# Patient Record
Sex: MALE | Race: WHITE | NOT HISPANIC OR LATINO | Employment: OTHER | URBAN - METROPOLITAN AREA
[De-identification: names, ages, dates, MRNs, and addresses within clinical notes are randomized per-mention and may not be internally consistent; named-entity substitution may affect disease eponyms.]

---

## 2017-10-18 ENCOUNTER — ALLSCRIPTS OFFICE VISIT (OUTPATIENT)
Dept: OTHER | Facility: OTHER | Age: 57
End: 2017-10-18

## 2017-10-19 NOTE — PROGRESS NOTES
Assessment  1  Abdominal pain, epigastric (847 29) (R10 13)    Plan  Abdominal pain, epigastric    · Follow-up PRN Evaluation and Treatment  Follow-up  Status: Complete  Done:  52FSC2009 09:03AM   Ordered; For: Abdominal pain, epigastric; Ordered By: Jose Bee Performed:  Due: 68CAP3933    Discussion/Summary  Discussion Summary:   50-year-old male who previous history of diverticulitis who now has epigastric discomfort and burning  I told him it sounds more upper GI related  I told him he should try either an antacid such as Tums or an over-the-counter Zantac or Prilosec  I told the try that for couple weeks and see if things feel better  If he has further problems to give us call  Goals and Barriers: The patient has the current Goals: Become pain free  The patent has the current Barriers: None  Patient's Capacity to Self-Care: Patient is able to Self-Care  Patient Education: Educational resources provided: Verbal instructions given  Medication SE Review and Pt Understands Tx: The treatment plan was reviewed with the patient/guardian  The patient/guardian understands and agrees with the treatment plan   Self Referrals:   Self Referrals: No Previous Dr Patricia Suarez patient  Chief Complaint  Chief Complaint Free Text Note Form: f/u diverticulitis last year  C/o abdominal pain  History of Present Illness  HPI: 50-year-old male history of diverticulitis  Now having epigastric burning sensation  He notices after coffee or tea in the morning  Gets better when he eat some food  This may come back later on in the day however  Tells me it is similar to previous diverticulitis      Review of Systems  Complete-Male:   Constitutional: No fever or chills, feels well, no tiredness, no recent weight gain or weight loss  Eyes: No complaints of eye pain, no red eyes, no discharge from eyes, no itchy eyes     ENT: no complaints of earache, no hearing loss, no nosebleeds, no nasal discharge, no sore throat, no hoarseness  Cardiovascular: No complaints of slow heart rate, no fast heart rate, no chest pain, no palpitations, no leg claudication, no lower extremity  Respiratory: No complaints of shortness of breath, no wheezing, no cough, no SOB on exertion, no orthopnea or PND  Gastrointestinal: as noted in HPI  Genitourinary: No complaints of dysuria, no incontinence, no hesitancy, no nocturia, no genital lesion, no testicular pain  Musculoskeletal: No complaints of arthralgia, no myalgias, no joint swelling or stiffness, no limb pain or swelling  Integumentary: No complaints of skin rash or skin lesions, no itching, no skin wound, no dry skin  Neurological: No compliants of headache, no confusion, no convulsions, no numbness or tingling, no dizziness or fainting, no limb weakness, no difficulty walking  Psychiatric: Is not suicidal, no sleep disturbances, no anxiety or depression, no change in personality, no emotional problems  Endocrine: No complaints of proptosis, no hot flashes, no muscle weakness, no erectile dysfunction, no deepening of the voice, no feelings of weakness  Hematologic/Lymphatic: No complaints of swollen glands, no swollen glands in the neck, does not bleed easily, no easy bruising  Active Problems  1  Acromioclavicular joint arthritis (716 91) (M19 019)   2  Benign prostatic hyperplasia (600 00) (N40 0)   3  Chronic pain of left ankle (835 83,464 53) (M25 572,G89 29)   4  Diverticulitis of colon (562 11) (K57 32)   5  Encounter for prostate cancer screening (V76 44) (Z12 5)   6  Lumbosacral pain (724 2,724 6) (M54 5)   7  Neuropathic pain of thigh, right (355 8) (G57 91)    Past Medical History  1  History of Chronic right shoulder pain (719 41,338 29) (M25 511,G89 29)   2  History of diverticulitis (V12 70) (Z87 19)  Active Problems And Past Medical History Reviewed: The active problems and past medical history were reviewed and updated today  Surgical History  1  History of Ankle Surgery  Surgical History Reviewed: The surgical history was reviewed and updated today  Family History  Family History    1  No pertinent family history  Family History Reviewed: The family history was reviewed and updated today  Social History   · Always uses seat belt   ·    · Never a smoker   · No alcohol use   · No drug use  Social History Reviewed: The social history was reviewed and updated today  Current Meds   1  Cardura 1 MG Oral Tablet; TAKE 1 TABLET AT BEDTIME; Therapy: (Recorded:11Rph2807) to Recorded   2  Tamsulosin HCl - 0 4 MG Oral Capsule; take 1tablet by mouth at bedtime MDD:1 TDD:1;   Therapy: 78EFS3933 to (Last RX:80CGT0954)  Requested for: 12XRV4299 Ordered    Allergies  1  Penicillins    Physical Exam    Constitutional   General appearance: No acute distress, well appearing and well nourished  Eyes   Conjunctiva and lids: No swelling, erythema, or discharge  Ears, Nose, Mouth, and Throat   External inspection of ears and nose: Normal     Neck   Supple, symmetric, trachea midline, no masses   Pulmonary   Respiratory effort: No increased work of breathing or signs of respiratory distress  Auscultation of lungs: Clear to auscultation, equal breath sounds bilaterally, no wheezes, no rales, no rhonci  Cardiovascular   Auscultation of heart: Normal rate and rhythm, normal S1 and S2, without murmurs  Examination of extremities for edema and/or varicosities: Normal     Carotid pulses: Normal     Abdomen   Abdomen: Non-tender, no masses  Liver and spleen: No hepatomegaly or splenomegaly  Lymphatic   Palpation of lymph nodes in neck: No lymphadenopathy  Musculoskeletal   Gait and station: Normal     Extremities: No clubbing, no cyanosis, no edema   Neurologic   Sensation: Motor and sensory grossly intact      Psychiatric   Orientation to person, place and time: Normal     Mood and affect: Normal        Signatures   Electronically signed by : Carlos James MD; Oct 18 2017  9:05AM EST                       (Author)

## 2017-11-30 ENCOUNTER — GENERIC CONVERSION - ENCOUNTER (OUTPATIENT)
Dept: OTHER | Facility: OTHER | Age: 57
End: 2017-11-30

## 2017-12-18 ENCOUNTER — GENERIC CONVERSION - ENCOUNTER (OUTPATIENT)
Dept: OTHER | Facility: OTHER | Age: 57
End: 2017-12-18

## 2018-01-09 NOTE — RESULT NOTES
Verified Results  * CT Abdomen/Pelvis With Contrast [id number] 08:03PM Antonio Corona     Test Name Result Flag Reference   CT Abd/Pelvis W/ (Report)     [address] [facility];;[place];[id number]   01/08/2016 2002 01/08/2016 2022       CT ABDOMEN AND PELVIS WITH IV CONTRAST     INDICATION- Abdominal pain and fever recent history of perforation  COMPARISON- December 31, 2015     TECHNIQUE- CT examination of the abdomen and pelvis was performed  Examination was performed utilizing techniques to minimize radiation   dose, including the use of dose reduction software  100 ml of Omnipaque 350 was injected intravenously  Axial and coronal   reformatted images were created  Enteric contrast was administered  FINDINGS-     ABDOMEN     LOWER CHEST- No significant abnormalities at the lung bases  LIVER/BILIARY TREE- Unremarkable  GALLBLADDER- No calcified gallstones  No pericholecystic inflammatory   change  SPLEEN- Normal      PANCREAS- Unremarkable  ADRENAL GLANDS- Unremarkable  KIDNEYS/URETERS- Unremarkable  No hydronephrosis  STOMACH AND BOWEL- Stomach is unremarkable  There is thickening of the   distal ileal segment immediately adjacent to a large pelvic abscess   collection  The focal region of thickening within the sigmoid colon   has shown slight interval improvement there remains extensive   pericolonic inflammatory changes within the midline of the pelvis   compatible with recent history of diverticulitis  Free air noted on   the prior study has resolved  APPENDIX- No findings to suggest appendicitis  ABDOMINOPELVIC CAVITY- Within the right pelvis interposed between the   sigmoid colon and several small bowel segments is a large abscess   collection measuring 4 8 x 4 8 cm  Focal collection of air noted   immediately adjacent to the sigmoid colon measures 2 2 x 2 0 cm  This   may represent a 2nd smaller abscess or giant diverticulum    There is   no lymphadenopathy  VESSELS- Unremarkable for patient's age  PELVIS     REPRODUCTIVE ORGANS- Unremarkable for patient's age  URINARY BLADDER- Unremarkable  ABDOMINAL WALL/INGUINAL REGIONS- Unremarkable  OSSEOUS STRUCTURES- No acute fracture or destructive osseous lesion  IMPRESSION-      5 cm right pelvic abscess collection with 2nd smaller 2 cm collection   of air which may represent 2nd smaller abscess or giant diverticulum  The patient's sigmoid diverticulitis has shown slight interval   improvement when compared to prior study         Findings were discussed with Dr Sarina Henderson at time of dictation       Transcribed on- MARELY Bautista MD   Reading Radiologist- MARELY Aparicio MD   Electronically Signed- MARELY Aparicio MD   Released Date Time- 01/08/16 2042   ------------------------------------------------------------------------------   9336^AMBROSIO ALFRED   9336^AMBROSIO ALFRED

## 2018-01-15 NOTE — PROGRESS NOTES
Patient Health Assessment    Date:            11/30/2017  Blood Pressure:  130/86  Pulse:           61  Age:             57  Weight:          165 lbs  Height/Length:   5' 8"  Body Mass Index: 25 1  Provider:        [email or web address]  Clinic:          [facility]:  Medications: Cardura  Allergies:      Penicillin  Since Last Visit: Medical Alert: No Change    Medications: No Change    Allergies:        No Change  Pain Scale Type: Numeric Pain ScalePain Level: 0  Description: pt presents for er exam: rmh: no sig med history, all: pcn, pt  reports pain on upper left side  ioe shows #15 MO caries to pulp, perio involvement, no opposing #18  eoe wnl  discussed treatment options with pt, need for ext or rct, reviewed finances for   rct, pt elects for ext  IRM placed #15 occlusion taken down  nv: comp care  nnv: ext #15    ----- Signed on Thursday, November 30, 2017 at 8:58:57 AM  -----  ----- Provider: Patricio Paula DDS -- Clinic: [facility] -----

## 2018-01-17 NOTE — RESULT NOTES
Message   benign polyps  Colonoscopy in five years  Verified Results  (1) TISSUE EXAM [id number] 10:57AM Divya Lindquist     Test Name Result Flag Reference   LAB AP CASE REPORT (Report)     Surgical Pathology Report             Case: [id number]                   Authorizing Provider: Jia Jose MD      Collected:      05/05/2016 1057        Ordering Location:   [address] [place] [address]    Received:      05/06/2016 [facility]                            Pathologist:      Roxy Yang MD                                Specimens:  A) - Polyp, Colorectal, cold snare, ascending colon, polyp                       B) - Polyp, Colorectal, hot snare, sigmoid, polyp   LAB AP FINAL DIAGNOSIS      A  Polyp, Colorectal, cold snare, ascending colon, polyp:  -Tubular adenoma  -No evidence of high grade dysplasia or malignancy seen  B  Polyp, Colorectal, hot snare, sigmoid, polyp:  -Hyperplastic polyp   -No evidence of adenomatous change or malignancy  LAB AP NOTE      Interpretation performed at [facility], [facility]   LAB AP SURGICAL ADDITIONAL INFORMATION (Report)     These tests were developed and their performance characteristics   determined by [facility] or First Opinion  They may not be cleared or approved by the [place]  Food and   Drug Administration  The FDA has determined that such clearance or   approval is not necessary  These tests are used for clinical purposes  They should not be regarded as investigational or for research  This   laboratory has been approved by CLIA 88, designated as a high-complexity   laboratory and is qualified to perform these tests  LAB AP GROSS DESCRIPTION (Report)     A  The specimen is received in formalin, labeled with the patient's name   and hospital number, and is designated ascending colon polyp   The   specimen consists of one tan-pink polypoid soft tissue fragment measuring 0 4 centimeters in greatest dimension  Entirely submitted  One cassette  B  The specimen is received in formalin, labeled with the patient's name   and hospital number, and is designated sigmoid polyp  The specimen   consists of one pink-red polypoid soft tissue fragment measuring 1 0 by   0 8 by 0 4 centimeters  The margin of resection is inked blue  The   specimen is bisected revealing pink-red dense cut surfaces  Entirely   submitted  One cassette  Note: The estimated total formalin fixation time based upon information   provided by the submitting clinician and the standard processing schedule   is 33 5 hours    MAS   LAB AP CLINICAL INFORMATION polyp     polyp

## 2018-01-23 NOTE — PROGRESS NOTES
Patient Health Assessment    Date:            12/18/2017  Blood Pressure:  120/75  Pulse:           0  Age:             65  Weight:          165 lbs  Height/Length:   5' 8"  Body Mass Index: 25 1  Provider:        [id number]  Cl[facility]:          [facility] Patient Health Assessment    Date:            12/18/2017  Blood Pressure:  120/75  Pulse:           70  Age:             57  Weight:          165 lbs  Height/Length:   5' 8"  Body Mass Index: 25 1  Provider:        [email or web address]  Clinic:          [facility]:  Medications: Cardura  Allergies:      Penicillin  Since Last Visit: Medical Alert: No Change    Medications: No Change    Allergies:        No Change  Pain Scale Type: Numeric Pain ScalePain Level: 0  Description:    Pt presents for limited exam with CC ''temporary filling came out in UL''  PMH reviewed, no changes  No radiographs taken  Tooth #15 pre-existing MO IRM  restoration chipped  No IO swelling, Palpation (-),  percussion (+), cold  (+)  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  Pt would like to  save the tooth, discussed R/B/P of restoring #15, Pt informed that the  prognosis becomes poorer the longer he waits  Pt tx planned for #15 temporary  restoration, #15 RCT, P&C, and [place]  Pt signed tx plan- uploaded to DC  Pt  also expressed interest in crowing #4 and 5     administered 0 5 carps 2% lido 1:100k epi via local infiltration  removed old  IRM restoration  Caries removed with round carbide on slow speed  Restored with   IRM  Verified Contacts and occlusion  Pt left ambulatory and alert      NV: Start #15 RCT(80 mins)    BH/MQ    ----- Signed on Monday, December 18, 2017 at 3:51:45 PM  -----  ----- Provider: [email or web address] - Resident Two, Dentist -- Clinic: [facility] -----
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
## 2018-05-18 ENCOUNTER — HOSPITAL ENCOUNTER (INPATIENT)
Facility: HOSPITAL | Age: 58
LOS: 3 days | Discharge: LEFT BEFORE DISCHARGE | DRG: 392 | End: 2018-05-21
Attending: EMERGENCY MEDICINE | Admitting: SURGERY
Payer: COMMERCIAL

## 2018-05-18 ENCOUNTER — APPOINTMENT (EMERGENCY)
Dept: RADIOLOGY | Facility: HOSPITAL | Age: 58
DRG: 392 | End: 2018-05-18
Payer: COMMERCIAL

## 2018-05-18 DIAGNOSIS — K57.20 DIVERTICULITIS OF LARGE INTESTINE WITH PERFORATION WITHOUT BLEEDING: Primary | ICD-10-CM

## 2018-05-18 LAB
ALBUMIN SERPL BCP-MCNC: 3.6 G/DL (ref 3.5–5)
ALP SERPL-CCNC: 79 U/L (ref 46–116)
ALT SERPL W P-5'-P-CCNC: 20 U/L (ref 12–78)
ANION GAP SERPL CALCULATED.3IONS-SCNC: 7 MMOL/L (ref 4–13)
AST SERPL W P-5'-P-CCNC: 12 U/L (ref 5–45)
BACTERIA UR QL AUTO: ABNORMAL /HPF
BASOPHILS # BLD AUTO: 0.02 THOUSANDS/ΜL (ref 0–0.1)
BASOPHILS NFR BLD AUTO: 0 % (ref 0–1)
BILIRUB SERPL-MCNC: 0.86 MG/DL (ref 0.2–1)
BILIRUB UR QL STRIP: ABNORMAL
BUN SERPL-MCNC: 16 MG/DL (ref 5–25)
CALCIUM SERPL-MCNC: 8.6 MG/DL (ref 8.3–10.1)
CHLORIDE SERPL-SCNC: 105 MMOL/L (ref 100–108)
CLARITY UR: CLEAR
CO2 SERPL-SCNC: 25 MMOL/L (ref 21–32)
COLOR UR: YELLOW
COLOR, POC: NORMAL
CREAT SERPL-MCNC: 0.82 MG/DL (ref 0.6–1.3)
EOSINOPHIL # BLD AUTO: 0.06 THOUSAND/ΜL (ref 0–0.61)
EOSINOPHIL NFR BLD AUTO: 0 % (ref 0–6)
ERYTHROCYTE [DISTWIDTH] IN BLOOD BY AUTOMATED COUNT: 13.3 % (ref 11.6–15.1)
GFR SERPL CREATININE-BSD FRML MDRD: 98 ML/MIN/1.73SQ M
GLUCOSE SERPL-MCNC: 113 MG/DL (ref 65–140)
GLUCOSE UR STRIP-MCNC: NEGATIVE MG/DL
HCT VFR BLD AUTO: 42.2 % (ref 36.5–49.3)
HGB BLD-MCNC: 14.1 G/DL (ref 12–17)
HGB UR QL STRIP.AUTO: NEGATIVE
HYALINE CASTS #/AREA URNS LPF: ABNORMAL /LPF
KETONES UR STRIP-MCNC: ABNORMAL MG/DL
LEUKOCYTE ESTERASE UR QL STRIP: NEGATIVE
LIPASE SERPL-CCNC: 163 U/L (ref 73–393)
LYMPHOCYTES # BLD AUTO: 1.48 THOUSANDS/ΜL (ref 0.6–4.47)
LYMPHOCYTES NFR BLD AUTO: 8 % (ref 14–44)
MCH RBC QN AUTO: 31 PG (ref 26.8–34.3)
MCHC RBC AUTO-ENTMCNC: 33.4 G/DL (ref 31.4–37.4)
MCV RBC AUTO: 93 FL (ref 82–98)
MONOCYTES # BLD AUTO: 1.28 THOUSAND/ΜL (ref 0.17–1.22)
MONOCYTES NFR BLD AUTO: 7 % (ref 4–12)
NEUTROPHILS # BLD AUTO: 16.81 THOUSANDS/ΜL (ref 1.85–7.62)
NEUTS SEG NFR BLD AUTO: 85 % (ref 43–75)
NITRITE UR QL STRIP: NEGATIVE
NON-SQ EPI CELLS URNS QL MICRO: ABNORMAL /HPF
NRBC BLD AUTO-RTO: 0 /100 WBCS
PH UR STRIP.AUTO: 6 [PH] (ref 4.5–8)
PLATELET # BLD AUTO: 254 THOUSANDS/UL (ref 149–390)
PMV BLD AUTO: 10.8 FL (ref 8.9–12.7)
POTASSIUM SERPL-SCNC: 3.6 MMOL/L (ref 3.5–5.3)
PROT SERPL-MCNC: 7.7 G/DL (ref 6.4–8.2)
PROT UR STRIP-MCNC: ABNORMAL MG/DL
RBC # BLD AUTO: 4.55 MILLION/UL (ref 3.88–5.62)
RBC #/AREA URNS AUTO: ABNORMAL /HPF
SODIUM SERPL-SCNC: 137 MMOL/L (ref 136–145)
SP GR UR STRIP.AUTO: >=1.03 (ref 1–1.03)
UROBILINOGEN UR QL STRIP.AUTO: 1 E.U./DL
WBC # BLD AUTO: 19.7 THOUSAND/UL (ref 4.31–10.16)
WBC #/AREA URNS AUTO: ABNORMAL /HPF

## 2018-05-18 PROCEDURE — 96375 TX/PRO/DX INJ NEW DRUG ADDON: CPT

## 2018-05-18 PROCEDURE — 99223 1ST HOSP IP/OBS HIGH 75: CPT | Performed by: PHYSICIAN ASSISTANT

## 2018-05-18 PROCEDURE — 82272 OCCULT BLD FECES 1-3 TESTS: CPT

## 2018-05-18 PROCEDURE — 99285 EMERGENCY DEPT VISIT HI MDM: CPT

## 2018-05-18 PROCEDURE — 83690 ASSAY OF LIPASE: CPT | Performed by: EMERGENCY MEDICINE

## 2018-05-18 PROCEDURE — 96365 THER/PROPH/DIAG IV INF INIT: CPT

## 2018-05-18 PROCEDURE — 81002 URINALYSIS NONAUTO W/O SCOPE: CPT | Performed by: EMERGENCY MEDICINE

## 2018-05-18 PROCEDURE — 85025 COMPLETE CBC W/AUTO DIFF WBC: CPT | Performed by: EMERGENCY MEDICINE

## 2018-05-18 PROCEDURE — 80053 COMPREHEN METABOLIC PANEL: CPT | Performed by: EMERGENCY MEDICINE

## 2018-05-18 PROCEDURE — 36415 COLL VENOUS BLD VENIPUNCTURE: CPT | Performed by: EMERGENCY MEDICINE

## 2018-05-18 PROCEDURE — 74177 CT ABD & PELVIS W/CONTRAST: CPT

## 2018-05-18 PROCEDURE — 81001 URINALYSIS AUTO W/SCOPE: CPT

## 2018-05-18 RX ORDER — DOXAZOSIN MESYLATE 4 MG/1
4 TABLET ORAL
Status: DISCONTINUED | OUTPATIENT
Start: 2018-05-18 | End: 2018-05-21 | Stop reason: HOSPADM

## 2018-05-18 RX ORDER — MORPHINE SULFATE 4 MG/ML
4 INJECTION, SOLUTION INTRAMUSCULAR; INTRAVENOUS ONCE
Status: COMPLETED | OUTPATIENT
Start: 2018-05-18 | End: 2018-05-18

## 2018-05-18 RX ORDER — OXYCODONE HYDROCHLORIDE 10 MG/1
10 TABLET ORAL EVERY 4 HOURS PRN
Status: DISCONTINUED | OUTPATIENT
Start: 2018-05-18 | End: 2018-05-21 | Stop reason: HOSPADM

## 2018-05-18 RX ORDER — DOXAZOSIN MESYLATE 4 MG/1
4 TABLET ORAL
COMMUNITY
End: 2018-11-06

## 2018-05-18 RX ORDER — MORPHINE SULFATE 4 MG/ML
4 INJECTION, SOLUTION INTRAMUSCULAR; INTRAVENOUS ONCE
Status: DISCONTINUED | OUTPATIENT
Start: 2018-05-18 | End: 2018-05-18

## 2018-05-18 RX ORDER — ERGOCALCIFEROL (VITAMIN D2) 1250 MCG
50000 CAPSULE ORAL WEEKLY
COMMUNITY
Start: 2018-05-08 | End: 2018-08-21 | Stop reason: HOSPADM

## 2018-05-18 RX ORDER — CIPROFLOXACIN 2 MG/ML
400 INJECTION, SOLUTION INTRAVENOUS ONCE
Status: COMPLETED | OUTPATIENT
Start: 2018-05-18 | End: 2018-05-18

## 2018-05-18 RX ORDER — ERGOCALCIFEROL 1.25 MG/1
50000 CAPSULE ORAL WEEKLY
Status: DISCONTINUED | OUTPATIENT
Start: 2018-05-24 | End: 2018-05-21 | Stop reason: HOSPADM

## 2018-05-18 RX ORDER — ONDANSETRON 2 MG/ML
4 INJECTION INTRAMUSCULAR; INTRAVENOUS EVERY 6 HOURS PRN
Status: DISCONTINUED | OUTPATIENT
Start: 2018-05-18 | End: 2018-05-21 | Stop reason: HOSPADM

## 2018-05-18 RX ORDER — OXYCODONE HYDROCHLORIDE 5 MG/1
5 TABLET ORAL EVERY 4 HOURS PRN
Status: DISCONTINUED | OUTPATIENT
Start: 2018-05-18 | End: 2018-05-21 | Stop reason: HOSPADM

## 2018-05-18 RX ORDER — SODIUM CHLORIDE 9 MG/ML
125 INJECTION, SOLUTION INTRAVENOUS CONTINUOUS
Status: DISCONTINUED | OUTPATIENT
Start: 2018-05-18 | End: 2018-05-19

## 2018-05-18 RX ORDER — TAMSULOSIN HYDROCHLORIDE 0.4 MG/1
0.4 CAPSULE ORAL EVERY 24 HOURS
COMMUNITY
Start: 2018-04-25 | End: 2018-07-18 | Stop reason: SDUPTHER

## 2018-05-18 RX ORDER — TAMSULOSIN HYDROCHLORIDE 0.4 MG/1
0.4 CAPSULE ORAL
Status: DISCONTINUED | OUTPATIENT
Start: 2018-05-18 | End: 2018-05-21 | Stop reason: HOSPADM

## 2018-05-18 RX ORDER — KETOROLAC TROMETHAMINE 30 MG/ML
15 INJECTION, SOLUTION INTRAMUSCULAR; INTRAVENOUS ONCE
Status: COMPLETED | OUTPATIENT
Start: 2018-05-18 | End: 2018-05-18

## 2018-05-18 RX ORDER — ACETAMINOPHEN 325 MG/1
650 TABLET ORAL EVERY 4 HOURS PRN
Status: DISCONTINUED | OUTPATIENT
Start: 2018-05-18 | End: 2018-05-19

## 2018-05-18 RX ADMIN — METRONIDAZOLE 500 MG: 500 INJECTION, SOLUTION INTRAVENOUS at 08:46

## 2018-05-18 RX ADMIN — HYDROMORPHONE HYDROCHLORIDE 0.5 MG: 1 INJECTION, SOLUTION INTRAMUSCULAR; INTRAVENOUS; SUBCUTANEOUS at 18:22

## 2018-05-18 RX ADMIN — CEFEPIME HYDROCHLORIDE 2000 MG: 2 INJECTION, POWDER, FOR SOLUTION INTRAVENOUS at 18:56

## 2018-05-18 RX ADMIN — CEFAZOLIN SODIUM 1000 MG: 1 SOLUTION INTRAVENOUS at 11:04

## 2018-05-18 RX ADMIN — SODIUM CHLORIDE 125 ML/HR: 0.9 INJECTION, SOLUTION INTRAVENOUS at 21:35

## 2018-05-18 RX ADMIN — SODIUM CHLORIDE 125 ML/HR: 0.9 INJECTION, SOLUTION INTRAVENOUS at 11:05

## 2018-05-18 RX ADMIN — HYDROMORPHONE HYDROCHLORIDE 0.5 MG: 1 INJECTION, SOLUTION INTRAMUSCULAR; INTRAVENOUS; SUBCUTANEOUS at 09:53

## 2018-05-18 RX ADMIN — IOHEXOL 100 ML: 350 INJECTION, SOLUTION INTRAVENOUS at 08:02

## 2018-05-18 RX ADMIN — CIPROFLOXACIN 400 MG: 2 INJECTION, SOLUTION INTRAVENOUS at 09:56

## 2018-05-18 RX ADMIN — HYDROMORPHONE HYDROCHLORIDE 0.5 MG: 1 INJECTION, SOLUTION INTRAMUSCULAR; INTRAVENOUS; SUBCUTANEOUS at 23:02

## 2018-05-18 RX ADMIN — DOXAZOSIN 4 MG: 4 TABLET ORAL at 18:33

## 2018-05-18 RX ADMIN — MORPHINE SULFATE 4 MG: 4 INJECTION INTRAVENOUS at 07:35

## 2018-05-18 RX ADMIN — HYDROMORPHONE HYDROCHLORIDE 0.5 MG: 1 INJECTION, SOLUTION INTRAMUSCULAR; INTRAVENOUS; SUBCUTANEOUS at 20:45

## 2018-05-18 RX ADMIN — HYDROMORPHONE HYDROCHLORIDE 0.5 MG: 1 INJECTION, SOLUTION INTRAMUSCULAR; INTRAVENOUS; SUBCUTANEOUS at 15:39

## 2018-05-18 RX ADMIN — SODIUM CHLORIDE 125 ML/HR: 0.9 INJECTION, SOLUTION INTRAVENOUS at 12:52

## 2018-05-18 RX ADMIN — HYDROMORPHONE HYDROCHLORIDE 0.5 MG: 1 INJECTION, SOLUTION INTRAMUSCULAR; INTRAVENOUS; SUBCUTANEOUS at 13:01

## 2018-05-18 RX ADMIN — METRONIDAZOLE 500 MG: 500 INJECTION, SOLUTION INTRAVENOUS at 18:12

## 2018-05-18 RX ADMIN — KETOROLAC TROMETHAMINE 15 MG: 30 INJECTION, SOLUTION INTRAMUSCULAR at 06:24

## 2018-05-18 RX ADMIN — TAMSULOSIN HYDROCHLORIDE 0.4 MG: 0.4 CAPSULE ORAL at 18:32

## 2018-05-18 NOTE — ED PROVIDER NOTES
History  Chief Complaint   Patient presents with    Abdominal Pain     Pt c/o mid abdominal pain for 3 days  Pt has hx of diverticulitis  Pt denies NVD but states he has dark stools  62 y/o male presents to the ED with abd pain x 3 days  States the pain is located in the periumbilical area- describes it as a sharp/ stabbing, constant pain that has been worsening since onset  States it is a 9/10  Nothing worsens or improves the pain  Denies any n/v/d, cp, sob, or urinary symptoms  Does report subjective fever and chills yesterday  States pain is similar to past diverticulitis  Has tried pepto without any relief  Reports black stools  No hematochezia  Denies any other complaints  Last colonoscopy was 2016- had multiple polyps removed that were benign  History provided by:  Patient  Abdominal Pain   Pain location:  Periumbilical  Pain quality: sharp and stabbing    Pain radiates to:  Does not radiate  Pain severity:  Moderate  Onset quality:  Sudden  Duration:  3 days  Timing:  Constant  Progression:  Worsening  Chronicity:  New  Context: not previous surgeries    Relieved by:  Nothing  Worsened by:  Nothing  Ineffective treatments:  OTC medications  Associated symptoms: no chest pain, no chills, no constipation, no cough, no diarrhea, no dysuria, no fever, no hematuria, no nausea, no shortness of breath, no sore throat and no vomiting    Risk factors: has not had multiple surgeries        Prior to Admission Medications   Prescriptions Last Dose Informant Patient Reported?  Taking?   doxazosin (CARDURA) 4 mg tablet 2018 at 1800  Yes No   Si mg   ergocalciferol (ERGOCALCIFEROL) 01844 units capsule 2018 at 1800  Yes Yes   Si,000 Units   tamsulosin (FLOMAX) 0 4 mg 2018 at 1800  Yes Yes   Si 4 mg every 24 hours      Facility-Administered Medications: None       Past Medical History:   Diagnosis Date    Abscess, intestine     Arthritis     Diverticulitis        Past Surgical History:   Procedure Laterality Date    ABCESS DRAINAGE      COLONOSCOPY N/A 5/5/2016    Procedure: COLONOSCOPY;  Surgeon: Rusty Benson MD;  Location: [facility]; Service:        History reviewed  No pertinent family history  I have reviewed and agree with the history as documented  Social History   Substance Use Topics    Smoking status: Former Smoker    Smokeless tobacco: Never Used    Alcohol use No        Review of Systems   Constitutional: Negative for chills and fever  HENT: Negative for congestion, ear pain and sore throat  Eyes: Negative for pain and visual disturbance  Respiratory: Negative for cough, shortness of breath and wheezing  Cardiovascular: Negative for chest pain and leg swelling  Gastrointestinal: Positive for abdominal pain  Negative for constipation, diarrhea, nausea and vomiting  Genitourinary: Negative for dysuria, frequency, hematuria and urgency  Musculoskeletal: Negative for neck pain and neck stiffness  Skin: Negative for rash and wound  Neurological: Negative for weakness, numbness and headaches  Psychiatric/Behavioral: Negative for agitation and confusion  All other systems reviewed and are negative  Physical Exam  ED Triage Vitals   Temperature Pulse Respirations Blood Pressure SpO2   05/18/18 0558 05/18/18 0558 05/18/18 0558 05/18/18 0558 05/18/18 0558   98 8 °F (37 1 °C) 99 16 122/68 98 %      Temp Source Heart Rate Source Patient Position - Orthostatic VS BP Location FiO2 (%)   05/18/18 0558 05/18/18 0745 05/18/18 0745 05/18/18 0745 --   Oral Monitor Lying Right arm       Pain Score       05/18/18 0608       8           Orthostatic Vital Signs  Vitals:    05/19/18 0700 05/19/18 1500 05/19/18 1756 05/19/18 1758   BP: 115/56 136/85 146/83    Pulse: 79 73  76   Patient Position - Orthostatic VS: Lying Lying         Physical Exam   Constitutional: He is oriented to person, place, and time  He appears well-developed and well-nourished     HENT: Head: Normocephalic and atraumatic  Mouth/Throat: Oropharynx is clear and moist    Eyes: EOM are normal  Pupils are equal, round, and reactive to light  Neck: Normal range of motion  Neck supple  Cardiovascular: Normal rate and regular rhythm  Pulmonary/Chest: Effort normal and breath sounds normal  No respiratory distress  He has no wheezes  He has no rales  He exhibits no tenderness  Abdominal: Soft  Bowel sounds are normal  He exhibits distension  There is tenderness in the right lower quadrant and left lower quadrant  There is CVA tenderness  There is no rigidity, no rebound, no guarding, no tenderness at McBurney's point and negative Murdock's sign  Genitourinary: Rectal exam shows guaiac negative stool  Musculoskeletal: Normal range of motion  Neurological: He is alert and oriented to person, place, and time  No focal deficits   Skin: Skin is warm and dry  Nursing note and vitals reviewed        ED Medications  Medications   doxazosin (CARDURA) tablet 4 mg (4 mg Oral Given 5/19/18 1756)   ergocalciferol (VITAMIN D2) capsule 50,000 Units (not administered)   tamsulosin (FLOMAX) capsule 0 4 mg (0 4 mg Oral Given 5/19/18 1756)   metroNIDAZOLE (FLAGYL) IVPB (premix) 500 mg (0 mg Intravenous Stopped 5/19/18 1830)   oxyCODONE (ROXICODONE) IR tablet 5 mg (5 mg Oral Given 5/19/18 1203)   oxyCODONE (ROXICODONE) immediate release tablet 10 mg (0 mg Oral Return to [facility] 5/18/18 1247)   HYDROmorphone (DILAUDID) injection 0 5 mg (0 5 mg Intravenous Given 5/19/18 2318)   ondansetron (ZOFRAN) injection 4 mg (not administered)   enoxaparin (LOVENOX) subcutaneous injection 40 mg (40 mg Subcutaneous Given 5/19/18 0931)   cefepime (MAXIPIME) 2,000 mg in dextrose 5 % 50 mL IVPB (0 mg Intravenous Stopped 5/19/18 1935)   dextrose 5 % and sodium chloride 0 45 % with KCl 20 mEq/L infusion (75 mL/hr Intravenous New Bag 5/19/18 2313)   acetaminophen (TYLENOL) tablet 650 mg (not administered)   ketorolac (TORADOL) injection 15 mg (15 mg Intravenous Given 5/18/18 0624)   morphine (PF) 4 mg/mL injection 4 mg (4 mg Intravenous Given 5/18/18 0735)   iohexol (OMNIPAQUE) 350 MG/ML injection (MULTI-DOSE) 100 mL (100 mL Intravenous Given 5/18/18 0802)   metroNIDAZOLE (FLAGYL) IVPB (premix) 500 mg (0 mg Intravenous Stopped 5/18/18 0955)   ciprofloxacin (CIPRO) IVPB (premix) 400 mg (0 mg Intravenous Stopped 5/18/18 1058)   potassium chloride 20 mEq IVPB (premix) (20 mEq Intravenous New Bag 5/19/18 1406)       Diagnostic Studies  Results Reviewed     Procedure Component Value Units Date/Time    Basic metabolic panel [[id number]]  (Abnormal) Collected:  05/19/18 2840    Lab Status:  Final result Specimen:  Blood from Arm, Right Updated:  05/19/18 0715     Sodium 136 mmol/L      Potassium 3 6 mmol/L      Chloride 106 mmol/L      CO2 23 mmol/L      Anion Gap 7 mmol/L      BUN 11 mg/dL      Creatinine 0 59 (L) mg/dL      Glucose 93 mg/dL      Calcium 8 3 mg/dL      eGFR 112 ml/min/1 73sq m     Narrative:         National Kidney Disease Education Program recommendations are as follows:  GFR calculation is accurate only with a steady state creatinine  Chronic Kidney disease less than 60 ml/min/1 73 sq  meters  Kidney failure less than 15 ml/min/1 73 sq  meters      Magnesium [[id number]]  (Normal) Collected:  05/19/18 0633    Lab Status:  Final result Specimen:  Blood from Arm, Right Updated:  05/19/18 0715     Magnesium 2 0 mg/dL     CBC and differential [[id number]]  (Abnormal) Collected:  05/19/18 4490    Lab Status:  Final result Specimen:  Blood from Arm, Right Updated:  05/19/18 0705     WBC 18 63 (H) Thousand/uL      RBC 4 14 Million/uL      Hemoglobin 12 9 g/dL      Hematocrit 37 7 %      MCV 91 fL      MCH 31 2 pg      MCHC 34 2 g/dL      RDW 13 2 %      MPV 10 8 fL      Platelets 221 Thousands/uL      nRBC 0 /100 WBCs      Neutrophils Relative 84 (H) %      Lymphocytes Relative 7 (L) %      Monocytes Relative 8 %      Eosinophils Relative 0 %      Basophils Relative 0 %      Neutrophils Absolute 15 64 (H) Thousands/µL      Lymphocytes Absolute 1 35 Thousands/µL      Monocytes Absolute 1 51 (H) Thousand/µL      Eosinophils Absolute 0 07 Thousand/µL      Basophils Absolute 0 02 Thousands/µL     Platelet count [[id number]]     Lab Status:  No result Specimen:  Blood     CBC and differential [[id number]]  (Abnormal) Collected:  05/18/18 0623    Lab Status:  Final result Specimen:  Blood from Arm, Left Updated:  05/18/18 0839     WBC 19 70 (H) Thousand/uL      RBC 4 55 Million/uL      Hemoglobin 14 1 g/dL      Hematocrit 42 2 %      MCV 93 fL      MCH 31 0 pg      MCHC 33 4 g/dL      RDW 13 3 %      MPV 10 8 fL      Platelets 230 Thousands/uL      nRBC 0 /100 WBCs      Neutrophils Relative 85 (H) %      Lymphocytes Relative 8 (L) %      Monocytes Relative 7 %      Eosinophils Relative 0 %      Basophils Relative 0 %      Neutrophils Absolute 16 81 (H) Thousands/µL      Lymphocytes Absolute 1 48 Thousands/µL      Monocytes Absolute 1 28 (H) Thousand/µL      Eosinophils Absolute 0 06 Thousand/µL      Basophils Absolute 0 02 Thousands/µL     Narrative: This is an appended report  These results have been appended to a previously verified report      Urine Microscopic [[id number]]  (Abnormal) Collected:  05/18/18 0734    Lab Status:  Final result Specimen:  Urine from Urine, Other Updated:  05/18/18 0831     RBC, UA 2-4 (A) /hpf      WBC, UA 4-10 (A) /hpf      Epithelial Cells Occasional /hpf      Bacteria, UA None Seen /hpf      Hyaline Casts, UA 10-25 (A) /lpf     POCT urinalysis dipstick [[id number]]  (Normal) Resulted:  05/18/18 0729    Lab Status:  Final result Specimen:  Urine Updated:  05/18/18 0748     Color, UA orange    ED Urine Macroscopic [[id number]]  (Abnormal) Collected:  05/18/18 0734    Lab Status:  Final result Specimen:  Urine Updated:  05/18/18 0729     Color, UA Yellow     Clarity, UA Clear     pH, UA 6 0     Leukocytes, UA Negative Nitrite, UA Negative     Protein,  (2+) (A) mg/dl      Glucose, UA Negative mg/dl      Ketones, UA Trace (A) mg/dl      Urobilinogen, UA 1 0 E U /dl      Bilirubin, UA Interference- unable to analyze (A)     Blood, UA Negative     Specific Gravity, UA >=1 030    Nelli Castellano RESULT    CMP [[id number]] Collected:  05/18/18 3180    Lab Status:  Final result Specimen:  Blood from Arm, Left Updated:  05/18/18 4614     Sodium 137 mmol/L      Potassium 3 6 mmol/L      Chloride 105 mmol/L      CO2 25 mmol/L      Anion Gap 7 mmol/L      BUN 16 mg/dL      Creatinine 0 82 mg/dL      Glucose 113 mg/dL      Calcium 8 6 mg/dL      AST 12 U/L      ALT 20 U/L      Alkaline Phosphatase 79 U/L      Total Protein 7 7 g/dL      Albumin 3 6 g/dL      Total Bilirubin 0 86 mg/dL      eGFR 98 ml/min/1 73sq m     Narrative:         National Kidney Disease Education Program recommendations are as follows:  GFR calculation is accurate only with a steady state creatinine  Chronic Kidney disease less than 60 ml/min/1 73 sq  meters  Kidney failure less than 15 ml/min/1 73 sq  meters  Lipase [[id number]]  (Normal) Collected:  05/18/18 0623    Lab Status:  Final result Specimen:  Blood from Arm, Left Updated:  05/18/18 2131     Lipase 163 u/L                  CT abdomen pelvis with contrast   Final Result by Osmany Lacey MD (05/18 2178)   Sigmoid diverticulitis with adjacent free air consistent with bowel perforation  No abscess               I personally discussed this study with Dr Rosana De La Torre on 5/18/2018 at 8:20 AM                Workstation performed: [id number]               Procedures  Procedures      Phone Consults  ED Phone Contact    ED Course                               MDM  Number of Diagnoses or Management Options  Diverticulitis of large intestine with perforation without bleeding: new and requires workup  Diagnosis management comments: Patient with abd pain- will get labs, UA, and ct abd pelv to r/o intra-abdominal etiology  Case signed out to Dr Dann Oquendo- pending CT scan and dispo  Amount and/or Complexity of Data Reviewed  Clinical lab tests: ordered and reviewed  Tests in the radiology section of CPT®: ordered and reviewed  Tests in the medicine section of CPT®: ordered and reviewed  Discussion of test results with the performing providers: yes  Decide to obtain previous medical records or to obtain history from someone other than the patient: yes  Obtain history from someone other than the patient: yes  Review and summarize past medical records: yes  Discuss the patient with other providers: yes  Independent visualization of images, tracings, or specimens: yes    Patient Progress  Patient progress: improved    CritCare Time    Disposition  Final diagnoses:   Diverticulitis of large intestine with perforation without bleeding     Time reflects when diagnosis was documented in both MDM as applicable and the Disposition within this note     Time User Action Codes Description Comment    5/18/2018  8:35 AM Anthony Allen R Add [K57 32] Diverticulitis large intestine     5/18/2018  8:35 AM [place] Spray Remove [K57 32] Diverticulitis large intestine     5/18/2018  8:35 AM [place] Spray Add [K57 20] Diverticulitis of large intestine with perforation without bleeding       ED Disposition     None      Follow-up Information    None       Current Discharge Medication List      CONTINUE these medications which have NOT CHANGED    Details   ergocalciferol (ERGOCALCIFEROL) 57975 units capsule 50,000 Units      tamsulosin (FLOMAX) 0 4 mg 0 4 mg every 24 hours      doxazosin (CARDURA) 4 mg tablet 4 mg           No discharge procedures on file  ED Provider  Attending physically available and evaluated Jailyn Alegre I managed the patient along with the ED Attending      Electronically Signed by         Meg Johnson DO  05/20/18 0003

## 2018-05-18 NOTE — H&P
Acute Care Surgery H&P- Saul Bunn 1960, 63 y o  male MRN: [id number]    Unit/Bed#: ED 08 Encounter: [id number]    Primary Care Provider: Nanci Atkins MD   Date and time admitted to hospital: 5/18/2018  6:00 AM        Diverticulitis of large intestine with abscess without bleeding   Assessment & Plan    - Acute sigmoid diverticulitis with localized perforation, but no evidence of abscess at this time   - NPO except for sips of liquids  - IV fluid resuscitation   - Initiate IV antibiotics   - P r n  analgesia  - Monitor abdominal exam and leukocytosis  - Consider operative intervention if fails to improve  Above assessment and plan will be discussed with Dr Cintia Fontanez  History of Present Illness     HPI:  Mallory Doe is a 63 y o  male who presents with diffuse abdominal pain  Patient states he began having abdominal pain while eating at [facility] approximately 3 days ago  The pain persisted after returning home and has been constant since its onset  He describes the pain is similar to an episode of diverticulitis he had approximately 2 years ago  The pain is worst across his lower abdomen left-sided greater than right, but he does note that it hurts over his entire abdomen  He denies any fever, nausea, vomiting, chest pain, shortness of breath, cough, dysuria  His bowel habits have been at baseline from a consistency standpoint, but his stool has been dark black in color the last 2 days  Though he has not had a recorded fever, he has had shaking chills intermittently for the last 2 days  Review of Systems   Constitutional: Positive for activity change (Decreased activity secondary to abdominal pain), appetite change (Decreased appetite secondary to abdominal pain) and chills  Negative for fatigue, fever and unexpected weight change  HENT: Negative for congestion and sore throat  Eyes: Negative  Respiratory: Negative    Negative for cough, chest tightness, shortness of breath and wheezing  Cardiovascular: Negative  Negative for chest pain and leg swelling  Gastrointestinal: Positive for abdominal pain  Negative for abdominal distention, constipation, diarrhea, nausea and vomiting  Patient notes dark (black) appearing stools over the last couple days  Endocrine: Negative  Genitourinary: Negative  Negative for difficulty urinating, dysuria, flank pain, frequency and hematuria  Musculoskeletal: Positive for joint swelling (Right elbow joint swelling and pain)  Negative for arthralgias and back pain  Skin: Negative  Negative for color change, pallor, rash and wound  Allergic/Immunologic: Negative  Neurological: Negative  Negative for dizziness, syncope, weakness, light-headedness and headaches  Hematological: Negative  Psychiatric/Behavioral: Negative  Negative for agitation and confusion  Historical Information   Past Medical History:   Diagnosis Date    Abscess, intestine     Arthritis     Diverticulitis      Past Surgical History:   Procedure Laterality Date    ABCESS DRAINAGE      COLONOSCOPY N/A 5/5/2016    Procedure: COLONOSCOPY;  Surgeon: Josey Romero MD;  Location: [facility];   Service:      Social History   History   Alcohol Use No     History   Drug Use No     History   Smoking Status    Former Smoker   Smokeless Tobacco    Never Used     Family History: non-contributory    Meds/Allergies   all medications and allergies reviewed  Allergies   Allergen Reactions    Penicillins        Objective   First Vitals:   Blood Pressure: 122/68 (05/18/18 0558)  Pulse: 99 (05/18/18 0558)  Temperature: 98 8 °F (37 1 °C) (05/18/18 0558)  Temp Source: Oral (05/18/18 0558)  Respirations: 16 (05/18/18 0558)  Weight - Scale: 79 8 kg (176 lb) (05/18/18 0558)  SpO2: 98 % (05/18/18 0558)    Current Vitals:   Blood Pressure: 129/68 (05/18/18 0845)  Pulse: 76 (05/18/18 0845)  Temperature: 98 8 °F (37 1 °C) (05/18/18 1378)  Temp Source: Oral (05/18/18 0558)  Respirations: 16 (05/18/18 0845)  Weight - Scale: 79 8 kg (176 lb) (05/18/18 0558)  SpO2: 97 % (05/18/18 0845)    No intake or output data in the 24 hours ending 05/18/18 0917    Invasive Devices     Peripheral Intravenous Line            Peripheral IV 05/18/18 Left Antecubital less than 1 day                Physical Exam   Constitutional: He is oriented to person, place, and time  Vital signs are normal  He appears well-developed and well-nourished  He does not have a sickly appearance  No distress  HENT:   Head: Normocephalic and atraumatic  Eyes: EOM are normal  Pupils are equal, round, and reactive to light  Neck: Normal range of motion  Neck supple  No tracheal deviation present  Cardiovascular: Normal rate, regular rhythm, normal heart sounds and intact distal pulses  Exam reveals no gallop and no friction rub  No murmur heard  Pulses:       Radial pulses are 2+ on the right side, and 2+ on the left side  Dorsalis pedis pulses are 2+ on the right side, and 2+ on the left side  Pulmonary/Chest: Effort normal and breath sounds normal  No accessory muscle usage  No tachypnea  No respiratory distress  He has no decreased breath sounds  He has no wheezes  He has no rhonchi  He has no rales  Abdominal: Soft  Normal appearance and bowel sounds are normal  He exhibits no mass  There is tenderness in the right lower quadrant, suprapubic area and left lower quadrant  There is rebound (Focal rebound tenderness over the suprapubic area and left lower quadrant ) and guarding  Musculoskeletal: He exhibits no edema  Neurological: He is alert and oriented to person, place, and time  Skin: Skin is warm and dry  No rash noted  He is not diaphoretic  No erythema  No pallor  Psychiatric: He has a normal mood and affect  Nursing note and vitals reviewed  Lab Results:   I have personally reviewed pertinent lab results    , CBC:   Lab Results   Component Value Date    WBC 19 70 (H) 05/18/2018    HGB 14 1 05/18/2018    HCT 42 2 05/18/2018    MCV 93 05/18/2018     05/18/2018    MCH 31 0 05/18/2018    MCHC 33 4 05/18/2018    RDW 13 3 05/18/2018    MPV 10 8 05/18/2018    NRBC 0 05/18/2018   , CMP:   Lab Results   Component Value Date     05/18/2018    K 3 6 05/18/2018     05/18/2018    CO2 25 05/18/2018    ANIONGAP 7 05/18/2018    BUN 16 05/18/2018    CREATININE 0 82 05/18/2018    GLUCOSE 113 05/18/2018    CALCIUM 8 6 05/18/2018    AST 12 05/18/2018    ALT 20 05/18/2018    ALKPHOS 79 05/18/2018    PROT 7 7 05/18/2018    BILITOT 0 86 05/18/2018    EGFR 98 05/18/2018   , Coagulation: No results found for: PT, INR, APTT, Urinalysis:   Lab Results   Component Value Date    COLORU Yellow 05/18/2018    CLARITYU Clear 05/18/2018    SPECGRAV >=1 030 05/18/2018    PHUR 6 0 05/18/2018    LEUKOCYTESUR Negative 05/18/2018    NITRITE Negative 05/18/2018    PROTEINUA 100 (2+) (A) 05/18/2018    GLUCOSEU Negative 05/18/2018    KETONESU Trace (A) 05/18/2018    BILIRUBINUR Interference- unable to analyze (A) 05/18/2018    BLOODU Negative 05/18/2018   , Lipase:   Lab Results   Component Value Date    LIPASE 163 05/18/2018     Imaging: I have personally reviewed pertinent reports  and I have personally reviewed pertinent films in PACS  EKG, Pathology, and Other Studies: I have personally reviewed pertinent reports  Code Status: Prior  Advance Directive and Living Will:      Power of :    POLST:      Counseling / Coordination of Care  Total floor / unit time spent today 35 minutes  This involved direct patient contact where I performed a full history and physical, reviewed previous records, and reviewed laboratory and other diagnostic studies  Greater than 50% of total time was spent with the patient and / or family counseling and / or coordination of care      Jose Brown PA-C  5/18/2018 9:17 AM

## 2018-05-18 NOTE — ED ATTENDING ATTESTATION
I, [address], DO, saw and evaluated the patient  I have discussed the patient with the resident/non-physician practitioner and agree with the resident's/non-physician practitioner's findings, Plan of Care, and MDM as documented in the resident's/non-physician practitioner's note, except where noted  All available labs and Radiology studies were reviewed  At this point I agree with the current assessment done in the Emergency Department  I have conducted an independent evaluation of this patient a history and physical is as follows:    52-year-old male presents with abdominal pain that started 3 days ago  Pain is located the periumbilical area  Reports that he had similar pain 2 years ago with diverticulitis  Patient denies nausea, vomiting, diarrhea but states he does have black stools  No fevers  On exam-no acute distress, heart regular, no respiratory distress, abdomen soft with diffuse tenderness and guarding  Tenderness worse in the lower abdomen    Plan - CT abdomen, check abdominal labs    Critical Care Time  CritCare Time    Procedures

## 2018-05-18 NOTE — ASSESSMENT & PLAN NOTE
- Acute sigmoid diverticulitis with localized perforation, but no evidence of abscess at this time   - NPO except for sips of liquids  - IV fluid resuscitation   - Initiate IV antibiotics   - P r n  analgesia  - Monitor abdominal exam and leukocytosis  - Consider operative intervention if fails to improve

## 2018-05-19 LAB
ANION GAP SERPL CALCULATED.3IONS-SCNC: 7 MMOL/L (ref 4–13)
BASOPHILS # BLD AUTO: 0.02 THOUSANDS/ΜL (ref 0–0.1)
BASOPHILS NFR BLD AUTO: 0 % (ref 0–1)
BUN SERPL-MCNC: 11 MG/DL (ref 5–25)
CALCIUM SERPL-MCNC: 8.3 MG/DL (ref 8.3–10.1)
CHLORIDE SERPL-SCNC: 106 MMOL/L (ref 100–108)
CO2 SERPL-SCNC: 23 MMOL/L (ref 21–32)
CREAT SERPL-MCNC: 0.59 MG/DL (ref 0.6–1.3)
EOSINOPHIL # BLD AUTO: 0.07 THOUSAND/ΜL (ref 0–0.61)
EOSINOPHIL NFR BLD AUTO: 0 % (ref 0–6)
ERYTHROCYTE [DISTWIDTH] IN BLOOD BY AUTOMATED COUNT: 13.2 % (ref 11.6–15.1)
GFR SERPL CREATININE-BSD FRML MDRD: 112 ML/MIN/1.73SQ M
GLUCOSE SERPL-MCNC: 93 MG/DL (ref 65–140)
HCT VFR BLD AUTO: 37.7 % (ref 36.5–49.3)
HGB BLD-MCNC: 12.9 G/DL (ref 12–17)
LYMPHOCYTES # BLD AUTO: 1.35 THOUSANDS/ΜL (ref 0.6–4.47)
LYMPHOCYTES NFR BLD AUTO: 7 % (ref 14–44)
MAGNESIUM SERPL-MCNC: 2 MG/DL (ref 1.6–2.6)
MCH RBC QN AUTO: 31.2 PG (ref 26.8–34.3)
MCHC RBC AUTO-ENTMCNC: 34.2 G/DL (ref 31.4–37.4)
MCV RBC AUTO: 91 FL (ref 82–98)
MONOCYTES # BLD AUTO: 1.51 THOUSAND/ΜL (ref 0.17–1.22)
MONOCYTES NFR BLD AUTO: 8 % (ref 4–12)
NEUTROPHILS # BLD AUTO: 15.64 THOUSANDS/ΜL (ref 1.85–7.62)
NEUTS SEG NFR BLD AUTO: 84 % (ref 43–75)
NRBC BLD AUTO-RTO: 0 /100 WBCS
PLATELET # BLD AUTO: 252 THOUSANDS/UL (ref 149–390)
PMV BLD AUTO: 10.8 FL (ref 8.9–12.7)
POTASSIUM SERPL-SCNC: 3.6 MMOL/L (ref 3.5–5.3)
RBC # BLD AUTO: 4.14 MILLION/UL (ref 3.88–5.62)
SODIUM SERPL-SCNC: 136 MMOL/L (ref 136–145)
WBC # BLD AUTO: 18.63 THOUSAND/UL (ref 4.31–10.16)

## 2018-05-19 PROCEDURE — 99232 SBSQ HOSP IP/OBS MODERATE 35: CPT | Performed by: SURGERY

## 2018-05-19 PROCEDURE — 80048 BASIC METABOLIC PNL TOTAL CA: CPT | Performed by: PHYSICIAN ASSISTANT

## 2018-05-19 PROCEDURE — 85025 COMPLETE CBC W/AUTO DIFF WBC: CPT | Performed by: PHYSICIAN ASSISTANT

## 2018-05-19 PROCEDURE — 83735 ASSAY OF MAGNESIUM: CPT | Performed by: PHYSICIAN ASSISTANT

## 2018-05-19 RX ORDER — DEXTROSE, SODIUM CHLORIDE, AND POTASSIUM CHLORIDE 5; .45; .15 G/100ML; G/100ML; G/100ML
75 INJECTION INTRAVENOUS CONTINUOUS
Status: DISCONTINUED | OUTPATIENT
Start: 2018-05-19 | End: 2018-05-20

## 2018-05-19 RX ORDER — POTASSIUM CHLORIDE 14.9 MG/ML
20 INJECTION INTRAVENOUS
Status: COMPLETED | OUTPATIENT
Start: 2018-05-19 | End: 2018-05-19

## 2018-05-19 RX ORDER — ACETAMINOPHEN 325 MG/1
650 TABLET ORAL EVERY 4 HOURS PRN
Status: DISCONTINUED | OUTPATIENT
Start: 2018-05-19 | End: 2018-05-21 | Stop reason: HOSPADM

## 2018-05-19 RX ADMIN — OXYCODONE HYDROCHLORIDE 5 MG: 5 TABLET ORAL at 12:03

## 2018-05-19 RX ADMIN — CEFEPIME HYDROCHLORIDE 2000 MG: 2 INJECTION, POWDER, FOR SOLUTION INTRAVENOUS at 05:34

## 2018-05-19 RX ADMIN — HYDROMORPHONE HYDROCHLORIDE 0.5 MG: 1 INJECTION, SOLUTION INTRAMUSCULAR; INTRAVENOUS; SUBCUTANEOUS at 08:22

## 2018-05-19 RX ADMIN — HYDROMORPHONE HYDROCHLORIDE 0.5 MG: 1 INJECTION, SOLUTION INTRAMUSCULAR; INTRAVENOUS; SUBCUTANEOUS at 16:25

## 2018-05-19 RX ADMIN — METRONIDAZOLE 500 MG: 500 INJECTION, SOLUTION INTRAVENOUS at 01:49

## 2018-05-19 RX ADMIN — METRONIDAZOLE 500 MG: 500 INJECTION, SOLUTION INTRAVENOUS at 17:56

## 2018-05-19 RX ADMIN — HYDROMORPHONE HYDROCHLORIDE 0.5 MG: 1 INJECTION, SOLUTION INTRAMUSCULAR; INTRAVENOUS; SUBCUTANEOUS at 10:38

## 2018-05-19 RX ADMIN — ENOXAPARIN SODIUM 40 MG: 40 INJECTION SUBCUTANEOUS at 09:31

## 2018-05-19 RX ADMIN — DEXTROSE, SODIUM CHLORIDE, AND POTASSIUM CHLORIDE 75 ML/HR: 5; .45; .15 INJECTION INTRAVENOUS at 23:13

## 2018-05-19 RX ADMIN — HYDROMORPHONE HYDROCHLORIDE 0.5 MG: 1 INJECTION, SOLUTION INTRAMUSCULAR; INTRAVENOUS; SUBCUTANEOUS at 23:18

## 2018-05-19 RX ADMIN — METRONIDAZOLE 500 MG: 500 INJECTION, SOLUTION INTRAVENOUS at 09:31

## 2018-05-19 RX ADMIN — HYDROMORPHONE HYDROCHLORIDE 0.5 MG: 1 INJECTION, SOLUTION INTRAMUSCULAR; INTRAVENOUS; SUBCUTANEOUS at 21:01

## 2018-05-19 RX ADMIN — TAMSULOSIN HYDROCHLORIDE 0.4 MG: 0.4 CAPSULE ORAL at 17:56

## 2018-05-19 RX ADMIN — DOXAZOSIN 4 MG: 4 TABLET ORAL at 17:56

## 2018-05-19 RX ADMIN — HYDROMORPHONE HYDROCHLORIDE 0.5 MG: 1 INJECTION, SOLUTION INTRAMUSCULAR; INTRAVENOUS; SUBCUTANEOUS at 18:53

## 2018-05-19 RX ADMIN — CEFEPIME HYDROCHLORIDE 2000 MG: 2 INJECTION, POWDER, FOR SOLUTION INTRAVENOUS at 18:53

## 2018-05-19 RX ADMIN — ACETAMINOPHEN 650 MG: 325 TABLET, FILM COATED ORAL at 10:44

## 2018-05-19 RX ADMIN — HYDROMORPHONE HYDROCHLORIDE 0.5 MG: 1 INJECTION, SOLUTION INTRAMUSCULAR; INTRAVENOUS; SUBCUTANEOUS at 01:49

## 2018-05-19 RX ADMIN — HYDROMORPHONE HYDROCHLORIDE 0.5 MG: 1 INJECTION, SOLUTION INTRAMUSCULAR; INTRAVENOUS; SUBCUTANEOUS at 05:34

## 2018-05-19 RX ADMIN — POTASSIUM CHLORIDE 20 MEQ: 200 INJECTION, SOLUTION INTRAVENOUS at 10:35

## 2018-05-19 RX ADMIN — POTASSIUM CHLORIDE 20 MEQ: 200 INJECTION, SOLUTION INTRAVENOUS at 14:06

## 2018-05-19 RX ADMIN — DEXTROSE, SODIUM CHLORIDE, AND POTASSIUM CHLORIDE 75 ML/HR: 5; .45; .15 INJECTION INTRAVENOUS at 08:19

## 2018-05-19 NOTE — PLAN OF CARE
GASTROINTESTINAL - ADULT     Minimal or absence of nausea and/or vomiting Progressing     Maintains or returns to baseline bowel function Progressing     Maintains adequate nutritional intake Progressing        GENITOURINARY - ADULT     Maintains or returns to baseline urinary function Progressing     Absence of urinary retention Progressing        METABOLIC, FLUID AND ELECTROLYTES - ADULT     Electrolytes maintained within normal limits Progressing     Fluid balance maintained Progressing

## 2018-05-19 NOTE — PROGRESS NOTES
Progress Note - General Surgery   Roney Bunn 63 y o  male MRN: [id number]  Unit/Bed#: [facility] 617-01 Encounter: [id number]    Assessment:  04R w/complicated diverticulitis w/localized perforation    Plan:  - advance to clears  - maintenance IVF  - cefepime/flagyl  - prn pain control  - OOB/ambulate  - SQH/SCDs      Subjective/Objective   Subjective: feels better today, pain greatly improved, no nausea/vomiting, feels hungry    Objective:    Blood pressure 115/56, pulse 79, temperature 99 5 °F (37 5 °C), temperature source Oral, resp  rate 20, weight 79 8 kg (176 lb), SpO2 94 %  ,Body mass index is 26 76 kg/m²  I/O last 24 hours: In: 358 8 [I V :258 8;  IV Piggyback:100]  Out: 525 [Urine:525]    Invasive Devices     Peripheral Intravenous Line            Peripheral IV 05/18/18 Left Antecubital 1 day                Physical Exam:   NAD  Norm resp effort  RRR  Abd soft, mild tender in BLQ, ND  -c/c/e    Lab, Imaging and other studies:  Lab Results   Component Value Date    WBC 18 63 (H) 05/19/2018    HGB 12 9 05/19/2018    HCT 37 7 05/19/2018    MCV 91 05/19/2018     05/19/2018      Lab Results   Component Value Date    GLUCOSE 93 05/19/2018    CALCIUM 8 3 05/19/2018     05/19/2018    K 3 6 05/19/2018    CO2 23 05/19/2018     05/19/2018    BUN 11 05/19/2018    CREATININE 0 59 (L) 05/19/2018       VTE Pharmacologic Prophylaxis: Heparin  VTE Mechanical Prophylaxis: sequential compression device

## 2018-05-20 LAB
ANION GAP SERPL CALCULATED.3IONS-SCNC: 6 MMOL/L (ref 4–13)
BASOPHILS # BLD AUTO: 0.02 THOUSANDS/ΜL (ref 0–0.1)
BASOPHILS NFR BLD AUTO: 0 % (ref 0–1)
BUN SERPL-MCNC: 6 MG/DL (ref 5–25)
CALCIUM SERPL-MCNC: 8.4 MG/DL (ref 8.3–10.1)
CHLORIDE SERPL-SCNC: 104 MMOL/L (ref 100–108)
CO2 SERPL-SCNC: 27 MMOL/L (ref 21–32)
CREAT SERPL-MCNC: 0.62 MG/DL (ref 0.6–1.3)
EOSINOPHIL # BLD AUTO: 0.2 THOUSAND/ΜL (ref 0–0.61)
EOSINOPHIL NFR BLD AUTO: 2 % (ref 0–6)
ERYTHROCYTE [DISTWIDTH] IN BLOOD BY AUTOMATED COUNT: 13.1 % (ref 11.6–15.1)
GFR SERPL CREATININE-BSD FRML MDRD: 110 ML/MIN/1.73SQ M
GLUCOSE SERPL-MCNC: 110 MG/DL (ref 65–140)
HCT VFR BLD AUTO: 38.8 % (ref 36.5–49.3)
HGB BLD-MCNC: 12.8 G/DL (ref 12–17)
LYMPHOCYTES # BLD AUTO: 1.58 THOUSANDS/ΜL (ref 0.6–4.47)
LYMPHOCYTES NFR BLD AUTO: 12 % (ref 14–44)
MCH RBC QN AUTO: 30.6 PG (ref 26.8–34.3)
MCHC RBC AUTO-ENTMCNC: 33 G/DL (ref 31.4–37.4)
MCV RBC AUTO: 93 FL (ref 82–98)
MONOCYTES # BLD AUTO: 0.95 THOUSAND/ΜL (ref 0.17–1.22)
MONOCYTES NFR BLD AUTO: 8 % (ref 4–12)
NEUTROPHILS # BLD AUTO: 9.93 THOUSANDS/ΜL (ref 1.85–7.62)
NEUTS SEG NFR BLD AUTO: 78 % (ref 43–75)
NRBC BLD AUTO-RTO: 0 /100 WBCS
PLATELET # BLD AUTO: 305 THOUSANDS/UL (ref 149–390)
PMV BLD AUTO: 10.6 FL (ref 8.9–12.7)
POTASSIUM SERPL-SCNC: 3.6 MMOL/L (ref 3.5–5.3)
RBC # BLD AUTO: 4.18 MILLION/UL (ref 3.88–5.62)
SODIUM SERPL-SCNC: 137 MMOL/L (ref 136–145)
WBC # BLD AUTO: 12.71 THOUSAND/UL (ref 4.31–10.16)

## 2018-05-20 PROCEDURE — 99232 SBSQ HOSP IP/OBS MODERATE 35: CPT | Performed by: SURGERY

## 2018-05-20 PROCEDURE — 85025 COMPLETE CBC W/AUTO DIFF WBC: CPT | Performed by: SURGERY

## 2018-05-20 PROCEDURE — 80048 BASIC METABOLIC PNL TOTAL CA: CPT | Performed by: SURGERY

## 2018-05-20 RX ADMIN — HYDROMORPHONE HYDROCHLORIDE 0.5 MG: 1 INJECTION, SOLUTION INTRAMUSCULAR; INTRAVENOUS; SUBCUTANEOUS at 03:28

## 2018-05-20 RX ADMIN — CEFEPIME HYDROCHLORIDE 2000 MG: 2 INJECTION, POWDER, FOR SOLUTION INTRAVENOUS at 05:58

## 2018-05-20 RX ADMIN — HYDROMORPHONE HYDROCHLORIDE 0.5 MG: 1 INJECTION, SOLUTION INTRAMUSCULAR; INTRAVENOUS; SUBCUTANEOUS at 09:39

## 2018-05-20 RX ADMIN — TAMSULOSIN HYDROCHLORIDE 0.4 MG: 0.4 CAPSULE ORAL at 17:32

## 2018-05-20 RX ADMIN — METRONIDAZOLE 500 MG: 500 INJECTION, SOLUTION INTRAVENOUS at 17:33

## 2018-05-20 RX ADMIN — CEFEPIME HYDROCHLORIDE 2000 MG: 2 INJECTION, POWDER, FOR SOLUTION INTRAVENOUS at 18:34

## 2018-05-20 RX ADMIN — HYDROMORPHONE HYDROCHLORIDE 0.5 MG: 1 INJECTION, SOLUTION INTRAMUSCULAR; INTRAVENOUS; SUBCUTANEOUS at 12:29

## 2018-05-20 RX ADMIN — DOXAZOSIN 4 MG: 4 TABLET ORAL at 17:35

## 2018-05-20 RX ADMIN — ENOXAPARIN SODIUM 40 MG: 40 INJECTION SUBCUTANEOUS at 09:39

## 2018-05-20 RX ADMIN — HYDROMORPHONE HYDROCHLORIDE 0.5 MG: 1 INJECTION, SOLUTION INTRAMUSCULAR; INTRAVENOUS; SUBCUTANEOUS at 05:57

## 2018-05-20 RX ADMIN — ONDANSETRON 4 MG: 2 INJECTION INTRAMUSCULAR; INTRAVENOUS at 09:39

## 2018-05-20 RX ADMIN — METRONIDAZOLE 500 MG: 500 INJECTION, SOLUTION INTRAVENOUS at 09:39

## 2018-05-20 RX ADMIN — HYDROMORPHONE HYDROCHLORIDE 0.5 MG: 1 INJECTION, SOLUTION INTRAMUSCULAR; INTRAVENOUS; SUBCUTANEOUS at 14:42

## 2018-05-20 RX ADMIN — ACETAMINOPHEN 650 MG: 325 TABLET, FILM COATED ORAL at 21:00

## 2018-05-20 RX ADMIN — METRONIDAZOLE 500 MG: 500 INJECTION, SOLUTION INTRAVENOUS at 01:30

## 2018-05-20 RX ADMIN — OXYCODONE HYDROCHLORIDE 10 MG: 10 TABLET ORAL at 17:33

## 2018-05-20 NOTE — PROGRESS NOTES
Progress Note - General Surgery   [facility] Sepideh 63 y o  male MRN: [id number]  Unit/Bed#: [facility] 617-01 Encounter: [id number]    Assessment and Plan:  Patient Active Problem List   Diagnosis    Diverticulitis of large intestine with abscess without bleeding       Assessment:  47W w/complicated diverticulitis w/localized perforation     Plan:  Regular diet   D/C IVF   Continue abx - will be d/c-ed on total of 7days abx   Ok to shower   Possibly d/c home later today   Lovenox and venodynes for DVT ppx     Subjective: Pt feels well - tolerated clears yesterday and had 1 normal BM     Objective:     Vitals   Vitals:    05/19/18 1756 05/19/18 1758 05/19/18 2309 05/20/18 0700   BP: 146/83  129/68 136/88   BP Location:   Right arm Left arm   Pulse:  76 71 73   Resp:   20 20   Temp:   98 4 °F (36 9 °C) 98 8 °F (37 1 °C)   TempSrc:   Oral Oral   SpO2:   98% 97%   Weight:       Height:         Temp  Min: 97 8 °F (36 6 °C)  Max: 100 1 °F (37 8 °C)  IBW: 66 1 kg   Body mass index is 27 57 kg/m²  I/O   I/O       05/18 0701 - 05/19 0700 05/19 0701 - 05/20 0700 05/20 0701 - 05/21 0700    P  O  0 1540     I V  (mL/kg) 258 8 (3 2) 1701 3 (21 3)     IV Piggyback 100 154     Total Intake(mL/kg) 358 8 (4 5) 3395 3 (42 5)     Urine (mL/kg/hr) 525 (0 3) 1325 (0 7) 180 (1 5)    Emesis/NG output  0 (0)     Stool  0 (0) 0 (0)    Total Output 525 1325 180    Net -166 3 +2070 3 -180           Unmeasured Urine Occurrence 2 x 7 x 1 x    Unmeasured Stool Occurrence  1 x 1 x          Intake/Output Summary (Last 24 hours) at 05/20/18 2883  Last data filed at 05/20/18 0715   Gross per 24 hour   Intake          3395 25 ml   Output             1505 ml   Net          1890 25 ml       Invasive  Devices  Invasive Devices     Peripheral Intravenous Line            Peripheral IV 05/19/18 Left Forearm 1 day                Active medications  Scheduled Meds:  Current Facility-Administered Medications:  acetaminophen 650 mg Oral Q4H PRN Zehra Marks, DO    cefepime 2,000 mg Intravenous Q12H Ana Schwarz MD Last Rate: Stopped (05/20/18 0630)   doxazosin 4 mg Oral After Dinner Selena Pressley PA-C    enoxaparin 40 mg Subcutaneous Daily Dorian Win PA-C    [START ON 5/24/2018] ergocalciferol 50,000 Units Oral Weekly Dorian Win PA-C    HYDROmorphone 0 5 mg Intravenous Q2H PRN Selena Pressley PA-C    metroNIDAZOLE 500 mg Intravenous Q8H Selena Pressley PA-C Last Rate: Stopped (05/20/18 0200)   ondansetron 4 mg Intravenous Q6H PRN Selena Pressley PA-C    oxyCODONE 10 mg Oral Q4H PRN Selena Pressley PA-C    oxyCODONE 5 mg Oral Q4H PRN Selena Pressley PA-C    tamsulosin 0 4 mg Oral After Dinner Dorian Win PA-C      Continuous Infusions:     PRN Meds:     acetaminophen 650 mg Q4H PRN   HYDROmorphone 0 5 mg Q2H PRN   ondansetron 4 mg Q6H PRN   oxyCODONE 10 mg Q4H PRN   oxyCODONE 5 mg Q4H PRN       Physical Exam: /88 (BP Location: Left arm)   Pulse 73   Temp 98 8 °F (37 1 °C) (Oral)   Resp 20   Ht 5' 7" (1 702 m)   Wt 79 8 kg (176 lb)   SpO2 97%   BMI 27 57 kg/m²     Physical Exam:  General Appearance: Alert, cooperative, no distress, appears stated age  Lungs: Clear to auscultation bilaterally, respirations unlablored   Heart: Regular rate and rhythm, S1 and S2 normal, no murmur, rub or gallop  Abdomen: Soft, non-tender, non distended, bowel sounds active in all four quadrants,   No masses or organomegaly    Laboratory and Diagnostics:    Results from last 7 days  Lab Units 05/20/18  0616 05/19/18  0633 05/18/18  0623   WBC Thousand/uL 12 71* 18 63* 19 70*   HEMOGLOBIN g/dL 12 8 12 9 14 1   HEMATOCRIT % 38 8 37 7 42 2   PLATELETS Thousands/uL 305 252 254   NEUTROS PCT % 78* 84* 85*   MONOS PCT % 8 8 7       Results from last 7 days  Lab Units 05/20/18  0616 05/19/18  0633 05/18/18  0623   SODIUM mmol/L 137 136 137   POTASSIUM mmol/L 3 6 3 6 3 6   CHLORIDE mmol/L 104 106 105   CO2 mmol/L 27 23 25   BUN mg/dL 6 11 16   CREATININE mg/dL 0 62 0 59* 0 82   CALCIUM mg/dL 8 4 8 3 8 6   TOTAL PROTEIN g/dL  --   --  7 7   BILIRUBIN TOTAL mg/dL  --   --  0 86   ALK PHOS U/L  --   --  79   ALT U/L  --   --  20   AST U/L  --   --  12   GLUCOSE RANDOM mg/dL 110 93 113       Results from last 7 days  Lab Units 05/19/18  0633   MAGNESIUM mg/dL 2 0     Lab Results   Component Value Date    PHOS 2 4 (L) 01/02/2016    PHOS 1 6 (L) 01/01/2016          No results found for: TROPONINT  ABG:No results found for: PHART, VJW4ASY, PO2ART, OSL0KKL, M2ISGWJX, BEART, SOURCE    Blood Culture: No results found for: BLOODCX  Urine Culture: No results found for: URINECX  Sputum Culture: No components found for: SPUTUMCX    Imaging: I have personally reviewed pertinent reports     and I have personally reviewed pertinent films in PACS    VTE Pharmacologic Prophylaxis: Enoxaparin (Lovenox)  VTE Mechanical Prophylaxis: sequential compression device

## 2018-05-20 NOTE — CASE MANAGEMENT
Initial Clinical Review    Thank you,  [address] [facility] in the [facility] by [facility] Rodrigues for 2017  Network Utilization Review Department  Phone: [phone]; Fax [phone]  ATTENTION: The Network Utilization Review Department is now centralized for our 7 Facilities  Make a note that we have a new phone and fax numbers for our Department  Please call with any questions or concerns to [phone] and carefully follow the prompts so that you are directed to the right person  All voicemails are confidential  Fax any determinations, approvals, denials, and requests for initial or continue stay review clinical to [phone]  Due to HIGH CALL volume, it would be easier if you could please send faxed requests to expedite your requests and in part, help us provide discharge notifications faster  Admission: Date/Time/Statement: 5/18/18 @ 0921     Orders Placed This Encounter   Procedures    Inpatient Admission     Standing Status:   Standing     Number of Occurrences:   1     Order Specific Question:   Admitting Physician     Answer:   Henrietta Prescott [[id number]]     Order Specific Question:   Level of Care     Answer:   Med Surg [16]     Order Specific Question:   Estimated length of stay     Answer:   More than 2 Midnights     Order Specific Question:   Certification     Answer:   I certify that inpatient services are medically necessary for this patient for a duration of greater than two midnights  See H&P and MD Progress Notes for additional information about the patient's course of treatment  ED: Date/Time/Mode of Arrival:   ED Arrival Information     Expected Arrival Acuity Means of Arrival Escorted By Service Admission Type    - 5/18/2018 05:53 Urgent Walk-In Self Surgery-General Urgent    Arrival Complaint    Abdominal Pain          Chief Complaint:   Chief Complaint   Patient presents with    Abdominal Pain     Pt c/o mid abdominal pain for 3 days  Pt has hx of diverticulitis  Pt denies NVD but states he has dark stools  History of Illness: 63 y o  male who presents with diffuse abdominal pain  Patient states he began having abdominal pain while eating at [facility] approximately 3 days ago  The pain persisted after returning home and has been constant since its onset  He describes the pain is similar to an episode of diverticulitis he had approximately 2 years ago  The pain is worst across his lower abdomen left-sided greater than right, but he does note that it hurts over his entire abdomen  His bowel habits have been at baseline from a consistency standpoint, but his stool has been dark black in color the last 2 days  Though he has not had a recorded fever, he has had shaking chills intermittently for the last 2 days  Exam:  Abdominal: Soft  Normal appearance and bowel sounds are normal  He exhibits no mass  There is tenderness in the right lower quadrant, suprapubic area and left lower quadrant  There is rebound (Focal rebound tenderness over the suprapubic area and left lower quadrant ) and guarding         ED Vital Signs:   ED Triage Vitals   Temperature Pulse Respirations Blood Pressure SpO2   05/18/18 0558 05/18/18 0558 05/18/18 0558 05/18/18 0558 05/18/18 0558   98 8 °F (37 1 °C) 99 16 122/68 98 %      Temp Source Heart Rate Source Patient Position - Orthostatic VS BP Location FiO2 (%)   05/18/18 0558 05/18/18 0745 05/18/18 0745 05/18/18 0745 --   Oral Monitor Lying Right arm       Pain Score       05/18/18 0608       8        Wt Readings from Last 1 Encounters:   05/18/18 79 8 kg (176 lb)       Vital Signs (abnormal): WNL    Abnormal Labs/Diagnostic Test Results: 5/18 -- WBC 19 70    CT a/p 5/18 -- Sigmoid diverticulitis with adjacent free air consistent with bowel perforation    No abscess      UA--   COLORU Yellow 05/18/2018     CLARITYU Clear 05/18/2018     SPECGRAV >=1 030 05/18/2018     PHUR 6 0 05/18/2018     LEUKOCYTESUR Negative 05/18/2018     NITRITE Negative 05/18/2018     PROTEINUA 100 (2+) (A) 05/18/2018     GLUCOSEU Negative 05/18/2018     KETONESU Trace (A) 05/18/2018     BILIRUBINUR Interference- unable to analyze (A) 05/18/2018     BLOODU Negative 05/18/2018         ED Treatment:   Medication Administration from 05/18/2018 0553 to 05/18/2018 1228       Date/Time Order Dose Route Action Action by Comments     05/18/2018 0624 ketorolac (TORADOL) injection 15 mg 15 mg Intravenous Given Albina Brittle, RN      05/18/2018 0735 morphine (PF) 4 mg/mL injection 4 mg 4 mg Intravenous Given Ally Aviles RN      05/18/2018 0802 iohexol (OMNIPAQUE) 350 MG/ML injection (MULTI-DOSE) 100 mL 100 mL Intravenous Given Phylicia Koch      05/18/2018 0846 metroNIDAZOLE (FLAGYL) IVPB (premix) 500 mg 500 mg Intravenous [place] [address] Ally Aviles RN      05/18/2018 9524 ciprofloxacin (CIPRO) IVPB (premix) 400 mg 400 mg Intravenous [facility] Ally Aviles RN      05/18/2018 1104 ceFAZolin (ANCEF) IVPB (premix) 1,000 mg 1,000 mg Intravenous [place] [address] Ally Aviles RN      05/18/2018 8711 HYDROmorphone (DILAUDID) injection 0 5 mg 0 5 mg Intravenous Given Ally Aviles RN      05/18/2018 1105 sodium chloride 0 9 % infusion 125 mL/hr Intravenous New Bag Ally Aviles RN           Past Medical/Surgical History:   Past Medical History:   Diagnosis Date    Abscess, intestine     Arthritis     Diverticulitis        Admitting Diagnosis: Abdominal pain [R10 9]  Diverticulitis of large intestine with perforation without bleeding [K57 20]    Age/Sex: 63 y o  male    Assessment/Plan:   Diverticulitis of large intestine with abscess without bleeding   Assessment & Plan     - Acute sigmoid diverticulitis with localized perforation, but no evidence of abscess at this time   - NPO except for sips of liquids  - IV fluid resuscitation   - Initiate IV antibiotics   - P r n  analgesia  - Monitor abdominal exam and leukocytosis    - Consider operative intervention if fails to improve             Admission Orders:  M/S unit  Regular diet  Monitor I&O's q shift  IS q 1h  Up as tolerated    Scheduled Meds:   Current Facility-Administered Medications:  acetaminophen 650 mg Oral Q4H PRN Iris Felix DO    cefepime 2,000 mg Intravenous Q12H Helen Carey MD Last Rate: Stopped (05/20/18 0630)   doxazosin 4 mg Oral After Dinner Magan Skaggs PA-C    enoxaparin 40 mg Subcutaneous Daily Dorian Win PA-C    [START ON 5/24/2018] ergocalciferol 50,000 Units Oral Weekly Dorian Win PA-C    HYDROmorphone 0 5 mg Intravenous Q2H PRN Magan Skaggs PA-C    metroNIDAZOLE 500 mg Intravenous Q8H Dorian Hamilton PA-C Last Rate: Stopped (05/20/18 1033)   ondansetron 4 mg Intravenous Q6H PRN Magan Skaggs PA-C    oxyCODONE 10 mg Oral Q4H PRN Magan Skaggs PA-C    oxyCODONE 5 mg Oral Q4H PRN Magan Skaggs PA-C    tamsulosin 0 4 mg Oral After Sabra Desai PA-C      Continuous Infusions:  NSS @ 125 ml/hr --> D5 1/2 NS w/ 20 mEq KcL @ 75 ml/hr on 5/19  PRN Meds:   acetaminophen    HYDROmorphone 0 5 mg IV 5/18 x6, 5/19 x8,     Ondansetron     oxyCODONE 5 mg po 5/19

## 2018-05-20 NOTE — SOCIAL WORK
CM met pt at bedside and made aware of CM role at d/c  Pt denies having a LW or POA  Pt's primary contact is son [phone]  Pt reported that he lives with his 3 sons on a 1st floor apt with 4 SAJAN  Pt was IPTA with all ADL's, drive and retired  Pt denies hx with HHC, STR, alc, drug and psych tx  Pt has no DME  [facility]acy is [facility] , [address] [place] [address]  Pt has transportation when d/c    CM reviewed d/c planning process including the following: identifying help at home, patient preference for d/c planning needs, Discharge Lounge, [facility] Meds to Bed program, availability of treatment team to discuss questions or concerns patient and/or family may have regarding understanding medications and recognizing signs and symptoms once discharged  CM also encouraged patient to follow up with all recommended appointments after discharge  Patient advised of importance for patient and family to participate in managing patients medical well being

## 2018-05-21 ENCOUNTER — APPOINTMENT (EMERGENCY)
Dept: RADIOLOGY | Facility: HOSPITAL | Age: 58
DRG: 392 | End: 2018-05-21
Payer: COMMERCIAL

## 2018-05-21 ENCOUNTER — HOSPITAL ENCOUNTER (INPATIENT)
Facility: HOSPITAL | Age: 58
LOS: 4 days | Discharge: HOME | DRG: 392 | End: 2018-05-26
Attending: EMERGENCY MEDICINE | Admitting: SURGERY
Payer: COMMERCIAL

## 2018-05-21 VITALS
HEIGHT: 67 IN | HEART RATE: 72 BPM | RESPIRATION RATE: 20 BRPM | WEIGHT: 176 LBS | BODY MASS INDEX: 27.62 KG/M2 | DIASTOLIC BLOOD PRESSURE: 81 MMHG | OXYGEN SATURATION: 98 % | TEMPERATURE: 98.9 F | SYSTOLIC BLOOD PRESSURE: 118 MMHG

## 2018-05-21 DIAGNOSIS — K57.20 DIVERTICULITIS OF LARGE INTESTINE WITH ABSCESS WITHOUT BLEEDING: ICD-10-CM

## 2018-05-21 DIAGNOSIS — R10.84 GENERALIZED ABDOMINAL PAIN: ICD-10-CM

## 2018-05-21 DIAGNOSIS — K57.20 DIVERTICULITIS OF LARGE INTESTINE WITH PERFORATION WITHOUT BLEEDING: Primary | ICD-10-CM

## 2018-05-21 DIAGNOSIS — R11.2 NAUSEA AND VOMITING: ICD-10-CM

## 2018-05-21 LAB
BASOPHILS # BLD AUTO: 0.01 THOUSANDS/ΜL (ref 0–0.1)
BASOPHILS # BLD AUTO: 0.03 THOUSANDS/ΜL (ref 0–0.1)
BASOPHILS NFR BLD AUTO: 0 % (ref 0–1)
BASOPHILS NFR BLD AUTO: 0 % (ref 0–1)
EOSINOPHIL # BLD AUTO: 0.08 THOUSAND/ΜL (ref 0–0.61)
EOSINOPHIL # BLD AUTO: 0.23 THOUSAND/ΜL (ref 0–0.61)
EOSINOPHIL NFR BLD AUTO: 1 % (ref 0–6)
EOSINOPHIL NFR BLD AUTO: 2 % (ref 0–6)
ERYTHROCYTE [DISTWIDTH] IN BLOOD BY AUTOMATED COUNT: 13.1 % (ref 11.6–15.1)
ERYTHROCYTE [DISTWIDTH] IN BLOOD BY AUTOMATED COUNT: 13.2 % (ref 11.6–15.1)
HCT VFR BLD AUTO: 41.4 % (ref 36.5–49.3)
HCT VFR BLD AUTO: 41.7 % (ref 36.5–49.3)
HGB BLD-MCNC: 13.8 G/DL (ref 12–17)
HGB BLD-MCNC: 14 G/DL (ref 12–17)
LYMPHOCYTES # BLD AUTO: 1.71 THOUSANDS/ΜL (ref 0.6–4.47)
LYMPHOCYTES # BLD AUTO: 1.94 THOUSANDS/ΜL (ref 0.6–4.47)
LYMPHOCYTES NFR BLD AUTO: 15 % (ref 14–44)
LYMPHOCYTES NFR BLD AUTO: 15 % (ref 14–44)
MCH RBC QN AUTO: 30.7 PG (ref 26.8–34.3)
MCH RBC QN AUTO: 31.1 PG (ref 26.8–34.3)
MCHC RBC AUTO-ENTMCNC: 33.3 G/DL (ref 31.4–37.4)
MCHC RBC AUTO-ENTMCNC: 33.6 G/DL (ref 31.4–37.4)
MCV RBC AUTO: 92 FL (ref 82–98)
MCV RBC AUTO: 93 FL (ref 82–98)
MONOCYTES # BLD AUTO: 0.7 THOUSAND/ΜL (ref 0.17–1.22)
MONOCYTES # BLD AUTO: 0.94 THOUSAND/ΜL (ref 0.17–1.22)
MONOCYTES NFR BLD AUTO: 6 % (ref 4–12)
MONOCYTES NFR BLD AUTO: 7 % (ref 4–12)
NEUTROPHILS # BLD AUTO: 9 THOUSANDS/ΜL (ref 1.85–7.62)
NEUTROPHILS # BLD AUTO: 9.9 THOUSANDS/ΜL (ref 1.85–7.62)
NEUTS SEG NFR BLD AUTO: 76 % (ref 43–75)
NEUTS SEG NFR BLD AUTO: 78 % (ref 43–75)
NRBC BLD AUTO-RTO: 0 /100 WBCS
NRBC BLD AUTO-RTO: 0 /100 WBCS
PLATELET # BLD AUTO: 339 THOUSANDS/UL (ref 149–390)
PLATELET # BLD AUTO: 390 THOUSANDS/UL (ref 149–390)
PMV BLD AUTO: 10 FL (ref 8.9–12.7)
PMV BLD AUTO: 10.5 FL (ref 8.9–12.7)
RBC # BLD AUTO: 4.49 MILLION/UL (ref 3.88–5.62)
RBC # BLD AUTO: 4.5 MILLION/UL (ref 3.88–5.62)
WBC # BLD AUTO: 11.56 THOUSAND/UL (ref 4.31–10.16)
WBC # BLD AUTO: 13.09 THOUSAND/UL (ref 4.31–10.16)

## 2018-05-21 PROCEDURE — 83605 ASSAY OF LACTIC ACID: CPT | Performed by: SURGERY

## 2018-05-21 PROCEDURE — 96365 THER/PROPH/DIAG IV INF INIT: CPT

## 2018-05-21 PROCEDURE — 80053 COMPREHEN METABOLIC PANEL: CPT | Performed by: EMERGENCY MEDICINE

## 2018-05-21 PROCEDURE — 85025 COMPLETE CBC W/AUTO DIFF WBC: CPT | Performed by: STUDENT IN AN ORGANIZED HEALTH CARE EDUCATION/TRAINING PROGRAM

## 2018-05-21 PROCEDURE — 99238 HOSP IP/OBS DSCHRG MGMT 30/<: CPT | Performed by: PHYSICIAN ASSISTANT

## 2018-05-21 PROCEDURE — 85025 COMPLETE CBC W/AUTO DIFF WBC: CPT | Performed by: EMERGENCY MEDICINE

## 2018-05-21 PROCEDURE — 83690 ASSAY OF LIPASE: CPT | Performed by: EMERGENCY MEDICINE

## 2018-05-21 PROCEDURE — 36415 COLL VENOUS BLD VENIPUNCTURE: CPT | Performed by: EMERGENCY MEDICINE

## 2018-05-21 PROCEDURE — 96375 TX/PRO/DX INJ NEW DRUG ADDON: CPT

## 2018-05-21 RX ORDER — CEFDINIR 300 MG/1
300 CAPSULE ORAL EVERY 12 HOURS SCHEDULED
Qty: 15 CAPSULE | Refills: 0 | Status: SHIPPED | OUTPATIENT
Start: 2018-05-21 | End: 2018-05-26 | Stop reason: HOSPADM

## 2018-05-21 RX ORDER — CIPROFLOXACIN 2 MG/ML
400 INJECTION, SOLUTION INTRAVENOUS ONCE
Status: COMPLETED | OUTPATIENT
Start: 2018-05-21 | End: 2018-05-22

## 2018-05-21 RX ORDER — ACETAMINOPHEN 325 MG/1
650 TABLET ORAL EVERY 4 HOURS PRN
Qty: 30 TABLET | Refills: 0
Start: 2018-05-21 | End: 2018-06-20

## 2018-05-21 RX ORDER — METRONIDAZOLE 500 MG/1
500 TABLET ORAL 3 TIMES DAILY
Qty: 22 TABLET | Refills: 0 | Status: ON HOLD | OUTPATIENT
Start: 2018-05-21 | End: 2018-05-26

## 2018-05-21 RX ORDER — IBUPROFEN 600 MG/1
600 TABLET ORAL EVERY 6 HOURS PRN
Qty: 30 TABLET | Refills: 0 | Status: SHIPPED | OUTPATIENT
Start: 2018-05-21 | End: 2018-06-17 | Stop reason: HOSPADM

## 2018-05-21 RX ADMIN — ENOXAPARIN SODIUM 40 MG: 40 INJECTION SUBCUTANEOUS at 10:31

## 2018-05-21 RX ADMIN — METRONIDAZOLE 500 MG: 500 INJECTION, SOLUTION INTRAVENOUS at 23:31

## 2018-05-21 RX ADMIN — SODIUM CHLORIDE 1000 ML: 0.9 INJECTION, SOLUTION INTRAVENOUS at 23:31

## 2018-05-21 RX ADMIN — CEFEPIME HYDROCHLORIDE 2000 MG: 2 INJECTION, POWDER, FOR SOLUTION INTRAVENOUS at 05:45

## 2018-05-21 RX ADMIN — METRONIDAZOLE 500 MG: 500 INJECTION, SOLUTION INTRAVENOUS at 01:08

## 2018-05-21 RX ADMIN — ACETAMINOPHEN 650 MG: 325 TABLET, FILM COATED ORAL at 10:31

## 2018-05-21 RX ADMIN — OXYCODONE HYDROCHLORIDE 5 MG: 5 TABLET ORAL at 07:47

## 2018-05-21 RX ADMIN — HYDROMORPHONE HYDROCHLORIDE 0.5 MG: 1 INJECTION, SOLUTION INTRAMUSCULAR; INTRAVENOUS; SUBCUTANEOUS at 23:31

## 2018-05-21 NOTE — DISCHARGE INSTRUCTIONS
Acute Care Surgery Discharge Instructions    Please follow-up as instructed  Activity:  - You may resume activity as tolerated  Return to work:    - You are clear to return to work on discharge  Diet:    - You may resume your normal diet  Medications:  - Please complete the entire course of antibiotics through the last dose on 5/28/2018  - You may resume all of your regular medications, including blood thinners and aspirin, after going home unless otherwise instructed  Please refer to your discharge medication list for further details  - Please take the pain medications as directed  - You may become constipated, especially if taking pain medications  You may take any over the counter stool softeners or laxatives as needed  Examples: Milk of Magnesia, Colace, Senna  Additional Instructions:  - Please have a repeat CT scan of the abdomen and pelvis with oral and IV contrast in approximately 1 week  Please complete repeat CT scan prior to surgical follow-up appointment on 5/30/2018  Please call [phone] to scheduled CT scan  - May shower daily and/or bathe normally  - If you have any questions or concerns after discharge please call the office   - Call office or return to ER if fever greater than 101, chills, persistent nausea/vomiting, and/or worsening/uncontrollable pain

## 2018-05-21 NOTE — PROGRESS NOTES
Progress Note - General Surgery   Avila Bunn 63 y o  male MRN: [id number]  Unit/Bed#: [facility] 617-01 Encounter: [id number]    Assessment:  27Q w/complicated diverticulitis w/localized perforation    Plan:  - reg diet as tolerated  - cefepime/flagyl, transition to PO abx at d/c for 7d total  - prn pain control  - OOB/ambulate  - SQH/SCDs  - d/c today if pain controlled      Subjective/Objective   Subjective: yesterday had 1x emesis after eating breakfast very quickly  Did better after lunch and dinner, with which he took his time and ate smaller amounts  Denies current nausea  Passing flatus and having BM  Pain much decreased  Objective:    Blood pressure 113/65, pulse 62, temperature 97 8 °F (36 6 °C), temperature source Oral, resp  rate 20, height 5' 7" (1 702 m), weight 79 8 kg (176 lb), SpO2 99 %  ,Body mass index is 27 57 kg/m²  I/O last 24 hours: In: 2655 3 [P O :800;  I V :1701 3; IV Piggyback:154]  Out: 1805 [Urine:1805]    Invasive Devices     Peripheral Intravenous Line            Peripheral IV 05/19/18 Left Forearm 1 day                Physical Exam:   NAD  Norm resp effort  RRR  Abd soft, NT/ND  -c/c/e    Lab, Imaging and other studies:  Lab Results   Component Value Date    WBC 12 71 (H) 05/20/2018    HGB 12 8 05/20/2018    HCT 38 8 05/20/2018    MCV 93 05/20/2018     05/20/2018      Lab Results   Component Value Date    GLUCOSE 110 05/20/2018    CALCIUM 8 4 05/20/2018     05/20/2018    K 3 6 05/20/2018    CO2 27 05/20/2018     05/20/2018    BUN 6 05/20/2018    CREATININE 0 62 05/20/2018       VTE Pharmacologic Prophylaxis: Heparin  VTE Mechanical Prophylaxis: sequential compression device

## 2018-05-22 ENCOUNTER — TRANSITIONAL CARE MANAGEMENT (OUTPATIENT)
Dept: INTERNAL MEDICINE CLINIC | Facility: CLINIC | Age: 58
End: 2018-05-22

## 2018-05-22 ENCOUNTER — APPOINTMENT (EMERGENCY)
Dept: RADIOLOGY | Facility: HOSPITAL | Age: 58
DRG: 392 | End: 2018-05-22
Payer: COMMERCIAL

## 2018-05-22 LAB
ALBUMIN SERPL BCP-MCNC: 2.9 G/DL (ref 3.5–5)
ALP SERPL-CCNC: 71 U/L (ref 46–116)
ALT SERPL W P-5'-P-CCNC: 21 U/L (ref 12–78)
ANION GAP SERPL CALCULATED.3IONS-SCNC: 6 MMOL/L (ref 4–13)
ANION GAP SERPL CALCULATED.3IONS-SCNC: 8 MMOL/L (ref 4–13)
AST SERPL W P-5'-P-CCNC: 47 U/L (ref 5–45)
BASOPHILS # BLD AUTO: 0.04 THOUSANDS/ΜL (ref 0–0.1)
BASOPHILS NFR BLD AUTO: 0 % (ref 0–1)
BILIRUB SERPL-MCNC: 0.64 MG/DL (ref 0.2–1)
BUN SERPL-MCNC: 13 MG/DL (ref 5–25)
BUN SERPL-MCNC: 16 MG/DL (ref 5–25)
CALCIUM SERPL-MCNC: 8.1 MG/DL (ref 8.3–10.1)
CALCIUM SERPL-MCNC: 8.7 MG/DL (ref 8.3–10.1)
CHLORIDE SERPL-SCNC: 106 MMOL/L (ref 100–108)
CHLORIDE SERPL-SCNC: 109 MMOL/L (ref 100–108)
CO2 SERPL-SCNC: 23 MMOL/L (ref 21–32)
CO2 SERPL-SCNC: 25 MMOL/L (ref 21–32)
CREAT SERPL-MCNC: 0.68 MG/DL (ref 0.6–1.3)
CREAT SERPL-MCNC: 0.71 MG/DL (ref 0.6–1.3)
EOSINOPHIL # BLD AUTO: 0.14 THOUSAND/ΜL (ref 0–0.61)
EOSINOPHIL NFR BLD AUTO: 1 % (ref 0–6)
ERYTHROCYTE [DISTWIDTH] IN BLOOD BY AUTOMATED COUNT: 13.2 % (ref 11.6–15.1)
GFR SERPL CREATININE-BSD FRML MDRD: 104 ML/MIN/1.73SQ M
GFR SERPL CREATININE-BSD FRML MDRD: 106 ML/MIN/1.73SQ M
GLUCOSE SERPL-MCNC: 91 MG/DL (ref 65–140)
GLUCOSE SERPL-MCNC: 97 MG/DL (ref 65–140)
HCT VFR BLD AUTO: 38.7 % (ref 36.5–49.3)
HGB BLD-MCNC: 12.9 G/DL (ref 12–17)
LACTATE SERPL-SCNC: 0.9 MMOL/L (ref 0.5–2)
LIPASE SERPL-CCNC: 165 U/L (ref 73–393)
LYMPHOCYTES # BLD AUTO: 2.09 THOUSANDS/ΜL (ref 0.6–4.47)
LYMPHOCYTES NFR BLD AUTO: 19 % (ref 14–44)
MAGNESIUM SERPL-MCNC: 2.1 MG/DL (ref 1.6–2.6)
MCH RBC QN AUTO: 30.9 PG (ref 26.8–34.3)
MCHC RBC AUTO-ENTMCNC: 33.3 G/DL (ref 31.4–37.4)
MCV RBC AUTO: 93 FL (ref 82–98)
MONOCYTES # BLD AUTO: 0.73 THOUSAND/ΜL (ref 0.17–1.22)
MONOCYTES NFR BLD AUTO: 7 % (ref 4–12)
NEUTROPHILS # BLD AUTO: 7.98 THOUSANDS/ΜL (ref 1.85–7.62)
NEUTS SEG NFR BLD AUTO: 72 % (ref 43–75)
NRBC BLD AUTO-RTO: 0 /100 WBCS
PLATELET # BLD AUTO: 376 THOUSANDS/UL (ref 149–390)
PMV BLD AUTO: 9.9 FL (ref 8.9–12.7)
POTASSIUM SERPL-SCNC: 3.8 MMOL/L (ref 3.5–5.3)
POTASSIUM SERPL-SCNC: 5.7 MMOL/L (ref 3.5–5.3)
PROT SERPL-MCNC: 7.8 G/DL (ref 6.4–8.2)
RBC # BLD AUTO: 4.18 MILLION/UL (ref 3.88–5.62)
SODIUM SERPL-SCNC: 137 MMOL/L (ref 136–145)
SODIUM SERPL-SCNC: 140 MMOL/L (ref 136–145)
WBC # BLD AUTO: 11.03 THOUSAND/UL (ref 4.31–10.16)

## 2018-05-22 PROCEDURE — 36415 COLL VENOUS BLD VENIPUNCTURE: CPT | Performed by: SURGERY

## 2018-05-22 PROCEDURE — 99222 1ST HOSP IP/OBS MODERATE 55: CPT | Performed by: SURGERY

## 2018-05-22 PROCEDURE — 83735 ASSAY OF MAGNESIUM: CPT | Performed by: SURGERY

## 2018-05-22 PROCEDURE — 80048 BASIC METABOLIC PNL TOTAL CA: CPT | Performed by: SURGERY

## 2018-05-22 PROCEDURE — 74177 CT ABD & PELVIS W/CONTRAST: CPT

## 2018-05-22 PROCEDURE — 99285 EMERGENCY DEPT VISIT HI MDM: CPT

## 2018-05-22 PROCEDURE — 96367 TX/PROPH/DG ADDL SEQ IV INF: CPT

## 2018-05-22 PROCEDURE — 85025 COMPLETE CBC W/AUTO DIFF WBC: CPT | Performed by: SURGERY

## 2018-05-22 RX ORDER — ONDANSETRON 2 MG/ML
4 INJECTION INTRAMUSCULAR; INTRAVENOUS EVERY 6 HOURS PRN
Status: DISCONTINUED | OUTPATIENT
Start: 2018-05-22 | End: 2018-05-22 | Stop reason: SDUPTHER

## 2018-05-22 RX ORDER — HEPARIN SODIUM 5000 [USP'U]/ML
5000 INJECTION, SOLUTION INTRAVENOUS; SUBCUTANEOUS EVERY 8 HOURS SCHEDULED
Status: DISCONTINUED | OUTPATIENT
Start: 2018-05-22 | End: 2018-05-26 | Stop reason: HOSPADM

## 2018-05-22 RX ORDER — OXYCODONE HYDROCHLORIDE AND ACETAMINOPHEN 5; 325 MG/1; MG/1
2 TABLET ORAL EVERY 4 HOURS PRN
Status: DISCONTINUED | OUTPATIENT
Start: 2018-05-22 | End: 2018-05-22

## 2018-05-22 RX ORDER — MORPHINE SULFATE 2 MG/ML
2 INJECTION, SOLUTION INTRAMUSCULAR; INTRAVENOUS EVERY 4 HOURS PRN
Status: DISCONTINUED | OUTPATIENT
Start: 2018-05-22 | End: 2018-05-26

## 2018-05-22 RX ORDER — TAMSULOSIN HYDROCHLORIDE 0.4 MG/1
0.4 CAPSULE ORAL
Status: DISCONTINUED | OUTPATIENT
Start: 2018-05-22 | End: 2018-05-22

## 2018-05-22 RX ORDER — OXYCODONE HYDROCHLORIDE AND ACETAMINOPHEN 5; 325 MG/1; MG/1
1 TABLET ORAL EVERY 4 HOURS PRN
Status: DISCONTINUED | OUTPATIENT
Start: 2018-05-22 | End: 2018-05-22

## 2018-05-22 RX ORDER — MORPHINE SULFATE 4 MG/ML
4 INJECTION, SOLUTION INTRAMUSCULAR; INTRAVENOUS EVERY 4 HOURS PRN
Status: DISCONTINUED | OUTPATIENT
Start: 2018-05-22 | End: 2018-05-26

## 2018-05-22 RX ORDER — SODIUM CHLORIDE 9 MG/ML
100 INJECTION, SOLUTION INTRAVENOUS CONTINUOUS
Status: DISCONTINUED | OUTPATIENT
Start: 2018-05-22 | End: 2018-05-23

## 2018-05-22 RX ORDER — TAMSULOSIN HYDROCHLORIDE 0.4 MG/1
0.4 CAPSULE ORAL
Status: DISCONTINUED | OUTPATIENT
Start: 2018-05-22 | End: 2018-05-26 | Stop reason: HOSPADM

## 2018-05-22 RX ORDER — ONDANSETRON 2 MG/ML
4 INJECTION INTRAMUSCULAR; INTRAVENOUS EVERY 4 HOURS PRN
Status: DISCONTINUED | OUTPATIENT
Start: 2018-05-22 | End: 2018-05-22 | Stop reason: SDUPTHER

## 2018-05-22 RX ORDER — DOXAZOSIN MESYLATE 4 MG/1
4 TABLET ORAL DAILY
Status: DISCONTINUED | OUTPATIENT
Start: 2018-05-22 | End: 2018-05-26 | Stop reason: HOSPADM

## 2018-05-22 RX ORDER — ONDANSETRON 2 MG/ML
4 INJECTION INTRAMUSCULAR; INTRAVENOUS EVERY 4 HOURS PRN
Status: DISCONTINUED | OUTPATIENT
Start: 2018-05-22 | End: 2018-05-26 | Stop reason: HOSPADM

## 2018-05-22 RX ADMIN — HEPARIN SODIUM 5000 UNITS: 5000 INJECTION, SOLUTION INTRAVENOUS; SUBCUTANEOUS at 22:27

## 2018-05-22 RX ADMIN — CEFEPIME HYDROCHLORIDE 2000 MG: 2 INJECTION, POWDER, FOR SOLUTION INTRAVENOUS at 08:17

## 2018-05-22 RX ADMIN — METRONIDAZOLE 500 MG: 500 INJECTION, SOLUTION INTRAVENOUS at 22:27

## 2018-05-22 RX ADMIN — DOXAZOSIN 4 MG: 4 TABLET ORAL at 13:25

## 2018-05-22 RX ADMIN — CEFEPIME HYDROCHLORIDE 2000 MG: 2 INJECTION, POWDER, FOR SOLUTION INTRAVENOUS at 20:01

## 2018-05-22 RX ADMIN — TAMSULOSIN HYDROCHLORIDE 0.4 MG: 0.4 CAPSULE ORAL at 02:53

## 2018-05-22 RX ADMIN — HEPARIN SODIUM 5000 UNITS: 5000 INJECTION, SOLUTION INTRAVENOUS; SUBCUTANEOUS at 13:26

## 2018-05-22 RX ADMIN — HYDROMORPHONE HYDROCHLORIDE 1 MG: 1 INJECTION, SOLUTION INTRAMUSCULAR; INTRAVENOUS; SUBCUTANEOUS at 22:28

## 2018-05-22 RX ADMIN — ONDANSETRON 4 MG: 2 INJECTION INTRAMUSCULAR; INTRAVENOUS at 10:00

## 2018-05-22 RX ADMIN — SODIUM CHLORIDE 100 ML/HR: 0.9 INJECTION, SOLUTION INTRAVENOUS at 02:54

## 2018-05-22 RX ADMIN — METRONIDAZOLE 500 MG: 500 INJECTION, SOLUTION INTRAVENOUS at 15:27

## 2018-05-22 RX ADMIN — SODIUM CHLORIDE 100 ML/HR: 0.9 INJECTION, SOLUTION INTRAVENOUS at 13:26

## 2018-05-22 RX ADMIN — MORPHINE SULFATE 2 MG: 2 INJECTION, SOLUTION INTRAMUSCULAR; INTRAVENOUS at 20:01

## 2018-05-22 RX ADMIN — HEPARIN SODIUM 5000 UNITS: 5000 INJECTION, SOLUTION INTRAVENOUS; SUBCUTANEOUS at 05:11

## 2018-05-22 RX ADMIN — HYDROMORPHONE HYDROCHLORIDE 1 MG: 1 INJECTION, SOLUTION INTRAMUSCULAR; INTRAVENOUS; SUBCUTANEOUS at 09:57

## 2018-05-22 RX ADMIN — CIPROFLOXACIN 400 MG: 2 INJECTION, SOLUTION INTRAVENOUS at 00:08

## 2018-05-22 RX ADMIN — OXYCODONE HYDROCHLORIDE AND ACETAMINOPHEN 1 TABLET: 5; 325 TABLET ORAL at 13:26

## 2018-05-22 RX ADMIN — SODIUM CHLORIDE 100 ML/HR: 0.9 INJECTION, SOLUTION INTRAVENOUS at 23:14

## 2018-05-22 RX ADMIN — METRONIDAZOLE 500 MG: 500 INJECTION, SOLUTION INTRAVENOUS at 09:52

## 2018-05-22 RX ADMIN — IOHEXOL 100 ML: 350 INJECTION, SOLUTION INTRAVENOUS at 00:46

## 2018-05-22 RX ADMIN — HYDROMORPHONE HYDROCHLORIDE 1 MG: 1 INJECTION, SOLUTION INTRAMUSCULAR; INTRAVENOUS; SUBCUTANEOUS at 15:30

## 2018-05-22 RX ADMIN — OXYCODONE HYDROCHLORIDE AND ACETAMINOPHEN 1 TABLET: 5; 325 TABLET ORAL at 05:24

## 2018-05-22 RX ADMIN — TAMSULOSIN HYDROCHLORIDE 0.4 MG: 0.4 CAPSULE ORAL at 22:27

## 2018-05-22 NOTE — ASSESSMENT & PLAN NOTE
- Acute sigmoid diverticulitis with localized perforation, but no evidence of abscess on admission   - Patient electing for leaving against medical advice on 05/21/2018  He was advised to continues inpatient hospitalization on IV antibiotics due to worsening leukocytosis and persistent abdominal tenderness likely associated to ongoing diverticulitis and possible intra-abdominal abscess formation  The risks of leaving against medical advice including, but not limited to worsening intra-abdominal infection, sepsis, need for operative intervention, and possible death were reviewed with the patient and he acknowledged the risks  - Prior to leaving, he did agree to wait for some discharge recommendations and prescriptions for oral antibiotics, but did have an intention to return to the hospital after he addressed some personal fairs   - Should he elect to not return to the hospital, he was discharged with an oral antibiotic regimen, recommendations for an outpatient CT scan of the abdomen and pelvis within the next week, and short-term outpatient follow-up in the surgery clinic

## 2018-05-22 NOTE — PROGRESS NOTES
Progress Note - General Surgery   Diane Bunn 63 y o  male MRN: [id number]  Unit/Bed#: ED 09 Encounter: [id number]    Assessment:  63 y o  M w/ perforated diverticulitis; perf contained in pelvis    Returned to ED w/ worsening abd pain; non toxic w/o peritonitis     Plan:  Advance diet to clears today  IVF   Cefepime/flagyl   Serial exams  Cont to monitor WBC    Subjective/Objective     Subjective: Pain improved this AM    Objective:     Blood pressure 144/83, pulse 71, temperature 99 5 °F (37 5 °C), temperature source Tympanic, resp  rate 20, SpO2 97 %  ,There is no height or weight on file to calculate BMI      No intake or output data in the 24 hours ending 05/22/18 0954    Invasive Devices     Peripheral Intravenous Line            Peripheral IV 05/21/18 Right Forearm less than 1 day                Physical Exam:   NAD  CV RRR  Pulm CTA  Abd soft, nondistended, tender in pelvis and LLQ    Lab, Imaging and other studies:  CBC:   Lab Results   Component Value Date    WBC 11 03 (H) 05/22/2018    HGB 12 9 05/22/2018    HCT 38 7 05/22/2018    MCV 93 05/22/2018     05/22/2018    MCH 30 9 05/22/2018    MCHC 33 3 05/22/2018    RDW 13 2 05/22/2018    MPV 9 9 05/22/2018    NRBC 0 05/22/2018   , CMP:   Lab Results   Component Value Date     05/22/2018    K 3 8 05/22/2018     (H) 05/22/2018    CO2 25 05/22/2018    ANIONGAP 6 05/22/2018    BUN 13 05/22/2018    CREATININE 0 68 05/22/2018    GLUCOSE 91 05/22/2018    CALCIUM 8 1 (L) 05/22/2018    AST 47 (H) 05/21/2018    ALT 21 05/21/2018    ALKPHOS 71 05/21/2018    PROT 7 8 05/21/2018    BILITOT 0 64 05/21/2018    EGFR 106 05/22/2018   , Coagulation: No results found for: PT, INR, APTT  VTE Pharmacologic Prophylaxis: Heparin  VTE Mechanical Prophylaxis: sequential compression device

## 2018-05-22 NOTE — SOCIAL WORK
CM met pt at bedside and made aware of CM role at d/c  Pt is readmission   Pt denies having a LW or POA  Pt's primary contact is son [phone]  Pt reported that he lives with his 3 sons on a 1st floor apt with 4 SAJAN  Pt was IPTA with all ADL's, drive and retired  Pt denies hx with HHC, STR, alc, drug and psych tx  Pt has no DME  [facility] is [facility] , [address] [place] [address]  Pt has transportation when d/c    CM reviewed d/c planning process including the following: identifying help at home, patient preference for d/c planning needs, Discharge Lounge, [facility] Meds to Bed program, availability of treatment team to discuss questions or concerns patient and/or family may have regarding understanding medications and recognizing signs and symptoms once discharged  CM also encouraged patient to follow up with all recommended appointments after discharge  Patient advised of importance for patient and family to participate in managing patients medical well being

## 2018-05-22 NOTE — ED NOTES
Patient has discharge prescriptions in hand, did not have them filled at a pharmacy       Kely Groves, CRISTAL  05/21/18 1757

## 2018-05-22 NOTE — H&P
H&P Exam - General Surgery   Amy Bunn 63 y o  male MRN: [id number]  Unit/Bed#: ED 09 Encounter: [id number]    Assessment/Plan     Assessment:  50-year-old male with locally perforated diverticulitis and worsening abdominal pain  Given patient's history, he is at high risk for developing pelvic abscess  Will obtain CT scan of the abdomen pelvis with IV contrast evaluate for fluid collection or worsening intra-abdominal air    Plan:  Obtain CT abdomen pelvis  PRN  Analgesia  NPO  IV fluids  IV antibiotics      History of Present Illness       HPI:  Wally Ortiz is a 63 y o  male who presents with 1 day of worsening abdominal pain  Patient was admitted on 05/18/2018 for abdominal pain was found to have locally perforated sigmoid diverticulitis  Patient was moved to the general surgical service and underwent conservative treatment with minimal improvement  Patient left hospital today against medical advice in order to take care family affairs  Patient states that he has been having increasing abdominal pain for the past day  He has been having persistent nausea vomiting  He has been passing gas and bowel movements  Patient's history of previously complicated diverticulitis status post percutaneous drainage in 2016  Patient underwent subsequent colonoscopy and was found to have sigmoid polyp, and diverticulosis however no evidence of sigmoid mass  Review of Systems  Complete 12 point review of systems was performed, all findings are negative unless otherwise noted by the HPI  Historical Information   Past Medical History:   Diagnosis Date    Abscess, intestine     Arthritis     Diverticulitis      Past Surgical History:   Procedure Laterality Date    ABCESS DRAINAGE      COLONOSCOPY N/A 5/5/2016    Procedure: COLONOSCOPY;  Surgeon: Merry Gonzales MD;  Location: [facility];   Service:      Social History   History   Alcohol Use No     History   Drug Use No     History   Smoking Status    Former Smoker   Smokeless Tobacco    Never Used     Family History: non-contributory    Meds/Allergies   all medications and allergies reviewed  Allergies   Allergen Reactions    Penicillins        Objective   First Vitals:   Blood Pressure: 158/89 (05/21/18 2054)  Pulse: 105 (05/21/18 2054)  Temperature: 99 5 °F (37 5 °C) (05/21/18 2054)  Temp Source: Tympanic (05/21/18 2054)  Respirations: 20 (05/21/18 2054)  SpO2: 96 % (05/21/18 2054)    Current Vitals:   Blood Pressure: 158/89 (05/21/18 2054)  Pulse: 105 (05/21/18 2054)  Temperature: 99 5 °F (37 5 °C) (05/21/18 2054)  Temp Source: Tympanic (05/21/18 2054)  Respirations: 20 (05/21/18 2054)  SpO2: 96 % (05/21/18 2054)    No intake or output data in the 24 hours ending 05/21/18 2318    Invasive Devices          No matching active lines, drains, or airways          Physical Exam   Constitutional: He is oriented to person, place, and time  He appears well-developed  No distress  HENT:   Head: Normocephalic  Cardiovascular: Normal rate and regular rhythm  Pulmonary/Chest: Effort normal  No respiratory distress  He has no wheezes  Abdominal: Soft  He exhibits no distension  There is tenderness  There is guarding  There is no rebound  Diffuse lower abdominal tenderness w/ voluntary guarding; no rebound or hubert diffuse peritonitis on my exam   Musculoskeletal: Normal range of motion  Neurological: He is alert and oriented to person, place, and time  Skin: Skin is warm and dry         Lab Results:   CBC:   Lab Results   Component Value Date    WBC 13 09 (H) 05/21/2018    HGB 14 0 05/21/2018    HCT 41 7 05/21/2018    MCV 93 05/21/2018     05/21/2018    MCH 31 1 05/21/2018    MCHC 33 6 05/21/2018    RDW 13 2 05/21/2018    MPV 10 5 05/21/2018    NRBC 0 05/21/2018   , CMP: No results found for: NA, K, CL, CO2, ANIONGAP, BUN, CREATININE, GLUCOSE, CALCIUM, AST, ALT, ALKPHOS, PROT, ALBUMIN, BILITOT, EGFR, Coagulation: No results found for: PT, INR, APTT  Imaging: I have personally reviewed pertinent reports  EKG, Pathology, and Other Studies: I have personally reviewed pertinent reports        Code Status: Prior  Advance Directive and Living Will:      Power of :    POLST:

## 2018-05-22 NOTE — DISCHARGE SUMMARY
Discharge- Diane Bunn 1960, 63 y o  male MRN: [id number]    Unit/Bed#: [facility] 368-59 Encounter: [id number]    Primary Care Provider: Jane Becerra MD   Date and time admitted to hospital: 5/18/2018  6:00 AM        * Diverticulitis of large intestine with abscess without bleeding   Assessment & Plan    - Acute sigmoid diverticulitis with localized perforation, but no evidence of abscess on admission   - Patient electing for leaving against medical advice on 05/21/2018  He was advised to continues inpatient hospitalization on IV antibiotics due to worsening leukocytosis and persistent abdominal tenderness likely associated to ongoing diverticulitis and possible intra-abdominal abscess formation  The risks of leaving against medical advice including, but not limited to worsening intra-abdominal infection, sepsis, need for operative intervention, and possible death were reviewed with the patient and he acknowledged the risks  - Prior to leaving, he did agree to wait for some discharge recommendations and prescriptions for oral antibiotics, but did have an intention to return to the hospital after he addressed some personal fairs   - Should he elect to not return to the hospital, he was discharged with an oral antibiotic regimen, recommendations for an outpatient CT scan of the abdomen and pelvis within the next week, and short-term outpatient follow-up in the surgery clinic  Discharge Summary - General Surgery   Diane Bunn 63 y o  male MRN: [id number]  Unit/Bed#: [facility] 092-69 Encounter: [id number]    Admission Date: 5/18/2018     Discharge Date: 5/21/2018    Admitting Diagnosis: Abdominal pain [R10 9]  Diverticulitis of large intestine with perforation without bleeding [K57 20]    Discharge Diagnosis: See above  Attending and Service: Dr Kasper [facility]      Consulting Physician(s): None    Imaging and Procedures Performed:     CT scan of the abdomen and pelvis on 05/18/2018 showed sigmoid diverticulitis with adjacent free air consistent with bowel perforation  No abscess  Pathology: N/A    Hospital Course: Oly eMjia is a 15-year-old male who presented with diffuse abdominal pain ongoing for approximately 3 days  He described the pain is similar to an episode of diverticulitis he had in the past   The pain was worst in the left lower abdominal region, but did hurt across his entire abdomen  He denied any associated fever, nausea, vomiting, chest pain, shortness breath, cough, and/or dysuria  He did have some associated chills intermittently over the last couple of days  On his initial surgical evaluation, he was afebrile with normal vital signs; he had a soft, nondistended abdomen with diffuse abdominal tenderness and focal peritonitis over the left lower quadrant; the remainder of his exam was unremarkable  His initial workup included the above-noted CT scan of the abdomen and pelvis  He is admitted to the acute care surgery service with acute perforated sigmoid diverticulitis  His treatment options including both non operative and operative treatments were discussed with the patient  Non operative management was recommended initially, the patient did not wish to undergo operative intervention if at all possible  He was initially kept NPO, started on IV fluid resuscitation and IV antibiotics as well as an analgesic regimen  His abdominal exam was closely followed  During his hospitalization, he did have improvement in his pain and tenderness, but it did persist   After initial improvement in his leukocytosis, he did experience some increasing leukocytosis on 05/21/2018  Despite the persistent pain and tenderness as well as increasing leukocytosis, the patient elected to leave against medical advice as noted above due to some personal fears that he deemed to important to ignore  He was signed out against medical advice on 05/21/2018      On discharge, he was provided discharge instructions to continue oral antibiotics, obtain repeat CT scan of the abdomen and pelvis within the next week and have short-term outpatient surgical follow-up  He was also strongly cautioned to return to the emergency department with any worsening in his symptoms  Condition at Discharge: serious     Discharge instructions/Information to patient and family:   See after visit summary for information provided to patient and family  Provisions for Follow-Up Care:  See after visit summary for information related to follow-up care and any pertinent home health orders  Disposition: Left against medical advice    Planned Readmission: No    Discharge Statement   I spent 30 minutes discharging the patient  This time was spent on the day of discharge  I had direct contact with the patient on the day of discharge  Additional documentation is required if more than 30 minutes were spent on discharge  Discharge Medications:  See after visit summary for reconciled discharge medications provided to patient and family        Jose Brown PA-C  5/21/2018 12:48 PM

## 2018-05-22 NOTE — ED ATTENDING ATTESTATION
Marta Jean MD, saw and evaluated the patient  I have discussed the patient with the resident/non-physician practitioner and agree with the resident's/non-physician practitioner's findings, Plan of Care, and MDM as documented in the resident's/non-physician practitioner's note, except where noted  All available labs and Radiology studies were reviewed  At this point I agree with the current assessment done in the Emergency Department    I have conducted an independent evaluation of this patient a history and physical is as follows:   the patient was recently seen on 5/18 for left lower quadrant abdominal pain   The patient was diagnosed with diverticulitis with a localized perforation he was admitted to the hospital was given IV antibiotics apparently today he states he was feeling better and was discharged however patient states he was not really feeling better   patient has had no vomiting he has had a bowel movement today he continues to have low-grade fevers and abdominal pain patient did not get his outpatient antibiotics prescriptions filled   exam: The patient is in no acute distress low-grade temp 99 5° HEENT mucous membranes moist lungs clear heart regular mildly tachycardic abdomen soft positive bowel sounds tenderness is noted in the lower abdomen   impression diverticulitis   Will check labs IV fluids give a dose of IV antibiotics and have surgery Re consulted    Critical Care Time  CritCare Time    Procedures

## 2018-05-22 NOTE — ED PROVIDER NOTES
History  Chief Complaint   Patient presents with    Abdominal Pain     pt left ER thi afternoon and states he does not feel better and wants to have  MRI and have abscess drained tonight     27-year-old male with past medical history significant for multiple episodes of diverticulitis in the past who is presenting with recurrent diverticulitis  Patient initially presented to this facility on May 18th and was ultimately diagnosed with sigmoid diverticulitis with localized perforation  No abscess  Patient was admitted under [facility] and was treated with IV antibiotics  Apparently, patient had to leave the hospital today in order [place] the airport  He states that he lied to his care team regarding feeling better and tolerating his diet  He essentially signed out AMA  Patient returns today because his pain has worsened  Patient reports that he has had nausea and vomiting  He had 1 small bowel movement on the morning of presentation  He has been passing gas  Patient reports that he has not been able to eat or drink  Patient reports that he also has had fever and chills  No other symptoms on review of systems  Plan:  CBC, CMP, lipase  IV fluids, IV analgesics, and IV antibiotics  Evaluation by [facility] and probable admission  Prior to Admission Medications   Prescriptions Last Dose Informant Patient Reported? Taking?   acetaminophen (TYLENOL) 325 mg tablet 2018 at Unknown time  No Yes   Sig: Take 2 tablets (650 mg total) by mouth every 4 (four) hours as needed for mild pain for up to 30 days Do not exceed 4 g daily     cefdinir (OMNICEF) 300 mg capsule 2018 at Unknown time  No Yes   Sig: Take 1 capsule (300 mg total) by mouth every 12 (twelve) hours for 7 days   doxazosin (CARDURA) 4 mg tablet 2018 at Unknown time  Yes Yes   Si mg   ergocalciferol (ERGOCALCIFEROL) 55786 units capsule 2018 at Unknown time  Yes Yes   Si,000 Units   ibuprofen (MOTRIN) 600 mg tablet 2018 at Unknown time  No Yes   Sig: Take 1 tablet (600 mg total) by mouth every 6 (six) hours as needed for mild pain (Take with food ) for up to 30 days   metroNIDAZOLE (FLAGYL) 500 mg tablet 2018 at Unknown time  No Yes   Sig: Take 1 tablet (500 mg total) by mouth 3 (three) times a day for 7 days   tamsulosin (FLOMAX) 0 4 mg 2018 at Unknown time  Yes Yes   Si 4 mg every 24 hours      Facility-Administered Medications: None       Past Medical History:   Diagnosis Date    Abscess, intestine     Arthritis     Diverticulitis        Past Surgical History:   Procedure Laterality Date    ABCESS DRAINAGE      COLONOSCOPY N/A 2016    Procedure: COLONOSCOPY;  Surgeon: Cindy Curtis MD;  Location: [facility]; Service:        No family history on file  I have reviewed and agree with the history as documented  Social History   Substance Use Topics    Smoking status: Former Smoker    Smokeless tobacco: Never Used    Alcohol use No        Review of Systems   Constitutional: Negative for diaphoresis, fever and unexpected weight change  HENT: Negative for congestion, rhinorrhea and sore throat  Eyes: Negative for pain, discharge and visual disturbance  Respiratory: Negative for cough, shortness of breath and wheezing  Cardiovascular: Negative for chest pain, palpitations and leg swelling  Gastrointestinal: Positive for abdominal pain  Negative for blood in stool, constipation, diarrhea, nausea and vomiting  Genitourinary: Negative for dysuria, flank pain and hematuria  Musculoskeletal: Negative for arthralgias and myalgias  Skin: Negative for rash and wound  Allergic/Immunologic: Negative for environmental allergies and food allergies  Neurological: Negative for dizziness, seizures, weakness and numbness  Hematological: Negative for adenopathy  Psychiatric/Behavioral: Negative for confusion and hallucinations         Physical Exam  ED Triage Vitals   Temperature Pulse Respirations Blood Pressure SpO2   05/21/18 2054 05/21/18 2054 05/21/18 2054 05/21/18 2054 05/21/18 2054   99 5 °F (37 5 °C) 105 20 158/89 96 %      Temp Source Heart Rate Source Patient Position - Orthostatic VS BP Location FiO2 (%)   05/21/18 2054 05/21/18 2054 -- 05/21/18 2054 --   Tympanic Monitor  Right arm       Pain Score       05/21/18 2331       Worst Possible Pain           Orthostatic Vital Signs  Vitals:    05/21/18 2054   BP: 158/89   Pulse: 105       Physical Exam   Constitutional: He is oriented to person, place, and time  He appears well-developed and well-nourished  He appears distressed  Mild distress secondary to pain  HENT:   Head: Normocephalic and atraumatic  Right Ear: External ear normal    Left Ear: External ear normal    Nose: Nose normal    Eyes: EOM are normal  Pupils are equal, round, and reactive to light  Neck: Normal range of motion  Neck supple  Cardiovascular: Normal rate, regular rhythm and normal heart sounds  No murmur heard  Pulmonary/Chest: Effort normal and breath sounds normal  No respiratory distress  He has no wheezes  He has no rales  Abdominal: Bowel sounds are normal  He exhibits no distension  There is tenderness  There is guarding  No distention  Bowel sounds are normal  Patient is diffusely tender to palpation, most significantly over the bilateral lower quadrants and suprapubic region  Voluntary guarding to palpation  No overt peritonitis  Musculoskeletal: Normal range of motion  He exhibits no deformity  Neurological: He is alert and oriented to person, place, and time  No gross motor deficits noted  Cranial nerves II-XII are intact  Speech is fluent without dysarthria or aphasia  Skin: Skin is warm and dry  Capillary refill takes less than 2 seconds  He is not diaphoretic  Psychiatric: He has a normal mood and affect   His behavior is normal  Judgment and thought content normal    Nursing note and vitals reviewed  ED Medications  Medications   sodium chloride 0 9 % bolus 1,000 mL (0 mL Intravenous Stopped 5/22/18 0116)   ciprofloxacin (CIPRO) IVPB (premix) 400 mg (0 mg Intravenous Stopped 5/22/18 0116)   metroNIDAZOLE (FLAGYL) IVPB (premix) 500 mg (0 mg Intravenous Stopped 5/22/18 0007)   HYDROmorphone (DILAUDID) injection 0 5 mg (0 5 mg Intravenous Given 5/21/18 2331)   iohexol (OMNIPAQUE) 350 MG/ML injection (MULTI-DOSE) 100 mL (100 mL Intravenous Given 5/22/18 0046)       Diagnostic Studies  Results Reviewed     Procedure Component Value Units Date/Time    Comprehensive metabolic panel [[id number]]  (Abnormal) Collected:  05/21/18 2331    Lab Status:  Final result Specimen:  Blood from Arm, Right Updated:  05/22/18 0014     Sodium 137 mmol/L      Potassium 5 7 (H) mmol/L      Chloride 106 mmol/L      CO2 23 mmol/L      Anion Gap 8 mmol/L      BUN 16 mg/dL      Creatinine 0 71 mg/dL      Glucose 97 mg/dL      Calcium 8 7 mg/dL      AST 47 (H) U/L      ALT 21 U/L      Alkaline Phosphatase 71 U/L      Total Protein 7 8 g/dL      Albumin 2 9 (L) g/dL      Total Bilirubin 0 64 mg/dL      eGFR 104 ml/min/1 73sq m     Narrative:         National Kidney Disease Education Program recommendations are as follows:  GFR calculation is accurate only with a steady state creatinine  Chronic Kidney disease less than 60 ml/min/1 73 sq  meters  Kidney failure less than 15 ml/min/1 73 sq  meters  Lipase [[id number]]  (Normal) Collected:  05/21/18 2331    Lab Status:  Final result Specimen:  Blood from Arm, Right Updated:  05/22/18 0014     Lipase 165 u/L     Lactic acid, plasma [[id number]]  (Normal) Collected:  05/21/18 2331    Lab Status:  Final result Specimen:  Blood from Arm, Right Updated:  05/22/18 0006     LACTIC ACID 0 9 mmol/L     Narrative:         Result may be elevated if tourniquet was used during collection      CBC and differential [[id number]]  (Abnormal) Collected:  05/21/18 2331    Lab Status:  Final result Specimen:  Blood from Arm, Right Updated:  05/21/18 2345     WBC 11 56 (H) Thousand/uL      RBC 4 49 Million/uL      Hemoglobin 13 8 g/dL      Hematocrit 41 4 %      MCV 92 fL      MCH 30 7 pg      MCHC 33 3 g/dL      RDW 13 1 %      MPV 10 0 fL      Platelets 305 Thousands/uL      nRBC 0 /100 WBCs      Neutrophils Relative 78 (H) %      Lymphocytes Relative 15 %      Monocytes Relative 6 %      Eosinophils Relative 1 %      Basophils Relative 0 %      Neutrophils Absolute 9 00 (H) Thousands/µL      Lymphocytes Absolute 1 71 Thousands/µL      Monocytes Absolute 0 70 Thousand/µL      Eosinophils Absolute 0 08 Thousand/µL      Basophils Absolute 0 01 Thousands/µL                  CT abdomen pelvis w contrast   Final Result by Marc Carrington MD (05/22 0100)      Redemonstrated sequela of perforated diverticulitis including free air within the pelvis which appears to be slightly increased in volume since prior exam   No drainable fluid collection is seen  Workstation performed: [id number]               Procedures  Procedures      Phone Consults  ED Phone Contact    ED Course  ED Course as of May 22 0158   Mon May 21, 2018   2257 Blood Pressure: 158/89   2257 Temperature: 99 5 °F (37 5 °C)   2257 Pulse: 105   2257 Respirations: 20   2257 SpO2: 96 %   2307 Spoke with Gabe Crane of [facility]  We discussed case  He is very familiar with the patient as he was just discharged today  He will evaluate the patient  2349 WBC: (!) 11 56   Tue May 22, 2018   0011 LACTIC ACID: 0 9   0014 CMP unremarkable; potassium and AST are elevated due to hemolysis  Lipase: 165   0111 Slight increase in free air in the pelvis  No abscess   CT abdomen pelvis w contrast                               MDM  Number of Diagnoses or Management Options  Diverticulitis of large intestine with perforation without bleeding: established and worsening  Generalized abdominal pain: established and worsening  Nausea and vomiting: established and worsening  Diagnosis management comments:      81-year-old male with recurrent diverticulitis presenting with worsening nausea, vomiting, and abdominal pain  He presented on May 18th and was diagnosed with recurrent diverticulitis with localized perforation at that time  He essentially signed out of the hospital AMA yesterday  We were concerned for worsening pneumoperitoneum, abscess formation, sepsis, or other complications of diverticulitis  Labs were obtained as above  Patient was given IV fluids, IV analgesia, and IV Cipro/Flagyl  Spoke with [facility] regarding the patient  They evaluated the patient and ordered a CT scan  CT demonstrated slight increase in free air  Patient was admitted to [facility]         Amount and/or Complexity of Data Reviewed  Clinical lab tests: ordered and reviewed  Tests in the radiology section of CPT®: reviewed  Decide to obtain previous medical records or to obtain history from someone other than the patient: yes  Review and summarize past medical records: yes  Discuss the patient with other providers: yes  Independent visualization of images, tracings, or specimens: yes    Risk of Complications, Morbidity, and/or Mortality  Presenting problems: moderate  Diagnostic procedures: minimal  Management options: minimal    Patient Progress  Patient progress: improved    CritCare Time    Disposition  Final diagnoses:   Diverticulitis of large intestine with perforation without bleeding   Generalized abdominal pain   Nausea and vomiting     Time reflects when diagnosis was documented in both MDM as applicable and the Disposition within this note     Time User Action Codes Description Comment    5/22/2018  1:54 AM Susan Astorga [K57 20] Diverticulitis of large intestine with perforation without bleeding     5/22/2018  1:55 AM Susan Astorga [R10 84] Generalized abdominal pain     5/22/2018  1:55 AM Susan Astorga [R11 2] Nausea and vomiting       ED Disposition     ED Disposition Condition Comment    Admit  Case was discussed with [facility] and the patient's admission status was agreed to be Admission Status: inpatient status to the service of Dr Zena Rodriguez  Follow-up Information    None         Patient's Medications   Discharge Prescriptions    No medications on file     No discharge procedures on file  ED Provider  Attending physically available and evaluated Oly Mejia I managed the patient along with the ED Attending      Electronically Signed by         Viktoria Wright MD  05/22/18 8052

## 2018-05-23 PROCEDURE — 99232 SBSQ HOSP IP/OBS MODERATE 35: CPT | Performed by: SURGERY

## 2018-05-23 PROCEDURE — G8978 MOBILITY CURRENT STATUS: HCPCS

## 2018-05-23 PROCEDURE — G8980 MOBILITY D/C STATUS: HCPCS

## 2018-05-23 PROCEDURE — G8987 SELF CARE CURRENT STATUS: HCPCS

## 2018-05-23 PROCEDURE — G8989 SELF CARE D/C STATUS: HCPCS

## 2018-05-23 PROCEDURE — 97165 OT EVAL LOW COMPLEX 30 MIN: CPT

## 2018-05-23 PROCEDURE — G8988 SELF CARE GOAL STATUS: HCPCS

## 2018-05-23 PROCEDURE — G8979 MOBILITY GOAL STATUS: HCPCS

## 2018-05-23 PROCEDURE — 97163 PT EVAL HIGH COMPLEX 45 MIN: CPT

## 2018-05-23 RX ORDER — DEXTROSE AND SODIUM CHLORIDE 5; .45 G/100ML; G/100ML
84 INJECTION, SOLUTION INTRAVENOUS CONTINUOUS
Status: DISCONTINUED | OUTPATIENT
Start: 2018-05-23 | End: 2018-05-25

## 2018-05-23 RX ADMIN — METRONIDAZOLE 500 MG: 500 INJECTION, SOLUTION INTRAVENOUS at 14:23

## 2018-05-23 RX ADMIN — MORPHINE SULFATE 4 MG: 4 INJECTION INTRAVENOUS at 10:28

## 2018-05-23 RX ADMIN — MORPHINE SULFATE 4 MG: 4 INJECTION INTRAVENOUS at 06:27

## 2018-05-23 RX ADMIN — HEPARIN SODIUM 5000 UNITS: 5000 INJECTION, SOLUTION INTRAVENOUS; SUBCUTANEOUS at 06:27

## 2018-05-23 RX ADMIN — METRONIDAZOLE 500 MG: 500 INJECTION, SOLUTION INTRAVENOUS at 22:32

## 2018-05-23 RX ADMIN — HYDROMORPHONE HYDROCHLORIDE 1 MG: 1 INJECTION, SOLUTION INTRAMUSCULAR; INTRAVENOUS; SUBCUTANEOUS at 18:33

## 2018-05-23 RX ADMIN — HYDROMORPHONE HYDROCHLORIDE 1 MG: 1 INJECTION, SOLUTION INTRAMUSCULAR; INTRAVENOUS; SUBCUTANEOUS at 07:28

## 2018-05-23 RX ADMIN — CEFEPIME HYDROCHLORIDE 2000 MG: 2 INJECTION, POWDER, FOR SOLUTION INTRAVENOUS at 07:13

## 2018-05-23 RX ADMIN — HYDROMORPHONE HYDROCHLORIDE 1 MG: 1 INJECTION, SOLUTION INTRAMUSCULAR; INTRAVENOUS; SUBCUTANEOUS at 22:34

## 2018-05-23 RX ADMIN — HYDROMORPHONE HYDROCHLORIDE 1 MG: 1 INJECTION, SOLUTION INTRAMUSCULAR; INTRAVENOUS; SUBCUTANEOUS at 12:45

## 2018-05-23 RX ADMIN — ONDANSETRON 4 MG: 2 INJECTION INTRAMUSCULAR; INTRAVENOUS at 07:28

## 2018-05-23 RX ADMIN — CEFEPIME HYDROCHLORIDE 2000 MG: 2 INJECTION, POWDER, FOR SOLUTION INTRAVENOUS at 19:42

## 2018-05-23 RX ADMIN — MORPHINE SULFATE 4 MG: 4 INJECTION INTRAVENOUS at 00:18

## 2018-05-23 RX ADMIN — TAMSULOSIN HYDROCHLORIDE 0.4 MG: 0.4 CAPSULE ORAL at 21:39

## 2018-05-23 RX ADMIN — HEPARIN SODIUM 5000 UNITS: 5000 INJECTION, SOLUTION INTRAVENOUS; SUBCUTANEOUS at 21:39

## 2018-05-23 RX ADMIN — DEXTROSE AND SODIUM CHLORIDE 84 ML/HR: 5; .45 INJECTION, SOLUTION INTRAVENOUS at 07:13

## 2018-05-23 RX ADMIN — METRONIDAZOLE 500 MG: 500 INJECTION, SOLUTION INTRAVENOUS at 06:30

## 2018-05-23 RX ADMIN — DEXTROSE AND SODIUM CHLORIDE 84 ML/HR: 5; .45 INJECTION, SOLUTION INTRAVENOUS at 22:39

## 2018-05-23 RX ADMIN — DOXAZOSIN 4 MG: 4 TABLET ORAL at 08:30

## 2018-05-23 RX ADMIN — HEPARIN SODIUM 5000 UNITS: 5000 INJECTION, SOLUTION INTRAVENOUS; SUBCUTANEOUS at 14:22

## 2018-05-23 RX ADMIN — HYDROMORPHONE HYDROCHLORIDE 1 MG: 1 INJECTION, SOLUTION INTRAMUSCULAR; INTRAVENOUS; SUBCUTANEOUS at 02:38

## 2018-05-23 RX ADMIN — MORPHINE SULFATE 2 MG: 2 INJECTION, SOLUTION INTRAMUSCULAR; INTRAVENOUS at 15:33

## 2018-05-23 RX ADMIN — MORPHINE SULFATE 2 MG: 2 INJECTION, SOLUTION INTRAMUSCULAR; INTRAVENOUS at 19:40

## 2018-05-23 NOTE — PROGRESS NOTES
Progress Note - General Surgery   Maddi Bunn 63 y o  male MRN: [id number]  Unit/Bed#: [facility] 827-01 Encounter: [id number]    Assessment:  62 yo M with Perforated diverticulitis undergoing conservative management    Plan:  Continue just clears for PO  NS @ 100  PRN pain control  OOB, ambulation, mobiliation  Continue Cefepime/Flagyl    Subjective/Objective   Chief Complaint: Noen    Subjective: Abd still feels tender, similar to prior  No differnece in pain when he takes in clears  No N/V overnight  + gas, liquid BMs    Objective:     Blood pressure 138/81, pulse 71, temperature 98 7 °F (37 1 °C), temperature source Oral, resp  rate 18, height 5' 8" (1 727 m), weight 76 1 kg (167 lb 11 2 oz), SpO2 96 %  ,Body mass index is 25 5 kg/m²        Intake/Output Summary (Last 24 hours) at 05/23/18 0557  Last data filed at 05/23/18 0100   Gross per 24 hour   Intake          2031 67 ml   Output              925 ml   Net          1106 67 ml       Invasive Devices     Peripheral Intravenous Line            Peripheral IV 05/21/18 Right Forearm 1 day                Physical Exam:   Gen: A&O, NAD  Cardio: RRR  Lungs: CTAB  Abd: Soft, slightly distended, tender across lower abdomen, tympany  Ext; Warm, dry      Lab, Imaging and other studies:  CBC:   Lab Results   Component Value Date    WBC 11 03 (H) 05/22/2018    HGB 12 9 05/22/2018    HCT 38 7 05/22/2018    MCV 93 05/22/2018     05/22/2018    MCH 30 9 05/22/2018    MCHC 33 3 05/22/2018    RDW 13 2 05/22/2018    MPV 9 9 05/22/2018    NRBC 0 05/22/2018   , CMP: No results found for: NA, K, CL, CO2, ANIONGAP, BUN, CREATININE, GLUCOSE, CALCIUM, AST, ALT, ALKPHOS, PROT, ALBUMIN, BILITOT, EGFR, Coagulation: No results found for: PT, INR, APTT  VTE Pharmacologic Prophylaxis: Heparin  VTE Mechanical Prophylaxis: sequential compression device

## 2018-05-23 NOTE — PLAN OF CARE
DISCHARGE PLANNING     Discharge to home or other facility with appropriate resources Progressing        DISCHARGE PLANNING - CARE MANAGEMENT     Discharge to post-acute care or home with appropriate resources Progressing        GASTROINTESTINAL - ADULT     Minimal or absence of nausea and/or vomiting Progressing     Maintains or returns to baseline bowel function Progressing     Maintains adequate nutritional intake Progressing        INFECTION - ADULT     Absence or prevention of progression during hospitalization Progressing     Absence of fever/infection during neutropenic period Progressing        Knowledge Deficit     Patient/family/caregiver demonstrates understanding of disease process, treatment plan, medications, and discharge instructions Progressing        PAIN - ADULT     Verbalizes/displays adequate comfort level or baseline comfort level Progressing        SAFETY ADULT     Patient will remain free of falls Progressing     Maintain or return to baseline ADL function Progressing     Maintain or return mobility status to optimal level Progressing

## 2018-05-23 NOTE — PHYSICAL THERAPY NOTE
Physical Therapy Evaluation     Patient's Name: Odette Bunn    Admitting Diagnosis  Abdominal pain [R10 9]  Nausea and vomiting [R11 2]  Generalized abdominal pain [R10 84]  Diverticulitis of large intestine with perforation without bleeding [K57 20]    Problem List  Patient Active Problem List   Diagnosis    Diverticulitis of large intestine with abscess without bleeding       Past Medical History  Past Medical History:   Diagnosis Date    Abscess, intestine     Arthritis     Diverticulitis        Past Surgical History  Past Surgical History:   Procedure Laterality Date    ABCESS DRAINAGE      COLONOSCOPY N/A 5/5/2016    Procedure: COLONOSCOPY;  Surgeon: Tone Michaels MD;  Location: [facility] GI LAB;   Service:         05/23/18 1122   Note Type   Note type Eval only   Pain Assessment   Pain Assessment 0-10   Pain Score 7   Pain Type Acute pain   Pain Location Abdomen   Home Living   Type of [address] [place] [address] One level;Stairs to enter with rails  (4 SAJAN)   Bathroom Shower/Tub Tub/shower unit   Bathroom Toilet Standard   Prior Function   Level of [place] Independent with ADLs and functional mobility   Lives With Family   ADL Assistance Independent   IADLs Independent   Falls in the last 6 months 0   Vocational Retired   Restrictions/Precautions   [facility] Bearing Precautions Per Order No   Braces or Orthoses (NONE)   Other Precautions Pain;Multiple lines   General   Family/Caregiver Present No   Cognition   Overall Cognitive Status WFL   RUE Assessment   RUE Assessment WFL   LUE Assessment   LUE Assessment WFL   RLE Assessment   RLE Assessment WFL   LLE Assessment   LLE Assessment WFL   Bed Mobility   Supine to Sit 7  Independent   Sit to Supine 7  Independent   Transfers   Sit to Stand 7  Independent   Stand to Sit 7  Independent   Ambulation/Elevation   Gait pattern Excessively slow   Gait Assistance 6  Modified independent   Assistive Device None   Distance 300 FEET   Stair Management Assistance 6  Modified independent   Additional items Verbal cues   Stair Management Technique One rail R   Number of Stairs 4   Balance   Static Sitting Normal   Static Standing Good   Ambulatory Good   Endurance Deficit   Endurance Deficit Yes   Endurance Deficit Description PAIN    Activity Tolerance   Activity Tolerance Patient limited by pain; Patient tolerated treatment well   Medical Staff Made Aware ITALO ORR   Nurse Made Aware CRISTAL MA    Assessment   Prognosis Good   Problem List Pain   Assessment PT COMPLETED EVALUATION OF 63YEAR OLD MALE ADMITTED TO [facility] ON 5/21/18 WITH DIAGNOSIS OF LOCALLY PERFORATED DIVERTICULITIS BEING TREATED CONSERVATIVELY  CURRENT MEDICAL STATUS INSTABILITIES INCLUDE PAIN, UTILIZATION OF IV FLUIDS, MODIFIED DIET (CLEAR LIQUIDS), AND ONGOING VITAL SIGN MONITORING  PMH IS SIGNIFICANT FOR DIVERITICULITIS, ABSCESS OF INTESTINE, AND ARTHRITIS  PRIOR TO THIS ADMISSION PATIENT RESIDED WITH THREE ADULT SONS IN ONE LEVEL APARTMENT (4 SAJAN) WHERE SHE WAS PREVIOUSLY I WITH MOBILITY (NO AD), ADLS, AND IADLS  CURRENT IMPAIRMENTS INCLUDE PAIN, DECREASED ACTIVITY TOLERANCE, GAIT DEVIATIONS, AND GROSS FATIGUE  DURING PT EVALUATION PATIENT PERFORMED SUPINE<-->SIT TRANSFER AND SIT<-->STAND TRANSFER I  HE AMBULATED 300 FEET AND NAVIGATED 4 STEPS MOD-I W/ INCREASED TIME  GAIT PRESENTS WITH REDUCED SPEED SECONDARY TO ABDOMINAL PAIN  PATIENT PRESENTED WITH DIFFICULTY ON STAIRS  AT THIS POINT IN TIME PATIENT DEMONSTRATES SAFE/EFFECTIVE MOBILITY AND DENIES QUESTIONS/CONCERNS ABOUT D/C HOME WHEN MED AMOL  RECOMMEND D/C HOME I WHEN MED AMOL  RECOMMEND CONTINUED SELF AMBULATION THIS ADMISSION  D/C INPT PT SERVICES      Goals   Patient Goals TO HAVE LESS PAIN AND TO GO HOME    Treatment Day 0   Recommendation   Recommendation Home independently   PT - OK to Discharge Yes  (HOME I WHEN MED AMOL )   Barthel Index   Feeding 5   Bathing 5   Grooming Score 5   Dressing Score 10   Bladder Score 10   Bowels Score 10 Toilet Use Score 10   Transfers (Bed/Chair) Score 15   Mobility (Level Surface) Score 15   Stairs Score 10   Barthel Index Score 95           Carmelo Dominguez, PT Pulmonary embolism

## 2018-05-23 NOTE — CASE MANAGEMENT
Initial Clinical Review    Admission: Date/Time/Statement: 5/22/18 @ 0133     Orders Placed This Encounter   Procedures    Inpatient Admission     Standing Status:   Standing     Number of Occurrences:   1     Order Specific Question:   Admitting Physician     Answer:   Ashlie Fontaine [[id number]]     Order Specific Question:   Level of Care     Answer:   Med Surg [16]     Order Specific Question:   Estimated length of stay     Answer:   More than 2 Midnights     Order Specific Question:   Certification     Answer:   I certify that inpatient services are medically necessary for this patient for a duration of greater than two midnights  See H&P and MD Progress Notes for additional information about the patient's course of treatment  ED: Date/Time/Mode of Arrival:   ED Arrival Information     Expected Arrival Acuity Means of Arrival Escorted By Service Admission Type    - 5/21/2018 20:50 Urgent Walk-In Self Surgery-General Urgent    Arrival Complaint    Intestinal problem        Chief Complaint:   Chief Complaint   Patient presents with    Abdominal Pain     pt left ER thia afternoon and states he does not feel better and wants to have  MRI and have abscess drained tonight     History of Illness: Di Almazan is a 63 y o  male who presents with 1 day of worsening abdominal pain  Patient was admitted on 05/18/2018 for abdominal pain was found to have locally perforated sigmoid diverticulitis  Patient was moved to the general surgical service and underwent conservative treatment with minimal improvement  Patient left hospital today against medical advice in order to take care family affairs  Patient states that he has been having increasing abdominal pain for the past day  He has been having persistent nausea vomiting  He has been passing gas and bowel movements  Patient's history of previously complicated diverticulitis status post percutaneous drainage in 2016   Patient underwent subsequent colonoscopy and was found to have sigmoid polyp, and diverticulosis however no evidence of sigmoid mass  ED Vital Signs:   ED Triage Vitals   Temperature Pulse Respirations Blood Pressure SpO2   05/21/18 2054 05/21/18 2054 05/21/18 2054 05/21/18 2054 05/21/18 2054   99 5 °F (37 5 °C) 105 20 158/89 96 %      Temp Source Heart Rate Source Patient Position - Orthostatic VS BP Location FiO2 (%)   05/21/18 2054 05/21/18 2054 05/22/18 1250 05/21/18 2054 --   Tympanic Monitor Lying Right arm       Pain Score       05/21/18 2331       Worst Possible Pain        Wt Readings from Last 1 Encounters:   05/22/18 76 1 kg (167 lb 11 2 oz)     Vital Signs (abnormal):     05/22/18 1250   97 3 °F (36 3 °C)  63  18  131/78  99 %  None (Room air)  Lying     Abnormal Labs:    Cl 109  Calcium 8 1  WBC 11 03    Diagnostic Test Results:     5/22 CT abd, pelvis - Redemonstrated sequela of perforated diverticulitis including free air within the pelvis which appears to be slightly increased in volume since prior exam   No drainable fluid collection is seen      ED Treatment:   Medication Administration from 05/21/2018 2050 to 05/22/2018 1247    Date/Time Order Dose Route Action   05/21/2018 2331 sodium chloride 0 9 % bolus 1,000 mL 1,000 mL Intravenous New Bag   05/22/2018 0008 ciprofloxacin (CIPRO) IVPB (premix) 400 mg 400 mg Intravenous New Bag   05/21/2018 2331 metroNIDAZOLE (FLAGYL) IVPB (premix) 500 mg 500 mg Intravenous New Bag   05/21/2018 2331 HYDROmorphone (DILAUDID) injection 0 5 mg 0 5 mg Intravenous Given   05/22/2018 0046 iohexol (OMNIPAQUE) 350 MG/ML injection (MULTI-DOSE) 100 mL 100 mL Intravenous Given   05/22/2018 0254 sodium chloride 0 9 % infusion 100 mL/hr Intravenous New Bag   05/22/2018 0511 heparin (porcine) subcutaneous injection 5,000 Units 5,000 Units Subcutaneous Given   05/22/2018 0952 metroNIDAZOLE (FLAGYL) IVPB (premix) 500 mg 500 mg Intravenous New Bag   05/22/2018 1000 ondansetron (ZOFRAN) injection 4 mg 4 mg Intravenous Given   05/22/2018 0524 oxyCODONE-acetaminophen (PERCOCET) 5-325 mg per tablet 1 tablet 1 tablet Oral Given   05/22/2018 0957 HYDROmorphone (DILAUDID) injection 1 mg 1 mg Intravenous Given   05/22/2018 0253 tamsulosin (FLOMAX) capsule 0 4 mg 0 4 mg Oral Given   05/22/2018 0817 cefepime (MAXIPIME) 2 g/50 mL dextrose IVPB 2,000 mg Intravenous New Bag        Past Medical/Surgical History: Active Ambulatory Problems     Diagnosis Date Noted    Diverticulitis of large intestine with abscess without bleeding 01/13/2016     Resolved Ambulatory Problems     Diagnosis Date Noted    No Resolved Ambulatory Problems     Past Medical History:   Diagnosis Date    Abscess, intestine     Arthritis     Diverticulitis      Admitting Diagnosis: Abdominal pain [R10 9]  Nausea and vomiting [R11 2]  Generalized abdominal pain [R10 84]  Diverticulitis of large intestine with perforation without bleeding [K57 20]    Age/Sex: 63 y o  male    Assessment/Plan:   60-year-old male with locally perforated diverticulitis and worsening abdominal pain  Given patient's history, he is at high risk for developing pelvic abscess    Will obtain CT scan of the abdomen pelvis with IV contrast evaluate for fluid collection or worsening intra-abdominal air     Plan:  Obtain CT abdomen pelvis  PRN  Analgesia  NPO  IV fluids  IV antibiotics     Admission Orders:  Scheduled Meds:   Current Facility-Administered Medications:  cefepime 2,000 mg Intravenous Q12H   dextrose 5 % and sodium chloride 0 45 % 84 mL/hr Intravenous Continuous   doxazosin 4 mg Oral Daily   heparin (porcine) 5,000 Units Subcutaneous Q8H [address]   HYDROmorphone 1 mg Intravenous Q4H PRN   metroNIDAZOLE 500 mg Intravenous Q8H   morphine injection 4 mg Intravenous Q4H PRN   morphine injection 2 mg Intravenous Q4H PRN   ondansetron 4 mg Intravenous Q4H PRN   tamsulosin 0 4 mg Oral HS     Continuous Infusions:   0 9% NSS infusing at 100 ml/hr from admission to d/c 5/23 @ 53-29-14-27 dextrose 5 % and sodium chloride 0 45 % 84 mL/hr Last Rate: 84 mL/hr (05/23/18 0713)     PRN Meds: HYDROmorphone IV x 5 since 5/22 admission     morphine injection 4 mg x 3 since admission     morphine injection 2 mg x 1     Ondansetron IV x 2   Oxycodone -acetaminophen 1 tab x 2     Up as tolerated  SCDs  Incentive spirometry   Diet cl liq   PT eval  _____________________  5/22 Surgery Progress Note  63 y o  M w/ perforated diverticulitis; perf contained in pelvis  Returned to ED w/ worsening abd pain; non toxic w/o peritonitis      Plan:  Advance diet to clears today  IVF   Cefepime/flagyl   Serial exams  Cont to monitor WBC  ____________________  5/23 Surgery Progress Note  64 yo M with Perforated diverticulitis undergoing conservative management     Plan:  Continue just clears for PO  NS @ 100  PRN pain control  OOB, ambulation, mobiliation  Continue Cefepime/Flagyl        Thank you,  [facility] in the [facility] by Cody Rodrigues for 2017  Network Utilization Review Department  Phone: [phone]; Fax [phone]  ATTENTION: The Network Utilization Review Department is now centralized for our 7 Facilities  Make a note that we have a new phone and fax numbers for our Department  Please call with any questions or concerns to [phone] and carefully follow the prompts so that you are directed to the right person  All voicemails are confidential  Fax any determinations, approvals, denials, and requests for initial or continue stay review clinical to [phone]  Due to HIGH CALL volume, it would be easier if you could please send faxed requests to expedite your requests and in part, help us provide discharge notifications faster

## 2018-05-23 NOTE — PROGRESS NOTES
Patient care rounds were completed with the patient's nurse Liu noel  We discussed the plan is to continue clear liquid diet as tolerated  Continue IV fluids due to poor oral intake  Continue current IV antibiotic regimen  Repeat CBC tomorrow to re-evaluate leukocytosis as the patient notes persistent lower abdominal pain  We reviewed all of the invasive devices/lines/telemetry orders   - None  Pain Assessment / Plan:  - Continue current analgesic regimen  Mobility Assessment / Plan:  - Activity as tolerated  Goals / Barriers for discharge:  - Need for further inpatient monitoring due to lack of progression and minimal oral intake  Patient still may need operative intervention if fails to improve  - Case management following; case and discharge needs discussed  All questions and concerns were addressed  I spent greater than 10 minutes reviewing the plan with the patient and the nurse, and coordinating his care for the day      Susanna Rene PA-C  5/23/2018 2:25 PM

## 2018-05-23 NOTE — OCCUPATIONAL THERAPY NOTE
Occupational Therapy Evaluation      Odettewalter Bunn    5/23/2018    Patient Active Problem List   Diagnosis    Diverticulitis of large intestine with abscess without bleeding       Past Medical History:   Diagnosis Date    Abscess, intestine     Arthritis     Diverticulitis        Past Surgical History:   Procedure Laterality Date    ABCESS DRAINAGE      COLONOSCOPY N/A 5/5/2016    Procedure: COLONOSCOPY;  Surgeon: Tone Michaels MD;  Location: [facility] GI LAB;   Service:       05/23/18 1048   Note Type   Note type Eval only   Restrictions/Precautions   Other Precautions Pain   Pain Assessment   Pain Assessment 0-10   Pain Score 7   Pain Type Acute pain   Pain Location Abdomen   Home Living   Type of Home Apartment   Home Layout One level   Bathroom Shower/Tub Tub/shower unit   Bathroom Toilet Standard   Additional Comments 4 SAJAN   Prior Function   Level of [place] Independent with ADLs and functional mobility   Lives With [employer] Help From Family   ADL Assistance Independent   IADLs Independent   Falls in the last 6 months 0   Vocational Retired   Comments lives in 1st floor apartment w his 3 adult son's ages 31,22 and 21    Lifestyle   Autonomy fully indpendent w self care, mobility ,driving and home managment   Reciprocal Relationships supportive family   Intrinsic Gratification sports/soccer, computer, and spending time w his family   Psychosocial   Psychosocial (WDL) WDL   ADL   Eating Deficit Other (Comment)  (clear liquids only)   Grooming Assistance 7  Independent   UB Bathing Assistance 7  Independent   LB Bathing Assistance 7  Independent   UB Dressing Assistance 7  Independent   LB Dressing Assistance 7  [address]  7  Independent   Bed Mobility   Supine to Sit 7  Independent   Sit to Supine 7  Independent   Transfers   Sit to Stand 7  Independent   Stand to Sit 7  Independent   Stand pivot 7  Independent   Functional Mobility   Functional Mobility 7 Independent   Balance   Static Sitting Normal   Dynamic Sitting Normal   Static Standing Good   Dynamic Standing Good   Activity Tolerance   Activity Tolerance Patient limited by pain   RUE Assessment   RUE Assessment WFL   LUE Assessment   LUE Assessment WFL   Hand Function   Gross Motor Coordination Functional   Fine Motor Coordination Functional   Sensation   Light Touch No apparent deficits   Cognition   Overall Cognitive Status WFL   Arousal/Participation Alert; Cooperative   Attention Within functional limits   Orientation Level Oriented X4   Memory Within functional limits   Following Commands Follows all commands and directions without difficulty   Assessment   Assessment Pt is a 63 y o  male seen for OT evaluation s/p admit to [facility] on 5/21/2018 w/ Diverticulitis of large intestine with abscess without bleeding  Pt with perforated diverticulitis, conservative management  Comorbidities affecting pt's functional performance at time of assessment include: arthritis and chronic pain  Personal factors affecting pt at time of IE include:steps to enter environment  Prior to admission, pt was fully independent w self care, mobility, home management as well as driving  He lives in a first floor apartment w 4 SAJAN  He lives w 3 adult sons  Upon evaluation: Pt c/o 7/10 abdominal pain, but was pleasant and cooperative  He demonstrated independent bedmobility/transfers/mobility as well as independent self care/ADL's  Pt scored  95 /100 on the barthel index  Based on findings from OT evaluation and functional performance deficits, pt has been identified as a low complexity evaluation  From OT standpoint, recommendation at time of d/c would be home independently  At this time, no immediate OT needs identified   DC OT    Goals   Patient Goals to have less pain   Plan   OT Frequency Eval only   Recommendation   OT Discharge Recommendation Home independent   OT - OK to Discharge Yes   Barthel Index   Feeding 5  (clear liquid)   Bathing 5   Grooming Score 5   Dressing Score 10   Bladder Score 10   Bowels Score 10   Toilet Use Score 10   Transfers (Bed/Chair) Score 15   Mobility (Level Surface) Score 15   Stairs Score 10   Barthel Index Score 95

## 2018-05-24 LAB
ANION GAP SERPL CALCULATED.3IONS-SCNC: 4 MMOL/L (ref 4–13)
BASOPHILS # BLD AUTO: 0.02 THOUSANDS/ΜL (ref 0–0.1)
BASOPHILS NFR BLD AUTO: 0 % (ref 0–1)
BUN SERPL-MCNC: 4 MG/DL (ref 5–25)
CALCIUM SERPL-MCNC: 8.1 MG/DL (ref 8.3–10.1)
CHLORIDE SERPL-SCNC: 105 MMOL/L (ref 100–108)
CO2 SERPL-SCNC: 30 MMOL/L (ref 21–32)
CREAT SERPL-MCNC: 0.69 MG/DL (ref 0.6–1.3)
EOSINOPHIL # BLD AUTO: 0.14 THOUSAND/ΜL (ref 0–0.61)
EOSINOPHIL NFR BLD AUTO: 1 % (ref 0–6)
ERYTHROCYTE [DISTWIDTH] IN BLOOD BY AUTOMATED COUNT: 13 % (ref 11.6–15.1)
GFR SERPL CREATININE-BSD FRML MDRD: 105 ML/MIN/1.73SQ M
GLUCOSE SERPL-MCNC: 120 MG/DL (ref 65–140)
HCT VFR BLD AUTO: 38.8 % (ref 36.5–49.3)
HGB BLD-MCNC: 12.6 G/DL (ref 12–17)
LYMPHOCYTES # BLD AUTO: 2.15 THOUSANDS/ΜL (ref 0.6–4.47)
LYMPHOCYTES NFR BLD AUTO: 22 % (ref 14–44)
MCH RBC QN AUTO: 30.1 PG (ref 26.8–34.3)
MCHC RBC AUTO-ENTMCNC: 32.5 G/DL (ref 31.4–37.4)
MCV RBC AUTO: 93 FL (ref 82–98)
MONOCYTES # BLD AUTO: 0.71 THOUSAND/ΜL (ref 0.17–1.22)
MONOCYTES NFR BLD AUTO: 7 % (ref 4–12)
NEUTROPHILS # BLD AUTO: 6.65 THOUSANDS/ΜL (ref 1.85–7.62)
NEUTS SEG NFR BLD AUTO: 69 % (ref 43–75)
NRBC BLD AUTO-RTO: 0 /100 WBCS
PLATELET # BLD AUTO: 382 THOUSANDS/UL (ref 149–390)
PMV BLD AUTO: 9.9 FL (ref 8.9–12.7)
POTASSIUM SERPL-SCNC: 3.7 MMOL/L (ref 3.5–5.3)
RBC # BLD AUTO: 4.18 MILLION/UL (ref 3.88–5.62)
SODIUM SERPL-SCNC: 139 MMOL/L (ref 136–145)
WBC # BLD AUTO: 9.71 THOUSAND/UL (ref 4.31–10.16)

## 2018-05-24 PROCEDURE — 99232 SBSQ HOSP IP/OBS MODERATE 35: CPT | Performed by: SURGERY

## 2018-05-24 PROCEDURE — 85025 COMPLETE CBC W/AUTO DIFF WBC: CPT | Performed by: SURGERY

## 2018-05-24 PROCEDURE — 80048 BASIC METABOLIC PNL TOTAL CA: CPT | Performed by: SURGERY

## 2018-05-24 RX ADMIN — MORPHINE SULFATE 2 MG: 2 INJECTION, SOLUTION INTRAMUSCULAR; INTRAVENOUS at 00:19

## 2018-05-24 RX ADMIN — CEFEPIME HYDROCHLORIDE 2000 MG: 2 INJECTION, POWDER, FOR SOLUTION INTRAVENOUS at 21:00

## 2018-05-24 RX ADMIN — CEFEPIME HYDROCHLORIDE 2000 MG: 2 INJECTION, POWDER, FOR SOLUTION INTRAVENOUS at 08:01

## 2018-05-24 RX ADMIN — HEPARIN SODIUM 5000 UNITS: 5000 INJECTION, SOLUTION INTRAVENOUS; SUBCUTANEOUS at 21:10

## 2018-05-24 RX ADMIN — HYDROMORPHONE HYDROCHLORIDE 1 MG: 1 INJECTION, SOLUTION INTRAMUSCULAR; INTRAVENOUS; SUBCUTANEOUS at 08:00

## 2018-05-24 RX ADMIN — MORPHINE SULFATE 4 MG: 4 INJECTION INTRAVENOUS at 17:34

## 2018-05-24 RX ADMIN — HYDROMORPHONE HYDROCHLORIDE 1 MG: 1 INJECTION, SOLUTION INTRAMUSCULAR; INTRAVENOUS; SUBCUTANEOUS at 02:48

## 2018-05-24 RX ADMIN — ONDANSETRON 4 MG: 2 INJECTION INTRAMUSCULAR; INTRAVENOUS at 02:48

## 2018-05-24 RX ADMIN — HYDROMORPHONE HYDROCHLORIDE 1 MG: 1 INJECTION, SOLUTION INTRAMUSCULAR; INTRAVENOUS; SUBCUTANEOUS at 13:41

## 2018-05-24 RX ADMIN — METRONIDAZOLE 500 MG: 500 INJECTION, SOLUTION INTRAVENOUS at 06:26

## 2018-05-24 RX ADMIN — DOXAZOSIN 4 MG: 4 TABLET ORAL at 08:54

## 2018-05-24 RX ADMIN — MORPHINE SULFATE 2 MG: 2 INJECTION, SOLUTION INTRAMUSCULAR; INTRAVENOUS at 06:26

## 2018-05-24 RX ADMIN — MORPHINE SULFATE 4 MG: 4 INJECTION INTRAVENOUS at 10:56

## 2018-05-24 RX ADMIN — HYDROMORPHONE HYDROCHLORIDE 1 MG: 1 INJECTION, SOLUTION INTRAMUSCULAR; INTRAVENOUS; SUBCUTANEOUS at 21:00

## 2018-05-24 RX ADMIN — METRONIDAZOLE 500 MG: 500 INJECTION, SOLUTION INTRAVENOUS at 23:08

## 2018-05-24 RX ADMIN — HEPARIN SODIUM 5000 UNITS: 5000 INJECTION, SOLUTION INTRAVENOUS; SUBCUTANEOUS at 06:26

## 2018-05-24 RX ADMIN — METRONIDAZOLE 500 MG: 500 INJECTION, SOLUTION INTRAVENOUS at 14:08

## 2018-05-24 RX ADMIN — HEPARIN SODIUM 5000 UNITS: 5000 INJECTION, SOLUTION INTRAVENOUS; SUBCUTANEOUS at 14:08

## 2018-05-24 RX ADMIN — TAMSULOSIN HYDROCHLORIDE 0.4 MG: 0.4 CAPSULE ORAL at 21:10

## 2018-05-24 RX ADMIN — DEXTROSE AND SODIUM CHLORIDE 84 ML/HR: 5; .45 INJECTION, SOLUTION INTRAVENOUS at 12:00

## 2018-05-24 RX ADMIN — MORPHINE SULFATE 4 MG: 4 INJECTION INTRAVENOUS at 23:08

## 2018-05-24 NOTE — PROGRESS NOTES
Progress Note - General Surgery   Caitlin Bunn 63 y o  male MRN: [id number]  [facility] Encounter: [id number]    Assessment:  63 M with perforated diverticulitis    Plan:  Continue clears  Decrease IV fluids  PRN pain control  Cefepime/Flagyl  OOB and ambulate  SQH    Subjective/Objective     Subjective: No acute events  Tolerated clears  Small BM overnight  Still with some pain  Objective:    Blood pressure 139/75, pulse 69, temperature 99 4 °F (37 4 °C), temperature source Oral, resp  rate 18, height 5' 8" (1 727 m), weight 76 1 kg (167 lb 11 2 oz), SpO2 97 %  ,Body mass index is 25 5 kg/m²        Intake/Output Summary (Last 24 hours) at 05/24/18 0635  Last data filed at 05/24/18 0300   Gross per 24 hour   Intake             1360 ml   Output             1625 ml   Net             -265 ml       Invasive Devices     Peripheral Intravenous Line            Peripheral IV 05/23/18 Left Arm less than 1 day                Physical Exam:   General: NAD, AAOx3  CV: RRR +S1/S2  Chest: breath sounds bilaterally  Abdomen: soft, moderately tender lower abdomen  Extremities: atraumatic, no edema        Results from last 7 days  Lab Units 05/22/18  0826 05/21/18  2331 05/21/18  0548   WBC Thousand/uL 11 03* 11 56* 13 09*   HEMOGLOBIN g/dL 12 9 13 8 14 0   HEMATOCRIT % 38 7 41 4 41 7   PLATELETS Thousands/uL 376 390 339       Results from last 7 days  Lab Units 05/22/18  0539 05/21/18  2331 05/20/18  0616   SODIUM mmol/L 140 137 137   POTASSIUM mmol/L 3 8 5 7* 3 6   CHLORIDE mmol/L 109* 106 104   CO2 mmol/L 25 23 27   BUN mg/dL 13 16 6   CREATININE mg/dL 0 68 0 71 0 62   GLUCOSE RANDOM mg/dL 91 97 110   CALCIUM mg/dL 8 1* 8 7 8 4

## 2018-05-24 NOTE — PROGRESS NOTES
Patient care rounds were completed with the patient's nurse today, John Arcos  We discussed the plan is to continue clear liquid diet as tolerated and continue to monitor for improvement in abdominal pain  Leukocytosis now resolved on CBC this morning  Continue IV antibiotics  At his request, a fax was sent to note his hospitalization as he is missing a court date this morning  We reviewed all of the invasive devices/lines/telemetry orders   - None  Pain Assessment / Plan:  - Continue current analgesic regimen  Mobility Assessment / Plan:  - Activity as tolerated  Goals / Barriers for discharge:  - Ability to tolerate oral diet and improvement in abdominal pain  - Case management following; case and discharge needs discussed  All questions and concerns were addressed  I spent greater than 15 minutes reviewing the plan with the patient and the nurse, and coordinating his care for the day      Clifton Benson PA-C  5/24/2018 10:43 AM

## 2018-05-25 PROCEDURE — 99232 SBSQ HOSP IP/OBS MODERATE 35: CPT | Performed by: SURGERY

## 2018-05-25 RX ADMIN — DEXTROSE AND SODIUM CHLORIDE 84 ML/HR: 5; .45 INJECTION, SOLUTION INTRAVENOUS at 02:11

## 2018-05-25 RX ADMIN — HEPARIN SODIUM 5000 UNITS: 5000 INJECTION, SOLUTION INTRAVENOUS; SUBCUTANEOUS at 21:41

## 2018-05-25 RX ADMIN — MORPHINE SULFATE 2 MG: 2 INJECTION, SOLUTION INTRAMUSCULAR; INTRAVENOUS at 03:17

## 2018-05-25 RX ADMIN — HYDROMORPHONE HYDROCHLORIDE 1 MG: 1 INJECTION, SOLUTION INTRAMUSCULAR; INTRAVENOUS; SUBCUTANEOUS at 02:12

## 2018-05-25 RX ADMIN — METRONIDAZOLE 500 MG: 500 INJECTION, SOLUTION INTRAVENOUS at 22:59

## 2018-05-25 RX ADMIN — MORPHINE SULFATE 2 MG: 2 INJECTION, SOLUTION INTRAMUSCULAR; INTRAVENOUS at 07:48

## 2018-05-25 RX ADMIN — HEPARIN SODIUM 5000 UNITS: 5000 INJECTION, SOLUTION INTRAVENOUS; SUBCUTANEOUS at 14:04

## 2018-05-25 RX ADMIN — HYDROMORPHONE HYDROCHLORIDE 1 MG: 1 INJECTION, SOLUTION INTRAMUSCULAR; INTRAVENOUS; SUBCUTANEOUS at 06:22

## 2018-05-25 RX ADMIN — METRONIDAZOLE 500 MG: 500 INJECTION, SOLUTION INTRAVENOUS at 14:04

## 2018-05-25 RX ADMIN — METRONIDAZOLE 500 MG: 500 INJECTION, SOLUTION INTRAVENOUS at 06:21

## 2018-05-25 RX ADMIN — CEFEPIME HYDROCHLORIDE 2000 MG: 2 INJECTION, POWDER, FOR SOLUTION INTRAVENOUS at 07:48

## 2018-05-25 RX ADMIN — TAMSULOSIN HYDROCHLORIDE 0.4 MG: 0.4 CAPSULE ORAL at 21:41

## 2018-05-25 RX ADMIN — MORPHINE SULFATE 2 MG: 2 INJECTION, SOLUTION INTRAMUSCULAR; INTRAVENOUS at 14:04

## 2018-05-25 RX ADMIN — MORPHINE SULFATE 2 MG: 2 INJECTION, SOLUTION INTRAMUSCULAR; INTRAVENOUS at 18:09

## 2018-05-25 RX ADMIN — HEPARIN SODIUM 5000 UNITS: 5000 INJECTION, SOLUTION INTRAVENOUS; SUBCUTANEOUS at 06:21

## 2018-05-25 RX ADMIN — ONDANSETRON 4 MG: 2 INJECTION INTRAMUSCULAR; INTRAVENOUS at 03:17

## 2018-05-25 RX ADMIN — HYDROMORPHONE HYDROCHLORIDE 1 MG: 1 INJECTION, SOLUTION INTRAMUSCULAR; INTRAVENOUS; SUBCUTANEOUS at 21:41

## 2018-05-25 RX ADMIN — DOXAZOSIN 4 MG: 4 TABLET ORAL at 07:48

## 2018-05-25 RX ADMIN — ONDANSETRON 4 MG: 2 INJECTION INTRAMUSCULAR; INTRAVENOUS at 22:02

## 2018-05-25 RX ADMIN — CEFEPIME HYDROCHLORIDE 2000 MG: 2 INJECTION, POWDER, FOR SOLUTION INTRAVENOUS at 21:47

## 2018-05-25 RX ADMIN — HYDROMORPHONE HYDROCHLORIDE 1 MG: 1 INJECTION, SOLUTION INTRAMUSCULAR; INTRAVENOUS; SUBCUTANEOUS at 15:17

## 2018-05-25 NOTE — PROGRESS NOTES
Progress Note - General Surgery   Viviana Bunn 63 y o  male MRN: [id number]  [facility] Encounter: [id number]    Assessment:  63 M with perforated diverticulitis    Plan:  - lo res  - saline lock  - prn pain control  - OOB/amb      Subjective/Objective     Subjective: tolerating clears, +flatus/BM    Objective:    Blood pressure 151/85, pulse 96, temperature 98 5 °F (36 9 °C), temperature source Oral, resp  rate 20, height 5' 8" (1 727 m), weight 76 1 kg (167 lb 11 2 oz), SpO2 97 %  ,Body mass index is 25 5 kg/m²        Intake/Output Summary (Last 24 hours) at 05/25/18 0806  Last data filed at 05/25/18 0301   Gross per 24 hour   Intake           2771 4 ml   Output             1900 ml   Net            871 4 ml       Invasive Devices     Peripheral Intravenous Line            Peripheral IV 05/25/18 Right;Upper Arm less than 1 day                Physical Exam:   NAD  Norm resp effort  RRR  Abd soft, tender in LLQ, ND  -c/c/e        Results from last 7 days  Lab Units 05/24/18  0928 05/22/18  0826 05/21/18  2331   WBC Thousand/uL 9 71 11 03* 11 56*   HEMOGLOBIN g/dL 12 6 12 9 13 8   HEMATOCRIT % 38 8 38 7 41 4   PLATELETS Thousands/uL 382 376 390       Results from last 7 days  Lab Units 05/24/18  0928 05/22/18  0539 05/21/18  2331   SODIUM mmol/L 139 140 137   POTASSIUM mmol/L 3 7 3 8 5 7*   CHLORIDE mmol/L 105 109* 106   CO2 mmol/L 30 25 23   BUN mg/dL 4* 13 16   CREATININE mg/dL 0 69 0 68 0 71   GLUCOSE RANDOM mg/dL 120 91 97   CALCIUM mg/dL 8 1* 8 1* 8 7

## 2018-05-25 NOTE — PROGRESS NOTES
Paged and spoke with Dr Brayan Marino regarding pt only on IV pain meds, but started on regular diet today, and asked possibility of starting PO pain meds  Dr Brayan Marino in [address] and to address once he's done  Will await orders

## 2018-05-26 VITALS
OXYGEN SATURATION: 98 % | SYSTOLIC BLOOD PRESSURE: 141 MMHG | HEIGHT: 68 IN | RESPIRATION RATE: 18 BRPM | BODY MASS INDEX: 25.42 KG/M2 | DIASTOLIC BLOOD PRESSURE: 85 MMHG | HEART RATE: 69 BPM | WEIGHT: 167.7 LBS | TEMPERATURE: 98.4 F

## 2018-05-26 PROCEDURE — 99232 SBSQ HOSP IP/OBS MODERATE 35: CPT | Performed by: SURGERY

## 2018-05-26 RX ORDER — OXYCODONE HYDROCHLORIDE 10 MG/1
10 TABLET ORAL EVERY 4 HOURS PRN
Status: DISCONTINUED | OUTPATIENT
Start: 2018-05-26 | End: 2018-05-26 | Stop reason: HOSPADM

## 2018-05-26 RX ORDER — OXYCODONE HYDROCHLORIDE 5 MG/1
5 TABLET ORAL EVERY 4 HOURS PRN
Qty: 20 TABLET | Refills: 0 | Status: ON HOLD | OUTPATIENT
Start: 2018-05-26 | End: 2018-05-31

## 2018-05-26 RX ORDER — CIPROFLOXACIN 500 MG/1
500 TABLET, FILM COATED ORAL EVERY 12 HOURS SCHEDULED
Status: DISCONTINUED | OUTPATIENT
Start: 2018-05-26 | End: 2018-05-26 | Stop reason: HOSPADM

## 2018-05-26 RX ORDER — METRONIDAZOLE 500 MG/1
500 TABLET ORAL 3 TIMES DAILY
Qty: 18 TABLET | Refills: 0 | Status: SHIPPED | OUTPATIENT
Start: 2018-05-26 | End: 2018-05-27

## 2018-05-26 RX ORDER — ACETAMINOPHEN 325 MG/1
650 TABLET ORAL EVERY 6 HOURS PRN
Status: DISCONTINUED | OUTPATIENT
Start: 2018-05-26 | End: 2018-05-26 | Stop reason: HOSPADM

## 2018-05-26 RX ORDER — METRONIDAZOLE 500 MG/1
500 TABLET ORAL EVERY 8 HOURS SCHEDULED
Status: DISCONTINUED | OUTPATIENT
Start: 2018-05-26 | End: 2018-05-26 | Stop reason: HOSPADM

## 2018-05-26 RX ORDER — METRONIDAZOLE 500 MG/1
500 TABLET ORAL 3 TIMES DAILY
Qty: 18 TABLET | Refills: 0 | Status: SHIPPED | OUTPATIENT
Start: 2018-05-26 | End: 2018-05-26

## 2018-05-26 RX ORDER — CIPROFLOXACIN 500 MG/1
500 TABLET, FILM COATED ORAL EVERY 12 HOURS SCHEDULED
Qty: 12 TABLET | Refills: 0 | Status: SHIPPED | OUTPATIENT
Start: 2018-05-26 | End: 2018-06-17 | Stop reason: HOSPADM

## 2018-05-26 RX ORDER — OXYCODONE HYDROCHLORIDE 5 MG/1
5 TABLET ORAL EVERY 4 HOURS PRN
Status: DISCONTINUED | OUTPATIENT
Start: 2018-05-26 | End: 2018-05-26 | Stop reason: HOSPADM

## 2018-05-26 RX ORDER — METRONIDAZOLE 500 MG/1
500 TABLET ORAL EVERY 8 HOURS SCHEDULED
Qty: 18 TABLET | Refills: 0 | Status: SHIPPED | OUTPATIENT
Start: 2018-05-26 | End: 2018-06-17 | Stop reason: HOSPADM

## 2018-05-26 RX ADMIN — METRONIDAZOLE 500 MG: 500 TABLET ORAL at 09:13

## 2018-05-26 RX ADMIN — HEPARIN SODIUM 5000 UNITS: 5000 INJECTION, SOLUTION INTRAVENOUS; SUBCUTANEOUS at 06:17

## 2018-05-26 RX ADMIN — HYDROMORPHONE HYDROCHLORIDE 0.5 MG: 1 INJECTION, SOLUTION INTRAMUSCULAR; INTRAVENOUS; SUBCUTANEOUS at 13:21

## 2018-05-26 RX ADMIN — DOXAZOSIN 4 MG: 4 TABLET ORAL at 09:13

## 2018-05-26 RX ADMIN — MORPHINE SULFATE 2 MG: 2 INJECTION, SOLUTION INTRAMUSCULAR; INTRAVENOUS at 06:17

## 2018-05-26 RX ADMIN — OXYCODONE HYDROCHLORIDE 10 MG: 10 TABLET ORAL at 11:08

## 2018-05-26 RX ADMIN — CIPROFLOXACIN 500 MG: 500 TABLET, FILM COATED ORAL at 09:13

## 2018-05-26 RX ADMIN — METRONIDAZOLE 500 MG: 500 TABLET ORAL at 13:19

## 2018-05-26 RX ADMIN — METRONIDAZOLE 500 MG: 500 INJECTION, SOLUTION INTRAVENOUS at 06:17

## 2018-05-26 RX ADMIN — MORPHINE SULFATE 2 MG: 2 INJECTION, SOLUTION INTRAMUSCULAR; INTRAVENOUS at 00:19

## 2018-05-26 RX ADMIN — HYDROMORPHONE HYDROCHLORIDE 1 MG: 1 INJECTION, SOLUTION INTRAMUSCULAR; INTRAVENOUS; SUBCUTANEOUS at 02:13

## 2018-05-26 NOTE — PROGRESS NOTES
Progress Note - General Surgery  Viviana Bunn 63 y o  male MRN: [id number]  [facility]: [id number]    Assessment:  63y o -year-old male with perforated diverticulitis, second episode    Plan:  1  Diverticulitis   - change IV abx to oral cipro/flagyl x 2 weeks total abx   - PO pain meds to be ordered   - OOB   - diet    2  DVT Prophylaxis   - SQh   - SCDs    3  Disposition   - d/c late today or possibly tomorrow    Deanne Gore MD PGY-4  9:28 AM  05/26/18      Subjective:  Feels much better today  Tolerating regular diet  Objective:  Patient Vitals for the past 24 hrs:   BP Temp Temp src Pulse Resp SpO2   05/25/18 2300 137/72 99 2 °F (37 3 °C) Oral 69 16 96 %   05/25/18 1500 144/78 99 1 °F (37 3 °C) Oral 72 20 97 %          Diet Orders            Start     Ordered    05/25/18 0703  Diet Regular; Regular House; Lo Fiber/Lo Residue  Diet effective now     Question Answer Comment   Diet Type Regular    Regular Regular House    Other Restriction(s): Lo Fiber/Lo Residue    RD to adjust diet per protocol?  No        05/25/18 0702        Intake/Output Summary (Last 24 hours) at 05/26/18 3400  Last data filed at 05/25/18 2142   Gross per 24 hour   Intake              180 ml   Output              675 ml   Net             -495 ml    + 1x    Physical Exam:  General: NAD  Cardiovascular: RRR  Respiratory: breath sounds b/l  Abdomen: soft, mild tenderness, ND  Extremities: no edema    Medications:    Current Facility-Administered Medications:  acetaminophen 650 mg Oral Q6H PRN Josie Ma MD   ciprofloxacin 500 mg Oral Q12H [address] Josie Ma MD   doxazosin 4 mg Oral Daily Nika Mccall MD   heparin (porcine) 5,000 Units Subcutaneous [facility] Nika Mccall MD   HYDROmorphone 0 5 mg Intravenous Q3H PRN Josie Ma MD   metroNIDAZOLE 500 mg Oral [facility] Josie Ma MD   ondansetron 4 mg Intravenous Q4H PRN Nika Mccall MD   oxyCODONE 10 mg Oral Q4H PRN Josie Ma MD oxyCODONE 5 mg Oral Q4H PRN Luiz Olmos MD   tamsulosin 0 4 mg Oral HS Tran Krause MD      acetaminophen 650 mg Q6H PRN   HYDROmorphone 0 5 mg Q3H PRN   ondansetron 4 mg Q4H PRN   oxyCODONE 10 mg Q4H PRN   oxyCODONE 5 mg Q4H PRN     Laboratory results:   CBC: No results found for: WBC, HGB, HCT, MCV, PLT, ADJUSTEDWBC, MCH, MCHC, RDW, MPV, NRBC, CMP: No results found for: NA, K, CL, CO2, ANIONGAP, BUN, CREATININE, GLUCOSE, CALCIUM, AST, ALT, ALKPHOS, PROT, ALBUMIN, BILITOT, EGFR, Coagulation: No results found for: PT, INR, APTT, Urinalysis: No results found for: COLORU, CLARITYU, SPECGRAV, PHUR, LEUKOCYTESUR, NITRITE, PROTEINUA, GLUCOSEU, KETONESU, BILIRUBINUR, BLOODU, Amylase: No results found for: AMYLASE, Lipase: No results found for: LIPASE    VTE Pharmacologic Prophylaxis: Heparin  VTE Mechanical Prophylaxis: sequential compression device

## 2018-05-26 NOTE — DISCHARGE INSTRUCTIONS
Follow up with Dr Pablo Guevara in 2-3 weeks  You may need colonoscopy in 4-6 weeks  Please call if developing fevers, nausea, vomiting or worsening abdominal pain    Diverticulitis   WHAT YOU NEED TO KNOW:   Diverticulitis is a condition that causes small pockets along your intestine called diverticula to become inflamed or infected  This is caused by hard bowel movements, food, or bacteria that get stuck in the pockets  DISCHARGE INSTRUCTIONS:   Seek care immediately if:   · You have bowel movement or foul-smelling discharge leaking from your vagina or in your urine  · You have severe diarrhea  · You urinate less than usual or not at all  · You are not able to have a bowel movement  · You cannot stop vomiting  · You have severe abdominal pain, a fever, and your abdomen is larger than usual      · You have new or increased blood in your bowel movements  Contact your healthcare provider if:   · You have pain when you urinate  · Your symptoms get worse or do not go away  · You have questions or concerns about your condition or care  Medicines:   · Antibiotics  may be given to help prevent or treat a bacterial infection  · Prescription pain medicine  may be given  Ask your healthcare provider how to take this medicine safely  Some prescription pain medicines contain acetaminophen  Do not take other medicines that contain acetaminophen without talking to your healthcare provider  Too much acetaminophen may cause liver damage  Prescription pain medicine may cause constipation  Ask your healthcare provider how to prevent or treat constipation  · Take your medicine as directed  Contact your healthcare provider if you think your medicine is not helping or if you have side effects  Tell him or her if you are allergic to any medicine  Keep a list of the medicines, vitamins, and herbs you take  Include the amounts, and when and why you take them   Bring the list or the pill bottles to follow-up visits  Carry your medicine list with you in case of an emergency  Clear liquid diet:  A clear liquid diet includes any liquids that you can see through  Examples include water, ginger-gladys, cranberry or apple juice, frozen fruit ice, or broth  Stay on a clear liquid diet until your symptoms are gone, or as directed  Follow up with your healthcare provider as directed: You may need to return for a colonoscopy  When your symptoms are gone, you may need a low-fat, high-fiber diet to help prevent diverticulitis from developing again  Your healthcare provider or dietitian can help you create meal plans  Write down your questions so you remember to ask them during your visits  © 2017 [address] Information is for End User's use only and may not be sold, redistributed or otherwise used for commercial purposes  All illustrations and images included in CareNotes® are the copyrighted property of [employer] , [employer]  or Cody Rodrigues  The above information is an  only  It is not intended as medical advice for individual conditions or treatments  Talk to your doctor, nurse or pharmacist before following any medical regimen to see if it is safe and effective for you

## 2018-05-27 ENCOUNTER — APPOINTMENT (EMERGENCY)
Dept: RADIOLOGY | Facility: HOSPITAL | Age: 58
DRG: 330 | End: 2018-05-27
Payer: COMMERCIAL

## 2018-05-27 ENCOUNTER — HOSPITAL ENCOUNTER (INPATIENT)
Facility: HOSPITAL | Age: 58
LOS: 18 days | Discharge: HOME | DRG: 330 | End: 2018-06-17
Attending: EMERGENCY MEDICINE | Admitting: SURGERY
Payer: COMMERCIAL

## 2018-05-27 DIAGNOSIS — K57.20 DIVERTICULITIS OF LARGE INTESTINE WITH ABSCESS WITHOUT BLEEDING: ICD-10-CM

## 2018-05-27 DIAGNOSIS — N13.5 URETERAL OBSTRUCTION: ICD-10-CM

## 2018-05-27 DIAGNOSIS — K57.92 DIVERTICULITIS: Primary | ICD-10-CM

## 2018-05-27 DIAGNOSIS — N13.30 HYDRONEPHROSIS: ICD-10-CM

## 2018-05-27 LAB
ALBUMIN SERPL BCP-MCNC: 3 G/DL (ref 3.5–5)
ALP SERPL-CCNC: 107 U/L (ref 46–116)
ALT SERPL W P-5'-P-CCNC: 34 U/L (ref 12–78)
ANION GAP SERPL CALCULATED.3IONS-SCNC: 5 MMOL/L (ref 4–13)
AST SERPL W P-5'-P-CCNC: 45 U/L (ref 5–45)
BASOPHILS # BLD AUTO: 0.02 THOUSANDS/ΜL (ref 0–0.1)
BASOPHILS NFR BLD AUTO: 0 % (ref 0–1)
BILIRUB SERPL-MCNC: 0.29 MG/DL (ref 0.2–1)
BUN SERPL-MCNC: 11 MG/DL (ref 5–25)
CALCIUM SERPL-MCNC: 8.4 MG/DL (ref 8.3–10.1)
CHLORIDE SERPL-SCNC: 107 MMOL/L (ref 100–108)
CO2 SERPL-SCNC: 30 MMOL/L (ref 21–32)
CREAT SERPL-MCNC: 0.83 MG/DL (ref 0.6–1.3)
EOSINOPHIL # BLD AUTO: 0.13 THOUSAND/ΜL (ref 0–0.61)
EOSINOPHIL NFR BLD AUTO: 1 % (ref 0–6)
ERYTHROCYTE [DISTWIDTH] IN BLOOD BY AUTOMATED COUNT: 13.6 % (ref 11.6–15.1)
GFR SERPL CREATININE-BSD FRML MDRD: 98 ML/MIN/1.73SQ M
GLUCOSE SERPL-MCNC: 108 MG/DL (ref 65–140)
HCT VFR BLD AUTO: 41.2 % (ref 36.5–49.3)
HGB BLD-MCNC: 13.5 G/DL (ref 12–17)
HOLD SPECIMEN: NORMAL
LIPASE SERPL-CCNC: 178 U/L (ref 73–393)
LYMPHOCYTES # BLD AUTO: 1.99 THOUSANDS/ΜL (ref 0.6–4.47)
LYMPHOCYTES NFR BLD AUTO: 16 % (ref 14–44)
MCH RBC QN AUTO: 30.8 PG (ref 26.8–34.3)
MCHC RBC AUTO-ENTMCNC: 32.8 G/DL (ref 31.4–37.4)
MCV RBC AUTO: 94 FL (ref 82–98)
MONOCYTES # BLD AUTO: 0.56 THOUSAND/ΜL (ref 0.17–1.22)
MONOCYTES NFR BLD AUTO: 5 % (ref 4–12)
NEUTROPHILS # BLD AUTO: 9.81 THOUSANDS/ΜL (ref 1.85–7.62)
NEUTS SEG NFR BLD AUTO: 78 % (ref 43–75)
NRBC BLD AUTO-RTO: 0 /100 WBCS
PLATELET # BLD AUTO: 490 THOUSANDS/UL (ref 149–390)
PMV BLD AUTO: 9.9 FL (ref 8.9–12.7)
POTASSIUM SERPL-SCNC: 4.3 MMOL/L (ref 3.5–5.3)
PROT SERPL-MCNC: 6.8 G/DL (ref 6.4–8.2)
RBC # BLD AUTO: 4.38 MILLION/UL (ref 3.88–5.62)
SODIUM SERPL-SCNC: 142 MMOL/L (ref 136–145)
WBC # BLD AUTO: 12.56 THOUSAND/UL (ref 4.31–10.16)

## 2018-05-27 PROCEDURE — 83690 ASSAY OF LIPASE: CPT | Performed by: EMERGENCY MEDICINE

## 2018-05-27 PROCEDURE — 85025 COMPLETE CBC W/AUTO DIFF WBC: CPT | Performed by: EMERGENCY MEDICINE

## 2018-05-27 PROCEDURE — 80053 COMPREHEN METABOLIC PANEL: CPT | Performed by: EMERGENCY MEDICINE

## 2018-05-27 PROCEDURE — 36415 COLL VENOUS BLD VENIPUNCTURE: CPT

## 2018-05-27 PROCEDURE — 96374 THER/PROPH/DIAG INJ IV PUSH: CPT

## 2018-05-27 PROCEDURE — 93005 ELECTROCARDIOGRAM TRACING: CPT

## 2018-05-27 PROCEDURE — 74177 CT ABD & PELVIS W/CONTRAST: CPT

## 2018-05-27 RX ORDER — MORPHINE SULFATE 4 MG/ML
4 INJECTION, SOLUTION INTRAMUSCULAR; INTRAVENOUS ONCE
Status: COMPLETED | OUTPATIENT
Start: 2018-05-27 | End: 2018-05-27

## 2018-05-27 RX ADMIN — MORPHINE SULFATE 4 MG: 4 INJECTION INTRAVENOUS at 22:18

## 2018-05-27 RX ADMIN — IOHEXOL 100 ML: 350 INJECTION, SOLUTION INTRAVENOUS at 23:14

## 2018-05-28 PROBLEM — K57.92 DIVERTICULITIS: Status: ACTIVE | Noted: 2018-05-28

## 2018-05-28 LAB
ANION GAP SERPL CALCULATED.3IONS-SCNC: 5 MMOL/L (ref 4–13)
ATRIAL RATE: 79 BPM
BASOPHILS # BLD AUTO: 0.03 THOUSANDS/ΜL (ref 0–0.1)
BASOPHILS NFR BLD AUTO: 0 % (ref 0–1)
BUN SERPL-MCNC: 8 MG/DL (ref 5–25)
CALCIUM SERPL-MCNC: 8.5 MG/DL (ref 8.3–10.1)
CHLORIDE SERPL-SCNC: 106 MMOL/L (ref 100–108)
CO2 SERPL-SCNC: 30 MMOL/L (ref 21–32)
CREAT SERPL-MCNC: 0.82 MG/DL (ref 0.6–1.3)
EOSINOPHIL # BLD AUTO: 0.06 THOUSAND/ΜL (ref 0–0.61)
EOSINOPHIL NFR BLD AUTO: 1 % (ref 0–6)
ERYTHROCYTE [DISTWIDTH] IN BLOOD BY AUTOMATED COUNT: 13.7 % (ref 11.6–15.1)
GFR SERPL CREATININE-BSD FRML MDRD: 98 ML/MIN/1.73SQ M
GLUCOSE SERPL-MCNC: 102 MG/DL (ref 65–140)
HCT VFR BLD AUTO: 41 % (ref 36.5–49.3)
HGB BLD-MCNC: 13.2 G/DL (ref 12–17)
LYMPHOCYTES # BLD AUTO: 1.93 THOUSANDS/ΜL (ref 0.6–4.47)
LYMPHOCYTES NFR BLD AUTO: 16 % (ref 14–44)
MCH RBC QN AUTO: 30.5 PG (ref 26.8–34.3)
MCHC RBC AUTO-ENTMCNC: 32.2 G/DL (ref 31.4–37.4)
MCV RBC AUTO: 95 FL (ref 82–98)
MONOCYTES # BLD AUTO: 0.82 THOUSAND/ΜL (ref 0.17–1.22)
MONOCYTES NFR BLD AUTO: 7 % (ref 4–12)
NEUTROPHILS # BLD AUTO: 9.11 THOUSANDS/ΜL (ref 1.85–7.62)
NEUTS SEG NFR BLD AUTO: 76 % (ref 43–75)
NRBC BLD AUTO-RTO: 0 /100 WBCS
P AXIS: 67 DEGREES
PLATELET # BLD AUTO: 488 THOUSANDS/UL (ref 149–390)
PMV BLD AUTO: 10 FL (ref 8.9–12.7)
POTASSIUM SERPL-SCNC: 4.4 MMOL/L (ref 3.5–5.3)
PR INTERVAL: 142 MS
QRS AXIS: 62 DEGREES
QRSD INTERVAL: 78 MS
QT INTERVAL: 380 MS
QTC INTERVAL: 435 MS
RBC # BLD AUTO: 4.33 MILLION/UL (ref 3.88–5.62)
SODIUM SERPL-SCNC: 141 MMOL/L (ref 136–145)
T WAVE AXIS: 51 DEGREES
VENTRICULAR RATE: 79 BPM
WBC # BLD AUTO: 11.99 THOUSAND/UL (ref 4.31–10.16)

## 2018-05-28 PROCEDURE — 85025 COMPLETE CBC W/AUTO DIFF WBC: CPT | Performed by: SURGERY

## 2018-05-28 PROCEDURE — 99219 PR INITIAL OBSERVATION CARE/DAY 50 MINUTES: CPT | Performed by: SURGERY

## 2018-05-28 PROCEDURE — 93010 ELECTROCARDIOGRAM REPORT: CPT | Performed by: INTERNAL MEDICINE

## 2018-05-28 PROCEDURE — 80048 BASIC METABOLIC PNL TOTAL CA: CPT | Performed by: SURGERY

## 2018-05-28 PROCEDURE — 99285 EMERGENCY DEPT VISIT HI MDM: CPT

## 2018-05-28 RX ORDER — SODIUM CHLORIDE 9 MG/ML
75 INJECTION, SOLUTION INTRAVENOUS CONTINUOUS
Status: DISCONTINUED | OUTPATIENT
Start: 2018-05-28 | End: 2018-05-28

## 2018-05-28 RX ORDER — OXYCODONE HYDROCHLORIDE 5 MG/1
5 TABLET ORAL EVERY 4 HOURS PRN
Status: DISCONTINUED | OUTPATIENT
Start: 2018-05-28 | End: 2018-06-08

## 2018-05-28 RX ORDER — TAMSULOSIN HYDROCHLORIDE 0.4 MG/1
0.4 CAPSULE ORAL
Status: DISCONTINUED | OUTPATIENT
Start: 2018-05-28 | End: 2018-06-17 | Stop reason: HOSPADM

## 2018-05-28 RX ORDER — ACETAMINOPHEN 325 MG/1
650 TABLET ORAL EVERY 4 HOURS PRN
Status: DISCONTINUED | OUTPATIENT
Start: 2018-05-28 | End: 2018-06-03

## 2018-05-28 RX ORDER — ONDANSETRON 2 MG/ML
4 INJECTION INTRAMUSCULAR; INTRAVENOUS EVERY 6 HOURS PRN
Status: DISCONTINUED | OUTPATIENT
Start: 2018-05-28 | End: 2018-06-01

## 2018-05-28 RX ORDER — OXYCODONE HYDROCHLORIDE 10 MG/1
10 TABLET ORAL EVERY 4 HOURS PRN
Status: DISCONTINUED | OUTPATIENT
Start: 2018-05-28 | End: 2018-06-08

## 2018-05-28 RX ORDER — DOXAZOSIN MESYLATE 4 MG/1
4 TABLET ORAL DAILY
Status: DISCONTINUED | OUTPATIENT
Start: 2018-05-28 | End: 2018-06-17 | Stop reason: HOSPADM

## 2018-05-28 RX ORDER — MORPHINE SULFATE 4 MG/ML
4 INJECTION, SOLUTION INTRAMUSCULAR; INTRAVENOUS ONCE
Status: COMPLETED | OUTPATIENT
Start: 2018-05-28 | End: 2018-05-28

## 2018-05-28 RX ADMIN — METRONIDAZOLE 500 MG: 500 INJECTION, SOLUTION INTRAVENOUS at 11:23

## 2018-05-28 RX ADMIN — CEFAZOLIN SODIUM 1000 MG: 1 SOLUTION INTRAVENOUS at 10:46

## 2018-05-28 RX ADMIN — CEFAZOLIN SODIUM 1000 MG: 1 SOLUTION INTRAVENOUS at 05:32

## 2018-05-28 RX ADMIN — OXYCODONE HYDROCHLORIDE 5 MG: 5 TABLET ORAL at 20:10

## 2018-05-28 RX ADMIN — MORPHINE SULFATE 4 MG: 4 INJECTION INTRAVENOUS at 02:40

## 2018-05-28 RX ADMIN — OXYCODONE HYDROCHLORIDE 5 MG: 5 TABLET ORAL at 15:42

## 2018-05-28 RX ADMIN — HYDROMORPHONE HYDROCHLORIDE 0.5 MG: 1 INJECTION, SOLUTION INTRAMUSCULAR; INTRAVENOUS; SUBCUTANEOUS at 11:29

## 2018-05-28 RX ADMIN — TAMSULOSIN HYDROCHLORIDE 0.4 MG: 0.4 CAPSULE ORAL at 15:37

## 2018-05-28 RX ADMIN — ONDANSETRON 4 MG: 2 INJECTION INTRAMUSCULAR; INTRAVENOUS at 21:29

## 2018-05-28 RX ADMIN — OXYCODONE HYDROCHLORIDE 5 MG: 5 TABLET ORAL at 09:38

## 2018-05-28 RX ADMIN — METRONIDAZOLE 500 MG: 500 INJECTION, SOLUTION INTRAVENOUS at 19:12

## 2018-05-28 RX ADMIN — METRONIDAZOLE 500 MG: 500 INJECTION, SOLUTION INTRAVENOUS at 04:11

## 2018-05-28 RX ADMIN — HYDROMORPHONE HYDROCHLORIDE 0.5 MG: 1 INJECTION, SOLUTION INTRAMUSCULAR; INTRAVENOUS; SUBCUTANEOUS at 04:12

## 2018-05-28 RX ADMIN — CEFAZOLIN SODIUM 1000 MG: 1 SOLUTION INTRAVENOUS at 18:05

## 2018-05-28 RX ADMIN — SODIUM CHLORIDE 75 ML/HR: 0.9 INJECTION, SOLUTION INTRAVENOUS at 03:25

## 2018-05-28 NOTE — CASE MANAGEMENT
Initial Clinical Review    Admission: Date/Time/Statement: 05/28/2018 @ 01:19      Orders Placed This Encounter   Procedures    Place in Observation     Standing Status:   Standing     Number of Occurrences:   1     Order Specific Question:   Admitting Physician     Answer:   Shaji Pruitt     Order Specific Question:   Level of Care     Answer:   Med Surg [16]         ED: Date/Time/Mode of Arrival:   ED Arrival Information     Expected Arrival Acuity Means of Arrival Escorted By Service Admission Type    - 5/27/2018 19:49 Urgent Walk-In Spouse Surgery-General Urgent    Arrival Complaint    Abdominal Pain          Chief Complaint:   Chief Complaint   Patient presents with    Abdominal Pain     Pt having abdominal pain, states same as before, having lots of pain  Pt was discharged yesterday  History of Illness: This is a 60-year-old male with a recent past medical history of multiple admissions for perforated diverticulitis who presents to the ED this evening with periumbilical abdominal pain that he states feels exactly like his previous diverticulitis pain  Patient states that he was discharged from the hospital yesterday and he was feeling pretty good throughout the day and into today  However, around 1900 tonight he started to have sharp, stabbing pains around his umbilicus  Patient denies any subjective fevers and did not measure his temperature  Patient states that he took one oxycodone that he was prescribed and follow-up, waited 20 minutes the, and then when the pain did not improve he took a second  When the second oxycodone did not help his pain is decided to walk six blocks to come to the emergency room for further evaluation  Patient denies any bloody or black stool, dysuria, hematuria, testicular pain,  Swelling in his legs, or any numbness/ weakness / tingling      ED Vital Signs:   ED Triage Vitals [05/27/18 1959]   Temperature Pulse Respirations Blood Pressure SpO2   98 2 °F (36 8 °C) 90 18 117/73 97 %      Temp Source Heart Rate Source Patient Position - Orthostatic VS BP Location FiO2 (%)   Tympanic Monitor Sitting Left arm --      Pain Score       8        Wt Readings from Last 1 Encounters:   05/28/18 75 8 kg (167 lb)     Abnormal Labs/Diagnostic Test Results: WBC 12 56, Platelets 030, Neutro Absolute 9 81    CT of Abd/Pelvis: Sigmoid diverticulitis is again noted   Degree of fat stranding is relatively decreased   There are less foci of air however a single larger foci appears to be developing possibly a developing giant pseudodiverticulum or developing abscess  ED Treatment:   Medication Administration from 05/27/2018 1949 to 05/28/2018 0256       Date/Time Order Dose Route Action Action by Comments     05/27/2018 2218 morphine (PF) 4 mg/mL injection 4 mg 4 mg Intravenous Given Jose Watson RN      05/27/2018 2314 iohexol (OMNIPAQUE) 350 MG/ML injection (MULTI-DOSE) 100 mL 100 mL Intravenous Given Alana Marquez      05/28/2018 0240 morphine (PF) 4 mg/mL injection 4 mg 4 mg Intravenous Given Oskar Cain RN         Physical Exam   Constitutional: He is oriented to person, place, and time  He appears well-developed and well-nourished  No distress  Cardiovascular: Normal rate, regular rhythm and normal heart sounds  Exam reveals no gallop and no friction rub  No murmur heard  Pulmonary/Chest: Effort normal and breath sounds normal  No stridor  No respiratory distress  He has no wheezes  He has no rales  Abdominal: Soft  Bowel sounds are normal  He exhibits no distension  There is tenderness              ( bilateral lower quadrants)  There is no guarding  Past Medical/Surgical History:    Active Ambulatory Problems     Diagnosis Date Noted    Diverticulitis of large intestine with abscess without bleeding 01/13/2016     Resolved Ambulatory Problems     Diagnosis Date Noted    No Resolved Ambulatory Problems     Past Medical History:   Diagnosis Date    Abscess, intestine     Arthritis     Diverticulitis        Admitting Diagnosis: Diverticulitis [K57 92]  Abdominal pain [R10 9]    Age/Sex: 63 y o  male    Assessment/Plan:     Assessment:  63 y M w/ recurrent sigmoid diverticulitis treated with cefepime / flagyl and discharged two days ago on cipro / flagyl returns to ED with worsening abdominal pain and nausea and vomiting       Plan:  Admit for observation  Ancef/Flagyl  Full liquid diet  Serial exams  Outpatient colonoscopy     Admission Orders:  Observation/MedSurg  Bilateral Sequential Compression Device  Full Liquids Diet  NSS @ 75    Scheduled Meds:   Current Facility-Administered Medications:  cefazolin 1,000 mg Intravenous Q8H   doxazosin 4 mg Oral Daily   metroNIDAZOLE 500 mg Intravenous Q8H   tamsulosin 0 4 mg Oral Daily With Dinner     IV Dilaudid 0 5 mg x 1 thus far

## 2018-05-28 NOTE — ED ATTENDING ATTESTATION
Ramirez Coyle DO, saw and evaluated the patient  I have discussed the patient with the resident/non-physician practitioner and agree with the resident's/non-physician practitioner's findings, Plan of Care, and MDM as documented in the resident's/non-physician practitioner's note, except where noted  All available labs and Radiology studies were reviewed  At this point I agree with the current assessment done in the Emergency Department  I have conducted an independent evaluation of this patient a history and physical is as follows:    Patient returns to the hospital 24 hours after previous discharge for diverticulitis  Patient claims and tense worsening of his abdominal pain, different and discharge  Patient was not found to have abscess on previous admission, but at present time cannot be ruled out  Given the patient's worsening abdominal pain, he will receive CT scan of the abdomen pelvis, lab work which shows leukocytosis, antibiotics, and likely admission        Critical Care Time  CritCare Time    Procedures

## 2018-05-28 NOTE — PROGRESS NOTES
Progress Note - General Surgery  Yulissa Bunn 63 y o  male MRN: [id number]  Unit/Bed#: [facility]2 217-01 Encounter: [id number]    Assessment:  63y o -year-old male with recurrent sigmoid diverticulitis treated with cefepime / flagyl and discharged two days ago on cipro / flagyl returns to ED with worsening abdominal pain and nausea and vomiting  Plan:  Ancef/Flagyl  Full liquid diet, advance later today if tolerates  Serial exams  Outpatient colonoscopy, possible colon resection   Possible d/c today on augmentin if allergy not contraindication     Dewayne Glass MD PGY-4  6:00 AM  05/28/18      Subjective:  Pain controlled  Objective:  Patient Vitals for the past 24 hrs:   BP Temp Temp src Pulse Resp SpO2 Height Weight   05/28/18 0300 141/80 99 °F (37 2 °C) Oral 68 18 97 % 5' 8" (1 727 m) 75 8 kg (167 lb)   05/28/18 0207 137/90 - - 70 16 98 % - -   05/27/18 2335 135/86 - - 73 18 98 % - -   05/27/18 2202 132/83 - - 67 18 97 % - -   05/27/18 1959 117/73 98 2 °F (36 8 °C) Tympanic 90 18 97 % 5' 6" (1 676 m) 75 8 kg (167 lb)          Diet Orders            Start     Ordered    05/28/18 0259  Diet Surgical; Full Liquid  Diet effective now     Question Answer Comment   Diet Type Surgical    Surgical Full Liquid    RD to adjust diet per protocol?  Yes        05/28/18 0258      No intake or output data in the 24 hours ending 05/28/18 0600    Physical Exam:  General: NAD  Cardiovascular: RRR  Respiratory: breath sounds b/l  Abdomen: soft, tender LLQ, ND  Extremities: no edema    Medications:    Current Facility-Administered Medications:  acetaminophen 650 mg Oral Q4H PRN Michelene Carrel, MD    cefazolin 1,000 mg Intravenous Q8H Michelene Carrel, MD Last Rate: 1,000 mg (05/28/18 0532)   doxazosin 4 mg Oral Daily Michelene Carrel, MD    HYDROmorphone 0 5 mg Intravenous Q3H PRN Michelene Carrel, MD    metroNIDAZOLE 500 mg Intravenous Q8H Michelene Carrel, MD Last Rate: 500 mg (05/28/18 3317)   ondansetron 4 mg Intravenous Q6H PRN Junior Galo MD    oxyCODONE 10 mg Oral Q4H PRN Junior Galo MD    oxyCODONE 5 mg Oral Q4H PRN Junior Galo MD    sodium chloride 75 mL/hr Intravenous Continuous Junior Galo MD Last Rate: 75 mL/hr (05/28/18 0325)   tamsulosin 0 4 mg Oral Daily With Philippe Saucedo MD      sodium chloride 75 mL/hr Last Rate: 75 mL/hr (05/28/18 0325)     acetaminophen 650 mg Q4H PRN   HYDROmorphone 0 5 mg Q3H PRN   ondansetron 4 mg Q6H PRN   oxyCODONE 10 mg Q4H PRN   oxyCODONE 5 mg Q4H PRN     Laboratory results:   CBC:   Lab Results   Component Value Date    WBC 11 99 (H) 05/28/2018    HGB 13 2 05/28/2018    HCT 41 0 05/28/2018    MCV 95 05/28/2018     (H) 05/28/2018    MCH 30 5 05/28/2018    MCHC 32 2 05/28/2018    RDW 13 7 05/28/2018    MPV 10 0 05/28/2018    NRBC 0 05/28/2018   , CMP:   Lab Results   Component Value Date     05/28/2018    K 4 4 05/28/2018     05/28/2018    CO2 30 05/28/2018    ANIONGAP 5 05/28/2018    BUN 8 05/28/2018    CREATININE 0 82 05/28/2018    GLUCOSE 102 05/28/2018    CALCIUM 8 5 05/28/2018    AST 45 05/27/2018    ALT 34 05/27/2018    ALKPHOS 107 05/27/2018    PROT 6 8 05/27/2018    BILITOT 0 29 05/27/2018    EGFR 98 05/28/2018   , Coagulation: No results found for: PT, INR, APTT, Urinalysis: No results found for: Marthann End, SPECGRAV, PHUR, LEUKOCYTESUR, NITRITE, PROTEINUA, GLUCOSEU, KETONESU, BILIRUBINUR, BLOODU, Amylase: No results found for: AMYLASE, Lipase:   Lab Results   Component Value Date    LIPASE 178 05/27/2018       VTE Pharmacologic Prophylaxis: Reason for no pharmacologic prophylaxis low risk  VTE Mechanical Prophylaxis: sequential compression device

## 2018-05-28 NOTE — H&P
H&P Exam - General Surgery   Bess Bridgett Bunn 63 y o  male MRN: [id number]  Unit/Bed#: ED 28 Encounter: [id number]    Assessment/Plan     Assessment:  63 y M w/ recurrent sigmoid diverticulitis treated with cefepime / flagyl and discharged two days ago on cipro / flagyl returns to ED with worsening abdominal pain and nausea and vomiting  Plan:  Admit for observation  Ancef/Flagyl  Full liquid diet  Serial exams  Outpatient colonoscopy    Ashanti Benson MD PGY-4  1:27 AM  05/28/18        History of Present Illness     HPI:  Ca Hardy is a 63 y o  male who presents with abdominal pain  The patient was recently discharged after an episode of recurrent sigmoid diverticular disease  He was treated with cefepime / flagyl at that admission  He was discharged after being able to tolerate PO on cipro / flagyl  That was two days ago  He is now having worsening abdominal pain, and he isn't able to tolerate PO  He was see in the ED and a CT scan showed improving inflammation but possible early abscess formation  He thinks that when he was changed by IV to PO antibiotics his pain worsened  Despite not being able to tolerate PO as an outpatient he is very eager to eat food in the ED  Review of Systems   Constitutional: Positive for appetite change  Negative for fever  HENT: Negative for sore throat  Eyes: Negative for visual disturbance  Respiratory: Negative for shortness of breath  Cardiovascular: Negative for chest pain  Gastrointestinal: Positive for abdominal pain  Negative for nausea and vomiting  Endocrine: Negative for polyuria  Genitourinary: Negative for dysuria  Musculoskeletal: Negative for arthralgias  Skin: Negative for rash  Allergic/Immunologic: Negative for environmental allergies  Neurological: Negative for dizziness  Hematological: Negative for adenopathy  Psychiatric/Behavioral: The patient is not nervous/anxious          Historical Information   Past Medical History:   Diagnosis Date    Abscess, intestine     Arthritis     Diverticulitis      Past Surgical History:   Procedure Laterality Date    ABCESS DRAINAGE      COLONOSCOPY N/A 5/5/2016    Procedure: COLONOSCOPY;  Surgeon: Blanco Fung MD;  Location: [facility]; Service:      Social History   History   Alcohol Use No     History   Drug Use No     History   Smoking Status    Former Smoker   Smokeless Tobacco    Never Used     Family History: non-contributory    Meds/Allergies   all medications and allergies reviewed  Allergies   Allergen Reactions    Penicillins        Objective   First Vitals:   Blood Pressure: 117/73 (05/27/18 1959)  Pulse: 90 (05/27/18 1959)  Temperature: 98 2 °F (36 8 °C) (05/27/18 1959)  Temp Source: Tympanic (05/27/18 1959)  Respirations: 18 (05/27/18 1959)  Height: 5' 6" (167 6 cm) (05/27/18 1959)  Weight - Scale: 75 8 kg (167 lb) (05/27/18 1959)  SpO2: 97 % (05/27/18 1959)    Current Vitals:   Blood Pressure: 135/86 (05/27/18 2335)  Pulse: 73 (05/27/18 2335)  Temperature: 98 2 °F (36 8 °C) (05/27/18 1959)  Temp Source: Tympanic (05/27/18 1959)  Respirations: 18 (05/27/18 2335)  Height: 5' 6" (167 6 cm) (05/27/18 1959)  Weight - Scale: 75 8 kg (167 lb) (05/27/18 1959)  SpO2: 98 % (05/27/18 2335)    No intake or output data in the 24 hours ending 05/28/18 0126    Invasive Devices     Peripheral Intravenous Line            Peripheral IV 05/27/18 Left Forearm less than 1 day                Physical Exam   Constitutional: He is oriented to person, place, and time  He appears well-developed and well-nourished  HENT:   Head: Normocephalic and atraumatic  Eyes: Pupils are equal, round, and reactive to light  Neck: Normal range of motion  No tracheal deviation present  Cardiovascular: Normal rate and regular rhythm  Pulmonary/Chest: Effort normal  He has no wheezes  Abdominal: Soft  There is tenderness (LLQ)  Musculoskeletal: Normal range of motion   He exhibits no edema  Neurological: He is alert and oriented to person, place, and time  No cranial nerve deficit  Skin: Skin is warm  No rash noted  Psychiatric: He has a normal mood and affect  Lab Results:   CBC:   Lab Results   Component Value Date    WBC 12 56 (H) 05/27/2018    HGB 13 5 05/27/2018    HCT 41 2 05/27/2018    MCV 94 05/27/2018     (H) 05/27/2018    MCH 30 8 05/27/2018    MCHC 32 8 05/27/2018    RDW 13 6 05/27/2018    MPV 9 9 05/27/2018    NRBC 0 05/27/2018   , CMP:   Lab Results   Component Value Date     05/27/2018    K 4 3 05/27/2018     05/27/2018    CO2 30 05/27/2018    ANIONGAP 5 05/27/2018    BUN 11 05/27/2018    CREATININE 0 83 05/27/2018    GLUCOSE 108 05/27/2018    CALCIUM 8 4 05/27/2018    AST 45 05/27/2018    ALT 34 05/27/2018    ALKPHOS 107 05/27/2018    PROT 6 8 05/27/2018    BILITOT 0 29 05/27/2018    EGFR 98 05/27/2018   , Coagulation: No results found for: PT, INR, APTT, Urinalysis: No results found for: Inetta Hench, SPECGRAV, PHUR, LEUKOCYTESUR, NITRITE, PROTEINUA, GLUCOSEU, KETONESU, BILIRUBINUR, BLOODU, Amylase: No results found for: AMYLASE, Lipase:   Lab Results   Component Value Date    LIPASE 178 05/27/2018     Imaging: I have personally reviewed pertinent films in PACS   5/27 CT-a/p:  Sigmoid diverticulitis is again noted  Degree of fat stranding is relatively decreased  There are less foci of air however a single larger foci appears to be developing possibly a developing giant pseudodiverticulum or developing abscess  Short interval follow-up CT (5-14 days) is recommended  EKG, Pathology, and Other Studies: I have personally reviewed pertinent films in PACS    Code Status: Level 1 - Full Code  Advance Directive and Living Will:      Power of :    POLST:      Counseling / Coordination of Care  Total floor / unit time spent today 30 minutes    Greater than 50% of total time was spent with the patient and / or family counseling and / or coordination of care  A description of the counseling / coordination of care: plan of care

## 2018-05-28 NOTE — ED PROVIDER NOTES
History  Chief Complaint   Patient presents with    Abdominal Pain     Pt having abdominal pain, states same as before, having lots of pain  Pt was discharged yesterday  This is a 27-year-old male with a recent past medical history of multiple admissions for perforated diverticulitis who presents to the ED this evening with periumbilical abdominal pain that he states feels exactly like his previous diverticulitis pain  Patient states that he was discharged from the hospital yesterday and he was feeling pretty good throughout the day and into today  However, around 1900 tonight he started to have sharp, stabbing pains around his umbilicus  Patient denies any subjective fevers and did not measure his temperature  Patient states that he took one oxycodone that he was prescribed and follow-up, waited 20 minutes the, and then when the pain did not improve he took a second  When the second oxycodone did not help his pain is decided to walk six blocks to come to the emergency room for further evaluation  Patient denies any bloody or black stool, dysuria, hematuria, testicular pain,  Swelling in his legs, or any numbness/ weakness / tingling  Prior to Admission Medications   Prescriptions Last Dose Informant Patient Reported? Taking?   acetaminophen (TYLENOL) 325 mg tablet 2018 at Unknown time  No Yes   Sig: Take 2 tablets (650 mg total) by mouth every 4 (four) hours as needed for mild pain for up to 30 days Do not exceed 4 g daily     ciprofloxacin (CIPRO) 500 mg tablet 2018 at Unknown time  No Yes   Sig: Take 1 tablet (500 mg total) by mouth every 12 (twelve) hours for 6 days   doxazosin (CARDURA) 4 mg tablet Past Week at Unknown time  Yes Yes   Si mg   ergocalciferol (ERGOCALCIFEROL) 90683 units capsule 2018 at Unknown time  Yes Yes   Si,000 Units   ibuprofen (MOTRIN) 600 mg tablet Unknown at Unknown time  No No   Sig: Take 1 tablet (600 mg total) by mouth every 6 (six) hours as needed for mild pain (Take with food ) for up to 30 days   metroNIDAZOLE (FLAGYL) 500 mg tablet 2018 at Unknown time  No Yes   Sig: Take 1 tablet (500 mg total) by mouth every 8 (eight) hours for 6 days   oxyCODONE (ROXICODONE) 5 mg immediate release tablet 2018 at Unknown time  No Yes   Sig: Take 1 tablet (5 mg total) by mouth every 4 (four) hours as needed for moderate pain for up to 5 days Max Daily Amount: 30 mg   tamsulosin (FLOMAX) 0 4 mg Past Week at Unknown time  Yes Yes   Si 4 mg every 24 hours      Facility-Administered Medications: None       Past Medical History:   Diagnosis Date    Abscess, intestine     Arthritis     Diverticulitis        Past Surgical History:   Procedure Laterality Date    ABCESS DRAINAGE      COLONOSCOPY N/A 2016    Procedure: COLONOSCOPY;  Surgeon: Jia Jose MD;  Location: [facility]; Service:        History reviewed  No pertinent family history  I have reviewed and agree with the history as documented  Social History   Substance Use Topics    Smoking status: Former Smoker    Smokeless tobacco: Never Used    Alcohol use No        Review of Systems   Constitutional: Negative for chills, diaphoresis and fever  HENT: Negative for congestion, rhinorrhea, sinus pressure and sore throat  Eyes: Negative for visual disturbance  Respiratory: Negative for cough, chest tightness and shortness of breath  Cardiovascular: Negative for chest pain  Gastrointestinal: Positive for abdominal pain ( periumbilical)  Negative for blood in stool, constipation, diarrhea, nausea and vomiting  Genitourinary: Negative for dysuria, frequency, hematuria and urgency  Musculoskeletal: Negative for arthralgias and myalgias  Skin: Negative for color change and rash  Neurological: Negative for dizziness, numbness and headaches         Physical Exam  ED Triage Vitals [18]   Temperature Pulse Respirations Blood Pressure SpO2   98 2 °F (36 8 °C) 90 18 117/73 97 %      Temp Source Heart Rate Source Patient Position - Orthostatic VS BP Location FiO2 (%)   Tympanic Monitor Sitting Left arm --      Pain Score       8           Orthostatic Vital Signs  Vitals:    05/27/18 2202 05/27/18 2335 05/28/18 0207 05/28/18 0300   BP: 132/83 135/86 137/90 141/80   Pulse: 67 73 70 68   Patient Position - Orthostatic VS:  Lying Lying Lying       Physical Exam   Constitutional: He is oriented to person, place, and time  He appears well-developed and well-nourished  No distress  HENT:   Head: Normocephalic and atraumatic  Eyes: Conjunctivae are normal  Pupils are equal, round, and reactive to light  Right eye exhibits no discharge  Left eye exhibits no discharge  No scleral icterus  Neck: Normal range of motion  Neck supple  Cardiovascular: Normal rate, regular rhythm and normal heart sounds  Exam reveals no gallop and no friction rub  No murmur heard  Pulmonary/Chest: Effort normal and breath sounds normal  No stridor  No respiratory distress  He has no wheezes  He has no rales  Abdominal: Soft  Bowel sounds are normal  He exhibits no distension  There is tenderness ( bilateral lower quadrants)  There is no guarding  Musculoskeletal: Normal range of motion  He exhibits no edema or tenderness  Neurological: He is alert and oriented to person, place, and time  No cranial nerve deficit or sensory deficit  He exhibits normal muscle tone  Skin: Skin is warm and dry  Psychiatric: He has a normal mood and affect  His behavior is normal    Nursing note and vitals reviewed        ED Medications  Medications   acetaminophen (TYLENOL) tablet 650 mg (not administered)   tamsulosin (FLOMAX) capsule 0 4 mg (not administered)   doxazosin (CARDURA) tablet 4 mg (not administered)   sodium chloride 0 9 % infusion (75 mL/hr Intravenous New Bag 5/28/18 3866)   ondansetron (ZOFRAN) injection 4 mg (not administered)   oxyCODONE (ROXICODONE) IR tablet 5 mg (not administered) oxyCODONE (ROXICODONE) immediate release tablet 10 mg (not administered)   HYDROmorphone (DILAUDID) injection 0 5 mg (0 5 mg Intravenous Given 5/28/18 0412)   ceFAZolin (ANCEF) IVPB (premix) 1,000 mg (not administered)   metroNIDAZOLE (FLAGYL) IVPB (premix) 500 mg (500 mg Intravenous New Bag 5/28/18 0411)   morphine (PF) 4 mg/mL injection 4 mg (4 mg Intravenous Given 5/27/18 2218)   iohexol (OMNIPAQUE) 350 MG/ML injection (MULTI-DOSE) 100 mL (100 mL Intravenous Given 5/27/18 2314)   morphine (PF) 4 mg/mL injection 4 mg (4 mg Intravenous Given 5/28/18 0240)       Diagnostic Studies  Results Reviewed     Procedure Component Value Units Date/Time    Comprehensive metabolic panel [[id number]]  (Abnormal) Collected:  05/27/18 2042    Lab Status:  Final result Specimen:  Blood from Arm, Right Updated:  05/27/18 2110     Sodium 142 mmol/L      Potassium 4 3 mmol/L      Chloride 107 mmol/L      CO2 30 mmol/L      Anion Gap 5 mmol/L      BUN 11 mg/dL      Creatinine 0 83 mg/dL      Glucose 108 mg/dL      Calcium 8 4 mg/dL      AST 45 U/L      ALT 34 U/L      Alkaline Phosphatase 107 U/L      Total Protein 6 8 g/dL      Albumin 3 0 (L) g/dL      Total Bilirubin 0 29 mg/dL      eGFR 98 ml/min/1 73sq m     Narrative:         National Kidney Disease Education Program recommendations are as follows:  GFR calculation is accurate only with a steady state creatinine  Chronic Kidney disease less than 60 ml/min/1 73 sq  meters  Kidney failure less than 15 ml/min/1 73 sq  meters      Lipase [[id number]]  (Normal) Collected:  05/27/18 2042    Lab Status:  Final result Specimen:  Blood from Arm, Right Updated:  05/27/18 2110     Lipase 178 u/L     CBC and differential [[id number]]  (Abnormal) Collected:  05/27/18 2042    Lab Status:  Final result Specimen:  Blood from Arm, Right Updated:  05/27/18 2100     WBC 12 56 (H) Thousand/uL      RBC 4 38 Million/uL      Hemoglobin 13 5 g/dL      Hematocrit 41 2 %      MCV 94 fL      MCH 30 8 pg MCHC 32 8 g/dL      RDW 13 6 %      MPV 9 9 fL      Platelets 269 (H) Thousands/uL      nRBC 0 /100 WBCs      Neutrophils Relative 78 (H) %      Lymphocytes Relative 16 %      Monocytes Relative 5 %      Eosinophils Relative 1 %      Basophils Relative 0 %      Neutrophils Absolute 9 81 (H) Thousands/µL      Lymphocytes Absolute 1 99 Thousands/µL      Monocytes Absolute 0 56 Thousand/µL      Eosinophils Absolute 0 13 Thousand/µL      Basophils Absolute 0 02 Thousands/µL                  CT abdomen pelvis with contrast   Final Result by Severo Garb, MD (05/28 0010)      Sigmoid diverticulitis is again noted  Degree of fat stranding is relatively decreased  There are less foci of air however a single larger foci appears to be developing possibly a developing giant pseudodiverticulum or developing abscess  Shoft    interval follow-up CT (5-14 days) is recommended  Workstation performed: [id number]               Procedures  Procedures      Phone Consults  ED Phone Contact    ED Course                               MDM  Number of Diagnoses or Management Options  Diverticulitis:   Diagnosis management comments:  Patient's CT scan significant for possible developing abdominal abscess  Surgery was consulted and agreed to admit the patient to their service  CritCare Time    Disposition  Final diagnoses:   Diverticulitis     Time reflects when diagnosis was documented in both MDM as applicable and the Disposition within this note     Time User Action Codes Description Comment    5/28/2018  2:28 AM Lindsay Wheat Add [K57 92] Diverticulitis       ED Disposition     None      Follow-up Information    None         Current Discharge Medication List      CONTINUE these medications which have NOT CHANGED    Details   acetaminophen (TYLENOL) 325 mg tablet Take 2 tablets (650 mg total) by mouth every 4 (four) hours as needed for mild pain for up to 30 days Do not exceed 4 g daily    Qty: 30 tablet, Refills: 0 Associated Diagnoses: Diverticulitis of large intestine with perforation without bleeding      ciprofloxacin (CIPRO) 500 mg tablet Take 1 tablet (500 mg total) by mouth every 12 (twelve) hours for 6 days  Qty: 12 tablet, Refills: 0    Associated Diagnoses: Diverticulitis of large intestine with abscess without bleeding      doxazosin (CARDURA) 4 mg tablet 4 mg      ergocalciferol (ERGOCALCIFEROL) 72287 units capsule 50,000 Units      metroNIDAZOLE (FLAGYL) 500 mg tablet Take 1 tablet (500 mg total) by mouth every 8 (eight) hours for 6 days  Qty: 18 tablet, Refills: 0    Associated Diagnoses: Diverticulitis of large intestine with abscess without bleeding      oxyCODONE (ROXICODONE) 5 mg immediate release tablet Take 1 tablet (5 mg total) by mouth every 4 (four) hours as needed for moderate pain for up to 5 days Max Daily Amount: 30 mg  Qty: 20 tablet, Refills: 0    Associated Diagnoses: Diverticulitis of large intestine with abscess without bleeding      tamsulosin (FLOMAX) 0 4 mg 0 4 mg every 24 hours      ibuprofen (MOTRIN) 600 mg tablet Take 1 tablet (600 mg total) by mouth every 6 (six) hours as needed for mild pain (Take with food ) for up to 30 days  Qty: 30 tablet, Refills: 0    Associated Diagnoses: Diverticulitis of large intestine with perforation without bleeding           No discharge procedures on file  ED Provider  Attending physically available and evaluated Di Almazan I managed the patient along with the ED Attending      Electronically Signed by         Oumou Bailey MD  05/28/18 4441

## 2018-05-29 PROCEDURE — 99232 SBSQ HOSP IP/OBS MODERATE 35: CPT | Performed by: SURGERY

## 2018-05-29 RX ADMIN — METRONIDAZOLE 500 MG: 500 INJECTION, SOLUTION INTRAVENOUS at 20:09

## 2018-05-29 RX ADMIN — CEFAZOLIN SODIUM 1000 MG: 1 SOLUTION INTRAVENOUS at 02:34

## 2018-05-29 RX ADMIN — OXYCODONE HYDROCHLORIDE 5 MG: 5 TABLET ORAL at 04:07

## 2018-05-29 RX ADMIN — DOXAZOSIN 4 MG: 4 TABLET ORAL at 12:57

## 2018-05-29 RX ADMIN — OXYCODONE HYDROCHLORIDE 5 MG: 5 TABLET ORAL at 08:09

## 2018-05-29 RX ADMIN — CEFAZOLIN SODIUM 1000 MG: 1 SOLUTION INTRAVENOUS at 10:38

## 2018-05-29 RX ADMIN — OXYCODONE HYDROCHLORIDE 5 MG: 5 TABLET ORAL at 00:25

## 2018-05-29 RX ADMIN — HYDROMORPHONE HYDROCHLORIDE 0.5 MG: 1 INJECTION, SOLUTION INTRAMUSCULAR; INTRAVENOUS; SUBCUTANEOUS at 23:20

## 2018-05-29 RX ADMIN — OXYCODONE HYDROCHLORIDE 10 MG: 10 TABLET ORAL at 12:57

## 2018-05-29 RX ADMIN — CEFAZOLIN SODIUM 1000 MG: 1 SOLUTION INTRAVENOUS at 19:18

## 2018-05-29 RX ADMIN — METRONIDAZOLE 500 MG: 500 INJECTION, SOLUTION INTRAVENOUS at 03:12

## 2018-05-29 RX ADMIN — TAMSULOSIN HYDROCHLORIDE 0.4 MG: 0.4 CAPSULE ORAL at 17:02

## 2018-05-29 RX ADMIN — METRONIDAZOLE 500 MG: 500 INJECTION, SOLUTION INTRAVENOUS at 13:25

## 2018-05-29 RX ADMIN — OXYCODONE HYDROCHLORIDE 10 MG: 10 TABLET ORAL at 20:09

## 2018-05-29 RX ADMIN — OXYCODONE HYDROCHLORIDE 5 MG: 5 TABLET ORAL at 17:02

## 2018-05-29 NOTE — SOCIAL WORK
Pt is < 30 day readmit  Pt does not have POA  Pt's primary contact is karen Roca [phone]  Pt reported living with family in [address]  Driving, retired and Independent with ADLs PTA  Pt denies DME  No hx HHC, STR, MH/ETOH/drug tx  Pharmacy is [facility]   Pt has transportation when d/c  CM to follow for d/c needs  CM reviewed d/c planning process including the following: identifying help at home, patient preference for d/c planning needs, Discharge Lounge, [facility] Meds to Bed program, availability of treatment team to discuss questions or concerns patient and/or family may have regarding understanding medications and recognizing signs and symptoms once discharged   CM also encouraged patient to follow up with all recommended appointments after discharge  Patient advised of importance for patient and family to participate in managing patients medical well being

## 2018-05-29 NOTE — PROGRESS NOTES
Progress Note - General Surgery   Aram Bunn 63 y o  male MRN: [id number]  Unit/Bed#: CW2 217-01 Encounter: [id number]    Assessment:  64 yo M with diverticulitis on antibiotics    Plan:  - diet as tolerated  - saline lock  - transition to PO antibiotics, f/u patient for allergy  - DVT ppx  - dispo planning    Subjective/Objective     Subjective: Patient reports mild pain, greatly improved since admission  Passing gas and had a BM  Objective:     Vitals: Blood pressure 129/74, pulse 64, temperature 99 2 °F (37 3 °C), temperature source Oral, resp  rate 20, height 5' 8" (1 727 m), weight 75 8 kg (167 lb), SpO2 95 %  ,Body mass index is 25 39 kg/m²  I/O       05/27 0701 - 05/28 0700 05/28 0701 - 05/29 0700 05/29 0701 - 05/30 0700    I V  (mL/kg)  671 3 (8 9)     Total Intake(mL/kg)  671 3 (8 9)     Urine (mL/kg/hr) 125 830 (0 5)     Total Output 125 830      Net -125 -158 8             Unmeasured Urine Occurrence  1 x           Physical Exam:  GEN: NAD  HEENT: MMM  CV: RRR  Lung: Normal effort  Ab: Soft, NT/ND  Extrem: No CCE  Neuro:  A+Ox3    Lab, Imaging and other studies: CBC with diff: No results found for: WBC, HGB, HCT, MCV, PLT, ADJUSTEDWBC, MCH, MCHC, RDW, MPV, NRBC, BMP/CMP: No results found for: NA, K, CL, CO2, ANIONGAP, BUN, CREATININE, GLUCOSE, CALCIUM, AST, ALT, ALKPHOS, PROT, ALBUMIN, BILITOT, EGFR, Magnesium: No results found for: MAG  VTE Pharmacologic Prophylaxis: Sequential compression device (Venodyne)   VTE Mechanical Prophylaxis: sequential compression device

## 2018-05-30 ENCOUNTER — TRANSITIONAL CARE MANAGEMENT (OUTPATIENT)
Dept: INTERNAL MEDICINE CLINIC | Facility: CLINIC | Age: 58
End: 2018-05-30

## 2018-05-30 LAB
BASOPHILS # BLD AUTO: 0.02 THOUSANDS/ΜL (ref 0–0.1)
BASOPHILS NFR BLD AUTO: 0 % (ref 0–1)
EOSINOPHIL # BLD AUTO: 0.09 THOUSAND/ΜL (ref 0–0.61)
EOSINOPHIL NFR BLD AUTO: 1 % (ref 0–6)
ERYTHROCYTE [DISTWIDTH] IN BLOOD BY AUTOMATED COUNT: 13.9 % (ref 11.6–15.1)
HCT VFR BLD AUTO: 42.2 % (ref 36.5–49.3)
HGB BLD-MCNC: 13.9 G/DL (ref 12–17)
LYMPHOCYTES # BLD AUTO: 2.64 THOUSANDS/ΜL (ref 0.6–4.47)
LYMPHOCYTES NFR BLD AUTO: 21 % (ref 14–44)
MCH RBC QN AUTO: 30.8 PG (ref 26.8–34.3)
MCHC RBC AUTO-ENTMCNC: 32.9 G/DL (ref 31.4–37.4)
MCV RBC AUTO: 93 FL (ref 82–98)
MONOCYTES # BLD AUTO: 0.7 THOUSAND/ΜL (ref 0.17–1.22)
MONOCYTES NFR BLD AUTO: 6 % (ref 4–12)
NEUTROPHILS # BLD AUTO: 9.18 THOUSANDS/ΜL (ref 1.85–7.62)
NEUTS SEG NFR BLD AUTO: 73 % (ref 43–75)
NRBC BLD AUTO-RTO: 0 /100 WBCS
PLATELET # BLD AUTO: 538 THOUSANDS/UL (ref 149–390)
PMV BLD AUTO: 10.1 FL (ref 8.9–12.7)
RBC # BLD AUTO: 4.52 MILLION/UL (ref 3.88–5.62)
WBC # BLD AUTO: 12.67 THOUSAND/UL (ref 4.31–10.16)

## 2018-05-30 PROCEDURE — 85025 COMPLETE CBC W/AUTO DIFF WBC: CPT | Performed by: SURGERY

## 2018-05-30 PROCEDURE — 99231 SBSQ HOSP IP/OBS SF/LOW 25: CPT | Performed by: SURGERY

## 2018-05-30 RX ORDER — SIMETHICONE 80 MG
80 TABLET,CHEWABLE ORAL EVERY 6 HOURS PRN
Status: DISCONTINUED | OUTPATIENT
Start: 2018-05-30 | End: 2018-06-17 | Stop reason: HOSPADM

## 2018-05-30 RX ADMIN — CEFAZOLIN SODIUM 1000 MG: 1 SOLUTION INTRAVENOUS at 12:07

## 2018-05-30 RX ADMIN — METRONIDAZOLE 500 MG: 500 INJECTION, SOLUTION INTRAVENOUS at 13:11

## 2018-05-30 RX ADMIN — TAMSULOSIN HYDROCHLORIDE 0.4 MG: 0.4 CAPSULE ORAL at 15:33

## 2018-05-30 RX ADMIN — OXYCODONE HYDROCHLORIDE 10 MG: 10 TABLET ORAL at 19:21

## 2018-05-30 RX ADMIN — CEFAZOLIN SODIUM 1000 MG: 1 SOLUTION INTRAVENOUS at 05:08

## 2018-05-30 RX ADMIN — DOXAZOSIN 4 MG: 4 TABLET ORAL at 09:06

## 2018-05-30 RX ADMIN — HYDROMORPHONE HYDROCHLORIDE 0.5 MG: 1 INJECTION, SOLUTION INTRAMUSCULAR; INTRAVENOUS; SUBCUTANEOUS at 10:15

## 2018-05-30 RX ADMIN — CEFAZOLIN SODIUM 1000 MG: 1 SOLUTION INTRAVENOUS at 20:37

## 2018-05-30 RX ADMIN — OXYCODONE HYDROCHLORIDE 10 MG: 10 TABLET ORAL at 05:08

## 2018-05-30 RX ADMIN — HYDROMORPHONE HYDROCHLORIDE 0.5 MG: 1 INJECTION, SOLUTION INTRAMUSCULAR; INTRAVENOUS; SUBCUTANEOUS at 15:33

## 2018-05-30 RX ADMIN — SIMETHICONE CHEW TAB 80 MG 80 MG: 80 TABLET ORAL at 17:54

## 2018-05-30 RX ADMIN — HYDROMORPHONE HYDROCHLORIDE 0.5 MG: 1 INJECTION, SOLUTION INTRAMUSCULAR; INTRAVENOUS; SUBCUTANEOUS at 20:36

## 2018-05-30 RX ADMIN — METRONIDAZOLE 500 MG: 500 INJECTION, SOLUTION INTRAVENOUS at 06:01

## 2018-05-30 RX ADMIN — METRONIDAZOLE 500 MG: 500 INJECTION, SOLUTION INTRAVENOUS at 22:13

## 2018-05-30 NOTE — CASE MANAGEMENT
Initial Clinical Review    Admission: Date/Time/Statement: Observation 5/28 and changed to Inpatient on 5/30 @ 1342  Pt requiring continue stay for IV Antibiotics   Transfer Service: Surgery-General       Question Answer Comment   Admitting Physician Funmilayo Souza    Level of Care Med Surg    Estimated length of stay More than 2 Midnights    Certification I certify that inpatient services are medically necessary for this patient for a duration of greater than two midnights  See H&P and MD Progress Notes for additional information about the patient's course of treatment  ED: Date/Time/Mode of Arrival:   ED Arrival Information     Expected Arrival Acuity Means of Arrival Escorted By Service Admission Type    - 5/27/2018 19:49 Urgent Walk-In Spouse Surgery-General Urgent    Arrival Complaint    Abdominal Pain          Chief Complaint:   Chief Complaint   Patient presents with    Abdominal Pain     Pt having abdominal pain, states same as before, having lots of pain  Pt was discharged yesterday  History of Illness: This is a 30-year-old male with a recent past medical history of multiple admissions for perforated diverticulitis who presents to the ED this evening with periumbilical abdominal pain that he states feels exactly like his previous diverticulitis pain  Patient states that he was discharged from the hospital yesterday and he was feeling pretty good throughout the day and into today  However, around 1900 tonight he started to have sharp, stabbing pains around his umbilicus  Patient denies any subjective fevers and did not measure his temperature  Patient states that he took one oxycodone that he was prescribed and follow-up, waited 20 minutes the, and then when the pain did not improve he took a second  When the second oxycodone did not help his pain is decided to walk six blocks to come to the emergency room for further evaluation    Patient denies any bloody or black stool, dysuria, hematuria, testicular pain,  Swelling in his legs, or any numbness/ weakness / tingling  ED Vital Signs:   ED Triage Vitals [05/27/18 1959]   Temperature Pulse Respirations Blood Pressure SpO2   98 2 °F (36 8 °C) 90 18 117/73 97 %      Temp Source Heart Rate Source Patient Position - Orthostatic VS BP Location FiO2 (%)   Tympanic Monitor Sitting Left arm --      Pain Score       8          Wt Readings from Last 1 Encounters:   05/28/18 75 8 kg (167 lb)     Abnormal Labs/Diagnostic Test Results: WBC 12 56, Platelets 811, Neutro Absolute 9 81    CT of Abd/Pelvis: Sigmoid diverticulitis is again noted   Degree of fat stranding is relatively decreased   There are less foci of air however a single larger foci appears to be developing possibly a developing giant pseudodiverticulum or developing abscess  ED Treatment:   Medication Administration from 05/27/2018 1949 to 05/28/2018 0256       Date/Time Order Dose Route Action     05/27/2018 2218 morphine (PF) 4 mg/mL injection 4 mg 4 mg Intravenous Given     05/27/2018 2314 iohexol (OMNIPAQUE) 350 MG/ML injection (MULTI-DOSE) 100 mL 100 mL Intravenous Given     05/28/2018 0240 morphine (PF) 4 mg/mL injection 4 mg 4 mg Intravenous Given          Past Medical/Surgical History:    Active Ambulatory Problems     Diagnosis Date Noted    Diverticulitis of large intestine with abscess without bleeding 01/13/2016     Resolved Ambulatory Problems     Diagnosis Date Noted    No Resolved Ambulatory Problems     Past Medical History:   Diagnosis Date    Abscess, intestine     Arthritis     Diverticulitis        Admitting Diagnosis: Diverticulitis [K57 92]  Abdominal pain [R10 9]    Age/Sex: 63 y o  male    Assessment:  63 y M w/ recurrent sigmoid diverticulitis treated with cefepime / flagyl and discharged two days ago on cipro / flagyl returns to ED with worsening abdominal pain and nausea and vomiting       Plan:  Ancef/Flagyl  Full liquid diet  Serial exams      Admission Orders:  Bilateral Sequential Compression Device  Full Liquids Diet  NSS @ 75    Scheduled Meds:   Current Facility-Administered Medications:  cefazolin 1,000 mg Intravenous Q8H   doxazosin 4 mg Oral Daily   metroNIDAZOLE 500 mg Intravenous Q8H   tamsulosin 0 4 mg Oral Daily With Dinner     PRN Meds:  Dilaudid   5/28 Iv x2  5/29 Iv x1  5/30 Iv x1    Oxycodone   5/28 po x3  5/29 po x6  5/30 po x1  --------------------------------------------------------------------------------------------------------------    5/30 Physician progress notes:  Assessment:  64 yo M with locally perforated diverticulitis on antibiotics     Persistent abd pain  Low grade temperatures  No fevers, nausea or vomiting      Plan:  - Diet as tolerated  - Cont abx for 2 more days  - I explained to the patient that he will likely need sigmoid resection; hopefully as an outpt   - DVT ppx  - dispo planning    05/29/18 2311  100 °F (37 8 °C)  76  20  129/83  --  95 %  None (Room air)  Lying       Scheduled Meds:  Current Facility-Administered Medications:  cefazolin 1,000 mg Intravenous Q8H   doxazosin 4 mg Oral Daily   metroNIDAZOLE 500 mg Intravenous Q8H   tamsulosin 0 4 mg Oral Daily With [employer] Â®

## 2018-05-30 NOTE — PROGRESS NOTES
Pt temp slightly elevated, offered tylenol- refused as he prefers advil, resident of [facility] sx stated that pt does not need anything for the temp   Pt given dilaudid for breakthrough pain

## 2018-05-30 NOTE — PROGRESS NOTES
Progress Note - General Surgery   Roney Bunn 63 y o  male MRN: [id number]  Unit/Bed#: CW2 217-01 Encounter: [id number]    Assessment:  64 yo M with locally perforated diverticulitis on antibiotics    Persistent abd pain  Low grade temperatures  No fevers, nausea or vomiting     Plan:  - Diet as tolerated  - Cont abx for 2 more days  - I explained to the patient that he will likely need sigmoid resection; hopefully as an outpt   - DVT ppx  - dispo planning    Subjective/Objective     Subjective: Patient reports mild pain, greatly improved since admission  Passing gas and had a BM  Objective:     Vitals: Blood pressure 122/75, pulse 75, temperature 98 5 °F (36 9 °C), temperature source Oral, resp  rate 18, height 5' 8" (1 727 m), weight 75 8 kg (167 lb), SpO2 94 %  ,Body mass index is 25 39 kg/m²  I/O       05/27 0701 - 05/28 0700 05/28 0701 - 05/29 0700 05/29 0701 - 05/30 0700    I V  (mL/kg)  671 3 (8 9)     Total Intake(mL/kg)  671 3 (8 9)     Urine (mL/kg/hr) 125 830 (0 5)     Total Output 125 830      Net -125 -158 8             Unmeasured Urine Occurrence  1 x           Physical Exam:  GEN: NAD  HEENT: MMM  CV: RRR  Lung: Normal effort  Ab: Soft, NT/ND  Extrem: No CCE  Neuro:  A+Ox3    Lab, Imaging and other studies: CBC with diff:   Lab Results   Component Value Date    WBC 12 67 (H) 05/30/2018    HGB 13 9 05/30/2018    HCT 42 2 05/30/2018    MCV 93 05/30/2018     (H) 05/30/2018    MCH 30 8 05/30/2018    MCHC 32 9 05/30/2018    RDW 13 9 05/30/2018    MPV 10 1 05/30/2018    NRBC 0 05/30/2018   , BMP/CMP: No results found for: NA, K, CL, CO2, ANIONGAP, BUN, CREATININE, GLUCOSE, CALCIUM, AST, ALT, ALKPHOS, PROT, ALBUMIN, BILITOT, EGFR, Magnesium: No results found for: MAG  VTE Pharmacologic Prophylaxis: Sequential compression device (Venodyne)   VTE Mechanical Prophylaxis: sequential compression device

## 2018-05-30 NOTE — PHYSICIAN ADVISOR
Current patient class: Inpatient  The patient is currently on Hospital Day: 4    This patient was originally admitted to the hospital under observation status  After admission the patient was reevaluated and determined to require further hospitalization  After review of the relevant documentation, labs, vital signs and test results, the patient is appropriate for INPATIENT ADMISSION  Admission to the hospital as an inpatient is a complex decision making process which requires the practitioner to consider the patients presenting complaint, history and physical examination and all relevant testing  With this in mind, in this case, the patient was deemed appropriate for INPATIENT ADMISSION  After review of the documentation and testing available at the time of the admission I concur with this clinical determination of medical necessity  Rationale is as follows: The patient is a 63year-old male who presented to the emergency department with the complaints of intractable abdominal pain and nausea  A CT scan of the abdomen/pelvis was completed on 05/27/2018 with the following results:  IMPRESSION:     Sigmoid diverticulitis is again noted  Degree of fat stranding is relatively decreased  There are less foci of air however a single larger foci appears to be developing possibly a developing giant pseudodiverticulum or developing abscess  Short interval follow-up CT (5-14 days) is recommended  For the sigmoid diverticulitis, the patient is being treated with IV antibiotics and continues to require additional IV antibiotic treatment  The patient was also treated with IV fluids  The patient continues to require IV pain medications for optimal pain control  In the setting of continued abdominal pain with a persistent leukocytosis, he now requires an additional imaging study to rule-out an abscess associated with the sigmoid diverticulitis    He may require a definitive surgical procedure for the sigmoid diverticulitis  The patient is now appropriate for an INPATIENT ADMISSION  The patients vitals on arrival were ED Triage Vitals [05/27/18 1959]   Temperature Pulse Respirations Blood Pressure SpO2   98 2 °F (36 8 °C) 90 18 117/73 97 %      Temp Source Heart Rate Source Patient Position - Orthostatic VS BP Location FiO2 (%)   Tympanic Monitor Sitting Left arm --      Pain Score       8           Past Medical History:   Diagnosis Date    Abscess, intestine     Arthritis     Diverticulitis      Past Surgical History:   Procedure Laterality Date    ABCESS DRAINAGE      COLONOSCOPY N/A 5/5/2016    Procedure: COLONOSCOPY;  Surgeon: Elieser Baron MD;  Location: [facility] GI [facility]; Service:         The patient was admitted to the hospital at 1341 on 5/30/18 for the following diagnosis:  Diverticulitis [K57 92]  Abdominal pain [R10 9]    Consults have been placed to:   None    Vitals:    05/29/18 0758 05/29/18 1525 05/29/18 2311 05/30/18 0700   BP: 126/78 134/78 129/83 122/75   BP Location: Left arm Left arm Left arm    Pulse: 86 72 76 75   Resp: 20 20 20 18   Temp: 98 5 °F (36 9 °C) 98 7 °F (37 1 °C) 100 °F (37 8 °C) 98 5 °F (36 9 °C)   TempSrc: Oral Oral Oral Oral   SpO2: 96% 96% 95% 94%   Weight:       Height:           Most recent labs:    Recent Labs      05/27/18   2042  05/28/18   0509  05/30/18   0502   WBC  12 56*  11 99*  12 67*   HGB  13 5  13 2  13 9   HCT  41 2  41 0  42 2   PLT  490*  488*  538*   K  4 3  4 4   --    NA  142  141   --    CALCIUM  8 4  8 5   --    BUN  11  8   --    CREATININE  0 83  0 82   --    LIPASE  178   --    --    AST  45   --    --    ALT  34   --    --    ALKPHOS  107   --    --    BILITOT  0 29   --    --        Scheduled Meds:  Current Facility-Administered Medications:  acetaminophen 650 mg Oral Q4H PRN Han Sweet MD    cefazolin 1,000 mg Intravenous Q8H Han Sweet MD Last Rate: Stopped (05/30/18 1240)   doxazosin 4 mg Oral Daily Han Sweet MD HYDROmorphone 0 5 mg Intravenous Q3H PRN Grey Robison MD    metroNIDAZOLE 500 mg Intravenous Q8H Grey Robison MD Last Rate: Stopped (05/30/18 7431)   ondansetron 4 mg Intravenous Q6H PRN Grey Robison MD    oxyCODONE 10 mg Oral Q4H PRN Grey Robison MD    oxyCODONE 5 mg Oral Q4H PRN Grey Robison MD    tamsulosin 0 4 mg Oral Daily With Shireen Camejo MD      Continuous Infusions:   PRN Meds:   acetaminophen    HYDROmorphone    ondansetron    oxyCODONE    oxyCODONE    Surgical procedures (if appropriate):

## 2018-05-31 ENCOUNTER — APPOINTMENT (INPATIENT)
Dept: RADIOLOGY | Facility: HOSPITAL | Age: 58
DRG: 330 | End: 2018-05-31
Payer: COMMERCIAL

## 2018-05-31 ENCOUNTER — ANESTHESIA EVENT (INPATIENT)
Dept: PERIOP | Facility: HOSPITAL | Age: 58
DRG: 330 | End: 2018-05-31
Payer: COMMERCIAL

## 2018-05-31 LAB
ATRIAL RATE: 78 BPM
P AXIS: 57 DEGREES
PR INTERVAL: 150 MS
QRS AXIS: 25 DEGREES
QRSD INTERVAL: 80 MS
QT INTERVAL: 376 MS
QTC INTERVAL: 428 MS
T WAVE AXIS: 46 DEGREES
VENTRICULAR RATE: 78 BPM

## 2018-05-31 PROCEDURE — 93010 ELECTROCARDIOGRAM REPORT: CPT | Performed by: INTERNAL MEDICINE

## 2018-05-31 PROCEDURE — 93005 ELECTROCARDIOGRAM TRACING: CPT

## 2018-05-31 PROCEDURE — 99024 POSTOP FOLLOW-UP VISIT: CPT | Performed by: SURGERY

## 2018-05-31 PROCEDURE — 74177 CT ABD & PELVIS W/CONTRAST: CPT

## 2018-05-31 RX ORDER — NEOMYCIN SULFATE 500 MG/1
1000 TABLET ORAL ONCE
Status: COMPLETED | OUTPATIENT
Start: 2018-05-31 | End: 2018-05-31

## 2018-05-31 RX ORDER — METRONIDAZOLE 500 MG/1
500 TABLET ORAL ONCE
Status: COMPLETED | OUTPATIENT
Start: 2018-05-31 | End: 2018-05-31

## 2018-05-31 RX ADMIN — OXYCODONE HYDROCHLORIDE 10 MG: 10 TABLET ORAL at 18:13

## 2018-05-31 RX ADMIN — METRONIDAZOLE 500 MG: 500 INJECTION, SOLUTION INTRAVENOUS at 05:43

## 2018-05-31 RX ADMIN — CEFAZOLIN SODIUM 1000 MG: 1 SOLUTION INTRAVENOUS at 04:54

## 2018-05-31 RX ADMIN — HYDROMORPHONE HYDROCHLORIDE 0.5 MG: 1 INJECTION, SOLUTION INTRAMUSCULAR; INTRAVENOUS; SUBCUTANEOUS at 16:05

## 2018-05-31 RX ADMIN — DOXAZOSIN 4 MG: 4 TABLET ORAL at 09:40

## 2018-05-31 RX ADMIN — OXYCODONE HYDROCHLORIDE 5 MG: 5 TABLET ORAL at 22:31

## 2018-05-31 RX ADMIN — HYDROMORPHONE HYDROCHLORIDE 0.5 MG: 1 INJECTION, SOLUTION INTRAMUSCULAR; INTRAVENOUS; SUBCUTANEOUS at 20:42

## 2018-05-31 RX ADMIN — OXYCODONE HYDROCHLORIDE 10 MG: 10 TABLET ORAL at 07:50

## 2018-05-31 RX ADMIN — OXYCODONE HYDROCHLORIDE 5 MG: 5 TABLET ORAL at 12:19

## 2018-05-31 RX ADMIN — IOHEXOL 100 ML: 350 INJECTION, SOLUTION INTRAVENOUS at 10:25

## 2018-05-31 RX ADMIN — NEOMYCIN SULFATE 1000 MG: 500 TABLET ORAL at 23:40

## 2018-05-31 RX ADMIN — CEFAZOLIN SODIUM 1000 MG: 1 SOLUTION INTRAVENOUS at 12:30

## 2018-05-31 RX ADMIN — METRONIDAZOLE 500 MG: 500 INJECTION, SOLUTION INTRAVENOUS at 13:25

## 2018-05-31 RX ADMIN — ACETAMINOPHEN 650 MG: 325 TABLET ORAL at 23:35

## 2018-05-31 RX ADMIN — METRONIDAZOLE 500 MG: 500 INJECTION, SOLUTION INTRAVENOUS at 22:31

## 2018-05-31 RX ADMIN — TAMSULOSIN HYDROCHLORIDE 0.4 MG: 0.4 CAPSULE ORAL at 16:05

## 2018-05-31 RX ADMIN — HYDROMORPHONE HYDROCHLORIDE 0.5 MG: 1 INJECTION, SOLUTION INTRAMUSCULAR; INTRAVENOUS; SUBCUTANEOUS at 09:40

## 2018-05-31 RX ADMIN — CEFAZOLIN SODIUM 1000 MG: 1 SOLUTION INTRAVENOUS at 20:42

## 2018-05-31 RX ADMIN — METRONIDAZOLE 500 MG: 500 TABLET ORAL at 22:30

## 2018-05-31 RX ADMIN — OXYCODONE HYDROCHLORIDE 10 MG: 10 TABLET ORAL at 03:50

## 2018-05-31 NOTE — PROGRESS NOTES
Progress Note - General Surgery   Alexander Bunn 63 y o  male MRN: [id number]  Unit/Bed#: [facility] 829-01 Encounter: [id number]    Assessment:  62 yo M with locally perforated diverticulitis on antibiotics    Persistent abd pain  Low grade temperatures  No fevers, nausea or vomiting     Plan:  - Diet as tolerated  - Cont abx for 2 more days  - patient that he will likely need sigmoid resection tenatively plan for tomorrow  - CT scan tomorrow  - DVT ppx  - dispo planning    Subjective/Objective     Subjective: Patient reports mild pain, greatly improved since admission  Passing gas and had a BM  Objective:     Vitals: Blood pressure 130/71, pulse 74, temperature 99 5 °F (37 5 °C), temperature source Oral, resp  rate 17, height 5' 8" (1 727 m), weight 75 8 kg (167 lb), SpO2 96 %  ,Body mass index is 25 39 kg/m²  I/O       05/29 0701 - 05/30 0700 05/30 0701 - 05/31 0700 05/31 0701 - 06/01 0700    P  O  130      I V  (mL/kg)       IV Piggyback  50 100    Total Intake(mL/kg) 130 (1 7) 50 (0 7) 100 (1 3)    Urine (mL/kg/hr)       Total Output          Net +130 +50 +100           Unmeasured Urine Occurrence  1 x           Physical Exam:  GEN: NAD  HEENT: MMM  CV: RRR  Lung: Normal effort  Ab: Soft, NT/ND  Extrem: No CCE  Neuro:  A+Ox3    Lab, Imaging and other studies: CBC with diff:   No results found for: WBC, HGB, HCT, MCV, PLT, ADJUSTEDWBC, MCH, MCHC, RDW, MPV, NRBC, BMP/CMP: No results found for: NA, K, CL, CO2, ANIONGAP, BUN, CREATININE, GLUCOSE, CALCIUM, AST, ALT, ALKPHOS, PROT, ALBUMIN, BILITOT, EGFR, Magnesium: No results found for: MAG  VTE Pharmacologic Prophylaxis: Sequential compression device (Venodyne)   VTE Mechanical Prophylaxis: sequential compression device

## 2018-05-31 NOTE — PROGRESS NOTES
Patient complaining of left sternal chest pain  Vitals normal   Shooting pain 6/10  States this pain started yesterday and he did not inform any of the physicians  I spoke with Christopher Resendiz from [facility] and he will order an EKG  Patient also wondering CT scan results and if he will be going for surgery tomorrow  Edwardo waiting for a response from attending

## 2018-06-01 ENCOUNTER — ANESTHESIA (INPATIENT)
Dept: PERIOP | Facility: HOSPITAL | Age: 58
DRG: 330 | End: 2018-06-01
Payer: COMMERCIAL

## 2018-06-01 LAB
ABO GROUP BLD: NORMAL
ANION GAP SERPL CALCULATED.3IONS-SCNC: 4 MMOL/L (ref 4–13)
BASOPHILS # BLD AUTO: 0.05 THOUSANDS/ΜL (ref 0–0.1)
BASOPHILS NFR BLD AUTO: 0 % (ref 0–1)
BLD GP AB SCN SERPL QL: NEGATIVE
BUN SERPL-MCNC: 9 MG/DL (ref 5–25)
CALCIUM SERPL-MCNC: 8.3 MG/DL (ref 8.3–10.1)
CHLORIDE SERPL-SCNC: 105 MMOL/L (ref 100–108)
CO2 SERPL-SCNC: 29 MMOL/L (ref 21–32)
CREAT SERPL-MCNC: 0.71 MG/DL (ref 0.6–1.3)
EOSINOPHIL # BLD AUTO: 0.06 THOUSAND/ΜL (ref 0–0.61)
EOSINOPHIL NFR BLD AUTO: 1 % (ref 0–6)
ERYTHROCYTE [DISTWIDTH] IN BLOOD BY AUTOMATED COUNT: 13.7 % (ref 11.6–15.1)
GFR SERPL CREATININE-BSD FRML MDRD: 104 ML/MIN/1.73SQ M
GLUCOSE SERPL-MCNC: 102 MG/DL (ref 65–140)
GLUCOSE SERPL-MCNC: 213 MG/DL (ref 65–140)
HCT VFR BLD AUTO: 41 % (ref 36.5–49.3)
HGB BLD-MCNC: 13.3 G/DL (ref 12–17)
IMM GRANULOCYTES # BLD AUTO: 0.05 THOUSAND/UL (ref 0–0.2)
IMM GRANULOCYTES NFR BLD AUTO: 0 % (ref 0–2)
INR PPP: 1.11 (ref 0.86–1.17)
LYMPHOCYTES # BLD AUTO: 2.04 THOUSANDS/ΜL (ref 0.6–4.47)
LYMPHOCYTES NFR BLD AUTO: 18 % (ref 14–44)
MCH RBC QN AUTO: 30.6 PG (ref 26.8–34.3)
MCHC RBC AUTO-ENTMCNC: 32.4 G/DL (ref 31.4–37.4)
MCV RBC AUTO: 95 FL (ref 82–98)
MONOCYTES # BLD AUTO: 0.8 THOUSAND/ΜL (ref 0.17–1.22)
MONOCYTES NFR BLD AUTO: 7 % (ref 4–12)
NEUTROPHILS # BLD AUTO: 8.34 THOUSANDS/ΜL (ref 1.85–7.62)
NEUTS SEG NFR BLD AUTO: 74 % (ref 43–75)
NRBC BLD AUTO-RTO: 0 /100 WBCS
PLATELET # BLD AUTO: 556 THOUSANDS/UL (ref 149–390)
PMV BLD AUTO: 9.9 FL (ref 8.9–12.7)
POTASSIUM SERPL-SCNC: 4.3 MMOL/L (ref 3.5–5.3)
PROTHROMBIN TIME: 14.4 SECONDS (ref 11.8–14.2)
RBC # BLD AUTO: 4.34 MILLION/UL (ref 3.88–5.62)
RH BLD: POSITIVE
SODIUM SERPL-SCNC: 138 MMOL/L (ref 136–145)
SPECIMEN EXPIRATION DATE: NORMAL
WBC # BLD AUTO: 11.34 THOUSAND/UL (ref 4.31–10.16)

## 2018-06-01 PROCEDURE — 86850 RBC ANTIBODY SCREEN: CPT | Performed by: SURGERY

## 2018-06-01 PROCEDURE — C1769 GUIDE WIRE: HCPCS | Performed by: SURGERY

## 2018-06-01 PROCEDURE — 82948 REAGENT STRIP/BLOOD GLUCOSE: CPT

## 2018-06-01 PROCEDURE — 44146 PARTIAL REMOVAL OF COLON: CPT | Performed by: SURGERY

## 2018-06-01 PROCEDURE — 99222 1ST HOSP IP/OBS MODERATE 55: CPT | Performed by: UROLOGY

## 2018-06-01 PROCEDURE — 94760 N-INVAS EAR/PLS OXIMETRY 1: CPT

## 2018-06-01 PROCEDURE — 88307 TISSUE EXAM BY PATHOLOGIST: CPT | Performed by: PATHOLOGY

## 2018-06-01 PROCEDURE — 80048 BASIC METABOLIC PNL TOTAL CA: CPT | Performed by: PHYSICIAN ASSISTANT

## 2018-06-01 PROCEDURE — 86901 BLOOD TYPING SEROLOGIC RH(D): CPT | Performed by: SURGERY

## 2018-06-01 PROCEDURE — 0DTN0ZZ RESECTION OF SIGMOID COLON, OPEN APPROACH: ICD-10-PCS | Performed by: SURGERY

## 2018-06-01 PROCEDURE — 0T778DZ DILATION OF LEFT URETER WITH INTRALUMINAL DEVICE, VIA NATURAL OR ARTIFICIAL OPENING ENDOSCOPIC: ICD-10-PCS | Performed by: SURGERY

## 2018-06-01 PROCEDURE — 85025 COMPLETE CBC W/AUTO DIFF WBC: CPT | Performed by: PHYSICIAN ASSISTANT

## 2018-06-01 PROCEDURE — 85610 PROTHROMBIN TIME: CPT | Performed by: PHYSICIAN ASSISTANT

## 2018-06-01 PROCEDURE — 86900 BLOOD TYPING SEROLOGIC ABO: CPT | Performed by: SURGERY

## 2018-06-01 PROCEDURE — 52351 CYSTOURETERO & OR PYELOSCOPE: CPT | Performed by: UROLOGY

## 2018-06-01 PROCEDURE — 99232 SBSQ HOSP IP/OBS MODERATE 35: CPT | Performed by: SURGERY

## 2018-06-01 RX ORDER — FENTANYL CITRATE/PF 50 MCG/ML
25 SYRINGE (ML) INJECTION
Status: DISCONTINUED | OUTPATIENT
Start: 2018-06-01 | End: 2018-06-01 | Stop reason: HOSPADM

## 2018-06-01 RX ORDER — PROPOFOL 10 MG/ML
INJECTION, EMULSION INTRAVENOUS AS NEEDED
Status: DISCONTINUED | OUTPATIENT
Start: 2018-06-01 | End: 2018-06-01 | Stop reason: SURG

## 2018-06-01 RX ORDER — DIPHENHYDRAMINE HYDROCHLORIDE 50 MG/ML
12.5 INJECTION INTRAMUSCULAR; INTRAVENOUS ONCE AS NEEDED
Status: DISCONTINUED | OUTPATIENT
Start: 2018-06-01 | End: 2018-06-01 | Stop reason: HOSPADM

## 2018-06-01 RX ORDER — ONDANSETRON 2 MG/ML
INJECTION INTRAMUSCULAR; INTRAVENOUS AS NEEDED
Status: DISCONTINUED | OUTPATIENT
Start: 2018-06-01 | End: 2018-06-01 | Stop reason: SURG

## 2018-06-01 RX ORDER — MIDAZOLAM HYDROCHLORIDE 1 MG/ML
INJECTION INTRAMUSCULAR; INTRAVENOUS AS NEEDED
Status: DISCONTINUED | OUTPATIENT
Start: 2018-06-01 | End: 2018-06-01 | Stop reason: SURG

## 2018-06-01 RX ORDER — SODIUM CHLORIDE 9 MG/ML
100 INJECTION, SOLUTION INTRAVENOUS CONTINUOUS
Status: DISCONTINUED | OUTPATIENT
Start: 2018-06-01 | End: 2018-06-01

## 2018-06-01 RX ORDER — FENTANYL CITRATE 50 UG/ML
INJECTION, SOLUTION INTRAMUSCULAR; INTRAVENOUS AS NEEDED
Status: DISCONTINUED | OUTPATIENT
Start: 2018-06-01 | End: 2018-06-01 | Stop reason: SURG

## 2018-06-01 RX ORDER — GLYCOPYRROLATE 0.2 MG/ML
INJECTION INTRAMUSCULAR; INTRAVENOUS AS NEEDED
Status: DISCONTINUED | OUTPATIENT
Start: 2018-06-01 | End: 2018-06-01 | Stop reason: SURG

## 2018-06-01 RX ORDER — METOCLOPRAMIDE HYDROCHLORIDE 5 MG/ML
10 INJECTION INTRAMUSCULAR; INTRAVENOUS ONCE AS NEEDED
Status: DISCONTINUED | OUTPATIENT
Start: 2018-06-01 | End: 2018-06-01 | Stop reason: HOSPADM

## 2018-06-01 RX ORDER — LIDOCAINE HYDROCHLORIDE 10 MG/ML
INJECTION, SOLUTION INFILTRATION; PERINEURAL AS NEEDED
Status: DISCONTINUED | OUTPATIENT
Start: 2018-06-01 | End: 2018-06-01 | Stop reason: SURG

## 2018-06-01 RX ORDER — SODIUM CHLORIDE 9 MG/ML
INJECTION, SOLUTION INTRAVENOUS CONTINUOUS PRN
Status: DISCONTINUED | OUTPATIENT
Start: 2018-06-01 | End: 2018-06-01 | Stop reason: SURG

## 2018-06-01 RX ORDER — ROCURONIUM BROMIDE 10 MG/ML
INJECTION, SOLUTION INTRAVENOUS AS NEEDED
Status: DISCONTINUED | OUTPATIENT
Start: 2018-06-01 | End: 2018-06-01 | Stop reason: SURG

## 2018-06-01 RX ORDER — MAGNESIUM HYDROXIDE 1200 MG/15ML
LIQUID ORAL AS NEEDED
Status: DISCONTINUED | OUTPATIENT
Start: 2018-06-01 | End: 2018-06-01 | Stop reason: HOSPADM

## 2018-06-01 RX ORDER — SODIUM CHLORIDE 9 MG/ML
125 INJECTION, SOLUTION INTRAVENOUS CONTINUOUS
Status: DISCONTINUED | OUTPATIENT
Start: 2018-06-01 | End: 2018-06-02

## 2018-06-01 RX ORDER — ONDANSETRON 2 MG/ML
4 INJECTION INTRAMUSCULAR; INTRAVENOUS EVERY 4 HOURS PRN
Status: DISCONTINUED | OUTPATIENT
Start: 2018-06-01 | End: 2018-06-17 | Stop reason: HOSPADM

## 2018-06-01 RX ORDER — ONDANSETRON 2 MG/ML
4 INJECTION INTRAMUSCULAR; INTRAVENOUS ONCE AS NEEDED
Status: DISCONTINUED | OUTPATIENT
Start: 2018-06-01 | End: 2018-06-01 | Stop reason: HOSPADM

## 2018-06-01 RX ADMIN — OXYCODONE HYDROCHLORIDE 10 MG: 10 TABLET ORAL at 08:53

## 2018-06-01 RX ADMIN — FENTANYL CITRATE 25 MCG: 50 INJECTION INTRAMUSCULAR; INTRAVENOUS at 18:55

## 2018-06-01 RX ADMIN — ROCURONIUM BROMIDE 20 MG: 10 INJECTION INTRAVENOUS at 16:00

## 2018-06-01 RX ADMIN — DEXAMETHASONE SODIUM PHOSPHATE 5 MG: 10 INJECTION INTRAMUSCULAR; INTRAVENOUS at 14:03

## 2018-06-01 RX ADMIN — CEFAZOLIN SODIUM 1000 MG: 1 SOLUTION INTRAVENOUS at 17:31

## 2018-06-01 RX ADMIN — HYDROMORPHONE HYDROCHLORIDE 0.2 MG: 1 INJECTION, SOLUTION INTRAMUSCULAR; INTRAVENOUS; SUBCUTANEOUS at 19:18

## 2018-06-01 RX ADMIN — NEOSTIGMINE METHYLSULFATE 3 MG: 1 INJECTION, SOLUTION INTRAMUSCULAR; INTRAVENOUS; SUBCUTANEOUS at 18:02

## 2018-06-01 RX ADMIN — DOXAZOSIN 4 MG: 4 TABLET ORAL at 08:54

## 2018-06-01 RX ADMIN — ROCURONIUM BROMIDE 50 MG: 10 INJECTION INTRAVENOUS at 13:21

## 2018-06-01 RX ADMIN — FENTANYL CITRATE 25 MCG: 50 INJECTION INTRAMUSCULAR; INTRAVENOUS at 19:03

## 2018-06-01 RX ADMIN — HYDROMORPHONE HYDROCHLORIDE: 10 INJECTION INTRAMUSCULAR; INTRAVENOUS; SUBCUTANEOUS at 19:05

## 2018-06-01 RX ADMIN — CEFAZOLIN SODIUM 1000 MG: 1 SOLUTION INTRAVENOUS at 04:41

## 2018-06-01 RX ADMIN — OXYCODONE HYDROCHLORIDE 10 MG: 10 TABLET ORAL at 04:42

## 2018-06-01 RX ADMIN — ROCURONIUM BROMIDE 30 MG: 10 INJECTION INTRAVENOUS at 14:02

## 2018-06-01 RX ADMIN — SODIUM CHLORIDE: 0.9 INJECTION, SOLUTION INTRAVENOUS at 15:55

## 2018-06-01 RX ADMIN — FENTANYL CITRATE 25 MCG: 50 INJECTION INTRAMUSCULAR; INTRAVENOUS at 18:58

## 2018-06-01 RX ADMIN — HYDROMORPHONE HYDROCHLORIDE 0.2 MG: 1 INJECTION, SOLUTION INTRAMUSCULAR; INTRAVENOUS; SUBCUTANEOUS at 19:06

## 2018-06-01 RX ADMIN — FENTANYL CITRATE 100 MCG: 50 INJECTION, SOLUTION INTRAMUSCULAR; INTRAVENOUS at 13:18

## 2018-06-01 RX ADMIN — SODIUM CHLORIDE 125 ML/HR: 0.9 INJECTION, SOLUTION INTRAVENOUS at 19:24

## 2018-06-01 RX ADMIN — METHYLENE BLUE 10 MG: 5 INJECTION INTRAVENOUS at 15:44

## 2018-06-01 RX ADMIN — LIDOCAINE HYDROCHLORIDE 50 MG: 10 INJECTION, SOLUTION INFILTRATION; PERINEURAL at 13:21

## 2018-06-01 RX ADMIN — HYDROMORPHONE HYDROCHLORIDE 0.2 MG: 1 INJECTION, SOLUTION INTRAMUSCULAR; INTRAVENOUS; SUBCUTANEOUS at 19:40

## 2018-06-01 RX ADMIN — HYDROMORPHONE HYDROCHLORIDE 0.5 MG: 1 INJECTION, SOLUTION INTRAMUSCULAR; INTRAVENOUS; SUBCUTANEOUS at 09:29

## 2018-06-01 RX ADMIN — CEFAZOLIN SODIUM 1000 MG: 1 SOLUTION INTRAVENOUS at 21:11

## 2018-06-01 RX ADMIN — METHYLENE BLUE 10 MG: 5 INJECTION INTRAVENOUS at 15:47

## 2018-06-01 RX ADMIN — CEFAZOLIN SODIUM 1000 MG: 1 SOLUTION INTRAVENOUS at 13:31

## 2018-06-01 RX ADMIN — HYDROMORPHONE HYDROCHLORIDE 0.5 MG: 1 INJECTION, SOLUTION INTRAMUSCULAR; INTRAVENOUS; SUBCUTANEOUS at 06:10

## 2018-06-01 RX ADMIN — METHYLENE BLUE 10 MG: 5 INJECTION INTRAVENOUS at 15:54

## 2018-06-01 RX ADMIN — ONDANSETRON 4 MG: 2 INJECTION INTRAMUSCULAR; INTRAVENOUS at 17:50

## 2018-06-01 RX ADMIN — MIDAZOLAM 2 MG: 1 INJECTION INTRAMUSCULAR; INTRAVENOUS at 13:15

## 2018-06-01 RX ADMIN — METRONIDAZOLE 500 MG: 500 INJECTION, SOLUTION INTRAVENOUS at 13:31

## 2018-06-01 RX ADMIN — GLYCOPYRROLATE 0.4 MG: 0.2 INJECTION, SOLUTION INTRAMUSCULAR; INTRAVENOUS at 18:02

## 2018-06-01 RX ADMIN — HYDROMORPHONE HYDROCHLORIDE 0.5 MG: 1 INJECTION, SOLUTION INTRAMUSCULAR; INTRAVENOUS; SUBCUTANEOUS at 00:04

## 2018-06-01 RX ADMIN — HYDROMORPHONE HYDROCHLORIDE 0.2 MG: 1 INJECTION, SOLUTION INTRAMUSCULAR; INTRAVENOUS; SUBCUTANEOUS at 19:25

## 2018-06-01 RX ADMIN — FENTANYL CITRATE 25 MCG: 50 INJECTION INTRAMUSCULAR; INTRAVENOUS at 18:48

## 2018-06-01 RX ADMIN — ROCURONIUM BROMIDE 20 MG: 10 INJECTION INTRAVENOUS at 16:48

## 2018-06-01 RX ADMIN — METRONIDAZOLE 500 MG: 500 INJECTION, SOLUTION INTRAVENOUS at 06:13

## 2018-06-01 RX ADMIN — HYDROMORPHONE HYDROCHLORIDE 0.5 MG: 1 INJECTION, SOLUTION INTRAMUSCULAR; INTRAVENOUS; SUBCUTANEOUS at 11:24

## 2018-06-01 RX ADMIN — PROPOFOL 200 MG: 10 INJECTION, EMULSION INTRAVENOUS at 13:21

## 2018-06-01 RX ADMIN — METRONIDAZOLE 500 MG: 500 INJECTION, SOLUTION INTRAVENOUS at 21:11

## 2018-06-01 RX ADMIN — HYDROMORPHONE HYDROCHLORIDE 0.2 MG: 1 INJECTION, SOLUTION INTRAMUSCULAR; INTRAVENOUS; SUBCUTANEOUS at 19:33

## 2018-06-01 RX ADMIN — SODIUM CHLORIDE 100 ML/HR: 0.9 INJECTION, SOLUTION INTRAVENOUS at 09:34

## 2018-06-01 RX ADMIN — SODIUM CHLORIDE: 0.9 INJECTION, SOLUTION INTRAVENOUS at 13:23

## 2018-06-01 RX ADMIN — METHYLENE BLUE 10 MG: 5 INJECTION INTRAVENOUS at 15:51

## 2018-06-01 RX ADMIN — HYDROMORPHONE HYDROCHLORIDE 0.2 MG: 1 INJECTION, SOLUTION INTRAMUSCULAR; INTRAVENOUS; SUBCUTANEOUS at 19:13

## 2018-06-01 RX ADMIN — METHYLENE BLUE 10 MG: 5 INJECTION INTRAVENOUS at 15:49

## 2018-06-01 NOTE — OP NOTE
OPERATIVE REPORT  PATIENT NAME: Thuan Bunn    :  1960  MRN: [id number]  Pt Location: BE OR ROOM 08    SURGERY DATE: 2018    Surgeon(s) and Role:  Panel 1:     * Pablo Gilmore DO - Primary     * Erin Doherty MD - Assisting    Panel 2:     * Ria Hays MD - Primary     * Poncho Magaña - Assisting    Preop Diagnosis:  Diverticulitis [K57 92]    Post-Op Diagnosis Codes:     * Diverticulitis [K57 92]    Procedure(s) (LRB):  LAPAROTOMY EXPLORATORY, (N/A)  RESECTION COLON SIGMOID (N/A)  CYSTOSCOPY, B/L URETEROSCOPY, PLACEMENT OF LEFT URETERAL CATHETER (Bilateral)  ILEOSTOMY LOOP DIVERTING (N/A)    Specimen(s):  ID Type Source Tests Collected by Time Destination   1 : stitch marks proximal sigmoid Tissue Large Intestine, Sigmoid Colon TISSUE EXAM Pablo Gilmore DO 2018 1534        Estimated Blood Loss:   100 mL    Drains:  Closed/Suction Drain Left Abdomen Bulb (Active)   Number of days: 0       NG/OG/Enteral Tube Orogastric 18 Fr Center mouth (Active)   Number of days: 0       Ileostomy Loop RUQ (Active)   Number of days: 0       Urethral Catheter Latex 20 Fr  (Active)   Number of days: 0       Ureteral Drain/Stent Left ureter 5 Fr  (Active)   Number of days: 0       Anesthesia Type:   General    Operative Indications:  Diverticulitis [K57 92]      Operative Findings:  Difficulty placing preoperative ureteral stents (see separate op note for details)   L ureteral stent placement   Area of sigmoid inflammation   Healthy rectum and proximal sigmoid anastamosis with intact doughnuts and negative leak test (c-scope)    Complications:   None    Procedure and Technique:  Patient was seen in preoperative holding  The most recent history and physical is reviewed with the patient  Preoperative consultation with Urology for preoperative stent placement was obtained  They were in agreement the procedure  Preoperative consultation with wound care was also obtained for ostomy marking    The patient understood all risks of the surgery including bleeding, infection, abscess, inability to reverse ostomy, damage to surrounding structures including ureteral injury, and inability to perform resection  The patient was then brought back into the operating room and identified by name and birth date  He was placed in supine position and general anesthesia was then achieved  At this point he was placed in lithotomy position and urology performed a cystoscopy and ureteral stent placement  Please see separate operative note for that dictation  Please note that ureteral stents were difficult to place and only the left ureteral stent was placed  Preoperative antibiotics were given  Bilateral lower extremity compression devices were placed  The lesion was draped and prepped in the usual sterile fashion  A time-out was then held  A lower midline incision was made with a scalpel  This was carried down the level of the fascia with electrocautery Bovie  The fascia was incised and the peritoneal was opened with Metzenbaum scissors  This was then extended the full length of the incision with electrocautery Bovie  The abdomen was then inspected and found to contain some adhesions deep in the pelvis  These were dissected with Bovie and Metzenbaum scissors  The sigmoid colon was found to be inflamed with a notable posterior mesenteric inflamed mass  This was ultimately able to be dissected  The proximal portion of the colon was transected with a contour stapler blue load  Using the EnSeal device, the mesentery was taken down to the level of the inflammation  This area was then dissected free  Deep to the pelvis and the rectum was area of soft tissue and healthy rectum  This area was dissected and another contour staple load was used for the distal transection  A blue staple load was utilized  The remainder of the mesentery was freed with the EnSeal device making sure to locate and protect the ureters    The descending colon and the white line of Toldt were then incised to release additional length of the proximal segment of colon  3 L of warm they will normal saline were used to irrigate the abdomen  Decision was made to proceed with anastomosis and diverting loop ileostomy  The proximal portion of the bowel was opened and a 29 1301 [employer] anvil device was secured in place with a 3 0 Prolene pursestring suture  Attention was then turned to the rectum  Rectal irrigation with Betadine was performed  A 34 Sizer was inserted into the rectum  Then the stapler device was placed  The anastomosis lined up without any twisting or tension of any other tissues  The stapler was fired and donuts were found to be intact  A postop anastomosis colonoscopy with bubble test was performed and was found to be negative  A 10french JENN was then placed   After changing sterile gloves and to the sterile closure tray  The remainder of the wound was again irrigated with warm  normal saline  A segment of distal ileum approximately 15 cm proximal to the to the terminal ileum was identified for loop ileostomy  This was brought up through a previously marked right ileostomy stoma  A circular pad of skin was removed from the patient with electrocautery Bovie  The anterior posterior fascia was incised in a cruciate form  The ileum was brought through the abdominal wall  Omentum was then placed down to the pelvis  The fascia was closed with 1  PDS suture skin was closed with staples  The ileostomy was matured with 3 0 Vicryl sutures and brought in standard fashion      All counts and are wanting was completed at the end of the case was found to be correct  Dr Tory Magallon was present for the entire case      Patient Disposition:  PACU     SIGNATURE: Jenn Ochoa  DATE: June 1, 2018  TIME: 6:36 PM

## 2018-06-01 NOTE — ANESTHESIA PROCEDURE NOTES
Peripheral Block    Patient location during procedure: OR  Start time: 6/1/2018 6:24 PM  Reason for block: at surgeon's request and post-op pain management  Staffing  Anesthesiologist: Julienne Ferris  Performed: anesthesiologist   Preanesthetic Checklist  Completed: patient identified, site marked, surgical consent, pre-op evaluation, timeout performed, IV checked, risks and benefits discussed and monitors and equipment checked  Peripheral Block  Patient position: supine  Prep: ChloraPrep  Patient monitoring: continuous pulse ox and frequent blood pressure checks  Block type: TAP  Laterality: bilateral  Injection technique: single-shot  Procedures: ultrasound guided  Ultrasound permanent image saved  Anesthesia block local: Exparel    Dose: 20 mL  Needle  Needle type: Stimuplex   Needle localization: ultrasound guidance  Test dose: negative  Assessment  Injection assessment: incremental injection, local visualized surrounding nerve on ultrasound, negative aspiration for CSF, negative aspiration for heme and no paresthesia on injection  Paresthesia pain: none  Heart rate change: no  Slow fractionated injection: yes  Post-procedure:  site cleaned  patient tolerated the procedure well with no immediate complications  Additional Notes  Exparel TAP block  Block on each side contained: 10cc Exparel and 10cc 0 25% bupivacaine  Total 20cc Exparel and 20cc 0 25% bupivacaine

## 2018-06-01 NOTE — CONSULTS
UROLOGY CONSULTATION NOTE     Patient Identifiers: Caryn Brown (MRN [id number])  Service Requesting Consultation: Gen Surg  Service Providing Consultation:  Urology, Norma Clemente MD    Date of Service: 6/1/2018    Reason for Consultation: need for preoperative ureteral catheters      ASSESSMENT:     63 y o  old male with  perforated diverticulitis and planned exploration  PLAN:     I have discussed with the patient the request for preoperative ureteral catheters  I have described to him the surgery in the inherent risks and benefits  We discussed that this not decrease the risk of ureteral injury but simply helps to identify the ureters and potentially identify any intraoperative ureteral it injuries or other issues  Patient has agreed to proceed and signed informed consent    History of Present Illness:     Caryn Brown is a 63 y o  old with a history of perforated diverticulitis  He is noting some increased urinary urgency and frequency likely related to localized inflammation  Patient denies any other urologic history  He is undergoing planned exploration later today with the general surgery team   They have requested preoperative ureteral catheter placement  Past Medical, Past Surgical History:     Past Medical History:   Diagnosis Date    Abscess, intestine     Arthritis     Diverticulitis    :    Past Surgical History:   Procedure Laterality Date    ABCESS DRAINAGE      COLONOSCOPY N/A 5/5/2016    Procedure: COLONOSCOPY;  Surgeon: Gaye Ruelas MD;  Location: [facility] GI LAB;   Service:    :    Medications, Allergies:     Current Facility-Administered Medications:     acetaminophen (TYLENOL) tablet 650 mg, 650 mg, Oral, Q4H PRN, Isaiah Tristan MD, 650 mg at 05/31/18 2335    ceFAZolin (ANCEF) IVPB (premix) 1,000 mg, 1,000 mg, Intravenous, Q8H, Isaiah Tristan MD, Last Rate: 100 mL/hr at 06/01/18 0441, 1,000 mg at 06/01/18 0441    doxazosin (CARDURA) tablet 4 mg, 4 mg, Oral, Daily, Julia Hills MD, 4 mg at 06/01/18 0854    HYDROmorphone (DILAUDID) injection 0 5 mg, 0 5 mg, Intravenous, Q2H PRN, Doreen Mcdonald MD, 0 5 mg at 06/01/18 1124    metroNIDAZOLE (FLAGYL) IVPB (premix) 500 mg, 500 mg, Intravenous, Q8H, Julia Hills MD, Last Rate: 200 mL/hr at 06/01/18 0613, 500 mg at 06/01/18 0613    ondansetron (ZOFRAN) injection 4 mg, 4 mg, Intravenous, Q6H PRN, Julia Hills MD, 4 mg at 05/28/18 2129    oxyCODONE (ROXICODONE) immediate release tablet 10 mg, 10 mg, Oral, Q4H PRN, Julia Hills MD, 10 mg at 06/01/18 0853    oxyCODONE (ROXICODONE) IR tablet 5 mg, 5 mg, Oral, Q4H PRN, Julia Hills MD, 5 mg at 05/31/18 2231    simethicone (MYLICON) chewable tablet 80 mg, 80 mg, Oral, Q6H PRN, Barak Zhang MD, 80 mg at 05/30/18 1754    sodium chloride 0 9 % infusion, 100 mL/hr, Intravenous, Continuous, Doreen Mcdonald MD, Last Rate: 100 mL/hr at 06/01/18 0934, 100 mL/hr at 06/01/18 0934    tamsulosin (FLOMAX) capsule 0 4 mg, 0 4 mg, Oral, Daily With Rebekah Darnell MD, 0 4 mg at 05/31/18 1605    Allergies: Allergies   Allergen Reactions    Penicillins    :    Social and Family History:   Social History:   Social History   Substance Use Topics    Smoking status: Former Smoker    Smokeless tobacco: Never Used    Alcohol use No        History   Smoking Status    Former Smoker   Smokeless Tobacco    Never Used       Family History:  History reviewed  No pertinent family history :     Review of Systems:     General: Fever, chills, or night sweats: positive  Cardiac: Negative for chest pain  Pulmonary: Negative for shortness of breath  Gastrointestinal: Abdominal pain positive  Nausea, vomiting, or diarrhea positive,  Genitourinary: See HPI above  Patient does not have hematuria  All other systems queried were negative  Physical Exam:   General: Patient is pleasant and in NAD   Awake and alert  /92 (BP Location: Left arm)   Pulse 83   Temp 98 4 °F (36 9 °C) (Oral)   Resp 16   Ht 5' 8" (1 727 m)   Wt 75 8 kg (167 lb)   SpO2 97%   BMI 25 39 kg/m²   Cardiac: Peripheral edema: negative  Pulmonary: Non-labored breathing  Abdomen: Soft, non-tender, non-distended  No surgical scars  No masses, tenderness, hernias noted  Genitourinary: Negative CVA tenderness, positive suprapubic tenderness  (Male): Phallus normal, meatus patent  Testicles descended into scrotum bilaterally without masses nor tenderness  No inguinal hernias bilaterally  Labs:     Lab Results   Component Value Date    HGB 13 3 06/01/2018    HCT 41 0 06/01/2018    WBC 11 34 (H) 06/01/2018     (H) 06/01/2018   ]    Lab Results   Component Value Date     06/01/2018    K 4 3 06/01/2018     06/01/2018    CO2 29 06/01/2018    BUN 9 06/01/2018    CREATININE 0 71 06/01/2018    CALCIUM 8 3 06/01/2018    GLUCOSE 102 06/01/2018   ]    Imaging:   I personally reviewed the images and report of the following studies, and reviewed them with the patient:    5/31/18  CT ABDOMEN AND PELVIS WITH IV CONTRAST     INDICATION:   perforated diverticulitis      COMPARISON: May 27, 2018     TECHNIQUE:  CT examination of the abdomen and pelvis was performed  Axial, sagittal, and coronal 2D reformatted images were created from the source data and submitted for interpretation      Radiation dose length product (DLP) for this visit:  414 8 mGy-cm   This examination, like all CT scans performed in the [facility], was performed utilizing techniques to minimize radiation dose exposure, including the use of iterative   reconstruction and automated exposure control      IV Contrast:  100 mL of iohexol (OMNIPAQUE)  Enteric Contrast:  Enteric contrast was not administered      FINDINGS:     ABDOMEN     LOWER CHEST:  Linear density seen at the right lung base suggest atelectasis     LIVER/BILIARY TREE:  Unremarkable      GALLBLADDER:  No calcified gallstones   No pericholecystic inflammatory change  Phrygian cap is noted     SPLEEN:  Unremarkable      PANCREAS:  Unremarkable      ADRENAL GLANDS:  Mild nodularity of the left adrenal gland seen, stable since the previous study of  2016     KIDNEYS/URETERS:  Unremarkable  No hydronephrosis      STOMACH AND BOWEL:  There is sigmoid colonic wall thickening  On the previous study of 3 8 x 3 9 cm area with phlegmonous changes and inflammatory stranding is seen in relation to the sigmoid colon  This demonstrates increasing organization  There is   no distal free air or hubert pneumoperitoneum     APPENDIX: Appears unremarkable     ABDOMINOPELVIC CAVITY:  There is no distal free air  There is no evidence of bowel obstruction     VESSELS:  The celiac trunk, SMA, BÁRBARA are patent     PELVIS     REPRODUCTIVE ORGANS:  Unremarkable for patient's age      URINARY BLADDER:  Unremarkable      ABDOMINAL WALL/INGUINAL REGIONS:  Unremarkable      OSSEOUS STRUCTURES:  No acute fracture or destructive osseous lesion      IMPRESSION:  Developing fluid collection in the relation to the sigmoid colon measuring 3 8 x 3 9 cm with the increasing organisation in the region of the previously noted phlegmonous changes sequela of  locally perforated diverticulitis      No evidence of  distal air/pneumoperitoneum    Thank you for allowing me to participate in this patients care  Please do not hesitate to call with any additional questions    Edgardo Benson MD

## 2018-06-01 NOTE — PROGRESS NOTES
Per Latisha Matos MD with [facility], OK to give IV Dilaudid without trying PO pain meds first  Frequency of IV pain med changed from q3 to q2 PRN  Per Dr Rosmery Stanton, can give strictly IV pain meds as needed up until the OR this afternoon

## 2018-06-01 NOTE — CONSULTS
Progress Note- Ostomy  Edgar Bunn 63 y o  male  [id number]  OR POOL-OR POOL (BE OR MAIN)        Assessment:  Patient seen this morning for stoma site marking, patient is AAO x3 with good understanding of planned surgery, asking appropriate questions and receptive to teaching  Abdominus rectus identified via palpation while patient coughing, abdomen assessed supine, sitting and standing  Patient with very small torso, wears pants very low, has small abdomen with no skin folds or crease  Marked two different site, one for colostomy and second for Ileostomy, patient educated on how a colostomy and Ileostomy works as well as what to expect immediately post op  Patient made aware final stoma type and location depends of findings, we will continue following patient after surgery for teaching  Plan:  Continue post op education and ostomy care  Vitals:    06/01/18 0807   BP: 143/92   Pulse: 83   Resp: 16   Temp: 98 4 °F (36 9 °C)   SpO2: 97%                    ETT  Cuffed;Oral 8 mm (Active)   Number of days: 0       NG/OG/Enteral Tube Orogastric 18 Fr Center mouth (Active)   Number of days: 0       Urethral Catheter Latex 20 Fr  (Active)   Number of days: 0       Ureteral Drain/Stent Left ureter 5 Fr  (Active)   Number of days: 0       Peripheral IV 06/01/18 Right Forearm (Active)   Site Assessment Clean;Dry; Intact 6/1/2018  7:00 AM   Dressing Type Transparent 6/1/2018  7:00 AM   Line Status Flushed;Saline locked 6/1/2018  7:00 AM   Dressing Status Clean;Dry; Intact 6/1/2018  7:00 AM   Number of days: 0       Peripheral IV 06/01/18 Left Wrist (Active)   Number of days: 0         We will see patient on Monday for fist pouch change with teaching      Pricila Tillman, RN, BSN, [facility]

## 2018-06-01 NOTE — ANESTHESIA PREPROCEDURE EVALUATION
Review of Systems/Medical History      No history of anesthetic complications     Cardiovascular  Negative cardio ROS    Pulmonary  Negative pulmonary ROS        GI/Hepatic  Negative GI/hepatic ROS          Negative  ROS        Endo/Other  Negative endo/other ROS      GYN  Negative gynecology ROS          Hematology  Negative hematology ROS      Musculoskeletal    Comment: Ankle surgery Arthritis     Neurology  Negative neurology ROS      Psychology   Negative psychology ROS              Physical Exam    Airway    Mallampati score: I  TM Distance: >3 FB  Neck ROM: full     Dental       Cardiovascular  Comment: Negative ROS,     Pulmonary      Other Findings        Anesthesia Plan  ASA Score- 2     Anesthesia Type- general with ASA Monitors  Additional Monitors:   Airway Plan: ETT  Comment: General anesthesia, endotracheal tube; standard ASA monitors  Risks and benefits discussed with patient; patient consented and agrees to proceed  I saw and evaluated the patient  If seen with CRNA, we have discussed the anesthetic plan and I am in agreement that the plan is appropriate for the patient  Pt refuses epidural  Pt ok with TAP block  Plan Factors-    Induction- intravenous  Postoperative Plan- Plan for postoperative opioid use  Planned trial extubation    Informed Consent- Anesthetic plan and risks discussed with patient  I personally reviewed this patient with the CRNA  Discussed and agreed on the Anesthesia Plan with the CRNA  Anna Cartagena

## 2018-06-01 NOTE — PROGRESS NOTES
Patient care rounds were completed with the patient's nurse today, Mustapha Howe  We discussed the plan is to keep him NPO and on IV fluids  Continue IV antibiotics  Plan for OR this afternoon, 06/01/2018, for colon resection and diverting ostomy due to persistent perforated diverticulitis  Awaiting stoma nurse evaluation for stoma marking preoperatively  Awaiting Urology availability for perioperative ureteral stent insertion  We reviewed all of the invasive devices/lines/telemetry orders   - None  Pain Assessment / Plan:  - Continue current analgesic regimen  Mobility Assessment / Plan:  - Activity as tolerated  Goals / Barriers for discharge:  - Plan for OR today with re-evaluation of discharge plan postoperatively; discharge to occur likely sometime towards the end of next week  - Case management following; case and discharge needs discussed  All questions and concerns were addressed  I spent greater than 20 minutes reviewing the plan with the patient and the nurse, and coordinating his care for the day      Sarah Purdy PA-C  6/1/2018 11:21 AM

## 2018-06-01 NOTE — ANESTHESIA POSTPROCEDURE EVALUATION
Post-Op Assessment Note      CV Status:  Stable    Mental Status:  Alert and awake    Hydration Status:  Euvolemic    PONV Controlled:  Controlled    Airway Patency:  Patent    Post Op Vitals Reviewed: Yes          Staff: CRNA           BP   132/66   Temp 97 3   Pulse 90   Resp 20   SpO2 100%

## 2018-06-01 NOTE — OP NOTE
OPERATIVE REPORT  PATIENT NAME: Bess Bunn    :  1960  MRN: [id number]  Pt Location: BE OR ROOM 08    SURGERY DATE: 2018    Surgeon(s) and Role:  Panel 1:     * Alice Hubbard DO - Primary     * Liliya Felix MD - Assisting    Panel 2:     * Lucy Sandra MD - Primary    Preop Diagnosis:  Diverticulitis [K57 92]    Post-Op Diagnosis Codes:     * Diverticulitis [K57 92]    Procedure(s) (LRB):  LAPAROTOMY EXPLORATORY, (N/A)  RESECTION COLON SIGMOID (N/A)  HARTMANS PROCEDURE (N/A)  CYSTOSCOPY, B/L URETEROSCOPY, PLACEMENT OF LEFT URETERAL CATHETER (Bilateral)    Specimen(s):  * No specimens in log *    Estimated Blood Loss:   Minimal    Drains:  NG/OG/Enteral Tube Orogastric 18 Fr Center mouth (Active)   Number of days: 0       Urethral Catheter Latex 20 Fr  (Active)   Number of days: 0       Ureteral Drain/Stent Left ureter 5 Fr  (Active)   Number of days: 0       Anesthesia Type:   General    Operative Indications:  Diverticulitis [K57 92]      Operative Findings:  1  Normal cystourethroscopy  2  Inability to pass LEFT or RIGHT ureteral catheter easily or with wire  3  Bilateral ureteroscopy demonstrated distal inflammation, wire and catheter passable on the LEFT not on the RIGHT    Complications:   None    Procedure and Technique:  Ca Hardy is a 63y o -year-old male known to Dr Paola Tobar with diverticulitis  He presents today to undergo exploratory laparotomy  Preoperative ureteral stents of been requested  Risk and benefits of the procedure were discussed and reviewed  Informed consent was obtained  The patient was brought to the operating room on 2018  After the smooth induction of general endotracheal anesthesia, the patient was placed in the dorsal lithotomy position  His genitalia was prepped and draped in a sterile fashion  Intravenous antibiotics were administered    A timeout was performed with all members of the operative team confirming the patient's identity and procedure to be performed  A 22 Andorran rigid cystoscope with 30° lens was inserted  The bladder was thoroughly inspected  Both ureteral orifices were visualized with clear efflux of urine  A urine culture was obtained  The bladder wall was thoroughly inspected and no abnormalities or visible inflammation were seen in the left wall  We attempted to place a Tiger tip catheter into the left ureteral orifice  We were immediately met with some difficulty  An angled and straight 0 035 wire were attempted to be passed through the catheter but again resistance was met  Given the known sigmoid diverticulitis and abscess, we opted to stop and instead evaluate the other side  We experience the same difficulty on the right side  At this point time the presumptive diagnosis was potential J hooking or inflammation  Decided to attempt semi rigid ureteroscopy  I was able to pass a long semi rigid ureteral scope through the urethra into the bladder  I intubated the left distal ureteral orifice  Under direct vision I was able to identify the true lumen  There was again some jeff ureteral inflammation  The wire was easily passed proximally  I removed the ureteral scope in a push-pull fashion and backloaded the cystoscope  Under direct vision I was able to introduce a 5 Western Wendi open-ended catheter with blue marking over top of the placed wire  This was easily passed up to 25 cm  The cystoscope was then removed leaving the ureteral catheter in place  Semi rigid ureteral scope was Re passed through the urethra into the bladder  We now explored the right side  There was slightly more inflammation on the right side  We were not able to identify the true lumen and so additional manipulation was halted  Patient's bladder was left full and all instruments were removed  Securing the left 5 Andorran tiger tip catheter, a 21 Andorran Garcia was placed    Catheter was placed to the hub and we experience drainage of clear urine  10 cc of fluid were placed in the balloon  We then secured the 5 Western Wendi open-ended catheter using a adapter device  The catheter was sutured to the Gacria with a 0 silk suture  The case was then handed over to the general surgery team     We see the dictation of Dr Mary Perez for additional details regarding his colon resection  Dr Sofia Caba, general surgery resident, was present for assistance with cystoscopy and catheter placement  She was directly supervised by myself  I was present for the entire procedure    Patient Disposition:  Continued surgical disposition with Dr Mary Perez      SIGNATURE: Kana Purcell MD  DATE: June 1, 2018  TIME: 2:50 PM

## 2018-06-02 LAB
ANION GAP SERPL CALCULATED.3IONS-SCNC: 7 MMOL/L (ref 4–13)
BASOPHILS # BLD AUTO: 0.02 THOUSANDS/ΜL (ref 0–0.1)
BASOPHILS NFR BLD AUTO: 0 % (ref 0–1)
BUN SERPL-MCNC: 9 MG/DL (ref 5–25)
CALCIUM SERPL-MCNC: 7.9 MG/DL (ref 8.3–10.1)
CHLORIDE SERPL-SCNC: 106 MMOL/L (ref 100–108)
CO2 SERPL-SCNC: 26 MMOL/L (ref 21–32)
CREAT SERPL-MCNC: 0.68 MG/DL (ref 0.6–1.3)
EOSINOPHIL # BLD AUTO: 0 THOUSAND/ΜL (ref 0–0.61)
EOSINOPHIL NFR BLD AUTO: 0 % (ref 0–6)
ERYTHROCYTE [DISTWIDTH] IN BLOOD BY AUTOMATED COUNT: 13.8 % (ref 11.6–15.1)
GFR SERPL CREATININE-BSD FRML MDRD: 106 ML/MIN/1.73SQ M
GLUCOSE SERPL-MCNC: 106 MG/DL (ref 65–140)
HCT VFR BLD AUTO: 37.6 % (ref 36.5–49.3)
HGB BLD-MCNC: 12 G/DL (ref 12–17)
IMM GRANULOCYTES # BLD AUTO: 0.04 THOUSAND/UL (ref 0–0.2)
IMM GRANULOCYTES NFR BLD AUTO: 0 % (ref 0–2)
LYMPHOCYTES # BLD AUTO: 0.9 THOUSANDS/ΜL (ref 0.6–4.47)
LYMPHOCYTES NFR BLD AUTO: 7 % (ref 14–44)
MAGNESIUM SERPL-MCNC: 1.9 MG/DL (ref 1.6–2.6)
MCH RBC QN AUTO: 30.5 PG (ref 26.8–34.3)
MCHC RBC AUTO-ENTMCNC: 31.9 G/DL (ref 31.4–37.4)
MCV RBC AUTO: 95 FL (ref 82–98)
MONOCYTES # BLD AUTO: 1.05 THOUSAND/ΜL (ref 0.17–1.22)
MONOCYTES NFR BLD AUTO: 9 % (ref 4–12)
NEUTROPHILS # BLD AUTO: 10.27 THOUSANDS/ΜL (ref 1.85–7.62)
NEUTS SEG NFR BLD AUTO: 84 % (ref 43–75)
NRBC BLD AUTO-RTO: 0 /100 WBCS
PHOSPHATE SERPL-MCNC: 3.3 MG/DL (ref 2.7–4.5)
PLATELET # BLD AUTO: 498 THOUSANDS/UL (ref 149–390)
PMV BLD AUTO: 10.4 FL (ref 8.9–12.7)
POTASSIUM SERPL-SCNC: 4.2 MMOL/L (ref 3.5–5.3)
RBC # BLD AUTO: 3.94 MILLION/UL (ref 3.88–5.62)
SODIUM SERPL-SCNC: 139 MMOL/L (ref 136–145)
WBC # BLD AUTO: 12.28 THOUSAND/UL (ref 4.31–10.16)

## 2018-06-02 PROCEDURE — 84100 ASSAY OF PHOSPHORUS: CPT | Performed by: STUDENT IN AN ORGANIZED HEALTH CARE EDUCATION/TRAINING PROGRAM

## 2018-06-02 PROCEDURE — 83735 ASSAY OF MAGNESIUM: CPT | Performed by: STUDENT IN AN ORGANIZED HEALTH CARE EDUCATION/TRAINING PROGRAM

## 2018-06-02 PROCEDURE — 85025 COMPLETE CBC W/AUTO DIFF WBC: CPT | Performed by: STUDENT IN AN ORGANIZED HEALTH CARE EDUCATION/TRAINING PROGRAM

## 2018-06-02 PROCEDURE — 80048 BASIC METABOLIC PNL TOTAL CA: CPT | Performed by: STUDENT IN AN ORGANIZED HEALTH CARE EDUCATION/TRAINING PROGRAM

## 2018-06-02 PROCEDURE — 99232 SBSQ HOSP IP/OBS MODERATE 35: CPT | Performed by: UROLOGY

## 2018-06-02 PROCEDURE — C9113 INJ PANTOPRAZOLE SODIUM, VIA: HCPCS | Performed by: STUDENT IN AN ORGANIZED HEALTH CARE EDUCATION/TRAINING PROGRAM

## 2018-06-02 PROCEDURE — 94760 N-INVAS EAR/PLS OXIMETRY 1: CPT

## 2018-06-02 PROCEDURE — 99024 POSTOP FOLLOW-UP VISIT: CPT | Performed by: SURGERY

## 2018-06-02 RX ORDER — DEXTROSE, SODIUM CHLORIDE, AND POTASSIUM CHLORIDE 5; .45; .15 G/100ML; G/100ML; G/100ML
100 INJECTION INTRAVENOUS CONTINUOUS
Status: DISCONTINUED | OUTPATIENT
Start: 2018-06-02 | End: 2018-06-04

## 2018-06-02 RX ORDER — MAGNESIUM SULFATE 1 G/100ML
1 INJECTION INTRAVENOUS ONCE
Status: COMPLETED | OUTPATIENT
Start: 2018-06-02 | End: 2018-06-02

## 2018-06-02 RX ORDER — PANTOPRAZOLE SODIUM 40 MG/1
40 INJECTION, POWDER, FOR SOLUTION INTRAVENOUS
Status: DISCONTINUED | OUTPATIENT
Start: 2018-06-02 | End: 2018-06-12 | Stop reason: SDUPTHER

## 2018-06-02 RX ADMIN — SODIUM CHLORIDE 125 ML/HR: 0.9 INJECTION, SOLUTION INTRAVENOUS at 05:11

## 2018-06-02 RX ADMIN — PANTOPRAZOLE SODIUM 40 MG: 40 INJECTION, POWDER, FOR SOLUTION INTRAVENOUS at 08:01

## 2018-06-02 RX ADMIN — CEFAZOLIN SODIUM 1000 MG: 1 SOLUTION INTRAVENOUS at 20:24

## 2018-06-02 RX ADMIN — ONDANSETRON 4 MG: 2 INJECTION INTRAMUSCULAR; INTRAVENOUS at 23:13

## 2018-06-02 RX ADMIN — CEFAZOLIN SODIUM 1000 MG: 1 SOLUTION INTRAVENOUS at 12:16

## 2018-06-02 RX ADMIN — ONDANSETRON 4 MG: 2 INJECTION INTRAMUSCULAR; INTRAVENOUS at 07:58

## 2018-06-02 RX ADMIN — CEFAZOLIN SODIUM 1000 MG: 1 SOLUTION INTRAVENOUS at 05:21

## 2018-06-02 RX ADMIN — DOXAZOSIN 4 MG: 4 TABLET ORAL at 08:01

## 2018-06-02 RX ADMIN — ACETAMINOPHEN 650 MG: 325 TABLET ORAL at 20:42

## 2018-06-02 RX ADMIN — ONDANSETRON 4 MG: 2 INJECTION INTRAMUSCULAR; INTRAVENOUS at 12:14

## 2018-06-02 RX ADMIN — METRONIDAZOLE 500 MG: 500 INJECTION, SOLUTION INTRAVENOUS at 21:33

## 2018-06-02 RX ADMIN — MAGNESIUM SULFATE HEPTAHYDRATE 1 G: 1 INJECTION, SOLUTION INTRAVENOUS at 14:18

## 2018-06-02 RX ADMIN — ACETAMINOPHEN 650 MG: 325 TABLET ORAL at 07:58

## 2018-06-02 RX ADMIN — POTASSIUM CHLORIDE, DEXTROSE MONOHYDRATE AND SODIUM CHLORIDE 100 ML/HR: 150; 5; 450 INJECTION, SOLUTION INTRAVENOUS at 14:11

## 2018-06-02 RX ADMIN — METRONIDAZOLE 500 MG: 500 INJECTION, SOLUTION INTRAVENOUS at 06:35

## 2018-06-02 RX ADMIN — METRONIDAZOLE 500 MG: 500 INJECTION, SOLUTION INTRAVENOUS at 16:41

## 2018-06-02 RX ADMIN — TAMSULOSIN HYDROCHLORIDE 0.4 MG: 0.4 CAPSULE ORAL at 16:41

## 2018-06-02 NOTE — PROGRESS NOTES
UROLOGY PROGRESS NOTE   Patient Identifiers: Rudy Luna (MRN [id number])  Date of Service: 6/2/2018        Assessment:   67-year-old male with perforated diverticulitis status post cystoscopy with attempted bilateral ureteral catheter placement, complicated by need for bilateral ureteroscopy due to distal ureteral inflammation  Placement of catheter aborted on the right side, left catheter placed after ureteroscopic guidance    Plan: Will plan to maintain Garcia catheter and left ureteral catheter today  Patient not having any flank pain at this time  Will observe and consider retroperitoneal ultrasound to rule out any hydronephrosis on the right tomorrow  Will likely remove ureteral catheter tomorrow  Garcia catheter will be at the discretion of the general surgical team   Patient continues to do well, will plan outpatient follow up within 6 weeks to repeat retroperitoneal ultrasound      Subjective:     24 HR EVENTS:   no significant events  Patient has  complaints of typically incision pain  Denies RIGHT or LEFT flank pain         Objective:     VITALS:    Vitals:    06/02/18 0725   BP:    Pulse:    Resp:    Temp:    SpO2: 98%       INS & OUTS:  1550cc/24hours Garcia  JENN 35cc    LABS:  Lab Results   Component Value Date    HGB 12 0 06/02/2018    HCT 37 6 06/02/2018    WBC 12 28 (H) 06/02/2018     (H) 06/02/2018   ]    Lab Results   Component Value Date     06/02/2018    K 4 2 06/02/2018     06/02/2018    CO2 26 06/02/2018    BUN 9 06/02/2018    CREATININE 0 68 06/02/2018    CALCIUM 7 9 (L) 06/02/2018    GLUCOSE 106 06/02/2018   ]    INPATIENT MEDS:    Current Facility-Administered Medications:     acetaminophen (TYLENOL) tablet 650 mg, 650 mg, Oral, Q4H PRN, Yesi Hunt MD, 650 mg at 06/02/18 0758    ceFAZolin (ANCEF) IVPB (premix) 1,000 mg, 1,000 mg, Intravenous, Q8H, Yesi Hunt MD, Last Rate: 100 mL/hr at 06/02/18 0521, 1,000 mg at 06/02/18 0521    doxazosin (CARDURA) tablet 4 mg, 4 mg, Oral, Daily, Bianka Welsh MD, 4 mg at 06/02/18 0801    HYDROmorphone (DILAUDID) 1 mg/mL 50 mL PCA, , Intravenous, Continuous, Janelle Holder    HYDROmorphone (DILAUDID) injection 0 5 mg, 0 5 mg, Intravenous, Q2H PRN, Jeanette Cohn MD, 0 5 mg at 06/01/18 1124    metroNIDAZOLE (FLAGYL) IVPB (premix) 500 mg, 500 mg, Intravenous, Q8H, Bianka Welsh MD, Last Rate: 200 mL/hr at 06/02/18 0635, 500 mg at 06/02/18 0635    ondansetron (ZOFRAN) injection 4 mg, 4 mg, Intravenous, Q4H PRN, Jatin Laughlin, 4 mg at 06/02/18 0758    oxyCODONE (ROXICODONE) immediate release tablet 10 mg, 10 mg, Oral, Q4H PRN, Bianka Welsh MD, 10 mg at 06/01/18 0853    oxyCODONE (ROXICODONE) IR tablet 5 mg, 5 mg, Oral, Q4H PRN, Bianka Welsh MD, 5 mg at 05/31/18 2231    pantoprazole (PROTONIX) injection 40 mg, 40 mg, Intravenous, Q24H [address], Jeanette Cohn MD, 40 mg at 06/02/18 0801    simethicone (MYLICON) chewable tablet 80 mg, 80 mg, Oral, Q6H PRN, Carlos Howe MD, 80 mg at 05/30/18 1754    sodium chloride 0 9 % infusion, 125 mL/hr, Intravenous, Continuous, Jatin Laughlin, Last Rate: 125 mL/hr at 06/02/18 0511, 125 mL/hr at 06/02/18 0511    tamsulosin (FLOMAX) capsule 0 4 mg, 0 4 mg, Oral, Daily With Fede Phoenix MD, 0 4 mg at 05/31/18 1605      Physical Exam:   /82 (BP Location: Left arm)   Pulse 91   Temp 99 °F (37 2 °C) (Oral)   Resp 12   Ht 5' 8" (1 727 m)   Wt 75 8 kg (167 lb)   SpO2 98%   BMI 25 39 kg/m²   GEN: no acute distress    RESP: breathing comfortably with no accessory muscle use    ABD: soft, appropriately tender to palpation, non-distended   INCISION: clean, dry, intact   EXT: No edema   JENN: Serosang  THOMAS:  Ureteral catheter in place  Draining dark tea-colored urine  No hubert gross hematuria

## 2018-06-02 NOTE — PROGRESS NOTES
Progress Note - General Surgery   Chika Bunn 63 y o  male MRN: [id number]  Unit/Bed#: [facility] 829-01 Encounter: [id number]    Assessment:  62 yo M with locally perforated diverticulitis on antibiotics s/p cysto and L ureteral stent placement, ex lap, resection of sigmoid colon and reanastomosis with diverting loop ileostomy   JENN 35  Plan:  Clears Toast and crackers  PCA-D  [email or web address]  Continue ancef /flagyl day 4  Garcia to remain  Continue JENN  Renal U/S today   IS/OOB/ambulate  SQH    Subjective/Objective     Subjective: BERNARDO  No BF  No N/V  Using PCA-D, also had TAP block  Objective:     Vitals: Blood pressure 114/71, pulse 104, temperature 98 4 °F (36 9 °C), temperature source Oral, resp  rate 12, height 5' 8" (1 727 m), weight 75 8 kg (167 lb), SpO2 99 %  ,Body mass index is 25 39 kg/m²  I/O       05/29 0701 - 05/30 0700 05/30 0701 - 05/31 0700 05/31 0701 - 06/01 0700    P  O  130      I V  (mL/kg)       IV Piggyback  50 100    Total Intake(mL/kg) 130 (1 7) 50 (0 7) 100 (1 3)    Urine (mL/kg/hr)       Total Output          Net +130 +50 +100           Unmeasured Urine Occurrence  1 x           Physical Exam:  AAOX3  NAD  Normal respiratory rate  soft, TTP, ND  JENN serosang  no c/c/e    Lab, Imaging and other studies: CBC with diff:   Lab Results   Component Value Date    WBC 11 34 (H) 06/01/2018    HGB 13 3 06/01/2018    HCT 41 0 06/01/2018    MCV 95 06/01/2018     (H) 06/01/2018    MCH 30 6 06/01/2018    MCHC 32 4 06/01/2018    RDW 13 7 06/01/2018    MPV 9 9 06/01/2018    NRBC 0 06/01/2018   , BMP/CMP:   Lab Results   Component Value Date     06/01/2018    K 4 3 06/01/2018     06/01/2018    CO2 29 06/01/2018    ANIONGAP 4 06/01/2018    BUN 9 06/01/2018    CREATININE 0 71 06/01/2018    GLUCOSE 102 06/01/2018    CALCIUM 8 3 06/01/2018    EGFR 104 06/01/2018   , Magnesium: No results found for: MAG  VTE Pharmacologic Prophylaxis: Sequential compression device (Venodyne)   VTE Mechanical Prophylaxis: sequential compression device

## 2018-06-03 ENCOUNTER — TELEPHONE (OUTPATIENT)
Dept: OTHER | Facility: HOSPITAL | Age: 58
End: 2018-06-03

## 2018-06-03 ENCOUNTER — APPOINTMENT (INPATIENT)
Dept: RADIOLOGY | Facility: HOSPITAL | Age: 58
DRG: 330 | End: 2018-06-03
Payer: COMMERCIAL

## 2018-06-03 DIAGNOSIS — N13.5 URETERAL OBSTRUCTION: Primary | ICD-10-CM

## 2018-06-03 LAB
ANION GAP SERPL CALCULATED.3IONS-SCNC: 5 MMOL/L (ref 4–13)
BASOPHILS # BLD AUTO: 0.03 THOUSANDS/ΜL (ref 0–0.1)
BASOPHILS NFR BLD AUTO: 0 % (ref 0–1)
BUN SERPL-MCNC: 4 MG/DL (ref 5–25)
CALCIUM SERPL-MCNC: 7.9 MG/DL (ref 8.3–10.1)
CHLORIDE SERPL-SCNC: 105 MMOL/L (ref 100–108)
CO2 SERPL-SCNC: 24 MMOL/L (ref 21–32)
CREAT SERPL-MCNC: 0.6 MG/DL (ref 0.6–1.3)
EOSINOPHIL # BLD AUTO: 0.01 THOUSAND/ΜL (ref 0–0.61)
EOSINOPHIL NFR BLD AUTO: 0 % (ref 0–6)
ERYTHROCYTE [DISTWIDTH] IN BLOOD BY AUTOMATED COUNT: 13.5 % (ref 11.6–15.1)
GFR SERPL CREATININE-BSD FRML MDRD: 112 ML/MIN/1.73SQ M
GLUCOSE SERPL-MCNC: 99 MG/DL (ref 65–140)
HCT VFR BLD AUTO: 34.6 % (ref 36.5–49.3)
HGB BLD-MCNC: 11.1 G/DL (ref 12–17)
IMM GRANULOCYTES # BLD AUTO: 0.07 THOUSAND/UL (ref 0–0.2)
IMM GRANULOCYTES NFR BLD AUTO: 1 % (ref 0–2)
LYMPHOCYTES # BLD AUTO: 0.6 THOUSANDS/ΜL (ref 0.6–4.47)
LYMPHOCYTES NFR BLD AUTO: 4 % (ref 14–44)
MCH RBC QN AUTO: 30.5 PG (ref 26.8–34.3)
MCHC RBC AUTO-ENTMCNC: 32.1 G/DL (ref 31.4–37.4)
MCV RBC AUTO: 95 FL (ref 82–98)
MONOCYTES # BLD AUTO: 1.06 THOUSAND/ΜL (ref 0.17–1.22)
MONOCYTES NFR BLD AUTO: 7 % (ref 4–12)
NEUTROPHILS # BLD AUTO: 13.49 THOUSANDS/ΜL (ref 1.85–7.62)
NEUTS SEG NFR BLD AUTO: 88 % (ref 43–75)
NRBC BLD AUTO-RTO: 0 /100 WBCS
PLATELET # BLD AUTO: 464 THOUSANDS/UL (ref 149–390)
PMV BLD AUTO: 10.2 FL (ref 8.9–12.7)
POTASSIUM SERPL-SCNC: 4.8 MMOL/L (ref 3.5–5.3)
RBC # BLD AUTO: 3.64 MILLION/UL (ref 3.88–5.62)
SODIUM SERPL-SCNC: 134 MMOL/L (ref 136–145)
WBC # BLD AUTO: 15.26 THOUSAND/UL (ref 4.31–10.16)

## 2018-06-03 PROCEDURE — 99024 POSTOP FOLLOW-UP VISIT: CPT | Performed by: SURGERY

## 2018-06-03 PROCEDURE — 80048 BASIC METABOLIC PNL TOTAL CA: CPT | Performed by: SURGERY

## 2018-06-03 PROCEDURE — 85025 COMPLETE CBC W/AUTO DIFF WBC: CPT | Performed by: SURGERY

## 2018-06-03 PROCEDURE — 99232 SBSQ HOSP IP/OBS MODERATE 35: CPT | Performed by: UROLOGY

## 2018-06-03 PROCEDURE — C9113 INJ PANTOPRAZOLE SODIUM, VIA: HCPCS | Performed by: STUDENT IN AN ORGANIZED HEALTH CARE EDUCATION/TRAINING PROGRAM

## 2018-06-03 PROCEDURE — 76770 US EXAM ABDO BACK WALL COMP: CPT

## 2018-06-03 RX ORDER — POLYVINYL ALCOHOL 14 MG/ML
1 SOLUTION/ DROPS OPHTHALMIC
Status: DISCONTINUED | OUTPATIENT
Start: 2018-06-03 | End: 2018-06-17 | Stop reason: HOSPADM

## 2018-06-03 RX ORDER — ACETAMINOPHEN 325 MG/1
975 TABLET ORAL EVERY 6 HOURS SCHEDULED
Status: DISCONTINUED | OUTPATIENT
Start: 2018-06-03 | End: 2018-06-06

## 2018-06-03 RX ADMIN — ACETAMINOPHEN 975 MG: 325 TABLET ORAL at 17:12

## 2018-06-03 RX ADMIN — POLYVINYL ALCOHOL 1 DROP: 14 SOLUTION/ DROPS OPHTHALMIC at 05:27

## 2018-06-03 RX ADMIN — CEFAZOLIN SODIUM 1000 MG: 1 SOLUTION INTRAVENOUS at 12:20

## 2018-06-03 RX ADMIN — CEFAZOLIN SODIUM 1000 MG: 1 SOLUTION INTRAVENOUS at 05:26

## 2018-06-03 RX ADMIN — METRONIDAZOLE 500 MG: 500 INJECTION, SOLUTION INTRAVENOUS at 06:05

## 2018-06-03 RX ADMIN — POTASSIUM CHLORIDE, DEXTROSE MONOHYDRATE AND SODIUM CHLORIDE 100 ML/HR: 150; 5; 450 INJECTION, SOLUTION INTRAVENOUS at 12:21

## 2018-06-03 RX ADMIN — CEFAZOLIN SODIUM 1000 MG: 1 SOLUTION INTRAVENOUS at 21:21

## 2018-06-03 RX ADMIN — HYDROMORPHONE HYDROCHLORIDE 0.5 MG: 1 INJECTION, SOLUTION INTRAMUSCULAR; INTRAVENOUS; SUBCUTANEOUS at 13:43

## 2018-06-03 RX ADMIN — DOXAZOSIN 4 MG: 4 TABLET ORAL at 08:08

## 2018-06-03 RX ADMIN — ONDANSETRON 4 MG: 2 INJECTION INTRAMUSCULAR; INTRAVENOUS at 19:27

## 2018-06-03 RX ADMIN — HYDROMORPHONE HYDROCHLORIDE 0.5 MG: 1 INJECTION, SOLUTION INTRAMUSCULAR; INTRAVENOUS; SUBCUTANEOUS at 08:08

## 2018-06-03 RX ADMIN — PANTOPRAZOLE SODIUM 40 MG: 40 INJECTION, POWDER, FOR SOLUTION INTRAVENOUS at 08:08

## 2018-06-03 RX ADMIN — POTASSIUM CHLORIDE, DEXTROSE MONOHYDRATE AND SODIUM CHLORIDE 100 ML/HR: 150; 5; 450 INJECTION, SOLUTION INTRAVENOUS at 03:28

## 2018-06-03 RX ADMIN — HYDROMORPHONE HYDROCHLORIDE 0.5 MG: 1 INJECTION, SOLUTION INTRAMUSCULAR; INTRAVENOUS; SUBCUTANEOUS at 22:23

## 2018-06-03 RX ADMIN — ACETAMINOPHEN 975 MG: 325 TABLET ORAL at 23:42

## 2018-06-03 RX ADMIN — ACETAMINOPHEN 975 MG: 325 TABLET ORAL at 08:15

## 2018-06-03 RX ADMIN — ACETAMINOPHEN 650 MG: 325 TABLET ORAL at 00:43

## 2018-06-03 RX ADMIN — TAMSULOSIN HYDROCHLORIDE 0.4 MG: 0.4 CAPSULE ORAL at 17:12

## 2018-06-03 RX ADMIN — HYDROMORPHONE HYDROCHLORIDE 0.5 MG: 1 INJECTION, SOLUTION INTRAMUSCULAR; INTRAVENOUS; SUBCUTANEOUS at 19:33

## 2018-06-03 RX ADMIN — METRONIDAZOLE 500 MG: 500 INJECTION, SOLUTION INTRAVENOUS at 13:43

## 2018-06-03 RX ADMIN — METRONIDAZOLE 500 MG: 500 INJECTION, SOLUTION INTRAVENOUS at 22:23

## 2018-06-03 NOTE — PROGRESS NOTES
Progress Note - General Surgery   Abdifatah Bunn 63 y o  male MRN: [id number]  Unit/Bed#: [facility] 829-01 Encounter: [id number]    Assessment:  64 yo M with locally perforated diverticulitis on antibiotics s/p cysto and L ureteral stent placement, ex lap, resection of sigmoid colon and reanastomosis with diverting loop ileostomy   JENN 20    Plan:  Surgical soft diet  [email or web address] until tolerating more PO   PCA-D decrease dose  Continue ancef /flagyl day 5  Renal U/S today   Urology following-plan for stent removal   D/c walters after stent removal   IS/OOB/ambulate  SQH    Subjective/Objective     Subjective: No BF  ileostomy with bilious output  No N/V  Spiked one fever to 101 9 11pm yesterday, resolved with tylenol  Objective:     Vitals: Blood pressure 126/75, pulse 93, temperature 98 4 °F (36 9 °C), temperature source Oral, resp  rate 18, height 5' 8" (1 727 m), weight 75 8 kg (167 lb), SpO2 93 %  ,Body mass index is 25 39 kg/m²  I/O       05/29 0701 - 05/30 0700 05/30 0701 - 05/31 0700 05/31 0701 - 06/01 0700    P  O  130      I V  (mL/kg)       IV Piggyback  50 100    Total Intake(mL/kg) 130 (1 7) 50 (0 7) 100 (1 3)    Urine (mL/kg/hr)       Total Output          Net +130 +50 +100           Unmeasured Urine Occurrence  1 x           Physical Exam:  AAOX3  NAD  normal respiratory effort  soft, TTP over midline, ND  JENN serosang  Midline incision c/d/I with staples  no c/c/e    Lab, Imaging and other studies: CBC with diff:   No results found for: WBC, HGB, HCT, MCV, PLT, ADJUSTEDWBC, MCH, MCHC, RDW, MPV, NRBC, BMP/CMP:   No results found for: NA, K, CL, CO2, ANIONGAP, BUN, CREATININE, GLUCOSE, CALCIUM, AST, ALT, ALKPHOS, PROT, ALBUMIN, BILITOT, EGFR, Magnesium: No results found for: MAG  VTE Pharmacologic Prophylaxis: Sequential compression device (Venodyne)   VTE Mechanical Prophylaxis: sequential compression device

## 2018-06-03 NOTE — PROGRESS NOTES
Attempted to get pt oob to ambulate, pt refusing stating "he told the dr this am he would walk this afternoon and that's when he'll walk " Did reinforce importance of ambulation with pt, but pt continues to refuse

## 2018-06-03 NOTE — PROGRESS NOTES
US reviewed  Will followup as outpatient for repeat retroperitoneal US  If patient develops rising creatinine or new flank pain, please reach out to urology  No additional urologic intervention at current  Garcia catheter can be removed at the discretion of general surgery

## 2018-06-03 NOTE — PROGRESS NOTES
Dr Yonny Palma paged to verify they did want PCA d/c'd, although pump was just increased previous day since per report pt not getting adequate pain relief, also made dr aware pt requesting PRN pain meds at this time  Dr stating to decrease dose as ordered and they will be up to evaluate pt this afternoon, also stating tylenol dose was increased also  Will continue to monitor

## 2018-06-03 NOTE — PROGRESS NOTES
Patient reported having HA and his eyes felt uncomfortable  Gave patient tylenol  Temp was 101 9 F  Informed white surgery  Will continue to monitor

## 2018-06-04 LAB
ANION GAP SERPL CALCULATED.3IONS-SCNC: 5 MMOL/L (ref 4–13)
ANION GAP SERPL CALCULATED.3IONS-SCNC: 7 MMOL/L (ref 4–13)
BACTERIA UR QL AUTO: ABNORMAL /HPF
BASOPHILS # BLD AUTO: 0.05 THOUSANDS/ΜL (ref 0–0.1)
BASOPHILS NFR BLD AUTO: 0 % (ref 0–1)
BILIRUB UR QL STRIP: NEGATIVE
BUN SERPL-MCNC: 6 MG/DL (ref 5–25)
BUN SERPL-MCNC: 6 MG/DL (ref 5–25)
CALCIUM SERPL-MCNC: 8 MG/DL (ref 8.3–10.1)
CALCIUM SERPL-MCNC: 8.3 MG/DL (ref 8.3–10.1)
CHLORIDE SERPL-SCNC: 104 MMOL/L (ref 100–108)
CHLORIDE SERPL-SCNC: 105 MMOL/L (ref 100–108)
CLARITY UR: CLEAR
CO2 SERPL-SCNC: 25 MMOL/L (ref 21–32)
CO2 SERPL-SCNC: 25 MMOL/L (ref 21–32)
COLOR UR: YELLOW
CREAT SERPL-MCNC: 1.13 MG/DL (ref 0.6–1.3)
CREAT SERPL-MCNC: 1.2 MG/DL (ref 0.6–1.3)
EOSINOPHIL # BLD AUTO: 0.01 THOUSAND/ΜL (ref 0–0.61)
EOSINOPHIL NFR BLD AUTO: 0 % (ref 0–6)
ERYTHROCYTE [DISTWIDTH] IN BLOOD BY AUTOMATED COUNT: 13.4 % (ref 11.6–15.1)
GFR SERPL CREATININE-BSD FRML MDRD: 67 ML/MIN/1.73SQ M
GFR SERPL CREATININE-BSD FRML MDRD: 72 ML/MIN/1.73SQ M
GLUCOSE SERPL-MCNC: 120 MG/DL (ref 65–140)
GLUCOSE SERPL-MCNC: 133 MG/DL (ref 65–140)
GLUCOSE UR STRIP-MCNC: NEGATIVE MG/DL
HCT VFR BLD AUTO: 34.7 % (ref 36.5–49.3)
HGB BLD-MCNC: 11.2 G/DL (ref 12–17)
HGB UR QL STRIP.AUTO: ABNORMAL
HYALINE CASTS #/AREA URNS LPF: ABNORMAL /LPF
IMM GRANULOCYTES # BLD AUTO: 0.14 THOUSAND/UL (ref 0–0.2)
IMM GRANULOCYTES NFR BLD AUTO: 1 % (ref 0–2)
KETONES UR STRIP-MCNC: NEGATIVE MG/DL
LEUKOCYTE ESTERASE UR QL STRIP: ABNORMAL
LYMPHOCYTES # BLD AUTO: 1.1 THOUSANDS/ΜL (ref 0.6–4.47)
LYMPHOCYTES NFR BLD AUTO: 6 % (ref 14–44)
MCH RBC QN AUTO: 30.9 PG (ref 26.8–34.3)
MCHC RBC AUTO-ENTMCNC: 32.3 G/DL (ref 31.4–37.4)
MCV RBC AUTO: 96 FL (ref 82–98)
MONOCYTES # BLD AUTO: 1.45 THOUSAND/ΜL (ref 0.17–1.22)
MONOCYTES NFR BLD AUTO: 7 % (ref 4–12)
NEUTROPHILS # BLD AUTO: 17.42 THOUSANDS/ΜL (ref 1.85–7.62)
NEUTS SEG NFR BLD AUTO: 86 % (ref 43–75)
NITRITE UR QL STRIP: NEGATIVE
NON-SQ EPI CELLS URNS QL MICRO: ABNORMAL /HPF
NRBC BLD AUTO-RTO: 0 /100 WBCS
PH UR STRIP.AUTO: 6 [PH] (ref 4.5–8)
PLATELET # BLD AUTO: 464 THOUSANDS/UL (ref 149–390)
PMV BLD AUTO: 10.5 FL (ref 8.9–12.7)
POTASSIUM SERPL-SCNC: 3.7 MMOL/L (ref 3.5–5.3)
POTASSIUM SERPL-SCNC: 4 MMOL/L (ref 3.5–5.3)
PROT UR STRIP-MCNC: NEGATIVE MG/DL
RBC # BLD AUTO: 3.63 MILLION/UL (ref 3.88–5.62)
RBC #/AREA URNS AUTO: ABNORMAL /HPF
SODIUM SERPL-SCNC: 135 MMOL/L (ref 136–145)
SODIUM SERPL-SCNC: 136 MMOL/L (ref 136–145)
SP GR UR STRIP.AUTO: 1.01 (ref 1–1.03)
UROBILINOGEN UR QL STRIP.AUTO: 0.2 E.U./DL
WBC # BLD AUTO: 20.17 THOUSAND/UL (ref 4.31–10.16)
WBC #/AREA URNS AUTO: ABNORMAL /HPF

## 2018-06-04 PROCEDURE — 80048 BASIC METABOLIC PNL TOTAL CA: CPT | Performed by: STUDENT IN AN ORGANIZED HEALTH CARE EDUCATION/TRAINING PROGRAM

## 2018-06-04 PROCEDURE — 81001 URINALYSIS AUTO W/SCOPE: CPT | Performed by: SURGERY

## 2018-06-04 PROCEDURE — 85025 COMPLETE CBC W/AUTO DIFF WBC: CPT | Performed by: STUDENT IN AN ORGANIZED HEALTH CARE EDUCATION/TRAINING PROGRAM

## 2018-06-04 PROCEDURE — 99024 POSTOP FOLLOW-UP VISIT: CPT | Performed by: SURGERY

## 2018-06-04 PROCEDURE — C9113 INJ PANTOPRAZOLE SODIUM, VIA: HCPCS | Performed by: STUDENT IN AN ORGANIZED HEALTH CARE EDUCATION/TRAINING PROGRAM

## 2018-06-04 PROCEDURE — 87040 BLOOD CULTURE FOR BACTERIA: CPT | Performed by: SURGERY

## 2018-06-04 PROCEDURE — 87086 URINE CULTURE/COLONY COUNT: CPT | Performed by: UROLOGY

## 2018-06-04 PROCEDURE — 80048 BASIC METABOLIC PNL TOTAL CA: CPT | Performed by: PHYSICIAN ASSISTANT

## 2018-06-04 RX ORDER — SODIUM CHLORIDE 9 MG/ML
100 INJECTION, SOLUTION INTRAVENOUS CONTINUOUS
Status: DISCONTINUED | OUTPATIENT
Start: 2018-06-04 | End: 2018-06-06

## 2018-06-04 RX ORDER — POTASSIUM CHLORIDE 14.9 MG/ML
20 INJECTION INTRAVENOUS
Status: COMPLETED | OUTPATIENT
Start: 2018-06-04 | End: 2018-06-04

## 2018-06-04 RX ADMIN — CEFAZOLIN SODIUM 1000 MG: 1 SOLUTION INTRAVENOUS at 20:05

## 2018-06-04 RX ADMIN — SODIUM CHLORIDE 100 ML/HR: 0.9 INJECTION, SOLUTION INTRAVENOUS at 21:16

## 2018-06-04 RX ADMIN — HYDROMORPHONE HYDROCHLORIDE 0.5 MG: 1 INJECTION, SOLUTION INTRAMUSCULAR; INTRAVENOUS; SUBCUTANEOUS at 18:36

## 2018-06-04 RX ADMIN — CEFAZOLIN SODIUM 1000 MG: 1 SOLUTION INTRAVENOUS at 12:11

## 2018-06-04 RX ADMIN — POTASSIUM CHLORIDE, DEXTROSE MONOHYDRATE AND SODIUM CHLORIDE 100 ML/HR: 150; 5; 450 INJECTION, SOLUTION INTRAVENOUS at 01:28

## 2018-06-04 RX ADMIN — ACETAMINOPHEN 975 MG: 325 TABLET ORAL at 11:18

## 2018-06-04 RX ADMIN — HYDROMORPHONE HYDROCHLORIDE: 10 INJECTION INTRAMUSCULAR; INTRAVENOUS; SUBCUTANEOUS at 12:16

## 2018-06-04 RX ADMIN — POTASSIUM CHLORIDE, DEXTROSE MONOHYDRATE AND SODIUM CHLORIDE 100 ML/HR: 150; 5; 450 INJECTION, SOLUTION INTRAVENOUS at 12:11

## 2018-06-04 RX ADMIN — METRONIDAZOLE 500 MG: 500 INJECTION, SOLUTION INTRAVENOUS at 21:15

## 2018-06-04 RX ADMIN — HYDROMORPHONE HYDROCHLORIDE 0.5 MG: 1 INJECTION, SOLUTION INTRAMUSCULAR; INTRAVENOUS; SUBCUTANEOUS at 05:07

## 2018-06-04 RX ADMIN — HYDROMORPHONE HYDROCHLORIDE 0.5 MG: 1 INJECTION, SOLUTION INTRAMUSCULAR; INTRAVENOUS; SUBCUTANEOUS at 21:12

## 2018-06-04 RX ADMIN — METRONIDAZOLE 500 MG: 500 INJECTION, SOLUTION INTRAVENOUS at 06:01

## 2018-06-04 RX ADMIN — OXYCODONE HYDROCHLORIDE 10 MG: 10 TABLET ORAL at 20:09

## 2018-06-04 RX ADMIN — HYDROMORPHONE HYDROCHLORIDE 0.5 MG: 1 INJECTION, SOLUTION INTRAMUSCULAR; INTRAVENOUS; SUBCUTANEOUS at 11:21

## 2018-06-04 RX ADMIN — METRONIDAZOLE 500 MG: 500 INJECTION, SOLUTION INTRAVENOUS at 13:40

## 2018-06-04 RX ADMIN — POTASSIUM CHLORIDE 20 MEQ: 200 INJECTION, SOLUTION INTRAVENOUS at 13:40

## 2018-06-04 RX ADMIN — DOXAZOSIN 4 MG: 4 TABLET ORAL at 08:13

## 2018-06-04 RX ADMIN — CEFAZOLIN SODIUM 1000 MG: 1 SOLUTION INTRAVENOUS at 05:07

## 2018-06-04 RX ADMIN — ACETAMINOPHEN 975 MG: 325 TABLET ORAL at 20:05

## 2018-06-04 RX ADMIN — ACETAMINOPHEN 975 MG: 325 TABLET ORAL at 06:01

## 2018-06-04 RX ADMIN — HYDROMORPHONE HYDROCHLORIDE 0.5 MG: 1 INJECTION, SOLUTION INTRAMUSCULAR; INTRAVENOUS; SUBCUTANEOUS at 01:23

## 2018-06-04 RX ADMIN — POTASSIUM CHLORIDE 20 MEQ: 200 INJECTION, SOLUTION INTRAVENOUS at 11:18

## 2018-06-04 RX ADMIN — PANTOPRAZOLE SODIUM 40 MG: 40 INJECTION, POWDER, FOR SOLUTION INTRAVENOUS at 08:13

## 2018-06-04 RX ADMIN — TAMSULOSIN HYDROCHLORIDE 0.4 MG: 0.4 CAPSULE ORAL at 16:53

## 2018-06-04 RX ADMIN — ENOXAPARIN SODIUM 40 MG: 100 INJECTION SUBCUTANEOUS at 11:18

## 2018-06-04 NOTE — PROGRESS NOTES
Paged and spoke with Dr Aquino to verify new IVF's entered for [email or web address],  stating to start those IVF's once current bag of D51/2NS w/ 20Kcl complete

## 2018-06-04 NOTE — CASE MANAGEMENT
Continued Stay Review    Date: 6/4/18    Vital Signs: /76 (BP Location: Right arm)   Pulse 89   Temp 99 6 °F (37 6 °C) (Oral)   Resp 18   Ht 5' 8" (1 727 m)   Wt 75 8 kg (167 lb)   SpO2 96%   BMI 25 39 kg/m²     Medications:   Scheduled Meds:   Current Facility-Administered Medications:  acetaminophen 975 mg Oral Q6H [facility] Jeanette Cohn MD    cefazolin 1,000 mg Intravenous Q8H Bianka Welsh MD Last Rate: Stopped (06/04/18 1241)   dextrose 5 % and sodium chloride 0 45 % with KCl 20 mEq/L 100 mL/hr Intravenous Continuous Bianka Welsh MD Last Rate: 100 mL/hr (06/04/18 1211)   doxazosin 4 mg Oral Daily Bianka Welsh MD    enoxaparin 40 mg Subcutaneous Q24H [facility] Pily Villeda MD    HYDROmorphone  Intravenous Continuous Jeanette Cohn MD    HYDROmorphone 0 5 mg Intravenous Q2H PRN Jeanette Cohn MD    metroNIDAZOLE 500 mg Intravenous Q8H Bianka Welsh MD Last Rate: 500 mg (06/04/18 1340)   ondansetron 4 mg Intravenous Q4H PRN Janelle Holder    oxyCODONE 10 mg Oral Q4H PRN Bianka Welsh MD    oxyCODONE 5 mg Oral Q4H PRN Bianka Welsh MD    pantoprazole 40 mg Intravenous Q24H [facility] Jeanette Cohn MD    polyvinyl alcohol 1 drop Both Eyes Q3H PRN Bianka Welsh MD    potassium chloride 20 mEq Intravenous Q2H Nelli Granados PA-C Last Rate: 20 mEq (06/04/18 1340)   simethicone 80 mg Oral Q6H PRN Carlos Howe MD    tamsulosin 0 4 mg Oral Daily With Herb Wells MD      Continuous Infusions:   dextrose 5 % and sodium chloride 0 45 % with KCl 20 mEq/L 100 mL/hr Last Rate: 100 mL/hr (06/04/18 1211)   HYDROmorphone PCA        PRN Meds: HYDROmorphone x5 in last 24 hrs    ondansetron    oxyCODONE    oxyCODONE    polyvinyl alcohol    simethicone    Abnormal Labs/Diagnostic Results:   Na 135  Calc 8 0   06/04/18 0612   WBC 20 17     RBC 3 63     Hemoglobin 11 2     Hematocrit 34 7     Platelets 268     Neutrophils Relative 86     Lymphocytes Relative 6     Neutrophils Absolute 17 42 Monocytes Absolute 1 45       6/4 Obst series pending     Age/Sex: 63 y o  male     Assessment/Plan:   6/4 Surgery Progress Note   64 yo M with locally perforated diverticulitis on antibiotics s/p cysto and L ureteral stent placement, ex lap, resection of sigmoid colon and reanastomosis with diverting loop ileostomy      Plan:  Clears  Continue IV fluids   Keep PCA  Continue antibiotics  Remove ostomy bar today  DVT ppx  Ambulate    Discharge Plan: home when stable       Thank you,  [facility] by Cody Rodrigues for 2017  Network Utilization Review Department  Phone: [phone]; Fax [phone]  ATTENTION: The Network Utilization Review Department is now centralized for our 7 Facilities  Make a note that we have a new phone and fax numbers for our Department  Please call with any questions or concerns to [phone] and carefully follow the prompts so that you are directed to the right person  All voicemails are confidential  Fax any determinations, approvals, denials, and requests for initial or continue stay review clinical to [phone]  Due to HIGH CALL volume, it would be easier if you could please send faxed requests to expedite your requests and in part, help us provide discharge notifications faster

## 2018-06-04 NOTE — PROGRESS NOTES
Rounding done with ARIS Baird plan to continue ambulation, I S , DB and coughing, PCA pump til bowel function increases and then PO pain meds to be introduced  Pt aware of plan

## 2018-06-04 NOTE — WOUND OSTOMY CARE
Progress Note- Ostomy  Chika Bunn 63 y o  male  [id number]  [facility] [address] 829-01        Assessment:  POD # 3 - Patient is AAO x 3 and ambulating hallway, has a RUQ loop Ileostomy in place with minimal drainage  Patient made aware ET nurse visiting for continue teaching and ostomy care, patient prefers change and continue teaching tomorrow at 1300 since states usually being in discomfort usually in the morning  One piece pouch remains in place with no leakage, stoma is budded, pink, moist and healthy with serosanguinous effluent  As per patient's request, we will return tomorrow for first pouch change, education material in [facility] [place] and [place] given and encourage reading and formulating questions for wound care nurse  Plan: We will continue following patient for ostomy car and teaching  Vitals:    06/04/18 1100   BP: 135/84   Pulse: (!) 114   Resp: 18   Temp: 99 4 °F (37 4 °C)   SpO2: 96%     Incision 06/01/18 Abdomen Other (Comment) (Active)   Incision Description CLAUDIA 6/4/2018  7:25 AM   Debra-wound Assessment Clean;Dry; Intact 6/4/2018  7:25 AM   Closure CLAUDIA 6/4/2018  7:25 AM   Drainage Amount CLAUDIA 6/4/2018  7:25 AM   Dressing Open to air 6/2/2018  4:00 PM   Dressing Status Clean;Dry; Intact 6/4/2018  7:25 AM   Number of days: 3     Closed/Suction Drain Left Abdomen Bulb (Active)   Site Description Unable to view 6/4/2018  7:25 AM   Dressing Status Clean;Dry; Intact 6/4/2018  7:25 AM   Drainage Appearance Serosanguineous 6/4/2018  7:25 AM   Status To bulb suction 6/4/2018  7:25 AM   Output (mL) 10 mL 6/4/2018  4:16 AM   Number of days: 3       Ileostomy Loop RUQ (Active)   Stomal Appliance 1 piece 6/3/2018  7:33 PM   Stoma Assessment Budded;Pink 6/4/2018  7:25 AM   Stoma Shape Round 6/4/2018  7:25 AM   Peristomal Assessment CLAUDIA 6/3/2018  7:33 PM   Output (mL) 25 mL 6/4/2018  4:16 AM   Number of days: 3           Please call wound care to ext [phone] or [phone] with questions or concerns      Libra Young Curt RN, BSN, [facility]

## 2018-06-04 NOTE — PROGRESS NOTES
Patient care rounds were completed with the patient's nurse today, Orin Forde  We discussed the plan is to ***  We reviewed all of the invasive devices/lines/telemetry orders  ***  Pain Assessment / Plan:  ***  Mobility Assessment / Plan:  ***  Goals / Barriers for discharge:  ***  Case management following; case and discharge needs discussed  All questions and concerns were addressed  I spent greater than *** minutes reviewing the plan with the patient and the nurse, and coordinating *** care for the day      Nicole Felix PA-C  6/4/2018 10:06 AM

## 2018-06-04 NOTE — SOCIAL WORK
CM discussed pt in care coordination rounds and pt has  Possible post op ileus   Md will monitor labs , wbc for tentative CT scan   Cm will follow

## 2018-06-04 NOTE — PROGRESS NOTES
Progress Note - General Surgery   Lawernce Laughter Rites 63 y o  male MRN: [id number]  Unit/Bed#: [facility] 829-01 Encounter: [id number]    Assessment:  62 yo M with locally perforated diverticulitis on antibiotics s/p cysto and L ureteral stent placement, ex lap, resection of sigmoid colon and reanastomosis with diverting loop ileostomy     Plan:  Clears  Continue IV fluids   Keep PCA  Continue antibiotics  Remove ostomy bar today  DVT ppx  Ambulate    Subjective/Objective     Subjective: No acute events overnight  C/o nausea and burping this morning  Minimal output from ostomy    Objective:     Vitals: Blood pressure 126/79, pulse 86, temperature 98 °F (36 7 °C), temperature source Oral, resp  rate 18, height 5' 8" (1 727 m), weight 75 8 kg (167 lb), SpO2 97 %  ,Body mass index is 25 39 kg/m²  I/O       05/29 0701 - 05/30 0700 05/30 0701 - 05/31 0700 05/31 0701 - 06/01 0700    P  O  130      I V  (mL/kg)       IV Piggyback  50 100    Total Intake(mL/kg) 130 (1 7) 50 (0 7) 100 (1 3)    Urine (mL/kg/hr)       Total Output          Net +130 +50 +100           Unmeasured Urine Occurrence  1 x           Physical Exam:  AAOX3  NAD  normal respiratory effort  soft, TTP over midline, Ostomy pink, minimal bilious effluent   No air    Lab, Imaging and other studies: CBC with diff:   Lab Results   Component Value Date    WBC 15 26 (H) 06/03/2018    HGB 11 1 (L) 06/03/2018    HCT 34 6 (L) 06/03/2018    MCV 95 06/03/2018     (H) 06/03/2018    MCH 30 5 06/03/2018    MCHC 32 1 06/03/2018    RDW 13 5 06/03/2018    MPV 10 2 06/03/2018    NRBC 0 06/03/2018   , BMP/CMP:   No results found for: NA, K, CL, CO2, ANIONGAP, BUN, CREATININE, GLUCOSE, CALCIUM, AST, ALT, ALKPHOS, PROT, ALBUMIN, BILITOT, EGFR, Magnesium: No results found for: MAG  VTE Pharmacologic Prophylaxis: Sequential compression device (Venodyne)   VTE Mechanical Prophylaxis: sequential compression device

## 2018-06-05 ENCOUNTER — APPOINTMENT (INPATIENT)
Dept: RADIOLOGY | Facility: HOSPITAL | Age: 58
DRG: 330 | End: 2018-06-05
Payer: COMMERCIAL

## 2018-06-05 LAB
ANION GAP SERPL CALCULATED.3IONS-SCNC: 7 MMOL/L (ref 4–13)
BUN SERPL-MCNC: 7 MG/DL (ref 5–25)
CALCIUM SERPL-MCNC: 7.8 MG/DL (ref 8.3–10.1)
CHLORIDE SERPL-SCNC: 105 MMOL/L (ref 100–108)
CO2 SERPL-SCNC: 26 MMOL/L (ref 21–32)
CREAT SERPL-MCNC: 1.17 MG/DL (ref 0.6–1.3)
ERYTHROCYTE [DISTWIDTH] IN BLOOD BY AUTOMATED COUNT: 13.5 % (ref 11.6–15.1)
GFR SERPL CREATININE-BSD FRML MDRD: 69 ML/MIN/1.73SQ M
GLUCOSE SERPL-MCNC: 96 MG/DL (ref 65–140)
HCT VFR BLD AUTO: 32.8 % (ref 36.5–49.3)
HGB BLD-MCNC: 10.7 G/DL (ref 12–17)
MAGNESIUM SERPL-MCNC: 2 MG/DL (ref 1.6–2.6)
MCH RBC QN AUTO: 31 PG (ref 26.8–34.3)
MCHC RBC AUTO-ENTMCNC: 32.6 G/DL (ref 31.4–37.4)
MCV RBC AUTO: 95 FL (ref 82–98)
PLATELET # BLD AUTO: 491 THOUSANDS/UL (ref 149–390)
PMV BLD AUTO: 10.1 FL (ref 8.9–12.7)
POTASSIUM SERPL-SCNC: 3.7 MMOL/L (ref 3.5–5.3)
RBC # BLD AUTO: 3.45 MILLION/UL (ref 3.88–5.62)
SODIUM SERPL-SCNC: 138 MMOL/L (ref 136–145)
WBC # BLD AUTO: 18.34 THOUSAND/UL (ref 4.31–10.16)

## 2018-06-05 PROCEDURE — 99024 POSTOP FOLLOW-UP VISIT: CPT | Performed by: SURGERY

## 2018-06-05 PROCEDURE — 74177 CT ABD & PELVIS W/CONTRAST: CPT

## 2018-06-05 PROCEDURE — 80048 BASIC METABOLIC PNL TOTAL CA: CPT | Performed by: PHYSICIAN ASSISTANT

## 2018-06-05 PROCEDURE — 83735 ASSAY OF MAGNESIUM: CPT | Performed by: PHYSICIAN ASSISTANT

## 2018-06-05 PROCEDURE — C9113 INJ PANTOPRAZOLE SODIUM, VIA: HCPCS | Performed by: STUDENT IN AN ORGANIZED HEALTH CARE EDUCATION/TRAINING PROGRAM

## 2018-06-05 PROCEDURE — 85027 COMPLETE CBC AUTOMATED: CPT | Performed by: PHYSICIAN ASSISTANT

## 2018-06-05 PROCEDURE — 71260 CT THORAX DX C+: CPT

## 2018-06-05 RX ORDER — POTASSIUM CHLORIDE 14.9 MG/ML
20 INJECTION INTRAVENOUS
Status: COMPLETED | OUTPATIENT
Start: 2018-06-05 | End: 2018-06-05

## 2018-06-05 RX ADMIN — TAMSULOSIN HYDROCHLORIDE 0.4 MG: 0.4 CAPSULE ORAL at 17:58

## 2018-06-05 RX ADMIN — POTASSIUM CHLORIDE 20 MEQ: 200 INJECTION, SOLUTION INTRAVENOUS at 10:04

## 2018-06-05 RX ADMIN — ACETAMINOPHEN 975 MG: 325 TABLET ORAL at 17:58

## 2018-06-05 RX ADMIN — ACETAMINOPHEN 975 MG: 325 TABLET ORAL at 12:16

## 2018-06-05 RX ADMIN — ONDANSETRON 4 MG: 2 INJECTION INTRAMUSCULAR; INTRAVENOUS at 11:06

## 2018-06-05 RX ADMIN — CEFAZOLIN SODIUM 1000 MG: 1 SOLUTION INTRAVENOUS at 21:21

## 2018-06-05 RX ADMIN — ACETAMINOPHEN 975 MG: 325 TABLET ORAL at 00:01

## 2018-06-05 RX ADMIN — HYDROMORPHONE HYDROCHLORIDE 0.5 MG: 1 INJECTION, SOLUTION INTRAMUSCULAR; INTRAVENOUS; SUBCUTANEOUS at 19:34

## 2018-06-05 RX ADMIN — OXYCODONE HYDROCHLORIDE 10 MG: 10 TABLET ORAL at 04:30

## 2018-06-05 RX ADMIN — HYDROMORPHONE HYDROCHLORIDE 0.5 MG: 1 INJECTION, SOLUTION INTRAMUSCULAR; INTRAVENOUS; SUBCUTANEOUS at 10:04

## 2018-06-05 RX ADMIN — ACETAMINOPHEN 975 MG: 325 TABLET ORAL at 23:32

## 2018-06-05 RX ADMIN — OXYCODONE HYDROCHLORIDE 10 MG: 10 TABLET ORAL at 08:42

## 2018-06-05 RX ADMIN — OXYCODONE HYDROCHLORIDE 5 MG: 5 TABLET ORAL at 13:40

## 2018-06-05 RX ADMIN — OXYCODONE HYDROCHLORIDE 5 MG: 5 TABLET ORAL at 23:32

## 2018-06-05 RX ADMIN — OXYCODONE HYDROCHLORIDE 10 MG: 10 TABLET ORAL at 00:06

## 2018-06-05 RX ADMIN — ENOXAPARIN SODIUM 40 MG: 100 INJECTION SUBCUTANEOUS at 08:50

## 2018-06-05 RX ADMIN — PANTOPRAZOLE SODIUM 40 MG: 40 INJECTION, POWDER, FOR SOLUTION INTRAVENOUS at 08:43

## 2018-06-05 RX ADMIN — OXYCODONE HYDROCHLORIDE 5 MG: 5 TABLET ORAL at 17:58

## 2018-06-05 RX ADMIN — SODIUM CHLORIDE 100 ML/HR: 0.9 INJECTION, SOLUTION INTRAVENOUS at 08:47

## 2018-06-05 RX ADMIN — HYDROMORPHONE HYDROCHLORIDE: 10 INJECTION INTRAMUSCULAR; INTRAVENOUS; SUBCUTANEOUS at 16:12

## 2018-06-05 RX ADMIN — METRONIDAZOLE 500 MG: 500 INJECTION, SOLUTION INTRAVENOUS at 05:22

## 2018-06-05 RX ADMIN — CEFAZOLIN SODIUM 1000 MG: 1 SOLUTION INTRAVENOUS at 13:43

## 2018-06-05 RX ADMIN — IOHEXOL 100 ML: 350 INJECTION, SOLUTION INTRAVENOUS at 12:50

## 2018-06-05 RX ADMIN — POTASSIUM CHLORIDE 20 MEQ: 200 INJECTION, SOLUTION INTRAVENOUS at 12:13

## 2018-06-05 RX ADMIN — SODIUM CHLORIDE 100 ML/HR: 0.9 INJECTION, SOLUTION INTRAVENOUS at 18:02

## 2018-06-05 RX ADMIN — METRONIDAZOLE 500 MG: 500 INJECTION, SOLUTION INTRAVENOUS at 13:41

## 2018-06-05 RX ADMIN — DOXAZOSIN 4 MG: 4 TABLET ORAL at 08:42

## 2018-06-05 RX ADMIN — METRONIDAZOLE 500 MG: 500 INJECTION, SOLUTION INTRAVENOUS at 21:21

## 2018-06-05 RX ADMIN — CEFAZOLIN SODIUM 1000 MG: 1 SOLUTION INTRAVENOUS at 04:31

## 2018-06-05 RX ADMIN — HYDROMORPHONE HYDROCHLORIDE 0.5 MG: 1 INJECTION, SOLUTION INTRAMUSCULAR; INTRAVENOUS; SUBCUTANEOUS at 02:16

## 2018-06-05 RX ADMIN — HYDROMORPHONE HYDROCHLORIDE 0.5 MG: 1 INJECTION, SOLUTION INTRAMUSCULAR; INTRAVENOUS; SUBCUTANEOUS at 05:22

## 2018-06-05 NOTE — NUTRITION
06/05/18 1526   Recommendations/Interventions   Malnutrition/BMI Present Yes   Malnutrition type Acute illness  (related to diverticulitis, abdominal surgery as evidenced by 5% weight loss over the past month and <75% energy intake needs met >7 days, treated with ensure clear supplements )   Degree of Malnutrition Malnutrition of moderate degree   Malnutrition Characteristics Weight loss; Inadequate energy   Interventions Supplement initiate  (Ensure clear BID ordered  )   Nutrition Recommendations Other (specify)  (If unable to safely advance diet to low fiber, low residue within 48 hours suggest initiate standard PN for day one   Consult RD for additional recommendations  )

## 2018-06-05 NOTE — PROGRESS NOTES
CT of the chest abdomen pelvis with delays requested by the general surgery team   Patient is having some low-level fevers  Official CT report not available at this time  Right kidney appears to have good nephrographic phase and good drainage of contrast all the way down through the ureter to the bladder on delays  There is no obstruction on the right  The left side has a delayed nephrogram and delayed excretion of contrast   There is no drainage through the ureter into the bladder  On review, the JENN drain sits in the retroperitoneal space over the distal ureter  This may be a point of kinking or obstruction  There does appear to be some perinephric inflammation  Will discuss directly with Dr Onur Cabrera regarding urine culture testing and possible removal of JENN prior to consideration of repeating retrograde stent replacement or PCN placement

## 2018-06-05 NOTE — PROGRESS NOTES
Progress Note - Acute Care Surgery   Roney Bunn 63 y o  male MRN: [id number]  Unit/Bed#: [facility] 829-01 Encounter: [id number]    Assessment:  63 M with perforated diverticulitis, s/p ex-lap sigmoidectomy, anastomosis, diverting ileostomy with cysto and L ureteral stent  - tolerated clears with some belching  - ostomy with mixed liquid/solid stool  - UA relatively unremarkable  - BCx in process    Plan:  Surgical soft diet  Continue IV fluids  Pain control with PCA/oral regimen  OOB and ambulate  f/u WBC this AM, possible CT of abd/pelvis if increasing or febrile again  Lovenox ppx    Subjective/Objective     Subjective: Febrile yesterday to 101 3  Otherwise no acute events  Tolerating clears, having ostomy output  Some belching, but otherwise feels well  Objective:    Blood pressure 143/78, pulse 88, temperature 99 °F (37 2 °C), temperature source Oral, resp  rate 16, height 5' 8" (1 727 m), weight 75 6 kg (166 lb 10 7 oz), SpO2 93 %  ,Body mass index is 25 34 kg/m²  Intake/Output Summary (Last 24 hours) at 06/05/18 0546  Last data filed at 06/05/18 4816   Gross per 24 hour   Intake          4111 79 ml   Output             2705 ml   Net          1406 79 ml       Invasive Devices     Peripheral Intravenous Line            Peripheral IV 06/02/18 Right Arm 2 days    Peripheral IV 06/04/18 Left Forearm less than 1 day          Drain            Closed/Suction Drain Left Abdomen Bulb 3 days    Ileostomy Loop RUQ 3 days                Physical Exam:   General: NAD, AAOx3  CV: RRR +S1/S2  Chest: breath sounds bilaterally  Abdomen: soft, mildly tender, non-distended  Ostomy in place, healthy mucosa, green mixed liquid/solid stool   JENN in place serosang  Extremities: atraumatic, no edema        Results from last 7 days  Lab Units 06/04/18  0612 06/03/18  0858 06/02/18  0450   WBC Thousand/uL 20 17* 15 26* 12 28*   HEMOGLOBIN g/dL 11 2* 11 1* 12 0   HEMATOCRIT % 34 7* 34 6* 37 6   PLATELETS Thousands/uL 464* 464* 498*       Results from last 7 days  Lab Units 06/04/18  1428 06/04/18  0612 06/03/18  0516   SODIUM mmol/L 135* 136 134*   POTASSIUM mmol/L 4 0 3 7 4 8   CHLORIDE mmol/L 105 104 105   CO2 mmol/L 25 25 24   BUN mg/dL 6 6 4*   CREATININE mg/dL 1 20 1 13 0 60   GLUCOSE RANDOM mg/dL 133 120 99   CALCIUM mg/dL 8 0* 8 3 7 9*       Results from last 7 days  Lab Units 06/01/18  0756   INR  1 11

## 2018-06-05 NOTE — PROGRESS NOTES
Rounded at bedside with Ozzie Urbina PA-C  Patient's pain improving  Plan for CT scan with oral contrast today  Continue fluids and current treatments

## 2018-06-05 NOTE — PROGRESS NOTES
Patient care rounds were completed with the patient's nurse today, Tiff Bellamy  We discussed the plan is to continue clear liquids with toast and crackers; continue to monitor ostomy function and abdominal distention  Awaiting CT scan of the abdomen and pelvis today due to persistent fevers  Laboratory studies note slight improvement in leukocytosis and stable renal function  Continue current analgesic regimen with plan to transition to oral analgesics only once tolerating oral diet well  Continue IV antibiotics  Continue JENN drain for now and monitor output  We reviewed all of the invasive devices/lines/telemetry orders   - None  Pain Assessment / Plan:  - Continue current analgesic regimen with plan to transition to oral analgesics only once tolerating oral diet well  Mobility Assessment / Plan:  - Activity as tolerated  Goals / Barriers for discharge:  - Patient continues to have fevers and has not had tolerating an oral diet well with return of normal bowel function   - Case management following; case and discharge needs discussed  All questions and concerns were addressed  I spent greater than 20 minutes reviewing the plan with the patient and the nurse, and coordinating his care for the day      Shaylee Hebert PA-C  6/5/2018 10:38 AM

## 2018-06-05 NOTE — WOUND OSTOMY CARE
Progress Note- Ostomy  Diane Bunn 63 y o  male  [id number]  [facility] 829-01        Assessment:  Patient seen for continue teaching, however on arrival to bed side at 12:15pm, patient was being prep to go to CT of abdomen  Stoma is well budded, round, moist with (+) flatus and brown effluent  Stoma with red catheter in place measuring approximately 1 1/4in with intact once piece pouch in place  Patient declined change and teaching today, states he just want to get his CT done, asked ET nurse to see him tomorrow preferably in the afternoon she is not "a morning person  Ostomy supplies ordered and placed in room, dietary education in Togolese provided by dietary department  Plan: We will see patient tomorrow as requested in the early afternoon, patient encourage asking for ET nurse if he has questions  Vitals:    06/05/18 1100   BP: 148/90   Pulse: 101   Resp: 20   Temp: 99 1 °F (37 3 °C)   SpO2: 94%     Incision 06/01/18 Abdomen Other (Comment) (Active)   Incision Description [facility] 6/5/2018  7:15 AM   Debra-wound Assessment [facility] 6/5/2018  7:15 AM   Closure CLAUDIA 6/4/2018  8:09 PM   Drainage Amount [facility] 6/4/2018  8:09 PM   Dressing Dry dressing 6/5/2018  7:15 AM   Dressing Status Clean;Dry; Intact 6/5/2018  7:15 AM   Number of days: 4     Closed/Suction Drain Left Abdomen Bulb (Active)   Site Description Unable to view 6/5/2018  7:15 AM   Dressing Status Clean;Dry; Intact 6/5/2018  7:15 AM   Drainage Appearance Serosanguineous 6/5/2018  7:15 AM   Status To bulb suction 6/5/2018  7:15 AM   Output (mL) 20 mL 6/5/2018  2:41 PM   Number of days: 4       Ileostomy Loop RUQ (Active)   Stomal Appliance 1 piece 6/5/2018  7:15 AM   Stoma Assessment Budded;Pink 6/5/2018  7:15 AM   Stoma Shape Round 6/5/2018  7:15 AM   Peristomal Assessment CLAUDIA 6/5/2018  7:15 AM   Output (mL) 200 mL 6/5/2018  2:41 PM   Number of days: 4       Wound care to continue following      Seymour Martinez, RN, BSN, [facility]

## 2018-06-05 NOTE — PROGRESS NOTES
Spoke to Dr Kenn Soto earlier today  Will plan to remove JENN drain overlying LEFT ureter and follow patient clinically  If no improvement, will consider repeat functional study and possible return to OR for placement of more formal LEFT ureteral stent to allow for resolution of postop hydroureteronephrosis  Also, formal urine culture sent  Will follow

## 2018-06-06 LAB
ANION GAP SERPL CALCULATED.3IONS-SCNC: 7 MMOL/L (ref 4–13)
BACTERIA UR CULT: NORMAL
BASOPHILS # BLD AUTO: 0.04 THOUSANDS/ΜL (ref 0–0.1)
BASOPHILS NFR BLD AUTO: 0 % (ref 0–1)
BUN SERPL-MCNC: 6 MG/DL (ref 5–25)
CALCIUM SERPL-MCNC: 8.2 MG/DL (ref 8.3–10.1)
CHLORIDE SERPL-SCNC: 107 MMOL/L (ref 100–108)
CO2 SERPL-SCNC: 25 MMOL/L (ref 21–32)
CREAT SERPL-MCNC: 1.13 MG/DL (ref 0.6–1.3)
EOSINOPHIL # BLD AUTO: 0.16 THOUSAND/ΜL (ref 0–0.61)
EOSINOPHIL NFR BLD AUTO: 1 % (ref 0–6)
ERYTHROCYTE [DISTWIDTH] IN BLOOD BY AUTOMATED COUNT: 13.6 % (ref 11.6–15.1)
GFR SERPL CREATININE-BSD FRML MDRD: 72 ML/MIN/1.73SQ M
GLUCOSE SERPL-MCNC: 98 MG/DL (ref 65–140)
HCT VFR BLD AUTO: 32.5 % (ref 36.5–49.3)
HGB BLD-MCNC: 10.5 G/DL (ref 12–17)
IMM GRANULOCYTES # BLD AUTO: 0.07 THOUSAND/UL (ref 0–0.2)
IMM GRANULOCYTES NFR BLD AUTO: 1 % (ref 0–2)
LYMPHOCYTES # BLD AUTO: 1.5 THOUSANDS/ΜL (ref 0.6–4.47)
LYMPHOCYTES NFR BLD AUTO: 11 % (ref 14–44)
MAGNESIUM SERPL-MCNC: 2 MG/DL (ref 1.6–2.6)
MCH RBC QN AUTO: 30.6 PG (ref 26.8–34.3)
MCHC RBC AUTO-ENTMCNC: 32.3 G/DL (ref 31.4–37.4)
MCV RBC AUTO: 95 FL (ref 82–98)
MONOCYTES # BLD AUTO: 1.1 THOUSAND/ΜL (ref 0.17–1.22)
MONOCYTES NFR BLD AUTO: 8 % (ref 4–12)
NEUTROPHILS # BLD AUTO: 11.08 THOUSANDS/ΜL (ref 1.85–7.62)
NEUTS SEG NFR BLD AUTO: 79 % (ref 43–75)
NRBC BLD AUTO-RTO: 0 /100 WBCS
PLATELET # BLD AUTO: 511 THOUSANDS/UL (ref 149–390)
PMV BLD AUTO: 9.7 FL (ref 8.9–12.7)
POTASSIUM SERPL-SCNC: 3.8 MMOL/L (ref 3.5–5.3)
RBC # BLD AUTO: 3.43 MILLION/UL (ref 3.88–5.62)
SODIUM SERPL-SCNC: 139 MMOL/L (ref 136–145)
WBC # BLD AUTO: 13.95 THOUSAND/UL (ref 4.31–10.16)

## 2018-06-06 PROCEDURE — 80048 BASIC METABOLIC PNL TOTAL CA: CPT | Performed by: SURGERY

## 2018-06-06 PROCEDURE — 83735 ASSAY OF MAGNESIUM: CPT | Performed by: SURGERY

## 2018-06-06 PROCEDURE — 85025 COMPLETE CBC W/AUTO DIFF WBC: CPT | Performed by: SURGERY

## 2018-06-06 PROCEDURE — C9113 INJ PANTOPRAZOLE SODIUM, VIA: HCPCS | Performed by: STUDENT IN AN ORGANIZED HEALTH CARE EDUCATION/TRAINING PROGRAM

## 2018-06-06 PROCEDURE — 99024 POSTOP FOLLOW-UP VISIT: CPT | Performed by: SURGERY

## 2018-06-06 RX ORDER — ACETAMINOPHEN 325 MG/1
650 TABLET ORAL EVERY 6 HOURS PRN
Status: DISCONTINUED | OUTPATIENT
Start: 2018-06-06 | End: 2018-06-07

## 2018-06-06 RX ORDER — POTASSIUM CHLORIDE 20 MEQ/1
20 TABLET, EXTENDED RELEASE ORAL ONCE
Status: COMPLETED | OUTPATIENT
Start: 2018-06-06 | End: 2018-06-06

## 2018-06-06 RX ADMIN — HYDROMORPHONE HYDROCHLORIDE 0.5 MG: 1 INJECTION, SOLUTION INTRAMUSCULAR; INTRAVENOUS; SUBCUTANEOUS at 06:14

## 2018-06-06 RX ADMIN — HYDROMORPHONE HYDROCHLORIDE 0.5 MG: 1 INJECTION, SOLUTION INTRAMUSCULAR; INTRAVENOUS; SUBCUTANEOUS at 01:00

## 2018-06-06 RX ADMIN — CEFAZOLIN SODIUM 1000 MG: 1 SOLUTION INTRAVENOUS at 13:10

## 2018-06-06 RX ADMIN — DOXAZOSIN 4 MG: 4 TABLET ORAL at 08:34

## 2018-06-06 RX ADMIN — ENOXAPARIN SODIUM 40 MG: 100 INJECTION SUBCUTANEOUS at 08:34

## 2018-06-06 RX ADMIN — ACETAMINOPHEN 650 MG: 325 TABLET ORAL at 18:09

## 2018-06-06 RX ADMIN — PANTOPRAZOLE SODIUM 40 MG: 40 INJECTION, POWDER, FOR SOLUTION INTRAVENOUS at 08:34

## 2018-06-06 RX ADMIN — ACETAMINOPHEN 975 MG: 325 TABLET ORAL at 05:53

## 2018-06-06 RX ADMIN — HYDROMORPHONE HYDROCHLORIDE 0.5 MG: 1 INJECTION, SOLUTION INTRAMUSCULAR; INTRAVENOUS; SUBCUTANEOUS at 11:03

## 2018-06-06 RX ADMIN — METRONIDAZOLE 500 MG: 500 INJECTION, SOLUTION INTRAVENOUS at 05:53

## 2018-06-06 RX ADMIN — METRONIDAZOLE 500 MG: 500 INJECTION, SOLUTION INTRAVENOUS at 18:09

## 2018-06-06 RX ADMIN — TAMSULOSIN HYDROCHLORIDE 0.4 MG: 0.4 CAPSULE ORAL at 18:09

## 2018-06-06 RX ADMIN — OXYCODONE HYDROCHLORIDE 10 MG: 10 TABLET ORAL at 08:34

## 2018-06-06 RX ADMIN — CEFAZOLIN SODIUM 1000 MG: 1 SOLUTION INTRAVENOUS at 05:53

## 2018-06-06 RX ADMIN — HYDROMORPHONE HYDROCHLORIDE 0.5 MG: 1 INJECTION, SOLUTION INTRAMUSCULAR; INTRAVENOUS; SUBCUTANEOUS at 20:13

## 2018-06-06 RX ADMIN — OXYCODONE HYDROCHLORIDE 10 MG: 10 TABLET ORAL at 13:24

## 2018-06-06 RX ADMIN — HYDROMORPHONE HYDROCHLORIDE 0.5 MG: 1 INJECTION, SOLUTION INTRAMUSCULAR; INTRAVENOUS; SUBCUTANEOUS at 18:09

## 2018-06-06 RX ADMIN — OXYCODONE HYDROCHLORIDE 10 MG: 10 TABLET ORAL at 19:31

## 2018-06-06 RX ADMIN — OXYCODONE HYDROCHLORIDE 10 MG: 10 TABLET ORAL at 23:35

## 2018-06-06 RX ADMIN — OXYCODONE HYDROCHLORIDE 10 MG: 10 TABLET ORAL at 03:32

## 2018-06-06 RX ADMIN — POTASSIUM CHLORIDE 20 MEQ: 1500 TABLET, EXTENDED RELEASE ORAL at 10:11

## 2018-06-06 RX ADMIN — SODIUM CHLORIDE 100 ML/HR: 0.9 INJECTION, SOLUTION INTRAVENOUS at 03:34

## 2018-06-06 RX ADMIN — HYDROMORPHONE HYDROCHLORIDE 0.5 MG: 1 INJECTION, SOLUTION INTRAMUSCULAR; INTRAVENOUS; SUBCUTANEOUS at 22:16

## 2018-06-06 NOTE — PROGRESS NOTES
Patient care rounds were completed with the patient's nurse today, Wang Quevedo  We discussed the plan is to advance diet today, remove PCA, and follow-up culture studies  We reviewed all of the invasive devices/lines/telemetry orders  Pain Assessment / Plan:  Continue current plan and d/c PCA  Mobility Assessment / Plan: Activity as tolerated  Goals / Barriers for discharge:  Likely d c tomorrow afternoon  Case management following; case and discharge needs discussed  All questions and concerns were addressed  I spent greater than 12 minutes reviewing the plan with the patient and the nurse, and coordinating care for the day      Alma Baumgarten, PA-C  6/6/2018 10:18 AM

## 2018-06-06 NOTE — PROGRESS NOTES
Into assess pt, resting comfortably in bed, talking on phone; when asked if he has pain, he rates his pain a "7"; prn oxy given

## 2018-06-06 NOTE — INTERIM OP NOTE
LAPAROTOMY EXPLORATORY,, RESECTION COLON SIGMOID, ILEOSTOMY LOOP DIVERTING  Postoperative Note  PATIENT NAME: Viviana Bunn  : 1960  MRN: [id number]  [facility] OR ROOM 08    Surgery Date: 2018    Preop Diagnosis:  Diverticulitis [K57 92]    Post-Op Diagnosis Codes:     * Diverticulitis [K57 92]    Procedure(s) (LRB):  LAPAROTOMY EXPLORATORY, (N/A)  RESECTION COLON SIGMOID (N/A)  CYSTOSCOPY, B/L URETEROSCOPY, PLACEMENT OF LEFT URETERAL CATHETER (Bilateral)  ILEOSTOMY LOOP DIVERTING (N/A)    Surgeon(s) and Role:  Panel 1:     * Myranda Melo DO - Primary     * Nyasia Jara MD - Assisting    Panel 2:     * Kourtney Muñoz MD - Primary     * Evangelina Coronel - Assisting    Specimens:  ID Type Source Tests Collected by Time Destination   1 : stitch marks proximal sigmoid Tissue Large Intestine, Sigmoid Colon TISSUE EXAM Myranda Melo DO 2018 1534        Estimated Blood Loss:   100 mL    Anesthesia Type:   General     Findings:    3 x 2 5cm hernia defect at previous surgical incision  Complications:   None    SIGNATURE: Myranda Melo DO   DATE: 2018   TIME: 12:41 PM

## 2018-06-06 NOTE — PLAN OF CARE
Problem: DISCHARGE PLANNING - CARE MANAGEMENT  Goal: Discharge to post-acute care or home with appropriate resources  INTERVENTIONS:  - Conduct assessment to determine patient/family and health care team treatment goals, and need for post-acute services based on payer coverage, community resources, and patient preferences, and barriers to discharge  - Address psychosocial, clinical, and financial barriers to discharge as identified in assessment in conjunction with the patient/family and health care team  - Arrange appropriate level of post-acute services according to patient's   needs and preference and payer coverage in collaboration with the physician and health care team  - Communicate with and update the patient/family, physician, and health care team regarding progress on the discharge plan  - Arrange appropriate transportation to post-acute venues  - Family to transport home @ discharge   Outcome: Progressing

## 2018-06-06 NOTE — WOUND OSTOMY CARE
Progress Note- Ostomy  Amee Bunn 63 y o  male  [id number]  [facility] 829-01        Assessment:  Patient seen again today for teaching, on arrival to patient room patient was moaning stating "Im in too much pain, I don't feel like talking today, not today"  When asked if he requested pain medication patient reported getting pain medication about an hour ago however his pain is still 13/10 and he has cried to nursed for help  Patient discharge is possible tomorrow, as of yet patient has decline every ET nurse visit with one excuse or another so ostomy care teaching has been minimal   Stoma is patent and functional, well budded, in good location and unlikely to experience leakage issues, however patient has not even emptied pouch as of yet per nursing  Per patients request we will return tomorrow and attempt teaching with hands on demonstration as well as verbal instructions, primary RN made aware patient declined care and we will return tomorrow  Plan: We will continue following and attempting to teach patient  Vitals:    06/06/18 0700   BP: 158/84   Pulse: 86   Resp: 16   Temp: 99 4 °F (37 4 °C)   SpO2: 93%     Incision 06/01/18 Abdomen Other (Comment) (Active)   Incision Description CLAUDIA 6/6/2018 11:22 AM   Debra-wound Assessment CLAUDIA 6/6/2018 11:22 AM   Closure CLAUDIA 6/4/2018  8:09 PM   Drainage Amount CLAUDIA 6/4/2018  8:09 PM   Dressing Dry dressing 6/6/2018 11:22 AM   Dressing Status Clean;Dry; Intact 6/6/2018 11:22 AM   Number of days: 5     Ileostomy Loop RUQ (Active)   Stomal Appliance Intact 6/6/2018 11:22 AM   Stoma Assessment Budded 6/6/2018 11:22 AM   Stoma Shape Round 6/6/2018 11:22 AM   Peristomal Assessment CLAUDIA 6/6/2018 11:22 AM   Output (mL) 200 mL 6/5/2018  2:41 PM   Number of days: 5         Please call wound care to ext [phone] with questions or concerns      Nella Muniz RN, BSN, [facility]

## 2018-06-06 NOTE — PROGRESS NOTES
Progress Note - Acute Care Surgery   Lawernce Lajordy uBnn 63 y o  male MRN: [id number]  Unit/Bed#: [facility] 829-01 Encounter: [id number]    Assessment:  63 M with perforated diverticulitis, s/p ex-lap sigmoidectomy, anastomosis, diverting ileostomy with cysto and L ureteral stent (removed)  L hydronephrosis and possible pyelitis    Plan:  Advance to surg soft diet  Continue IV fluids until taking adequate PO intake  Stop PCA  Continue with prn analgesia  OOB and ambulate  Continue antibiotics  Follow WBC  Monitor for symptoms of hydronephrosis  Appreciate urology assistance  Lovenox for DVT ppx    Subjective/Objective     Subjective: No acute events overnight  Pain controlled  Noted to have L hydronephrosis on imaging yesterday and JENN drain removed  Tolerating clears    Objective:    Blood pressure 151/76, pulse 87, temperature 98 9 °F (37 2 °C), temperature source Oral, resp  rate 16, height 5' 8" (1 727 m), weight 75 6 kg (166 lb 10 7 oz), SpO2 95 %  ,Body mass index is 25 34 kg/m²  Intake/Output Summary (Last 24 hours) at 06/06/18 0449  Last data filed at 06/06/18 7846   Gross per 24 hour   Intake           3877 6 ml   Output             2625 ml   Net           1252 6 ml       Invasive Devices     Peripheral Intravenous Line            Peripheral IV 06/02/18 Right Arm 3 days    Peripheral IV 06/04/18 Left Forearm 1 day          Drain            Ileostomy Loop RUQ 4 days                Physical Exam:   General: NAD, AAOx3  CV: RRR +S1/S2  Chest: nonlabored respirations  Abdomen: soft, mildly tender, non-distended  Ostomy in place, healthy mucosa, brown mixed liquid/solid stool    Extremities: atraumatic, no edema      Results from last 7 days  Lab Units 06/05/18  0558 06/04/18  0612 06/03/18  0858   WBC Thousand/uL 18 34* 20 17* 15 26*   HEMOGLOBIN g/dL 10 7* 11 2* 11 1*   HEMATOCRIT % 32 8* 34 7* 34 6*   PLATELETS Thousands/uL 491* 464* 464*       Results from last 7 days  Lab Units 06/05/18  0558 06/04/18  1428 06/04/18  0612   SODIUM mmol/L 138 135* 136   POTASSIUM mmol/L 3 7 4 0 3 7   CHLORIDE mmol/L 105 105 104   CO2 mmol/L 26 25 25   BUN mg/dL 7 6 6   CREATININE mg/dL 1 17 1 20 1 13   GLUCOSE RANDOM mg/dL 96 133 120   CALCIUM mg/dL 7 8* 8 0* 8 3       Results from last 7 days  Lab Units 06/01/18  0756   INR  1 11

## 2018-06-07 LAB
BASOPHILS # BLD AUTO: 0.04 THOUSANDS/ΜL (ref 0–0.1)
BASOPHILS NFR BLD AUTO: 0 % (ref 0–1)
EOSINOPHIL # BLD AUTO: 0.01 THOUSAND/ΜL (ref 0–0.61)
EOSINOPHIL NFR BLD AUTO: 0 % (ref 0–6)
ERYTHROCYTE [DISTWIDTH] IN BLOOD BY AUTOMATED COUNT: 13.5 % (ref 11.6–15.1)
HCT VFR BLD AUTO: 36.3 % (ref 36.5–49.3)
HGB BLD-MCNC: 11.8 G/DL (ref 12–17)
IMM GRANULOCYTES # BLD AUTO: 0.07 THOUSAND/UL (ref 0–0.2)
IMM GRANULOCYTES NFR BLD AUTO: 0 % (ref 0–2)
LYMPHOCYTES # BLD AUTO: 1.29 THOUSANDS/ΜL (ref 0.6–4.47)
LYMPHOCYTES NFR BLD AUTO: 8 % (ref 14–44)
MCH RBC QN AUTO: 30.5 PG (ref 26.8–34.3)
MCHC RBC AUTO-ENTMCNC: 32.5 G/DL (ref 31.4–37.4)
MCV RBC AUTO: 94 FL (ref 82–98)
MONOCYTES # BLD AUTO: 1.24 THOUSAND/ΜL (ref 0.17–1.22)
MONOCYTES NFR BLD AUTO: 7 % (ref 4–12)
NEUTROPHILS # BLD AUTO: 14.01 THOUSANDS/ΜL (ref 1.85–7.62)
NEUTS SEG NFR BLD AUTO: 85 % (ref 43–75)
NRBC BLD AUTO-RTO: 0 /100 WBCS
PLATELET # BLD AUTO: 544 THOUSANDS/UL (ref 149–390)
PMV BLD AUTO: 9.5 FL (ref 8.9–12.7)
RBC # BLD AUTO: 3.87 MILLION/UL (ref 3.88–5.62)
WBC # BLD AUTO: 16.66 THOUSAND/UL (ref 4.31–10.16)

## 2018-06-07 PROCEDURE — 85025 COMPLETE CBC W/AUTO DIFF WBC: CPT | Performed by: SURGERY

## 2018-06-07 PROCEDURE — 99024 POSTOP FOLLOW-UP VISIT: CPT | Performed by: SURGERY

## 2018-06-07 PROCEDURE — C9113 INJ PANTOPRAZOLE SODIUM, VIA: HCPCS | Performed by: STUDENT IN AN ORGANIZED HEALTH CARE EDUCATION/TRAINING PROGRAM

## 2018-06-07 RX ORDER — GABAPENTIN 300 MG/1
300 CAPSULE ORAL 3 TIMES DAILY
Status: DISCONTINUED | OUTPATIENT
Start: 2018-06-07 | End: 2018-06-17 | Stop reason: HOSPADM

## 2018-06-07 RX ORDER — ACETAMINOPHEN 325 MG/1
975 TABLET ORAL EVERY 6 HOURS SCHEDULED
Status: DISCONTINUED | OUTPATIENT
Start: 2018-06-07 | End: 2018-06-08

## 2018-06-07 RX ADMIN — ENOXAPARIN SODIUM 40 MG: 100 INJECTION SUBCUTANEOUS at 10:06

## 2018-06-07 RX ADMIN — OXYCODONE HYDROCHLORIDE 10 MG: 10 TABLET ORAL at 23:03

## 2018-06-07 RX ADMIN — OXYCODONE HYDROCHLORIDE 10 MG: 10 TABLET ORAL at 03:50

## 2018-06-07 RX ADMIN — METRONIDAZOLE 500 MG: 500 INJECTION, SOLUTION INTRAVENOUS at 02:30

## 2018-06-07 RX ADMIN — HYDROMORPHONE HYDROCHLORIDE 0.5 MG: 1 INJECTION, SOLUTION INTRAMUSCULAR; INTRAVENOUS; SUBCUTANEOUS at 16:07

## 2018-06-07 RX ADMIN — GABAPENTIN 300 MG: 300 CAPSULE ORAL at 10:08

## 2018-06-07 RX ADMIN — GABAPENTIN 300 MG: 300 CAPSULE ORAL at 20:26

## 2018-06-07 RX ADMIN — HYDROMORPHONE HYDROCHLORIDE 0.5 MG: 1 INJECTION, SOLUTION INTRAMUSCULAR; INTRAVENOUS; SUBCUTANEOUS at 20:26

## 2018-06-07 RX ADMIN — ACETAMINOPHEN 975 MG: 325 TABLET ORAL at 18:30

## 2018-06-07 RX ADMIN — ACETAMINOPHEN 975 MG: 325 TABLET ORAL at 12:06

## 2018-06-07 RX ADMIN — HYDROMORPHONE HYDROCHLORIDE 0.5 MG: 1 INJECTION, SOLUTION INTRAMUSCULAR; INTRAVENOUS; SUBCUTANEOUS at 10:06

## 2018-06-07 RX ADMIN — HYDROMORPHONE HYDROCHLORIDE 0.5 MG: 1 INJECTION, SOLUTION INTRAMUSCULAR; INTRAVENOUS; SUBCUTANEOUS at 01:23

## 2018-06-07 RX ADMIN — DOXAZOSIN 4 MG: 4 TABLET ORAL at 10:06

## 2018-06-07 RX ADMIN — HYDROMORPHONE HYDROCHLORIDE 0.5 MG: 1 INJECTION, SOLUTION INTRAMUSCULAR; INTRAVENOUS; SUBCUTANEOUS at 07:12

## 2018-06-07 RX ADMIN — OXYCODONE HYDROCHLORIDE 10 MG: 10 TABLET ORAL at 18:30

## 2018-06-07 RX ADMIN — GABAPENTIN 300 MG: 300 CAPSULE ORAL at 16:08

## 2018-06-07 RX ADMIN — HYDROMORPHONE HYDROCHLORIDE 0.5 MG: 1 INJECTION, SOLUTION INTRAMUSCULAR; INTRAVENOUS; SUBCUTANEOUS at 04:54

## 2018-06-07 RX ADMIN — OXYCODONE HYDROCHLORIDE 10 MG: 10 TABLET ORAL at 12:06

## 2018-06-07 RX ADMIN — TAMSULOSIN HYDROCHLORIDE 0.4 MG: 0.4 CAPSULE ORAL at 16:08

## 2018-06-07 RX ADMIN — PANTOPRAZOLE SODIUM 40 MG: 40 INJECTION, POWDER, FOR SOLUTION INTRAVENOUS at 10:06

## 2018-06-07 NOTE — PROGRESS NOTES
Progress Note - Acute Care Surgery   [facility] Sepideh 63 y o  male MRN: [id number]  Unit/Bed#: [facility] 829-01 Encounter: [id number]    Assessment:  63 M with perforated diverticulitis, s/p ex-lap sigmoidectomy, anastomosis, diverting ileostomy with cysto and L ureteral stent (removed)  L hydronephrosis and possible pyelitis    Plan:  Continue diet as tolerated  Prn analgesia  Encourage OOB and ambulate  Follow WBC  Repeat UA if symptoms persist  UA on 6/4 negative  Appreciate urology recs  Lovenox for DVT ppx    Subjective/Objective     Subjective: Patient very frustrated this morning  States that he had some solid food yesterday morning that caused him abdominal pain  Pain has remained constant 9/10 until now  C/o pain with urination  States that he is depressed    Objective:    Blood pressure 145/92, pulse 96, temperature 100 °F (37 8 °C), temperature source Oral, resp  rate 20, height 5' 8" (1 727 m), weight 75 6 kg (166 lb 10 7 oz), SpO2 94 %  ,Body mass index is 25 34 kg/m²  Intake/Output Summary (Last 24 hours) at 06/07/18 3298  Last data filed at 06/07/18 0501   Gross per 24 hour   Intake          1136 67 ml   Output             2050 ml   Net          -913 33 ml       Invasive Devices     Peripheral Intravenous Line            Peripheral IV 06/04/18 Left Forearm 2 days          Drain            Ileostomy Loop RUQ 5 days                Physical Exam:   General: NAD, AAOx3  CV: RRR +S1/S2  Chest: nonlabored respirations  Abdomen: soft, mildly tender, non-distended  Ostomy in place, healthy mucosa, brown mixed liquid/solid stool    Extremities: atraumatic, no edema      Results from last 7 days  Lab Units 06/07/18  0502 06/06/18  0557 06/05/18  0558   WBC Thousand/uL 16 66* 13 95* 18 34*   HEMOGLOBIN g/dL 11 8* 10 5* 10 7*   HEMATOCRIT % 36 3* 32 5* 32 8*   PLATELETS Thousands/uL 544* 511* 491*       Results from last 7 days  Lab Units 06/06/18  0557 06/05/18  0558 06/04/18  1428   SODIUM mmol/L 139 138 135* POTASSIUM mmol/L 3 8 3 7 4 0   CHLORIDE mmol/L 107 105 105   CO2 mmol/L 25 26 25   BUN mg/dL 6 7 6   CREATININE mg/dL 1 13 1 17 1 20   GLUCOSE RANDOM mg/dL 98 96 133   CALCIUM mg/dL 8 2* 7 8* 8 0*       Results from last 7 days  Lab Units 06/01/18  0756   INR  1 11

## 2018-06-07 NOTE — PROGRESS NOTES
Patient care rounds were completed with the patient's nurse today, Manisha Cee  We discussed the plan is to potentially discharge patient  We reviewed all of the invasive devices/lines/telemetry orders  Pain Assessment / Plan:  Continue current plan  Mobility Assessment / Plan:  Continue current plan  Goals / Barriers for discharge:  Discharge planning for today  Case management following; case and discharge needs discussed  All questions and concerns were addressed  I spent greater than 10 minutes reviewing the plan with the patient and the nurse, and coordinating care for the day      Nataliya Lewis PA-C  6/7/2018 10:24 AM

## 2018-06-07 NOTE — WOUND OSTOMY CARE
Progress Note- Ostomy  Angie Bunn 63 y o  male  [id number]  [facility]-01        Assessment:  POD # 7 - Patient seen this morning for ostomy care teaching, on arrival to room patient in bed with no complaints and in apparent distress  One piece pouch in place with intact seal, (+) flatus and brown effluent  Stoma is well budded, round, moist, pink with intact mucocutaneous junction and peristomal skin, red rubber catheter in place, stoma measures 1 3/4inch  Patient receptive to teaching today, asking appropriate questions and examining pouches and ostomy accessories  Reviewed how to empty and daily maintenance of pouch, cleaning skin and stoma, changing pouch, using one piece vs two piece pouch system and treating skin breakdown Patient was shown each accessories and how to use them, emphasis made on hydration, s/s of electrolyte imbalance and diet  Pouch change completed with hands on demonstration, he closely watch emptying, changing, cleaning skin and stoma, measuring stoma and applying new pouch  Patient verbalize process seemed easy and feeling more comfortable with having to care for his ostomy  Extra supplies ordered and place in room, samples to be arriving to patient's house  Patient agreeable with ET nurse returning of Monday for continue teaching if he remains admitted, all questions answered and encourage asking questions  Plan: We will continue following if he remains admitted  Vitals:    06/07/18 0700   BP: 132/84   Pulse: 86   Resp: 16   Temp: 99 6 °F (37 6 °C)   SpO2: 94%       Incision 06/01/18 Abdomen Other (Comment) (Active)   Incision Description Clean;Dry; Intact 6/7/2018  7:13 AM   Debra-wound Assessment Clean;Dry; Intact 6/7/2018  7:13 AM   Closure Staples 6/7/2018  7:13 AM   Drainage Amount Scant 6/7/2018  4:00 AM   Drainage Description Yellow 6/7/2018  4:00 AM   Treatments Cleansed;Site care 6/6/2018  7:31 PM   Dressing ABD;Dry dressing 6/7/2018  7:13 AM Dressing Changed New dressing applied 6/7/2018  4:00 AM   Patient Tolerance Tolerated well 6/7/2018  4:00 AM   Dressing Status Clean;Dry; Intact 6/7/2018  7:13 AM   Number of days: 6     Ileostomy Loop RUQ (Active)   Stomal Appliance 1 piece 6/7/2018 11:00 AM   Stoma Assessment Budded;Pink 6/7/2018 11:00 AM   Stoma size (in) 1 75 in 6/7/2018 11:00 AM   Stoma Shape Round 6/7/2018 11:00 AM   Peristomal Assessment Intact 6/7/2018 11:00 AM   Treatment Bag change 6/7/2018 11:00 AM   Output (mL) 50 mL 6/7/2018 11:00 AM   Number of days: 6       Wound care to continue following, please call ext [phone] or [phone] with questions or concerns      Nancy Ford, RN, BSN, [facility]

## 2018-06-08 ENCOUNTER — APPOINTMENT (INPATIENT)
Dept: RADIOLOGY | Facility: HOSPITAL | Age: 58
DRG: 330 | End: 2018-06-08
Payer: COMMERCIAL

## 2018-06-08 ENCOUNTER — ANESTHESIA EVENT (OUTPATIENT)
Dept: PERIOP | Facility: HOSPITAL | Age: 58
End: 2018-06-08

## 2018-06-08 ENCOUNTER — ANESTHESIA (OUTPATIENT)
Dept: PERIOP | Facility: HOSPITAL | Age: 58
End: 2018-06-08

## 2018-06-08 PROBLEM — N13.30 HYDRONEPHROSIS: Status: ACTIVE | Noted: 2018-05-27

## 2018-06-08 LAB
ANION GAP SERPL CALCULATED.3IONS-SCNC: 6 MMOL/L (ref 4–13)
BACTERIA UR QL AUTO: ABNORMAL /HPF
BASOPHILS # BLD AUTO: 0.05 THOUSANDS/ΜL (ref 0–0.1)
BASOPHILS NFR BLD AUTO: 0 % (ref 0–1)
BILIRUB UR QL STRIP: NEGATIVE
BUN SERPL-MCNC: 10 MG/DL (ref 5–25)
CALCIUM SERPL-MCNC: 8.6 MG/DL (ref 8.3–10.1)
CHLORIDE SERPL-SCNC: 102 MMOL/L (ref 100–108)
CLARITY UR: CLEAR
CO2 SERPL-SCNC: 29 MMOL/L (ref 21–32)
COLOR UR: YELLOW
CREAT SERPL-MCNC: 1.25 MG/DL (ref 0.6–1.3)
EOSINOPHIL # BLD AUTO: 0.08 THOUSAND/ΜL (ref 0–0.61)
EOSINOPHIL NFR BLD AUTO: 1 % (ref 0–6)
ERYTHROCYTE [DISTWIDTH] IN BLOOD BY AUTOMATED COUNT: 13.7 % (ref 11.6–15.1)
GFR SERPL CREATININE-BSD FRML MDRD: 64 ML/MIN/1.73SQ M
GLUCOSE SERPL-MCNC: 102 MG/DL (ref 65–140)
GLUCOSE UR STRIP-MCNC: NEGATIVE MG/DL
HCT VFR BLD AUTO: 35.4 % (ref 36.5–49.3)
HGB BLD-MCNC: 11.5 G/DL (ref 12–17)
HGB UR QL STRIP.AUTO: ABNORMAL
HYALINE CASTS #/AREA URNS LPF: ABNORMAL /LPF
IMM GRANULOCYTES # BLD AUTO: 0.08 THOUSAND/UL (ref 0–0.2)
IMM GRANULOCYTES NFR BLD AUTO: 1 % (ref 0–2)
KETONES UR STRIP-MCNC: NEGATIVE MG/DL
LEUKOCYTE ESTERASE UR QL STRIP: ABNORMAL
LYMPHOCYTES # BLD AUTO: 1.9 THOUSANDS/ΜL (ref 0.6–4.47)
LYMPHOCYTES NFR BLD AUTO: 13 % (ref 14–44)
MCH RBC QN AUTO: 30.3 PG (ref 26.8–34.3)
MCHC RBC AUTO-ENTMCNC: 32.5 G/DL (ref 31.4–37.4)
MCV RBC AUTO: 93 FL (ref 82–98)
MONOCYTES # BLD AUTO: 1.2 THOUSAND/ΜL (ref 0.17–1.22)
MONOCYTES NFR BLD AUTO: 8 % (ref 4–12)
NEUTROPHILS # BLD AUTO: 11.45 THOUSANDS/ΜL (ref 1.85–7.62)
NEUTS SEG NFR BLD AUTO: 77 % (ref 43–75)
NITRITE UR QL STRIP: NEGATIVE
NON-SQ EPI CELLS URNS QL MICRO: ABNORMAL /HPF
NRBC BLD AUTO-RTO: 0 /100 WBCS
PH UR STRIP.AUTO: 7 [PH] (ref 4.5–8)
PLATELET # BLD AUTO: 550 THOUSANDS/UL (ref 149–390)
PMV BLD AUTO: 9.5 FL (ref 8.9–12.7)
POTASSIUM SERPL-SCNC: 3.8 MMOL/L (ref 3.5–5.3)
PROT UR STRIP-MCNC: NEGATIVE MG/DL
RBC # BLD AUTO: 3.79 MILLION/UL (ref 3.88–5.62)
RBC #/AREA URNS AUTO: ABNORMAL /HPF
SODIUM SERPL-SCNC: 137 MMOL/L (ref 136–145)
SP GR UR STRIP.AUTO: 1.02 (ref 1–1.03)
UROBILINOGEN UR QL STRIP.AUTO: 0.2 E.U./DL
WBC # BLD AUTO: 14.76 THOUSAND/UL (ref 4.31–10.16)
WBC #/AREA URNS AUTO: ABNORMAL /HPF

## 2018-06-08 PROCEDURE — 85025 COMPLETE CBC W/AUTO DIFF WBC: CPT | Performed by: SURGERY

## 2018-06-08 PROCEDURE — 80048 BASIC METABOLIC PNL TOTAL CA: CPT | Performed by: SURGERY

## 2018-06-08 PROCEDURE — 87086 URINE CULTURE/COLONY COUNT: CPT | Performed by: SURGERY

## 2018-06-08 PROCEDURE — 74177 CT ABD & PELVIS W/CONTRAST: CPT

## 2018-06-08 PROCEDURE — C9113 INJ PANTOPRAZOLE SODIUM, VIA: HCPCS | Performed by: STUDENT IN AN ORGANIZED HEALTH CARE EDUCATION/TRAINING PROGRAM

## 2018-06-08 PROCEDURE — 99024 POSTOP FOLLOW-UP VISIT: CPT | Performed by: SURGERY

## 2018-06-08 PROCEDURE — 81001 URINALYSIS AUTO W/SCOPE: CPT | Performed by: SURGERY

## 2018-06-08 RX ORDER — HYDROMORPHONE HYDROCHLORIDE 2 MG/1
2 TABLET ORAL EVERY 4 HOURS PRN
Status: DISCONTINUED | OUTPATIENT
Start: 2018-06-08 | End: 2018-06-11

## 2018-06-08 RX ORDER — LEVOFLOXACIN 5 MG/ML
500 INJECTION, SOLUTION INTRAVENOUS ONCE
Status: COMPLETED | OUTPATIENT
Start: 2018-06-09 | End: 2018-06-09

## 2018-06-08 RX ADMIN — OXYCODONE HYDROCHLORIDE 10 MG: 10 TABLET ORAL at 06:24

## 2018-06-08 RX ADMIN — TAMSULOSIN HYDROCHLORIDE 0.4 MG: 0.4 CAPSULE ORAL at 17:17

## 2018-06-08 RX ADMIN — HYDROMORPHONE HYDROCHLORIDE 0.5 MG: 1 INJECTION, SOLUTION INTRAMUSCULAR; INTRAVENOUS; SUBCUTANEOUS at 20:16

## 2018-06-08 RX ADMIN — HYDROMORPHONE HYDROCHLORIDE 0.5 MG: 1 INJECTION, SOLUTION INTRAMUSCULAR; INTRAVENOUS; SUBCUTANEOUS at 01:23

## 2018-06-08 RX ADMIN — ENOXAPARIN SODIUM 40 MG: 100 INJECTION SUBCUTANEOUS at 10:03

## 2018-06-08 RX ADMIN — PANTOPRAZOLE SODIUM 40 MG: 40 INJECTION, POWDER, FOR SOLUTION INTRAVENOUS at 10:02

## 2018-06-08 RX ADMIN — ACETAMINOPHEN 975 MG: 325 TABLET ORAL at 00:59

## 2018-06-08 RX ADMIN — HYDROMORPHONE HYDROCHLORIDE 2 MG: 2 TABLET ORAL at 22:20

## 2018-06-08 RX ADMIN — GABAPENTIN 300 MG: 300 CAPSULE ORAL at 20:16

## 2018-06-08 RX ADMIN — ACETAMINOPHEN 975 MG: 325 TABLET ORAL at 06:24

## 2018-06-08 RX ADMIN — HYDROMORPHONE HYDROCHLORIDE 2 MG: 2 TABLET ORAL at 17:18

## 2018-06-08 RX ADMIN — HYDROMORPHONE HYDROCHLORIDE 0.5 MG: 1 INJECTION, SOLUTION INTRAMUSCULAR; INTRAVENOUS; SUBCUTANEOUS at 10:02

## 2018-06-08 RX ADMIN — DOXAZOSIN 4 MG: 4 TABLET ORAL at 10:03

## 2018-06-08 RX ADMIN — HYDROMORPHONE HYDROCHLORIDE 0.5 MG: 1 INJECTION, SOLUTION INTRAMUSCULAR; INTRAVENOUS; SUBCUTANEOUS at 23:56

## 2018-06-08 RX ADMIN — GABAPENTIN 300 MG: 300 CAPSULE ORAL at 17:17

## 2018-06-08 RX ADMIN — IOHEXOL 100 ML: 350 INJECTION, SOLUTION INTRAVENOUS at 08:57

## 2018-06-08 RX ADMIN — HYDROMORPHONE HYDROCHLORIDE 0.5 MG: 1 INJECTION, SOLUTION INTRAMUSCULAR; INTRAVENOUS; SUBCUTANEOUS at 14:56

## 2018-06-08 RX ADMIN — GABAPENTIN 300 MG: 300 CAPSULE ORAL at 10:03

## 2018-06-08 NOTE — PROGRESS NOTES
Patient is still with fevers  Nuclear medicine renal scan was ordered yesterday to trend prior left-sided obstruction  Primary team has ordered a CT scan of the abdomen pelvis with delayed imaging  I have canceled the renal scan  Patient has been made NPO in the event left ureteral stent placement is required

## 2018-06-08 NOTE — ANESTHESIA PREPROCEDURE EVALUATION
Review of Systems/Medical History  Patient summary reviewed  Chart reviewed  No history of anesthetic complications     Cardiovascular  Negative cardio ROS    Pulmonary  Smoker ex-smoker  ,        GI/Hepatic      Comment: Perforated Diverticulitis-s/p sigmoidectomy with diverting ileostomy, cysto, Left ureteral stent 6/1/2018-, stent removed 6/3/2018 -now with fever 101 1, difficulty urinating, left hydronephrosis       Comment: Ureteral obstruction/Hydronephrosis     Endo/Other  Negative endo/other ROS      GYN  Negative gynecology ROS          Hematology  Negative hematology ROS      Musculoskeletal  Osteoarthritis,        Neurology  Negative neurology ROS      Psychology   Negative psychology ROS              Physical Exam    Airway    Mallampati score: II  TM Distance: >3 FB       Dental   No notable dental hx     Cardiovascular  Comment: Negative ROS, Rhythm: regular,     Pulmonary  Breath sounds clear to auscultation,     Other Findings        Anesthesia Plan  ASA Score- 2     Anesthesia Type- general with ASA Monitors  Additional Monitors:   Airway Plan: LMA  Comment: Pt ate full breakfast at 10:30 am, then drank prep for CT scan    Spoke with Dr Jet Barbosa, case is cancelled for today ,pt will be scheduled for AM   RONEL Madrigal      Plan Factors-    Induction-     Postoperative Plan-     Informed Consent-

## 2018-06-08 NOTE — PROGRESS NOTES
After the patient's prior CT scan, we had hoped removal of his JENN would remove any local obstruction  Patient continues to have left-sided pain and some fevers  His urine cultures have been negative  I have reviewed the CT scan repeated today which demonstrates left ureteral obstruction without drainage  I have discussed with the patient the findings of the CT scan  We will proceed to the operating room for cystoscopy, left retrograde pyelogram, left indwelling double-J stent placement  Patient understands the plan for surgery  All questions have been answered    I have discussed this with the primary General surgery team

## 2018-06-08 NOTE — PLAN OF CARE

## 2018-06-08 NOTE — NUTRITION
06/08/18 1612   Recommendations/Interventions   Summary Patient NPO for ureteral stent placement secondary to obstruction  CT scan of abdomen/pelvis pending per chart review  Nutrition Recommendations Other (specify); Lab - consider order (specify)  (After procedure completed suggest advance diet to low fiber, low residue with ensure compact BID  If unable to advance diet within 24 hours suggest initiate standard PN for day one  Monitor electrolytes   Obtain current standing weight  )

## 2018-06-08 NOTE — CONSULTS
Consultation - Inpatient Pain Management   [facility] Sepideh 63 y o  male MRN: [id number]  Unit/Bed#: [facility] 829-01 Encounter: [id number]               Assessment/Plan     Assessment:     Principal Problem:    Diverticulitis  Active Problems:    Ureteral obstruction    Hydronephrosis      Plan/Recommendations:   Acute post op pain  · Gabapentin 300mg TID  · Change acetaminophen to 975mg Q8 hours PRN as to not mask fevers  · Discontinue PRN Oxycodone due to lack of effectiveness  · Trial oral dilaudid 2mg PO Q4 hours PRN severe pain   · Continue Dilaudid 0 5mg IV Q2 hours PRN breakthrough pain  · Plan to wean off after OR intervention    Will see again on Monday  Reviewed with [facility]     History of Present Illness    Admit Date:  5/27/2018  Hospital Day:  9 days  Primary Service:  Surgery-General  Attending Provider:  Johanna Monahan MD  Physician Requesting Consult: Johanna Monahan MD  Reason for Consult / Principal Problem: post-op pain   HPI: Eunice Handley is a 63y o  year old male who initially presented with abdominal pain, N/V and recurrent sigmoid diverticulitis previously treated with IV antibiotics  On 6/1/2018 underwent a exploratory lap, sigmoid colon resection and diverting loop ileostomy with ureteroscopy and placement of left ureteral catheter  Within the last 24 hours patient spiked a fever, CT abdomen revealed mild to moderate hydronephrosis  Plan is for OR tomorrow with urology  Review of Systems:  Denied headache or dizziness  Denied chest pain or SOB  + abdominal pain  Denied LE pain     Pain History:  Pain History: No history of chronic pain or opioid use prior to hospitalization  I have reviewed the patient's controlled substance dispensing history in the Prescription Drug Monitoring Program in compliance with the [facility] regulations before recommending any controlled substances       Current pain location(s): left abdomen  Pain Scale:   5-9  Severity:  severe  Quality: sharp and stabbing  Treatment History:  Patient is resting in bed, appears comfortable  Reports severe abdominal pain, worse on the left side  IV Dilaudid provides a moderate amount of pain control which lasts for about 1 5 hours  PRN Oxycodone is not effective  States nausea after ingestion of oral medications, no vomiting  Current Analgesic regimen:  Tylenol 975mg Q6 hours, Gabapentin 300mg TID, Dilaudid 0 5mg Q2 hours PRN (5 doses), Oxycodone total (30mg)  Bowel Regimen: None    Historical Information   Past Medical History:   Diagnosis Date    Abscess, intestine     Arthritis     Diverticulitis     Hydronephrosis 2018     Past Surgical History:   Procedure Laterality Date    ABCESS DRAINAGE      COLONOSCOPY N/A 2016    Procedure: COLONOSCOPY;  Surgeon: Misty Beyer MD;  Location: [facility] GI [facility];   Service:     ILEO LOOP DIVERSION N/A 2018    Procedure: ILEOSTOMY LOOP DIVERTING;  Surgeon: Mary Olguin DO;  Location: [facility];  Service: General    LAPAROTOMY N/A 2018    Procedure: LAPAROTOMY EXPLORATORY,;  Surgeon: Mary Olguin DO;  Location: [facility];  Service: General    MA PART REMOVAL COLON W ANASTOMOSIS N/A 2018    Procedure: RESECTION COLON SIGMOID;  Surgeon: Mary Olguin DO;  Location: [facility];  Service: General     Social History   History   Alcohol Use No     History   Drug Use No     History   Smoking Status    Former Smoker   Smokeless Tobacco    Never Used     Family History: non-contributory    Meds/Allergies   Prior to Admission Medications  Prescriptions Prior to Admission   Medication    acetaminophen (TYLENOL) 325 mg tablet    [] ciprofloxacin (CIPRO) 500 mg tablet    doxazosin (CARDURA) 4 mg tablet    ergocalciferol (ERGOCALCIFEROL) 14253 units capsule    [] metroNIDAZOLE (FLAGYL) 500 mg tablet    [] oxyCODONE (ROXICODONE) 5 mg immediate release tablet    tamsulosin (FLOMAX) 0 4 mg    ibuprofen (MOTRIN) 600 mg tablet     [facility] Medications  Current Facility-Administered Medications   Medication Dose Route Frequency    doxazosin (CARDURA) tablet 4 mg  4 mg Oral Daily    enoxaparin (LOVENOX) subcutaneous injection 40 mg  40 mg Subcutaneous Q24H [facility]    gabapentin (NEURONTIN) capsule 300 mg  300 mg Oral TID    HYDROmorphone (DILAUDID) injection 0 5 mg  0 5 mg Intravenous Q2H PRN    HYDROmorphone (DILAUDID) tablet 2 mg  2 mg Oral Q4H PRN    iohexol (OMNIPAQUE) 240 MG/ML solution 50 mL  50 mL Oral 90 min pre-procedure    [START ON 6/9/2018] levofloxacin (LEVAQUIN) IVPB (premix) 500 mg  500 mg Intravenous Once    ondansetron (ZOFRAN) injection 4 mg  4 mg Intravenous Q4H PRN    pantoprazole (PROTONIX) injection 40 mg  40 mg Intravenous Q24H [facility]    polyvinyl alcohol (LIQUIFILM TEARS) 1 4 % ophthalmic solution 1 drop  1 drop Both Eyes Q3H PRN    simethicone (MYLICON) chewable tablet 80 mg  80 mg Oral Q6H PRN    tamsulosin (FLOMAX) capsule 0 4 mg  0 4 mg Oral Daily With Dinner       Allergies   Allergen Reactions    Penicillins        Objective   Temp:  [98 6 °F (37 °C)-101 1 °F (38 4 °C)] 98 6 °F (37 °C)  HR:  [] 105  Resp:  [18] 18  BP: (120-144)/(70-81) 144/70    Intake/Output Summary (Last 24 hours) at 06/08/18 1546  Last data filed at 06/08/18 1500   Gross per 24 hour   Intake              480 ml   Output             2325 ml   Net            -1845 ml       Physical Exam:  General Appearance:    Alert, cooperative, no distress   Neurological:   Oriented to person, place, and time   Head:    Normocephalic, without obvious abnormality, atraumatic   Eyes:    EOM's intact   Lungs:     Clear to auscultation bilaterally, respirations unlabored   Chest Wall:    No tenderness or deformity   Abdomen:        Soft, abdominal incision with dressing intact; ostomy with brown liquids    Heart:    Regular rate and rhythm, S1 and S2 normal   Extremities:   Extremities no edema, intact sensation to light touch   Pulses:   2+ and symmetric all extremities   Skin:   Skin warm and dry, no rashes or lesions     Lab Results:   Results from last 7 days  Lab Units 06/08/18  0608   WBC Thousand/uL 14 76*   HEMOGLOBIN g/dL 11 5*   HEMATOCRIT % 35 4*   PLATELETS Thousands/uL 550*      Results from last 7 days  Lab Units 06/08/18  0700   SODIUM mmol/L 137   POTASSIUM mmol/L 3 8   CHLORIDE mmol/L 102   CO2 mmol/L 29   BUN mg/dL 10   CREATININE mg/dL 1 25   CALCIUM mg/dL 8 6   GLUCOSE RANDOM mg/dL 102       Imaging Studies: I have personally reviewed pertinent reports  Counseling / Coordination of Care  Total floor / unit time spent today 45 minutes  Greater than 50% of total time was spent with the patient and / or family counseling and / or coordination of care   A description of the counseling / coordination of care: Reviewed plan of care and medications with patient, RN staff and primary care team     Gustavo Nieto MS, RN-BC  Acute Pain

## 2018-06-08 NOTE — PROGRESS NOTES
Progress Note - General Surgery   Chika Bunn 63 y o  male MRN: [id number]  Unit/Bed#: [facility] 829-01 Encounter: [id number]    Assessment and Plan:  Patient Active Problem List   Diagnosis    Diverticulitis of large intestine with abscess without bleeding    Diverticulitis    Ureteral obstruction       Assessment:  63 M with perforated diverticulitis, s/p ex-lap sigmoidectomy, anastomosis, diverting ileostomy with cysto and L ureteral stent (removed)  L hydronephrosis and possible pyelitis     Plan:  Continue diet as tolerated  Prn analgesia - will also consult APS   Encourage OOB and ambulate  Trend leukocytosis   Repeat UA ( UA on 6/4 negative)  CTAP w/ oral and IV contrast - with delayed imaging to assess ureters   Lovenox for DVT ppx    Subjective: Spiked a fever of 101 1 yesterday  All blood and urine cx are NGTD Pt states he is in 10/10 abd pain but appears to be lying in bed comfortably  He is having difficulty urinaty and c/o some burning with urination  Is tender with rebound in LLQ to minimal palpation  Objective:     Vitals   Vitals:    06/07/18 1900 06/07/18 2026 06/07/18 2300 06/08/18 0647   BP:   132/81    BP Location:   Right arm    Pulse: 98  88    Resp:   18    Temp: (!) 101 1 °F (38 4 °C) 99 2 °F (37 3 °C) 99 7 °F (37 6 °C) 100 2 °F (37 9 °C)   TempSrc: Oral Oral Oral Oral   SpO2:   97%    Weight:       Height:         Temp  Min: 97 3 °F (36 3 °C)  Max: 101 9 °F (38 8 °C)  IBW: 68 4 kg   Body mass index is 25 34 kg/m²  I/O   I/O       06/06 0701 - 06/07 0700 06/07 0701 - 06/08 0700 06/08 0701 - 06/09 0700    P  O  960 720     I V  (mL/kg)       IV Piggyback       Total Intake(mL/kg) 960 (12 7) 720 (9 5)     Urine (mL/kg/hr) 1875 (1) 1200 (0 7)     Drains       Stool 175 (0 1) 200 (0 1)     Total Output 2050 1400      Net -1090 -680             Unmeasured Urine Occurrence  3 x           Intake/Output Summary (Last 24 hours) at 06/08/18 2870  Last data filed at 06/08/18 8894   Gross per 24 hour   Intake              720 ml   Output             1400 ml   Net             -680 ml       Invasive  Devices  Invasive Devices     Peripheral Intravenous Line            Peripheral IV 06/08/18 Right Wrist less than 1 day          Drain            Ileostomy Loop RUQ 6 days                Active medications  Scheduled Meds:  Current Facility-Administered Medications:  acetaminophen 975 mg Oral Q6H [facility] Lyssa Segura MD   barium sulfate 450 mL Oral 90 min pre-procedure Johanna Monahan MD   doxazosin 4 mg Oral Daily Mary Jo Romero MD   enoxaparin 40 mg Subcutaneous Q24H [facility] Lyssa Segura MD   gabapentin 300 mg Oral TID Lyssa Segura MD   HYDROmorphone 0 5 mg Intravenous Q2H PRN Latisha Matos MD   iohexol 50 mL Oral 90 min pre-procedure Johanna Monahan MD   ondansetron 4 mg Intravenous Q4H PRN Arti Benji   oxyCODONE 10 mg Oral Q4H PRN Mary Jo Romero MD   oxyCODONE 5 mg Oral Q4H PRN Mary Jo Romero MD   pantoprazole 40 mg Intravenous Q24H [facility] Latisha Matos MD   polyvinyl alcohol 1 drop Both Eyes Q3H PRN Mary Jo Romero MD   simethicone 80 mg Oral Q6H PRN Nora Bianchi MD   tamsulosin 0 4 mg Oral Daily With Berenice Meadows MD     Continuous Infusions:     PRN Meds:     HYDROmorphone 0 5 mg Q2H PRN   ondansetron 4 mg Q4H PRN   oxyCODONE 10 mg Q4H PRN   oxyCODONE 5 mg Q4H PRN   polyvinyl alcohol 1 drop Q3H PRN   simethicone 80 mg Q6H PRN       Physical Exam: /81 (BP Location: Right arm)   Pulse 88   Temp 100 2 °F (37 9 °C) (Oral)   Resp 18   Ht 5' 8" (1 727 m)   Wt 75 6 kg (166 lb 10 7 oz)   SpO2 97%   BMI 25 34 kg/m²     Physical Exam:  General Appearance: Alert, cooperative, no distress, appears stated age  Lungs: Clear to auscultation bilaterally, respirations unlablored   Heart: Regular rate and rhythm, S1 and S2 normal, no murmur, rub or gallop  Abdomen: Soft, tender in LLQ with rebound, non distended, bowel sounds active in all four quadrants,   No masses or organomegaly   Stool out put in colostomy bag, stoma is pink and viable     Laboratory and Diagnostics:    Results from last 7 days  Lab Units 06/07/18  0502 06/06/18  0557 06/05/18  0558 06/04/18  0612   WBC Thousand/uL 16 66* 13 95* 18 34* 20 17*   HEMOGLOBIN g/dL 11 8* 10 5* 10 7* 11 2*   HEMATOCRIT % 36 3* 32 5* 32 8* 34 7*   PLATELETS Thousands/uL 544* 511* 491* 464*   NEUTROS PCT % 85* 79*  --  86*   MONOS PCT % 7 8  --  7       Results from last 7 days  Lab Units 06/08/18  0700 06/06/18  0557 06/05/18  0558   SODIUM mmol/L 137 139 138   POTASSIUM mmol/L 3 8 3 8 3 7   CHLORIDE mmol/L 102 107 105   CO2 mmol/L 29 25 26   BUN mg/dL 10 6 7   CREATININE mg/dL 1 25 1 13 1 17   CALCIUM mg/dL 8 6 8 2* 7 8*   GLUCOSE RANDOM mg/dL 102 98 96       Results from last 7 days  Lab Units 06/06/18  0557 06/05/18  0558 06/02/18  0450   MAGNESIUM mg/dL 2 0 2 0 1 9     Lab Results   Component Value Date    PHOS 3 3 06/02/2018    PHOS 2 4 (L) 01/02/2016    PHOS 1 6 (L) 01/01/2016          No results found for: TROPONINT  ABG:No results found for: PHART, TUY7RTM, PO2ART, XLF8OWB, X2CEMMPN, BEART, SOURCE    Blood Culture:   Lab Results   Component Value Date    BLOODCX No Growth at 48 hrs  06/04/2018    BLOODCX No Growth at 48 hrs  06/04/2018     Urine Culture:   Lab Results   Component Value Date    URINECX No Growth <1000 cfu/mL 06/04/2018     Sputum Culture: No components found for: SPUTUMCX    Imaging: I have personally reviewed pertinent reports     and I have personally reviewed pertinent films in PACS    VTE Pharmacologic Prophylaxis: Enoxaparin (Lovenox)  VTE Mechanical Prophylaxis: sequential compression device

## 2018-06-09 ENCOUNTER — APPOINTMENT (INPATIENT)
Dept: RADIOLOGY | Facility: HOSPITAL | Age: 58
DRG: 330 | End: 2018-06-09
Payer: COMMERCIAL

## 2018-06-09 ENCOUNTER — ANESTHESIA (INPATIENT)
Dept: PERIOP | Facility: HOSPITAL | Age: 58
DRG: 330 | End: 2018-06-09
Payer: COMMERCIAL

## 2018-06-09 ENCOUNTER — ANESTHESIA EVENT (INPATIENT)
Dept: PERIOP | Facility: HOSPITAL | Age: 58
DRG: 330 | End: 2018-06-09
Payer: COMMERCIAL

## 2018-06-09 LAB
ANION GAP SERPL CALCULATED.3IONS-SCNC: 4 MMOL/L (ref 4–13)
BACTERIA UR CULT: NORMAL
BASOPHILS # BLD MANUAL: 0 THOUSAND/UL (ref 0–0.1)
BASOPHILS NFR MAR MANUAL: 0 % (ref 0–1)
BUN SERPL-MCNC: 13 MG/DL (ref 5–25)
CALCIUM SERPL-MCNC: 8.4 MG/DL (ref 8.3–10.1)
CHLORIDE SERPL-SCNC: 106 MMOL/L (ref 100–108)
CO2 SERPL-SCNC: 27 MMOL/L (ref 21–32)
CREAT SERPL-MCNC: 1.12 MG/DL (ref 0.6–1.3)
EOSINOPHIL # BLD MANUAL: 0 THOUSAND/UL (ref 0–0.4)
EOSINOPHIL NFR BLD MANUAL: 0 % (ref 0–6)
ERYTHROCYTE [DISTWIDTH] IN BLOOD BY AUTOMATED COUNT: 13.8 % (ref 11.6–15.1)
GFR SERPL CREATININE-BSD FRML MDRD: 73 ML/MIN/1.73SQ M
GLUCOSE SERPL-MCNC: 132 MG/DL (ref 65–140)
HCT VFR BLD AUTO: 35.5 % (ref 36.5–49.3)
HGB BLD-MCNC: 11.2 G/DL (ref 12–17)
LYMPHOCYTES # BLD AUTO: 0.45 THOUSAND/UL (ref 0.6–4.47)
LYMPHOCYTES # BLD AUTO: 4 % (ref 14–44)
MCH RBC QN AUTO: 30.4 PG (ref 26.8–34.3)
MCHC RBC AUTO-ENTMCNC: 31.5 G/DL (ref 31.4–37.4)
MCV RBC AUTO: 96 FL (ref 82–98)
MONOCYTES # BLD AUTO: 0.11 THOUSAND/UL (ref 0–1.22)
MONOCYTES NFR BLD: 1 % (ref 4–12)
NEUTROPHILS # BLD MANUAL: 10.68 THOUSAND/UL (ref 1.85–7.62)
NEUTS SEG NFR BLD AUTO: 95 % (ref 43–75)
NRBC BLD AUTO-RTO: 0 /100 WBCS
PLATELET # BLD AUTO: 621 THOUSANDS/UL (ref 149–390)
PLATELET BLD QL SMEAR: ABNORMAL
PMV BLD AUTO: 9.2 FL (ref 8.9–12.7)
POLYCHROMASIA BLD QL SMEAR: PRESENT
POTASSIUM SERPL-SCNC: 4.5 MMOL/L (ref 3.5–5.3)
RBC # BLD AUTO: 3.69 MILLION/UL (ref 3.88–5.62)
RBC MORPH BLD: PRESENT
SODIUM SERPL-SCNC: 137 MMOL/L (ref 136–145)
WBC # BLD AUTO: 11.24 THOUSAND/UL (ref 4.31–10.16)

## 2018-06-09 PROCEDURE — 74420 UROGRAPHY RTRGR +-KUB: CPT

## 2018-06-09 PROCEDURE — 85027 COMPLETE CBC AUTOMATED: CPT | Performed by: STUDENT IN AN ORGANIZED HEALTH CARE EDUCATION/TRAINING PROGRAM

## 2018-06-09 PROCEDURE — C2617 STENT, NON-COR, TEM W/O DEL: HCPCS | Performed by: UROLOGY

## 2018-06-09 PROCEDURE — C1769 GUIDE WIRE: HCPCS | Performed by: UROLOGY

## 2018-06-09 PROCEDURE — 99024 POSTOP FOLLOW-UP VISIT: CPT | Performed by: SURGERY

## 2018-06-09 PROCEDURE — 52332 CYSTOSCOPY AND TREATMENT: CPT | Performed by: UROLOGY

## 2018-06-09 PROCEDURE — 80048 BASIC METABOLIC PNL TOTAL CA: CPT | Performed by: STUDENT IN AN ORGANIZED HEALTH CARE EDUCATION/TRAINING PROGRAM

## 2018-06-09 PROCEDURE — C9113 INJ PANTOPRAZOLE SODIUM, VIA: HCPCS | Performed by: STUDENT IN AN ORGANIZED HEALTH CARE EDUCATION/TRAINING PROGRAM

## 2018-06-09 PROCEDURE — 85007 BL SMEAR W/DIFF WBC COUNT: CPT | Performed by: STUDENT IN AN ORGANIZED HEALTH CARE EDUCATION/TRAINING PROGRAM

## 2018-06-09 DEVICE — STENT URETERAL 6 FR 26CM INLAY OPTIMA: Type: IMPLANTABLE DEVICE | Site: URETER | Status: FUNCTIONAL

## 2018-06-09 RX ORDER — PROMETHAZINE HYDROCHLORIDE 25 MG/ML
25 INJECTION, SOLUTION INTRAMUSCULAR; INTRAVENOUS ONCE AS NEEDED
Status: DISCONTINUED | OUTPATIENT
Start: 2018-06-09 | End: 2018-06-09 | Stop reason: HOSPADM

## 2018-06-09 RX ORDER — METOCLOPRAMIDE HYDROCHLORIDE 5 MG/ML
10 INJECTION INTRAMUSCULAR; INTRAVENOUS ONCE AS NEEDED
Status: DISCONTINUED | OUTPATIENT
Start: 2018-06-09 | End: 2018-06-09 | Stop reason: HOSPADM

## 2018-06-09 RX ORDER — FENTANYL CITRATE/PF 50 MCG/ML
50 SYRINGE (ML) INJECTION
Status: DISCONTINUED | OUTPATIENT
Start: 2018-06-09 | End: 2018-06-09 | Stop reason: HOSPADM

## 2018-06-09 RX ORDER — LIDOCAINE HYDROCHLORIDE 10 MG/ML
INJECTION, SOLUTION INFILTRATION; PERINEURAL AS NEEDED
Status: DISCONTINUED | OUTPATIENT
Start: 2018-06-09 | End: 2018-06-09 | Stop reason: SURG

## 2018-06-09 RX ORDER — MEPERIDINE HYDROCHLORIDE 25 MG/ML
12.5 INJECTION INTRAMUSCULAR; INTRAVENOUS; SUBCUTANEOUS ONCE AS NEEDED
Status: DISCONTINUED | OUTPATIENT
Start: 2018-06-09 | End: 2018-06-09 | Stop reason: HOSPADM

## 2018-06-09 RX ORDER — FENTANYL CITRATE 50 UG/ML
INJECTION, SOLUTION INTRAMUSCULAR; INTRAVENOUS AS NEEDED
Status: DISCONTINUED | OUTPATIENT
Start: 2018-06-09 | End: 2018-06-09 | Stop reason: SURG

## 2018-06-09 RX ORDER — MAGNESIUM HYDROXIDE 1200 MG/15ML
LIQUID ORAL AS NEEDED
Status: DISCONTINUED | OUTPATIENT
Start: 2018-06-09 | End: 2018-06-09 | Stop reason: HOSPADM

## 2018-06-09 RX ORDER — MIDAZOLAM HYDROCHLORIDE 1 MG/ML
INJECTION INTRAMUSCULAR; INTRAVENOUS AS NEEDED
Status: DISCONTINUED | OUTPATIENT
Start: 2018-06-09 | End: 2018-06-09 | Stop reason: SURG

## 2018-06-09 RX ORDER — SODIUM CHLORIDE 9 MG/ML
125 INJECTION, SOLUTION INTRAVENOUS CONTINUOUS
Status: DISCONTINUED | OUTPATIENT
Start: 2018-06-09 | End: 2018-06-10

## 2018-06-09 RX ORDER — PROPOFOL 10 MG/ML
INJECTION, EMULSION INTRAVENOUS AS NEEDED
Status: DISCONTINUED | OUTPATIENT
Start: 2018-06-09 | End: 2018-06-09 | Stop reason: SURG

## 2018-06-09 RX ORDER — ONDANSETRON 2 MG/ML
INJECTION INTRAMUSCULAR; INTRAVENOUS AS NEEDED
Status: DISCONTINUED | OUTPATIENT
Start: 2018-06-09 | End: 2018-06-09 | Stop reason: SURG

## 2018-06-09 RX ORDER — DIPHENHYDRAMINE HYDROCHLORIDE 50 MG/ML
12.5 INJECTION INTRAMUSCULAR; INTRAVENOUS ONCE AS NEEDED
Status: DISCONTINUED | OUTPATIENT
Start: 2018-06-09 | End: 2018-06-09 | Stop reason: HOSPADM

## 2018-06-09 RX ADMIN — FENTANYL CITRATE 50 MCG: 50 INJECTION, SOLUTION INTRAMUSCULAR; INTRAVENOUS at 08:52

## 2018-06-09 RX ADMIN — GABAPENTIN 300 MG: 300 CAPSULE ORAL at 08:09

## 2018-06-09 RX ADMIN — LEVOFLOXACIN: 5 INJECTION, SOLUTION INTRAVENOUS at 08:50

## 2018-06-09 RX ADMIN — FENTANYL CITRATE 50 MCG: 50 INJECTION, SOLUTION INTRAMUSCULAR; INTRAVENOUS at 09:00

## 2018-06-09 RX ADMIN — HYDROMORPHONE HYDROCHLORIDE 2 MG: 2 TABLET ORAL at 03:11

## 2018-06-09 RX ADMIN — PROPOFOL 200 MG: 10 INJECTION, EMULSION INTRAVENOUS at 08:48

## 2018-06-09 RX ADMIN — HYDROMORPHONE HYDROCHLORIDE 2 MG: 2 TABLET ORAL at 16:52

## 2018-06-09 RX ADMIN — MIDAZOLAM 2 MG: 1 INJECTION INTRAMUSCULAR; INTRAVENOUS at 08:43

## 2018-06-09 RX ADMIN — TAMSULOSIN HYDROCHLORIDE 0.4 MG: 0.4 CAPSULE ORAL at 16:47

## 2018-06-09 RX ADMIN — GABAPENTIN 300 MG: 300 CAPSULE ORAL at 16:47

## 2018-06-09 RX ADMIN — PANTOPRAZOLE SODIUM 40 MG: 40 INJECTION, POWDER, FOR SOLUTION INTRAVENOUS at 08:09

## 2018-06-09 RX ADMIN — HYDROMORPHONE HYDROCHLORIDE 0.5 MG: 1 INJECTION, SOLUTION INTRAMUSCULAR; INTRAVENOUS; SUBCUTANEOUS at 22:39

## 2018-06-09 RX ADMIN — HYDROMORPHONE HYDROCHLORIDE 2 MG: 2 TABLET ORAL at 11:43

## 2018-06-09 RX ADMIN — SODIUM CHLORIDE 1000 ML: 0.9 INJECTION, SOLUTION INTRAVENOUS at 08:09

## 2018-06-09 RX ADMIN — HYDROMORPHONE HYDROCHLORIDE 2 MG: 2 TABLET ORAL at 21:39

## 2018-06-09 RX ADMIN — DOXAZOSIN 4 MG: 4 TABLET ORAL at 08:09

## 2018-06-09 RX ADMIN — ENOXAPARIN SODIUM 40 MG: 100 INJECTION SUBCUTANEOUS at 11:24

## 2018-06-09 RX ADMIN — LIDOCAINE HYDROCHLORIDE 50 MG: 10 INJECTION, SOLUTION INFILTRATION; PERINEURAL at 08:48

## 2018-06-09 RX ADMIN — GABAPENTIN 300 MG: 300 CAPSULE ORAL at 21:38

## 2018-06-09 RX ADMIN — ONDANSETRON 4 MG: 2 INJECTION INTRAMUSCULAR; INTRAVENOUS at 09:10

## 2018-06-09 RX ADMIN — HYDROMORPHONE HYDROCHLORIDE 0.5 MG: 1 INJECTION, SOLUTION INTRAMUSCULAR; INTRAVENOUS; SUBCUTANEOUS at 13:23

## 2018-06-09 RX ADMIN — SODIUM CHLORIDE 18.7 ML/HR: 0.9 INJECTION, SOLUTION INTRAVENOUS at 11:00

## 2018-06-09 RX ADMIN — HYDROMORPHONE HYDROCHLORIDE 0.5 MG: 1 INJECTION, SOLUTION INTRAMUSCULAR; INTRAVENOUS; SUBCUTANEOUS at 18:35

## 2018-06-09 RX ADMIN — DEXAMETHASONE SODIUM PHOSPHATE 10 MG: 10 INJECTION INTRAMUSCULAR; INTRAVENOUS at 09:00

## 2018-06-09 NOTE — ANESTHESIA PREPROCEDURE EVALUATION
Review of Systems/Medical History  Patient summary reviewed  Chart reviewed      Cardiovascular  EKG reviewed, Exercise tolerance (METS): >4,     Pulmonary  Smoker ex-smoker  Cumulative Pack Years: 8,        GI/Hepatic    No GERD ,   Comment: S/P colon resection, ileostomy          Endo/Other     GYN       Hematology   Musculoskeletal    Arthritis     Neurology   Psychology           Physical Exam    Airway    Mallampati score: III  TM Distance: >3 FB  Neck ROM: full     Dental   No notable dental hx     Cardiovascular  Rhythm: regular, Rate: normal, Cardiovascular exam normal    Pulmonary  Pulmonary exam normal Breath sounds clear to auscultation,     Other Findings        Anesthesia Plan  ASA Score- 2     Anesthesia Type- general with ASA Monitors  Additional Monitors:   Airway Plan: LMA  Plan Factors-Patient not instructed to abstain from smoking on day of procedure  Patient did not smoke on day of surgery  Induction- intravenous  Postoperative Plan- Plan for postoperative opioid use  Planned trial extubation    Informed Consent- Anesthetic plan and risks discussed with patient  I personally reviewed this patient with the CRNA  Discussed and agreed on the Anesthesia Plan with the CRNA  Rosella Goldmann I saw/examined the pt and reviewed his chart immediately prior to the anesthetic  R/B/a discussed, questions answered  Case d/w Sudeep Wall MD

## 2018-06-09 NOTE — PERIOPERATIVE NURSING NOTE
Arrived PACU supine HOB 30 deg Resps unlabored  No penile drainage noted  Urine pink tinged  No c/o pain

## 2018-06-09 NOTE — PROGRESS NOTES
Spoke to Esther Hankins, sx PA  Per PA will send IV Levaquin down with pt to OR as order states to give med within 120mins of incision and pt  time is 0730  Will notify next RN

## 2018-06-09 NOTE — PROGRESS NOTES
Repeat CT scan demonstrates persistent left ureteral obstruction and hydronephrosis  Patient is aware of this  Today I discussed these findings with the patient, which he has heard already  He had numerous questions about this  We discussed proposed procedure at length  He understands that this will be an attempt to relieve the obstruction  Consent was obtained for cystoscopy, left retrograde pyelogram, left ureteral stent placement  He understands that this may be unsuccessful  In that instance he will require left nephrostomy tube placement, which will be a separate procedure performed by Radiology  All questions answered to his satisfaction

## 2018-06-09 NOTE — ANESTHESIA POSTPROCEDURE EVALUATION
Post-Op Assessment Note      CV Status:  Stable    Mental Status:  Alert and awake    Hydration Status:  Euvolemic    PONV Controlled:  Controlled    Airway Patency:  Patent    Post Op Vitals Reviewed: Yes          Staff: CRNA           /82 (06/09/18 0928)    Temp 97 8 °F (36 6 °C) (06/09/18 0928)    Pulse 74 (06/09/18 0928)   Resp 14 (06/09/18 0928)    SpO2 100 % (06/09/18 0928)

## 2018-06-09 NOTE — PROGRESS NOTES
Progress Note - General Surgery   Lawernce Lajordy Bunn 63 y o  male MRN: [id number]  Unit/Bed#: [facility] 829-01 Encounter: [id number]    Assessment:  63 M with complicated diverticulitis, s/p ex-lap sigmoidectomy, loop ileostomy    Plan:  NPO  Cysto and stent placement with urology today  Monitor ostomy output, may need IVF replacements  OOB and ambulate  Pain control  Lovenox    Subjective/Objective     Subjective: No acute events  Tolerating diet  Having ostomy output  Complaining of some dysuria  Objective:    Blood pressure 129/71, pulse 92, temperature 99 2 °F (37 3 °C), temperature source Oral, resp  rate 18, height 5' 8" (1 727 m), weight 75 6 kg (166 lb 10 7 oz), SpO2 97 %  ,Body mass index is 25 34 kg/m²        Intake/Output Summary (Last 24 hours) at 06/09/18 0718  Last data filed at 06/09/18 0536   Gross per 24 hour   Intake              220 ml   Output             2415 ml   Net            -2195 ml       Invasive Devices     Peripheral Intravenous Line            Peripheral IV 06/08/18 Right Wrist 1 day          Drain            Ileostomy Loop RUQ 7 days                Physical Exam:   General: NAD, AAOx3  CV: RRR +S1/S2  Chest: breath sounds bilaterally  Abdomen: soft, mildly tender, non-distended, ostomy healthy with mixed liquid stool  Extremities: atraumatic, no edema        Results from last 7 days  Lab Units 06/08/18  0608 06/07/18  0502 06/06/18  0557   WBC Thousand/uL 14 76* 16 66* 13 95*   HEMOGLOBIN g/dL 11 5* 11 8* 10 5*   HEMATOCRIT % 35 4* 36 3* 32 5*   PLATELETS Thousands/uL 550* 544* 511*       Results from last 7 days  Lab Units 06/08/18  0700 06/06/18  0557 06/05/18  0558   SODIUM mmol/L 137 139 138   POTASSIUM mmol/L 3 8 3 8 3 7   CHLORIDE mmol/L 102 107 105   CO2 mmol/L 29 25 26   BUN mg/dL 10 6 7   CREATININE mg/dL 1 25 1 13 1 17   GLUCOSE RANDOM mg/dL 102 98 96   CALCIUM mg/dL 8 6 8 2* 7 8*

## 2018-06-09 NOTE — OP NOTE
OPERATIVE REPORT  PATIENT NAME: Harinder Bunn    :  1960  MRN: [id number]  Pt Location: [facility] CYSTO ROOM 01    SURGERY DATE: 2018    Surgeon(s) and Role:     Yesenia Frost MD - Primary    Preop Diagnosis:  Hydronephrosis [N13 30]  Ureteral obstruction [N13 5]    Post-Op Diagnosis Codes: * Hydronephrosis [N13 30]     * Ureteral obstruction [N13 5]    Procedure(s) (LRB):  CYSTOSCOPY RETROGRADE PYELOGRAM WITH INSERTION STENT URETERAL (Left)    Specimen(s):  * No specimens in log *    Estimated Blood Loss:   Minimal    Drains:  Ileostomy Loop RUQ (Active)   Stomal Appliance 1 piece 2018  8:09 AM   Stoma Assessment Budded;Pink 2018  8:09 AM   Stoma size (in) 1 75 in 2018 11:00 AM   Stoma Shape Round 2018  8:00 PM   Peristomal Assessment Clean; Intact 2018  8:00 PM   Treatment Bag change 2018 11:00 AM   Output (mL) 150 mL 2018  5:36 AM   Number of days: 8       Urethral Catheter Latex 18 Fr  (Active)   Number of days: 0       Anesthesia Type:   General    Operative Indications:  Hydronephrosis [N13 30]  Ureteral obstruction [N13 5]      Operative Findings:  Blood clot at ureteral orifice, tapered distal retrograde pyelogram unclear possibly related to clot or extrinsic compression     Complications:   None    Procedure and Technique:  The patient was identified, brought to the operating room, and placed on the table in supine position  After induction of general anesthesia, the patient was placed in dorsal lithotomy position and prepped and draped in the usual sterile fashion  A complete formal timeout was performed  The 25 Niuean rigid cystoscope was placed per urethra and cystoscopy was performed  There was no bladder abnormality identified  A blood clot was seen at the left ureteral orifice  There was no obvious efflux  The Left ureteral orifice was cannulated with a 5 Fr catheter   Retrograde pyelogram was performed, revealing irregular, tapered appearance of the distal ureter, possibly related to clot or extrinsic compression  The proximal ureter and kidney appeared grossly normal    The sensor wire was replaced and a 26 cm x 6 [place] double-J stent was placed without string  A temporary walters catheter was placed due to some slightly bloody drainage from the stent  The patient tolerated the procedure well and was transferred to the recovery room awake alert and in stable condition  I was present for the entire procedure  Patient Disposition:  PACU      Plan:  Remove walters catheter post-op if pink-clear  Maintain internal stent on discharge and follow up as outpatient for stent removal in 4 weeks  Will then need follow up imaging to ensure ureteral patency        SIGNATURE: Gilberto Mallory MD  DATE: June 9, 2018  TIME: 9:19 AM

## 2018-06-10 LAB
ANION GAP SERPL CALCULATED.3IONS-SCNC: 9 MMOL/L (ref 4–13)
BACTERIA BLD CULT: NORMAL
BACTERIA BLD CULT: NORMAL
BASOPHILS # BLD AUTO: 0.02 THOUSANDS/ΜL (ref 0–0.1)
BASOPHILS NFR BLD AUTO: 0 % (ref 0–1)
BUN SERPL-MCNC: 12 MG/DL (ref 5–25)
CALCIUM SERPL-MCNC: 8.6 MG/DL (ref 8.3–10.1)
CHLORIDE SERPL-SCNC: 108 MMOL/L (ref 100–108)
CO2 SERPL-SCNC: 26 MMOL/L (ref 21–32)
CREAT SERPL-MCNC: 0.79 MG/DL (ref 0.6–1.3)
EOSINOPHIL # BLD AUTO: 0.01 THOUSAND/ΜL (ref 0–0.61)
EOSINOPHIL NFR BLD AUTO: 0 % (ref 0–6)
ERYTHROCYTE [DISTWIDTH] IN BLOOD BY AUTOMATED COUNT: 13.6 % (ref 11.6–15.1)
GFR SERPL CREATININE-BSD FRML MDRD: 100 ML/MIN/1.73SQ M
GLUCOSE SERPL-MCNC: 96 MG/DL (ref 65–140)
HCT VFR BLD AUTO: 31.9 % (ref 36.5–49.3)
HGB BLD-MCNC: 10.4 G/DL (ref 12–17)
IMM GRANULOCYTES # BLD AUTO: 0.1 THOUSAND/UL (ref 0–0.2)
IMM GRANULOCYTES NFR BLD AUTO: 1 % (ref 0–2)
LYMPHOCYTES # BLD AUTO: 2.56 THOUSANDS/ΜL (ref 0.6–4.47)
LYMPHOCYTES NFR BLD AUTO: 19 % (ref 14–44)
MCH RBC QN AUTO: 31 PG (ref 26.8–34.3)
MCHC RBC AUTO-ENTMCNC: 32.6 G/DL (ref 31.4–37.4)
MCV RBC AUTO: 95 FL (ref 82–98)
MONOCYTES # BLD AUTO: 0.75 THOUSAND/ΜL (ref 0.17–1.22)
MONOCYTES NFR BLD AUTO: 6 % (ref 4–12)
NEUTROPHILS # BLD AUTO: 10.13 THOUSANDS/ΜL (ref 1.85–7.62)
NEUTS SEG NFR BLD AUTO: 74 % (ref 43–75)
NRBC BLD AUTO-RTO: 0 /100 WBCS
PLATELET # BLD AUTO: 651 THOUSANDS/UL (ref 149–390)
PMV BLD AUTO: 9 FL (ref 8.9–12.7)
POTASSIUM SERPL-SCNC: 4.4 MMOL/L (ref 3.5–5.3)
RBC # BLD AUTO: 3.36 MILLION/UL (ref 3.88–5.62)
SODIUM SERPL-SCNC: 143 MMOL/L (ref 136–145)
WBC # BLD AUTO: 13.57 THOUSAND/UL (ref 4.31–10.16)

## 2018-06-10 PROCEDURE — 99024 POSTOP FOLLOW-UP VISIT: CPT | Performed by: SURGERY

## 2018-06-10 PROCEDURE — C9113 INJ PANTOPRAZOLE SODIUM, VIA: HCPCS | Performed by: STUDENT IN AN ORGANIZED HEALTH CARE EDUCATION/TRAINING PROGRAM

## 2018-06-10 PROCEDURE — 80048 BASIC METABOLIC PNL TOTAL CA: CPT | Performed by: SURGERY

## 2018-06-10 PROCEDURE — 85025 COMPLETE CBC W/AUTO DIFF WBC: CPT | Performed by: SURGERY

## 2018-06-10 RX ORDER — DEXTROSE, SODIUM CHLORIDE, AND POTASSIUM CHLORIDE 5; .45; .15 G/100ML; G/100ML; G/100ML
84 INJECTION INTRAVENOUS CONTINUOUS
Status: DISCONTINUED | OUTPATIENT
Start: 2018-06-10 | End: 2018-06-13

## 2018-06-10 RX ADMIN — HYDROMORPHONE HYDROCHLORIDE 2 MG: 2 TABLET ORAL at 14:43

## 2018-06-10 RX ADMIN — ENOXAPARIN SODIUM 40 MG: 100 INJECTION SUBCUTANEOUS at 09:04

## 2018-06-10 RX ADMIN — HYDROMORPHONE HYDROCHLORIDE 0.5 MG: 1 INJECTION, SOLUTION INTRAMUSCULAR; INTRAVENOUS; SUBCUTANEOUS at 16:47

## 2018-06-10 RX ADMIN — POTASSIUM CHLORIDE, DEXTROSE MONOHYDRATE AND SODIUM CHLORIDE 84 ML/HR: 150; 5; 450 INJECTION, SOLUTION INTRAVENOUS at 20:11

## 2018-06-10 RX ADMIN — HYDROMORPHONE HYDROCHLORIDE 2 MG: 2 TABLET ORAL at 10:21

## 2018-06-10 RX ADMIN — HYDROMORPHONE HYDROCHLORIDE 0.5 MG: 1 INJECTION, SOLUTION INTRAMUSCULAR; INTRAVENOUS; SUBCUTANEOUS at 11:23

## 2018-06-10 RX ADMIN — HYDROMORPHONE HYDROCHLORIDE 0.5 MG: 1 INJECTION, SOLUTION INTRAMUSCULAR; INTRAVENOUS; SUBCUTANEOUS at 03:07

## 2018-06-10 RX ADMIN — HYDROMORPHONE HYDROCHLORIDE 2 MG: 2 TABLET ORAL at 20:12

## 2018-06-10 RX ADMIN — PANTOPRAZOLE SODIUM 40 MG: 40 INJECTION, POWDER, FOR SOLUTION INTRAVENOUS at 09:04

## 2018-06-10 RX ADMIN — SODIUM CHLORIDE 65.9 ML/HR: 0.9 INJECTION, SOLUTION INTRAVENOUS at 14:36

## 2018-06-10 RX ADMIN — TAMSULOSIN HYDROCHLORIDE 0.4 MG: 0.4 CAPSULE ORAL at 16:47

## 2018-06-10 RX ADMIN — GABAPENTIN 300 MG: 300 CAPSULE ORAL at 09:04

## 2018-06-10 RX ADMIN — GABAPENTIN 300 MG: 300 CAPSULE ORAL at 20:12

## 2018-06-10 RX ADMIN — DOXAZOSIN 4 MG: 4 TABLET ORAL at 09:04

## 2018-06-10 RX ADMIN — GABAPENTIN 300 MG: 300 CAPSULE ORAL at 16:47

## 2018-06-10 RX ADMIN — HYDROMORPHONE HYDROCHLORIDE 0.5 MG: 1 INJECTION, SOLUTION INTRAMUSCULAR; INTRAVENOUS; SUBCUTANEOUS at 22:52

## 2018-06-10 NOTE — PROGRESS NOTES
Progress Note - General Surgery   Lawernce Laughter Rites 63 y o  male MRN: [id number]  Unit/Bed#: [facility] 829-01 Encounter: [id number]    Assessment and Plan:  Patient Active Problem List   Diagnosis    Diverticulitis of large intestine with abscess without bleeding    Diverticulitis    Ureteral obstruction    Hydronephrosis       Assessment:  63 M with perforated diverticulitis, s/p ex-lap sigmoidectomy, anastomosis, diverting ileostomy with cysto and L ureteral stent on 6/1 and cystoscopy and  L ureteral stent replacement on 6/9     Plan:  Continue diet as tolerated  Prn analgesia  Encourage OOB and ambulate  Trend leukocytosis   Continue walters   Follow up with urology on duration/paln for walters   Lovenox for DVT ppx    Subjective: Pt had a L ureteral stent placed yesterday  No acute events  Pain appears to be well controlled  Passing flatus with good colostomy output     Objective:     Vitals   Vitals:    06/09/18 1215 06/09/18 1314 06/09/18 1500 06/09/18 2330   BP: 130/79 140/84 118/65 126/78   BP Location: Left arm Left arm Left arm Left arm   Pulse: 86 (!) 106 94 85   Resp: 18 18 18 18   Temp: 99 1 °F (37 3 °C) 98 8 °F (37 1 °C) 98 8 °F (37 1 °C) 98 7 °F (37 1 °C)   TempSrc: Oral Oral Oral Oral   SpO2: 97% 98% 96% 98%   Weight:       Height:         Temp  Min: 97 3 °F (36 3 °C)  Max: 101 9 °F (38 8 °C)  IBW: 68 4 kg   Body mass index is 25 34 kg/m²  I/O   I/O       06/06 0701 - 06/07 0700 06/07 0701 - 06/08 0700 06/08 0701 - 06/09 0700    P  O  960 720     I V  (mL/kg)       IV Piggyback       Total Intake(mL/kg) 960 (12 7) 720 (9 5)     Urine (mL/kg/hr) 1875 (1) 1200 (0 7)     Drains       Stool 175 (0 1) 200 (0 1)     Total Output 2050 1400      Net -1090 -680             Unmeasured Urine Occurrence  3 x           Intake/Output Summary (Last 24 hours) at 06/10/18 0710  Last data filed at 06/10/18 8581   Gross per 24 hour   Intake          2378 53 ml   Output             3575 ml   Net         -1196 47 ml Invasive  Devices  Invasive Devices     Peripheral Intravenous Line            Peripheral IV 06/09/18 Right;Ventral (anterior) Forearm less than 1 day          Drain            Ileostomy Loop RUQ 8 days    Urethral Catheter Latex 18 Fr  less than 1 day                Active medications  Scheduled Meds:    Current Facility-Administered Medications:  doxazosin 4 mg Oral Daily Dimas Narvaez MD    enoxaparin 40 mg Subcutaneous Q24H [address] Ninfa Baker MD    gabapentin 300 mg Oral TID Ninfa Baker MD    HYDROmorphone 0 5 mg Intravenous Q2H PRN Reubin Phoenix    HYDROmorphone 2 mg Oral Q4H PRN Veronica Kimbrough    iohexol 50 mL Oral 90 min pre-procedure Maricruz Jimenez MD    ondansetron 4 mg Intravenous Q4H PRN Janelle Holder    pantoprazole 40 mg Intravenous Q24H [address] William Fountain MD    polyvinyl alcohol 1 drop Both Eyes Q3H PRN Dimas Narvaez MD    simethicone 80 mg Oral Q6H PRN Merline Crooked, MD    sodium chloride 125 mL/hr Intravenous Continuous Reubin Phoenix Last Rate: 21 9 mL/hr (06/10/18 7239)   tamsulosin 0 4 mg Oral Daily With Amaris Gill MD      Continuous Infusions:    sodium chloride 125 mL/hr Last Rate: 21 9 mL/hr (06/10/18 0613)     PRN Meds:     HYDROmorphone 0 5 mg Q2H PRN   HYDROmorphone 2 mg Q4H PRN   ondansetron 4 mg Q4H PRN   polyvinyl alcohol 1 drop Q3H PRN   simethicone 80 mg Q6H PRN       Physical Exam: /78 (BP Location: Left arm)   Pulse 85   Temp 98 7 °F (37 1 °C) (Oral)   Resp 18   Ht 5' 8" (1 727 m)   Wt 75 6 kg (166 lb 10 7 oz)   SpO2 98%   BMI 25 34 kg/m²     Physical Exam:  General Appearance: Alert, cooperative, no distress, appears stated age  Lungs: Clear to auscultation bilaterally, respirations unlablored   Heart: Regular rate and rhythm, S1 and S2 normal, no murmur, rub or gallop  Abdomen: Soft, minimal tender in LLQ with rebound, non distended, bowel sounds active in all four quadrants,   No masses or organomegaly   Stool out put in colostomy bag, stoma is pink and viable   Garcia catheter in place with pink tinged urine     Laboratory and Diagnostics:    Results from last 7 days  Lab Units 06/10/18  0457 06/09/18  1411 06/08/18  0608 06/07/18  0502   WBC Thousand/uL 13 57* 11 24* 14 76* 16 66*   HEMOGLOBIN g/dL 10 4* 11 2* 11 5* 11 8*   HEMATOCRIT % 31 9* 35 5* 35 4* 36 3*   PLATELETS Thousands/uL 651* 621* 550* 544*   NEUTROS PCT % 74  --  77* 85*   MONOS PCT % 6  --  8 7   MONO PCT MAN %  --  1*  --   --        Results from last 7 days  Lab Units 06/10/18  0457 06/09/18  1411 06/08/18  0700   SODIUM mmol/L 143 137 137   POTASSIUM mmol/L 4 4 4 5 3 8   CHLORIDE mmol/L 108 106 102   CO2 mmol/L 26 27 29   BUN mg/dL 12 13 10   CREATININE mg/dL 0 79 1 12 1 25   CALCIUM mg/dL 8 6 8 4 8 6   GLUCOSE RANDOM mg/dL 96 132 102       Results from last 7 days  Lab Units 06/06/18  0557 06/05/18  0558   MAGNESIUM mg/dL 2 0 2 0     Lab Results   Component Value Date    PHOS 3 3 06/02/2018    PHOS 2 4 (L) 01/02/2016    PHOS 1 6 (L) 01/01/2016          No results found for: TROPONINT  ABG:No results found for: PHART, NCF1BEW, PO2ART, FCY9BMM, U3JSOSQP, BEART, SOURCE    Blood Culture:   Lab Results   Component Value Date    BLOODCX No Growth After 4 Days  06/04/2018    BLOODCX No Growth After 4 Days  06/04/2018     Urine Culture:   Lab Results   Component Value Date    URINECX No Growth <1000 cfu/mL 06/08/2018    URINECX No Growth <1000 cfu/mL 06/04/2018     Sputum Culture: No components found for: SPUTUMCX    Imaging: I have personally reviewed pertinent reports     and I have personally reviewed pertinent films in PACS    VTE Pharmacologic Prophylaxis: Enoxaparin (Lovenox)  VTE Mechanical Prophylaxis: sequential compression device

## 2018-06-11 ENCOUNTER — TELEPHONE (OUTPATIENT)
Dept: UROLOGY | Facility: CLINIC | Age: 58
End: 2018-06-11

## 2018-06-11 DIAGNOSIS — N13.5 URETERAL OBSTRUCTION, LEFT: Primary | ICD-10-CM

## 2018-06-11 LAB
ANION GAP SERPL CALCULATED.3IONS-SCNC: 8 MMOL/L (ref 4–13)
BASOPHILS # BLD AUTO: 0.05 THOUSANDS/ΜL (ref 0–0.1)
BASOPHILS NFR BLD AUTO: 1 % (ref 0–1)
BUN SERPL-MCNC: 11 MG/DL (ref 5–25)
CALCIUM SERPL-MCNC: 9 MG/DL (ref 8.3–10.1)
CHLORIDE SERPL-SCNC: 109 MMOL/L (ref 100–108)
CO2 SERPL-SCNC: 24 MMOL/L (ref 21–32)
CREAT SERPL-MCNC: 0.83 MG/DL (ref 0.6–1.3)
EOSINOPHIL # BLD AUTO: 0.36 THOUSAND/ΜL (ref 0–0.61)
EOSINOPHIL NFR BLD AUTO: 3 % (ref 0–6)
ERYTHROCYTE [DISTWIDTH] IN BLOOD BY AUTOMATED COUNT: 13.9 % (ref 11.6–15.1)
GFR SERPL CREATININE-BSD FRML MDRD: 98 ML/MIN/1.73SQ M
GLUCOSE SERPL-MCNC: 97 MG/DL (ref 65–140)
HCT VFR BLD AUTO: 35.4 % (ref 36.5–49.3)
HGB BLD-MCNC: 11.4 G/DL (ref 12–17)
IMM GRANULOCYTES # BLD AUTO: 0.11 THOUSAND/UL (ref 0–0.2)
IMM GRANULOCYTES NFR BLD AUTO: 1 % (ref 0–2)
LYMPHOCYTES # BLD AUTO: 2.04 THOUSANDS/ΜL (ref 0.6–4.47)
LYMPHOCYTES NFR BLD AUTO: 18 % (ref 14–44)
MAGNESIUM SERPL-MCNC: 1.8 MG/DL (ref 1.6–2.6)
MCH RBC QN AUTO: 30.9 PG (ref 26.8–34.3)
MCHC RBC AUTO-ENTMCNC: 32.2 G/DL (ref 31.4–37.4)
MCV RBC AUTO: 96 FL (ref 82–98)
MONOCYTES # BLD AUTO: 0.62 THOUSAND/ΜL (ref 0.17–1.22)
MONOCYTES NFR BLD AUTO: 6 % (ref 4–12)
NEUTROPHILS # BLD AUTO: 7.93 THOUSANDS/ΜL (ref 1.85–7.62)
NEUTS SEG NFR BLD AUTO: 71 % (ref 43–75)
NRBC BLD AUTO-RTO: 0 /100 WBCS
PLATELET # BLD AUTO: 684 THOUSANDS/UL (ref 149–390)
PMV BLD AUTO: 9 FL (ref 8.9–12.7)
POTASSIUM SERPL-SCNC: 4.7 MMOL/L (ref 3.5–5.3)
RBC # BLD AUTO: 3.69 MILLION/UL (ref 3.88–5.62)
SODIUM SERPL-SCNC: 141 MMOL/L (ref 136–145)
WBC # BLD AUTO: 11.11 THOUSAND/UL (ref 4.31–10.16)

## 2018-06-11 PROCEDURE — C9113 INJ PANTOPRAZOLE SODIUM, VIA: HCPCS | Performed by: STUDENT IN AN ORGANIZED HEALTH CARE EDUCATION/TRAINING PROGRAM

## 2018-06-11 PROCEDURE — 83735 ASSAY OF MAGNESIUM: CPT | Performed by: STUDENT IN AN ORGANIZED HEALTH CARE EDUCATION/TRAINING PROGRAM

## 2018-06-11 PROCEDURE — 99024 POSTOP FOLLOW-UP VISIT: CPT | Performed by: SURGERY

## 2018-06-11 PROCEDURE — 85025 COMPLETE CBC W/AUTO DIFF WBC: CPT | Performed by: STUDENT IN AN ORGANIZED HEALTH CARE EDUCATION/TRAINING PROGRAM

## 2018-06-11 PROCEDURE — 80048 BASIC METABOLIC PNL TOTAL CA: CPT | Performed by: STUDENT IN AN ORGANIZED HEALTH CARE EDUCATION/TRAINING PROGRAM

## 2018-06-11 RX ORDER — MAGNESIUM SULFATE HEPTAHYDRATE 40 MG/ML
2 INJECTION, SOLUTION INTRAVENOUS ONCE
Status: COMPLETED | OUTPATIENT
Start: 2018-06-11 | End: 2018-06-11

## 2018-06-11 RX ORDER — LIDOCAINE HYDROCHLORIDE 10 MG/ML
INJECTION, SOLUTION EPIDURAL; INFILTRATION; INTRACAUDAL; PERINEURAL
Status: DISPENSED
Start: 2018-06-11 | End: 2018-06-12

## 2018-06-11 RX ORDER — LIDOCAINE HYDROCHLORIDE 10 MG/ML
10 INJECTION, SOLUTION INFILTRATION; PERINEURAL ONCE
Status: DISCONTINUED | OUTPATIENT
Start: 2018-06-11 | End: 2018-06-17 | Stop reason: HOSPADM

## 2018-06-11 RX ORDER — HYDROMORPHONE HYDROCHLORIDE 2 MG/1
4 TABLET ORAL EVERY 6 HOURS PRN
Status: DISCONTINUED | OUTPATIENT
Start: 2018-06-11 | End: 2018-06-12

## 2018-06-11 RX ORDER — HYDROMORPHONE HYDROCHLORIDE 2 MG/1
2 TABLET ORAL EVERY 6 HOURS PRN
Status: DISCONTINUED | OUTPATIENT
Start: 2018-06-11 | End: 2018-06-12

## 2018-06-11 RX ORDER — BUPIVACAINE HYDROCHLORIDE AND EPINEPHRINE 5; 5 MG/ML; UG/ML
25 INJECTION, SOLUTION PERINEURAL ONCE
Status: DISCONTINUED | OUTPATIENT
Start: 2018-06-11 | End: 2018-06-11

## 2018-06-11 RX ADMIN — PANTOPRAZOLE SODIUM 40 MG: 40 INJECTION, POWDER, FOR SOLUTION INTRAVENOUS at 09:36

## 2018-06-11 RX ADMIN — POTASSIUM CHLORIDE, DEXTROSE MONOHYDRATE AND SODIUM CHLORIDE 84 ML/HR: 150; 5; 450 INJECTION, SOLUTION INTRAVENOUS at 09:36

## 2018-06-11 RX ADMIN — PSYLLIUM HUSK 1 PACKET: 3.4 POWDER ORAL at 21:28

## 2018-06-11 RX ADMIN — GABAPENTIN 300 MG: 300 CAPSULE ORAL at 16:26

## 2018-06-11 RX ADMIN — GABAPENTIN 300 MG: 300 CAPSULE ORAL at 09:36

## 2018-06-11 RX ADMIN — HYDROMORPHONE HYDROCHLORIDE 4 MG: 2 TABLET ORAL at 21:40

## 2018-06-11 RX ADMIN — HYDROMORPHONE HYDROCHLORIDE 4 MG: 2 TABLET ORAL at 13:50

## 2018-06-11 RX ADMIN — GABAPENTIN 300 MG: 300 CAPSULE ORAL at 21:29

## 2018-06-11 RX ADMIN — ENOXAPARIN SODIUM 40 MG: 100 INJECTION SUBCUTANEOUS at 09:35

## 2018-06-11 RX ADMIN — PSYLLIUM HUSK 1 PACKET: 3.4 POWDER ORAL at 16:26

## 2018-06-11 RX ADMIN — TAMSULOSIN HYDROCHLORIDE 0.4 MG: 0.4 CAPSULE ORAL at 16:26

## 2018-06-11 RX ADMIN — DOXAZOSIN 4 MG: 4 TABLET ORAL at 09:36

## 2018-06-11 RX ADMIN — MAGNESIUM SULFATE HEPTAHYDRATE 2 G: 40 INJECTION, SOLUTION INTRAVENOUS at 13:50

## 2018-06-11 RX ADMIN — PSYLLIUM HUSK 1 PACKET: 3.4 POWDER ORAL at 09:36

## 2018-06-11 RX ADMIN — HYDROMORPHONE HYDROCHLORIDE 2 MG: 2 TABLET ORAL at 07:09

## 2018-06-11 RX ADMIN — HYDROMORPHONE HYDROCHLORIDE 0.5 MG: 1 INJECTION, SOLUTION INTRAMUSCULAR; INTRAVENOUS; SUBCUTANEOUS at 09:36

## 2018-06-11 NOTE — PROGRESS NOTES
UROLOGY PROGRESS NOTE   Patient Identifiers: Norma Santiago (MRN [id number])  Date of Service: 6/11/2018        Assessment:   POD#2 s/p cystoscopy, retrograde pyelogram LEFT and replacement of indwelling stent     Plan:     Patient still having postsurgical pain at incision  LEFT flank improved  Discussed placement of stent and need for maintenance for next 3-4 weeks  Will arrange outpatient removal  No additional urologic needs while inpatient  Please call with questions      Subjective:     24 HR EVENTS:   Still with some post surgical pain      Objective:     VITALS:    Vitals:    06/11/18 0001   BP: 111/70   Pulse: 96   Resp: 18   Temp: 98 7 °F (37 1 °C)   SpO2: 99%       INS & OUTS:  1200cc/24hours    LABS:  Lab Results   Component Value Date    HGB 11 4 (L) 06/11/2018    HCT 35 4 (L) 06/11/2018    WBC 11 11 (H) 06/11/2018     (H) 06/11/2018   ]    Lab Results   Component Value Date     06/11/2018    K 4 7 06/11/2018     (H) 06/11/2018    CO2 24 06/11/2018    BUN 11 06/11/2018    CREATININE 0 83 06/11/2018    CALCIUM 9 0 06/11/2018    GLUCOSE 97 06/11/2018   ]    INPATIENT MEDS:    Current Facility-Administered Medications:     dextrose 5 % and sodium chloride 0 45 % with KCl 20 mEq/L infusion, 84 mL/hr, Intravenous, Continuous, Onur Cruz MD, Last Rate: 84 mL/hr at 06/10/18 2011, 84 mL/hr at 06/10/18 2011    doxazosin (CARDURA) tablet 4 mg, 4 mg, Oral, Daily, Dona Pacheco MD, 4 mg at 06/10/18 0904    enoxaparin (LOVENOX) subcutaneous injection 40 mg, 40 mg, Subcutaneous, Q24H [facility], Alex Collier MD, 40 mg at 06/10/18 9947    gabapentin (NEURONTIN) capsule 300 mg, 300 mg, Oral, TID, Alex Collier MD, 300 mg at 06/10/18 2012    HYDROmorphone (DILAUDID) injection 0 5 mg, 0 5 mg, Intravenous, Q2H PRN, Blas Blue, 0 5 mg at 06/10/18 2252    HYDROmorphone (DILAUDID) tablet 2 mg, 2 mg, Oral, Q4H PRN, Blas Blue, 2 mg at 06/11/18 0709    iohexol (OMNIPAQUE) 240 MG/ML solution 50 mL, 50 mL, Oral, 90 min pre-procedure, Shirlee Osgood, MD    ondansetron [facility]) injection 4 mg, 4 mg, Intravenous, Q4H PRN, Jenn Lime, 4 mg at 06/05/18 1106    pantoprazole (PROTONIX) injection 40 mg, 40 mg, Intravenous, Q24H [facility], Reba George MD, 40 mg at 06/10/18 0904    polyvinyl alcohol (LIQUIFILM TEARS) 1 4 % ophthalmic solution 1 drop, 1 drop, Both Eyes, Q3H PRN, Han Sweet MD, 1 drop at 06/03/18 0527    simethicone (MYLICON) chewable tablet 80 mg, 80 mg, Oral, Q6H PRN, Hardy Starkey MD, 80 mg at 05/30/18 1754    tamsulosin (FLOMAX) capsule 0 4 mg, 0 4 mg, Oral, Daily With Yomi Cho MD, 0 4 mg at 06/10/18 1647      Physical Exam:   /70 (BP Location: Left arm)   Pulse 96   Temp 98 7 °F (37 1 °C) (Oral)   Resp 18   Ht 5' 8" (1 727 m)   Wt 75 6 kg (166 lb 10 7 oz)   SpO2 99%   BMI 25 34 kg/m²   GEN: no acute distress    RESP: breathing comfortably with no accessory muscle use    ABD: soft, appropriately tender to palpation, non-distended   INCISION: erythematous   EXT: no significant peripheral edema     RADIOLOGY:   LEFT RETROGRADE PYELOGRAM     INDICATION:   Hydronephrosis [N13 30]     COMPARISON: None     IMAGES:  7     FLUOROSCOPY TIME:   29 SFT     CONTRAST:  15 mL of iohexol (OMNIPAQUE)     FINDINGS:     Fluoroscopic guidance provided for retrograde pyelogram       Opacified portions of the left ureter demonstrate focal area of narrowing the distal ureter  Remainder of the ureter is normal in appearance  The upper tract is unremarkable      Osseous and soft tissue detail limited by technique      IMPRESSION:     Fluoroscopic guidance provided for left retrograde pyelogram  Please see procedure report for further details

## 2018-06-11 NOTE — WOUND OSTOMY CARE
Progress Note- Ostomy  Roney Bunn 63 y o  male  [id number]  [facility] 829-[facility] 829-01        Assessment:  Patient seen today for continued teaching and ostomy care  Patient in bed, alert, awake with no complaints and in no apparent distress however complaining that his mid line incision has some drainage as distal aspect  Stoma remains well budded, 11/2 inch round, pink, moist with red rubber catheter in place and intact two piece pouching system  Stoma is patent with (+) flatus and brown effluent, with intact mucocutaneous junction and peristomal, abdomen appear minimally distended soft with no reported tenderness except at incisional line pain  Incision remains well approximated, with small serous exudate from mid/distal aspect with jeff-incisional maceration  Patient encouraged participating in ostomy pouch changed, intimally refused stating "no you do it, you're here"  After emphasis on his learning made patient relented and proceeded to participate  Patient removed pouch and clean skin and stoma under surperivison, new barrier preparted by ET nurse and patient applied barrier to skin  Patient effectively change his pouch, following all given instructions correctly  Emphasis made on maintaining hydration, emptying pouch, cleaning stoma and skin as well as full pouch change  Patient provided with additional supplies to take home  Patient place by in two piece moldable durahesive pouching system  Plan: We will continue following with ostomy care and teaching        Vitals:    06/11/18 0700   BP: 126/77   Pulse: 80   Resp: 18   Temp: 99 1 °F (37 3 °C)   SpO2: 98%     Incision 06/01/18 Abdomen Other (Comment) (Active)   Incision Description Drainage 6/11/2018  8:45 AM   Jeff-wound Assessment Erythema 6/11/2018  8:45 AM   Closure Staples 6/11/2018  8:45 AM   Drainage Amount None 6/10/2018  4:50 PM   Drainage Description Serosanguineous 6/8/2018  8:00 PM   Treatments Cleansed;Site care 6/6/2018  7:31 PM   Dressing Open to air 6/11/2018  8:45 AM   Dressing Changed New dressing applied 6/9/2018  9:19 AM   Patient Tolerance Tolerated well 6/7/2018  4:00 AM   Dressing Status Removed; Old drainage 6/11/2018  8:45 AM   Number of days: 10     Ileostomy Loop RUQ (Active)   Stomal Appliance 2 piece 6/11/2018 12:30 PM   Stoma Assessment Budded;Pink 6/11/2018 12:30 PM   Stoma size (in) 1 5 in 6/11/2018 12:30 PM   Stoma Shape Round 6/11/2018 12:30 PM   Peristomal Assessment Intact 6/11/2018 12:30 PM   Treatment Bag change 6/11/2018 12:30 PM   Output (mL) 120 mL 6/11/2018 12:30 PM   Number of days: 10           Please call wound care to ext [phone] or [phone] with questions or concerns  If patient remains admiteed  We will continue, please sent supplies placed in patient room home with him once discharge      Manual Orlin RN, BSN, [employer] [employer] [place]

## 2018-06-11 NOTE — MEDICAL STUDENT
Progress Note - General Surgery   Harinder Bunn 63 y o  male MRN: [id number]  Unit/Bed#: [facility] 829-01 Encounter: [id number]    Assessment and Plan:  Patient Active Problem List   Diagnosis    Diverticulitis of large intestine with abscess without bleeding    Diverticulitis    Ureteral obstruction    Hydronephrosis       Assessment:  59yo M s/p exploratory laparotomy, sigmoid colon resection, diverting ileostomy on 6/1    Plan:  1  Draw Blood Cultures  2  Consider wound culture  3  Consider repeat UA  4  Consider restarting Ancef/Flagyl after drawing cultures      Subjective: Patient resting comfortably  Complaining of severe abdominal pain and diaphoresis overnight  Ileostomy productive of stool and gas  Objective: Abdomen diffusely tender, midline staple line draining purulence      Vitals   Vitals:    06/09/18 2330 06/10/18 0700 06/10/18 1500 06/11/18 0001   BP: 126/78 143/83 139/85 111/70   BP Location: Left arm Left arm Left arm Left arm   Pulse: 85 81 89 96   Resp: 18 18 18 18   Temp: 98 7 °F (37 1 °C) 98 7 °F (37 1 °C) 98 3 °F (36 8 °C) 98 7 °F (37 1 °C)   TempSrc: Oral Oral Oral Oral   SpO2: 98% 96% 98% 99%   Weight:       Height:         Temp  Min: 97 3 °F (36 3 °C)  Max: 101 9 °F (38 8 °C)  IBW: 68 4 kg   Body mass index is 25 34 kg/m²  I/O   I/O       06/09 0701 - 06/10 0700 06/10 0701 - 06/11 0700    P  O  1030 480    I V  (mL/kg) 759 2 (10) 1106 5 (14 6)    IV Piggyback 589 3     Total Intake(mL/kg) 2378 5 (31 5) 1586 5 (21)    Urine (mL/kg/hr) 2250 (1 2) 1200 (0 7)    Stool 1325 (0 7) 2125 (1 2)    Total Output 3575 3325    Net -1196 5 -1738 5                Intake/Output Summary (Last 24 hours) at 06/11/18 0609  Last data filed at 06/11/18 0501   Gross per 24 hour   Intake          1706 54 ml   Output             3325 ml   Net         -1618 46 ml       Invasive  Devices  Invasive Devices     Peripheral Intravenous Line            Peripheral IV 06/10/18 Right Forearm less than 1 day Drain            Ileostomy Loop RUQ 9 days                Active medications  Scheduled Meds:  Current Facility-Administered Medications:  dextrose 5 % and sodium chloride 0 45 % with KCl 20 mEq/L 84 mL/hr Intravenous Continuous Tiffanie Alvarez MD Last Rate: 84 mL/hr (06/10/18 2011)   doxazosin 4 mg Oral Daily Stan Harada, MD    enoxaparin 40 mg Subcutaneous Q24H [facility] Cornelia Vázquez MD    gabapentin 300 mg Oral TID Cornelia Vázquez MD    HYDROmorphone 0 5 mg Intravenous Q2H PRN Bertha Sultana    HYDROmorphone 2 mg Oral Q4H PRN Angela Kimbrough    iohexol 50 mL Oral 90 min pre-procedure Tejas Pinon MD    ondansetron 4 mg Intravenous Q4H PRN Janelle Holder    pantoprazole 40 mg Intravenous Q24H [facility] Kiarra Mercado MD    polyvinyl alcohol 1 drop Both Eyes Q3H PRN Stan Harada, MD    simethicone 80 mg Oral Q6H PRN Lg Reid MD    tamsulosin 0 4 mg Oral Daily With Gold Caldera MD      Continuous Infusions:    dextrose 5 % and sodium chloride 0 45 % with KCl 20 mEq/L 84 mL/hr Last Rate: 84 mL/hr (06/10/18 2011)     PRN Meds:     HYDROmorphone 0 5 mg Q2H PRN   HYDROmorphone 2 mg Q4H PRN   ondansetron 4 mg Q4H PRN   polyvinyl alcohol 1 drop Q3H PRN   simethicone 80 mg Q6H PRN       Physical Exam: /70 (BP Location: Left arm)   Pulse 96   Temp 98 7 °F (37 1 °C) (Oral)   Resp 18   Ht 5' 8" (1 727 m)   Wt 75 6 kg (166 lb 10 7 oz)   SpO2 99%   BMI 25 34 kg/m²     Physical Exam:  General Appearance: Alert, cooperative, no distress, appears stated age  Lungs: Clear to auscultation bilaterally, respirations unlablored   Heart: Regular rate and rhythm, S1 and S2 normal, no murmur, rub or gallop  Abdomen: Soft, non-tender, non distended, bowel sounds active in all four quadrants,   No masses or organomegaly    Laboratory and Diagnostics:    Results from last 7 days  Lab Units 06/11/18  0527 06/10/18  0457 06/09/18  1411 06/08/18  0608   WBC Thousand/uL 11 11* 13 57* 11 24* 14 76*   HEMOGLOBIN g/dL 11 4* 10 4* 11 2* 11 5*   HEMATOCRIT % 35 4* 31 9* 35 5* 35 4*   PLATELETS Thousands/uL 684* 651* 621* 550*   NEUTROS PCT % 71 74  --  77*   MONOS PCT % 6 6  --  8   MONO PCT MAN %  --   --  1*  --        Results from last 7 days  Lab Units 06/10/18  0457 06/09/18  1411 06/08/18  0700   SODIUM mmol/L 143 137 137   POTASSIUM mmol/L 4 4 4 5 3 8   CHLORIDE mmol/L 108 106 102   CO2 mmol/L 26 27 29   BUN mg/dL 12 13 10   CREATININE mg/dL 0 79 1 12 1 25   CALCIUM mg/dL 8 6 8 4 8 6   GLUCOSE RANDOM mg/dL 96 132 102       Results from last 7 days  Lab Units 06/06/18  0557 06/05/18  0558   MAGNESIUM mg/dL 2 0 2 0     Lab Results   Component Value Date    PHOS 3 3 06/02/2018    PHOS 2 4 (L) 01/02/2016    PHOS 1 6 (L) 01/01/2016          No results found for: TROPONINT  ABG:No results found for: PHART, HQG4AOL, PO2ART, AVI3THO, U0SPDJLN, BEART, SOURCE    Blood Culture:   Lab Results   Component Value Date    BLOODCX No Growth After 5 Days  06/04/2018    BLOODCX No Growth After 5 Days   06/04/2018     Urine Culture:   Lab Results   Component Value Date    URINECX No Growth <1000 cfu/mL 06/08/2018    URINECX No Growth <1000 cfu/mL 06/04/2018     Sputum Culture: No components found for: SPUTUMCX    Imaging: I have personally reviewed pertinent films in PACS    VTE Pharmacologic Prophylaxis: Enoxaparin (Lovenox)  VTE Mechanical Prophylaxis: sequential compression device

## 2018-06-11 NOTE — PROGRESS NOTES
Nurse / Provider rounds completed with patient and staff nurse, Tari Alicia  Metamucil ordered today, will re-evaluate incision for drainage

## 2018-06-11 NOTE — PROGRESS NOTES
Progress Note - Inpatient Pain Management    Aram Bunn 63 y o  male MRN: [id number]  Unit/Bed#: [facility] 829-01 Encounter: [id number]      Assessment:   Principal Problem:    Diverticulitis  Active Problems:    Ureteral obstruction    Hydronephrosis      Plan/Recommendations:   Acute post op pain  · Gabapentin 300mg TID  · Increase oral dilaudid 2mg PO Q4 hours PRN moderate pain and Dilaudid oral 4mg PO Q4 hours PRN severe pain  · Decrease Dilaudid 0 5mg IV to Q6 hours PRN breakthrough pain today and plan to stop tomorrow    Reviewed with Red Surg    Pain History  Current pain location(s): abdominal   Pain Scale:   0-10  Severity:  Severe at times  24 hour history: Patient ambulating hallway  Continues to report abdominal pain pain, worse on the left side and over the incision  Reports episodes of diaphoresis intermittently  He is unsure if oral Dilaudid is effective      Current Analgesic regimen:  Dilaudid IV total (1 5mg), Dilaudid oral total (6mg)  Bowel Regimen: None    Meds/Allergies   all current active meds have been reviewed and current meds:   Current Facility-Administered Medications   Medication Dose Route Frequency    dextrose 5 % and sodium chloride 0 45 % with KCl 20 mEq/L infusion  84 mL/hr Intravenous Continuous    doxazosin (CARDURA) tablet 4 mg  4 mg Oral Daily    enoxaparin (LOVENOX) subcutaneous injection 40 mg  40 mg Subcutaneous Q24H BLAIR    gabapentin (NEURONTIN) capsule 300 mg  300 mg Oral TID    HYDROmorphone (DILAUDID) injection 0 5 mg  0 5 mg Intravenous Q2H PRN    HYDROmorphone (DILAUDID) tablet 2 mg  2 mg Oral Q4H PRN    iohexol (OMNIPAQUE) 240 MG/ML solution 50 mL  50 mL Oral 90 min pre-procedure    magnesium sulfate 2 g/50 mL IVPB (premix) 2 g  2 g Intravenous Once    ondansetron (ZOFRAN) injection 4 mg  4 mg Intravenous Q4H PRN    pantoprazole (PROTONIX) injection 40 mg  40 mg Intravenous Q24H BLAIR    polyvinyl alcohol (LIQUIFILM TEARS) 1 4 % ophthalmic solution 1 drop 1 drop Both Eyes Q3H PRN    psyllium (METAMUCIL) 1 packet  1 packet Oral TID    simethicone (MYLICON) chewable tablet 80 mg  80 mg Oral Q6H PRN    tamsulosin (FLOMAX) capsule 0 4 mg  0 4 mg Oral Daily With Dinner       Allergies   Allergen Reactions    Penicillins        Objective     Temp:  [98 3 °F (36 8 °C)-99 1 °F (37 3 °C)] 99 1 °F (37 3 °C)  HR:  [80-96] 80  Resp:  [18] 18  BP: (111-139)/(70-85) 126/77      Intake/Output Summary (Last 24 hours) at 06/11/18 1144  Last data filed at 06/11/18 0936   Gross per 24 hour   Intake          1982 54 ml   Output             3175 ml   Net         -1192 46 ml     Last Bowel Movement: ostomy with liquid stool               Physical Exam: /77 (BP Location: Left arm)   Pulse 80   Temp 99 1 °F (37 3 °C) (Oral)   Resp 18   Ht 5' 8" (1 727 m)   Wt 75 6 kg (166 lb 10 7 oz)   SpO2 98%   BMI 25 34 kg/m²     General Appearance:    Alert, cooperative, no distress   Neurological:   Oriented to person, place, and time   Head:    Normocephalic, without obvious abnormality, atraumatic   Eyes:    EOM's intact   Back:     Symmetric, no curvature, ROM normal   Lungs:     Respirations unlabored   Chest Wall:    No deformity   Abdomen:        Soft, + left sided tenderness, midline abdominal incision with staples JESSICA, erythema is present   Extremities:   Extremities no edema, intact sensation to light touch   Skin:   Skin no rashes or lesions     Lab Results:   Results from last 7 days  Lab Units 06/11/18  0527   WBC Thousand/uL 11 11*   HEMOGLOBIN g/dL 11 4*   HEMATOCRIT % 35 4*   PLATELETS Thousands/uL 684*      Results from last 7 days  Lab Units 06/11/18  0522   SODIUM mmol/L 141   POTASSIUM mmol/L 4 7   CHLORIDE mmol/L 109*   CO2 mmol/L 24   BUN mg/dL 11   CREATININE mg/dL 0 83   CALCIUM mg/dL 9 0   GLUCOSE RANDOM mg/dL 97       Imaging Studies: No new imaging studies  Counseling / Coordination of Care  Total floor / unit time spent today 15 minutes   Greater than 50% of total time was spent with the patient and / or family counseling and / or coordination of care   A description of the counseling / coordination of care: Reviewed plan of care and medications with patient, RN staff and primary care team     Janan Collet, MS, RN-BC  Acute Pain

## 2018-06-11 NOTE — PROGRESS NOTES
Progress Note - Acute Care Surgery   Harinder Bunn 63 y o  male MRN: [id number]  Unit/Bed#: [facility] 829-01 Encounter: [id number]    Assessment:  63 M with sigmoidectomy diverting ileostomy for diverticulitis, also with left ureteral stricture s/p cysto and ureteral stenting  - Garcia removed yesterday, voiding spontaneously    Plan:  Diet as tolerated  Pain control prn  OOB and ambulating  Lovenox ppx  Dispo planning    Subjective/Objective     Subjective: No acute events  Still with high ileostomy outputs  Garcia removed and voiding well though with some burning  Pain is controlled  Objective:    Blood pressure 111/70, pulse 96, temperature 98 7 °F (37 1 °C), temperature source Oral, resp  rate 18, height 5' 8" (1 727 m), weight 75 6 kg (166 lb 10 7 oz), SpO2 99 %  ,Body mass index is 25 34 kg/m²        Intake/Output Summary (Last 24 hours) at 06/11/18 0648  Last data filed at 06/11/18 0501   Gross per 24 hour   Intake          1586 54 ml   Output             3325 ml   Net         -1738 46 ml       Invasive Devices     Peripheral Intravenous Line            Peripheral IV 06/10/18 Right Forearm less than 1 day          Drain            Ileostomy Loop RUQ 9 days                Physical Exam:   General: NAD, AAOx3  CV: RRR +S1/S2  Chest: breath sounds bilaterally  Abdomen: soft, non-distended, ileostomy healthy with mixed liquid/solid stool, mildly tender  Extremities: atraumatic, no edema        Results from last 7 days  Lab Units 06/11/18  0527 06/10/18  0457 06/09/18  1411   WBC Thousand/uL 11 11* 13 57* 11 24*   HEMOGLOBIN g/dL 11 4* 10 4* 11 2*   HEMATOCRIT % 35 4* 31 9* 35 5*   PLATELETS Thousands/uL 684* 651* 621*       Results from last 7 days  Lab Units 06/11/18  0522 06/10/18  0457 06/09/18  1411   SODIUM mmol/L 141 143 137   POTASSIUM mmol/L 4 7 4 4 4 5   CHLORIDE mmol/L 109* 108 106   CO2 mmol/L 24 26 27   BUN mg/dL 11 12 13   CREATININE mg/dL 0 83 0 79 1 12   GLUCOSE RANDOM mg/dL 97 96 132   CALCIUM mg/dL 9 0 8 6 8 4

## 2018-06-11 NOTE — CASE MANAGEMENT
Continued Stay Review    Date: 06/11/18    Vital Signs: /77 (BP Location: Left arm)   Pulse 80   Temp 99 1 °F (37 3 °C) (Oral)   Resp 18   Ht 5' 8" (1 727 m)   Wt 75 6 kg (166 lb 10 7 oz)   SpO2 98%   BMI 25 34 kg/m²     Medications:   Scheduled Meds:   Current Facility-Administered Medications:  dextrose 5 % and sodium chloride 0 45 % with KCl 20 mEq/L 84 mL/hr Intravenous Continuous   doxazosin 4 mg Oral Daily   enoxaparin 40 mg Subcutaneous Q24H BLAIR   gabapentin 300 mg Oral TID   HYDROmorphone 0 5 mg Intravenous Q2H PRN   HYDROmorphone 2 mg Oral Q6H PRN   HYDROmorphone 4 mg Oral Q6H PRN   iohexol 50 mL Oral 90 min pre-procedure   magnesium sulfate 2 g Intravenous Once   ondansetron 4 mg Intravenous Q4H PRN   pantoprazole 40 mg Intravenous Q24H BLAIR   polyvinyl alcohol 1 drop Both Eyes Q3H PRN   psyllium 1 packet Oral TID   simethicone 80 mg Oral Q6H PRN   tamsulosin 0 4 mg Oral Daily With Dinner     Continuous Infusions:   dextrose 5 % and sodium chloride 0 45 % with KCl 20 mEq/L 84 mL/hr Last Rate: 84 mL/hr (06/11/18 0936)     PRN Meds: HYDROmorphone    HYDROmorphone    HYDROmorphone    ondansetron    polyvinyl alcohol    simethicone    Abnormal Labs/Diagnostic Results:   Results from last 7 days  Lab 06/11/18  0527 06/10/18  0457 06/09/18  1411   WBC 11 11* 13 57* 11 24*   HEMOGLOBIN 11 4* 10 4* 11 2*   HEMATOCRIT 35 4* 31 9* 35 5*   PLATELETS 072* 467* 440*         Results from last 7 days  Lab 06/11/18  0522 06/10/18  0457 06/09/18  1411   CHLORIDE 109* 108 106         Age/Sex: 63 y o  male        Assessment:  63 M with sigmoidectomy diverting ileostomy for diverticulitis, also with left ureteral stricture s/p cysto and ureteral stenting  - Garcia removed yesterday, voiding spontaneously     Plan:  Diet as tolerated  Pain control prn  OOB and ambulating  Lovenox ppx  Dispo planning    Discharge Plan: TBD      Thank you,  [facility] in the [facility] by Cody Rodrigues for 2017  Network Utilization Review Department  Phone: [phone]; Fax [phone]  ATTENTION: The Network Utilization Review Department is now centralized for our 7 Facilities  Make a note that we have a new phone and fax numbers for our Department  Please call with any questions or concerns to [phone] and carefully follow the prompts so that you are directed to the right person  All voicemails are confidential  Fax any determinations, approvals, denials, and requests for initial or continue stay review clinical to [phone]  Due to HIGH CALL volume, it would be easier if you could please send faxed requests to expedite your requests and in part, help us provide discharge notifications faster

## 2018-06-11 NOTE — SOCIAL WORK
DC Planning:     CATRACHO met with the patient to offer RN VNA services for management of his Ileostomy  Pt declined and stated preference to dc home with "familiar people caring for me"  Pt expressed confidence in self management and reported that his wife is available to help him at home  CM offered [facility] program for any discharge medications; pt agreeable and asked for bed delivery

## 2018-06-11 NOTE — TELEPHONE ENCOUNTER
Patient required indwelling stent placement LEFT  Will arrange outpatient followup for removal in 3-4 weeks

## 2018-06-12 LAB
BASOPHILS # BLD AUTO: 0.09 THOUSANDS/ΜL (ref 0–0.1)
BASOPHILS NFR BLD AUTO: 1 % (ref 0–1)
EOSINOPHIL # BLD AUTO: 0.55 THOUSAND/ΜL (ref 0–0.61)
EOSINOPHIL NFR BLD AUTO: 4 % (ref 0–6)
ERYTHROCYTE [DISTWIDTH] IN BLOOD BY AUTOMATED COUNT: 13.6 % (ref 11.6–15.1)
HCT VFR BLD AUTO: 37.8 % (ref 36.5–49.3)
HGB BLD-MCNC: 12.2 G/DL (ref 12–17)
IMM GRANULOCYTES # BLD AUTO: 0.19 THOUSAND/UL (ref 0–0.2)
IMM GRANULOCYTES NFR BLD AUTO: 1 % (ref 0–2)
LYMPHOCYTES # BLD AUTO: 2.35 THOUSANDS/ΜL (ref 0.6–4.47)
LYMPHOCYTES NFR BLD AUTO: 18 % (ref 14–44)
MAGNESIUM SERPL-MCNC: 2.2 MG/DL (ref 1.6–2.6)
MCH RBC QN AUTO: 30.9 PG (ref 26.8–34.3)
MCHC RBC AUTO-ENTMCNC: 32.3 G/DL (ref 31.4–37.4)
MCV RBC AUTO: 96 FL (ref 82–98)
MONOCYTES # BLD AUTO: 0.79 THOUSAND/ΜL (ref 0.17–1.22)
MONOCYTES NFR BLD AUTO: 6 % (ref 4–12)
NEUTROPHILS # BLD AUTO: 9.33 THOUSANDS/ΜL (ref 1.85–7.62)
NEUTS SEG NFR BLD AUTO: 70 % (ref 43–75)
NRBC BLD AUTO-RTO: 0 /100 WBCS
PLATELET # BLD AUTO: 699 THOUSANDS/UL (ref 149–390)
PMV BLD AUTO: 9.2 FL (ref 8.9–12.7)
RBC # BLD AUTO: 3.95 MILLION/UL (ref 3.88–5.62)
WBC # BLD AUTO: 13.3 THOUSAND/UL (ref 4.31–10.16)

## 2018-06-12 PROCEDURE — C9113 INJ PANTOPRAZOLE SODIUM, VIA: HCPCS | Performed by: STUDENT IN AN ORGANIZED HEALTH CARE EDUCATION/TRAINING PROGRAM

## 2018-06-12 PROCEDURE — 99024 POSTOP FOLLOW-UP VISIT: CPT | Performed by: SURGERY

## 2018-06-12 PROCEDURE — 83735 ASSAY OF MAGNESIUM: CPT | Performed by: NURSE PRACTITIONER

## 2018-06-12 PROCEDURE — 85025 COMPLETE CBC W/AUTO DIFF WBC: CPT | Performed by: NURSE PRACTITIONER

## 2018-06-12 RX ORDER — HYDROMORPHONE HYDROCHLORIDE 2 MG/1
4 TABLET ORAL EVERY 4 HOURS PRN
Status: DISCONTINUED | OUTPATIENT
Start: 2018-06-12 | End: 2018-06-13

## 2018-06-12 RX ORDER — PANTOPRAZOLE SODIUM 40 MG/1
40 TABLET, DELAYED RELEASE ORAL
Status: DISCONTINUED | OUTPATIENT
Start: 2018-06-13 | End: 2018-06-17 | Stop reason: HOSPADM

## 2018-06-12 RX ORDER — HYDROMORPHONE HYDROCHLORIDE 2 MG/1
2 TABLET ORAL EVERY 4 HOURS PRN
Status: DISCONTINUED | OUTPATIENT
Start: 2018-06-12 | End: 2018-06-13

## 2018-06-12 RX ADMIN — POTASSIUM CHLORIDE, DEXTROSE MONOHYDRATE AND SODIUM CHLORIDE 84 ML/HR: 150; 5; 450 INJECTION, SOLUTION INTRAVENOUS at 18:25

## 2018-06-12 RX ADMIN — GABAPENTIN 300 MG: 300 CAPSULE ORAL at 10:37

## 2018-06-12 RX ADMIN — TAMSULOSIN HYDROCHLORIDE 0.4 MG: 0.4 CAPSULE ORAL at 16:57

## 2018-06-12 RX ADMIN — GABAPENTIN 300 MG: 300 CAPSULE ORAL at 16:57

## 2018-06-12 RX ADMIN — DOXAZOSIN 4 MG: 4 TABLET ORAL at 10:37

## 2018-06-12 RX ADMIN — POTASSIUM CHLORIDE, DEXTROSE MONOHYDRATE AND SODIUM CHLORIDE 84 ML/HR: 150; 5; 450 INJECTION, SOLUTION INTRAVENOUS at 06:04

## 2018-06-12 RX ADMIN — PSYLLIUM HUSK 1 PACKET: 3.4 POWDER ORAL at 16:57

## 2018-06-12 RX ADMIN — HYDROMORPHONE HYDROCHLORIDE 4 MG: 2 TABLET ORAL at 22:19

## 2018-06-12 RX ADMIN — GABAPENTIN 300 MG: 300 CAPSULE ORAL at 20:59

## 2018-06-12 RX ADMIN — PANTOPRAZOLE SODIUM 40 MG: 40 INJECTION, POWDER, FOR SOLUTION INTRAVENOUS at 10:37

## 2018-06-12 RX ADMIN — HYDROMORPHONE HYDROCHLORIDE 4 MG: 2 TABLET ORAL at 10:36

## 2018-06-12 RX ADMIN — PSYLLIUM HUSK 1 PACKET: 3.4 POWDER ORAL at 20:59

## 2018-06-12 RX ADMIN — HYDROMORPHONE HYDROCHLORIDE 0.5 MG: 1 INJECTION, SOLUTION INTRAMUSCULAR; INTRAVENOUS; SUBCUTANEOUS at 12:17

## 2018-06-12 RX ADMIN — PSYLLIUM HUSK 1 PACKET: 3.4 POWDER ORAL at 10:37

## 2018-06-12 RX ADMIN — HYDROMORPHONE HYDROCHLORIDE 4 MG: 2 TABLET ORAL at 17:01

## 2018-06-12 RX ADMIN — ENOXAPARIN SODIUM 40 MG: 100 INJECTION SUBCUTANEOUS at 10:37

## 2018-06-12 NOTE — PROGRESS NOTES
Progress Note - Inpatient Pain Management    Alexander Bunn 63 y o  male MRN: [id number]  Unit/Bed#: [facility] 829-01 Encounter: [id number]      Assessment:   Principal Problem:    Diverticulitis  Active Problems:    Ureteral obstruction    Hydronephrosis      Plan/Recommendations:   Acute post op pain  · Gabapentin 300mg TID  · Oral dilaudid 2mg PO Q4 hours PRN moderate pain and Dilaudid oral 4mg PO Q4 hours PRN severe pain  · Discontinue Dilaudid IV today    Reviewed with Red Surg    Pain History  Current pain location(s): abdominal   Pain Scale:   0-10  Severity:  Severe at times  24 hour history: Patient resting in bed/ Continues to report abdominal pain pain, worse on the left side and over the incision  Reports episodes of diaphoresis intermittently  Pain also increases with urination  Feels increased dose of oral dilaudid is mildly effective       Current Analgesic regimen:  Dilaudid IV total (zero), Dilaudid oral total (12mg)  Bowel Regimen: None    Meds/Allergies   all current active meds have been reviewed and current meds:   Current Facility-Administered Medications   Medication Dose Route Frequency    dextrose 5 % and sodium chloride 0 45 % with KCl 20 mEq/L infusion  84 mL/hr Intravenous Continuous    doxazosin (CARDURA) tablet 4 mg  4 mg Oral Daily    enoxaparin (LOVENOX) subcutaneous injection 40 mg  40 mg Subcutaneous Q24H BLAIR    gabapentin (NEURONTIN) capsule 300 mg  300 mg Oral TID    HYDROmorphone (DILAUDID) injection 0 5 mg  0 5 mg Intravenous Q2H PRN    HYDROmorphone (DILAUDID) tablet 2 mg  2 mg Oral Q6H PRN    HYDROmorphone (DILAUDID) tablet 4 mg  4 mg Oral Q6H PRN    iohexol (OMNIPAQUE) 240 MG/ML solution 50 mL  50 mL Oral 90 min pre-procedure    lidocaine (XYLOCAINE) 1 % injection 10 mL  10 mL Infiltration Once    ondansetron (ZOFRAN) injection 4 mg  4 mg Intravenous Q4H PRN    [START ON 6/13/2018] pantoprazole (PROTONIX) EC tablet 40 mg  40 mg Oral Early Morning    polyvinyl alcohol (LIQUIFILM TEARS) 1 4 % ophthalmic solution 1 drop  1 drop Both Eyes Q3H PRN    psyllium (METAMUCIL) 1 packet  1 packet Oral TID    simethicone (MYLICON) chewable tablet 80 mg  80 mg Oral Q6H PRN    tamsulosin (FLOMAX) capsule 0 4 mg  0 4 mg Oral Daily With Dinner       Allergies   Allergen Reactions    Penicillins        Objective     Temp:  [98 °F (36 7 °C)-99 3 °F (37 4 °C)] 98 2 °F (36 8 °C)  HR:  [] 120  Resp:  [18] 18  BP: (120-149)/(76-90) 149/90      Intake/Output Summary (Last 24 hours) at 06/12/18 1211  Last data filed at 06/12/18 1051   Gross per 24 hour   Intake           2223 2 ml   Output             3445 ml   Net          -1221 8 ml     Last Bowel Movement: ostomy with liquid stool               Physical Exam: /90 (BP Location: Right arm)   Pulse (!) 120   Temp 98 2 °F (36 8 °C) (Oral)   Resp 18   Ht 5' 8" (1 727 m)   Wt 75 6 kg (166 lb 10 7 oz)   SpO2 98%   BMI 25 34 kg/m²     General Appearance:    Alert, cooperative, no distress   Neurological:   Oriented to person, place, and time   Head:    Normocephalic, without obvious abnormality, atraumatic   Eyes:    EOM's intact   Back:     ROM normal   Lungs:     Respirations unlabored   Chest Wall:    No deformity   Abdomen:        Soft, + left sided tenderness, midline abdominal incision JESSICA, small amount of sero-sang drainage is present on abdominal dressing    Extremities:   Extremities no edema, intact sensation to light touch   Skin:   Skin no rashes or lesions     Lab Results:     Results from last 7 days  Lab Units 06/12/18  0522   WBC Thousand/uL 13 30*   HEMOGLOBIN g/dL 12 2   HEMATOCRIT % 37 8   PLATELETS Thousands/uL 699*        Results from last 7 days  Lab Units 06/11/18  0522   SODIUM mmol/L 141   POTASSIUM mmol/L 4 7   CHLORIDE mmol/L 109*   CO2 mmol/L 24   BUN mg/dL 11   CREATININE mg/dL 0 83   CALCIUM mg/dL 9 0   GLUCOSE RANDOM mg/dL 97       Imaging Studies: No new imaging studies       Counseling / Coordination of Care  Total floor / unit time spent today 15 minutes  Greater than 50% of total time was spent with the patient and / or family counseling and / or coordination of care   A description of the counseling / coordination of care: Reviewed plan of care and medications with patient, RN staff and primary care team     Christy Brooks MS, RN-BC  Acute Pain

## 2018-06-12 NOTE — PROGRESS NOTES
The pantoprazole has / have been converted to Oral per [facility] for Adults as approved by the Pharmacy and Therapeutics Committee  The patient met all eligible criteria:  3 Age = 25years old   2) Received at least one dose of the IV form   3) Receiving at least one other scheduled oral/enteral medication   4) Tolerating an oral/enteral diet   and did not have any exclusions:   1) Critical care patient   2) Active GI bleed (IF assessing H2RAs or PPIs)   3) Continuous tube feeding (IF assessing cipro, doxycycline, levofloxacin, minocycline, rifampin, or voriconazole)   4) Receiving PO vancomycin (IF assessing metronidazole)   5) Persistent nausea and/or vomiting   6) Ileus or gastrointestinal obstruction   7) Rafaela/nasogastric tube set for continuous suction   8) Specific order not to automatically convert to PO (in the order's comments or if discussed in the most recent Infectious Disease or primary team's progress notes)

## 2018-06-12 NOTE — MEDICAL STUDENT
Progress Note - General Surgery   Thuan Bunn 63 y o  male MRN: [id number]  Unit/Bed#: [facility] 829-01 Encounter: [id number]    Assessment and Plan:  Patient Active Problem List   Diagnosis    Diverticulitis of large intestine with abscess without bleeding    Diverticulitis    Ureteral obstruction    Hydronephrosis       Assessment:  62 yo M s/p exploratory laparotomy, sigmoid colon resection, diverting ileostomy on 6/1/18    Plan:  1  Dispo planning  2  Continue metamucil  3  Continue D5 1/2 NS @ 84      Subjective: Patient resting comfortably  No acute events overnight  Objective: Midline incision no longer draining purulence  Ileostomy site looks clean and erythematous  Lower abdomen and suprapubic region tender to palpation  Vitals   Vitals:    06/11/18 0001 06/11/18 0700 06/11/18 1500 06/11/18 2300   BP: 111/70 126/77 120/76 130/80   BP Location: Left arm Left arm Left arm Left arm   Pulse: 96 80 (!) 107 94   Resp: 18 18 18 18   Temp: 98 7 °F (37 1 °C) 99 1 °F (37 3 °C) 98 °F (36 7 °C) 99 3 °F (37 4 °C)   TempSrc: Oral Oral Oral Oral   SpO2: 99% 98% 97% 98%   Weight:       Height:         Temp  Min: 97 3 °F (36 3 °C)  Max: 101 9 °F (38 8 °C)  IBW: 68 4 kg   Body mass index is 25 34 kg/m²  I/O   I/O       06/10 0701 - 06/11 0700 06/11 0701 - 06/12 0700    P  O  480 540    I V  (mL/kg) 1442 5 (19 1) 989 8 (13 1)    Total Intake(mL/kg) 1922 5 (25 4) 1529 8 (20 2)    Urine (mL/kg/hr) 1200 (0 7) 2075 (1 1)    Stool 2125 (1 2) 1320 (0 7)    Total Output 3325 3395    Net -1402 5 -1865 2                Intake/Output Summary (Last 24 hours) at 06/12/18 0555  Last data filed at 06/12/18 0300   Gross per 24 hour   Intake           1865 8 ml   Output             3395 ml   Net          -1529 2 ml       Invasive  Devices  Invasive Devices     Peripheral Intravenous Line            Peripheral IV 06/10/18 Right Forearm 1 day          Drain            Ileostomy Loop RUQ 10 days                Active medications  Scheduled Meds:    Current Facility-Administered Medications:  dextrose 5 % and sodium chloride 0 45 % with KCl 20 mEq/L 84 mL/hr Intravenous Continuous Mohit Mcdaniel MD Last Rate: 84 mL/hr (06/11/18 1758)   doxazosin 4 mg Oral Daily Bianka Welsh MD    enoxaparin 40 mg Subcutaneous Q24H [address] Pily Villeda MD    gabapentin 300 mg Oral TID Pily Villeda MD    HYDROmorphone 0 5 mg Intravenous Q2H PRN Belcamp Mayor    HYDROmorphone 2 mg Oral Q6H PRN GLENDY Leo    HYDROmorphone 4 mg Oral Q6H PRN GLENDY Leo    iohexol 50 mL Oral 90 min pre-procedure Dee Welch MD    lidocaine 10 mL Infiltration Once Darling Ozuna    ondansetron 4 mg Intravenous Q4H PRN Janelle Holder    pantoprazole 40 mg Intravenous Q24H [address] Jeanette Cohn MD    polyvinyl alcohol 1 drop Both Eyes Q3H PRN Bianka Weslh MD    psyllium 1 packet Oral TID Mohit Mcdaniel MD    simethicone 80 mg Oral Q6H PRN Carlos Howe MD    tamsulosin 0 4 mg Oral Daily With Herb Wells MD      Continuous Infusions:    dextrose 5 % and sodium chloride 0 45 % with KCl 20 mEq/L 84 mL/hr Last Rate: 84 mL/hr (06/11/18 1758)     PRN Meds:     HYDROmorphone 0 5 mg Q2H PRN   HYDROmorphone 2 mg Q6H PRN   HYDROmorphone 4 mg Q6H PRN   ondansetron 4 mg Q4H PRN   polyvinyl alcohol 1 drop Q3H PRN   simethicone 80 mg Q6H PRN       Physical Exam: /80 (BP Location: Left arm)   Pulse 94   Temp 99 3 °F (37 4 °C) (Oral)   Resp 18   Ht 5' 8" (1 727 m)   Wt 75 6 kg (166 lb 10 7 oz)   SpO2 98%   BMI 25 34 kg/m²     Physical Exam:  General Appearance: Alert, cooperative, no distress, appears stated age  Lungs: Clear to auscultation bilaterally, respirations unlablored   Heart: Regular rate and rhythm, S1 and S2 normal, no murmur, rub or gallop  Abdomen: Soft, non-tender, non distended, bowel sounds active in all four quadrants,   No masses or organomegaly    Laboratory and Diagnostics:    Results from last 7 days  Lab Units 06/11/18  0502 06/10/18  0457 06/09/18  1411 06/08/18  0608   WBC Thousand/uL 11 11* 13 57* 11 24* 14 76*   HEMOGLOBIN g/dL 11 4* 10 4* 11 2* 11 5*   HEMATOCRIT % 35 4* 31 9* 35 5* 35 4*   PLATELETS Thousands/uL 684* 651* 621* 550*   NEUTROS PCT % 71 74  --  77*   MONOS PCT % 6 6  --  8   MONO PCT MAN %  --   --  1*  --        Results from last 7 days  Lab Units 06/11/18  0522 06/10/18  0457 06/09/18  1411   SODIUM mmol/L 141 143 137   POTASSIUM mmol/L 4 7 4 4 4 5   CHLORIDE mmol/L 109* 108 106   CO2 mmol/L 24 26 27   BUN mg/dL 11 12 13   CREATININE mg/dL 0 83 0 79 1 12   CALCIUM mg/dL 9 0 8 6 8 4   GLUCOSE RANDOM mg/dL 97 96 132       Results from last 7 days  Lab Units 06/11/18  0522 06/06/18  0557 06/05/18  0558   MAGNESIUM mg/dL 1 8 2 0 2 0     Lab Results   Component Value Date    PHOS 3 3 06/02/2018    PHOS 2 4 (L) 01/02/2016    PHOS 1 6 (L) 01/01/2016          No results found for: TROPONINT  ABG:No results found for: PHART, VSN0USZ, PO2ART, VPI8SZK, C9EDAHTT, BEART, SOURCE    Blood Culture:   Lab Results   Component Value Date    BLOODCX No Growth After 5 Days  06/04/2018    BLOODCX No Growth After 5 Days   06/04/2018     Urine Culture:   Lab Results   Component Value Date    URINECX No Growth <1000 cfu/mL 06/08/2018    URINECX No Growth <1000 cfu/mL 06/04/2018     Sputum Culture: No components found for: SPUTUMCX    Imaging: I have personally reviewed pertinent films in PACS    VTE Pharmacologic Prophylaxis: Sequential compression device (Venodyne)  and Enoxaparin (Lovenox)  VTE Mechanical Prophylaxis: sequential compression device

## 2018-06-12 NOTE — PROGRESS NOTES
Progress Note - General Surgery   Deirdre Bunn 63 y o  male MRN: [id number]  Unit/Bed#: [facility] 829-01 Encounter: [id number]    Assessment and Plan:  Patient Active Problem List   Diagnosis    Diverticulitis of large intestine with abscess without bleeding    Diverticulitis    Ureteral obstruction    Hydronephrosis       Assessment:  63 M with perforated diverticulitis, s/p ex-lap sigmoidectomy, anastomosis, diverting ileostomy with cysto and L ureteral stent on 6/1 and cystoscopy and  L ureteral stent replacement on 6/9     Plan:  Continue diet as tolerated  Continue metamucil   Follow up on am labs   PRN analgesia  Encourage OOB and ambulation  Trend leukocytosis - decreasing   Monitor midline for signs of infection - purulent drainage, erythema etc   Lovenox & venodynes for DVT ppx  Dispo planning     Subjective: Pt's staples were removed at bedside yesterday  No purulent drainage noted  The most inferior aspect of wound had some fullness - possibly harboring an underlying abscess - this morning however the area of concern is now depressed  Gauze dressing has serosanguinous drainage - no purulence  Pt has been eating well, ostomy output is decreasing on metamucil and he is ambulating  T max & Tc 99 3    Objective:      Vitals   Vitals:    06/11/18 0001 06/11/18 0700 06/11/18 1500 06/11/18 2300   BP: 111/70 126/77 120/76 130/80   BP Location: Left arm Left arm Left arm Left arm   Pulse: 96 80 (!) 107 94   Resp: 18 18 18 18   Temp: 98 7 °F (37 1 °C) 99 1 °F (37 3 °C) 98 °F (36 7 °C) 99 3 °F (37 4 °C)   TempSrc: Oral Oral Oral Oral   SpO2: 99% 98% 97% 98%   Weight:       Height:         Temp  Min: 97 3 °F (36 3 °C)  Max: 101 9 °F (38 8 °C)  IBW: 68 4 kg   Body mass index is 25 34 kg/m²  I/O   I/O       06/10 0701 - 06/11 0700 06/11 0701 - 06/12 0700    P  O  480 540    I V  (mL/kg) 1442 5 (19 1) 989 8 (13 1)    Total Intake(mL/kg) 1922 5 (25 4) 1529 8 (20 2)    Urine (mL/kg/hr) 1200 (0 7) 2075 (1 1) Stool 2125 (1 2) 1320 (0 7)    Total Output 3325 3395    Net -1402 5 -1865 2                Intake/Output Summary (Last 24 hours) at 06/12/18 0544  Last data filed at 06/12/18 0300   Gross per 24 hour   Intake           1865 8 ml   Output             3395 ml   Net          -1529 2 ml       Invasive  Devices  Invasive Devices     Peripheral Intravenous Line            Peripheral IV 06/10/18 Right Forearm 1 day          Drain            Ileostomy Loop RUQ 10 days                Active medications  Scheduled Meds:  Current Facility-Administered Medications:  dextrose 5 % and sodium chloride 0 45 % with KCl 20 mEq/L 84 mL/hr Intravenous Continuous Jerry Sanchez MD Last Rate: 84 mL/hr (06/11/18 1758)   doxazosin 4 mg Oral Daily Winston Darnell MD    enoxaparin 40 mg Subcutaneous Q24H [facility] Chrissy Lopez MD    gabapentin 300 mg Oral TID Chrissy Lopez MD    HYDROmorphone 0 5 mg Intravenous Q2H PRN [address]    HYDROmorphone 2 mg Oral Q6H PRN Liliya Risk, CRNP    HYDROmorphone 4 mg Oral Q6H PRN Liliya Risk, CRNP    iohexol 50 mL Oral 90 min pre-procedure Cal Gee MD    lidocaine 10 mL Infiltration Once [address]    ondansetron 4 mg Intravenous Q4H PRN Janelle Holder    pantoprazole 40 mg Intravenous Q24H [facility] Tatiana Santiago MD    polyvinyl alcohol 1 drop Both Eyes Q3H PRN Winston Darnell MD    psyllium 1 packet Oral TID Jerry Sanchez MD    simethicone 80 mg Oral Q6H PRN Camila Baires MD    tamsulosin 0 4 mg Oral Daily With Manoj Acosta MD      Continuous Infusions:    dextrose 5 % and sodium chloride 0 45 % with KCl 20 mEq/L 84 mL/hr Last Rate: 84 mL/hr (06/11/18 1758)     PRN Meds:     HYDROmorphone 0 5 mg Q2H PRN   HYDROmorphone 2 mg Q6H PRN   HYDROmorphone 4 mg Q6H PRN   ondansetron 4 mg Q4H PRN   polyvinyl alcohol 1 drop Q3H PRN   simethicone 80 mg Q6H PRN       Physical Exam: /80 (BP Location: Left arm)   Pulse 94   Temp 99 3 °F (37 4 °C) (Oral)   Resp 18   Ht 5' 8" (1 727 m)   Wt 75 6 kg (166 lb 10 7 oz)   SpO2 98%   BMI 25 34 kg/m²     Physical Exam:  General Appearance: Alert, cooperative, no distress, appears stated age  Lungs: Clear to auscultation bilaterally, respirations unlablored   Heart: Regular rate and rhythm, S1 and S2 normal, no murmur, rub or gallop  Abdomen: Soft, non-tender, non distended, bowel sounds active in all four quadrants,   No masses or organomegaly  Incision is CDI - small amount of serosanguinous drainage on 4x4 gauze - no purulence  Ostomy with liquid stool output - red rubber catheter still present     Laboratory and Diagnostics:    Results from last 7 days  Lab Units 06/11/18  0527 06/10/18  0457 06/09/18  1411 06/08/18  0608   WBC Thousand/uL 11 11* 13 57* 11 24* 14 76*   HEMOGLOBIN g/dL 11 4* 10 4* 11 2* 11 5*   HEMATOCRIT % 35 4* 31 9* 35 5* 35 4*   PLATELETS Thousands/uL 684* 651* 621* 550*   NEUTROS PCT % 71 74  --  77*   MONOS PCT % 6 6  --  8   MONO PCT MAN %  --   --  1*  --        Results from last 7 days  Lab Units 06/11/18  0522 06/10/18  0457 06/09/18  1411   SODIUM mmol/L 141 143 137   POTASSIUM mmol/L 4 7 4 4 4 5   CHLORIDE mmol/L 109* 108 106   CO2 mmol/L 24 26 27   BUN mg/dL 11 12 13   CREATININE mg/dL 0 83 0 79 1 12   CALCIUM mg/dL 9 0 8 6 8 4   GLUCOSE RANDOM mg/dL 97 96 132       Results from last 7 days  Lab Units 06/11/18  0522 06/06/18  0557 06/05/18  0558   MAGNESIUM mg/dL 1 8 2 0 2 0     Lab Results   Component Value Date    PHOS 3 3 06/02/2018    PHOS 2 4 (L) 01/02/2016    PHOS 1 6 (L) 01/01/2016          No results found for: TROPONINT  ABG:No results found for: PHART, LMH2PMN, PO2ART, MDF1IQL, C9GWRTUT, BEART, SOURCE    Blood Culture:   Lab Results   Component Value Date    BLOODCX No Growth After 5 Days  06/04/2018    BLOODCX No Growth After 5 Days  06/04/2018     Urine Culture:   Lab Results   Component Value Date    URINECX No Growth <1000 cfu/mL 06/08/2018    URINECX No Growth <1000 cfu/mL 06/04/2018     Sputum Culture:  No components found for: SPUTUMCX    Imaging: I have personally reviewed pertinent reports     and I have personally reviewed pertinent films in PACS    VTE Pharmacologic Prophylaxis: Enoxaparin (Lovenox)  VTE Mechanical Prophylaxis: sequential compression device

## 2018-06-13 LAB
ANION GAP SERPL CALCULATED.3IONS-SCNC: 8 MMOL/L (ref 4–13)
BASOPHILS # BLD AUTO: 0.09 THOUSANDS/ΜL (ref 0–0.1)
BASOPHILS NFR BLD AUTO: 1 % (ref 0–1)
BUN SERPL-MCNC: 14 MG/DL (ref 5–25)
CALCIUM SERPL-MCNC: 8.9 MG/DL (ref 8.3–10.1)
CHLORIDE SERPL-SCNC: 106 MMOL/L (ref 100–108)
CO2 SERPL-SCNC: 22 MMOL/L (ref 21–32)
CREAT SERPL-MCNC: 0.89 MG/DL (ref 0.6–1.3)
EOSINOPHIL # BLD AUTO: 0.58 THOUSAND/ΜL (ref 0–0.61)
EOSINOPHIL NFR BLD AUTO: 4 % (ref 0–6)
ERYTHROCYTE [DISTWIDTH] IN BLOOD BY AUTOMATED COUNT: 13.8 % (ref 11.6–15.1)
GFR SERPL CREATININE-BSD FRML MDRD: 95 ML/MIN/1.73SQ M
GLUCOSE SERPL-MCNC: 96 MG/DL (ref 65–140)
HCT VFR BLD AUTO: 38.9 % (ref 36.5–49.3)
HGB BLD-MCNC: 12.3 G/DL (ref 12–17)
IMM GRANULOCYTES # BLD AUTO: 0.28 THOUSAND/UL (ref 0–0.2)
IMM GRANULOCYTES NFR BLD AUTO: 2 % (ref 0–2)
LYMPHOCYTES # BLD AUTO: 3.12 THOUSANDS/ΜL (ref 0.6–4.47)
LYMPHOCYTES NFR BLD AUTO: 19 % (ref 14–44)
MCH RBC QN AUTO: 30.1 PG (ref 26.8–34.3)
MCHC RBC AUTO-ENTMCNC: 31.6 G/DL (ref 31.4–37.4)
MCV RBC AUTO: 95 FL (ref 82–98)
MONOCYTES # BLD AUTO: 0.87 THOUSAND/ΜL (ref 0.17–1.22)
MONOCYTES NFR BLD AUTO: 5 % (ref 4–12)
NEUTROPHILS # BLD AUTO: 11.26 THOUSANDS/ΜL (ref 1.85–7.62)
NEUTS SEG NFR BLD AUTO: 69 % (ref 43–75)
NRBC BLD AUTO-RTO: 0 /100 WBCS
PLATELET # BLD AUTO: 764 THOUSANDS/UL (ref 149–390)
PMV BLD AUTO: 9.2 FL (ref 8.9–12.7)
POTASSIUM SERPL-SCNC: 4.9 MMOL/L (ref 3.5–5.3)
RBC # BLD AUTO: 4.08 MILLION/UL (ref 3.88–5.62)
SODIUM SERPL-SCNC: 136 MMOL/L (ref 136–145)
WBC # BLD AUTO: 16.2 THOUSAND/UL (ref 4.31–10.16)

## 2018-06-13 PROCEDURE — 85025 COMPLETE CBC W/AUTO DIFF WBC: CPT | Performed by: SURGERY

## 2018-06-13 PROCEDURE — 80048 BASIC METABOLIC PNL TOTAL CA: CPT | Performed by: SURGERY

## 2018-06-13 PROCEDURE — 99024 POSTOP FOLLOW-UP VISIT: CPT | Performed by: SURGERY

## 2018-06-13 RX ORDER — OXYCODONE HYDROCHLORIDE 5 MG/1
2.5 TABLET ORAL EVERY 4 HOURS PRN
Status: DISCONTINUED | OUTPATIENT
Start: 2018-06-13 | End: 2018-06-14

## 2018-06-13 RX ORDER — OXYCODONE HYDROCHLORIDE 5 MG/1
5 TABLET ORAL EVERY 4 HOURS PRN
Status: DISCONTINUED | OUTPATIENT
Start: 2018-06-13 | End: 2018-06-14

## 2018-06-13 RX ADMIN — HYDROMORPHONE HYDROCHLORIDE 4 MG: 2 TABLET ORAL at 08:19

## 2018-06-13 RX ADMIN — GABAPENTIN 300 MG: 300 CAPSULE ORAL at 20:18

## 2018-06-13 RX ADMIN — PSYLLIUM HUSK 1 PACKET: 3.4 POWDER ORAL at 15:41

## 2018-06-13 RX ADMIN — ENOXAPARIN SODIUM 40 MG: 100 INJECTION SUBCUTANEOUS at 08:12

## 2018-06-13 RX ADMIN — DOXAZOSIN 4 MG: 4 TABLET ORAL at 08:12

## 2018-06-13 RX ADMIN — PANTOPRAZOLE SODIUM 40 MG: 40 TABLET, DELAYED RELEASE ORAL at 05:46

## 2018-06-13 RX ADMIN — TAMSULOSIN HYDROCHLORIDE 0.4 MG: 0.4 CAPSULE ORAL at 15:41

## 2018-06-13 RX ADMIN — PSYLLIUM HUSK 1 PACKET: 3.4 POWDER ORAL at 20:18

## 2018-06-13 RX ADMIN — GABAPENTIN 300 MG: 300 CAPSULE ORAL at 08:12

## 2018-06-13 RX ADMIN — OXYCODONE HYDROCHLORIDE 5 MG: 5 TABLET ORAL at 20:18

## 2018-06-13 RX ADMIN — HYDROMORPHONE HYDROCHLORIDE 4 MG: 2 TABLET ORAL at 13:26

## 2018-06-13 RX ADMIN — POTASSIUM CHLORIDE, DEXTROSE MONOHYDRATE AND SODIUM CHLORIDE 84 ML/HR: 150; 5; 450 INJECTION, SOLUTION INTRAVENOUS at 05:46

## 2018-06-13 RX ADMIN — GABAPENTIN 300 MG: 300 CAPSULE ORAL at 15:41

## 2018-06-13 RX ADMIN — HYDROMORPHONE HYDROCHLORIDE 4 MG: 2 TABLET ORAL at 01:52

## 2018-06-13 RX ADMIN — PSYLLIUM HUSK 1 PACKET: 3.4 POWDER ORAL at 08:12

## 2018-06-13 NOTE — PROGRESS NOTES
Progress Note - Inpatient Pain Management    Luzmaria Bunn 63 y o  male MRN: [id number]  Unit/Bed#: [facility] 829-01 Encounter: [id number]      Assessment:   Principal Problem:    Diverticulitis  Active Problems:    Ureteral obstruction    Hydronephrosis      Plan/Recommendations:   Acute post op pain  · Gabapentin 300mg TID  · Discontinue PO dilaudid   · Oxycodone 5mg Q4 hours PRN breakthrough pain     Reviewed with Red Surg    Pain History  Current pain location(s): abdominal   Pain Scale:   5-9  Severity:  Severe at times  24 hour history: Patient sitting in bed, ambulates hallway frequently  Reports abdominal pain, worse on the left side, and pain with urination  He also feels oral Dilaudid is making him diaphoretic and have bad dreams at night  He is requesting to go back to Oxycodone products       Current Analgesic regimen:  Gabapentin 300mg TID, Dilaudid oral total (16mg)  Bowel Regimen: None    Meds/Allergies   all current active meds have been reviewed and current meds:   Current Facility-Administered Medications   Medication Dose Route Frequency    dextrose 5 % and sodium chloride 0 45 % with KCl 20 mEq/L infusion  84 mL/hr Intravenous Continuous    doxazosin (CARDURA) tablet 4 mg  4 mg Oral Daily    enoxaparin (LOVENOX) subcutaneous injection 40 mg  40 mg Subcutaneous Q24H BLAIR    gabapentin (NEURONTIN) capsule 300 mg  300 mg Oral TID    HYDROmorphone (DILAUDID) tablet 2 mg  2 mg Oral Q4H PRN    HYDROmorphone (DILAUDID) tablet 4 mg  4 mg Oral Q4H PRN    iohexol (OMNIPAQUE) 240 MG/ML solution 50 mL  50 mL Oral 90 min pre-procedure    lidocaine (XYLOCAINE) 1 % injection 10 mL  10 mL Infiltration Once    ondansetron (ZOFRAN) injection 4 mg  4 mg Intravenous Q4H PRN    pantoprazole (PROTONIX) EC tablet 40 mg  40 mg Oral Early Morning    polyvinyl alcohol (LIQUIFILM TEARS) 1 4 % ophthalmic solution 1 drop  1 drop Both Eyes Q3H PRN    psyllium (METAMUCIL) 1 packet  1 packet Oral TID    simethicone [facility]) chewable tablet 80 mg  80 mg Oral Q6H PRN    tamsulosin (FLOMAX) capsule 0 4 mg  0 4 mg Oral Daily With Dinner       Allergies   Allergen Reactions    Penicillins        Objective     Temp:  [98 4 °F (36 9 °C)-98 6 °F (37 °C)] 98 4 °F (36 9 °C)  HR:  [] 90  Resp:  [18] 18  BP: (112-147)/(68-84) 129/84      Intake/Output Summary (Last 24 hours) at 06/13/18 1123  Last data filed at 06/13/18 7679   Gross per 24 hour   Intake             2645 ml   Output             2850 ml   Net             -205 ml     Last Bowel Movement: ostomy with liquid stool               Physical Exam: /84 (BP Location: Left arm)   Pulse 90   Temp 98 4 °F (36 9 °C) (Oral)   Resp 18   Ht 5' 8" (1 727 m)   Wt 75 6 kg (166 lb 10 7 oz)   SpO2 99%   BMI 25 34 kg/m²     General Appearance:    Alert, cooperative, no distress   Neurological:   Oriented to person, place, and time   Head:    Normocephalic, without obvious abnormality, atraumatic   Eyes:    EOM's intact   Back:     ROM normal   Lungs:     Respirations unlabored   Chest Wall:    No deformity   Abdomen:        Soft, + left sided tenderness, midline abdominal incision with small amount of sero-sang drainage present on abdominal dressing    Extremities:   Extremities no edema, intact sensation to light touch   Skin:   Skin no rashes or lesions     Lab Results:     Results from last 7 days  Lab Units 06/13/18  0600   WBC Thousand/uL 16 20*   HEMOGLOBIN g/dL 12 3   HEMATOCRIT % 38 9   PLATELETS Thousands/uL 764*        Results from last 7 days  Lab Units 06/13/18  0600   SODIUM mmol/L 136   POTASSIUM mmol/L 4 9   CHLORIDE mmol/L 106   CO2 mmol/L 22   BUN mg/dL 14   CREATININE mg/dL 0 89   CALCIUM mg/dL 8 9   GLUCOSE RANDOM mg/dL 96       Imaging Studies: No new imaging studies  Counseling / Coordination of Care  Total floor / unit time spent today 15 minutes   Greater than 50% of total time was spent with the patient and / or family counseling and / or coordination of care   A description of the counseling / coordination of care: Reviewed plan of care and medications with patient, RN staff and primary care team     Maurice Reyna MS, RN-BC  Acute Pain

## 2018-06-13 NOTE — SOCIAL WORK
CM discussed pt during care coordination rounds   Pt wbc elevated not medically clear today   Discharge plan is home with vna and family support   Cm will follow

## 2018-06-13 NOTE — PROGRESS NOTES
Progress Note - General Surgery   Amee Bunn 63 y o  male MRN: [id number]  Unit/Bed#: [facility] 829-01 Encounter: [id number]    Assessment and Plan:  Patient Active Problem List   Diagnosis    Diverticulitis of large intestine with abscess without bleeding    Diverticulitis    Ureteral obstruction    Hydronephrosis       Assessment:  63 M with perforated diverticulitis, s/p ex-lap sigmoidectomy, anastomosis, diverting ileostomy with cysto and L ureteral stent on 6/1 and cystoscopy and  L ureteral stent replacement on 6/9     Plan:  Continue diet as tolerated  Continue metamucil   Follow up on am labs   PRN analgesia  Encourage OOB and ambulation  Trend leukocytosis - decreasing   Monitor midline for signs of infection - purulent drainage, erythema etc   Lovenox & venodynes for DVT ppx  Dispo planning     Subjective: Pt still c/o 8/10 pain  Nursing stating that he never budges from 8/10 pain scale yet is aggressively walking the halls and doesn't appear to be in pain The most inferior aspect of wound had some fullness - possibly harboring an underlying abscess - Gauze dressing has serosanguinous / salmon colored drainage - no purulence  Pt has been eating well, ostomy output is decreasing on metamucil and he is ambulating  Objective:      Vitals   Vitals:    06/11/18 2300 06/12/18 0700 06/12/18 1513 06/12/18 2258   BP: 130/80 149/90 112/68 147/81   BP Location: Left arm Right arm Left arm Left arm   Pulse: 94 (!) 120 105 104   Resp: 18 18 18 18   Temp: 99 3 °F (37 4 °C) 98 2 °F (36 8 °C) 98 6 °F (37 °C) 98 6 °F (37 °C)   TempSrc: Oral Oral Oral Oral   SpO2: 98% 98% 98% 97%   Weight:       Height:         Temp  Min: 97 3 °F (36 3 °C)  Max: 101 9 °F (38 8 °C)  IBW: 68 4 kg   Body mass index is 25 34 kg/m²  I/O   I/O       06/10 0701 - 06/11 0700 06/11 0701 - 06/12 0700    P  O  480 540    I V  (mL/kg) 1442 5 (19 1) 989 8 (13 1)    Total Intake(mL/kg) 1922 5 (25 4) 1529 8 (20 2)    Urine (mL/kg/hr) 1200 (0 7) 2075 (1 1)    Stool 2125 (1 2) 1320 (0 7)    Total Output 3325 3395    Net -1402 5 -1865 2                Intake/Output Summary (Last 24 hours) at 06/13/18 0703  Last data filed at 06/13/18 0645   Gross per 24 hour   Intake             2805 ml   Output             2600 ml   Net              205 ml       Invasive  Devices  Invasive Devices     Peripheral Intravenous Line            Peripheral IV 06/12/18 Right Forearm less than 1 day          Drain            Ileostomy Loop RUQ 11 days                Active medications  Scheduled Meds:    Current Facility-Administered Medications:  dextrose 5 % and sodium chloride 0 45 % with KCl 20 mEq/L 84 mL/hr Intravenous Continuous Laxmi Bell MD Last Rate: 84 mL/hr (06/13/18 0546)   doxazosin 4 mg Oral Daily Ami Farias MD    enoxaparin 40 mg Subcutaneous Q24H [address] Joe Damon MD    gabapentin 300 mg Oral TID Joe Damon MD    HYDROmorphone 2 mg Oral Q4H PRN Alex Villatoro PA-C    HYDROmorphone 4 mg Oral Q4H PRN Alex Villatoro PA-C    iohexol 50 mL Oral 90 min pre-procedure Carlos James MD    lidocaine 10 mL Infiltration Once Vilma Goff    ondansetron 4 mg Intravenous Q4H PRN Janelle Holder    pantoprazole 40 mg Oral Early Morning Clint Ortiz MD    polyvinyl alcohol 1 drop Both Eyes Q3H PRN Ami Farias MD    psyllium 1 packet Oral TID Laxmi Bell MD    simethicone 80 mg Oral Q6H PRN Keyona Steven MD    tamsulosin 0 4 mg Oral Daily With Porter Ceballos MD      Continuous Infusions:    dextrose 5 % and sodium chloride 0 45 % with KCl 20 mEq/L 84 mL/hr Last Rate: 84 mL/hr (06/13/18 0546)     PRN Meds:     HYDROmorphone 2 mg Q4H PRN   HYDROmorphone 4 mg Q4H PRN   ondansetron 4 mg Q4H PRN   polyvinyl alcohol 1 drop Q3H PRN   simethicone 80 mg Q6H PRN       Physical Exam: /81 (BP Location: Left arm)   Pulse 104   Temp 98 6 °F (37 °C) (Oral)   Resp 18   Ht 5' 8" (1 727 m)   Wt 75 6 kg (166 lb 10 7 oz)   SpO2 97%   BMI 25 34 kg/m² Physical Exam:  General Appearance: Alert, cooperative, no distress, appears stated age  Lungs: Clear to auscultation bilaterally, respirations unlablored   Heart: Regular rate and rhythm, S1 and S2 normal, no murmur, rub or gallop  Abdomen: Soft, non-tender, non distended, bowel sounds active in all four quadrants,   No masses or organomegaly  Incision is CDI - small amount of serosanguinous drainage on 4x4 gauze - no purulence  Ostomy with liquid stool output - red rubber catheter still present     Laboratory and Diagnostics:    Results from last 7 days  Lab Units 06/12/18  0522 06/11/18  0527 06/10/18  0457   WBC Thousand/uL 13 30* 11 11* 13 57*   HEMOGLOBIN g/dL 12 2 11 4* 10 4*   HEMATOCRIT % 37 8 35 4* 31 9*   PLATELETS Thousands/uL 699* 684* 651*   NEUTROS PCT % 70 71 74   MONOS PCT % 6 6 6       Results from last 7 days  Lab Units 06/11/18  0522 06/10/18  0457 06/09/18  1411   SODIUM mmol/L 141 143 137   POTASSIUM mmol/L 4 7 4 4 4 5   CHLORIDE mmol/L 109* 108 106   CO2 mmol/L 24 26 27   BUN mg/dL 11 12 13   CREATININE mg/dL 0 83 0 79 1 12   CALCIUM mg/dL 9 0 8 6 8 4   GLUCOSE RANDOM mg/dL 97 96 132       Results from last 7 days  Lab Units 06/12/18  0522 06/11/18  0522   MAGNESIUM mg/dL 2 2 1 8     Lab Results   Component Value Date    PHOS 3 3 06/02/2018    PHOS 2 4 (L) 01/02/2016    PHOS 1 6 (L) 01/01/2016          No results found for: TROPONINT  ABG:No results found for: PHART, NKC3GRM, PO2ART, PKX1SHX, M0BPSNSZ, BEART, SOURCE    Blood Culture:   Lab Results   Component Value Date    BLOODCX No Growth After 5 Days  06/04/2018    BLOODCX No Growth After 5 Days  06/04/2018     Urine Culture:   Lab Results   Component Value Date    URINECX No Growth <1000 cfu/mL 06/08/2018    URINECX No Growth <1000 cfu/mL 06/04/2018     Sputum Culture: No components found for: SPUTUMCX    Imaging: I have personally reviewed pertinent reports     and I have personally reviewed pertinent films in PACS    VTE Pharmacologic Prophylaxis: Enoxaparin (Lovenox)  VTE Mechanical Prophylaxis: sequential compression device

## 2018-06-13 NOTE — RESTORATIVE TECHNICIAN NOTE
Restorative Specialist Mobility Note       Activity: Ambulate in rao (Patient seen ambulating in hallway as self)     Assistive Device: None

## 2018-06-14 ENCOUNTER — APPOINTMENT (INPATIENT)
Dept: RADIOLOGY | Facility: HOSPITAL | Age: 58
DRG: 330 | End: 2018-06-14
Payer: COMMERCIAL

## 2018-06-14 LAB
BASOPHILS # BLD AUTO: 0.1 THOUSANDS/ΜL (ref 0–0.1)
BASOPHILS NFR BLD AUTO: 1 % (ref 0–1)
EOSINOPHIL # BLD AUTO: 0.11 THOUSAND/ΜL (ref 0–0.61)
EOSINOPHIL NFR BLD AUTO: 1 % (ref 0–6)
ERYTHROCYTE [DISTWIDTH] IN BLOOD BY AUTOMATED COUNT: 13.7 % (ref 11.6–15.1)
HCT VFR BLD AUTO: 37.7 % (ref 36.5–49.3)
HGB BLD-MCNC: 12.2 G/DL (ref 12–17)
IMM GRANULOCYTES # BLD AUTO: 0.25 THOUSAND/UL (ref 0–0.2)
IMM GRANULOCYTES NFR BLD AUTO: 2 % (ref 0–2)
LYMPHOCYTES # BLD AUTO: 2.27 THOUSANDS/ΜL (ref 0.6–4.47)
LYMPHOCYTES NFR BLD AUTO: 15 % (ref 14–44)
MCH RBC QN AUTO: 31 PG (ref 26.8–34.3)
MCHC RBC AUTO-ENTMCNC: 32.4 G/DL (ref 31.4–37.4)
MCV RBC AUTO: 96 FL (ref 82–98)
MONOCYTES # BLD AUTO: 0.84 THOUSAND/ΜL (ref 0.17–1.22)
MONOCYTES NFR BLD AUTO: 6 % (ref 4–12)
NEUTROPHILS # BLD AUTO: 11.57 THOUSANDS/ΜL (ref 1.85–7.62)
NEUTS SEG NFR BLD AUTO: 75 % (ref 43–75)
NRBC BLD AUTO-RTO: 0 /100 WBCS
PLATELET # BLD AUTO: 774 THOUSANDS/UL (ref 149–390)
PMV BLD AUTO: 9.2 FL (ref 8.9–12.7)
RBC # BLD AUTO: 3.94 MILLION/UL (ref 3.88–5.62)
WBC # BLD AUTO: 15.14 THOUSAND/UL (ref 4.31–10.16)

## 2018-06-14 PROCEDURE — 85025 COMPLETE CBC W/AUTO DIFF WBC: CPT | Performed by: SURGERY

## 2018-06-14 PROCEDURE — 74177 CT ABD & PELVIS W/CONTRAST: CPT

## 2018-06-14 PROCEDURE — 71260 CT THORAX DX C+: CPT

## 2018-06-14 PROCEDURE — 99024 POSTOP FOLLOW-UP VISIT: CPT | Performed by: SURGERY

## 2018-06-14 RX ORDER — OXYCODONE HYDROCHLORIDE 5 MG/1
5 TABLET ORAL EVERY 6 HOURS PRN
Status: DISCONTINUED | OUTPATIENT
Start: 2018-06-14 | End: 2018-06-17 | Stop reason: HOSPADM

## 2018-06-14 RX ORDER — OXYCODONE HYDROCHLORIDE 5 MG/1
2.5 TABLET ORAL EVERY 6 HOURS PRN
Status: DISCONTINUED | OUTPATIENT
Start: 2018-06-14 | End: 2018-06-17 | Stop reason: HOSPADM

## 2018-06-14 RX ADMIN — OXYCODONE HYDROCHLORIDE 5 MG: 5 TABLET ORAL at 18:30

## 2018-06-14 RX ADMIN — GABAPENTIN 300 MG: 300 CAPSULE ORAL at 18:31

## 2018-06-14 RX ADMIN — PSYLLIUM HUSK 1 PACKET: 3.4 POWDER ORAL at 21:14

## 2018-06-14 RX ADMIN — IOHEXOL 100 ML: 350 INJECTION, SOLUTION INTRAVENOUS at 14:58

## 2018-06-14 RX ADMIN — GABAPENTIN 300 MG: 300 CAPSULE ORAL at 08:48

## 2018-06-14 RX ADMIN — PSYLLIUM HUSK 1 PACKET: 3.4 POWDER ORAL at 18:31

## 2018-06-14 RX ADMIN — DOXAZOSIN 4 MG: 4 TABLET ORAL at 08:48

## 2018-06-14 RX ADMIN — PANTOPRAZOLE SODIUM 40 MG: 40 TABLET, DELAYED RELEASE ORAL at 05:12

## 2018-06-14 RX ADMIN — GABAPENTIN 300 MG: 300 CAPSULE ORAL at 21:13

## 2018-06-14 RX ADMIN — TAMSULOSIN HYDROCHLORIDE 0.4 MG: 0.4 CAPSULE ORAL at 18:31

## 2018-06-14 RX ADMIN — PSYLLIUM HUSK 1 PACKET: 3.4 POWDER ORAL at 08:48

## 2018-06-14 RX ADMIN — ENOXAPARIN SODIUM 40 MG: 100 INJECTION SUBCUTANEOUS at 08:48

## 2018-06-14 RX ADMIN — OXYCODONE HYDROCHLORIDE 5 MG: 5 TABLET ORAL at 05:12

## 2018-06-14 NOTE — PROGRESS NOTES
Progress Note - General Surgery   Drain Lj Bunn 63 y o  male MRN: [id number]  Unit/Bed#: [facility] 829-01 Encounter: [id number]    Assessment and Plan:  Patient Active Problem List   Diagnosis    Diverticulitis of large intestine with abscess without bleeding    Diverticulitis    Ureteral obstruction    Hydronephrosis       Assessment:  63 M with perforated diverticulitis, s/p ex-lap sigmoidectomy, anastomosis, diverting ileostomy with cysto and L ureteral stent on 6/1 and cystoscopy and  L ureteral stent replacement on 6/9     Plan:  Continue diet as tolerated  Continue metamucil   Consider adding Lomotil or Imodium   Follow up on am labs, leukocytosis   May obtain CT scan of his abdomen pelvis today if rising leukocytosis  PRN analgesia  Encourage OOB and ambulation  Monitor midline for signs of infection - purulent drainage, erythema etc   Lovenox & venodynes for DVT ppx  Dispo planning     Subjective:  No acute events overnight  Complains of constant lower abdominal pain  Tolerating diet without difficulty  Remains afebrile  He is concerned about drainage from the lower aspect of incision    Objective:      Vitals   Vitals:    06/12/18 2258 06/13/18 0807 06/13/18 1538 06/13/18 2300   BP: 147/81 129/84 130/91 144/85   BP Location: Left arm Left arm Right arm Left arm   Pulse: 104 90 (!) 106 96   Resp: 18 18 18 16   Temp: 98 6 °F (37 °C) 98 4 °F (36 9 °C) 98 9 °F (37 2 °C) 99 1 °F (37 3 °C)   TempSrc: Oral Oral Oral Oral   SpO2: 97% 99% 97% 97%   Weight:       Height:         Temp  Min: 97 3 °F (36 3 °C)  Max: 101 9 °F (38 8 °C)  IBW: 68 4 kg   Body mass index is 25 34 kg/m²  I/O   I/O       06/10 0701 - 06/11 0700 06/11 0701 - 06/12 0700    P  O  480 540    I V  (mL/kg) 1442 5 (19 1) 989 8 (13 1)    Total Intake(mL/kg) 1922 5 (25 4) 1529 8 (20 2)    Urine (mL/kg/hr) 1200 (0 7) 2075 (1 1)    Stool 2125 (1 2) 1320 (0 7)    Total Output 3325 3395    Net -1402 9 -9436 2                Intake/Output Summary (Last 24 hours) at 06/14/18 8873  Last data filed at 06/14/18 0340   Gross per 24 hour   Intake           1436 2 ml   Output             2875 ml   Net          -1438 8 ml       Invasive  Devices  Invasive Devices     Peripheral Intravenous Line            Peripheral IV 06/12/18 Right Forearm 1 day          Drain            Ileostomy Loop RUQ 12 days                Active medications  Scheduled Meds:    Current Facility-Administered Medications:  doxazosin 4 mg Oral Daily Han Sweet MD   enoxaparin 40 mg Subcutaneous Q24H [facility] Susana Campbell MD   gabapentin 300 mg Oral TID Susana Campbell MD   iohexol 50 mL Oral 90 min pre-procedure Shirlee Osgood, MD   lidocaine 10 mL Infiltration Once Cherylyn Lj   ondansetron 4 mg Intravenous Q4H PRN Jenn Lime   oxyCODONE 2 5 mg Oral Q4H PRN Ozzie Urbina PA-C   oxyCODONE 5 mg Oral Q4H PRN ARIS Hernandez-ROXANE   pantoprazole 40 mg Oral Early Morning Reba George MD   polyvinyl alcohol 1 drop Both Eyes Q3H PRN Han Sweet MD   psyllium 1 packet Oral TID Amaury East MD   simethicone 80 mg Oral Q6H PRN Hardy Starkey MD   tamsulosin 0 4 mg Oral Daily With Yomi Cho MD     Continuous Infusions:     PRN Meds:     ondansetron 4 mg Q4H PRN   oxyCODONE 2 5 mg Q4H PRN   oxyCODONE 5 mg Q4H PRN   polyvinyl alcohol 1 drop Q3H PRN   simethicone 80 mg Q6H PRN       Physical Exam: /85 (BP Location: Left arm)   Pulse 96   Temp 99 1 °F (37 3 °C) (Oral)   Resp 16   Ht 5' 8" (1 727 m)   Wt 75 6 kg (166 lb 10 7 oz)   SpO2 97%   BMI 25 34 kg/m²     Physical Exam:  General Appearance: Alert, cooperative, no distress, appears stated age  Lungs: Clear to auscultation bilaterally, respirations unlablored   Heart: Regular rate and rhythm, S1 and S2 normal, no murmur, rub or gallop  Abdomen: Soft, tender lower abdomen, non distended, bowel sounds active in all four quadrants,   No masses or organomegaly   Small amount of serosanguinous drainage on 4x4 gauze from lower aspect of incision - no purulence  Ostomy with liquid stool output - red rubber catheter still present     Laboratory and Diagnostics:    Results from last 7 days  Lab Units 06/13/18  0600 06/12/18  0522 06/11/18  0527   WBC Thousand/uL 16 20* 13 30* 11 11*   HEMOGLOBIN g/dL 12 3 12 2 11 4*   HEMATOCRIT % 38 9 37 8 35 4*   PLATELETS Thousands/uL 764* 699* 684*   NEUTROS PCT % 69 70 71   MONOS PCT % 5 6 6       Results from last 7 days  Lab Units 06/13/18  0600 06/11/18  0522 06/10/18  0457   SODIUM mmol/L 136 141 143   POTASSIUM mmol/L 4 9 4 7 4 4   CHLORIDE mmol/L 106 109* 108   CO2 mmol/L 22 24 26   BUN mg/dL 14 11 12   CREATININE mg/dL 0 89 0 83 0 79   CALCIUM mg/dL 8 9 9 0 8 6   GLUCOSE RANDOM mg/dL 96 97 96       Results from last 7 days  Lab Units 06/12/18  0522 06/11/18  0522   MAGNESIUM mg/dL 2 2 1 8     Lab Results   Component Value Date    PHOS 3 3 06/02/2018    PHOS 2 4 (L) 01/02/2016    PHOS 1 6 (L) 01/01/2016          No results found for: TROPONINT  ABG:No results found for: PHART, SKV3EXY, PO2ART, FFG3EWF, R8MMEYWJ, BEART, SOURCE    Blood Culture:   Lab Results   Component Value Date    BLOODCX No Growth After 5 Days  06/04/2018    BLOODCX No Growth After 5 Days  06/04/2018     Urine Culture:   Lab Results   Component Value Date    URINECX No Growth <1000 cfu/mL 06/08/2018    URINECX No Growth <1000 cfu/mL 06/04/2018     Sputum Culture: No components found for: SPUTUMCX    Imaging: I have personally reviewed pertinent reports     and I have personally reviewed pertinent films in PACS    VTE Pharmacologic Prophylaxis: Enoxaparin (Lovenox)  VTE Mechanical Prophylaxis: sequential compression device

## 2018-06-14 NOTE — WOUND OSTOMY CARE
Progress Note- Ostomy  Deirdre Bunn 63 y o  male  [id number]  [facility] 8[facility] 829-01        Assessment:  Patient seen this morning for continue teaching, patient has been emptying pouch and maintaining daily ostomy care needs independently  Stoma remains well budded, moist, pink, with red rubber catheter in place  Stoma measures 1 1/2 inch round with intact mucocutaneous junction and peristomal skin, slight maceration noted to immediate peristomal skin (+) flatus and brown effluent in pouch  Patient was able to complete full pouch independently with minimal assist by ET nurse, patient is very particular however all ostomy care performed by patient is correct  Final review done with patient about skin care, diet, hydration as well as using different pouching system  Patient asked if he has any questions or additional needs, patient verbalize being confident and able to care for his ostomy needs  Extra supplies placed in patient's room, samples ordered for home delivery, patient provided with product catalog as well as educational materials  Plan:  Wound care is signing off, please continue encouraging patient to actively participate with his ostomy care  Vitals:    06/14/18 0700   BP: 130/83   Pulse: 88   Resp: 18   Temp: 98 8 °F (37 1 °C)   SpO2: 99%     Incision 06/01/18 Abdomen Other (Comment) (Active)   Incision Description Clean;Dry;Pink 6/14/2018  8:00 AM   Debra-wound Assessment Clean;Dry; Intact 6/14/2018  8:00 AM   Closure None 6/14/2018  8:00 AM   Drainage Amount Moderate 6/14/2018  8:00 AM   Drainage Description Serosanguineous 6/14/2018  8:00 AM   Treatments Cleansed 6/12/2018  5:10 PM   Dressing Dry dressing 6/14/2018  8:00 AM   Dressing Changed Changed 6/12/2018  5:10 PM   Patient Tolerance Tolerated well 6/12/2018  5:10 PM   Dressing Status Clean;Dry; Intact 6/14/2018  8:00 AM   Number of days: 13       Incision 06/09/18 Penis Left (Active)   Incision Description CLAUDIA 6/14/2018  8:00 AM Dressing Open to air 6/9/2018  9:55 AM   Number of days: 5     Ileostomy Loop RUQ (Active)   Stomal Appliance 2 piece 6/12/2018  5:10 PM   Stoma Assessment Budded;Pink 6/12/2018  5:10 PM   Stoma size (in) 1 5 in 6/11/2018 12:30 PM   Stoma Shape Round 6/13/2018  8:00 PM   Peristomal Assessment Clean; Intact 6/13/2018  8:00 PM   Treatment Bag change 6/11/2018 12:30 PM   Output (mL) 100 mL 6/14/2018  7:00 AM   Number of days: 13         Wound care is signing off, please call ext [phone] or [phone] with questions or concerns of patient      Patricio Kyle, RN, BSN, [facility]

## 2018-06-14 NOTE — PROGRESS NOTES
Progress Note - Inpatient Pain Management    Yulissa Bunn 63 y o  male MRN: [id number]  Unit/Bed#: [facility] 829-01 Encounter: [id number]      Assessment:   Principal Problem:    Diverticulitis  Active Problems:    Ureteral obstruction    Hydronephrosis      Plan/Recommendations:   Acute post op pain  · Gabapentin 300mg TID  · Decrease Oxycodone 5mg to Q6 hours PRN breakthrough pain   · Schedule Acetaminophen 975mg Q8 hours     Reviewed with Red Surg    Pain History  Current pain location(s): abdominal   Pain Scale:   6-10  Severity:  Severe at times  24 hour history: Patient resting in bed, ongoing complaints of left sided abdominal pain despite medication adjustments  Reports "nightmares" at night for which he feels is related to the opioid pain medication and is requesting Tylenol for pain       Current Analgesic regimen:  Gabapentin 300mg TID, Dilaudid oral total (4mg), Oxycodone total (10mg)  Bowel Regimen: None    Meds/Allergies   all current active meds have been reviewed and current meds:   Current Facility-Administered Medications   Medication Dose Route Frequency    doxazosin (CARDURA) tablet 4 mg  4 mg Oral Daily    enoxaparin (LOVENOX) subcutaneous injection 40 mg  40 mg Subcutaneous Q24H BLAIR    gabapentin (NEURONTIN) capsule 300 mg  300 mg Oral TID    iohexol (OMNIPAQUE) 240 MG/ML solution 50 mL  50 mL Oral 90 min pre-procedure    lidocaine (XYLOCAINE) 1 % injection 10 mL  10 mL Infiltration Once    ondansetron (ZOFRAN) injection 4 mg  4 mg Intravenous Q4H PRN    oxyCODONE (ROXICODONE) IR tablet 2 5 mg  2 5 mg Oral Q4H PRN    oxyCODONE (ROXICODONE) IR tablet 5 mg  5 mg Oral Q4H PRN    pantoprazole (PROTONIX) EC tablet 40 mg  40 mg Oral Early Morning    polyvinyl alcohol (LIQUIFILM TEARS) 1 4 % ophthalmic solution 1 drop  1 drop Both Eyes Q3H PRN    psyllium (METAMUCIL) 1 packet  1 packet Oral TID    simethicone (MYLICON) chewable tablet 80 mg  80 mg Oral Q6H PRN    tamsulosin (FLOMAX) capsule 0 4 mg  0 4 mg Oral Daily With Dinner       Allergies   Allergen Reactions    Penicillins        Objective     Temp:  [98 8 °F (37 1 °C)-99 1 °F (37 3 °C)] 98 8 °F (37 1 °C)  HR:  [] 88  Resp:  [16-18] 18  BP: (130-144)/(83-91) 130/83      Intake/Output Summary (Last 24 hours) at 06/14/18 0370  Last data filed at 06/14/18 0700   Gross per 24 hour   Intake           1233 6 ml   Output             2625 ml   Net          -1391 4 ml     Last Bowel Movement: ostomy with liquid stool               Physical Exam: /83 (BP Location: Left arm)   Pulse 88   Temp 98 8 °F (37 1 °C) (Oral)   Resp 18   Ht 5' 8" (1 727 m)   Wt 75 6 kg (166 lb 10 7 oz)   SpO2 99%   BMI 25 34 kg/m²     General Appearance:    Alert, cooperative, no distress   Neurological:   Oriented to person, place, and time   Head:    Normocephalic, without obvious abnormality, atraumatic   Eyes:    EOM's intact   Back:     ROM normal   Lungs:     Respirations unlabored   Chest Wall:    No deformity   Abdomen:        Soft, + left sided tenderness, midline abdominal incision with small amount of sero-sang drainage present on abdominal dressing    Extremities:   Extremities no edema, intact sensation to light touch   Skin:   Skin no rashes or lesions     Lab Results:     Results from last 7 days  Lab Units 06/14/18  0514   WBC Thousand/uL 15 14*   HEMOGLOBIN g/dL 12 2   HEMATOCRIT % 37 7   PLATELETS Thousands/uL 774*        Results from last 7 days  Lab Units 06/13/18  0600   SODIUM mmol/L 136   POTASSIUM mmol/L 4 9   CHLORIDE mmol/L 106   CO2 mmol/L 22   BUN mg/dL 14   CREATININE mg/dL 0 89   CALCIUM mg/dL 8 9   GLUCOSE RANDOM mg/dL 96       Imaging Studies: No new imaging studies  Counseling / Coordination of Care  Total floor / unit time spent today 15 minutes  Greater than 50% of total time was spent with the patient and / or family counseling and / or coordination of care   A description of the counseling / coordination of care: Reviewed plan of care and medications with patient, RN staff and primary care team     Zhang Wilson MS, RN-BC  Acute Pain

## 2018-06-14 NOTE — PLAN OF CARE
DISCHARGE PLANNING     Discharge to home or other facility with appropriate resources Progressing        DISCHARGE PLANNING - CARE MANAGEMENT     Discharge to post-acute care or home with appropriate resources Progressing        INFECTION - ADULT     Absence or prevention of progression during hospitalization Progressing     Absence of fever/infection during neutropenic period Progressing        Knowledge Deficit     Patient/family/caregiver demonstrates understanding of disease process, treatment plan, medications, and discharge instructions Progressing        Nutrition/Hydration-ADULT     Nutrient/Hydration intake appropriate for improving, restoring or maintaining nutritional needs Progressing        PAIN - ADULT     Verbalizes/displays adequate comfort level or baseline comfort level Progressing        SAFETY ADULT     Patient will remain free of falls Progressing     Maintain or return to baseline ADL function Progressing     Maintain or return mobility status to optimal level Progressing

## 2018-06-14 NOTE — PROGRESS NOTES
Patient care rounds were completed with the patient's nurse today, Sheila House  We discussed the plan is to continue regular diet with Ensure supplements as tolerated  Continue current analgesic regimen with adjustments made per acute Pain Service recommendations  Continue local ostomy care and teaching  CT scan of the chest, abdomen, and pelvis completed this afternoon was reviewed with no evidence of acute pathology on my wet read; awaiting formal interpretation by radiologist   Anticipate discharge in the next 24 hours  We reviewed all of the invasive devices/lines/telemetry orders   - None  Pain Assessment / Plan:  Continue current analgesic regimen with adjustments made per Acute Pain Service recommendations  Mobility Assessment / Plan:  - Activity as tolerated  Goals / Barriers for discharge:  - Awaiting final interpretation by the radiologist of his CT scan today  Anticipate discharge in the next 24 hours if continues to improve  - Case management following; case and discharge needs discussed  All questions and concerns were addressed  I spent greater than 15 minutes reviewing the plan with the patient and the nurse, and coordinating his care for the day      Easton Valle PA-C  6/14/2018 4:07 PM

## 2018-06-15 LAB
ERYTHROCYTE [DISTWIDTH] IN BLOOD BY AUTOMATED COUNT: 13.7 % (ref 11.6–15.1)
HCT VFR BLD AUTO: 38 % (ref 36.5–49.3)
HGB BLD-MCNC: 12.1 G/DL (ref 12–17)
MCH RBC QN AUTO: 30.2 PG (ref 26.8–34.3)
MCHC RBC AUTO-ENTMCNC: 31.8 G/DL (ref 31.4–37.4)
MCV RBC AUTO: 95 FL (ref 82–98)
PLATELET # BLD AUTO: 792 THOUSANDS/UL (ref 149–390)
PMV BLD AUTO: 9 FL (ref 8.9–12.7)
RBC # BLD AUTO: 4.01 MILLION/UL (ref 3.88–5.62)
WBC # BLD AUTO: 14.79 THOUSAND/UL (ref 4.31–10.16)

## 2018-06-15 PROCEDURE — 99024 POSTOP FOLLOW-UP VISIT: CPT | Performed by: SURGERY

## 2018-06-15 PROCEDURE — 85027 COMPLETE CBC AUTOMATED: CPT | Performed by: PHYSICIAN ASSISTANT

## 2018-06-15 RX ORDER — LOPERAMIDE HYDROCHLORIDE 2 MG/1
2 CAPSULE ORAL DAILY
Status: DISCONTINUED | OUTPATIENT
Start: 2018-06-15 | End: 2018-06-15

## 2018-06-15 RX ORDER — ACETAMINOPHEN 325 MG/1
975 TABLET ORAL EVERY 6 HOURS PRN
Status: DISCONTINUED | OUTPATIENT
Start: 2018-06-15 | End: 2018-06-17 | Stop reason: HOSPADM

## 2018-06-15 RX ADMIN — OXYCODONE HYDROCHLORIDE 5 MG: 5 TABLET ORAL at 23:06

## 2018-06-15 RX ADMIN — ENOXAPARIN SODIUM 40 MG: 100 INJECTION SUBCUTANEOUS at 10:32

## 2018-06-15 RX ADMIN — PSYLLIUM HUSK 1 PACKET: 3.4 POWDER ORAL at 21:43

## 2018-06-15 RX ADMIN — DOXAZOSIN 4 MG: 4 TABLET ORAL at 10:32

## 2018-06-15 RX ADMIN — OXYCODONE HYDROCHLORIDE 5 MG: 5 TABLET ORAL at 10:32

## 2018-06-15 RX ADMIN — TAMSULOSIN HYDROCHLORIDE 0.4 MG: 0.4 CAPSULE ORAL at 17:07

## 2018-06-15 RX ADMIN — PSYLLIUM HUSK 1 PACKET: 3.4 POWDER ORAL at 17:07

## 2018-06-15 RX ADMIN — PSYLLIUM HUSK 1 PACKET: 3.4 POWDER ORAL at 12:35

## 2018-06-15 RX ADMIN — PANTOPRAZOLE SODIUM 40 MG: 40 TABLET, DELAYED RELEASE ORAL at 05:29

## 2018-06-15 RX ADMIN — OXYCODONE HYDROCHLORIDE 5 MG: 5 TABLET ORAL at 17:08

## 2018-06-15 RX ADMIN — OXYCODONE HYDROCHLORIDE 5 MG: 5 TABLET ORAL at 04:04

## 2018-06-15 RX ADMIN — GABAPENTIN 300 MG: 300 CAPSULE ORAL at 21:43

## 2018-06-15 RX ADMIN — GABAPENTIN 300 MG: 300 CAPSULE ORAL at 10:32

## 2018-06-15 RX ADMIN — GABAPENTIN 300 MG: 300 CAPSULE ORAL at 17:07

## 2018-06-15 NOTE — PROGRESS NOTES
Progress Note - General Surgery   [place] Jonathan Bunn 63 y o  male MRN: [id number]  Unit/Bed#: [facility] 829-01 Encounter: [id number]    Assessment and Plan:  Patient Active Problem List   Diagnosis    Diverticulitis of large intestine with abscess without bleeding    Diverticulitis    Ureteral obstruction    Hydronephrosis       Assessment:  63 M with perforated diverticulitis, s/p ex-lap sigmoidectomy, anastomosis, diverting ileostomy with cysto and L ureteral stent on 6/1 and cystoscopy and  L ureteral stent replacement on 6/9  Now with high ileostomy output     Plan:  Continue diet as tolerated  Continue metamucil   Add Lomotil   Trend leukocytosis  PRN analgesia  Encourage OOB and ambulation  Monitor midline for signs of infection - purulent drainage, erythema etc   Lovenox & venodynes for DVT ppx  Dispo planning     Subjective:  No acute events overnight  Pain improved  Afebrile past 24 hours    Objective:    Vitals   Vitals:    06/13/18 2300 06/14/18 0700 06/14/18 1537 06/14/18 2300   BP: 144/85 130/83 147/87 145/82   BP Location: Left arm Left arm Left arm Left arm   Pulse: 96 88 100 92   Resp: 16 18 18 18   Temp: 99 1 °F (37 3 °C) 98 8 °F (37 1 °C) 99 °F (37 2 °C) 98 8 °F (37 1 °C)   TempSrc: Oral Oral Oral Oral   SpO2: 97% 99% 97% 99%   Weight:       Height:         Temp  Min: 97 3 °F (36 3 °C)  Max: 101 9 °F (38 8 °C)  IBW: 68 4 kg   Body mass index is 25 34 kg/m²  I/O   I/O       06/10 0701 - 06/11 0700 06/11 0701 - 06/12 0700    P  O  480 540    I V  (mL/kg) 1442 5 (19 1) 989 8 (13 1)    Total Intake(mL/kg) 1922 5 (25 4) 1529 8 (20 2)    Urine (mL/kg/hr) 1200 (0 7) 2075 (1 1)    Stool 2125 (1 2) 1320 (0 7)    Total Output 3325 3395    Net -1402 5 -1865 2                Intake/Output Summary (Last 24 hours) at 06/15/18 0634  Last data filed at 06/15/18 0416   Gross per 24 hour   Intake              650 ml   Output             3275 ml   Net            -2625 ml       Invasive  Devices  Invasive Devices     Peripheral Intravenous Line            Peripheral IV 06/12/18 Right Forearm 2 days          Drain            Ileostomy Loop RUQ 13 days                Active medications  Scheduled Meds:    Current Facility-Administered Medications:  barium sulfate 450 mL Oral Once in [facility]   doxazosin 4 mg Oral Daily Dimas Narvaez MD   enoxaparin 40 mg Subcutaneous Q24H [address] Ninfa Baker MD   gabapentin 300 mg Oral TID Ninfa Baker MD   iohexol 50 mL Oral 90 min pre-procedure Maricruz Jimenez MD   lidocaine 10 mL Infiltration Once Reubin Phoenix   ondansetron 4 mg Intravenous Q4H PRN Gradie Lemming   oxyCODONE 2 5 mg Oral Q6H PRN Amita Hewitt PA-C   oxyCODONE 5 mg Oral Q6H PRN Amita Hewitt PA-C   pantoprazole 40 mg Oral Early Morning William Fountain MD   polyvinyl alcohol 1 drop Both Eyes Q3H PRN Dimas Narvaez MD   psyllium 1 packet Oral TID Mike Do MD   simethicone 80 mg Oral Q6H PRN Merline Crooked, MD   tamsulosin 0 4 mg Oral Daily With Amaris Gill MD     Continuous Infusions:     PRN Meds:     barium sulfate 450 mL Once in imaging   ondansetron 4 mg Q4H PRN   oxyCODONE 2 5 mg Q6H PRN   oxyCODONE 5 mg Q6H PRN   polyvinyl alcohol 1 drop Q3H PRN   simethicone 80 mg Q6H PRN       Physical Exam: /82 (BP Location: Left arm)   Pulse 92   Temp 98 8 °F (37 1 °C) (Oral)   Resp 18   Ht 5' 8" (1 727 m)   Wt 75 6 kg (166 lb 10 7 oz)   SpO2 99%   BMI 25 34 kg/m²     Physical Exam:  General Appearance: Alert, cooperative, no distress, appears stated age  Lungs:  Nonlabored respirations on room air  Heart: Regular rate and rhythm, S1 and S2 normal, no murmur, rub or gallop  Abdomen: Soft, tender lower abdomen, non distended, bowel sounds active in all four quadrants,   No masses or organomegaly  Small amount of serosanguinous drainage on gauze from lower aspect of incision - no purulence   Ostomy with liquid stool output - red rubber catheter in place    Laboratory and Diagnostics:    Results from last 7 days  Lab Units 06/15/18  0458 06/14/18  0514 06/13/18  0600 06/12/18  0522   WBC Thousand/uL 14 79* 15 14* 16 20* 13 30*   HEMOGLOBIN g/dL 12 1 12 2 12 3 12 2   HEMATOCRIT % 38 0 37 7 38 9 37 8   PLATELETS Thousands/uL 792* 774* 764* 699*   NEUTROS PCT %  --  75 69 70   MONOS PCT %  --  6 5 6       Results from last 7 days  Lab Units 06/13/18  0600 06/11/18  0522 06/10/18  0457   SODIUM mmol/L 136 141 143   POTASSIUM mmol/L 4 9 4 7 4 4   CHLORIDE mmol/L 106 109* 108   CO2 mmol/L 22 24 26   BUN mg/dL 14 11 12   CREATININE mg/dL 0 89 0 83 0 79   CALCIUM mg/dL 8 9 9 0 8 6   GLUCOSE RANDOM mg/dL 96 97 96       Results from last 7 days  Lab Units 06/12/18  0522 06/11/18  0522   MAGNESIUM mg/dL 2 2 1 8     Lab Results   Component Value Date    PHOS 3 3 06/02/2018    PHOS 2 4 (L) 01/02/2016    PHOS 1 6 (L) 01/01/2016          No results found for: TROPONINT  ABG:No results found for: PHART, XTU9RXU, PO2ART, SVP8EMT, X2NFCROY, BEART, SOURCE    Blood Culture:   Lab Results   Component Value Date    BLOODCX No Growth After 5 Days  06/04/2018    BLOODCX No Growth After 5 Days  06/04/2018     Urine Culture:   Lab Results   Component Value Date    URINECX No Growth <1000 cfu/mL 06/08/2018    URINECX No Growth <1000 cfu/mL 06/04/2018     Sputum Culture: No components found for: SPUTUMCX    Imaging: I have personally reviewed pertinent reports     and I have personally reviewed pertinent films in PACS    VTE Pharmacologic Prophylaxis: Enoxaparin (Lovenox)  VTE Mechanical Prophylaxis: sequential compression device

## 2018-06-15 NOTE — PROGRESS NOTES
Progress Note - Inpatient Pain Management    Kae Bunn 63 y o  male MRN: [id number]  Unit/Bed#: [facility] 829-01 Encounter: [id number]      Assessment:   Principal Problem:    Diverticulitis  Active Problems:    Ureteral obstruction    Hydronephrosis      Plan/Recommendations:   Acute post op pain  · Gabapentin 300mg TID  · Decrease Oxycodone 5mg to Q6 hours PRN breakthrough pain   · If pain is controlled with acetaminophen, OK to discontinue oxycodone  · Acetaminophen 975mg Q8 hours PRN pain    Pain is stable on current regimen  Will sign off  Please call with any questions  Reviewed with Red Surg    Pain History  Current pain location(s): abdominal   Pain Scale:   5-9  Severity:  Severe at times  24 hour history: Patient resting in bed, left sided abdominal pain is present, controlled on current regimen   No new pain complaints    Current Analgesic regimen:  Gabapentin 300mg TID, Oxycodone total (10mg)  Bowel Regimen: None    Meds/Allergies   all current active meds have been reviewed and current meds:   Current Facility-Administered Medications   Medication Dose Route Frequency    acetaminophen (TYLENOL) tablet 975 mg  975 mg Oral Q6H PRN    barium sulfate 2 1 % suspension 450 mL  450 mL Oral Once in imaging    doxazosin (CARDURA) tablet 4 mg  4 mg Oral Daily    enoxaparin (LOVENOX) subcutaneous injection 40 mg  40 mg Subcutaneous Q24H BLAIR    gabapentin (NEURONTIN) capsule 300 mg  300 mg Oral TID    iohexol (OMNIPAQUE) 240 MG/ML solution 50 mL  50 mL Oral 90 min pre-procedure    lidocaine (XYLOCAINE) 1 % injection 10 mL  10 mL Infiltration Once    ondansetron (ZOFRAN) injection 4 mg  4 mg Intravenous Q4H PRN    oxyCODONE (ROXICODONE) IR tablet 2 5 mg  2 5 mg Oral Q6H PRN    oxyCODONE (ROXICODONE) IR tablet 5 mg  5 mg Oral Q6H PRN    pantoprazole (PROTONIX) EC tablet 40 mg  40 mg Oral Early Morning    polyvinyl alcohol (LIQUIFILM TEARS) 1 4 % ophthalmic solution 1 drop  1 drop Both Eyes Q3H PRN  psyllium (METAMUCIL) 1 packet  1 packet Oral 4x Daily    simethicone (MYLICON) chewable tablet 80 mg  80 mg Oral Q6H PRN    tamsulosin (FLOMAX) capsule 0 4 mg  0 4 mg Oral Daily With Dinner       Allergies   Allergen Reactions    Penicillins        Objective     Temp:  [98 8 °F (37 1 °C)-99 °F (37 2 °C)] 98 9 °F (37 2 °C)  HR:  [] 106  Resp:  [18] 18  BP: (129-147)/(82-87) 129/84      Intake/Output Summary (Last 24 hours) at 06/15/18 1016  Last data filed at 06/15/18 9755   Gross per 24 hour   Intake              890 ml   Output             3125 ml   Net            -2235 ml     Last Bowel Movement: ostomy with liquid stool               Physical Exam: /84 (BP Location: Right arm)   Pulse (!) 106   Temp 98 9 °F (37 2 °C) (Oral)   Resp 18   Ht 5' 8" (1 727 m)   Wt 75 6 kg (166 lb 10 7 oz)   SpO2 97%   BMI 25 34 kg/m²     General Appearance:    Alert, cooperative, no distress   Neurological:   Oriented to person, place, and time   Head:    Normocephalic, without obvious abnormality, atraumatic   Eyes:    EOM's intact   Back:     ROM normal   Lungs:     Respirations unlabored   Chest Wall:    No deformity   Skin:   Skin no rashes or lesions     Lab Results:     Results from last 7 days  Lab Units 06/15/18  0458   WBC Thousand/uL 14 79*   HEMOGLOBIN g/dL 12 1   HEMATOCRIT % 38 0   PLATELETS Thousands/uL 792*        Results from last 7 days  Lab Units 06/13/18  0600   SODIUM mmol/L 136   POTASSIUM mmol/L 4 9   CHLORIDE mmol/L 106   CO2 mmol/L 22   BUN mg/dL 14   CREATININE mg/dL 0 89   CALCIUM mg/dL 8 9   GLUCOSE RANDOM mg/dL 96       Imaging Studies: I have personally reviewed pertinent reports  Counseling / Coordination of Care  Total floor / unit time spent today 15 minutes  Greater than 50% of total time was spent with the patient and / or family counseling and / or coordination of care   A description of the counseling / coordination of care: Reviewed plan of care and medications with patient, RN staff and primary care team     Zhang Wilson MS, RN-BC  Acute Pain

## 2018-06-15 NOTE — PROGRESS NOTES
Patient care rounds were completed with the patient's nurse today, Jacques Moore  We discussed the plan is to continue diet as tolerate  Continue metamucil and add Imodium for high ileostomy output  Continue routine ostomy care and teaching  Plan for discharge once ostomy output decreased  We reviewed all of the invasive devices/lines/telemetry orders   - None  Pain Assessment / Plan:  - Continue current analgesic regimen  Mobility Assessment / Plan:  - Activity as tolerated  Goals / Barriers for discharge:  - Await improved control of ileostomy output   - Case management following; case and discharge needs discussed  All questions and concerns were addressed  I spent greater than 15 minutes reviewing the plan with the patient and the nurse, and coordinating his care for the day      Berkley Wright PA-C  6/15/2018 4:14 PM

## 2018-06-15 NOTE — CASE MANAGEMENT
Continued Stay Review    Wbc  6/13 - 16 20     6/14 - 15 14     6/15  - 14 79     06/14 0701  06/15 0700 06/15 0701  06/15 1026  Most Recent    Temperature (°F) 98 8-99 98 9  98 9 (37 2)    Pulse  106  106    Respirations 18 18  18    Blood Pressure 145//87 129/84  129/84    SpO2 (%) 97-99 97          Date:  6/15    Vital Signs: /84 (BP Location: Right arm)   Pulse (!) 106   Temp 98 9 °F (37 2 °C) (Oral)   Resp 18   Ht 5' 8" (1 727 m)   Wt 75 6 kg (166 lb 10 7 oz)   SpO2 97%   BMI 25 34 kg/m²     Medications:   Scheduled Meds:   Current Facility-Administered Medications:  barium sulfate 450 mL Oral Once in imaging Eileenkim Mandel   doxazosin 4 mg Oral Daily Baudilio Corbett MD   enoxaparin 40 mg Subcutaneous Q24H [address] Barry Sarkar MD   gabapentin 300 mg Oral TID Barry Sarkar MD   iohexol 50 mL Oral 90 min pre-procedure Ulysess Ormond, MD   lidocaine 10 mL Infiltration Once Jurgen Laurentabatha   ondansetron 4 mg Intravenous Q4H PRN Janelle Holder   oxyCODONE 2 5 mg Oral Q6H PRN Honora Goldberg, PA-C   oxyCODONE 5 mg Oral Q6H PRN Honora Goldberg, PA-C   pantoprazole 40 mg Oral Early Morning Asim Fan MD   polyvinyl alcohol 1 drop Both Eyes Q3H PRN Baudilio Corbett MD   psyllium 1 packet Oral 4x Daily Florina Moreira MD   simethicone 80 mg Oral Q6H PRN Rhianna Padilla MD   tamsulosin 0 4 mg Oral Daily With Giselle Mcrae MD     6/15   GEN SURGERY - Assessment:  63 M with perforated diverticulitis, s/p ex-lap sigmoidectomy, anastomosis, diverting ileostomy with cysto and L ureteral stent on 6/1 and cystoscopy and  L ureteral stent replacement on 6/9  Ostomy output decreasing on metamucil and pt ambulating    Plan:  Continue diet as tolerated  Continue metamucil   Add Lomotil   Trend leukocytosis  PRN analgesia  Encourage OOB and ambulation  Monitor midline for signs of infection - purulent drainage, erythema etc   Lovenox & venodynes for DVT ppx  Dispo planning Jessy Mort Jessy Mort Jessy Mort   6/14   SURGERY   plan is to continue regular diet with Ensure supplements as tolerated  Continue current analgesic regimen with adjustments made per acute Pain Service recommendations  Continue local ostomy care and teaching  CT scan of the chest, abdomen, and pelvis completed this afternoon was reviewed with no evidence of acute pathology on my wet read; awaiting formal interpretation by radiologist     Anticipate discharge in the next 24 hours  Goals / Barriers for discharge:  - Awaiting final interpretation by the radiologist of his CT scan today  Anticipate discharge in the next 24 hours if continues to improve  - Case management following; case and discharge needs discussed    Discharge Plan: nickolas

## 2018-06-16 LAB
BASOPHILS # BLD AUTO: 0.09 THOUSANDS/ΜL (ref 0–0.1)
BASOPHILS NFR BLD AUTO: 1 % (ref 0–1)
EOSINOPHIL # BLD AUTO: 0.05 THOUSAND/ΜL (ref 0–0.61)
EOSINOPHIL NFR BLD AUTO: 0 % (ref 0–6)
ERYTHROCYTE [DISTWIDTH] IN BLOOD BY AUTOMATED COUNT: 14 % (ref 11.6–15.1)
HCT VFR BLD AUTO: 40 % (ref 36.5–49.3)
HGB BLD-MCNC: 12.7 G/DL (ref 12–17)
IMM GRANULOCYTES # BLD AUTO: 0.13 THOUSAND/UL (ref 0–0.2)
IMM GRANULOCYTES NFR BLD AUTO: 1 % (ref 0–2)
LYMPHOCYTES # BLD AUTO: 1.79 THOUSANDS/ΜL (ref 0.6–4.47)
LYMPHOCYTES NFR BLD AUTO: 14 % (ref 14–44)
MCH RBC QN AUTO: 30.5 PG (ref 26.8–34.3)
MCHC RBC AUTO-ENTMCNC: 31.8 G/DL (ref 31.4–37.4)
MCV RBC AUTO: 96 FL (ref 82–98)
MONOCYTES # BLD AUTO: 0.44 THOUSAND/ΜL (ref 0.17–1.22)
MONOCYTES NFR BLD AUTO: 4 % (ref 4–12)
NEUTROPHILS # BLD AUTO: 10.15 THOUSANDS/ΜL (ref 1.85–7.62)
NEUTS SEG NFR BLD AUTO: 80 % (ref 43–75)
NRBC BLD AUTO-RTO: 0 /100 WBCS
PLATELET # BLD AUTO: 750 THOUSANDS/UL (ref 149–390)
PMV BLD AUTO: 8.8 FL (ref 8.9–12.7)
RBC # BLD AUTO: 4.17 MILLION/UL (ref 3.88–5.62)
WBC # BLD AUTO: 12.65 THOUSAND/UL (ref 4.31–10.16)

## 2018-06-16 PROCEDURE — 85025 COMPLETE CBC W/AUTO DIFF WBC: CPT | Performed by: STUDENT IN AN ORGANIZED HEALTH CARE EDUCATION/TRAINING PROGRAM

## 2018-06-16 PROCEDURE — 99024 POSTOP FOLLOW-UP VISIT: CPT | Performed by: SURGERY

## 2018-06-16 RX ORDER — LOPERAMIDE HYDROCHLORIDE 2 MG/1
2 CAPSULE ORAL 2 TIMES DAILY
Status: DISCONTINUED | OUTPATIENT
Start: 2018-06-16 | End: 2018-06-17

## 2018-06-16 RX ADMIN — OXYCODONE HYDROCHLORIDE 5 MG: 5 TABLET ORAL at 08:13

## 2018-06-16 RX ADMIN — OXYCODONE HYDROCHLORIDE 5 MG: 5 TABLET ORAL at 23:30

## 2018-06-16 RX ADMIN — LOPERAMIDE HYDROCHLORIDE 2 MG: 2 CAPSULE ORAL at 17:21

## 2018-06-16 RX ADMIN — PSYLLIUM HUSK 1 PACKET: 3.4 POWDER ORAL at 17:21

## 2018-06-16 RX ADMIN — GABAPENTIN 300 MG: 300 CAPSULE ORAL at 21:41

## 2018-06-16 RX ADMIN — PANTOPRAZOLE SODIUM 40 MG: 40 TABLET, DELAYED RELEASE ORAL at 06:07

## 2018-06-16 RX ADMIN — OXYCODONE HYDROCHLORIDE 5 MG: 5 TABLET ORAL at 17:21

## 2018-06-16 RX ADMIN — GABAPENTIN 300 MG: 300 CAPSULE ORAL at 08:13

## 2018-06-16 RX ADMIN — PSYLLIUM HUSK 1 PACKET: 3.4 POWDER ORAL at 21:41

## 2018-06-16 RX ADMIN — ENOXAPARIN SODIUM 40 MG: 100 INJECTION SUBCUTANEOUS at 08:14

## 2018-06-16 RX ADMIN — PSYLLIUM HUSK 1 PACKET: 3.4 POWDER ORAL at 08:13

## 2018-06-16 RX ADMIN — PSYLLIUM HUSK 1 PACKET: 3.4 POWDER ORAL at 13:20

## 2018-06-16 RX ADMIN — GABAPENTIN 300 MG: 300 CAPSULE ORAL at 17:21

## 2018-06-16 RX ADMIN — TAMSULOSIN HYDROCHLORIDE 0.4 MG: 0.4 CAPSULE ORAL at 17:21

## 2018-06-16 RX ADMIN — DOXAZOSIN 4 MG: 4 TABLET ORAL at 08:13

## 2018-06-16 NOTE — PROGRESS NOTES
Progress Note - Acute Care Surgery   [facility] Sepideh 63 y o  male MRN: [id number]  Unit/Bed#: [facility] 829-01 Encounter: [id number]    Assessment:  63 M with perforated diverticulitis, s/p ex-lapm, sigmoidectomy, diverting ileostomy, cysto and ureteral stenting  - 1 7 from ileostomy  - midline wound with punctate opening draining pus  - systemically well    Plan:  Diet as tolerated  Continue metamucil, add lomotil  Pain control PRN  OOB and ambulate  Spontaneously draining midline, though may need packing if closes  Lovenox ppx    Subjective/Objective     Subjective: No acute events  Tolerating diet  Midline opening draining pus  Objective:    Blood pressure 131/85, pulse 100, temperature 98 3 °F (36 8 °C), temperature source Oral, resp  rate 16, height 5' 8" (1 727 m), weight 75 6 kg (166 lb 10 7 oz), SpO2 99 %  ,Body mass index is 25 34 kg/m²  Intake/Output Summary (Last 24 hours) at 06/16/18 0800  Last data filed at 06/16/18 0754   Gross per 24 hour   Intake             1220 ml   Output             3600 ml   Net            -2380 ml       Invasive Devices     Peripheral Intravenous Line            Peripheral IV 06/12/18 Right Forearm 3 days          Drain            Ileostomy Loop RUQ 14 days                Physical Exam:   General: NAD, AAOx3  CV: RRR +S1/S2  Chest: breath sounds bilaterally  Abdomen: soft, NT non-distended, ileostomy in place with mixed output   Midline wound well-healing with one area of punctate opening with purulent output, no significant erythema  Extremities: atraumatic, no edema        Results from last 7 days  Lab Units 06/15/18  0458 06/14/18  0514 06/13/18  0600   WBC Thousand/uL 14 79* 15 14* 16 20*   HEMOGLOBIN g/dL 12 1 12 2 12 3   HEMATOCRIT % 38 0 37 7 38 9   PLATELETS Thousands/uL 792* 774* 764*       Results from last 7 days  Lab Units 06/13/18  0600 06/11/18  0522 06/10/18  0457   SODIUM mmol/L 136 141 143   POTASSIUM mmol/L 4 9 4 7 4 4   CHLORIDE mmol/L 106 109* 108 CO2 mmol/L 22 24 26   BUN mg/dL 14 11 12   CREATININE mg/dL 0 89 0 83 0 79   GLUCOSE RANDOM mg/dL 96 97 96   CALCIUM mg/dL 8 9 9 0 8 6

## 2018-06-17 VITALS
WEIGHT: 166.67 LBS | DIASTOLIC BLOOD PRESSURE: 84 MMHG | BODY MASS INDEX: 25.26 KG/M2 | RESPIRATION RATE: 18 BRPM | TEMPERATURE: 99.1 F | HEIGHT: 68 IN | OXYGEN SATURATION: 99 % | HEART RATE: 82 BPM | SYSTOLIC BLOOD PRESSURE: 130 MMHG

## 2018-06-17 PROCEDURE — 99024 POSTOP FOLLOW-UP VISIT: CPT | Performed by: SURGERY

## 2018-06-17 RX ORDER — LOPERAMIDE HYDROCHLORIDE 2 MG/1
2 CAPSULE ORAL 4 TIMES DAILY PRN
Qty: 40 CAPSULE | Refills: 0 | Status: SHIPPED | OUTPATIENT
Start: 2018-06-17 | End: 2018-07-08

## 2018-06-17 RX ORDER — ONDANSETRON 2 MG/ML
4 INJECTION INTRAMUSCULAR; INTRAVENOUS EVERY 4 HOURS PRN
Qty: 30 ML | Refills: 0 | Status: SHIPPED | OUTPATIENT
Start: 2018-06-17 | End: 2018-06-17 | Stop reason: HOSPADM

## 2018-06-17 RX ORDER — OXYCODONE HYDROCHLORIDE 5 MG/1
5 TABLET ORAL EVERY 4 HOURS PRN
Qty: 20 TABLET | Refills: 0 | Status: SHIPPED | OUTPATIENT
Start: 2018-06-17 | End: 2018-06-22

## 2018-06-17 RX ORDER — LOPERAMIDE HYDROCHLORIDE 2 MG/1
2 CAPSULE ORAL 4 TIMES DAILY
Status: DISCONTINUED | OUTPATIENT
Start: 2018-06-17 | End: 2018-06-17 | Stop reason: HOSPADM

## 2018-06-17 RX ADMIN — GABAPENTIN 300 MG: 300 CAPSULE ORAL at 08:33

## 2018-06-17 RX ADMIN — LOPERAMIDE HYDROCHLORIDE 2 MG: 2 CAPSULE ORAL at 08:33

## 2018-06-17 RX ADMIN — PANTOPRAZOLE SODIUM 40 MG: 40 TABLET, DELAYED RELEASE ORAL at 05:36

## 2018-06-17 RX ADMIN — PSYLLIUM HUSK 1 PACKET: 3.4 POWDER ORAL at 08:33

## 2018-06-17 RX ADMIN — DOXAZOSIN 4 MG: 4 TABLET ORAL at 08:33

## 2018-06-17 RX ADMIN — OXYCODONE HYDROCHLORIDE 5 MG: 5 TABLET ORAL at 05:36

## 2018-06-17 RX ADMIN — PSYLLIUM HUSK 1 PACKET: 3.4 POWDER ORAL at 12:51

## 2018-06-17 RX ADMIN — ENOXAPARIN SODIUM 40 MG: 100 INJECTION SUBCUTANEOUS at 08:33

## 2018-06-17 NOTE — PROGRESS NOTES
Progress Note - General Surgery   Belva Siemens Rites 63 y o  male MRN: [id number]  Unit/Bed#: [facility] 829-01 Encounter: [id number]    Assessment and Plan:  Patient Active Problem List   Diagnosis    Diverticulitis of large intestine with abscess without bleeding    Diverticulitis    Ureteral obstruction    Hydronephrosis       Assessment:  63 M with perforated diverticulitis, s/p ex-lap sigmoidectomy, anastomosis, diverting ileostomy with cysto and L ureteral stent on 6/1 and cystoscopy and  L ureteral stent replacement on 6/9     Plan:  Continue diet as tolerated  Continue metamucil & imodium   PRN analgesia  Encourage OOB and ambulation  Trend leukocytosis - decreasing   Monitor midline for signs of infection - purulent drainage, erythema etc   Lovenox & venodynes for DVT ppx  Will speak to Dr Tory Magallon about pulling red rubber catheter today before discharge  Discharge home today    Subjective: Pt states his abd drainage has decreased  There are two punctate areas where the fluid is drainage from - It is serous  He is ambulating  Discussed going home today and he is ok with that plan  Objective:       Vitals   Vitals:    06/15/18 2306 06/16/18 0751 06/16/18 1519 06/16/18 2300   BP: 126/76 131/85 128/85 127/78   BP Location: Right arm Right arm Left arm Right arm   Pulse: 85 100 100 62   Resp: 16 16 17 20   Temp: 99 4 °F (37 4 °C) 98 3 °F (36 8 °C) 98 7 °F (37 1 °C) 98 7 °F (37 1 °C)   TempSrc: Oral Oral Oral Oral   SpO2: 99% 99% 99% 96%   Weight:       Height:         Temp  Min: 97 3 °F (36 3 °C)  Max: 101 9 °F (38 8 °C)  IBW: 68 4 kg   Body mass index is 25 34 kg/m²  I/O   I/O       06/15 0701 - 06/16 0700 06/16 0701 - 06/17 0700 06/17 0701 - 06/18 0700    P  O  1220 636     Total Intake(mL/kg) 1220 (16 1) 636 (8 4)     Urine (mL/kg/hr) 2050 (1 1) 1550 (0 9)     Stool 1700 1625     Total Output 3750 3175      Net -2530 -2534                   Intake/Output Summary (Last 24 hours) at 06/17/18 8830  Last data filed at 06/17/18 0300   Gross per 24 hour   Intake              636 ml   Output             3075 ml   Net            -2439 ml       Invasive  Devices  Invasive Devices     Drain            Ileostomy Loop RUQ 15 days                Active medications  Scheduled Meds:  Current Facility-Administered Medications:  acetaminophen 975 mg Oral Q6H PRN Florina Moreira MD   barium sulfate 450 mL Oral Once in [facility]   doxazosin 4 mg Oral Daily Baudilio Corbett MD   enoxaparin 40 mg Subcutaneous Q24H [facility] Barry Sarkar MD   gabapentin 300 mg Oral TID Barry Sarkar MD   iohexol 50 mL Oral 90 min pre-procedure Ulysess Ormond, MD   lidocaine 10 mL Infiltration Once Jurgen Mandel   loperamide 2 mg Oral BID Enmanuel Kimbrough   ondansetron 4 mg Intravenous Q4H PRN Crystal Perry   oxyCODONE 2 5 mg Oral Q6H PRN Honora Goldberg, PA-C   oxyCODONE 5 mg Oral Q6H PRN Honora Goldberg, PA-C   pantoprazole 40 mg Oral Early Morning Asim Fan MD   polyvinyl alcohol 1 drop Both Eyes Q3H PRN Baudilio Corbett MD   psyllium 1 packet Oral 4x Daily Florina Moreira MD   simethicone 80 mg Oral Q6H PRN Rhianna Padilla MD   tamsulosin 0 4 mg Oral Daily With Julia Maradiaga MD     Continuous Infusions:     PRN Meds:     acetaminophen 975 mg Q6H PRN   barium sulfate 450 mL Once in [facility]   ondansetron 4 mg Q4H PRN   oxyCODONE 2 5 mg Q6H PRN   oxyCODONE 5 mg Q6H PRN   polyvinyl alcohol 1 drop Q3H PRN   simethicone 80 mg Q6H PRN       Physical Exam: /78 (BP Location: Right arm)   Pulse 62   Temp 98 7 °F (37 1 °C) (Oral)   Resp 20   Ht 5' 8" (1 727 m)   Wt 75 6 kg (166 lb 10 7 oz)   SpO2 96%   BMI 25 34 kg/m²     Physical Exam:  General Appearance: Alert, cooperative, no distress, appears stated age  Lungs: Clear to auscultation bilaterally, respirations unlablored   Heart: Regular rate and rhythm, S1 and S2 normal, no murmur, rub or gallop  Abdomen: Soft, non-tender, non distended, bowel sounds active in all four quadrants,   No masses or organomegaly  Stool and red rubber catheter in ostomy  2 punctate hols along incision drainage serous output  Laboratory and Diagnostics:    Results from last 7 days  Lab Units 06/16/18  0832 06/15/18  0458 06/14/18  0514 06/13/18  0600   WBC Thousand/uL 12 65* 14 79* 15 14* 16 20*   HEMOGLOBIN g/dL 12 7 12 1 12 2 12 3   HEMATOCRIT % 40 0 38 0 37 7 38 9   PLATELETS Thousands/uL 750* 792* 774* 764*   NEUTROS PCT % 80*  --  75 69   MONOS PCT % 4  --  6 5       Results from last 7 days  Lab Units 06/13/18  0600 06/11/18  0522   SODIUM mmol/L 136 141   POTASSIUM mmol/L 4 9 4 7   CHLORIDE mmol/L 106 109*   CO2 mmol/L 22 24   BUN mg/dL 14 11   CREATININE mg/dL 0 89 0 83   CALCIUM mg/dL 8 9 9 0   GLUCOSE RANDOM mg/dL 96 97       Results from last 7 days  Lab Units 06/12/18  0522 06/11/18  0522   MAGNESIUM mg/dL 2 2 1 8     Lab Results   Component Value Date    PHOS 3 3 06/02/2018    PHOS 2 4 (L) 01/02/2016    PHOS 1 6 (L) 01/01/2016          No results found for: TROPONINT  ABG:No results found for: PHART, JSC9YOO, PO2ART, KXO6XXO, Q5CGNHNB, BEART, SOURCE    Blood Culture:   Lab Results   Component Value Date    BLOODCX No Growth After 5 Days  06/04/2018    BLOODCX No Growth After 5 Days  06/04/2018     Urine Culture:   Lab Results   Component Value Date    URINECX No Growth <1000 cfu/mL 06/08/2018    URINECX No Growth <1000 cfu/mL 06/04/2018     Sputum Culture: No components found for: SPUTUMCX    Imaging: I have personally reviewed pertinent reports     and I have personally reviewed pertinent films in PACS    VTE Pharmacologic Prophylaxis: Enoxaparin (Lovenox)  VTE Mechanical Prophylaxis: sequential compression device

## 2018-06-17 NOTE — SOCIAL WORK
Pt is medically clear for d/c home today  CM noted that pt refused VNA services  Pt informed CM that he is comfortable changing his ostomy dressing and that he has the available dressings ready to take home already  Pt asked CM if he is going to has Rx for pain meds  CM informed Flr RN and MD resident Silvia Khan

## 2018-06-17 NOTE — DISCHARGE INSTRUCTIONS
Please take your Imodium 30 minutes before meals and at bedtime  Record how much output come out from your ostomy over the course of the day and please call the office with the output

## 2018-06-18 ENCOUNTER — TRANSITIONAL CARE MANAGEMENT (OUTPATIENT)
Dept: INTERNAL MEDICINE CLINIC | Facility: CLINIC | Age: 58
End: 2018-06-18

## 2018-06-18 NOTE — TELEPHONE ENCOUNTER
Patient has been discharged  I attempted to call phone number on account and it went straight to , however  is not set up to leave message  Also of note, PT has Raymond  Since he has indwelling stent, we may schedule 1 office visit for cysto stent removal, however any consequent visits must be scheduled at clinic

## 2018-06-19 ENCOUNTER — PATIENT OUTREACH (OUTPATIENT)
Dept: INTERNAL MEDICINE CLINIC | Facility: CLINIC | Age: 58
End: 2018-06-19

## 2018-06-19 NOTE — PROGRESS NOTES
Pt was inpatient from 5/27 - 6/17 at [facility] for dicerticulitis and referred by inpatient case management to outpatient care management  Pt on 6/9 has a cystoscopy retrograde pyelogram with stent insertion  Pt to f/u with urology Dr Patricia Campbell  Pt has ostomy and refused VNA services and able to self manage  Pt to f/u with surgery on 6/28  I called and spoke with pt and he reports is doing well since he has been home and his wife as been helping him when needed  Pt reports has some abdominal pain and did get oxycodone at pharmacy and taking as needed  Pt reports is comfortable with caring for his ostomy and changing pouch without difficulty and has supplies  Pt reports is taking Metamucil only once a day and has Imodium but not taking at this time  Pt is aware per d/c med list how Imodium and Metamucil should be taken  Pt is aware to monitor ostomy output and call the surgeon office if output is persistently near or above 2L/day despite iteration of Imodium/Metamucil per last surgery note in EPIC  Pt reports output  I made pt aware the urology office was trying to reach him to schedule f/u apt  I gave him [facility] office # and Dr Emmanuel Romo name  Pt reports will call to schedule  Pt made aware he needs to f/u with his PCP  I asked if he has been seeing anyone as it has been awhile since seen at [address]  Pt then reports he has been f/u with a Dr Marcial Velazco with [facility] in [place] and will call to schedule a f/u with him  Pt reports is independent with ADL's and has wife and adult sons who help out when needed  Pt  did not have any questions or concerns for me at this time and has my contact # [phone] if needed

## 2018-06-20 ENCOUNTER — TELEPHONE (OUTPATIENT)
Dept: UROLOGY | Facility: CLINIC | Age: 58
End: 2018-06-20

## 2018-06-20 NOTE — TELEPHONE ENCOUNTER
Pt managed by Dr Dalia Massey at the [facility] office  Pt calling for appointment at the [facility] office for cysto stent removal in 4 weeks per Dr Dalia Massey    Thanks

## 2018-06-22 NOTE — TELEPHONE ENCOUNTER
Patient scheduled 7/18/18 at 8:30am [facility] office with Dr Eric Cheema  Mailed appointment card, directions to office and [facility] paperwork

## 2018-06-28 ENCOUNTER — OFFICE VISIT (OUTPATIENT)
Dept: SURGERY | Facility: CLINIC | Age: 58
End: 2018-06-28

## 2018-06-28 VITALS
WEIGHT: 160.6 LBS | HEIGHT: 68 IN | DIASTOLIC BLOOD PRESSURE: 86 MMHG | TEMPERATURE: 98.3 F | SYSTOLIC BLOOD PRESSURE: 122 MMHG | BODY MASS INDEX: 24.34 KG/M2

## 2018-06-28 DIAGNOSIS — Z09 POSTOP CHECK: Primary | ICD-10-CM

## 2018-06-28 PROCEDURE — 99024 POSTOP FOLLOW-UP VISIT: CPT | Performed by: SURGERY

## 2018-06-28 NOTE — DISCHARGE SUMMARY
Discharge Summary - [facility] Sepideh 63 y o  male MRN: [id number]    Unit/Bed#: [facility] 827-01 Encounter: [id number]    Admission Date:   Admission Orders     Ordered        05/22/18 0139  Inpatient Admission  Once               Admitting Diagnosis: Abdominal pain [R10 9]  Nausea and vomiting [R11 2]  Generalized abdominal pain [R10 84]  Diverticulitis of large intestine with perforation without bleeding [K57 20]    HPI:  Patient is 60-year-old male who presented with abdominal pain following earlier discharge from hospital for known perforated diverticulitis  Patient underwent repeat CT scan in ED that showed no progression disease, discussion with the patient and family including risks benefits alternatives Vandana's procedure, the patient requested to trial conservative management with NPO and IV antibiotics  Procedures Performed: No orders of the defined types were placed in this encounter  Summary of Hospital Course:  Patient was admitted to the medical-surgical floor for bowel rest and antibiotic therapy  Patient was maintained on IV fluids and on hospitalization day 1 he was advanced to clear liquid diet  He continued to have clinical improvement and by hospitalization day 4, his pain has substantially improved, he was tolerating diet without nausea vomiting had normal bowel function  At that point his white blood count had resolved  He was seen evaluated was cleared for discharge to home  Significant Findings, Care, Treatment and Services Provided:  As above    Complications:  None    Discharge Diagnosis:  Same    Resolved Problems  Date Reviewed: 6/14/2018    None          Condition at Discharge: fair         Discharge instructions/Information to patient and family:   See after visit summary for information provided to patient and family  Provisions for Follow-Up Care:  See after visit summary for information related to follow-up care and any pertinent home health orders        PCP: Dave Caba MD    Disposition: Home    Planned Readmission: No    Discharge Statement   I spent 30 minutes discharging the patient  This time was spent on the day of discharge  I had direct contact with the patient on the day of discharge  Additional documentation is required if more than 30 minutes were spent on discharge  Discharge Medications:  See after visit summary for reconciled discharge medications provided to patient and family

## 2018-06-28 NOTE — PROGRESS NOTES
Office Visit - General Surgery  Laxmi Giron MRN: [id number]  Encounter: [id number]    Assessment and Plan    Problem List Items Addressed This Visit     None          Chief Complaint:  Laxmi Giron is a 63 y o  male who presents for Post-op (p/o exploratory lap  Patient states still having  Appetite good  )    Subjective      Past Medical History  Past Medical History:   Diagnosis Date    Abscess, intestine     Arthritis     Diverticulitis     Hydronephrosis 5/27/2018       Past Surgical History  Past Surgical History:   Procedure Laterality Date    ABCESS DRAINAGE      COLONOSCOPY N/A 5/5/2016    Procedure: COLONOSCOPY;  Surgeon: Thomas Gordon MD;  Location: [facility]; Service:     ILEO LOOP DIVERSION N/A 6/1/2018    Procedure: ILEOSTOMY LOOP DIVERTING;  Surgeon: Marcelina Liz DO;  [facility]: [facility];  Service: General    LAPAROTOMY N/A 6/1/2018    Procedure: LAPAROTOMY EXPLORATORY,;  Surgeon: Marcelina Liz DO;  [facility]: [facility];  Service: General    [place] CYSTOURETHROSCOPY,URETER CATHETER Left 6/9/2018    Procedure: CYSTOSCOPY RETROGRADE PYELOGRAM WITH INSERTION STENT URETERAL;  Surgeon: Alireza Robins MD;  [facility]: [facility];  Service: Urology    [place] PART REMOVAL COLON W ANASTOMOSIS N/A 6/1/2018    Procedure: RESECTION COLON SIGMOID;  Surgeon: Marcelina Liz DO;  [facility]: [facility];  Service: General       Family History  History reviewed  No pertinent family history      Medications  Current Outpatient Prescriptions on File Prior to Visit   Medication Sig Dispense Refill    doxazosin (CARDURA) 4 mg tablet 4 mg      loperamide (IMODIUM) 2 mg capsule Take 1 capsule (2 mg total) by mouth 4 (four) times a day as needed for diarrhea for up to 40 doses 40 capsule 0    psyllium (METAMUCIL) packet Take 1 packet by mouth 4 (four) times a day 30 each 0    tamsulosin (FLOMAX) 0 4 mg 0 4 mg every 24 hours      ergocalciferol (ERGOCALCIFEROL) 69749 units capsule 50,000 Units No current facility-administered medications on file prior to visit          Allergies  Allergies   Allergen Reactions    Penicillins        Review of Systems    Objective  Vitals:    06/28/18 0935   BP: 122/86   Temp: 98 3 °F (36 8 °C)       Physical Exam     Abdomen soft nontender nondistended  Ostomy functioning  Upper Midline incision has a minimal amount of drainage, otherwise incision clean dry and intact

## 2018-06-29 NOTE — ASSESSMENT & PLAN NOTE
Impression  Status post sigmoid resection with diverting ileostomy secondary to diverticulitis    Recommendation  Follow-up in 2 weeks for wound evaluation  Diet as tolerated  Will discuss follow-up colonoscopy and potential timing of reversal at his next office visit

## 2018-06-29 NOTE — PATIENT INSTRUCTIONS
Follow-up in 2 weeks  Diet as tolerated  Return sooner if any additional drainage from midline wound

## 2018-07-01 NOTE — DISCHARGE SUMMARY
Discharge Summary - General Surgery  Diane Bunn 63 y o  male MRN: [id number]  [facility] Encounter: [id number]      Date of admission: 5/27/2018  Date of discharge: 6/17/2018    Admitting diagnosis: Diverticulitis [K57 92]  Abdominal pain [R10 9]  Discharge diagnosis: Sigmoid diverticulitis    KAMALJIT Dominguez is a 63 y o  male who presented to the hospital "with abdominal pain  The patient was recently discharged after an episode of recurrent sigmoid diverticular disease  He was treated with cefepime / flagyl at that admission  He was discharged after being able to tolerate PO on cipro / flagyl  That was two days ago  He is now having worsening abdominal pain, and he isn't able to tolerate PO  He was see in the ED and a CT scan showed improving inflammation but possible early abscess formation  He thinks that when he was changed by IV to PO antibiotics his pain worsened  Despite not being able to tolerate PO as an outpatient he is very eager to eat food in the ED "    Hospital Course  The patient was admitted to the hospital for observation  He was started on IV antibiotics  He did fairly, though did not progress over the 1st few hospital days  Was therefore recommended to go in open sigmoid resection with ostomy as well as bilateral ureteral stent placement  He was taken the operating room on 06/01/2018 for bilateral ureteral stents as well as open sigmoid resection with diverting loop ileostomy  He continued to progress fairly slowly  As a result a CT scan was obtained on postoperative day 4, 06/05/2018  This did not reveal any underlying abscess or infection, although it did reveal hydronephrosis  Urology was consulted with regards to this finding  He was taken the operating room on 06/09/2018 for a cystoscopy and retrograde pyelogram with left ureteral stent placement    In the interim his ostomy was functioning, though with relatively high output he was maintained on IV fluids  He continued to progress slowly  By postoperative day 16 he was tolerating a regular diet, adequately hydrating without IV fluid supplementation, and his pain was controlled  A red rubber catheter which served as an ostomy bar was removed  He was discharged home in good condition with instructions to follow up with Dr Jacob Mane  Condition at discharge: good    Procedures/Services:  IV antibiotics  IV fluids  Exploratory laparotomy sigmoid resection, loop ileostomy  Ureteral stent placement and removal  Cystoscopy with left ureteral stent replacement and retrograde pyelogram    Current Vitals:   Blood Pressure: 130/84 (06/17/18 0700)  Pulse: 82 (06/17/18 0700)  Temperature: 99 1 °F (37 3 °C) (06/17/18 0700)  Temp Source: Oral (06/17/18 0700)  Respirations: 18 (06/17/18 0700)  Height: 5' 8" (172 7 cm) (05/28/18 0300)  Weight - Scale: 75 6 kg (166 lb 10 7 oz) (06/05/18 0038)  SpO2: 99 % (06/17/18 0700)    No intake or output data in the 24 hours ending 06/30/18 2239    Lab Results: I have personally reviewed pertinent lab results  Imaging: I have personally reviewed pertinent reports  EKG, Pathology, and Other Studies: I have personally reviewed pertinent reports  Disposition: Home  Planned Readmission: No    Discharge Medications:  See after visit summary for reconciled discharge medications provided to patient and family  Discharge instructions/Information to patient and family:   See after visit summary for information provided to patient and family  Provisions for Follow-Up Care:  See after visit summary for information related to follow-up care and any pertinent home health orders  Discharge Statement   I spent 30 minutes discharging the patient  This time was spent on the day of discharge  I had direct contact with the patient on the day of discharge  Additional documentation is required if more than 30 minutes were spent on discharge

## 2018-07-05 ENCOUNTER — TELEPHONE (OUTPATIENT)
Dept: UROLOGY | Facility: CLINIC | Age: 58
End: 2018-07-05

## 2018-07-05 NOTE — TELEPHONE ENCOUNTER
Spoke with patient and explained the cysto with stent removal   He is still uncomfortable with not being sedated  Informed him a numbing agent will be used prior to passing the cystoscope  Patient is insistent we use 2 or 3 Lidojets in order for the area to be numb  Explained to patient Dr Nir Singer performs this procedure multiple times on a daily basis and patients are very tolerant to it  Patient verbalized understanding but is still reluctant to have this done without sedation due to pain

## 2018-07-05 NOTE — TELEPHONE ENCOUNTER
Dr Spear Julianne pt whom is scheduled for cysto with stent removal  I spoke with pt when scheduling the procedure, he thought he would be under anesthesia , but I said he did not need anesthesia   Pt was worried and asked for a more compete anwser to exactly what would happen during the procedure  Could you please reach out to him today and just explain more clearly what will be happening and how the stent will be removed     [phone]  Thank you

## 2018-07-06 ENCOUNTER — TELEPHONE (OUTPATIENT)
Dept: SURGERY | Facility: CLINIC | Age: 58
End: 2018-07-06

## 2018-07-06 NOTE — TELEPHONE ENCOUNTER
Patient called in today 07/06/2018 to ask if he needed a colonoscopy prior to his next appointment on 07/12/2018  He stated that upon his last visit with Dr Kimi Simon on 06/28/2018 he is to get the colonoscopy prior to next visit  No order is in for a colonoscopy  Please advise

## 2018-07-08 ENCOUNTER — APPOINTMENT (EMERGENCY)
Dept: RADIOLOGY | Facility: HOSPITAL | Age: 58
End: 2018-07-08
Payer: COMMERCIAL

## 2018-07-08 ENCOUNTER — HOSPITAL ENCOUNTER (EMERGENCY)
Facility: HOSPITAL | Age: 58
Discharge: HOME | End: 2018-07-08
Attending: EMERGENCY MEDICINE | Admitting: EMERGENCY MEDICINE
Payer: COMMERCIAL

## 2018-07-08 VITALS
RESPIRATION RATE: 14 BRPM | DIASTOLIC BLOOD PRESSURE: 74 MMHG | WEIGHT: 168 LBS | OXYGEN SATURATION: 97 % | HEIGHT: 68 IN | TEMPERATURE: 98.9 F | BODY MASS INDEX: 25.46 KG/M2 | HEART RATE: 62 BPM | SYSTOLIC BLOOD PRESSURE: 121 MMHG

## 2018-07-08 DIAGNOSIS — R31.9 HEMATURIA: Primary | ICD-10-CM

## 2018-07-08 LAB
ALBUMIN SERPL BCP-MCNC: 3.3 G/DL (ref 3.5–5)
ALP SERPL-CCNC: 107 U/L (ref 46–116)
ALT SERPL W P-5'-P-CCNC: 30 U/L (ref 12–78)
ANION GAP SERPL CALCULATED.3IONS-SCNC: 6 MMOL/L (ref 4–13)
AST SERPL W P-5'-P-CCNC: 12 U/L (ref 5–45)
BACTERIA UR QL AUTO: ABNORMAL /HPF
BASOPHILS # BLD AUTO: 0.06 THOUSANDS/ΜL (ref 0–0.1)
BASOPHILS NFR BLD AUTO: 1 % (ref 0–1)
BILIRUB SERPL-MCNC: 0.16 MG/DL (ref 0.2–1)
BILIRUB UR QL STRIP: ABNORMAL
BUN SERPL-MCNC: 14 MG/DL (ref 5–25)
CALCIUM SERPL-MCNC: 8.4 MG/DL (ref 8.3–10.1)
CHLORIDE SERPL-SCNC: 110 MMOL/L (ref 100–108)
CLARITY UR: CLEAR
CO2 SERPL-SCNC: 24 MMOL/L (ref 21–32)
COLOR UR: ABNORMAL
COLOR, POC: ABNORMAL
CREAT SERPL-MCNC: 1.07 MG/DL (ref 0.6–1.3)
EOSINOPHIL # BLD AUTO: 0.29 THOUSAND/ΜL (ref 0–0.61)
EOSINOPHIL NFR BLD AUTO: 4 % (ref 0–6)
ERYTHROCYTE [DISTWIDTH] IN BLOOD BY AUTOMATED COUNT: 13.7 % (ref 11.6–15.1)
GFR SERPL CREATININE-BSD FRML MDRD: 77 ML/MIN/1.73SQ M
GLUCOSE SERPL-MCNC: 98 MG/DL (ref 65–140)
GLUCOSE UR STRIP-MCNC: NEGATIVE MG/DL
HCT VFR BLD AUTO: 38 % (ref 36.5–49.3)
HGB BLD-MCNC: 12.4 G/DL (ref 12–17)
HGB UR QL STRIP.AUTO: ABNORMAL
HYALINE CASTS #/AREA URNS LPF: ABNORMAL /LPF
IMM GRANULOCYTES # BLD AUTO: 0.03 THOUSAND/UL (ref 0–0.2)
IMM GRANULOCYTES NFR BLD AUTO: 0 % (ref 0–2)
KETONES UR STRIP-MCNC: ABNORMAL MG/DL
LEUKOCYTE ESTERASE UR QL STRIP: ABNORMAL
LYMPHOCYTES # BLD AUTO: 2.65 THOUSANDS/ΜL (ref 0.6–4.47)
LYMPHOCYTES NFR BLD AUTO: 32 % (ref 14–44)
MCH RBC QN AUTO: 31 PG (ref 26.8–34.3)
MCHC RBC AUTO-ENTMCNC: 32.6 G/DL (ref 31.4–37.4)
MCV RBC AUTO: 95 FL (ref 82–98)
MONOCYTES # BLD AUTO: 0.55 THOUSAND/ΜL (ref 0.17–1.22)
MONOCYTES NFR BLD AUTO: 7 % (ref 4–12)
NEUTROPHILS # BLD AUTO: 4.61 THOUSANDS/ΜL (ref 1.85–7.62)
NEUTS SEG NFR BLD AUTO: 56 % (ref 43–75)
NITRITE UR QL STRIP: NEGATIVE
NON-SQ EPI CELLS URNS QL MICRO: ABNORMAL /HPF
NRBC BLD AUTO-RTO: 0 /100 WBCS
PH UR STRIP.AUTO: 5.5 [PH] (ref 4.5–8)
PLATELET # BLD AUTO: 262 THOUSANDS/UL (ref 149–390)
PMV BLD AUTO: 9.8 FL (ref 8.9–12.7)
POTASSIUM SERPL-SCNC: 4.2 MMOL/L (ref 3.5–5.3)
PROT SERPL-MCNC: 7 G/DL (ref 6.4–8.2)
PROT UR STRIP-MCNC: ABNORMAL MG/DL
RBC # BLD AUTO: 4 MILLION/UL (ref 3.88–5.62)
RBC #/AREA URNS AUTO: ABNORMAL /HPF
SODIUM SERPL-SCNC: 140 MMOL/L (ref 136–145)
SP GR UR STRIP.AUTO: >=1.03 (ref 1–1.03)
UROBILINOGEN UR QL STRIP.AUTO: 0.2 E.U./DL
WBC # BLD AUTO: 8.19 THOUSAND/UL (ref 4.31–10.16)
WBC #/AREA URNS AUTO: ABNORMAL /HPF

## 2018-07-08 PROCEDURE — 80053 COMPREHEN METABOLIC PANEL: CPT | Performed by: EMERGENCY MEDICINE

## 2018-07-08 PROCEDURE — 85025 COMPLETE CBC W/AUTO DIFF WBC: CPT | Performed by: EMERGENCY MEDICINE

## 2018-07-08 PROCEDURE — 81001 URINALYSIS AUTO W/SCOPE: CPT

## 2018-07-08 PROCEDURE — 99284 EMERGENCY DEPT VISIT MOD MDM: CPT

## 2018-07-08 PROCEDURE — 74176 CT ABD & PELVIS W/O CONTRAST: CPT

## 2018-07-08 PROCEDURE — 96374 THER/PROPH/DIAG INJ IV PUSH: CPT

## 2018-07-08 PROCEDURE — 36415 COLL VENOUS BLD VENIPUNCTURE: CPT | Performed by: EMERGENCY MEDICINE

## 2018-07-08 PROCEDURE — 87086 URINE CULTURE/COLONY COUNT: CPT

## 2018-07-08 RX ORDER — KETOROLAC TROMETHAMINE 30 MG/ML
15 INJECTION, SOLUTION INTRAMUSCULAR; INTRAVENOUS ONCE
Status: COMPLETED | OUTPATIENT
Start: 2018-07-08 | End: 2018-07-08

## 2018-07-08 RX ORDER — ACETAMINOPHEN 500 MG
500 TABLET ORAL EVERY 6 HOURS PRN
Qty: 30 TABLET | Refills: 0 | Status: SHIPPED | OUTPATIENT
Start: 2018-07-08 | End: 2018-08-21 | Stop reason: ALTCHOICE

## 2018-07-08 RX ORDER — CIPROFLOXACIN 500 MG/1
500 TABLET, FILM COATED ORAL 2 TIMES DAILY
Qty: 14 TABLET | Refills: 0 | Status: SHIPPED | OUTPATIENT
Start: 2018-07-08 | End: 2018-07-13

## 2018-07-08 RX ORDER — NAPROXEN 500 MG/1
500 TABLET ORAL 2 TIMES DAILY WITH MEALS
Qty: 30 TABLET | Refills: 0 | Status: SHIPPED | OUTPATIENT
Start: 2018-07-08 | End: 2018-08-21 | Stop reason: HOSPADM

## 2018-07-08 RX ADMIN — KETOROLAC TROMETHAMINE 15 MG: 30 INJECTION, SOLUTION INTRAMUSCULAR at 18:41

## 2018-07-08 NOTE — ED ATTENDING ATTESTATION
Danish Almaraz MD, saw and evaluated the patient  I have discussed the patient with the resident/non-physician practitioner and agree with the resident's/non-physician practitioner's findings, Plan of Care, and MDM as documented in the resident's/non-physician practitioner's note, except where noted  All available labs and Radiology studies were reviewed  At this point I agree with the current assessment done in the Emergency Department  I have conducted an independent evaluation of this patient a history and physical is as follows:      Critical Care Time  CritCare Time    Procedures     62 yo male with hx of diverticulitis s/p hemicolectomy and ostomy, and ureteral stents placed bilaterally  Right stent removed  Pt noted bloody urine pink tinged today  Pt with dysuria, frequency, no flank pain, no abdominal pain  Vss, afebrile, lungs cta, rrr, abdomen soft tender suprapubic area, no cva tenderness  Labs, urine, stone study, pain meds  Discuss with urology

## 2018-07-09 LAB — BACTERIA UR CULT: NORMAL

## 2018-07-09 NOTE — ED PROVIDER NOTES
History  Chief Complaint   Patient presents with    Flank Pain     pt c/o left flank pain and blood in urine  States he is suppose to get stent removed from left kidney 918     64-year-old male presents to the emergency department for evaluation of hematuria  He had bilateral ureteral stents placed after a colectomy done about month ago  The right stent was removed and the left stent is scheduled to be removed in the next week  He has had 3 days of pink-tinged urine and hubert hematuria today  Is associated with some suprapubic pain pressure as well as urinary frequency  He denies fevers chills or vomiting  He is primarily concerned about the blood in the urine and particularly concerned about potential problem with the stent  He has no flank pain  He has urology follow-up scheduled            Prior to Admission Medications   Prescriptions Last Dose Informant Patient Reported? Taking?   doxazosin (CARDURA) 4 mg tablet 2018 at Unknown time  Yes Yes   Si mg   ergocalciferol (ERGOCALCIFEROL) 79281 units capsule Past Week at Unknown time  Yes Yes   Si,000 Units   tamsulosin (FLOMAX) 0 4 mg 2018 at Unknown time  Yes Yes   Si 4 mg every 24 hours      Facility-Administered Medications: None       Past Medical History:   Diagnosis Date    Abscess, intestine     Arthritis     Diverticulitis     Hydronephrosis 2018       Past Surgical History:   Procedure Laterality Date    ABCESS DRAINAGE      COLONOSCOPY N/A 2016    Procedure: COLONOSCOPY;  Surgeon: Jesus Hooker MD;  [facility]: [facility];   Service:     ILEO LOOP DIVERSION N/A 2018    Procedure: ILEOSTOMY LOOP DIVERTING;  Surgeon: Rohan Guajardo DO;  [facility];  Service: General    LAPAROTOMY N/A 2018    Procedure: LAPAROTOMY EXPLORATORY,;  Surgeon: Rohan Guajardo DO;  [facility];  Service: General    HI CYSTOURETHROSCOPY,URETER CATHETER Left 2018    Procedure: CYSTOSCOPY RETROGRADE PYELOGRAM WITH INSERTION STENT URETERAL;  Surgeon: Juan Haskins MD;  [facility];  Service: Urology    IL PART REMOVAL COLON W ANASTOMOSIS N/A 6/1/2018    Procedure: RESECTION COLON SIGMOID;  Surgeon: Funmilayo Santoyo DO;  [facility];  Service: General       Family History   Problem Relation Age of Onset    No Known Problems Mother     No Known Problems Father      I have reviewed and agree with the history as documented  Social History   Substance Use Topics    Smoking status: Former Smoker    Smokeless tobacco: Never Used    Alcohol use No        Review of Systems   Constitutional: Negative for appetite change, chills, fatigue and fever  HENT: Negative for sneezing and sore throat  Eyes: Negative for visual disturbance  Respiratory: Negative for cough, choking, chest tightness, shortness of breath and wheezing  Cardiovascular: Negative for chest pain and palpitations  Gastrointestinal: Positive for abdominal pain  Negative for constipation, diarrhea, nausea and vomiting  Genitourinary: Positive for dysuria, hematuria and urgency  Negative for difficulty urinating  Neurological: Negative for dizziness, weakness, light-headedness, numbness and headaches  All other systems reviewed and are negative  Physical Exam  ED Triage Vitals [07/08/18 1718]   Temperature Pulse Respirations Blood Pressure SpO2   98 9 °F (37 2 °C) 79 18 142/77 98 %      Temp Source Heart Rate Source Patient Position - Orthostatic VS BP Location FiO2 (%)   Tympanic Monitor Sitting Right arm --      Pain Score       9           Orthostatic Vital Signs  Vitals:    07/08/18 1830 07/08/18 1915 07/08/18 2033 07/08/18 2045   BP: 116/70 123/74 121/74 121/74   Pulse: 71 66 67 62   Patient Position - Orthostatic VS: Lying          Physical Exam   Constitutional: He is oriented to person, place, and time  He appears well-developed and well-nourished  No distress     HENT:   Head: Normocephalic and atraumatic  Mouth/Throat: Oropharynx is clear and moist    Eyes: EOM are normal  Pupils are equal, round, and reactive to light  Neck: No JVD present  No tracheal deviation present  Cardiovascular: Normal rate, regular rhythm, normal heart sounds and intact distal pulses  Exam reveals no gallop and no friction rub  No murmur heard  Pulmonary/Chest: Effort normal and breath sounds normal  No respiratory distress  He has no wheezes  He has no rales  Abdominal: Soft  Bowel sounds are normal  He exhibits no distension  There is tenderness in the suprapubic area  There is no rebound and no guarding  Genitourinary: Testes normal and penis normal    Neurological: He is alert and oriented to person, place, and time  No cranial nerve deficit  He exhibits normal muscle tone  Skin: Skin is warm and dry  He is not diaphoretic  No pallor  Psychiatric: He has a normal mood and affect  His behavior is normal    Nursing note and vitals reviewed        ED Medications  Medications   ketorolac (TORADOL) injection 15 mg (15 mg Intravenous Given 7/8/18 1841)       Diagnostic Studies  Results Reviewed     Procedure Component Value Units Date/Time    Urine culture [[id number]] Collected:  07/08/18 1755    Lab Status:  Final result Specimen:  Urine from Urine, Clean Catch Updated:  07/09/18 1750     Urine Culture No Growth <1000 cfu/mL    Comprehensive metabolic panel [[id number]]  (Abnormal) Collected:  07/08/18 1819    Lab Status:  Final result Specimen:  Blood from Arm, Left Updated:  07/08/18 1845     Sodium 140 mmol/L      Potassium 4 2 mmol/L      Chloride 110 (H) mmol/L      CO2 24 mmol/L      Anion Gap 6 mmol/L      BUN 14 mg/dL      Creatinine 1 07 mg/dL      Glucose 98 mg/dL      Calcium 8 4 mg/dL      AST 12 U/L      ALT 30 U/L      Alkaline Phosphatase 107 U/L      Total Protein 7 0 g/dL      Albumin 3 3 (L) g/dL      Total Bilirubin 0 16 (L) mg/dL      eGFR 77 ml/min/1 73sq m     Narrative:         [place] [facility] Disease Education Program recommendations are as follows:  GFR calculation is accurate only with a steady state creatinine  Chronic Kidney disease less than 60 ml/min/1 73 sq  meters  Kidney failure less than 15 ml/min/1 73 sq  meters      CBC and differential [[id number]] Collected:  07/08/18 1819    Lab Status:  Final result Specimen:  Blood from Arm, Left Updated:  07/08/18 1828     WBC 8 19 Thousand/uL      RBC 4 00 Million/uL      Hemoglobin 12 4 g/dL      Hematocrit 38 0 %      MCV 95 fL      MCH 31 0 pg      MCHC 32 6 g/dL      RDW 13 7 %      MPV 9 8 fL      Platelets 590 Thousands/uL      nRBC 0 /100 WBCs      Neutrophils Relative 56 %      Immat GRANS % 0 %      Lymphocytes Relative 32 %      Monocytes Relative 7 %      Eosinophils Relative 4 %      Basophils Relative 1 %      Neutrophils Absolute 4 61 Thousands/µL      Immature Grans Absolute 0 03 Thousand/uL      Lymphocytes Absolute 2 65 Thousands/µL      Monocytes Absolute 0 55 Thousand/µL      Eosinophils Absolute 0 29 Thousand/µL      Basophils Absolute 0 06 Thousands/µL     Urine Microscopic [[id number]]  (Abnormal) Collected:  07/08/18 1755    Lab Status:  Final result Specimen:  Urine from Urine, Clean Catch Updated:  07/08/18 1813     RBC, UA Innumerable (A) /hpf      WBC, UA 30-50 (A) /hpf      Epithelial Cells None Seen /hpf      Bacteria, UA None Seen /hpf      Hyaline Casts, UA 5-10 (A) /lpf     POCT urinalysis dipstick [[id number]]  (Abnormal) Resulted:  07/08/18 1745    Lab Status:  Final result Updated:  07/08/18 1748     Color, UA see chart    ED Urine Macroscopic [[id number]]  (Abnormal) Collected:  07/08/18 1755    Lab Status:  Final result Specimen:  Urine Updated:  07/08/18 1745     Color, UA Cornelia     Clarity, UA Clear     pH, UA 5 5     Leukocytes, UA Trace (A)     Nitrite, UA Negative     Protein,  (2+) (A) mg/dl      Glucose, UA Negative mg/dl      Ketones, UA Trace (A) mg/dl      Urobilinogen, UA 0 2 E U /dl      Bilirubin, UA Interference- unable to analyze (A)     Blood, UA Large (A)     Specific Gravity, UA >=1 030    Narrative:       CLINITEK RESULT                 CT renal stone study abdomen pelvis wo contrast   Final Result by Julia Silverio MD (07/08 2001)      No acute pathology  No significant hydronephrosis status post left ureteral stent placement  Colonic diverticulosis without diverticulitis  Status post diverting ileostomy  Workstation performed: [id number]               Procedures  Procedures      Phone Consults  ED Phone Contact    ED Course  ED Course as of Jul 10 1319   Haleyjozef Becca Jul 08, 2018   2041 BUN: 14                               MDM  Number of Diagnoses or Management Options  Hematuria:   Diagnosis management comments: 26-year-old male with ureteral stent and hematuria  Concern for emergent cystitis versus stent dislodgement or occlusion  Will obtain urinalysis and labs and check CT of the abdomen pelvis to evaluate for stent placement  Laboratory work unremarkable urinalysis is suggestive infection with 30-50 WBCs  CT scan shows the stent in place with the hydronephrosis  I reviewed the case with the on-call urologist who feels that the patient will do well with discharge on antibiotic for infection and Urology follow-up  Discussed plan the patient was amenable  CritCare Time    Disposition  Final diagnoses:   Hematuria     Time reflects when diagnosis was documented in both MDM as applicable and the Disposition within this note     Time User Action Codes Description Comment    7/8/2018  9:21 PM Lauren, [facility] [R31 9] Hematuria       ED Disposition     ED Disposition Condition Comment    Discharge  [address] discharge to home/self care      Condition at discharge: Stable        Follow-up Information     Follow up With Specialties Details Why Rachna Bojorquez U  51  For Urology [facility]   [facility] [address] [address]  [phone]          Discharge Medication List as of 7/8/2018  9:24 PM      START taking these medications    Details   ciprofloxacin (CIPRO) 500 mg tablet Take 1 tablet (500 mg total) by mouth 2 (two) times a day for 5 days, Starting Sun 7/8/2018, Until Fri 7/13/2018, Print      naproxen (NAPROSYN) 500 mg tablet Take 1 tablet (500 mg total) by mouth 2 (two) times a day with meals, Starting Sun 7/8/2018, Print         CONTINUE these medications which have NOT CHANGED    Details   doxazosin (CARDURA) 4 mg tablet 4 mg, Historical Med      ergocalciferol (ERGOCALCIFEROL) 96313 units capsule 50,000 Units, Starting Tue 5/8/2018, Historical Med      tamsulosin (FLOMAX) 0 4 mg 0 4 mg every 24 hours, Starting Wed 4/25/2018, Historical Med           No discharge procedures on file  ED Provider  Attending physically available and evaluated Sebas Thomasbart RUBALCAVA managed the patient along with the ED Attending      Electronically Signed by         Deanne Rangel MD  07/10/18 5244

## 2018-07-09 NOTE — DISCHARGE INSTRUCTIONS
Hematuria   Juan M Flores & Shelby A: Hematuria as a Marker of Occult Urinary Tract Cancer: Advice for High-Value Care From the [employer] of Physicians  Kristin Intern Med 2016; Epub:Epub  86 Chung Garcia R: Hematuria and Dysuria  In: Bhavya Beaver, ed  [facility] Clinical Practice of Emergency Medicine, 6th ed  [address] [facility],[address] [place] [address], [address]  [place] TO: Hematuria  In: Justice ARAUJO, ed  The 5-Minute Clinical Consult Standard 2016, 24th ed  [address] [facility],[address] [place] [address], [address]  Rinne A & Samm Ridgel E: Hematuria/Proteinuria  In: Grace Foreman, [employer], et al, [facility]  [address] Emergency Medicine Consult, 5th ed  [address] [facility],[address] [place] [address], [address]  Aye Lizarraga, Magi MATHEW, & Jani R: Urinary Tract Disorders  In: Joselin RE, Joselin DP, eds  Textbook of Family Medicine, 8th ed  [address] [facility], [place], [place], 2011  Vlad Mess & Gina Kenyan: Management of macroscopic haematuria in the emergency department  Emerg Med J 2007; 24(6):385-390  Joseph RODNEY: Evaluation and management of hematuria  [facility] 2010; 37(3):461-72, vii  Yanna Carlos & Vikas ARCHER: Assessment of hematuria  Med Clin [place] 2011; 95(1):153-159  [place] TO: Hematuria  In: Justice ARAUJO, ed  The 5-Minute Clinical Consult 2011, 19th ed  [address] [facility],[address] [place] [address], 2010  Karl Blood, Brando CV, & Leia LG: Hematuria: etiology and evaluation for the primary care physician  Can J Urol 2008; 15 Suppl 1:54-61  [address] & Vickie LD: Evaluation of asymptomatic, atraumatic hematuria in children and adults  Erin Rev Urol 2010; 7(4):189-194  Frnacis Thomas: Hematuria/Proteinuria  In: Grace Foreman, [employer], et al  The Antonio-Dimitrios  [address] Emergency Medicine Consult, 4th ed  [address] [facility],[address] [facility], [place], [place], 2011    © 2017 [address] Information is for End User's use only and may not be sold, redistributed or otherwise used for commercial purposes  All illustrations and images included in CareNotes® are the copyrighted property of A D A M , Inc  or Cody Rodrigues  The above information is an  only  It is not intended as medical advice for individual conditions or treatments  Talk to your doctor, nurse or pharmacist before following any medical regimen to see if it is safe and effective for you

## 2018-07-12 ENCOUNTER — OFFICE VISIT (OUTPATIENT)
Dept: SURGERY | Facility: CLINIC | Age: 58
End: 2018-07-12

## 2018-07-12 VITALS
SYSTOLIC BLOOD PRESSURE: 114 MMHG | BODY MASS INDEX: 24.71 KG/M2 | TEMPERATURE: 97.4 F | DIASTOLIC BLOOD PRESSURE: 62 MMHG | HEIGHT: 68 IN | WEIGHT: 163 LBS

## 2018-07-12 DIAGNOSIS — Z87.898 NO POST-OP COMPLICATIONS: Primary | ICD-10-CM

## 2018-07-12 DIAGNOSIS — K57.20 DIVERTICULITIS OF LARGE INTESTINE WITH ABSCESS, UNSPECIFIED BLEEDING STATUS: ICD-10-CM

## 2018-07-12 PROCEDURE — 99024 POSTOP FOLLOW-UP VISIT: CPT | Performed by: SURGERY

## 2018-07-12 NOTE — ASSESSMENT & PLAN NOTE
Impression  Status post sigmoid resection secondary to diverticulitis with creation of diverting loop ileostomy    Recommendation  Will get colonoscopy in the next several weeks    After colonoscopy, will plan on ileostomy reversal

## 2018-07-12 NOTE — PROGRESS NOTES
Office Visit - General Surgery  Fabienne Woody MRN: [id number]  Encounter: [id number]    Assessment and Plan  Status post sigmoid resection secondary to diverticulitis and creation of diverting loop ileostomy    Will plan on colonoscopy within the next 2 weeks and then reversal of his diverting loop ileostomy      Chief Complaint:  Fabienne Woody is a 63 y o  male who presents for Post-op (2nd p/o exploratory lap )    Subjective      Past Medical History  Past Medical History:   Diagnosis Date    Abscess, intestine     Arthritis     Diverticulitis     Hydronephrosis 5/27/2018       Past Surgical History  Past Surgical History:   Procedure Laterality Date    ABCESS DRAINAGE      COLONOSCOPY N/A 5/5/2016    Procedure: COLONOSCOPY;  Surgeon: Vianney Paz MD;  Location: [facility] GI LAB;   Service:     ILEO LOOP DIVERSION N/A 6/1/2018    Procedure: ILEOSTOMY LOOP DIVERTING;  Surgeon: Ro Matos DO;  [facility];  Service: General    LAPAROTOMY N/A 6/1/2018    Procedure: LAPAROTOMY EXPLORATORY,;  Surgeon: Ro Matos DO;  [facility];  Service: General    CA CYSTOURETHROSCOPY,URETER CATHETER Left 6/9/2018    Procedure: CYSTOSCOPY RETROGRADE PYELOGRAM WITH INSERTION STENT URETERAL;  Surgeon: Jose Wilson MD;  [facility];  Service: Urology    CA PART REMOVAL COLON W ANASTOMOSIS N/A 6/1/2018    Procedure: RESECTION COLON SIGMOID;  Surgeon: Ro Matos DO;  [facility];  Service: General       Family History  Family History   Problem Relation Age of Onset    No Known Problems Mother     No Known Problems Father        Medications  Current Outpatient Prescriptions on File Prior to Visit   Medication Sig Dispense Refill    acetaminophen (TYLENOL) 500 mg tablet Take 1 tablet (500 mg total) by mouth every 6 (six) hours as needed for mild pain 30 tablet 0    ciprofloxacin (CIPRO) 500 mg tablet Take 1 tablet (500 mg total) by mouth 2 (two) times a day for 5 days 14 tablet 0    doxazosin (CARDURA) 4 mg tablet 4 mg      ergocalciferol (ERGOCALCIFEROL) 82644 units capsule 50,000 Units      naproxen (NAPROSYN) 500 mg tablet Take 1 tablet (500 mg total) by mouth 2 (two) times a day with meals 30 tablet 0    tamsulosin (FLOMAX) 0 4 mg 0 4 mg every 24 hours       No current facility-administered medications on file prior to visit  Allergies  Allergies   Allergen Reactions    Penicillins        Review of Systems    Objective  Vitals:    07/12/18 0831   BP: 114/62   Temp: (!) 97 4 °F (36 3 °C)       Physical Exam     Abdomen soft nontender nondistended  Ileostomy functioning well

## 2018-07-17 ENCOUNTER — OFFICE VISIT (OUTPATIENT)
Dept: GASTROENTEROLOGY | Facility: MEDICAL CENTER | Age: 58
End: 2018-07-17
Payer: COMMERCIAL

## 2018-07-17 VITALS
TEMPERATURE: 96.6 F | BODY MASS INDEX: 24.86 KG/M2 | WEIGHT: 164 LBS | SYSTOLIC BLOOD PRESSURE: 118 MMHG | HEART RATE: 69 BPM | HEIGHT: 68 IN | DIASTOLIC BLOOD PRESSURE: 82 MMHG

## 2018-07-17 DIAGNOSIS — K57.92 DIVERTICULITIS: ICD-10-CM

## 2018-07-17 DIAGNOSIS — K57.20 DIVERTICULITIS OF LARGE INTESTINE WITH ABSCESS WITHOUT BLEEDING: ICD-10-CM

## 2018-07-17 DIAGNOSIS — Z86.010 HISTORY OF COLONIC POLYPS: Primary | ICD-10-CM

## 2018-07-17 PROCEDURE — 99214 OFFICE O/P EST MOD 30 MIN: CPT | Performed by: INTERNAL MEDICINE

## 2018-07-17 RX ORDER — CIPROFLOXACIN 500 MG/1
500 TABLET, FILM COATED ORAL EVERY 12 HOURS SCHEDULED
COMMUNITY
End: 2018-07-18 | Stop reason: SDUPTHER

## 2018-07-17 NOTE — LETTER
July 17, 2018     Nataliya Dennis MD  [address]  [address] [place] [id number]    Patient: Avila Bunn   YOB: 1960   Date of Visit: 7/17/2018       Dear Dr Khari Pisano: Thank you for referring Mukul Chelo to me for evaluation  Below are my notes for this consultation  If you have questions, please do not hesitate to call me  I look forward to following your patient along with you  Sincerely,        Idalia Smith MD        CC: No Recipients  Idalia Smith MD  7/17/2018  1:23 PM  Sign at close encounter  [facility] - Outpatient Follow-up Note  Avila Bunn 63 y o  male MRN: [id number]  Encounter: [id number]          ASSESSMENT AND PLAN:       1  Status post sigmoid resection secondary to diverticulitis and creation of diverting loop ileostomy - which was done on 6/1/2018  We will plan on colonoscopy within the next 2 weeks to rule out underlying lesion such as large polyp or malignancy  - we reviewed risks benefits and alternatives of colonoscopy  Risks include but not limited to infection, bleeding, perforation, or missed lesion  Bowel prep instructions were given  - he has a history of recurrent diverticulitis complicated with abscesses requiring drainage by interventional radiology  2  History of colonic polyps -  he had a colonoscopy in 2016, 20 mm adenoma was removed at that time  We will proceed with colonoscopy as stated above      ______________________________________________________________________    SUBJECTIVE:     Kendal Nava is a 63 y o  male here for colonoscopy consult  He has a history of recurrent diverticulitis complicated with abscesses requiring drainage by interventional radiology  He is status post colon resection and ileostomy due to perforation in the colon on 06/01/2018  As a result a CT scan was obtained on postoperative day 4, 06/05/2018   This did not reveal any underlying abscess or infection  His ostomy was functioning, though with relatively high output he was maintained on IV fluids  By postoperative day 16 he was tolerating a regular diet, adequately hydrating without IV fluid supplementation, and his pain was controlled  He denied weight loss, hematochezia or family history of colon cancer  He had a colonoscopy in 2016, 20 mm adenoma was removed at that time  REVIEW OF SYSTEMS IS OTHERWISE NEGATIVE  Historical Information   Past Medical History:   Diagnosis Date    Abscess, intestine     Arthritis     Diverticulitis     GERD (gastroesophageal reflux disease)     Hydronephrosis 5/27/2018     Past Surgical History:   Procedure Laterality Date    ABCESS DRAINAGE      COLONOSCOPY N/A 5/5/2016    Procedure: COLONOSCOPY;  Surgeon: Rusty Benson MD;  Location: [facility]; Service:     ESOPHAGOGASTRODUODENOSCOPY      with biopsy    FOOT SURGERY Left     x2? fusion? due to arthritis  onset: 0      ILEO LOOP DIVERSION N/A 6/1/2018    Procedure: ILEOSTOMY LOOP DIVERTING;  Surgeon: Madalyn Palomino DO;  [facility];  Service: General    LAPAROTOMY N/A 6/1/2018    Procedure: LAPAROTOMY EXPLORATORY,;  Surgeon: Madalyn Palomino DO;  [facility];  Service: General    IN CYSTOURETHROSCOPY,URETER CATHETER Left 6/9/2018    Procedure: CYSTOSCOPY RETROGRADE PYELOGRAM WITH INSERTION STENT URETERAL;  Surgeon: Jana Gill MD;  [facility];  Service: Urology    IN PART REMOVAL COLON W ANASTOMOSIS N/A 6/1/2018    Procedure: RESECTION COLON SIGMOID;  Surgeon: Madalyn Palomino DO;  [facility];  Service: General     Social History   History   Alcohol Use No     History   Drug Use No     History   Smoking Status    Former Smoker   Smokeless Tobacco    Never Used     Family History   Problem Relation Age of Onset    Other Mother         head tumor    Glaucoma Father     Diabetes Father         possible diabetes    Stroke Family Meds/Allergies       Current Outpatient Prescriptions:     acetaminophen (TYLENOL) 500 mg tablet    ciprofloxacin (CIPRO) 500 mg tablet    doxazosin (CARDURA) 4 mg tablet    ergocalciferol (ERGOCALCIFEROL) 15225 units capsule    naproxen (NAPROSYN) 500 mg tablet    tamsulosin (FLOMAX) 0 4 mg    polyethylene glycol (GOLYTELY) 4000 mL solution    Allergies   Allergen Reactions    Penicillins            Objective     Blood pressure 118/82, pulse 69, temperature (!) 96 6 °F (35 9 °C), temperature source Tympanic, height 5' 8" (1 727 m), weight 74 4 kg (164 lb)  Body mass index is 24 94 kg/m²  PHYSICAL EXAM:      General Appearance:   Alert, cooperative, no distress   HEENT:   Normocephalic, atraumatic, anicteric      Neck:  Supple, symmetrical, trachea midline   Lungs:   Clear to auscultation bilaterally; no rales, rhonchi or wheezing; respirations unlabored    Heart[de-identified]   Regular rate and rhythm; no murmur, rub, or gallop  Abdomen:   Soft, non-tender, non-distended; normal bowel sounds; no masses, no organomegaly, ileostomy site intact  Midline surgical scar - healing wound       Genitalia:   Deferred    Rectal:   Deferred    Extremities:  No cyanosis, clubbing or edema    Pulses:  2+ and symmetric    Skin:  No jaundice, rashes, or lesions    Lymph nodes:  No palpable cervical lymphadenopathy        Lab Results:   No visits with results within 1 Day(s) from this visit     Latest known visit with results is:   Admission on 07/08/2018, Discharged on 07/08/2018   Component Date Value    Color, UA 07/08/2018 Cornelia     Clarity, UA 07/08/2018 Clear     pH, UA 07/08/2018 5 5     Leukocytes, UA 07/08/2018 Trace*    Nitrite, UA 07/08/2018 Negative     Protein, UA 07/08/2018 100 (2+)*    Glucose, UA 07/08/2018 Negative     Ketones, UA 07/08/2018 Trace*    Urobilinogen, UA 07/08/2018 0 2     Bilirubin, UA 07/08/2018 Interference- unable to analyze*    Blood, UA 07/08/2018 Large*    Specific Gravity, UA 07/08/2018 >=1 030     RBC, UA 07/08/2018 Innumerable*    WBC, UA 07/08/2018 30-50*    Epithelial Cells 07/08/2018 None Seen     Bacteria, UA 07/08/2018 None Seen     Hyaline Casts, UA 07/08/2018 5-10*    Color, UA 07/08/2018 see chart     WBC 07/08/2018 8 19     RBC 07/08/2018 4 00     Hemoglobin 07/08/2018 12 4     Hematocrit 07/08/2018 38 0     MCV 07/08/2018 95     MCH 07/08/2018 31 0     MCHC 07/08/2018 32 6     RDW 07/08/2018 13 7     MPV 07/08/2018 9 8     Platelets 10/91/4984 262     nRBC 07/08/2018 0     Neutrophils Relative 07/08/2018 56     Immat GRANS % 07/08/2018 0     Lymphocytes Relative 07/08/2018 32     Monocytes Relative 07/08/2018 7     Eosinophils Relative 07/08/2018 4     Basophils Relative 07/08/2018 1     Neutrophils Absolute 07/08/2018 4 61     Immature Grans Absolute 07/08/2018 0 03     Lymphocytes Absolute 07/08/2018 2 65     Monocytes Absolute 07/08/2018 0 55     Eosinophils Absolute 07/08/2018 0 29     Basophils Absolute 07/08/2018 0 06     Sodium 07/08/2018 140     Potassium 07/08/2018 4 2     Chloride 07/08/2018 110*    CO2 07/08/2018 24     Anion Gap 07/08/2018 6     BUN 07/08/2018 14     Creatinine 07/08/2018 1 07     Glucose 07/08/2018 98     Calcium 07/08/2018 8 4     AST 07/08/2018 12     ALT 07/08/2018 30     Alkaline Phosphatase 07/08/2018 107     Total Protein 07/08/2018 7 0     Albumin 07/08/2018 3 3*    Total Bilirubin 07/08/2018 0 16*    eGFR 07/08/2018 77     Urine Culture 07/08/2018 No Growth <1000 cfu/mL            Attestation:   By signing my name below, Bettie Ragland, attest that this documentation has been prepared under the direction and in the presence of Rosalie Michael MD  Electronically Signed: Stefany Recio  07/17/2018  Bessie Johnson, personally performed the services described in this documentation  All medical record entries made by the chuckibmee were at my direction and in my presence   I have reviewed the chart and discharge instructions and agree that the record reflects my personal performance and is accurate and complete  Mckinley Varghese MD  07/17/2018

## 2018-07-17 NOTE — LETTER
July 17, 2018     Nataliya Dennis MD  [facility] [address]  [facility] [address]    Patient: Avila Bunn   YOB: 1960   Date of Visit: 7/17/2018       Dear Dr Khari Pisano: Thank you for referring Mukul Whitney to me for evaluation  Below are my notes for this consultation  If you have questions, please do not hesitate to call me  I look forward to following your patient along with you  Sincerely,        Idalia Smith MD        CC: No Recipients  Idalia Smith MD  7/17/2018  1:22 PM  Sign at close encounter  [facility] - Outpatient Follow-up Note  Avila Bunn 63 y o  male MRN: [id number]  Encounter: [id number]          ASSESSMENT AND PLAN:       1  Status post sigmoid resection secondary to diverticulitis and creation of diverting loop ileostomy - which was done on 6/1/2018  We will plan on colonoscopy within the next 2 weeks to rule out underlying lesion such as large polyp or malignancy  - we reviewed risks benefits and alternatives of colonoscopy  Risks include but not limited to infection, bleeding, perforation, or missed lesion  Bowel prep instructions were given  - he has a history of recurrent diverticulitis complicated with abscesses requiring drainage by interventional radiology  - we will contact Dr Vicente Lazo office to ***    2  History of colonic polyps -  he had a colonoscopy in 2016, 20 mm adenoma was removed at that time  We will proceed with colonoscopy as stated above      ______________________________________________________________________    SUBJECTIVE:     Kendal Nava is a 63 y o  male here for colonoscopy consult  He has a history of recurrent diverticulitis complicated with abscesses requiring drainage by interventional radiology  He is status post colon resection and ileostomy due to perforation in the colon on 06/01/2018  As a result a CT scan was obtained on postoperative day 4, 06/05/2018  This did not reveal any underlying abscess or infection  His ostomy was functioning, though with relatively high output he was maintained on IV fluids  By postoperative day 16 he was tolerating a regular diet, adequately hydrating without IV fluid supplementation, and his pain was controlled  He denied weight loss, hematochezia or family history of colon cancer  He had a colonoscopy in 2016, 20 mm adenoma was removed at that time  REVIEW OF SYSTEMS IS OTHERWISE NEGATIVE  Historical Information   Past Medical History:   Diagnosis Date    Abscess, intestine     Arthritis     Diverticulitis     GERD (gastroesophageal reflux disease)     Hydronephrosis 5/27/2018     Past Surgical History:   Procedure Laterality Date    ABCESS DRAINAGE      COLONOSCOPY N/A 5/5/2016    Procedure: COLONOSCOPY;  Surgeon: April Byrnes MD;  Location: [facility]; Service:     ESOPHAGOGASTRODUODENOSCOPY      with biopsy    FOOT SURGERY Left     x2? fusion? due to arthritis  onset: 0      ILEO LOOP DIVERSION N/A 6/1/2018    Procedure: ILEOSTOMY LOOP DIVERTING;  Surgeon: Joel Martinez DO;  [facility];  Service: General    LAPAROTOMY N/A 6/1/2018    Procedure: LAPAROTOMY EXPLORATORY,;  Surgeon: Joel Martinez DO;  [facility];  Service: General    NM CYSTOURETHROSCOPY,URETER CATHETER Left 6/9/2018    Procedure: CYSTOSCOPY RETROGRADE PYELOGRAM WITH INSERTION STENT URETERAL;  Surgeon: Yoli Musa MD;  [facility];  Service: Urology    NM PART REMOVAL COLON W ANASTOMOSIS N/A 6/1/2018    Procedure: RESECTION COLON SIGMOID;  Surgeon: Joel Martinez DO;  [facility];  Service: General     Social History   History   Alcohol Use No     History   Drug Use No     History   Smoking Status    Former Smoker   Smokeless Tobacco    Never Used     Family History   Problem Relation Age of Onset    Other Mother         head tumor    Glaucoma Father     Diabetes Father         possible diabetes    Stroke Family        Meds/Allergies       Current Outpatient Prescriptions:     acetaminophen (TYLENOL) 500 mg tablet    ciprofloxacin (CIPRO) 500 mg tablet    doxazosin (CARDURA) 4 mg tablet    ergocalciferol (ERGOCALCIFEROL) 16202 units capsule    naproxen (NAPROSYN) 500 mg tablet    tamsulosin (FLOMAX) 0 4 mg    polyethylene glycol (GOLYTELY) 4000 mL solution    Allergies   Allergen Reactions    Penicillins            Objective     Blood pressure 118/82, pulse 69, temperature (!) 96 6 °F (35 9 °C), temperature source Tympanic, height 5' 8" (1 727 m), weight 74 4 kg (164 lb)  Body mass index is 24 94 kg/m²  PHYSICAL EXAM:      General Appearance:   Alert, cooperative, no distress   HEENT:   Normocephalic, atraumatic, anicteric      Neck:  Supple, symmetrical, trachea midline   Lungs:   Clear to auscultation bilaterally; no rales, rhonchi or wheezing; respirations unlabored    Heart[de-identified]   Regular rate and rhythm; no murmur, rub, or gallop  Abdomen:   Soft, non-tender, non-distended; normal bowel sounds; no masses, no organomegaly, ileostomy site intact  Midline surgical scar - healing wound       Genitalia:   Deferred    Rectal:   Deferred    Extremities:  No cyanosis, clubbing or edema    Pulses:  2+ and symmetric    Skin:  No jaundice, rashes, or lesions    Lymph nodes:  No palpable cervical lymphadenopathy        Lab Results:   No visits with results within 1 Day(s) from this visit     Latest known visit with results is:   Admission on 07/08/2018, Discharged on 07/08/2018   Component Date Value    Color,  07/08/2018 Cornelia     Clarity, UA 07/08/2018 Clear     pH, UA 07/08/2018 5 5     Leukocytes, UA 07/08/2018 Trace*    Nitrite, UA 07/08/2018 Negative     Protein, UA 07/08/2018 100 (2+)*    Glucose, UA 07/08/2018 Negative     Ketones, UA 07/08/2018 Trace*    Urobilinogen, UA 07/08/2018 0 2     Bilirubin, UA 07/08/2018 Interference- unable to analyze*    Blood, UA 07/08/2018 Large*    Specific Gravity, UA 07/08/2018 >=1 030     RBC, UA 07/08/2018 Innumerable*    WBC, UA 07/08/2018 30-50*    Epithelial Cells 07/08/2018 None Seen     Bacteria, UA 07/08/2018 None Seen     Hyaline Casts, UA 07/08/2018 5-10*    Color, UA 07/08/2018 see chart     WBC 07/08/2018 8 19     RBC 07/08/2018 4 00     Hemoglobin 07/08/2018 12 4     Hematocrit 07/08/2018 38 0     MCV 07/08/2018 95     MCH 07/08/2018 31 0     MCHC 07/08/2018 32 6     RDW 07/08/2018 13 7     MPV 07/08/2018 9 8     Platelets 08/98/4448 262     nRBC 07/08/2018 0     Neutrophils Relative 07/08/2018 56     Immat GRANS % 07/08/2018 0     Lymphocytes Relative 07/08/2018 32     Monocytes Relative 07/08/2018 7     Eosinophils Relative 07/08/2018 4     Basophils Relative 07/08/2018 1     Neutrophils Absolute 07/08/2018 4 61     Immature Grans Absolute 07/08/2018 0 03     Lymphocytes Absolute 07/08/2018 2 65     Monocytes Absolute 07/08/2018 0 55     Eosinophils Absolute 07/08/2018 0 29     Basophils Absolute 07/08/2018 0 06     Sodium 07/08/2018 140     Potassium 07/08/2018 4 2     Chloride 07/08/2018 110*    CO2 07/08/2018 24     Anion Gap 07/08/2018 6     BUN 07/08/2018 14     Creatinine 07/08/2018 1 07     Glucose 07/08/2018 98     Calcium 07/08/2018 8 4     AST 07/08/2018 12     ALT 07/08/2018 30     Alkaline Phosphatase 07/08/2018 107     Total Protein 07/08/2018 7 0     Albumin 07/08/2018 3 3*    Total Bilirubin 07/08/2018 0 16*    eGFR 07/08/2018 77     Urine Culture 07/08/2018 No Growth <1000 cfu/mL            Attestation:   By signing my name below, Pallavi Smith, attest that this documentation has been prepared under the direction and in the presence of Eboni Arzola MD  Electronically Signed: Stefany Guerra  07/17/2018  Mee Walters, personally performed the services described in this documentation   All medical record entries made by the scribmee were at my direction and in my presence  I have reviewed the chart and discharge instructions and agree that the record reflects my personal performance and is accurate and complete  Tristan Lindquist MD  07/17/2018

## 2018-07-17 NOTE — PROGRESS NOTES
[facility] - Outpatient Follow-up Note  Deirdre Bunn 63 y o  male MRN: [id number]  Encounter: [id number]          ASSESSMENT AND PLAN:       1  Status post sigmoid resection secondary to diverticulitis and creation of diverting loop ileostomy - which was done on 6/1/2018  We will plan on colonoscopy within the next 2 weeks to rule out underlying lesion such as large polyp or malignancy  - we reviewed risks benefits and alternatives of colonoscopy  Risks include but not limited to infection, bleeding, perforation, or missed lesion  Bowel prep instructions were given  - he has a history of recurrent diverticulitis complicated with abscesses requiring drainage by interventional radiology  2  History of colonic polyps -  he had a colonoscopy in 2016, 20 mm adenoma was removed at that time  We will proceed with colonoscopy as stated above      ______________________________________________________________________    SUBJECTIVE:     Delfina Freeman is a 63 y o  male here for colonoscopy consult  He has a history of recurrent diverticulitis complicated with abscesses requiring drainage by interventional radiology  He is status post colon resection and ileostomy due to perforation in the colon on 06/01/2018  As a result a CT scan was obtained on postoperative day 4, 06/05/2018  This did not reveal any underlying abscess or infection  His ostomy was functioning, though with relatively high output he was maintained on IV fluids  By postoperative day 16 he was tolerating a regular diet, adequately hydrating without IV fluid supplementation, and his pain was controlled  He denied weight loss, hematochezia or family history of colon cancer  He had a colonoscopy in 2016, 20 mm adenoma was removed at that time  REVIEW OF SYSTEMS IS OTHERWISE NEGATIVE        Historical Information   Past Medical History:   Diagnosis Date    Abscess, intestine     Arthritis     Diverticulitis     GERD (gastroesophageal reflux disease)     Hydronephrosis 5/27/2018     Past Surgical History:   Procedure Laterality Date    ABCESS DRAINAGE      COLONOSCOPY N/A 5/5/2016    Procedure: COLONOSCOPY;  Surgeon: Merry Gonzales MD;  Location: [facility] GI LAB; Service:     ESOPHAGOGASTRODUODENOSCOPY      with biopsy    FOOT SURGERY Left     x2? fusion? due to arthritis  onset: 0      ILEO LOOP DIVERSION N/A 6/1/2018    Procedure: ILEOSTOMY LOOP DIVERTING;  Surgeon: Jordyn Carrillo DO;  [facility];  Service: General    LAPAROTOMY N/A 6/1/2018    Procedure: LAPAROTOMY EXPLORATORY,;  Surgeon: Jordyn Carrillo DO;  [facility];  Service: General    FL CYSTOURETHROSCOPY,URETER CATHETER Left 6/9/2018    Procedure: CYSTOSCOPY RETROGRADE PYELOGRAM WITH INSERTION STENT URETERAL;  Surgeon: Chintan Moseley MD;  [facility];  Service: Urology    FL PART REMOVAL COLON W ANASTOMOSIS N/A 6/1/2018    Procedure: RESECTION COLON SIGMOID;  Surgeon: Jordyn Carrillo DO;  [facility];  Service: General     Social History   History   Alcohol Use No     History   Drug Use No     History   Smoking Status    Former Smoker   Smokeless Tobacco    Never Used     Family History   Problem Relation Age of Onset    Other Mother         head tumor    Glaucoma Father     Diabetes Father         possible diabetes    Stroke Family        Meds/Allergies       Current Outpatient Prescriptions:     acetaminophen (TYLENOL) 500 mg tablet    ciprofloxacin (CIPRO) 500 mg tablet    doxazosin (CARDURA) 4 mg tablet    ergocalciferol (ERGOCALCIFEROL) 02236 units capsule    naproxen (NAPROSYN) 500 mg tablet    tamsulosin (FLOMAX) 0 4 mg    polyethylene glycol (GOLYTELY) 4000 mL solution    Allergies   Allergen Reactions    Penicillins            Objective     Blood pressure 118/82, pulse 69, temperature (!) 96 6 °F (35 9 °C), temperature source Tympanic, height 5' 8" (1 727 m), weight 74 4 kg (164 lb)  Body mass index is 24 94 kg/m²  PHYSICAL EXAM:      General Appearance:   Alert, cooperative, no distress   HEENT:   Normocephalic, atraumatic, anicteric      Neck:  Supple, symmetrical, trachea midline   Lungs:   Clear to auscultation bilaterally; no rales, rhonchi or wheezing; respirations unlabored    Heart[de-identified]   Regular rate and rhythm; no murmur, rub, or gallop  Abdomen:   Soft, non-tender, non-distended; normal bowel sounds; no masses, no organomegaly, ileostomy site intact  Midline surgical scar - healing wound       Genitalia:   Deferred    Rectal:   Deferred    Extremities:  No cyanosis, clubbing or edema    Pulses:  2+ and symmetric    Skin:  No jaundice, rashes, or lesions    Lymph nodes:  No palpable cervical lymphadenopathy        Lab Results:   No visits with results within 1 Day(s) from this visit     Latest known visit with results is:   Admission on 07/08/2018, Discharged on 07/08/2018   Component Date Value    Color, UA 07/08/2018 Cornelia     Clarity, UA 07/08/2018 Clear     pH, UA 07/08/2018 5 5     Leukocytes, UA 07/08/2018 Trace*    Nitrite, UA 07/08/2018 Negative     Protein, UA 07/08/2018 100 (2+)*    Glucose, UA 07/08/2018 Negative     Ketones, UA 07/08/2018 Trace*    Urobilinogen, UA 07/08/2018 0 2     Bilirubin, UA 07/08/2018 Interference- unable to analyze*    Blood, UA 07/08/2018 Large*    Specific Gravity, UA 07/08/2018 >=1 030     RBC, UA 07/08/2018 Innumerable*    WBC, UA 07/08/2018 30-50*    Epithelial Cells 07/08/2018 None Seen     Bacteria, UA 07/08/2018 None Seen     Hyaline Casts, UA 07/08/2018 5-10*    Color, UA 07/08/2018 see chart     WBC 07/08/2018 8 19     RBC 07/08/2018 4 00     Hemoglobin 07/08/2018 12 4     Hematocrit 07/08/2018 38 0     MCV 07/08/2018 95     MCH 07/08/2018 31 0     MCHC 07/08/2018 32 6     RDW 07/08/2018 13 7     MPV 07/08/2018 9 8     Platelets 70/25/1575 262     nRBC 07/08/2018 0     Neutrophils Relative 07/08/2018 56     Immat GRANS % 07/08/2018 0     Lymphocytes Relative 07/08/2018 32     Monocytes Relative 07/08/2018 7     Eosinophils Relative 07/08/2018 4     Basophils Relative 07/08/2018 1     Neutrophils Absolute 07/08/2018 4 61     Immature Grans Absolute 07/08/2018 0 03     Lymphocytes Absolute 07/08/2018 2 65     Monocytes Absolute 07/08/2018 0 55     Eosinophils Absolute 07/08/2018 0 29     Basophils Absolute 07/08/2018 0 06     Sodium 07/08/2018 140     Potassium 07/08/2018 4 2     Chloride 07/08/2018 110*    CO2 07/08/2018 24     Anion Gap 07/08/2018 6     BUN 07/08/2018 14     Creatinine 07/08/2018 1 07     Glucose 07/08/2018 98     Calcium 07/08/2018 8 4     AST 07/08/2018 12     ALT 07/08/2018 30     Alkaline Phosphatase 07/08/2018 107     Total Protein 07/08/2018 7 0     Albumin 07/08/2018 3 3*    Total Bilirubin 07/08/2018 0 16*    eGFR 07/08/2018 77     Urine Culture 07/08/2018 No Growth <1000 cfu/mL            Attestation:   By signing my name below, Shala Son, attest that this documentation has been prepared under the direction and in the presence of Twila Arzola MD  Electronically Signed: Stefany Nicholas  07/17/2018  Eugenia Hinkle, personally performed the services described in this documentation  All medical record entries made by the chuckibmee were at my direction and in my presence  I have reviewed the chart and discharge instructions and agree that the record reflects my personal performance and is accurate and complete  Twila Arzola MD  07/17/2018

## 2018-07-17 NOTE — PATIENT INSTRUCTIONS
Colonoscopy scheduled on 7/26/2018 at the [facility] with Dr Dayanna Reyes/ Arjun prescribed/ instructions given to patient   OK'D per Larisa Sloan

## 2018-07-18 ENCOUNTER — PROCEDURE VISIT (OUTPATIENT)
Dept: UROLOGY | Facility: CLINIC | Age: 58
End: 2018-07-18
Payer: COMMERCIAL

## 2018-07-18 VITALS
HEIGHT: 68 IN | SYSTOLIC BLOOD PRESSURE: 120 MMHG | HEART RATE: 64 BPM | WEIGHT: 162 LBS | BODY MASS INDEX: 24.55 KG/M2 | DIASTOLIC BLOOD PRESSURE: 90 MMHG

## 2018-07-18 DIAGNOSIS — N13.5 URETERAL OBSTRUCTION: Primary | ICD-10-CM

## 2018-07-18 DIAGNOSIS — N13.30 HYDRONEPHROSIS, UNSPECIFIED HYDRONEPHROSIS TYPE: ICD-10-CM

## 2018-07-18 PROCEDURE — 52310 CYSTOSCOPY AND TREATMENT: CPT | Performed by: UROLOGY

## 2018-07-18 RX ORDER — TAMSULOSIN HYDROCHLORIDE 0.4 MG/1
0.4 CAPSULE ORAL
Qty: 30 CAPSULE | Refills: 11 | Status: SHIPPED | OUTPATIENT
Start: 2018-07-18 | End: 2018-08-21 | Stop reason: HOSPADM

## 2018-07-18 RX ORDER — CIPROFLOXACIN 500 MG/1
500 TABLET, FILM COATED ORAL EVERY 12 HOURS SCHEDULED
Qty: 6 TABLET | Refills: 0 | Status: SHIPPED | OUTPATIENT
Start: 2018-07-18 | End: 2018-07-21

## 2018-07-18 NOTE — PROGRESS NOTES
Cystoscopy  Date/Time: 7/18/2018 8:54 AM  Performed by: Vinny Baxter  Authorized by: Vinny Baxter     Procedure details: simple removal of a foreign body, stone, or stent    Patient tolerance: Patient tolerated the procedure well with no immediate complications    Additional Procedure Details:   Cystoscopy with stent removal procedure note: The patient returns to the office today to undergo cystoscopy with stent removal  Risks and benefits of the procedure were discussed and informed consent was obtained  The patient was placed in the modified supine position  Genitalia was prepped with Betadine and draped in a sterile fashion  Viscous 2% lidocaine was used for local anesthesia  The flexible cystoscope was passed  The bladder was inspected  The stent was identified coming from the left ureteral orifice  The stent was grasped and easily removed fully intact without difficulty  Overall the patient tolerated the procedure

## 2018-07-18 NOTE — LETTER
July 18, 2018     [address]oreen [address] [facility] [address]    Patient: Tay Bunn   YOB: 1960   Date of Visit: 7/18/2018       Dear Dr Gary Jean: Thank you for referring Ame Peralta to me for evaluation  Below are my notes for this consultation  If you have questions, please do not hesitate to call me  I look forward to following your patient along with you  Sincerely,        Emilie Hernandez MD        CC: No Recipients  Emilie Hernandez MD  7/18/2018  8:56 AM  Signed  Cystoscopy  Date/Time: 7/18/2018 8:54 AM  Performed by: Regla Kilgore by: Opal Larson     Procedure details: simple removal of a foreign body, stone, or stent    Patient tolerance: Patient tolerated the procedure well with no immediate complications    Additional Procedure Details:   Cystoscopy with stent removal procedure note: The patient returns to the office today to undergo cystoscopy with stent removal  Risks and benefits of the procedure were discussed and informed consent was obtained  The patient was placed in the modified supine position  Genitalia was prepped with Betadine and draped in a sterile fashion  Viscous 2% lidocaine was used for local anesthesia  The flexible cystoscope was passed  The bladder was inspected  The stent was identified coming from the left ureteral orifice  The stent was grasped and easily removed fully intact without difficulty  Overall the patient tolerated the procedure  Emilie Hernandez MD  7/18/2018  8:56 AM  Signed    UROLOGY ROUTINE FOLLOW UP NOTE     CHIEF COMPLAINT   Tay Bunn is a 63 y o  male with a complaint of   Chief Complaint   Patient presents with    Cystoscopy     Stent Removal    Nephrolithiasis     History of Present Illness:     63 y o  male with recent admission for perforated diverticulitis    The general surgery team was planning take the patient to the operating room for bowel resection in May  Preoperative ureteral stents were requested  There was significant difficulty placing the ureteral catheters preoperatively and this required distal ureteroscopy  Unfortunately, after stent removal the patient's creatinine was elevated  CT scan demonstrated persistent left ureteral obstruction patient return to the operating room on the 9th of June for indwelling double-J stent placement  A blood clot was noted in the distal ureter and ureteral orifice which was likely the source of obstruction  After decompression, the patient presents today for stent retrieval     Past Medical History:     Past Medical History:   Diagnosis Date    Abscess, intestine     Arthritis     Diverticulitis     GERD (gastroesophageal reflux disease)     Hydronephrosis 5/27/2018       PAST SURGICAL HISTORY:     Past Surgical History:   Procedure Laterality Date    ABCESS DRAINAGE      COLONOSCOPY N/A 5/5/2016    Procedure: COLONOSCOPY;  Surgeon: Blanco Fung MD;  Location: [facility]; Service:     ESOPHAGOGASTRODUODENOSCOPY      with biopsy    FOOT SURGERY Left     x2? fusion? due to arthritis  onset: 0      ILEO LOOP DIVERSION N/A 6/1/2018    Procedure: ILEOSTOMY LOOP DIVERTING;  Surgeon: Alice Hubbard DO;  [facility];  Service: General    LAPAROTOMY N/A 6/1/2018    Procedure: LAPAROTOMY EXPLORATORY,;  Surgeon: Alice Hubbard DO;  [facility];  Service: General    OH CYSTOURETHROSCOPY,URETER CATHETER Left 6/9/2018    Procedure: CYSTOSCOPY RETROGRADE PYELOGRAM WITH INSERTION STENT URETERAL;  Surgeon: Nam Esquivel MD;  [facility];  Service: Urology    OH PART REMOVAL COLON W ANASTOMOSIS N/A 6/1/2018    Procedure: RESECTION COLON SIGMOID;  Surgeon: Alice Hubbard DO;  [facility];  Service: General       CURRENT MEDICATIONS:     Current Outpatient Prescriptions   Medication Sig Dispense Refill    acetaminophen (TYLENOL) 500 mg tablet Take 1 tablet (500 mg total) by mouth every 6 (six) hours as needed for mild pain 30 tablet 0    ciprofloxacin (CIPRO) 500 mg tablet Take 500 mg by mouth every 12 (twelve) hours      doxazosin (CARDURA) 4 mg tablet 4 mg      ergocalciferol (ERGOCALCIFEROL) 60939 units capsule 50,000 Units      naproxen (NAPROSYN) 500 mg tablet Take 1 tablet (500 mg total) by mouth 2 (two) times a day with meals 30 tablet 0    polyethylene glycol (GOLYTELY) 4000 mL solution Take 4,000 mL by mouth once for 1 dose 4000 mL 0    tamsulosin (FLOMAX) 0 4 mg Take 1 capsule (0 4 mg total) by mouth daily with dinner for 30 days 30 capsule 11     No current facility-administered medications for this visit  ALLERGIES:     Allergies   Allergen Reactions    Penicillins        SOCIAL HISTORY:     Social History     Social History    Marital status: /Civil Union     Spouse name: N/A    Number of children: N/A    Years of education: N/A     Social History Main Topics    Smoking status: Former Smoker    Smokeless tobacco: Never Used    Alcohol use No    Drug use: No    Sexual activity: Not Asked     Other Topics Concern    None     Social History Narrative    None       SOCIAL HISTORY:     Family History   Problem Relation Age of Onset    Other Mother         head tumor    Glaucoma Father     Diabetes Father         possible diabetes    Stroke Family        REVIEW OF SYSTEMS:     Review of Systems   Constitutional: Negative  Respiratory: Negative  Cardiovascular: Negative  Gastrointestinal: Negative  Genitourinary: Positive for flank pain  Musculoskeletal: Positive for back pain  Skin: Negative  Neurological: Negative  Psychiatric/Behavioral: The patient is nervous/anxious  PHYSICAL EXAM:     /90   Pulse 64   Ht 5' 8" (1 727 m)   Wt 73 5 kg (162 lb)   BMI 24 63 kg/m²      General:  Healthy appearing male in no acute distress  They have a normal affect    There is not appear to be any gross neurologic defects or abnormalities  HEENT:  Normocephalic, atraumatic  Neck is supple without any palpable lymphadenopathy  Cardiovascular:  Patient has normal palpable distal radial pulses  There is no significant peripheral edema  No JVD is noted  Respiratory:  Patient has unlabored respirations  There is no audible wheeze or rhonchi  Abdomen:  Abdomen with healing surgical scars  Right lower quadrant ileostomy is viable and draining liquid stool  Abdomen is soft and nontender  There is no tympany  Inguinal and umbilical hernia are not appreciated  Genitourinary:  Uncircumcised phallus, orthotopic meatus, testicles descended without nodules  Musculoskeletal:  Patient does not have significant CVA tenderness in the left flank with palpation or percussion  They full range of motion in all 4 extremities  Strength in all 4 extremities appears congruent  Patient is able to ambulate without assistance or difficulty  Dermatologic:  Patient has no skin abnormalities or rashes  LABS:     CBC:   Lab Results   Component Value Date    WBC 8 19 2018    HGB 12 4 2018    HCT 38 0 2018    MCV 95 2018     2018       BMP:   Lab Results   Component Value Date    GLUCOSE 98 2018    CALCIUM 8 4 2018     2018    K 4 2 2018    CO2 24 2018     (H) 2018    BUN 14 2018    CREATININE 1 07 2018     Lab Results   Component Value Date    PSA 2 4 2016     IMAGIN/8/18  CT ABDOMEN AND PELVIS WITHOUT IV CONTRAST - LOW DOSE RENAL STONE      INDICATION:   Left flank pain      COMPARISON:  2018      TECHNIQUE:  Low dose thin section CT examination of the abdomen and pelvis was performed without intravenous or oral contrast according to a protocol specifically designed to evaluate for urinary tract calculus    Axial, sagittal, and coronal 2D   reformatted images were created from the source data and submitted for interpretation  Evaluation for pathology in the abdomen and pelvis that is unrelated to urinary tract calculi is limited       Radiation dose length product (DLP) for this visit:  279 06 mGy-cm   This examination, like all CT scans performed in the [facility], was performed utilizing techniques to minimize radiation dose exposure, including the use of iterative   reconstruction and automated exposure control       FINDINGS:     RIGHT KIDNEY AND URETER:  No urinary tract calculi  No hydronephrosis or hydroureter      LEFT KIDNEY AND URETER:  Left ureteral stent again identified, in satisfactory position  There is no significant hydronephrosis  No renal or ureteral calculi identified      URINARY BLADDER:   Unremarkable       No significant abnormality in the visualized lung bases      Limited low radiation dose noncontrast CT evaluation demonstrates no clinically significant abnormality of liver, spleen, pancreas, or adrenal glands  No calcified gallstones or gallbladder wall thickening noted  No ascites or bulky lymphadenopathy on this limited noncontrast study  Diverting ileostomy again seen in the right lower quadrant, and postsurgical changes in the sigmoid colon  No evidence of obstruction or acute inflammatory changes  Colonic diverticula are noted, without evidence to suggest acute diverticulitis  Visualized bowel appears otherwise unremarkable  Limited evaluation demonstrates no evidence to suggest acute appendicitis  No acute fracture or destructive osseous lesion is identified         IMPRESSION:     No acute pathology  No significant hydronephrosis status post left ureteral stent placement      Colonic diverticulosis without diverticulitis  Status post diverting ileostomy       PROCEDURE:     See note    ASSESSMENT:     63 y o  male with inflammation and trauma at the time of preoperative ureteral stent placement requiring return to the operating room and placement of indwelling left double-J stent now presenting for removal    PLAN:     Stent removal was uncomplicated  Patient will receive short course of antibiotics  He will return to clinic in 3 months with an ultrasound of his kidneys to ensure there is no recurrent swelling  Patient underwent a PSA in 2016 will need updated prostate cancer screening  New PSA was ordered  Given recent surgery and perforated sigmoid diverticulitis, voided digital rectal exam today  We will perform PSA and AVELINO in 3 months in the clinic

## 2018-07-18 NOTE — PROGRESS NOTES
UROLOGY ROUTINE FOLLOW UP NOTE     CHIEF COMPLAINT   Vandana Perez is a 63 y o  male with a complaint of   Chief Complaint   Patient presents with    Cystoscopy     Stent Removal    Nephrolithiasis     History of Present Illness:     63 y o  male with recent admission for perforated diverticulitis  The general surgery team was planning take the patient to the operating room for bowel resection in May  Preoperative ureteral stents were requested  There was significant difficulty placing the ureteral catheters preoperatively and this required distal ureteroscopy  Unfortunately, after stent removal the patient's creatinine was elevated  CT scan demonstrated persistent left ureteral obstruction patient return to the operating room on the 9th of June for indwelling double-J stent placement  A blood clot was noted in the distal ureter and ureteral orifice which was likely the source of obstruction  After decompression, the patient presents today for stent retrieval     Past Medical History:     Past Medical History:   Diagnosis Date    Abscess, intestine     Arthritis     Diverticulitis     GERD (gastroesophageal reflux disease)     Hydronephrosis 5/27/2018       PAST SURGICAL HISTORY:     Past Surgical History:   Procedure Laterality Date    ABCESS DRAINAGE      COLONOSCOPY N/A 5/5/2016    Procedure: COLONOSCOPY;  Surgeon: Clive Rojas MD;  Location: [facility]; Service:     ESOPHAGOGASTRODUODENOSCOPY      with biopsy    FOOT SURGERY Left     x2? fusion? due to arthritis  onset: 0      ILEO LOOP DIVERSION N/A 6/1/2018    Procedure: ILEOSTOMY LOOP DIVERTING;  Surgeon: Dominique Luna DO;  [facility];  Service: General    LAPAROTOMY N/A 6/1/2018    Procedure: LAPAROTOMY EXPLORATORY,;  Surgeon: Dominique Luna DO;  [facility];  Service: General    VT CYSTOURETHROSCOPY,URETER CATHETER Left 6/9/2018    Procedure: CYSTOSCOPY RETROGRADE PYELOGRAM WITH INSERTION STENT URETERAL;  Surgeon: Yordy Haynes MD;  [facility];  Service: Urology    NH PART REMOVAL COLON W ANASTOMOSIS N/A 6/1/2018    Procedure: RESECTION COLON SIGMOID;  Surgeon: Elaina Jacome DO;  [facility];  Service: General       CURRENT MEDICATIONS:     Current Outpatient Prescriptions   Medication Sig Dispense Refill    acetaminophen (TYLENOL) 500 mg tablet Take 1 tablet (500 mg total) by mouth every 6 (six) hours as needed for mild pain 30 tablet 0    ciprofloxacin (CIPRO) 500 mg tablet Take 500 mg by mouth every 12 (twelve) hours      doxazosin (CARDURA) 4 mg tablet 4 mg      ergocalciferol (ERGOCALCIFEROL) 91735 units capsule 50,000 Units      naproxen (NAPROSYN) 500 mg tablet Take 1 tablet (500 mg total) by mouth 2 (two) times a day with meals 30 tablet 0    polyethylene glycol (GOLYTELY) 4000 mL solution Take 4,000 mL by mouth once for 1 dose 4000 mL 0    tamsulosin (FLOMAX) 0 4 mg Take 1 capsule (0 4 mg total) by mouth daily with dinner for 30 days 30 capsule 11     No current facility-administered medications for this visit  ALLERGIES:     Allergies   Allergen Reactions    Penicillins        SOCIAL HISTORY:     Social History     Social History    Marital status: /Civil Union     Spouse name: N/A    Number of children: N/A    Years of education: N/A     Social History Main Topics    Smoking status: Former Smoker    Smokeless tobacco: Never Used    Alcohol use No    Drug use: No    Sexual activity: Not Asked     Other Topics Concern    None     Social History Narrative    None       SOCIAL HISTORY:     Family History   Problem Relation Age of Onset    Other Mother         head tumor    Glaucoma Father     Diabetes Father         possible diabetes    Stroke Family        REVIEW OF SYSTEMS:     Review of Systems   Constitutional: Negative  Respiratory: Negative  Cardiovascular: Negative  Gastrointestinal: Negative      Genitourinary: Positive for flank pain  Musculoskeletal: Positive for back pain  Skin: Negative  Neurological: Negative  Psychiatric/Behavioral: The patient is nervous/anxious  PHYSICAL EXAM:     /90   Pulse 64   Ht 5' 8" (1 727 m)   Wt 73 5 kg (162 lb)   BMI 24 63 kg/m²     General:  Healthy appearing male in no acute distress  They have a normal affect  There is not appear to be any gross neurologic defects or abnormalities  HEENT:  Normocephalic, atraumatic  Neck is supple without any palpable lymphadenopathy  Cardiovascular:  Patient has normal palpable distal radial pulses  There is no significant peripheral edema  No JVD is noted  Respiratory:  Patient has unlabored respirations  There is no audible wheeze or rhonchi  Abdomen:  Abdomen with healing surgical scars  Right lower quadrant ileostomy is viable and draining liquid stool  Abdomen is soft and nontender  There is no tympany  Inguinal and umbilical hernia are not appreciated  Genitourinary:  Uncircumcised phallus, orthotopic meatus, testicles descended without nodules  Musculoskeletal:  Patient does not have significant CVA tenderness in the left flank with palpation or percussion  They full range of motion in all 4 extremities  Strength in all 4 extremities appears congruent  Patient is able to ambulate without assistance or difficulty  Dermatologic:  Patient has no skin abnormalities or rashes        LABS:     CBC:   Lab Results   Component Value Date    WBC 8 19 2018    HGB 12 4 2018    HCT 38 0 2018    MCV 95 2018     2018       BMP:   Lab Results   Component Value Date    GLUCOSE 98 2018    CALCIUM 8 4 2018     2018    K 4 2 2018    CO2 24 2018     (H) 2018    BUN 14 2018    CREATININE 1 07 2018     Lab Results   Component Value Date    PSA 2 4 2016     IMAGIN/8/18  CT ABDOMEN AND PELVIS WITHOUT IV CONTRAST - LOW DOSE RENAL STONE      INDICATION:   Left flank pain      COMPARISON:  6/14/2018      TECHNIQUE:  Low dose thin section CT examination of the abdomen and pelvis was performed without intravenous or oral contrast according to a protocol specifically designed to evaluate for urinary tract calculus  Axial, sagittal, and coronal 2D   reformatted images were created from the source data and submitted for interpretation  Evaluation for pathology in the abdomen and pelvis that is unrelated to urinary tract calculi is limited       Radiation dose length product (DLP) for this visit:  279 06 mGy-cm   This examination, like all CT scans performed in the [facility], was performed utilizing techniques to minimize radiation dose exposure, including the use of iterative   reconstruction and automated exposure control       FINDINGS:     RIGHT KIDNEY AND URETER:  No urinary tract calculi  No hydronephrosis or hydroureter      LEFT KIDNEY AND URETER:  Left ureteral stent again identified, in satisfactory position  There is no significant hydronephrosis  No renal or ureteral calculi identified      URINARY BLADDER:   Unremarkable       No significant abnormality in the visualized lung bases      Limited low radiation dose noncontrast CT evaluation demonstrates no clinically significant abnormality of liver, spleen, pancreas, or adrenal glands  No calcified gallstones or gallbladder wall thickening noted  No ascites or bulky lymphadenopathy on this limited noncontrast study  Diverting ileostomy again seen in the right lower quadrant, and postsurgical changes in the sigmoid colon  No evidence of obstruction or acute inflammatory changes  Colonic diverticula are noted, without evidence to suggest acute diverticulitis  Visualized bowel appears otherwise unremarkable  Limited evaluation demonstrates no evidence to suggest acute appendicitis    No acute fracture or destructive osseous lesion is identified         IMPRESSION:     No acute pathology  No significant hydronephrosis status post left ureteral stent placement      Colonic diverticulosis without diverticulitis  Status post diverting ileostomy  PROCEDURE:     See note    ASSESSMENT:     63 y o  male with inflammation and trauma at the time of preoperative ureteral stent placement requiring return to the operating room and placement of indwelling left double-J stent now presenting for removal    PLAN:     Stent removal was uncomplicated  Patient will receive short course of antibiotics  He will return to clinic in 3 months with an ultrasound of his kidneys to ensure there is no recurrent swelling  Patient underwent a PSA in 2016 will need updated prostate cancer screening  New PSA was ordered  Given recent surgery and perforated sigmoid diverticulitis, voided digital rectal exam today  We will perform PSA and AVELINO in 3 months in the clinic

## 2018-07-25 ENCOUNTER — ANESTHESIA EVENT (OUTPATIENT)
Dept: GASTROENTEROLOGY | Facility: HOSPITAL | Age: 58
End: 2018-07-25
Payer: COMMERCIAL

## 2018-07-26 ENCOUNTER — HOSPITAL ENCOUNTER (OUTPATIENT)
Facility: HOSPITAL | Age: 58
Setting detail: OUTPATIENT SURGERY
Discharge: HOME | End: 2018-07-26
Attending: INTERNAL MEDICINE | Admitting: INTERNAL MEDICINE
Payer: COMMERCIAL

## 2018-07-26 ENCOUNTER — ANESTHESIA (OUTPATIENT)
Dept: GASTROENTEROLOGY | Facility: HOSPITAL | Age: 58
End: 2018-07-26
Payer: COMMERCIAL

## 2018-07-26 VITALS
HEART RATE: 60 BPM | WEIGHT: 167 LBS | TEMPERATURE: 98 F | HEIGHT: 68 IN | OXYGEN SATURATION: 100 % | BODY MASS INDEX: 25.31 KG/M2 | DIASTOLIC BLOOD PRESSURE: 79 MMHG | SYSTOLIC BLOOD PRESSURE: 138 MMHG | RESPIRATION RATE: 16 BRPM

## 2018-07-26 DIAGNOSIS — K57.20 DIVERTICULITIS OF LARGE INTESTINE WITH ABSCESS WITHOUT BLEEDING: ICD-10-CM

## 2018-07-26 DIAGNOSIS — Z86.010 HISTORY OF COLONIC POLYPS: ICD-10-CM

## 2018-07-26 PROCEDURE — 88305 TISSUE EXAM BY PATHOLOGIST: CPT | Performed by: PATHOLOGY

## 2018-07-26 PROCEDURE — 88341 IMHCHEM/IMCYTCHM EA ADD ANTB: CPT | Performed by: PATHOLOGY

## 2018-07-26 PROCEDURE — 88342 IMHCHEM/IMCYTCHM 1ST ANTB: CPT | Performed by: PATHOLOGY

## 2018-07-26 RX ORDER — SODIUM CHLORIDE 9 MG/ML
125 INJECTION, SOLUTION INTRAVENOUS CONTINUOUS
Status: DISCONTINUED | OUTPATIENT
Start: 2018-07-26 | End: 2018-07-26 | Stop reason: HOSPADM

## 2018-07-26 RX ORDER — PROPOFOL 10 MG/ML
INJECTION, EMULSION INTRAVENOUS AS NEEDED
Status: DISCONTINUED | OUTPATIENT
Start: 2018-07-26 | End: 2018-07-26 | Stop reason: SURG

## 2018-07-26 RX ADMIN — PROPOFOL 50 MG: 10 INJECTION, EMULSION INTRAVENOUS at 11:35

## 2018-07-26 RX ADMIN — PROPOFOL 100 MG: 10 INJECTION, EMULSION INTRAVENOUS at 11:22

## 2018-07-26 RX ADMIN — PROPOFOL 50 MG: 10 INJECTION, EMULSION INTRAVENOUS at 11:25

## 2018-07-26 RX ADMIN — PROPOFOL 50 MG: 10 INJECTION, EMULSION INTRAVENOUS at 11:30

## 2018-07-26 RX ADMIN — SODIUM CHLORIDE 125 ML/HR: 0.9 INJECTION, SOLUTION INTRAVENOUS at 10:30

## 2018-07-26 RX ADMIN — PROPOFOL 50 MG: 10 INJECTION, EMULSION INTRAVENOUS at 11:40

## 2018-07-26 NOTE — PROCEDURES
Colonoscopy Procedure Note    Procedure: Colonoscopy    Sedation: Monitored anesthesia care, check anesthesia records      ASA Class: 2    INDICATIONS:  History of diverting loop ileostomy; history of colonic polyps  POST-OP DIAGNOSIS: See the impression below    Procedure Details     Prior colonoscopy: Less than 3 years ago  It is being repeated at an interval of less than 3 years because: This colonoscopy is being performed for a diagnostic indication    Informed consent was obtained for the procedure, including sedation  Risks of perforation, hemorrhage, adverse drug reaction and aspiration were discussed  The patient was placed in the left lateral decubitus position  Based on the pre-procedure assessment, including review of the patient's medical history, medications, allergies, and review of systems, he had been deemed to be an appropriate candidate for conscious sedation; he was therefore sedated with the medications listed below  The patient was monitored continuously with telemetry, pulse oximetry, blood pressure monitoring, and direct observations  A rectal examination was performed  The colonoscope was inserted into the rectum and advanced under direct vision to the terminal ileum  The quality of the colonic preparation was good  A careful inspection was made as the colonoscope was withdrawn, including a retroflexed view of the rectum; findings and interventions are described below  Findings:  Nonobstructive Stricture seen in the sigmoid colon at the site of anastomosis 20 cm from the entry site  There was also evidence of 3-4 staples at the site of anastomosis  There was a 10 mm polyp versus granulation tissue at the site of stricture this was removed by cold snare polypectomy  Clip was placed at the site of polypectomy  Mild colitis was seen in the right side of the colon and transverse colon  Random biopsies performed for further evaluation    Descending colon, sigmoid colon and rectum did not have evidence of colitis  Diverticulosis descending and remaining sigmoid colon  Hemorrhoids felt on the digital exam    Visualize distal terminal ileum appeared normal           Complications: None; patient tolerated the procedure well      Impression:    Colitis on the right side and transverse colon 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  Colon polyp versus cannulation tissue removed at the site of stricture from previous surgery  Follow-up biopsy results  Stricture seen at 20 cm from the entry site  Internal hemorrhoids    Recommendations:  Repeat colonoscopy 1 year for further evaluation of colitis  Can proceed with reversal  Follow up on biopsy results

## 2018-07-26 NOTE — ANESTHESIA PREPROCEDURE EVALUATION
Review of Systems/Medical History  Patient summary reviewed  Chart reviewed  No history of anesthetic complications     Cardiovascular  Negative cardio ROS    Pulmonary  Negative pulmonary ROS        GI/Hepatic    GERD well controlled, Bowel prep  Comment: Bowel surgery     Prostatic disorder, benign prostatic hyperplasia  Comment: Hx hydronephrosis  Ureteral obstruction     Endo/Other  Negative endo/other ROS      GYN  Negative gynecology ROS          Hematology  Negative hematology ROS      Musculoskeletal    Arthritis     Neurology  Negative neurology ROS      Psychology   Negative psychology ROS              Physical Exam    Airway    Mallampati score: II  TM Distance: >3 FB  Neck ROM: full     Dental   No notable dental hx     Cardiovascular  Comment: Negative ROS, Rhythm: regular, Rate: normal, Cardiovascular exam normal    Pulmonary  Pulmonary exam normal Breath sounds clear to auscultation,     Other Findings        Anesthesia Plan  ASA Score- 2     Anesthesia Type- general with ASA Monitors  Additional Monitors:   Airway Plan:         Plan Factors-Patient not instructed to abstain from smoking on day of procedure  Patient did not smoke on day of surgery  Induction- intravenous  Postoperative Plan-     Informed Consent- Anesthetic plan and risks discussed with patient  I personally reviewed this patient with the CRNA  Discussed and agreed on the Anesthesia Plan with the MICHAEL Caba

## 2018-07-26 NOTE — DISCHARGE INSTRUCTIONS
Colonoscopy    Findings:  Nonobstructive Stricture seen in the sigmoid colon at the site of anastomosis 20 cm from the entry site  There was also evidence of 3-4 staples at the site of anastomosis  There was a 10 mm polyp versus granulation tissue at the site of stricture this was removed by cold snare polypectomy  Clip was placed at the site of polypectomy  Mild colitis was seen in the right side of the colon and transverse colon  Random biopsies performed for further evaluation  Descending colon, sigmoid colon and rectum did not have evidence of colitis      Diverticulosis descending and remaining sigmoid colon      Hemorrhoids felt on the digital exam     Visualize distal terminal ileum appeared normal           Complications: None; patient tolerated the procedure well      Impression:    Colitis on the right side and transverse colon 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  Colon polyp versus cannulation tissue removed at the site of stricture from previous surgery  Follow-up biopsy results  Stricture seen at 20 cm from the entry site  Internal hemorrhoids     Recommendations:  Repeat colonoscopy 1 year for further evaluation of colitis  Can proceed with reversal  Follow up on biopsy results      NOTE:    Hemo Clip application x 1-avoid MRI x 1 month minimum  Colonoscopy   WHAT YOU NEED TO KNOW:   A colonoscopy is a procedure to examine the inside of your colon (intestine) with a scope  Polyps or tissue growths may have been removed during your colonoscopy  It is normal to feel bloated and to have some abdominal discomfort  You should be passing gas  If you have hemorrhoids or you had polyps removed, you may have a small amount of bleeding  DISCHARGE INSTRUCTIONS:   Seek care immediately if:   · You have a large amount of bright red blood in your bowel movements  · Your abdomen is hard and firm and you have severe pain  · You have sudden trouble breathing    Contact your healthcare provider if:   · You develop a rash or hives  · You have a fever within 24 hours of your procedure  · You have not had a bowel movement for 3 days after your procedure  · You have questions or concerns about your condition or care  Activity:   · Do not lift, strain, or run  for 3 days after your procedure  · Rest after your procedure  You have been given medicine to relax you  Do not  drive or make important decisions until the day after your procedure  Return to your normal activity as directed  · Relieve gas and discomfort from bloating  by lying on your right side with a heating pad on your abdomen  You may need to take short walks to help the gas move out  Eat small meals until bloating is relieved  If you had polyps removed: For 7 days after your procedure:  · Do not  take aspirin  · Do not  go on long car rides  Help prevent constipation:   · Eat a variety of healthy foods  Healthy foods include fruit, vegetables, whole-grain breads, low-fat dairy products, beans, lean meat, and fish  Ask if you need to be on a special diet  Your healthcare provider may recommend that you eat high-fiber foods such as cooked beans  Fiber helps you have regular bowel movements  · Drink liquids as directed  Adults should drink between 9 and 13 eight-ounce cups of liquid every day  Ask what amount is best for you  For most people, good liquids to drink are water, juice, and milk  · Exercise as directed  Talk to your healthcare provider about the best exercise plan for you  Exercise can help prevent constipation, decrease your blood pressure and improve your health  Follow up with your healthcare provider as directed:  Write down your questions so you remember to ask them during your visits  © 2017 [facility] [place] [employer] Information is for End User's use only and may not be sold, redistributed or otherwise used for commercial purposes   All illustrations and images included in CareNotes® are the copyrighted property of [employer]  or Cody Rodrigues  The above information is an  only  It is not intended as medical advice for individual conditions or treatments  Talk to your doctor, nurse or pharmacist before following any medical regimen to see if it is safe and effective for you  Diverticulosis   WHAT YOU NEED TO KNOW:   What is diverticulosis? Diverticulosis is a condition that causes small pockets called diverticula to form in your intestine  These pockets make it difficult for bowel movements to pass through your digestive system  What causes diverticulosis? Diverticula form when muscles have to work hard to move bowel movements through the intestine  The force causes bulges to form at weak areas in the intestine  This may happen if you eat foods that are low in fiber  Fiber helps give your bowel movements more bulk so they are larger and easier to move through your colon  The following may increase your risk of diverticulosis:  · A history of constipation    · Age 36 or older    · Obesity    · Lack of exercise  What are the signs and symptoms of diverticulosis? Diverticulosis usually does not cause any signs or symptoms  It may cause any of the following in some people:  · Pain or discomfort in your lower abdomen    · Abdominal bloating    · Constipation or diarrhea  How is diverticulosis diagnosed? Your healthcare provider will examine you and ask about your bowel movements, diet, and symptoms  He or she will also ask about any medical conditions you have or medicines you take  You may need any of the following:  · Blood tests  may be done to check for signs of inflammation  · A barium enema  is an x-ray of your colon that may show diverticula  A tube is put into your anus, and a liquid called barium is put through the tube  Barium is used so that healthcare providers can see your colon more clearly       · Flexible sigmoidoscopy  is a test to look for any changes in your lower intestines and rectum  It may also show the cause of any bleeding or pain  A soft, bendable tube with a light on the end will be put into your anus  It will then be moved forward into your intestine  · A colonoscopy  is used to look at your whole colon  A scope (long bendable tube with a light on the end) is used to take pictures  This test may show diverticula  · A CT scan , or CAT scan, may show diverticula  You may be given contrast liquid before the scan  Tell the healthcare provider if you have ever had an allergic reaction to contrast liquid  How is diverticulosis managed? The goal of treatment is to manage any symptoms you have and prevent other problems such as diverticulitis  Diverticulitis is swelling or infection of the diverticula  Your healthcare provider may recommend any of the following:  · Eat a variety of high-fiber foods  High-fiber foods help you have regular bowel movements  High-fiber foods include cooked beans, fruits, vegetables, and some cereals  Most adults need 25 to 35 grams of fiber each day  Your healthcare provider may recommend that you have more  Ask your healthcare provider how much fiber you need  Increase fiber slowly  You may have abdominal discomfort, bloating, and gas if you add fiber to your diet too quickly  You may need to take a fiber supplement if you are not getting enough fiber from food  · Medicines  to soften your bowel movements may be given  You may also need medicines to treat symptoms such as bloating and pain  · Drink liquids as directed  You may need to drink 2 to 3 liters (8 to 12 cups) of liquids every day  Ask your healthcare provider how much liquid to drink each day and which liquids are best for you  · Apply heat  on your abdomen for 20 to 30 minutes every 2 hours for as many days as directed  Heat helps decrease pain and muscle spasms  How can I help prevent diverticulitis or other symptoms?   The following may help decrease your risk for diverticulitis or symptoms, such as bleeding  Talk to your provider about these or other things you can do to prevent problems that may occur with diverticulosis  · Exercise regularly  Ask your healthcare provider about the best exercise plan for you  Exercise can help you have regular bowel movements  Get 30 minutes of exercise on most days of the week  · Maintain a healthy weight  Ask your healthcare provider how much you should weigh  Ask him or her to help you create a weight loss plan if you are overweight  · Do not smoke  Nicotine and other chemicals in cigarettes increase your risk for diverticulitis  Ask your healthcare provider for information if you currently smoke and need help to quit  E-cigarettes or smokeless tobacco still contain nicotine  Talk to your healthcare provider before you use these products  · Ask your healthcare provider if it is safe to take NSAIDs  NSAIDs may increase your risk of diverticulitis  When should I seek immediate care? · You have severe pain on the left side of your lower abdomen  · Your bowel movements are bright or dark red  When should I contact my healthcare provider? · You have a fever and chills  · You feel dizzy or lightheaded  · You have nausea, or you are vomiting  · You have a change in your bowel movements  · You have questions or concerns about your condition or care  CARE AGREEMENT:   You have the right to help plan your care  Learn about your health condition and how it may be treated  Discuss treatment options with your caregivers to decide what care you want to receive  You always have the right to refuse treatment  The above information is an  only  It is not intended as medical advice for individual conditions or treatments  Talk to your doctor, nurse or pharmacist before following any medical regimen to see if it is safe and effective for you    © 2017 [facility] [address] [place] [address] is for End User's use only and may not be sold, redistributed or otherwise used for commercial purposes  All illustrations and images included in CareNotes® are the copyrighted property of [employer]  or Cody Rodrigues  Colorectal Polyps   WHAT YOU NEED TO KNOW:   What are colorectal polyps? Colorectal polyps are small growths of tissue in the lining of the colon and rectum  Most polyps are hyperplastic polyps and are usually benign (noncancerous)  Certain types of polyps, called adenomatous polyps, may turn into cancer  What increases my risk of colorectal polyps? The exact cause of colorectal polyps is unknown  The following may increase your risk:  · Older age    · A diet of foods high in fat and low in fiber     · Family history of polyps    · Intestinal diseases, such as Crohn's disease or ulcerative colitis    · An unhealthy lifestyle, such as physical inactivity, smoking, or drinking alcohol    · Obesity  What are the signs and symptoms of colorectal polyps? · Blood in your bowel movement or bleeding from the rectum    · Change in bowel movement habits, such as diarrhea and constipation    · Abdominal pain  How are colorectal polyps diagnosed? You should have fecal blood screening once a year for colorectal disease if you are over 48years old  You should be screened earlier if you have an intestinal disease or a family history of polyps or colorectal cancer  During this screening, a sample of your bowel movement is checked for blood, which may be an early sign of colorectal polyps or cancer  You may also need any of the following tests:  · Digital rectal exam:  Your healthcare provider will examine your anus and use a finger to check your rectum for polyps  · Barium enema: A barium enema is an x-ray of the colon  A tube is put into your anus, and a liquid called barium is put through the tube   Barium is used so that caregivers can see your colon better on the x-ray film  · Virtual colonoscopy: This is a CT scan that takes pictures of the inside of your colon and rectum  A small, flexible tube is put into your rectum and air or carbon dioxide (gas) is used to expand your colon  This lets healthcare providers clearly see your colon and any polyps on a monitor  · Colonoscopy or sigmoidoscopy: These procedures help your healthcare provider see the inside of your colon using a flexible tube with a small light and camera on the end  During a sigmoidoscopy, your healthcare provider will only look at rectum and lower colon  During a colonoscopy, healthcare providers will look at the full length of your colon  Healthcare providers may remove a small amount of tissue from the colon for a biopsy  How are colorectal polyps treated? · Polyp removal:  Polyps may be removed during your sigmoidoscopy or colonoscopy  · Polypectomy: This is surgery to remove your polyps  You may need laparoscopic or open surgery, depending on the type, size, and number of polyps that you have  Laparoscopy is done by inserting a small, flexible scope into incisions made on your abdomen  Open surgery is done by making a larger incision on your abdomen   What are the risks of colorectal polyps? You may bleed during a colonoscopy procedure  Your bowel may be perforated (torn) when polyps are removed  This may lead to an open abdominal surgery  During surgery, you may bleed too much or get an infection  Adenomatous polyps that are not removed may turn into cancer and become more difficult to treat  Where can I find support and more information? · Lennox Schaffer ([facility])  [address], [place] [address]  Phone: [phone]  Web Address: Ian Tolentino  [facility] gov  When should I contact my healthcare provider? · You have a fever  · You have chills, a cough, or feel weak and achy      · You have abdominal pain that does not go away or gets worse after you take medicine  · Your abdomen is swollen  · You are losing weight without trying  · You have questions or concerns about your condition or care  When should I seek immediate care or call 911? · You have sudden shortness of breath  · You have a fast heart rate, fast breathing, or are too dizzy to stand up  · You have severe abdominal pain  · You see blood in your bowel movement  CARE AGREEMENT:   You have the right to help plan your care  Learn about your health condition and how it may be treated  Discuss treatment options with your caregivers to decide what care you want to receive  You always have the right to refuse treatment  The above information is an  only  It is not intended as medical advice for individual conditions or treatments  Talk to your doctor, nurse or pharmacist before following any medical regimen to see if it is safe and effective for you  © 2017 [address] Information is for End User's use only and may not be sold, redistributed or otherwise used for commercial purposes  All illustrations and images included in CareNotes® are the copyrighted property of [employer] M [employer]  or Cody Rodrigues  Colitis   WHAT YOU NEED TO KNOW:   Colitis is swelling and irritation of your colon  Colitis may be caused by ulcers or a problem with your immune system  Bacteria, a virus, or a parasite may also cause colitis  The cause may not be known  You may have diarrhea, abdominal pain, fever, or blood or mucus in your bowel movement  DISCHARGE INSTRUCTIONS:   Return to the emergency department if:   · You have sudden trouble breathing  · Your bowel movements are black or have blood in them  · You have blood in your vomit  · You have severe abdominal pain or your abdomen is swollen and feels hard      · You have any of the following signs of dehydration:     ¨ Dizziness or weakness    ¨ Dry mouth, cracked lips, or severe thirst    ¨ Fast heartbeat or breathing    ¨ Urinating very little or not at all  Contact your healthcare provider if:   · Your symptoms get worse or do not go away  · You have a fever, chills, cough, or feel weak and achy  · You suddenly lose weight without trying  · You have questions or concerns about your condition or care  Medicines:   · Medicines  may be given to decrease inflammation in your colon and treat diarrhea  · Take your medicine as directed  Contact your healthcare provider if you think your medicine is not helping or if you have side effects  Tell him of her if you are allergic to any medicine  Keep a list of the medicines, vitamins, and herbs you take  Include the amounts, and when and why you take them  Bring the list or the pill bottles to follow-up visits  Carry your medicine list with you in case of an emergency  Manage your symptoms:   · Drink liquids as directed  to help prevent dehydration  Good liquids to drink include water, juice, and broth  Ask how much liquid to drink each day  You may need to drink an oral rehydration solution (ORS)  An ORS contains a balance of water, salt, and sugar to replace body fluids lost during diarrhea  · Eat a variety of healthy foods  Healthy foods include fruits, vegetables, whole-grain breads, beans, low-fat dairy products, lean meats, and fish  You may need to eat several small meals throughout the day instead of large meals  Avoid spicy foods, caffeine, chocolate, and foods high in fat  · Talk to your healthcare provider before you take NSAIDs  NSAIDs can cause worsen your symptoms if ulcers are causing your colitis  · Start to exercise when you feel better  Regular exercise helps your bowels work normally  Ask about the best exercise plan for you  Follow up with your healthcare provider as directed: You may need to return for a colonoscopy or other tests   Write down how often you have a bowel movements and what they look like  Bring this to your follow-up visits  Write down your questions so you remember to ask them during your visits  © 2017 [address] Information is for End User's use only and may not be sold, redistributed or otherwise used for commercial purposes  All illustrations and images included in CareNotes® are the copyrighted property of [employer]  or Cody Rodrigues  The above information is an  only  It is not intended as medical advice for individual conditions or treatments  Talk to your doctor, nurse or pharmacist before following any medical regimen to see if it is safe and effective for you

## 2018-07-27 ENCOUNTER — OFFICE VISIT (OUTPATIENT)
Dept: FAMILY MEDICINE CLINIC | Facility: CLINIC | Age: 58
End: 2018-07-27
Payer: COMMERCIAL

## 2018-07-27 VITALS
BODY MASS INDEX: 24.28 KG/M2 | SYSTOLIC BLOOD PRESSURE: 128 MMHG | HEART RATE: 78 BPM | HEIGHT: 68 IN | RESPIRATION RATE: 16 BRPM | DIASTOLIC BLOOD PRESSURE: 88 MMHG | TEMPERATURE: 98.9 F | WEIGHT: 160.2 LBS | OXYGEN SATURATION: 98 %

## 2018-07-27 DIAGNOSIS — I10 ESSENTIAL HYPERTENSION: Primary | ICD-10-CM

## 2018-07-27 DIAGNOSIS — K57.20 DIVERTICULITIS OF LARGE INTESTINE WITH ABSCESS WITHOUT BLEEDING: ICD-10-CM

## 2018-07-27 PROBLEM — M54.9 BACK PAIN: Status: ACTIVE | Noted: 2018-07-27

## 2018-07-27 PROBLEM — M25.569 KNEE PAIN: Status: ACTIVE | Noted: 2018-07-27

## 2018-07-27 PROBLEM — R10.13 ABDOMINAL PAIN, EPIGASTRIC: Status: ACTIVE | Noted: 2017-10-18

## 2018-07-27 PROCEDURE — 99214 OFFICE O/P EST MOD 30 MIN: CPT | Performed by: FAMILY MEDICINE

## 2018-07-27 PROCEDURE — 3074F SYST BP LT 130 MM HG: CPT | Performed by: FAMILY MEDICINE

## 2018-07-27 PROCEDURE — 3079F DIAST BP 80-89 MM HG: CPT | Performed by: FAMILY MEDICINE

## 2018-07-27 NOTE — PROGRESS NOTES
Assessment/Plan:     There are no diagnoses linked to this encounter  There are no Patient Instructions on file for this visit  No Follow-up on file  Subjective:      Patient ID: Delfina Freeman is a 63 y o  male  Chief Complaint   Patient presents with    Follow-up     colonoscopy       He is here today for followup the hypertension and status post to colectomy secondary to diverticulitis  He was told to hold on his lisinopril  His blood pressure has been well controlled without medication  He has been following with his surgeon and his symptoms had been improving  The following portions of the patient's history were reviewed and updated as appropriate: allergies, current medications, past family history, past medical history, past social history, past surgical history and problem list     Review of Systems   Constitutional: Negative for chills and fever  HENT: Negative for trouble swallowing  Eyes: Negative for visual disturbance  Respiratory: Negative for cough and shortness of breath  Cardiovascular: Negative for chest pain and palpitations  Gastrointestinal: Negative for abdominal pain, blood in stool and vomiting  Endocrine: Negative for cold intolerance and heat intolerance  Genitourinary: Negative for difficulty urinating and dysuria  Musculoskeletal: Negative for gait problem  Skin: Negative for rash  Neurological: Negative for dizziness, syncope and headaches  Hematological: Negative for adenopathy  Psychiatric/Behavioral: Negative for behavioral problems           Current Outpatient Prescriptions   Medication Sig Dispense Refill    acetaminophen (TYLENOL) 500 mg tablet Take 1 tablet (500 mg total) by mouth every 6 (six) hours as needed for mild pain 30 tablet 0    doxazosin (CARDURA) 4 mg tablet 4 mg      ergocalciferol (ERGOCALCIFEROL) 86746 units capsule 50,000 Units      tamsulosin (FLOMAX) 0 4 mg Take 1 capsule (0 4 mg total) by mouth daily with dinner for 30 days 30 capsule 11    naproxen (NAPROSYN) 500 mg tablet Take 1 tablet (500 mg total) by mouth 2 (two) times a day with meals 30 tablet 0     No current facility-administered medications for this visit  Objective:    /88 (BP Location: Left arm, Patient Position: Sitting, Cuff Size: Adult)   Pulse 78   Temp 98 9 °F (37 2 °C) (Tympanic)   Resp 16   Ht 5' 8" (1 727 m)   Wt 72 7 kg (160 lb 3 2 oz)   SpO2 98%   BMI 24 36 kg/m²        Physical Exam   Constitutional: He is oriented to person, place, and time  He appears well-developed and well-nourished  HENT:   Head: Normocephalic and atraumatic  Eyes: EOM are normal  Pupils are equal, round, and reactive to light  Neck: Normal range of motion  Neck supple  Cardiovascular: Normal rate, regular rhythm and normal heart sounds  Pulmonary/Chest: Effort normal and breath sounds normal    Abdominal: Soft  Bowel sounds are normal    Musculoskeletal: Normal range of motion  He exhibits no edema  Lymphadenopathy:     He has no cervical adenopathy  Neurological: He is alert and oriented to person, place, and time  No cranial nerve deficit  Skin: Skin is warm  No rash noted  Psychiatric: He has a normal mood and affect  Nursing note and vitals reviewed               Rob Degroot MD

## 2018-08-02 ENCOUNTER — TELEPHONE (OUTPATIENT)
Dept: GASTROENTEROLOGY | Facility: MEDICAL CENTER | Age: 58
End: 2018-08-02

## 2018-08-02 NOTE — TELEPHONE ENCOUNTER
----- Message from Autry Jeans, MD sent at 8/2/2018 12:20 AM EDT -----  Repeat endoscopy in 1 year for further evaluation of colitis but biopsy did not show severe inflammation  Removed polyp was benign

## 2018-08-10 ENCOUNTER — OFFICE VISIT (OUTPATIENT)
Dept: SURGERY | Facility: CLINIC | Age: 58
End: 2018-08-10
Payer: COMMERCIAL

## 2018-08-10 VITALS
TEMPERATURE: 96.6 F | SYSTOLIC BLOOD PRESSURE: 110 MMHG | BODY MASS INDEX: 25.31 KG/M2 | HEIGHT: 68 IN | WEIGHT: 167 LBS | DIASTOLIC BLOOD PRESSURE: 80 MMHG

## 2018-08-10 DIAGNOSIS — K57.20 DIVERTICULITIS OF LARGE INTESTINE WITH ABSCESS WITHOUT BLEEDING: Primary | ICD-10-CM

## 2018-08-10 PROCEDURE — 99024 POSTOP FOLLOW-UP VISIT: CPT | Performed by: SURGERY

## 2018-08-10 RX ORDER — SODIUM CHLORIDE 9 MG/ML
125 INJECTION, SOLUTION INTRAVENOUS CONTINUOUS
Status: CANCELLED | OUTPATIENT
Start: 2018-08-10

## 2018-08-10 NOTE — ASSESSMENT & PLAN NOTE
Assessment  Diverticulitis status post Vandana's procedure  Status post colonoscopy  One polyp excised, pathology negative    Recommendation  Will schedule for colostomy reversal with Dr Kassidy Crow  All risks benefits and alternatives were explained all questions answered    Consent was obtained

## 2018-08-10 NOTE — PROGRESS NOTES
Office Visit - General Surgery  Fabienne Woody MRN: [id number]  Encounter: [id number]    Assessment and Plan    Problem List Items Addressed This Visit     None          Chief Complaint:  Fabienne Woody is a 63 y o  male who presents for Pre-op Exam (Discuss colostomy reversal)    Subjective  Doing well  No pain  Midline incision well healed  Ostomy functioning well  Tolerating diet  Past Medical History  Past Medical History:   Diagnosis Date    Abscess, intestine     Arthritis     Diverticulitis     GERD (gastroesophageal reflux disease)     Hydronephrosis 5/27/2018       Past Surgical History  Past Surgical History:   Procedure Laterality Date    ABCESS DRAINAGE      COLON SURGERY      COLONOSCOPY N/A 5/5/2016    Procedure: COLONOSCOPY;  Surgeon: Vianney Paz MD;  Location: [facility] GI LAB; Service:     COLONOSCOPY N/A 7/26/2018    Procedure: COLONOSCOPY with bx's;  Surgeon: Eduar Jimenez MD;  Location: [facility]; Service: Gastroenterology    ESOPHAGOGASTRODUODENOSCOPY      with biopsy    FOOT SURGERY Left     x2? fusion? due to arthritis  onset: 0      ILEO LOOP DIVERSION N/A 6/1/2018    Procedure: ILEOSTOMY LOOP DIVERTING;  Surgeon: Ro Matos DO;  [facility];  Service: General    LAPAROTOMY N/A 6/1/2018    Procedure: LAPAROTOMY EXPLORATORY,;  Surgeon: Ro Matos DO;  [facility];  Service: General    NJ CYSTOURETHROSCOPY,URETER CATHETER Left 6/9/2018    Procedure: CYSTOSCOPY RETROGRADE PYELOGRAM WITH INSERTION STENT URETERAL;  Surgeon: Jose Wilson MD;  [facility];  Service: Urology    NJ PART REMOVAL COLON W ANASTOMOSIS N/A 6/1/2018    Procedure: RESECTION COLON SIGMOID;  Surgeon: Ro Matos DO;  [facility];  Service: General       Family History  Family History   Problem Relation Age of Onset    Other Mother         head tumor    Glaucoma Father     Diabetes Father         possible diabetes    Stroke Family Medications  Current Outpatient Prescriptions on File Prior to Visit   Medication Sig Dispense Refill    acetaminophen (TYLENOL) 500 mg tablet Take 1 tablet (500 mg total) by mouth every 6 (six) hours as needed for mild pain 30 tablet 0    doxazosin (CARDURA) 4 mg tablet 4 mg      ergocalciferol (ERGOCALCIFEROL) 12909 units capsule 50,000 Units      naproxen (NAPROSYN) 500 mg tablet Take 1 tablet (500 mg total) by mouth 2 (two) times a day with meals 30 tablet 0    tamsulosin (FLOMAX) 0 4 mg Take 1 capsule (0 4 mg total) by mouth daily with dinner for 30 days 30 capsule 11     No current facility-administered medications on file prior to visit  Allergies  Allergies   Allergen Reactions    Penicillins Rash     itching       Review of Systems    Objective  Vitals:    08/10/18 0907   BP: 110/80   Temp: (!) 96 6 °F (35 9 °C)       Physical Exam   HEENT:  Within normal limits  Lungs clear to auscultation bilaterally  Abdomen soft nontender nondistended  Ostomy pink, functioning    Heart regular rate rhythm with no S3 or S4  Extremities have no edema

## 2018-08-13 DIAGNOSIS — K57.92 DIVERTICULITIS: Primary | ICD-10-CM

## 2018-08-13 RX ORDER — METRONIDAZOLE 500 MG/1
TABLET ORAL
Qty: 3 TABLET | Refills: 0 | Status: SHIPPED | OUTPATIENT
Start: 2018-08-13 | End: 2018-08-18 | Stop reason: HOSPADM

## 2018-08-13 RX ORDER — NEOMYCIN SULFATE 500 MG/1
TABLET ORAL
Qty: 6 TABLET | Refills: 0 | Status: SHIPPED | OUTPATIENT
Start: 2018-08-13 | End: 2018-08-18 | Stop reason: HOSPADM

## 2018-08-14 NOTE — PRE-PROCEDURE INSTRUCTIONS
Pre-Surgery Instructions:   Medication Instructions    acetaminophen (TYLENOL) 500 mg tablet Instructed patient per Anesthesia Guidelines   doxazosin (CARDURA) 4 mg tablet Instructed patient per Anesthesia Guidelines   ergocalciferol (ERGOCALCIFEROL) 32032 units capsule Instructed patient per Anesthesia Guidelines   metroNIDAZOLE (FLAGYL) 500 mg tablet Patient was instructed by Physician and understands   naproxen (NAPROSYN) 500 mg tablet Instructed patient per Anesthesia Guidelines   neomycin (MYCIFRADIN) 500 mg tablet Patient was instructed by Physician and understands   tamsulosin (FLOMAX) 0 4 mg Instructed patient per Anesthesia Guidelines  Spoke with patient he is calling pharmacy today to make sure he has his meds for day before surgery    Pt states he is to take miralax day before the surgery

## 2018-08-14 NOTE — PRE-PROCEDURE INSTRUCTIONS
Pre-Surgery Instructions:   Medication Instructions    acetaminophen (TYLENOL) 500 mg tablet Instructed patient per Anesthesia Guidelines   doxazosin (CARDURA) 4 mg tablet Instructed patient per Anesthesia Guidelines   ergocalciferol (ERGOCALCIFEROL) 68104 units capsule Instructed patient per Anesthesia Guidelines   metroNIDAZOLE (FLAGYL) 500 mg tablet Patient was instructed by Physician and understands   naproxen (NAPROSYN) 500 mg tablet Instructed patient per Anesthesia Guidelines   neomycin (MYCIFRADIN) 500 mg tablet Patient was instructed by Physician and understands   tamsulosin (FLOMAX) 0 4 mg Instructed patient per Anesthesia Guidelines

## 2018-08-15 ENCOUNTER — ANESTHESIA EVENT (OUTPATIENT)
Dept: PERIOP | Facility: HOSPITAL | Age: 58
DRG: 331 | End: 2018-08-15
Payer: COMMERCIAL
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
## 2018-08-16 ENCOUNTER — HOSPITAL ENCOUNTER (INPATIENT)
Facility: HOSPITAL | Age: 58
LOS: 2 days | Discharge: HOME | DRG: 331 | End: 2018-08-18
Attending: SURGERY | Admitting: SURGERY
Payer: COMMERCIAL

## 2018-08-16 ENCOUNTER — ANESTHESIA (OUTPATIENT)
Dept: PERIOP | Facility: HOSPITAL | Age: 58
DRG: 331 | End: 2018-08-16
Payer: COMMERCIAL

## 2018-08-16 DIAGNOSIS — K57.20 DIVERTICULITIS OF LARGE INTESTINE WITH ABSCESS WITHOUT BLEEDING: Primary | ICD-10-CM

## 2018-08-16 LAB
ABO GROUP BLD: NORMAL
BLD GP AB SCN SERPL QL: NEGATIVE
GLUCOSE SERPL-MCNC: 102 MG/DL (ref 65–140)
GLUCOSE SERPL-MCNC: 80 MG/DL (ref 65–140)
RH BLD: POSITIVE
SPECIMEN EXPIRATION DATE: NORMAL

## 2018-08-16 PROCEDURE — 86900 BLOOD TYPING SEROLOGIC ABO: CPT | Performed by: SURGERY

## 2018-08-16 PROCEDURE — 86850 RBC ANTIBODY SCREEN: CPT | Performed by: SURGERY

## 2018-08-16 PROCEDURE — 0DQB0ZZ REPAIR ILEUM, OPEN APPROACH: ICD-10-PCS | Performed by: SURGERY

## 2018-08-16 PROCEDURE — 86901 BLOOD TYPING SEROLOGIC RH(D): CPT | Performed by: SURGERY

## 2018-08-16 PROCEDURE — 44625 REPAIR BOWEL OPENING: CPT | Performed by: SURGERY

## 2018-08-16 PROCEDURE — 82948 REAGENT STRIP/BLOOD GLUCOSE: CPT

## 2018-08-16 RX ORDER — FENTANYL CITRATE/PF 50 MCG/ML
50 SYRINGE (ML) INJECTION
Status: DISCONTINUED | OUTPATIENT
Start: 2018-08-16 | End: 2018-08-16 | Stop reason: HOSPADM

## 2018-08-16 RX ORDER — METOCLOPRAMIDE HYDROCHLORIDE 5 MG/ML
10 INJECTION INTRAMUSCULAR; INTRAVENOUS ONCE AS NEEDED
Status: DISCONTINUED | OUTPATIENT
Start: 2018-08-16 | End: 2018-08-16 | Stop reason: HOSPADM

## 2018-08-16 RX ORDER — OXYCODONE HYDROCHLORIDE 5 MG/1
5 TABLET ORAL EVERY 4 HOURS PRN
Status: DISCONTINUED | OUTPATIENT
Start: 2018-08-16 | End: 2018-08-18 | Stop reason: HOSPADM

## 2018-08-16 RX ORDER — CEFAZOLIN SODIUM 1 G/3ML
INJECTION, POWDER, FOR SOLUTION INTRAMUSCULAR; INTRAVENOUS AS NEEDED
Status: DISCONTINUED | OUTPATIENT
Start: 2018-08-16 | End: 2018-08-16 | Stop reason: SURG

## 2018-08-16 RX ORDER — FENTANYL CITRATE 50 UG/ML
INJECTION, SOLUTION INTRAMUSCULAR; INTRAVENOUS AS NEEDED
Status: DISCONTINUED | OUTPATIENT
Start: 2018-08-16 | End: 2018-08-16 | Stop reason: SURG

## 2018-08-16 RX ORDER — ACETAMINOPHEN 325 MG/1
975 TABLET ORAL EVERY 8 HOURS SCHEDULED
Status: DISCONTINUED | OUTPATIENT
Start: 2018-08-16 | End: 2018-08-18 | Stop reason: HOSPADM

## 2018-08-16 RX ORDER — LIDOCAINE HYDROCHLORIDE 10 MG/ML
INJECTION, SOLUTION INFILTRATION; PERINEURAL AS NEEDED
Status: DISCONTINUED | OUTPATIENT
Start: 2018-08-16 | End: 2018-08-16 | Stop reason: SURG

## 2018-08-16 RX ORDER — MIDAZOLAM HYDROCHLORIDE 1 MG/ML
INJECTION INTRAMUSCULAR; INTRAVENOUS AS NEEDED
Status: DISCONTINUED | OUTPATIENT
Start: 2018-08-16 | End: 2018-08-16 | Stop reason: SURG

## 2018-08-16 RX ORDER — DOXAZOSIN MESYLATE 4 MG/1
4 TABLET ORAL
Status: DISCONTINUED | OUTPATIENT
Start: 2018-08-16 | End: 2018-08-18 | Stop reason: HOSPADM

## 2018-08-16 RX ORDER — HEPARIN SODIUM 5000 [USP'U]/ML
5000 INJECTION, SOLUTION INTRAVENOUS; SUBCUTANEOUS EVERY 8 HOURS SCHEDULED
Status: DISCONTINUED | OUTPATIENT
Start: 2018-08-16 | End: 2018-08-18 | Stop reason: HOSPADM

## 2018-08-16 RX ORDER — TAMSULOSIN HYDROCHLORIDE 0.4 MG/1
0.4 CAPSULE ORAL
Status: DISCONTINUED | OUTPATIENT
Start: 2018-08-16 | End: 2018-08-18 | Stop reason: HOSPADM

## 2018-08-16 RX ORDER — SODIUM CHLORIDE, SODIUM LACTATE, POTASSIUM CHLORIDE, CALCIUM CHLORIDE 600; 310; 30; 20 MG/100ML; MG/100ML; MG/100ML; MG/100ML
125 INJECTION, SOLUTION INTRAVENOUS CONTINUOUS
Status: DISCONTINUED | OUTPATIENT
Start: 2018-08-16 | End: 2018-08-16

## 2018-08-16 RX ORDER — DOCUSATE SODIUM 100 MG/1
100 CAPSULE, LIQUID FILLED ORAL 2 TIMES DAILY
Status: DISCONTINUED | OUTPATIENT
Start: 2018-08-16 | End: 2018-08-18 | Stop reason: HOSPADM

## 2018-08-16 RX ORDER — PROPOFOL 10 MG/ML
INJECTION, EMULSION INTRAVENOUS AS NEEDED
Status: DISCONTINUED | OUTPATIENT
Start: 2018-08-16 | End: 2018-08-16 | Stop reason: SURG

## 2018-08-16 RX ORDER — ONDANSETRON 2 MG/ML
4 INJECTION INTRAMUSCULAR; INTRAVENOUS EVERY 4 HOURS PRN
Status: DISCONTINUED | OUTPATIENT
Start: 2018-08-16 | End: 2018-08-17

## 2018-08-16 RX ORDER — AMOXICILLIN 250 MG
2 CAPSULE ORAL DAILY
Status: DISCONTINUED | OUTPATIENT
Start: 2018-08-17 | End: 2018-08-18 | Stop reason: HOSPADM

## 2018-08-16 RX ORDER — SODIUM CHLORIDE, SODIUM LACTATE, POTASSIUM CHLORIDE, CALCIUM CHLORIDE 600; 310; 30; 20 MG/100ML; MG/100ML; MG/100ML; MG/100ML
50 INJECTION, SOLUTION INTRAVENOUS CONTINUOUS
Status: DISCONTINUED | OUTPATIENT
Start: 2018-08-16 | End: 2018-08-16

## 2018-08-16 RX ORDER — SODIUM CHLORIDE 9 MG/ML
125 INJECTION, SOLUTION INTRAVENOUS CONTINUOUS
Status: DISCONTINUED | OUTPATIENT
Start: 2018-08-16 | End: 2018-08-16

## 2018-08-16 RX ORDER — FENTANYL CITRATE/PF 50 MCG/ML
50 SYRINGE (ML) INJECTION
Status: COMPLETED | OUTPATIENT
Start: 2018-08-16 | End: 2018-08-16

## 2018-08-16 RX ORDER — GLYCOPYRROLATE 0.2 MG/ML
INJECTION INTRAMUSCULAR; INTRAVENOUS AS NEEDED
Status: DISCONTINUED | OUTPATIENT
Start: 2018-08-16 | End: 2018-08-16 | Stop reason: SURG

## 2018-08-16 RX ORDER — MEPERIDINE HYDROCHLORIDE 25 MG/ML
12.5 INJECTION INTRAMUSCULAR; INTRAVENOUS; SUBCUTANEOUS ONCE AS NEEDED
Status: DISCONTINUED | OUTPATIENT
Start: 2018-08-16 | End: 2018-08-16 | Stop reason: HOSPADM

## 2018-08-16 RX ORDER — ACETAMINOPHEN 325 MG/1
650 TABLET ORAL EVERY 6 HOURS PRN
Status: DISCONTINUED | OUTPATIENT
Start: 2018-08-16 | End: 2018-08-18 | Stop reason: HOSPADM

## 2018-08-16 RX ORDER — ROCURONIUM BROMIDE 10 MG/ML
INJECTION, SOLUTION INTRAVENOUS AS NEEDED
Status: DISCONTINUED | OUTPATIENT
Start: 2018-08-16 | End: 2018-08-16 | Stop reason: SURG

## 2018-08-16 RX ORDER — ONDANSETRON 2 MG/ML
4 INJECTION INTRAMUSCULAR; INTRAVENOUS ONCE AS NEEDED
Status: DISCONTINUED | OUTPATIENT
Start: 2018-08-16 | End: 2018-08-16 | Stop reason: HOSPADM

## 2018-08-16 RX ORDER — ONDANSETRON 2 MG/ML
INJECTION INTRAMUSCULAR; INTRAVENOUS AS NEEDED
Status: DISCONTINUED | OUTPATIENT
Start: 2018-08-16 | End: 2018-08-16 | Stop reason: SURG

## 2018-08-16 RX ORDER — OXYCODONE HYDROCHLORIDE 5 MG/1
10 TABLET ORAL EVERY 4 HOURS PRN
Status: DISCONTINUED | OUTPATIENT
Start: 2018-08-16 | End: 2018-08-18 | Stop reason: HOSPADM

## 2018-08-16 RX ORDER — SODIUM CHLORIDE 9 MG/ML
50 INJECTION, SOLUTION INTRAVENOUS CONTINUOUS
Status: DISCONTINUED | OUTPATIENT
Start: 2018-08-16 | End: 2018-08-17

## 2018-08-16 RX ADMIN — NEOSTIGMINE METHYLSULFATE 3 MG: 1 INJECTION, SOLUTION INTRAMUSCULAR; INTRAVENOUS; SUBCUTANEOUS at 18:18

## 2018-08-16 RX ADMIN — HYDROMORPHONE HYDROCHLORIDE 0.2 MG: 1 INJECTION, SOLUTION INTRAMUSCULAR; INTRAVENOUS; SUBCUTANEOUS at 18:40

## 2018-08-16 RX ADMIN — CEFAZOLIN SODIUM 2000 MG: 1 INJECTION, POWDER, FOR SOLUTION INTRAMUSCULAR; INTRAVENOUS at 17:32

## 2018-08-16 RX ADMIN — FENTANYL CITRATE 50 MCG: 50 INJECTION INTRAMUSCULAR; INTRAVENOUS at 19:15

## 2018-08-16 RX ADMIN — FENTANYL CITRATE 50 MCG: 50 INJECTION INTRAMUSCULAR; INTRAVENOUS at 19:22

## 2018-08-16 RX ADMIN — ROCURONIUM BROMIDE 30 MG: 10 INJECTION INTRAVENOUS at 17:26

## 2018-08-16 RX ADMIN — SODIUM CHLORIDE 50 ML/HR: 0.9 INJECTION, SOLUTION INTRAVENOUS at 19:10

## 2018-08-16 RX ADMIN — MIDAZOLAM 2 MG: 1 INJECTION INTRAMUSCULAR; INTRAVENOUS at 17:21

## 2018-08-16 RX ADMIN — DEXAMETHASONE SODIUM PHOSPHATE 5 MG: 10 INJECTION INTRAMUSCULAR; INTRAVENOUS at 18:09

## 2018-08-16 RX ADMIN — FENTANYL CITRATE 50 MCG: 50 INJECTION INTRAMUSCULAR; INTRAVENOUS at 19:05

## 2018-08-16 RX ADMIN — HYDROMORPHONE HYDROCHLORIDE 0.2 MG: 1 INJECTION, SOLUTION INTRAMUSCULAR; INTRAVENOUS; SUBCUTANEOUS at 18:50

## 2018-08-16 RX ADMIN — HYDROMORPHONE HYDROCHLORIDE 0.2 MG: 1 INJECTION, SOLUTION INTRAMUSCULAR; INTRAVENOUS; SUBCUTANEOUS at 18:55

## 2018-08-16 RX ADMIN — PROPOFOL 200 MG: 10 INJECTION, EMULSION INTRAVENOUS at 17:26

## 2018-08-16 RX ADMIN — FENTANYL CITRATE 50 MCG: 50 INJECTION INTRAMUSCULAR; INTRAVENOUS at 18:59

## 2018-08-16 RX ADMIN — LIDOCAINE HYDROCHLORIDE 50 MG: 10 INJECTION, SOLUTION INFILTRATION; PERINEURAL at 17:26

## 2018-08-16 RX ADMIN — ONDANSETRON 4 MG: 2 INJECTION INTRAMUSCULAR; INTRAVENOUS at 18:09

## 2018-08-16 RX ADMIN — HEPARIN SODIUM 5000 UNITS: 5000 INJECTION, SOLUTION INTRAVENOUS; SUBCUTANEOUS at 22:08

## 2018-08-16 RX ADMIN — HYDROMORPHONE HYDROCHLORIDE 0.2 MG: 1 INJECTION, SOLUTION INTRAMUSCULAR; INTRAVENOUS; SUBCUTANEOUS at 18:35

## 2018-08-16 RX ADMIN — GLYCOPYRROLATE 0.4 MG: 0.2 INJECTION, SOLUTION INTRAMUSCULAR; INTRAVENOUS at 18:18

## 2018-08-16 RX ADMIN — FENTANYL CITRATE 100 MCG: 50 INJECTION, SOLUTION INTRAMUSCULAR; INTRAVENOUS at 17:21

## 2018-08-16 RX ADMIN — SODIUM CHLORIDE, SODIUM LACTATE, POTASSIUM CHLORIDE, AND CALCIUM CHLORIDE 125 ML/HR: .6; .31; .03; .02 INJECTION, SOLUTION INTRAVENOUS at 13:34

## 2018-08-16 RX ADMIN — TAMSULOSIN HYDROCHLORIDE 0.4 MG: 0.4 CAPSULE ORAL at 22:08

## 2018-08-16 RX ADMIN — OXYCODONE HYDROCHLORIDE 10 MG: 5 TABLET ORAL at 20:44

## 2018-08-16 RX ADMIN — ACETAMINOPHEN 975 MG: 325 TABLET, FILM COATED ORAL at 22:08

## 2018-08-16 RX ADMIN — HYDROMORPHONE HYDROCHLORIDE 0.5 MG: 1 INJECTION, SOLUTION INTRAMUSCULAR; INTRAVENOUS; SUBCUTANEOUS at 18:28

## 2018-08-16 RX ADMIN — HYDROMORPHONE HYDROCHLORIDE 0.2 MG: 1 INJECTION, SOLUTION INTRAMUSCULAR; INTRAVENOUS; SUBCUTANEOUS at 18:45

## 2018-08-16 RX ADMIN — DOCUSATE SODIUM 100 MG: 100 CAPSULE, LIQUID FILLED ORAL at 22:11

## 2018-08-16 RX ADMIN — DOXAZOSIN 4 MG: 4 TABLET ORAL at 22:08

## 2018-08-16 RX ADMIN — HYDROMORPHONE HYDROCHLORIDE 0.5 MG: 1 INJECTION, SOLUTION INTRAMUSCULAR; INTRAVENOUS; SUBCUTANEOUS at 22:09

## 2018-08-16 NOTE — INTERVAL H&P NOTE
H&P reviewed  After examining the patient I find no changes in the patients condition since the H&P had been written    Plan: Patient with loop ileostomy for closure

## 2018-08-16 NOTE — OP NOTE
OPERATIVE REPORT  PATIENT NAME: Tay Bunn    :  1960  MRN: [id number]  Pt Location: [facility] OR ROOM 05    SURGERY DATE: 2018    Surgeon(s) and Role:     * James Uriostegui MD - Primary     * Joelle Garcia MD - Assisting    Preop Diagnosis:  Acute diverticulitis of intestine [K57 92]    Post-Op Diagnosis Codes:     * Acute diverticulitis of intestine [K57 92]    Procedure(s) (LRB):  CLOSURE LOOP ILEOSTOMY (N/A)    Specimen(s):  * No specimens in log *    Estimated Blood Loss:   Minimal    Drains:  Ileostomy Loop RUQ (Active)   Number of days: 76       Anesthesia Type:   * No anesthesia type entered *    Operative Indications:  Acute diverticulitis of intestine [K57 92]      Operative Findings:      Complications:   None    Procedure and Technique:    The patient was taken to the operative suite and placed supine on the operating room table  General endotracheal anesthesia was induced  Preoperative antibiotics were given in accordance with SCIP guidelines  The patient's ileostomy bag was removed  The site was cleaned with history of remover and sterile water  The right lower quadrant ostomy site was prepped and draped into a sterile field with Betadine  A time-out was performed and all in attendance agreed to the correct patient and procedure  An incision was made around the ileostomy site at the mucocutaneous junction  The subcutaneous tissues were dissected down until visible bowel was exposed  Staying in this plane adhesions to the small bowel, its mesentery, and surrounding subcutaneous tissues were divided down to the fascia  The fascia was visualized adhesions to the bowel and mesentery were divided  This allowed us to bring the ileum out of the abdomen  The 2 ends of the enterotomies were cleaned  The bowel was healthy enough for a primary anastomosis without resection  The posterior wall of the anastomosis was already completed due to fusing of the proximal and distally and loops  The lateral and anterior wall the anastomosis was made using 4-0 PDS suture starting laterally on both sides and needing anteriorly at the middle tied  The 2nd layer was performed with interrupted 3 0 silk sutures in a Lembert fashion  The bowel was reduced back in the abdomen  The wound was irrigated with sterile saline  Fascia was reapproximated using a running 0 PDS suture  A pursestring of 4 Monocryl was used to reapproximate the skin  A prior the ostomy defect was sized to the middle a pinky finger  A piece of Telfa was placed in the ostomy defect  For for side Tegaderm applied       Dr Vanesa Sanchez was present for the entire procedure    Patient Disposition:  PACU     SIGNATURE: Bridget Frazier MD  DATE: August 16, 2018  TIME: 6:43 PM

## 2018-08-16 NOTE — H&P (VIEW-ONLY)
Office Visit - General Surgery  Ca Hardy MRN: [id number]  Encounter: [id number]    Assessment and Plan    Problem List Items Addressed This Visit     None          Chief Complaint:  Ca Hardy is a 63 y o  male who presents for Pre-op Exam (Discuss colostomy reversal)    Subjective  Doing well  No pain  Midline incision well healed  Ostomy functioning well  Tolerating diet  Past Medical History  Past Medical History:   Diagnosis Date    Abscess, intestine     Arthritis     Diverticulitis     GERD (gastroesophageal reflux disease)     Hydronephrosis 5/27/2018       Past Surgical History  Past Surgical History:   Procedure Laterality Date    ABCESS DRAINAGE      COLON SURGERY      COLONOSCOPY N/A 5/5/2016    Procedure: COLONOSCOPY;  Surgeon: Blanco Fung MD;  [facility]; Service:     COLONOSCOPY N/A 7/26/2018    Procedure: COLONOSCOPY with bx's;  Surgeon: Twila Arzola MD;  Location: [facility]; Service: Gastroenterology    ESOPHAGOGASTRODUODENOSCOPY      with biopsy    FOOT SURGERY Left     x2? fusion? due to arthritis  onset: 0      ILEO LOOP DIVERSION N/A 6/1/2018    Procedure: ILEOSTOMY LOOP DIVERTING;  Surgeon: Alice Hubbard DO;  [facility];  Service: General    LAPAROTOMY N/A 6/1/2018    Procedure: LAPAROTOMY EXPLORATORY,;  Surgeon: Alice Hubbard DO;  [facility];  Service: General    WI CYSTOURETHROSCOPY,URETER CATHETER Left 6/9/2018    Procedure: CYSTOSCOPY RETROGRADE PYELOGRAM WITH INSERTION STENT URETERAL;  Surgeon: Nam Esquivel MD;  [facility];  Service: Urology    WI PART REMOVAL COLON W ANASTOMOSIS N/A 6/1/2018    Procedure: RESECTION COLON SIGMOID;  Surgeon: Alice Hubbard DO;  [facility];  Service: General       Family History  Family History   Problem Relation Age of Onset    Other Mother         head tumor    Glaucoma Father     Diabetes Father         possible diabetes    Stroke Family Medications  Current Outpatient Prescriptions on File Prior to Visit   Medication Sig Dispense Refill    acetaminophen (TYLENOL) 500 mg tablet Take 1 tablet (500 mg total) by mouth every 6 (six) hours as needed for mild pain 30 tablet 0    doxazosin (CARDURA) 4 mg tablet 4 mg      ergocalciferol (ERGOCALCIFEROL) 01589 units capsule 50,000 Units      naproxen (NAPROSYN) 500 mg tablet Take 1 tablet (500 mg total) by mouth 2 (two) times a day with meals 30 tablet 0    tamsulosin (FLOMAX) 0 4 mg Take 1 capsule (0 4 mg total) by mouth daily with dinner for 30 days 30 capsule 11     No current facility-administered medications on file prior to visit  Allergies  Allergies   Allergen Reactions    Penicillins Rash     itching       Review of Systems    Objective  Vitals:    08/10/18 0907   BP: 110/80   Temp: (!) 96 6 °F (35 9 °C)       Physical Exam   HEENT:  Within normal limits  Lungs clear to auscultation bilaterally  Abdomen soft nontender nondistended  Ostomy pink, functioning    Heart regular rate rhythm with no S3 or S4  Extremities have no edema

## 2018-08-16 NOTE — ANESTHESIA POSTPROCEDURE EVALUATION
Post-Op Assessment Note      CV Status:  Stable    Mental Status:  Alert and awake    Hydration Status:  Euvolemic    PONV Controlled:  Controlled    Airway Patency:  Patent    Post Op Vitals Reviewed: Yes          Staff: CRNA           /88 (08/16/18 1830)    Temp 99 3 °F (37 4 °C) (08/16/18 1827)    Pulse 74 (08/16/18 1827)   Resp 18 (08/16/18 1830)    SpO2 97 % (08/16/18 1830)

## 2018-08-17 PROCEDURE — 99024 POSTOP FOLLOW-UP VISIT: CPT | Performed by: SURGERY

## 2018-08-17 RX ORDER — METHOCARBAMOL 500 MG/1
500 TABLET, FILM COATED ORAL EVERY 6 HOURS PRN
Status: DISCONTINUED | OUTPATIENT
Start: 2018-08-17 | End: 2018-08-18 | Stop reason: HOSPADM

## 2018-08-17 RX ADMIN — HEPARIN SODIUM 5000 UNITS: 5000 INJECTION, SOLUTION INTRAVENOUS; SUBCUTANEOUS at 21:57

## 2018-08-17 RX ADMIN — SENNOSIDES AND DOCUSATE SODIUM 2 TABLET: 8.6; 5 TABLET ORAL at 08:15

## 2018-08-17 RX ADMIN — DOCUSATE SODIUM 100 MG: 100 CAPSULE, LIQUID FILLED ORAL at 08:15

## 2018-08-17 RX ADMIN — DOCUSATE SODIUM 100 MG: 100 CAPSULE, LIQUID FILLED ORAL at 19:05

## 2018-08-17 RX ADMIN — HYDROMORPHONE HYDROCHLORIDE 0.5 MG: 1 INJECTION, SOLUTION INTRAMUSCULAR; INTRAVENOUS; SUBCUTANEOUS at 02:36

## 2018-08-17 RX ADMIN — OXYCODONE HYDROCHLORIDE 10 MG: 5 TABLET ORAL at 00:54

## 2018-08-17 RX ADMIN — ACETAMINOPHEN 975 MG: 325 TABLET, FILM COATED ORAL at 13:23

## 2018-08-17 RX ADMIN — OXYCODONE HYDROCHLORIDE 10 MG: 5 TABLET ORAL at 06:16

## 2018-08-17 RX ADMIN — DOXAZOSIN 4 MG: 4 TABLET ORAL at 21:57

## 2018-08-17 RX ADMIN — HEPARIN SODIUM 5000 UNITS: 5000 INJECTION, SOLUTION INTRAVENOUS; SUBCUTANEOUS at 06:16

## 2018-08-17 RX ADMIN — OXYCODONE HYDROCHLORIDE 10 MG: 5 TABLET ORAL at 12:16

## 2018-08-17 RX ADMIN — HEPARIN SODIUM 5000 UNITS: 5000 INJECTION, SOLUTION INTRAVENOUS; SUBCUTANEOUS at 13:23

## 2018-08-17 RX ADMIN — HYDROMORPHONE HYDROCHLORIDE 0.5 MG: 1 INJECTION, SOLUTION INTRAMUSCULAR; INTRAVENOUS; SUBCUTANEOUS at 07:33

## 2018-08-17 RX ADMIN — ACETAMINOPHEN 975 MG: 325 TABLET, FILM COATED ORAL at 06:16

## 2018-08-17 RX ADMIN — METHOCARBAMOL 500 MG: 500 TABLET ORAL at 15:34

## 2018-08-17 RX ADMIN — HYDROMORPHONE HYDROCHLORIDE 0.5 MG: 1 INJECTION, SOLUTION INTRAMUSCULAR; INTRAVENOUS; SUBCUTANEOUS at 13:20

## 2018-08-17 RX ADMIN — TAMSULOSIN HYDROCHLORIDE 0.4 MG: 0.4 CAPSULE ORAL at 15:40

## 2018-08-17 RX ADMIN — ACETAMINOPHEN 975 MG: 325 TABLET, FILM COATED ORAL at 21:57

## 2018-08-17 NOTE — SOCIAL WORK
Cm met with patient to explain Cm role and discuss discharge planning  Patient lives with wife and sons in 1st floor apartment with 4[address]5 SAJAN  Patient's emergency contacts are wife Lennie Barr or son Cecil Heath [phone]  Patient was independent with ADLs PTA  Patient drives and is retired  Patient walked to [facility] from his home upon admission  Patient has no DME at home  Patient denied SNF, VNA, mental health or ETOH treatment in the past   Patient stated his family would be able to transport him home upon discharge or he will walk home  Pharmacy: [address] [place] [address] [employer] but patient is requesting any new scripts be filled at [facility] [place]  PCP: Dr Lily Porter  Cm following  CM reviewed d/c planning process including the following: identifying help at home, patient preference for d/c planning needs, Discharge Lounge, [facility] Meds to Bed program, availability of treatment team to discuss questions or concerns patient and/or family may have regarding understanding medications and recognizing signs and symptoms once discharged  CM also encouraged patient to follow up with all recommended appointments after discharge  Patient advised of importance for patient and family to participate in managing patients medical well being

## 2018-08-17 NOTE — CASE MANAGEMENT
Initial Clinical Review    Age/Sex: 63 y o  male    Surgery Date: 8/16/18    Procedure:   Surgeon(s) and Role:     * Harley Carlos MD - Primary     * Je Lafleur MD - Assisting     Preop Diagnosis:  Acute diverticulitis of intestine [K57 92]     Post-Op Diagnosis Codes:     * Acute diverticulitis of intestine [K57 92]     Procedure(s) (LRB):  CLOSURE LOOP ILEOSTOMY (N/A)     Anesthesia: General     Admission Orders: Date/Time/Statement: 8/16/18 @ 1839     Orders Placed This Encounter   Procedures    Inpatient Admission     Standing Status:   Standing     Number of Occurrences:   1     Order Specific Question:   Admitting Physician     Answer:   Maria Eugenia Walker     Order Specific Question:   Level of Care     Answer:   Med Surg [16]     Order Specific Question:   Estimated length of stay     Answer:   More than 2 Midnights     Order Specific Question:   Certification     Answer:   I certify that inpatient services are medically necessary for this patient for a duration of greater than two midnights  See H&P and MD Progress Notes for additional information about the patient's course of treatment  Vital Signs: /94 (BP Location: Right arm)   Pulse 79   Temp 97 5 °F (36 4 °C) (Oral)   Resp 18   Ht 5' 8" (1 727 m)   Wt 75 8 kg (167 lb)   SpO2 99%   BMI 25 39 kg/m²     Diet:        Diet Orders            Start     Ordered    08/16/18 1838  Diet Regular; Regular House  Diet effective now     Question Answer Comment   Diet Type Regular    Regular Regular House    RD to adjust diet per protocol? No        08/16/18 1839        Mobility: ambulate 3 x daily     DVT Prophylaxis:   Heparin Sq q 8 hr   SCDs    Pain Control:   Continuous Infusions:   PRN Meds:   acetaminophen    HYDROmorphone IV x 4 post op     ondansetron    oxyCODONE 10 mg x 4 post op     oxyCODONE 5 mg     Thank you,  Ghislaine Cabrera Utilization Review Department  Phone: [phone];  Fax [phone]  ATTENTION: Please call with any questions or concerns to [phone]  and carefully follow the prompts so that you are directed to the right person  Send all requests for admission clinical reviews, approved or denied determinations and any other requests to fax [phone]   All voicemails are confidential

## 2018-08-17 NOTE — PLAN OF CARE
Problem: PAIN - ADULT  Goal: Verbalizes/displays adequate comfort level or baseline comfort level  Interventions:  - Encourage patient to monitor pain and request assistance  - Assess pain using appropriate pain scale  - Administer analgesics based on type and severity of pain and evaluate response  - Implement non-pharmacological measures as appropriate and evaluate response  - Consider cultural and social influences on pain and pain management  - Notify physician/advanced practitioner if interventions unsuccessful or patient reports new pain  Outcome: Progressing      Problem: SAFETY ADULT  Goal: Maintain or return to baseline ADL function  INTERVENTIONS:  -  Assess patient's ability to carry out ADLs; assess patient's baseline for ADL function and identify physical deficits which impact ability to perform ADLs (bathing, care of mouth/teeth, toileting, grooming, dressing, etc )  - Assess/evaluate cause of self-care deficits   - Assess range of motion  - Assess patient's mobility; develop plan if impaired  - Assess patient's need for assistive devices and provide as appropriate  - Encourage maximum independence but intervene and supervise when necessary  ¯ Involve family in performance of ADLs  ¯ Assess for home care needs following discharge   ¯ Request OT consult to assist with ADL evaluation and planning for discharge  ¯ Provide patient education as appropriate  Outcome: Progressing    Goal: Maintain or return mobility status to optimal level  INTERVENTIONS:  - Assess patient's baseline mobility status (ambulation, transfers, stairs, etc )    - Identify cognitive and physical deficits and behaviors that affect mobility  - Identify mobility aids required to assist with transfers and/or ambulation (gait belt, sit-to-stand, lift, walker, cane, etc )  - [place] fall precautions as indicated by assessment  - Record patient progress and toleration of activity level on Mobility SBAR; progress patient to next Phase/Stage  - Instruct patient to call for assistance with activity based on assessment  - Request Rehabilitation consult to assist with strengthening/weightbearing, etc   Outcome: Progressing      Problem: DISCHARGE PLANNING  Goal: Discharge to home or other facility with appropriate resources  INTERVENTIONS:  - Identify barriers to discharge w/patient and caregiver  - Arrange for needed discharge resources and transportation as appropriate  - Identify discharge learning needs (meds, wound care, etc )  - Arrange for interpretive services to assist at discharge as needed  - Refer to Case Management Department for coordinating discharge planning if the patient needs post-hospital services based on physician/advanced practitioner order or complex needs related to functional status, cognitive ability, or social support system  Outcome: Progressing      Problem: Knowledge Deficit  Goal: Patient/family/caregiver demonstrates understanding of disease process, treatment plan, medications, and discharge instructions  Complete learning assessment and assess knowledge base    Interventions:  - Provide teaching at level of understanding  - Provide teaching via preferred learning methods  Outcome: Progressing

## 2018-08-17 NOTE — PROGRESS NOTES
Post OP Check - General Surgery   Lawernce Laughter Rites 63 y o  male MRN: [id number]  Unit/Bed#: [facility] 829-01 Encounter: [id number]    Assessment:  Patient is 52-year-old male status post 8/16 loop ileostomy closure    Plan:  Regular diet  IV fluids until tolerating p o   P r n  pain control  IS/out of bed/ambulate  SQH heparin/SCDs    Subjective/Objective   Chief Complaint:     Subjective:  Patient having pain since post operation comma told him he can have his breakthrough Dilaudid  Nausea vomiting, no flatus or bowel movements  Ate some toast for dinner  Objective:     Blood pressure 141/88, pulse 72, temperature 97 7 °F (36 5 °C), temperature source Oral, resp  rate 18, height 5' 8" (1 727 m), weight 75 8 kg (167 lb), SpO2 97 %  ,Body mass index is 25 39 kg/m²  Intake/Output Summary (Last 24 hours) at 08/16/18 2158  Last data filed at 08/16/18 1930   Gross per 24 hour   Intake             1000 ml   Output                0 ml   Net             1000 ml       Invasive Devices     Peripheral Intravenous Line            Peripheral IV 08/16/18 Right Arm less than 1 day          Drain            Ileostomy Loop RUQ 76 days                Physical Exam: NAD  AAOX3  Normal respiratory effort  Soft, TTP over incision, ND  Old ileostomy site with 4x4 and Tegaderm with serosang saturation  No c/c/e      Lab, Imaging and other studies:I have personally reviewed pertinent lab results    , CBC: No results found for: WBC, HGB, HCT, MCV, PLT, ADJUSTEDWBC, MCH, MCHC, RDW, MPV, NRBC, CMP: No results found for: NA, K, CL, CO2, ANIONGAP, BUN, CREATININE, GLUCOSE, CALCIUM, AST, ALT, ALKPHOS, PROT, ALBUMIN, BILITOT, EGFR  VTE Pharmacologic Prophylaxis: Heparin  VTE Mechanical Prophylaxis: sequential compression device

## 2018-08-17 NOTE — PROGRESS NOTES
Progress Note - General Surgery   Kimani Bunn 63 y o  male MRN: [id number]  Unit/Bed#: [facility] 829-01 Encounter: [id number]    Assessment:  63 M POD#1 s/p loop ileostomy closure  -Hx Acute diverticulitis    Plan:  - Regular Diet  - IVF hydration until fully tolerating PO  - Pain control PRN  - OOB/Ambulate  - SQH PPx    Subjective/Objective   Chief Complaint:     Subjective: Patient doing well overnight  No fevers or chills, denies n/v  Was able to eat toast last night  Not passing flatus or BM's  Objective:     Blood pressure 131/86, pulse 77, temperature 97 8 °F (36 6 °C), temperature source Oral, resp  rate 18, height 5' 8" (1 727 m), weight 75 8 kg (167 lb), SpO2 99 %  ,Body mass index is 25 39 kg/m²  Intake/Output Summary (Last 24 hours) at 08/17/18 0509  Last data filed at 08/16/18 1930   Gross per 24 hour   Intake             1000 ml   Output                0 ml   Net             1000 ml       Invasive Devices     Peripheral Intravenous Line            Peripheral IV 08/16/18 Right Arm less than 1 day          Drain            Ileostomy Loop RUQ 76 days                Physical Exam: General: AAOx3  Respiratory: BS b/l  Abdomen: Soft, tender to palpation in RLQ at site of ileostomy reversal  Incision covered with 4x4 c/d/i  Heart: RRR, S1s2  Ext: Warm no cyanosis     Lab, Imaging and other studies:I have personally reviewed pertinent lab results    , CBC: No results found for: WBC, HGB, HCT, MCV, PLT, ADJUSTEDWBC, MCH, MCHC, RDW, MPV, NRBC, CMP: No results found for: NA, K, CL, CO2, ANIONGAP, BUN, CREATININE, GLUCOSE, CALCIUM, AST, ALT, ALKPHOS, PROT, ALBUMIN, BILITOT, EGFR  VTE Pharmacologic Prophylaxis: Heparin  VTE Mechanical Prophylaxis: sequential compression device

## 2018-08-17 NOTE — PROGRESS NOTES
Patient eager to go home but no flatus yet  Had a hamburger for lunch  States he is walking around a lot  Per nursing, patient using quite a bit of narcotics, including Oxycodone 10mg as well as Dilaudid  Abdomen distended and tympanitic with hypoactive BS  Sam Pratt in place at ostomy wound  Wound redressed  Check labs in AM  Patient encouraged to continue ambulating  Discussed with patient the need to avoid heavy narcotics for pain  Pain likely gas pain, so he was encouraged to ambulate  Awaiting return of bowel function

## 2018-08-18 VITALS
BODY MASS INDEX: 25.31 KG/M2 | RESPIRATION RATE: 16 BRPM | HEART RATE: 64 BPM | HEIGHT: 68 IN | OXYGEN SATURATION: 98 % | DIASTOLIC BLOOD PRESSURE: 85 MMHG | TEMPERATURE: 98.5 F | WEIGHT: 167 LBS | SYSTOLIC BLOOD PRESSURE: 128 MMHG

## 2018-08-18 LAB
ANION GAP SERPL CALCULATED.3IONS-SCNC: 9 MMOL/L (ref 4–13)
BASOPHILS # BLD AUTO: 0.02 THOUSANDS/ΜL (ref 0–0.1)
BASOPHILS NFR BLD AUTO: 0 % (ref 0–1)
BUN SERPL-MCNC: 12 MG/DL (ref 5–25)
CALCIUM SERPL-MCNC: 8.1 MG/DL (ref 8.3–10.1)
CHLORIDE SERPL-SCNC: 106 MMOL/L (ref 100–108)
CO2 SERPL-SCNC: 25 MMOL/L (ref 21–32)
CREAT SERPL-MCNC: 0.77 MG/DL (ref 0.6–1.3)
EOSINOPHIL # BLD AUTO: 0.03 THOUSAND/ΜL (ref 0–0.61)
EOSINOPHIL NFR BLD AUTO: 0 % (ref 0–6)
ERYTHROCYTE [DISTWIDTH] IN BLOOD BY AUTOMATED COUNT: 13.5 % (ref 11.6–15.1)
GFR SERPL CREATININE-BSD FRML MDRD: 101 ML/MIN/1.73SQ M
GLUCOSE SERPL-MCNC: 93 MG/DL (ref 65–140)
HCT VFR BLD AUTO: 39.4 % (ref 36.5–49.3)
HGB BLD-MCNC: 12.7 G/DL (ref 12–17)
IMM GRANULOCYTES # BLD AUTO: 0.02 THOUSAND/UL (ref 0–0.2)
IMM GRANULOCYTES NFR BLD AUTO: 0 % (ref 0–2)
LYMPHOCYTES # BLD AUTO: 2.23 THOUSANDS/ΜL (ref 0.6–4.47)
LYMPHOCYTES NFR BLD AUTO: 22 % (ref 14–44)
MCH RBC QN AUTO: 30.2 PG (ref 26.8–34.3)
MCHC RBC AUTO-ENTMCNC: 32.2 G/DL (ref 31.4–37.4)
MCV RBC AUTO: 94 FL (ref 82–98)
MONOCYTES # BLD AUTO: 0.62 THOUSAND/ΜL (ref 0.17–1.22)
MONOCYTES NFR BLD AUTO: 6 % (ref 4–12)
NEUTROPHILS # BLD AUTO: 7.37 THOUSANDS/ΜL (ref 1.85–7.62)
NEUTS SEG NFR BLD AUTO: 72 % (ref 43–75)
NRBC BLD AUTO-RTO: 0 /100 WBCS
PLATELET # BLD AUTO: 287 THOUSANDS/UL (ref 149–390)
PMV BLD AUTO: 10.8 FL (ref 8.9–12.7)
POTASSIUM SERPL-SCNC: 3.8 MMOL/L (ref 3.5–5.3)
RBC # BLD AUTO: 4.21 MILLION/UL (ref 3.88–5.62)
SODIUM SERPL-SCNC: 140 MMOL/L (ref 136–145)
WBC # BLD AUTO: 10.29 THOUSAND/UL (ref 4.31–10.16)

## 2018-08-18 PROCEDURE — 85025 COMPLETE CBC W/AUTO DIFF WBC: CPT | Performed by: PHYSICIAN ASSISTANT

## 2018-08-18 PROCEDURE — 80048 BASIC METABOLIC PNL TOTAL CA: CPT | Performed by: PHYSICIAN ASSISTANT

## 2018-08-18 RX ORDER — ONDANSETRON 4 MG/1
4 TABLET, ORALLY DISINTEGRATING ORAL EVERY 6 HOURS PRN
Status: DISCONTINUED | OUTPATIENT
Start: 2018-08-18 | End: 2018-08-18 | Stop reason: HOSPADM

## 2018-08-18 RX ORDER — OXYCODONE HYDROCHLORIDE 5 MG/1
5 TABLET ORAL EVERY 4 HOURS PRN
Qty: 20 TABLET | Refills: 0 | Status: ON HOLD
Start: 2018-08-18 | End: 2018-09-07

## 2018-08-18 RX ADMIN — SENNOSIDES AND DOCUSATE SODIUM 2 TABLET: 8.6; 5 TABLET ORAL at 08:50

## 2018-08-18 RX ADMIN — HEPARIN SODIUM 5000 UNITS: 5000 INJECTION, SOLUTION INTRAVENOUS; SUBCUTANEOUS at 06:23

## 2018-08-18 RX ADMIN — ACETAMINOPHEN 975 MG: 325 TABLET, FILM COATED ORAL at 05:58

## 2018-08-18 RX ADMIN — HEPARIN SODIUM 5000 UNITS: 5000 INJECTION, SOLUTION INTRAVENOUS; SUBCUTANEOUS at 13:22

## 2018-08-18 RX ADMIN — ONDANSETRON 4 MG: 4 TABLET, ORALLY DISINTEGRATING ORAL at 00:47

## 2018-08-18 RX ADMIN — OXYCODONE HYDROCHLORIDE 10 MG: 5 TABLET ORAL at 00:56

## 2018-08-18 RX ADMIN — DOCUSATE SODIUM 100 MG: 100 CAPSULE, LIQUID FILLED ORAL at 08:50

## 2018-08-18 RX ADMIN — ONDANSETRON 4 MG: 4 TABLET, ORALLY DISINTEGRATING ORAL at 08:50

## 2018-08-18 RX ADMIN — ACETAMINOPHEN 975 MG: 325 TABLET, FILM COATED ORAL at 13:23

## 2018-08-18 NOTE — PROGRESS NOTES
Pt resting in bed with c/o having an upset stomach because of an episode of nausea and vomiting that happened during midnight  Pt denies any other symptoms at this time  Will continue to monitor

## 2018-08-18 NOTE — DISCHARGE SUMMARY
Discharge Summary - General Surgery   Tay Bunn 63 y o  male MRN: [id number]  Unit/Bed#: [facility] 829-01 Encounter: [id number]    Admission Date:   Admission Orders     Ordered        08/16/18 1842  Inpatient Admission  Once         08/16/18 1839  Inpatient Admission  Once                Discharge Date:  08/18/2018    Admitting Diagnosis: Acute diverticulitis of intestine [K57 [id number]]    Discharge Diagnosis:  Loop ileostomy status    Resolved Problems  Date Reviewed: 7/27/2018    None          Attending: Elaina Urena Physician(s):  None    Procedures Performed: No orders of the defined types were placed in this encounter  08/16/2018:  Closure of loop ileostomy    Pathology: n/a    Hospital Course: The patient presented on 08/16 for elective closure of loop ileostomy  He underwent the above-listed procedure and tolerated well  He was transferred to the floor postoperatively and started on a clear liquid diet which he tolerated  Diet was advanced to regular diet on postop day 1 and he tolerated well  He had a bowel movement on postoperative day 1 and remained afebrile  By postop day 2, the patient was tolerating regular diet, ambulating without difficulty, voiding spontaneously, and pain was controlled on oral analgesics  He was deemed ready for discharge home  Condition at Discharge: good     Discharge instructions/Information to patient and family:   See after visit summary for information provided to patient and family  Provisions for Follow-Up Care:  See after visit summary for information related to follow-up care and any pertinent home health orders  Disposition: See After Visit Summary for discharge disposition information  Planned Readmission: No    Discharge Statement   I spent 25 minutes discharging the patient  This time was spent on the day of discharge  I had direct contact with the patient on the day of discharge   Additional documentation is required if more than 30 minutes were spent on discharge  Discharge Medications:  See after visit summary for reconciled discharge medications provided to patient and family

## 2018-08-18 NOTE — PROGRESS NOTES
Progress Note - General Surgery   Aram Bunn 63 y o  male MRN: [id number]  Unit/Bed#: [facility] 829-01 Encounter: [id number]    Assessment:  64M with h/o diverticulitis now s/p loop ileostomy reversal POD#2    Plan:  Diet as tolerated  Remove wick  Analgesia  Ambulate  Home meds  DVT ppx  Dispo planning; likely home today if tolerates breakfast    Subjective/Objective   Chief Complaint:     Subjective: Small amount emesis x2 overnight  Denies nausea now  BMx1 yesterday    Objective:     Blood pressure 161/99, pulse 86, temperature 98 7 °F (37 1 °C), temperature source Oral, resp  rate 14, height 5' 8" (1 727 m), weight 75 8 kg (167 lb), SpO2 97 %  ,Body mass index is 25 39 kg/m²  Intake/Output Summary (Last 24 hours) at 08/18/18 0703  Last data filed at 08/18/18 0646   Gross per 24 hour   Intake          1564 17 ml   Output              575 ml   Net           989 17 ml       Invasive Devices     Drain            Ileostomy Loop RUQ 77 days                Physical Exam:   NAD  RRR  nonlabored respirations on RA  Abdomen soft, appropriately tender   Ostomy site clean    Lab, Imaging and other studies:  CBC:   Lab Results   Component Value Date    WBC 10 29 (H) 08/18/2018    HGB 12 7 08/18/2018    HCT 39 4 08/18/2018    MCV 94 08/18/2018     08/18/2018    MCH 30 2 08/18/2018    MCHC 32 2 08/18/2018    RDW 13 5 08/18/2018    MPV 10 8 08/18/2018    NRBC 0 08/18/2018   , CMP:   Lab Results   Component Value Date     08/18/2018    K 3 8 08/18/2018     08/18/2018    CO2 25 08/18/2018    ANIONGAP 9 08/18/2018    BUN 12 08/18/2018    CREATININE 0 77 08/18/2018    GLUCOSE 93 08/18/2018    CALCIUM 8 1 (L) 08/18/2018    EGFR 101 08/18/2018   , Coagulation: No results found for: PT, INR, APTT, Urinalysis: No results found for: COLORU, CLARITYU, SPECGRAV, PHUR, LEUKOCYTESUR, NITRITE, PROTEINUA, GLUCOSEU, KETONESU, BILIRUBINUR, BLOODU, Amylase: No results found for: AMYLASE, Lipase: No results found for: LIPASE  VTE Pharmacologic Prophylaxis: Heparin  VTE Mechanical Prophylaxis: sequential compression device

## 2018-08-20 ENCOUNTER — APPOINTMENT (EMERGENCY)
Dept: RADIOLOGY | Facility: HOSPITAL | Age: 58
DRG: 390 | End: 2018-08-20
Payer: COMMERCIAL

## 2018-08-20 ENCOUNTER — HOSPITAL ENCOUNTER (EMERGENCY)
Facility: HOSPITAL | Age: 58
Discharge: HOME | DRG: 390 | End: 2018-08-20
Attending: EMERGENCY MEDICINE | Admitting: EMERGENCY MEDICINE
Payer: COMMERCIAL

## 2018-08-20 VITALS
HEART RATE: 67 BPM | TEMPERATURE: 97.9 F | OXYGEN SATURATION: 98 % | RESPIRATION RATE: 18 BRPM | SYSTOLIC BLOOD PRESSURE: 125 MMHG | DIASTOLIC BLOOD PRESSURE: 62 MMHG

## 2018-08-20 DIAGNOSIS — R10.9 ABDOMINAL PAIN: Primary | ICD-10-CM

## 2018-08-20 DIAGNOSIS — R10.84 ABDOMINAL PAIN, GENERALIZED: ICD-10-CM

## 2018-08-20 LAB
ALBUMIN SERPL BCP-MCNC: 3.3 G/DL (ref 3.5–5)
ALP SERPL-CCNC: 82 U/L (ref 46–116)
ALT SERPL W P-5'-P-CCNC: 20 U/L (ref 12–78)
ANION GAP SERPL CALCULATED.3IONS-SCNC: 6 MMOL/L (ref 4–13)
AST SERPL W P-5'-P-CCNC: 13 U/L (ref 5–45)
BASOPHILS # BLD AUTO: 0.03 THOUSANDS/ΜL (ref 0–0.1)
BASOPHILS NFR BLD AUTO: 0 % (ref 0–1)
BILIRUB SERPL-MCNC: 0.25 MG/DL (ref 0.2–1)
BUN SERPL-MCNC: 8 MG/DL (ref 5–25)
CALCIUM SERPL-MCNC: 8.2 MG/DL (ref 8.3–10.1)
CHLORIDE SERPL-SCNC: 103 MMOL/L (ref 100–108)
CO2 SERPL-SCNC: 30 MMOL/L (ref 21–32)
CREAT SERPL-MCNC: 0.65 MG/DL (ref 0.6–1.3)
EOSINOPHIL # BLD AUTO: 0.15 THOUSAND/ΜL (ref 0–0.61)
EOSINOPHIL NFR BLD AUTO: 2 % (ref 0–6)
ERYTHROCYTE [DISTWIDTH] IN BLOOD BY AUTOMATED COUNT: 13.4 % (ref 11.6–15.1)
GFR SERPL CREATININE-BSD FRML MDRD: 108 ML/MIN/1.73SQ M
GLUCOSE SERPL-MCNC: 111 MG/DL (ref 65–140)
HCT VFR BLD AUTO: 41.7 % (ref 36.5–49.3)
HGB BLD-MCNC: 13.6 G/DL (ref 12–17)
IMM GRANULOCYTES # BLD AUTO: 0.03 THOUSAND/UL (ref 0–0.2)
IMM GRANULOCYTES NFR BLD AUTO: 0 % (ref 0–2)
LIPASE SERPL-CCNC: 88 U/L (ref 73–393)
LYMPHOCYTES # BLD AUTO: 1.98 THOUSANDS/ΜL (ref 0.6–4.47)
LYMPHOCYTES NFR BLD AUTO: 20 % (ref 14–44)
MCH RBC QN AUTO: 30 PG (ref 26.8–34.3)
MCHC RBC AUTO-ENTMCNC: 32.6 G/DL (ref 31.4–37.4)
MCV RBC AUTO: 92 FL (ref 82–98)
MONOCYTES # BLD AUTO: 0.61 THOUSAND/ΜL (ref 0.17–1.22)
MONOCYTES NFR BLD AUTO: 6 % (ref 4–12)
NEUTROPHILS # BLD AUTO: 7.18 THOUSANDS/ΜL (ref 1.85–7.62)
NEUTS SEG NFR BLD AUTO: 72 % (ref 43–75)
NRBC BLD AUTO-RTO: 0 /100 WBCS
PLATELET # BLD AUTO: 323 THOUSANDS/UL (ref 149–390)
PMV BLD AUTO: 10.2 FL (ref 8.9–12.7)
POTASSIUM SERPL-SCNC: 3.5 MMOL/L (ref 3.5–5.3)
PROT SERPL-MCNC: 7.1 G/DL (ref 6.4–8.2)
RBC # BLD AUTO: 4.53 MILLION/UL (ref 3.88–5.62)
SODIUM SERPL-SCNC: 139 MMOL/L (ref 136–145)
WBC # BLD AUTO: 9.98 THOUSAND/UL (ref 4.31–10.16)

## 2018-08-20 PROCEDURE — 83690 ASSAY OF LIPASE: CPT | Performed by: EMERGENCY MEDICINE

## 2018-08-20 PROCEDURE — 36415 COLL VENOUS BLD VENIPUNCTURE: CPT | Performed by: EMERGENCY MEDICINE

## 2018-08-20 PROCEDURE — 74177 CT ABD & PELVIS W/CONTRAST: CPT

## 2018-08-20 PROCEDURE — 96375 TX/PRO/DX INJ NEW DRUG ADDON: CPT

## 2018-08-20 PROCEDURE — 99284 EMERGENCY DEPT VISIT MOD MDM: CPT

## 2018-08-20 PROCEDURE — 71045 X-RAY EXAM CHEST 1 VIEW: CPT

## 2018-08-20 PROCEDURE — 85025 COMPLETE CBC W/AUTO DIFF WBC: CPT | Performed by: EMERGENCY MEDICINE

## 2018-08-20 PROCEDURE — 96376 TX/PRO/DX INJ SAME DRUG ADON: CPT

## 2018-08-20 PROCEDURE — 80053 COMPREHEN METABOLIC PANEL: CPT | Performed by: EMERGENCY MEDICINE

## 2018-08-20 PROCEDURE — 96374 THER/PROPH/DIAG INJ IV PUSH: CPT

## 2018-08-20 RX ORDER — ONDANSETRON 2 MG/ML
4 INJECTION INTRAMUSCULAR; INTRAVENOUS ONCE
Status: COMPLETED | OUTPATIENT
Start: 2018-08-20 | End: 2018-08-20

## 2018-08-20 RX ORDER — OXYCODONE HYDROCHLORIDE AND ACETAMINOPHEN 5; 325 MG/1; MG/1
1 TABLET ORAL EVERY 4 HOURS PRN
Status: DISCONTINUED | OUTPATIENT
Start: 2018-08-20 | End: 2018-08-20 | Stop reason: HOSPADM

## 2018-08-20 RX ORDER — MORPHINE SULFATE 4 MG/ML
4 INJECTION, SOLUTION INTRAMUSCULAR; INTRAVENOUS ONCE
Status: COMPLETED | OUTPATIENT
Start: 2018-08-20 | End: 2018-08-20

## 2018-08-20 RX ORDER — AMOXICILLIN 250 MG
1 CAPSULE ORAL
Status: DISCONTINUED | OUTPATIENT
Start: 2018-08-20 | End: 2018-08-20 | Stop reason: HOSPADM

## 2018-08-20 RX ORDER — MORPHINE SULFATE 2 MG/ML
2 INJECTION, SOLUTION INTRAMUSCULAR; INTRAVENOUS ONCE
Status: DISCONTINUED | OUTPATIENT
Start: 2018-08-20 | End: 2018-08-20

## 2018-08-20 RX ADMIN — MORPHINE SULFATE 4 MG: 4 INJECTION INTRAVENOUS at 03:14

## 2018-08-20 RX ADMIN — MORPHINE SULFATE 4 MG: 4 INJECTION INTRAVENOUS at 03:51

## 2018-08-20 RX ADMIN — IOHEXOL 100 ML: 350 INJECTION, SOLUTION INTRAVENOUS at 04:07

## 2018-08-20 RX ADMIN — HYDROMORPHONE HYDROCHLORIDE 0.5 MG: 1 INJECTION, SOLUTION INTRAMUSCULAR; INTRAVENOUS; SUBCUTANEOUS at 04:35

## 2018-08-20 RX ADMIN — OXYCODONE AND ACETAMINOPHEN 1 TABLET: 5; 325 TABLET ORAL at 06:06

## 2018-08-20 RX ADMIN — ONDANSETRON 4 MG: 2 INJECTION, SOLUTION INTRAMUSCULAR; INTRAVENOUS at 03:13

## 2018-08-20 NOTE — DISCHARGE INSTRUCTIONS

## 2018-08-20 NOTE — ED PROVIDER NOTES
History  Chief Complaint   Patient presents with    Abdominal Pain     Pt  recently had abdominal surgery, but states that he developed sharp abdominal pain yesturday afternoon  Pt  denies vomiting, and states that he has had a bowel movement since surgery  Patient is a 69-year-old male with significant history of ostomy reversal on  who presents with abdominal pain  Patient tells me that he was entirely pain free up until about noon on  when he developed the acute onset of generalized cramping severe abdominal pain  Patient tells me that the pain has been getting progressively worse since this time  Patient did admit to a bowel movement before the abdominal pain came on  He says the bowel movement was diarrhea with no melena or hematochezia  Patient does also complain of associated nausea with no vomiting  He denies any fevers, chills  He says that he has been able to handle p o  since the discharge  Patient denies lightheadedness, chest pain, dyspnea  Prior to Admission Medications   Prescriptions Last Dose Informant Patient Reported? Taking?   doxazosin (CARDURA) 4 mg tablet   Yes No   Si mg daily at bedtime     oxyCODONE (ROXICODONE) 5 mg immediate release tablet   No No   Sig: Take 1 tablet (5 mg total) by mouth every 4 (four) hours as needed for moderate pain for up to 10 days Earliest Fill Date: 18 Max Daily Amount: 30 mg      Facility-Administered Medications: None       Past Medical History:   Diagnosis Date    Abscess, intestine     Arthritis     Diverticulitis     GERD (gastroesophageal reflux disease)     Hydronephrosis 2018       Past Surgical History:   Procedure Laterality Date    ABCESS DRAINAGE      COLON SURGERY      COLONOSCOPY N/A 2016    Procedure: COLONOSCOPY;  Surgeon: Andrew Vale MD;  Location: [facility];   Service:     COLONOSCOPY N/A 2018    Procedure: COLONOSCOPY with bx's;  Surgeon: Theodora Morales MD;  Location: [facility]; Service: Gastroenterology    ESOPHAGOGASTRODUODENOSCOPY      with biopsy    FOOT SURGERY Left     x2? fusion? due to arthritis  onset: 0   ILEO LOOP DIVERSION N/A 6/1/2018    Procedure: ILEOSTOMY LOOP DIVERTING;  Surgeon: Karely Cao DO;  Location: [facility];  Service: General    LAPAROTOMY N/A 6/1/2018    Procedure: LAPAROTOMY EXPLORATORY,;  Surgeon: Karely Cao DO;  Location: [facility];  Service: General    MA CLOSE ENTEROSTOMY N/A 8/16/2018    Procedure: CLOSURE LOOP ILEOSTOMY;  Surgeon: Keya Nickerson MD;  Location: [facility];  Service: General    MA CYSTOURETHROSCOPY,URETER CATHETER Left 6/9/2018    Procedure: CYSTOSCOPY RETROGRADE PYELOGRAM WITH INSERTION STENT URETERAL;  Surgeon: Luane Closs, MD;  Location: [facility];  Service: Urology    MA PART REMOVAL COLON W ANASTOMOSIS N/A 6/1/2018    Procedure: RESECTION COLON SIGMOID;  Surgeon: Karely Cao DO;  Location: [facility];  Service: General       Family History   Problem Relation Age of Onset    Other Mother         head tumor    Glaucoma Father     Diabetes Father         possible diabetes    Stroke Family      I have reviewed and agree with the history as documented  Social History   Substance Use Topics    Smoking status: Former Smoker    Smokeless tobacco: Never Used    Alcohol use No        Review of Systems   Constitutional: Negative for chills, diaphoresis, fatigue and fever  HENT: Negative for drooling, facial swelling, sore throat and trouble swallowing  Eyes: Negative for photophobia  Respiratory: Negative for cough, choking, chest tightness, shortness of breath, wheezing and stridor  Cardiovascular: Negative for chest pain, palpitations and leg swelling  Gastrointestinal: Positive for abdominal pain and nausea  Negative for abdominal distention, anal bleeding, blood in stool, constipation, diarrhea and vomiting  Genitourinary: Negative for dysuria     Musculoskeletal: Negative for back pain, neck pain and neck stiffness  Skin: Negative for color change, pallor and rash  Neurological: Negative for dizziness, speech difficulty, weakness, light-headedness, numbness and headaches  Hematological: Negative for adenopathy  All other systems reviewed and are negative  Physical Exam  ED Triage Vitals [08/20/18 0252]   Temperature Pulse Respirations Blood Pressure SpO2   97 9 °F (36 6 °C) 76 18 152/93 96 %      Temp Source Heart Rate Source Patient Position - Orthostatic VS BP Location FiO2 (%)   Oral Monitor Lying Right arm --      Pain Score       Worst Possible Pain           Orthostatic Vital Signs  Vitals:    08/20/18 0252 08/20/18 0429 08/20/18 0559   BP: 152/93 149/85 125/62   Pulse: 76 74 67   Patient Position - Orthostatic VS: Lying Lying Lying       Physical Exam   Constitutional: He is oriented to person, place, and time  He appears well-developed and well-nourished  No distress  HENT:   Head: Normocephalic  Eyes: EOM are normal  Pupils are equal, round, and reactive to light  Neck: Normal range of motion  Neck supple  Cardiovascular: Normal rate, regular rhythm, normal heart sounds and intact distal pulses  Exam reveals no gallop and no friction rub  No murmur heard  Pulmonary/Chest: Effort normal and breath sounds normal  No respiratory distress  He has no wheezes  He has no rales  He exhibits no tenderness  Abdominal: Soft  Bowel sounds are normal  He exhibits no distension and no mass  There is tenderness  There is rebound and guarding  No hernia  Abdomen is soft tender throughout  Patient does exhibit rebound tenderness and guarding  Surgical site is erythematous with no drainage  Musculoskeletal: Normal range of motion  Neurological: He is alert and oriented to person, place, and time  No cranial nerve deficit or sensory deficit  He exhibits normal muscle tone  Skin: Capillary refill takes less than 2 seconds     Psychiatric: He has a normal mood and affect  His behavior is normal  Judgment and thought content normal        ED Medications  Medications   ondansetron (ZOFRAN) injection 4 mg (4 mg Intravenous Given 8/20/18 0313)   morphine (PF) 4 mg/mL injection 4 mg (4 mg Intravenous Given 8/20/18 0314)   morphine (PF) 4 mg/mL injection 4 mg (4 mg Intravenous Given 8/20/18 0351)   iohexol (OMNIPAQUE) 350 MG/ML injection (MULTI-DOSE) 100 mL (100 mL Intravenous Given 8/20/18 0407)   HYDROmorphone (DILAUDID) injection 0 5 mg (0 5 mg Intravenous Given 8/20/18 0435)       Diagnostic Studies  Results Reviewed     Procedure Component Value Units Date/Time    Comprehensive metabolic panel [[id number]]  (Abnormal) Collected:  08/20/18 0310    Lab Status:  Final result Specimen:  Blood from Arm, Right Updated:  08/20/18 0335     Sodium 139 mmol/L      Potassium 3 5 mmol/L      Chloride 103 mmol/L      CO2 30 mmol/L      Anion Gap 6 mmol/L      BUN 8 mg/dL      Creatinine 0 65 mg/dL      Glucose 111 mg/dL      Calcium 8 2 (L) mg/dL      AST 13 U/L      ALT 20 U/L      Alkaline Phosphatase 82 U/L      Total Protein 7 1 g/dL      Albumin 3 3 (L) g/dL      Total Bilirubin 0 25 mg/dL      eGFR 108 ml/min/1 73sq m     Narrative:         National Kidney Disease Education Program recommendations are as follows:  GFR calculation is accurate only with a steady state creatinine  Chronic Kidney disease less than 60 ml/min/1 73 sq  meters  Kidney failure less than 15 ml/min/1 73 sq  meters      Lipase [[id number]]  (Normal) Collected:  08/20/18 0310    Lab Status:  Final result Specimen:  Blood from Arm, Right Updated:  08/20/18 0335     Lipase 88 u/L     CBC and differential [[id number]] Collected:  08/20/18 0310    Lab Status:  Final result Specimen:  Blood from Arm, Right Updated:  08/20/18 0329     WBC 9 98 Thousand/uL      RBC 4 53 Million/uL      Hemoglobin 13 6 g/dL      Hematocrit 41 7 %      MCV 92 fL      MCH 30 0 pg      MCHC 32 6 g/dL      RDW 13 4 %      MPV 10 2 fL Platelets 222 Thousands/uL      nRBC 0 /100 WBCs      Neutrophils Relative 72 %      Immat GRANS % 0 %      Lymphocytes Relative 20 %      Monocytes Relative 6 %      Eosinophils Relative 2 %      Basophils Relative 0 %      Neutrophils Absolute 7 18 Thousands/µL      Immature Grans Absolute 0 03 Thousand/uL      Lymphocytes Absolute 1 98 Thousands/µL      Monocytes Absolute 0 61 Thousand/µL      Eosinophils Absolute 0 15 Thousand/µL      Basophils Absolute 0 03 Thousands/µL                  XR chest portable   ED Interpretation by Leann Goldman MD (08/20 0401)   ED wet read:  No free air noted under the diaphragm      Final Result by Iris Mcfadden DO (08/20 1042)   Diminished inspiration  Bibasilar atelectasis  Workstation performed: [id number]         CT abdomen pelvis with contrast   Final Result by Angelica Torres DO (08/20 8231)   Addendum 1 of 1 by Angelica Torres DO (08/20 7810)   ADDENDUM:      Diffuse thickening of scattered loops of small bowel with surrounding    inflammatory changes and possible transition point in the right lower    quadrant which may represent partial small bowel obstruction versus early    small bowel obstruction  4 mm nodule within the right upper lobe for which follow-up CT scan of the    chest in 6 months is recommended  I personally discussed this study with Eve Alaniss on 8/20/2018 at    4:46 AM                Final      1  Diffuse thickening of scattered loops of small bowel with surrounding inflammatory changes and a possible transition point in the right lower quadrant which may represent ileus versus enteritis  Clinical correlation is recommended  2   Inflammatory changes in the right anterior abdominal wall in the region of the prior diverting ileostomy  Findings may represent postsurgical changes however infection cannot be rule out              Workstation performed: [id number]               Procedures  Procedures      Phone Consults  ED Phone Contact    ED Course  ED Course as of Aug 22 0907   Mon Aug 20, 2018   8103 Lab work including CBC, CMP, lipase negative                                MDM  Number of Diagnoses or Management Options  Abdominal pain, generalized:   Diagnosis management comments:  Patient is a 63 male with recent ostomy reversal presents with abdominal pain  CT scan all healed up a potential partial or full obstruction at the surgical site  We consulted with surgery who felt comfortable sending the patient home analgesia  Cautioned patient returning  Vitals worsening abdominal, fevers, chills, inability eat or drink  Patient's pain was well controlled throughout his time here in the ED  Patient was stable throughout his stay here  Amount and/or Complexity of Data Reviewed  Clinical lab tests: ordered and reviewed  Tests in the radiology section of CPT®: ordered and reviewed  Tests in the medicine section of CPT®: ordered and reviewed    Risk of Complications, Morbidity, and/or Mortality  Presenting problems: moderate  Diagnostic procedures: low  Management options: low    Patient Progress  Patient progress: stable    CritCare Time    Disposition  Final diagnoses:   Abdominal pain, generalized     Time reflects when diagnosis was documented in both MDM as applicable and the Disposition within this note     Time User Action Codes Description Comment    8/20/2018  4:40 AM Abdirahman Gurrola Add [R10 9] Abdominal pain     8/20/2018  5:47 AM Simone Baptiste Add [R10 84] Abdominal pain, generalized       ED Disposition     ED Disposition Condition Comment    Discharge  [address] discharge to home/self care      Condition at discharge: Stable        Follow-up Information     Follow up With Specialties Details Why Contact Tera Medeiros MD General Surgery Follow up in 1 week(s)  [address] [place] [address]  [phone]            Discharge Medication List as of 8/20/2018  5:51 AM CONTINUE these medications which have NOT CHANGED    Details   doxazosin (CARDURA) 4 mg tablet 4 mg daily at bedtime  , Historical Med      oxyCODONE (ROXICODONE) 5 mg immediate release tablet Take 1 tablet (5 mg total) by mouth every 4 (four) hours as needed for moderate pain for up to 10 days Earliest Fill Date: 8/18/18 Max Daily Amount: 30 mg, Starting Sat 8/18/2018, Until Tue 8/28/2018, No Print      acetaminophen (TYLENOL) 500 mg tablet Take 1 tablet (500 mg total) by mouth every 6 (six) hours as needed for mild pain, Starting Sun 7/8/2018, Print      ergocalciferol (ERGOCALCIFEROL) 89944 units capsule 50,000 Units once a week  , Starting Tue 5/8/2018, Historical Med      naproxen (NAPROSYN) 500 mg tablet Take 1 tablet (500 mg total) by mouth 2 (two) times a day with meals, Starting Sun 7/8/2018, Print      tamsulosin (FLOMAX) 0 4 mg Take 1 capsule (0 4 mg total) by mouth daily with dinner for 30 days, Starting Wed 7/18/2018, Until Fri 8/17/2018, Normal           No discharge procedures on file  ED Provider  Attending physically available and evaluated Cee Morales I managed the patient along with the ED Attending      Electronically Signed by         Millicent Ortiz MD  08/22/18 6379

## 2018-08-20 NOTE — CONSULTS
Consultation - General Surgery   Christy Bunn 63 y o  male MRN: [id number]  Unit/Bed#: ED 05 Encounter: [id number]    Assessment/Plan     Assessment:  64M w abdominal pain and dilated SB loops s/p ileostomy reversal 8/16    Plan:  - likely normal post operative course in setting of poor pain control  - patient advised to  pain medicine as well as take a stool softener while on narcotics  - f/u w Dr Stella Leary this week      History of Present Illness     HPI:  Sebas Maxwell is a 63 y o  male who presents with abdominal pain  Patient was discharged from this hospital 8/18, after having his loop ileostomy reversed 8/16  Patient states his abdominal pain began yesterday evening, and is located periumbilically  He has been having bowel function since the surgery, including flatus today and a normal BM yesterday  He denies nausea/vomiting, fever/chills, constipation, diarrhea, lightheadedness, dizziness, SOB, CP  He has been tolerating a normal diet since discharge  He states he never picked up his pain medicine because he did not want to get constipated  He has not taken anything for pain since discharge  Consults    Review of Systems   Constitutional: Negative for chills and fever  HENT: Negative  Eyes: Negative  Respiratory: Negative  Negative for cough and shortness of breath  Cardiovascular: Negative  Negative for chest pain and palpitations  Gastrointestinal: Positive for abdominal pain  Negative for blood in stool, constipation, diarrhea, nausea and vomiting  Endocrine: Negative  Genitourinary: Negative  Negative for difficulty urinating and dysuria  Musculoskeletal: Negative  Skin: Negative  Allergic/Immunologic: Negative  Neurological: Negative  Negative for dizziness and light-headedness  Hematological: Negative  Psychiatric/Behavioral: Negative          Historical Information   Past Medical History:   Diagnosis Date    Abscess, intestine     Arthritis  Diverticulitis     GERD (gastroesophageal reflux disease)     Hydronephrosis 5/27/2018     Past Surgical History:   Procedure Laterality Date    ABCESS DRAINAGE      COLON SURGERY      COLONOSCOPY N/A 5/5/2016    Procedure: COLONOSCOPY;  Surgeon: Jia Jose MD;  Location: [facility]; Service:     COLONOSCOPY N/A 7/26/2018    Procedure: COLONOSCOPY with bx's;  Surgeon: Autry Jeans, MD;  Location: [facility]; Service: Gastroenterology    ESOPHAGOGASTRODUODENOSCOPY      with biopsy    FOOT SURGERY Left     x2? fusion? due to arthritis  onset: 0      ILEO LOOP DIVERSION N/A 6/1/2018    Procedure: ILEOSTOMY LOOP DIVERTING;  Surgeon: Leyla Perdue DO;  [facility];  Service: General    LAPAROTOMY N/A 6/1/2018    Procedure: LAPAROTOMY EXPLORATORY,;  Surgeon: Leyla Perdue DO;  [facility];  Service: General    AZ CLOSE ENTEROSTOMY N/A 8/16/2018    Procedure: CLOSURE LOOP ILEOSTOMY;  Surgeon: Tea Cruz MD;  [facility];  Service: General    AZ CYSTOURETHROSCOPY,URETER CATHETER Left 6/9/2018    Procedure: CYSTOSCOPY RETROGRADE PYELOGRAM WITH INSERTION STENT URETERAL;  Surgeon: Jase Benedict MD;  [facility];  Service: Urology    AZ PART REMOVAL COLON W ANASTOMOSIS N/A 6/1/2018    Procedure: RESECTION COLON SIGMOID;  Surgeon: Leyla Perdue DO;  [facility];  Service: General     Social History   History   Alcohol Use No     History   Drug Use No     History   Smoking Status    Former Smoker   Smokeless Tobacco    Never Used     Family History: non-contributory    Meds/Allergies   all current active meds have been reviewed  Allergies   Allergen Reactions    Penicillins Rash     itching       Objective   First Vitals:   Blood Pressure: 152/93 (08/20/18 0252)  Pulse: 76 (08/20/18 0252)  Temperature: 97 9 °F (36 6 °C) (08/20/18 0252)  Temp Source: Oral (08/20/18 0252)  Respirations: 18 (08/20/18 0252)  SpO2: 96 % (08/20/18 0252)    Current Vitals:   Blood Pressure: 125/62 (08/20/18 0559)  Pulse: 67 (08/20/18 0559)  Temperature: 97 9 °F (36 6 °C) (08/20/18 0252)  Temp Source: Oral (08/20/18 0252)  Respirations: 18 (08/20/18 0559)  SpO2: 98 % (08/20/18 0559)    No intake or output data in the 24 hours ending 08/20/18 1825    Invasive Devices          No matching active lines, drains, or airways          Physical Exam   Constitutional: He is oriented to person, place, and time  He appears well-developed and well-nourished  No distress  HENT:   Head: Normocephalic and atraumatic  Eyes: EOM are normal  Pupils are equal, round, and reactive to light  No scleral icterus  Neck: No JVD present  Cardiovascular: Normal rate, regular rhythm and normal heart sounds  Pulmonary/Chest: Effort normal and breath sounds normal  No stridor  No respiratory distress  Abdominal: Soft  He exhibits no distension  There is tenderness  There is no rebound and no guarding  Non focal tenderness  No signs of peritonitis  Ostomy closure cdi   Musculoskeletal: He exhibits no edema  Neurological: He is alert and oriented to person, place, and time  No cranial nerve deficit  Skin: Skin is warm and dry  He is not diaphoretic  Psychiatric: He has a normal mood and affect  Lab Results:   I have personally reviewed pertinent lab results    , CBC:   Lab Results   Component Value Date    WBC 9 98 08/20/2018    HGB 13 6 08/20/2018    HCT 41 7 08/20/2018    MCV 92 08/20/2018     08/20/2018    MCH 30 0 08/20/2018    MCHC 32 6 08/20/2018    RDW 13 4 08/20/2018    MPV 10 2 08/20/2018    NRBC 0 08/20/2018   , CMP:   Lab Results   Component Value Date     08/20/2018    K 3 5 08/20/2018     08/20/2018    CO2 30 08/20/2018    ANIONGAP 6 08/20/2018    BUN 8 08/20/2018    CREATININE 0 65 08/20/2018    GLUCOSE 111 08/20/2018    CALCIUM 8 2 (L) 08/20/2018    AST 13 08/20/2018    ALT 20 08/20/2018    ALKPHOS 82 08/20/2018    PROT 7 1 08/20/2018    BILITOT 0 25 08/20/2018    EGFR 108 08/20/2018     Imaging: I have personally reviewed pertinent reports  and I have personally reviewed pertinent films in PACS   Xr Chest Portable    Result Date: 8/20/2018  Impression: Diminished inspiration  Bibasilar atelectasis  Workstation performed: [id number]     Ct Abdomen Pelvis With Contrast    Addendum Date: 8/20/2018    ADDENDUM: Diffuse thickening of scattered loops of small bowel with surrounding inflammatory changes and possible transition point in the right lower quadrant which may represent partial small bowel obstruction versus early small bowel obstruction  4 mm nodule within the right upper lobe for which follow-up CT scan of the chest in 6 months is recommended  I personally discussed this study with Ellie Mullins on 8/20/2018 at 4:46 AM      Result Date: 8/20/2018  Impression: 1  Diffuse thickening of scattered loops of small bowel with surrounding inflammatory changes and a possible transition point in the right lower quadrant which may represent ileus versus enteritis  Clinical correlation is recommended  2   Inflammatory changes in the right anterior abdominal wall in the region of the prior diverting ileostomy  Findings may represent postsurgical changes however infection cannot be rule out  Workstation performed: [id number]     EKG, Pathology, and Other Studies: I have personally reviewed pertinent reports  Counseling / Coordination of Care  Total floor / unit time spent today 30 minutes  Greater than 50% of total time was spent with the patient and / or family counseling and / or coordination of care

## 2018-08-20 NOTE — ED NOTES
Pt  Made aware his IV location swelled after CT due to contrast  Made nurse Emma Nina) aware and placed a hot compress on it since pt  Preferred hot over cold  Nurse okay with hot compress        Prashanth Murguia  08/20/18 0424

## 2018-08-20 NOTE — ED ATTENDING ATTESTATION
Santosh Albert MD, saw and evaluated the patient  I have discussed the patient with the resident/non-physician practitioner and agree with the resident's/non-physician practitioner's findings, Plan of Care, and MDM as documented in the resident's/non-physician practitioner's note, except where noted  All available labs and Radiology studies were reviewed  At this point I agree with the current assessment done in the Emergency Department  I have conducted an independent evaluation of this patient a history and physical is as follows: This 13-year-old male presents complaining of worsening periumbilical abdominal pain  Patient is status post colostomy reversal approximately four days ago  Patient was recovering uneventfully at home until today when the pain began  Patient had a loose bowel movement earlier today, consistent with prior bowel movements  Patient denies any fevers or vomiting  On exam patient has diffuse tenderness with rebound and guarding of his abdomen worse in periumbilical and epigastric areas  Patient has regular heart sounds and clear lung sounds  We will check blood work and CT scan, consult surgery  Surgery came to patient's bedside and evaluated him as well as his imaging  They have felt patient is safe to be discharged home and follow up closely with them    Critical Care Time  CritCare Time    Procedures

## 2018-08-21 ENCOUNTER — HOSPITAL ENCOUNTER (INPATIENT)
Facility: HOSPITAL | Age: 58
LOS: 16 days | Discharge: HOME | DRG: 390 | End: 2018-09-07
Attending: EMERGENCY MEDICINE | Admitting: SURGERY
Payer: COMMERCIAL

## 2018-08-21 ENCOUNTER — APPOINTMENT (OUTPATIENT)
Dept: RADIOLOGY | Facility: HOSPITAL | Age: 58
DRG: 390 | End: 2018-08-21
Payer: COMMERCIAL

## 2018-08-21 DIAGNOSIS — K56.609 SBO (SMALL BOWEL OBSTRUCTION) (HCC): ICD-10-CM

## 2018-08-21 DIAGNOSIS — R14.0 ABDOMINAL DISTENSION: ICD-10-CM

## 2018-08-21 DIAGNOSIS — K56.609 SMALL BOWEL OBSTRUCTION (HCC): ICD-10-CM

## 2018-08-21 DIAGNOSIS — R10.9 ABDOMINAL PAIN: Primary | ICD-10-CM

## 2018-08-21 DIAGNOSIS — K57.20 DIVERTICULITIS OF LARGE INTESTINE WITH ABSCESS WITHOUT BLEEDING: ICD-10-CM

## 2018-08-21 DIAGNOSIS — K57.92 ACUTE DIVERTICULITIS OF INTESTINE: ICD-10-CM

## 2018-08-21 LAB
ABO GROUP BLD: NORMAL
ALBUMIN SERPL BCP-MCNC: 2.9 G/DL (ref 3.5–5)
ALBUMIN SERPL BCP-MCNC: 3.1 G/DL (ref 3.5–5)
ALP SERPL-CCNC: 85 U/L (ref 46–116)
ALP SERPL-CCNC: 87 U/L (ref 46–116)
ALT SERPL W P-5'-P-CCNC: 21 U/L (ref 12–78)
ALT SERPL W P-5'-P-CCNC: 22 U/L (ref 12–78)
ANION GAP SERPL CALCULATED.3IONS-SCNC: 4 MMOL/L (ref 4–13)
ANION GAP SERPL CALCULATED.3IONS-SCNC: 6 MMOL/L (ref 4–13)
APTT PPP: 29 SECONDS (ref 24–36)
AST SERPL W P-5'-P-CCNC: 20 U/L (ref 5–45)
AST SERPL W P-5'-P-CCNC: 21 U/L (ref 5–45)
ATRIAL RATE: 75 BPM
BASOPHILS # BLD AUTO: 0.02 THOUSANDS/ΜL (ref 0–0.1)
BASOPHILS NFR BLD AUTO: 0 % (ref 0–1)
BILIRUB SERPL-MCNC: 0.37 MG/DL (ref 0.2–1)
BILIRUB SERPL-MCNC: 0.54 MG/DL (ref 0.2–1)
BLD GP AB SCN SERPL QL: NEGATIVE
BUN SERPL-MCNC: 7 MG/DL (ref 5–25)
BUN SERPL-MCNC: 8 MG/DL (ref 5–25)
CALCIUM SERPL-MCNC: 8.2 MG/DL (ref 8.3–10.1)
CALCIUM SERPL-MCNC: 8.3 MG/DL (ref 8.3–10.1)
CHLORIDE SERPL-SCNC: 102 MMOL/L (ref 100–108)
CHLORIDE SERPL-SCNC: 105 MMOL/L (ref 100–108)
CO2 SERPL-SCNC: 29 MMOL/L (ref 21–32)
CO2 SERPL-SCNC: 32 MMOL/L (ref 21–32)
CREAT SERPL-MCNC: 0.65 MG/DL (ref 0.6–1.3)
CREAT SERPL-MCNC: 0.66 MG/DL (ref 0.6–1.3)
EOSINOPHIL # BLD AUTO: 0.04 THOUSAND/ΜL (ref 0–0.61)
EOSINOPHIL NFR BLD AUTO: 1 % (ref 0–6)
ERYTHROCYTE [DISTWIDTH] IN BLOOD BY AUTOMATED COUNT: 13.4 % (ref 11.6–15.1)
GFR SERPL CREATININE-BSD FRML MDRD: 107 ML/MIN/1.73SQ M
GFR SERPL CREATININE-BSD FRML MDRD: 108 ML/MIN/1.73SQ M
GLUCOSE P FAST SERPL-MCNC: 98 MG/DL (ref 65–99)
GLUCOSE SERPL-MCNC: 101 MG/DL (ref 65–140)
GLUCOSE SERPL-MCNC: 89 MG/DL (ref 65–140)
GLUCOSE SERPL-MCNC: 98 MG/DL (ref 65–140)
GLUCOSE SERPL-MCNC: 98 MG/DL (ref 65–140)
HCT VFR BLD AUTO: 41.2 % (ref 36.5–49.3)
HGB BLD-MCNC: 13.3 G/DL (ref 12–17)
IMM GRANULOCYTES # BLD AUTO: 0.03 THOUSAND/UL (ref 0–0.2)
IMM GRANULOCYTES NFR BLD AUTO: 0 % (ref 0–2)
INR PPP: 0.97 (ref 0.86–1.17)
LACTATE SERPL-SCNC: 0.9 MMOL/L (ref 0.5–2)
LIPASE SERPL-CCNC: 86 U/L (ref 73–393)
LYMPHOCYTES # BLD AUTO: 1.09 THOUSANDS/ΜL (ref 0.6–4.47)
LYMPHOCYTES NFR BLD AUTO: 14 % (ref 14–44)
MAGNESIUM SERPL-MCNC: 2 MG/DL (ref 1.6–2.6)
MCH RBC QN AUTO: 30 PG (ref 26.8–34.3)
MCHC RBC AUTO-ENTMCNC: 32.3 G/DL (ref 31.4–37.4)
MCV RBC AUTO: 93 FL (ref 82–98)
MONOCYTES # BLD AUTO: 0.69 THOUSAND/ΜL (ref 0.17–1.22)
MONOCYTES NFR BLD AUTO: 9 % (ref 4–12)
NEUTROPHILS # BLD AUTO: 6.01 THOUSANDS/ΜL (ref 1.85–7.62)
NEUTS SEG NFR BLD AUTO: 76 % (ref 43–75)
NRBC BLD AUTO-RTO: 0 /100 WBCS
P AXIS: 69 DEGREES
PHOSPHATE SERPL-MCNC: 2.8 MG/DL (ref 2.7–4.5)
PLATELET # BLD AUTO: 313 THOUSANDS/UL (ref 149–390)
PLATELET # BLD AUTO: 320 THOUSANDS/UL (ref 149–390)
PMV BLD AUTO: 10.4 FL (ref 8.9–12.7)
PMV BLD AUTO: 9.8 FL (ref 8.9–12.7)
POTASSIUM SERPL-SCNC: 3.5 MMOL/L (ref 3.5–5.3)
POTASSIUM SERPL-SCNC: 4.3 MMOL/L (ref 3.5–5.3)
PR INTERVAL: 154 MS
PROT SERPL-MCNC: 6.5 G/DL (ref 6.4–8.2)
PROT SERPL-MCNC: 6.6 G/DL (ref 6.4–8.2)
PROTHROMBIN TIME: 13 SECONDS (ref 11.8–14.2)
QRS AXIS: 63 DEGREES
QRSD INTERVAL: 82 MS
QT INTERVAL: 362 MS
QTC INTERVAL: 404 MS
RBC # BLD AUTO: 4.43 MILLION/UL (ref 3.88–5.62)
RH BLD: POSITIVE
SODIUM SERPL-SCNC: 138 MMOL/L (ref 136–145)
SODIUM SERPL-SCNC: 140 MMOL/L (ref 136–145)
SPECIMEN EXPIRATION DATE: NORMAL
T WAVE AXIS: 55 DEGREES
VENTRICULAR RATE: 75 BPM
WBC # BLD AUTO: 7.88 THOUSAND/UL (ref 4.31–10.16)

## 2018-08-21 PROCEDURE — 99024 POSTOP FOLLOW-UP VISIT: CPT | Performed by: SURGERY

## 2018-08-21 PROCEDURE — 85025 COMPLETE CBC W/AUTO DIFF WBC: CPT | Performed by: EMERGENCY MEDICINE

## 2018-08-21 PROCEDURE — 93005 ELECTROCARDIOGRAM TRACING: CPT

## 2018-08-21 PROCEDURE — 82948 REAGENT STRIP/BLOOD GLUCOSE: CPT

## 2018-08-21 PROCEDURE — 83605 ASSAY OF LACTIC ACID: CPT | Performed by: EMERGENCY MEDICINE

## 2018-08-21 PROCEDURE — 93010 ELECTROCARDIOGRAM REPORT: CPT | Performed by: INTERNAL MEDICINE

## 2018-08-21 PROCEDURE — 74018 RADEX ABDOMEN 1 VIEW: CPT

## 2018-08-21 PROCEDURE — 83690 ASSAY OF LIPASE: CPT | Performed by: EMERGENCY MEDICINE

## 2018-08-21 PROCEDURE — 85610 PROTHROMBIN TIME: CPT | Performed by: EMERGENCY MEDICINE

## 2018-08-21 PROCEDURE — 80053 COMPREHEN METABOLIC PANEL: CPT | Performed by: EMERGENCY MEDICINE

## 2018-08-21 PROCEDURE — 83735 ASSAY OF MAGNESIUM: CPT | Performed by: SURGERY

## 2018-08-21 PROCEDURE — 86850 RBC ANTIBODY SCREEN: CPT | Performed by: EMERGENCY MEDICINE

## 2018-08-21 PROCEDURE — 85730 THROMBOPLASTIN TIME PARTIAL: CPT | Performed by: EMERGENCY MEDICINE

## 2018-08-21 PROCEDURE — 84100 ASSAY OF PHOSPHORUS: CPT | Performed by: SURGERY

## 2018-08-21 PROCEDURE — 96374 THER/PROPH/DIAG INJ IV PUSH: CPT

## 2018-08-21 PROCEDURE — 85049 AUTOMATED PLATELET COUNT: CPT | Performed by: STUDENT IN AN ORGANIZED HEALTH CARE EDUCATION/TRAINING PROGRAM

## 2018-08-21 PROCEDURE — 99285 EMERGENCY DEPT VISIT HI MDM: CPT

## 2018-08-21 PROCEDURE — 86901 BLOOD TYPING SEROLOGIC RH(D): CPT | Performed by: EMERGENCY MEDICINE

## 2018-08-21 PROCEDURE — 80053 COMPREHEN METABOLIC PANEL: CPT | Performed by: SURGERY

## 2018-08-21 PROCEDURE — 36415 COLL VENOUS BLD VENIPUNCTURE: CPT | Performed by: EMERGENCY MEDICINE

## 2018-08-21 PROCEDURE — 86900 BLOOD TYPING SEROLOGIC ABO: CPT | Performed by: EMERGENCY MEDICINE

## 2018-08-21 RX ORDER — HEPARIN SODIUM 5000 [USP'U]/ML
5000 INJECTION, SOLUTION INTRAVENOUS; SUBCUTANEOUS EVERY 8 HOURS SCHEDULED
Status: DISCONTINUED | OUTPATIENT
Start: 2018-08-21 | End: 2018-09-04

## 2018-08-21 RX ORDER — KETOROLAC TROMETHAMINE 30 MG/ML
30 INJECTION, SOLUTION INTRAMUSCULAR; INTRAVENOUS EVERY 6 HOURS SCHEDULED
Status: COMPLETED | OUTPATIENT
Start: 2018-08-21 | End: 2018-08-23

## 2018-08-21 RX ORDER — ONDANSETRON 2 MG/ML
4 INJECTION INTRAMUSCULAR; INTRAVENOUS EVERY 6 HOURS PRN
Status: DISCONTINUED | OUTPATIENT
Start: 2018-08-21 | End: 2018-09-07 | Stop reason: HOSPADM

## 2018-08-21 RX ORDER — SODIUM CHLORIDE 9 MG/ML
125 INJECTION, SOLUTION INTRAVENOUS CONTINUOUS
Status: DISCONTINUED | OUTPATIENT
Start: 2018-08-21 | End: 2018-08-23

## 2018-08-21 RX ORDER — OXYCODONE HYDROCHLORIDE 5 MG/1
5 TABLET ORAL EVERY 4 HOURS PRN
Status: DISCONTINUED | OUTPATIENT
Start: 2018-08-21 | End: 2018-08-22

## 2018-08-21 RX ORDER — MORPHINE SULFATE 4 MG/ML
4 INJECTION, SOLUTION INTRAMUSCULAR; INTRAVENOUS ONCE
Status: COMPLETED | OUTPATIENT
Start: 2018-08-21 | End: 2018-08-21

## 2018-08-21 RX ORDER — DOXAZOSIN MESYLATE 4 MG/1
4 TABLET ORAL DAILY
Status: DISCONTINUED | OUTPATIENT
Start: 2018-08-21 | End: 2018-09-07 | Stop reason: HOSPADM

## 2018-08-21 RX ADMIN — HEPARIN SODIUM 5000 UNITS: 5000 INJECTION, SOLUTION INTRAVENOUS; SUBCUTANEOUS at 21:45

## 2018-08-21 RX ADMIN — HEPARIN SODIUM 5000 UNITS: 5000 INJECTION, SOLUTION INTRAVENOUS; SUBCUTANEOUS at 15:47

## 2018-08-21 RX ADMIN — MORPHINE SULFATE 4 MG: 4 INJECTION INTRAVENOUS at 09:51

## 2018-08-21 RX ADMIN — SODIUM CHLORIDE 125 ML/HR: 0.9 INJECTION, SOLUTION INTRAVENOUS at 21:46

## 2018-08-21 RX ADMIN — SODIUM CHLORIDE 125 ML/HR: 0.9 INJECTION, SOLUTION INTRAVENOUS at 11:09

## 2018-08-21 RX ADMIN — OXYCODONE HYDROCHLORIDE 5 MG: 5 TABLET ORAL at 15:47

## 2018-08-21 RX ADMIN — KETOROLAC TROMETHAMINE 30 MG: 30 INJECTION, SOLUTION INTRAMUSCULAR at 23:45

## 2018-08-21 RX ADMIN — KETOROLAC TROMETHAMINE 30 MG: 30 INJECTION, SOLUTION INTRAMUSCULAR at 17:18

## 2018-08-21 RX ADMIN — POTASSIUM CHLORIDE 20 MEQ: 2 INJECTION, SOLUTION, CONCENTRATE INTRAVENOUS at 11:10

## 2018-08-21 RX ADMIN — POTASSIUM CHLORIDE 20 MEQ: 2 INJECTION, SOLUTION, CONCENTRATE INTRAVENOUS at 13:14

## 2018-08-21 RX ADMIN — POTASSIUM CHLORIDE 20 MEQ: 2 INJECTION, SOLUTION, CONCENTRATE INTRAVENOUS at 15:20

## 2018-08-21 RX ADMIN — KETOROLAC TROMETHAMINE 30 MG: 30 INJECTION, SOLUTION INTRAMUSCULAR at 12:50

## 2018-08-21 RX ADMIN — DOXAZOSIN 4 MG: 4 TABLET ORAL at 17:18

## 2018-08-21 NOTE — ED NOTES
Assigned room currently being cleaned - no longer occupied by another pt     Everett Nieves RN  08/21/18 [id number]

## 2018-08-21 NOTE — H&P
Acute Care Surgery  History and Physical  Belva Siemens Rites 63 y o  male MRN: [id number]  Unit/Bed#: ED 24 Encounter: [id number]    Assessment and Plan:  63 M POD # 5 s/p loop ileostomy closure presenting today with complaints of emesis, lack of bowel movements and increasing abdominal pain  Post-operative ileus vs small bowel obstruction  Plan:   - Admit to General Surgery for Observation  - Electrolyte repletion - K+  - PRN PO pain medications until not tolerated   - 5mg Barbara PRN + Scheduled Toradol 30mg q6hrs for 2 days  - KUB  - Patient refusing NGT at this time   - Consider if fails conservative management  - Bowel Regimen   - Senna + Colace pending KUB results  - Diet NPO  - Maintenance IVF - NS @ 125mL/hr      History of Present Illness   HPI:  Pete Siemens is a 63 y o  male who presents with 2 days complaints of increasing abdominal pain since discharge s/p loop ileostomy reversal  Patient was discharged on 8/19/18 on POD #2 after tolerating diet, having bowel movement and passing flatus  Patient states that once he was home he stopped having bowel movements, was still passing flatus however his abdominal pain began to increase  He presented [facility] ED on 8/20 AM for this pain and at this time stated he had not been utilizing his pain medications nor stool softeners  He had a CT scan with IV contrast which showed thickened small bowel loops with surrounding inflammatory changes and a possible right lower quadrant transition point  Patient was sent home from ED and re-presented today with complaints of increasing pain and now an episode of emesis without hematemesis  Patient denies fevers or chills, hematochezia, but complains of constipation and right inguinal pain  Review of Systems   Constitutional: Positive for activity change and appetite change  Negative for chills and fever  HENT: Negative  Eyes: Negative  Respiratory: Negative for chest tightness and shortness of breath  Cardiovascular: Negative for chest pain  Gastrointestinal: Positive for abdominal distention, abdominal pain, constipation, nausea and vomiting  Endocrine: Negative  Historical Information   Past Medical History:   Diagnosis Date    Abscess, intestine     Arthritis     Diverticulitis     GERD (gastroesophageal reflux disease)     Hydronephrosis 5/27/2018     Past Surgical History:   Procedure Laterality Date    ABCESS DRAINAGE      COLON SURGERY      COLONOSCOPY N/A 5/5/2016    Procedure: COLONOSCOPY;  Surgeon: Andrew Vale MD;  [facility]; Service:     COLONOSCOPY N/A 7/26/2018    Procedure: COLONOSCOPY with bx's;  Surgeon: Theodora Morales MD;  Location: [facility]; Service: Gastroenterology    ESOPHAGOGASTRODUODENOSCOPY      with biopsy    FOOT SURGERY Left     x2? fusion? due to arthritis  onset: 0      ILEO LOOP DIVERSION N/A 6/1/2018    Procedure: ILEOSTOMY LOOP DIVERTING;  Surgeon: Edwin Coates DO;  [facility];  Service: General    LAPAROTOMY N/A 6/1/2018    Procedure: LAPAROTOMY EXPLORATORY,;  Surgeon: Edwin Coates DO;  [facility];  Service: General    RI CLOSE ENTEROSTOMY N/A 8/16/2018    Procedure: CLOSURE LOOP ILEOSTOMY;  Surgeon: Toshia Medeiros MD;  [facility];  Service: General    RI CYSTOURETHROSCOPY,URETER CATHETER Left 6/9/2018    Procedure: CYSTOSCOPY RETROGRADE PYELOGRAM WITH INSERTION STENT URETERAL;  Surgeon: Vicente Falcon MD;  [facility];  Service: Urology    RI PART REMOVAL COLON W ANASTOMOSIS N/A 6/1/2018    Procedure: RESECTION COLON SIGMOID;  Surgeon: Edwin Coates DO;  [facility];  Service: General     Social History   History   Alcohol Use No     History   Drug Use No     History   Smoking Status    Former Smoker   Smokeless Tobacco    Never Used     Family History: non-contributory    Meds/Allergies   all medications and allergies reviewed  Allergies   Allergen Reactions    Penicillins Rash     itching       Objective   First Vitals:   Blood Pressure: 144/85 (08/21/18 0824)  Pulse: (!) 106 (08/21/18 0824)  Temperature: 97 5 °F (36 4 °C) (08/21/18 0824)  Temp Source: Tympanic (08/21/18 0824)  Respirations: 18 (08/21/18 0824)  Height: 5' 8" (172 7 cm) (08/21/18 0824)  Weight - Scale: 75 8 kg (167 lb 1 7 oz) (08/21/18 0824)  SpO2: 98 % (08/21/18 0824)    Current Vitals:   Blood Pressure: 132/83 (08/21/18 0921)  Pulse: 78 (08/21/18 0921)  Temperature: 97 5 °F (36 4 °C) (08/21/18 0824)  Temp Source: Tympanic (08/21/18 0824)  Respirations: 16 (08/21/18 0921)  Height: 5' 8" (172 7 cm) (08/21/18 0824)  Weight - Scale: 75 8 kg (167 lb 1 7 oz) (08/21/18 0824)  SpO2: 97 % (08/21/18 0921)    No intake or output data in the 24 hours ending 08/21/18 1013    Invasive Devices     Peripheral Intravenous Line            Peripheral IV 08/21/18 Right Antecubital less than 1 day                Physical Exam   Constitutional: He is oriented to person, place, and time  He appears well-developed and well-nourished  HENT:   Head: Normocephalic and atraumatic  Neck: Normal range of motion  Cardiovascular: Normal rate, regular rhythm and normal heart sounds  Pulmonary/Chest: Effort normal    Abdominal: Soft  Bowel sounds are normal  He exhibits distension  There is tenderness  Distention more today than on 8/17    Mildly tender diffusely across abdomen however belly is compressible and soft    Bowel sounds in all 4 quadrants present   Musculoskeletal: Normal range of motion  Neurological: He is alert and oriented to person, place, and time  Lab Results:   I have personally reviewed pertinent lab results    , CBC:   Lab Results   Component Value Date    WBC 7 88 08/21/2018    HGB 13 3 08/21/2018    HCT 41 2 08/21/2018    MCV 93 08/21/2018     08/21/2018    MCH 30 0 08/21/2018    MCHC 32 3 08/21/2018    RDW 13 4 08/21/2018    MPV 9 8 08/21/2018    NRBC 0 08/21/2018   , CMP:   Lab Results Component Value Date     08/21/2018    K 3 5 08/21/2018     08/21/2018    CO2 32 08/21/2018    ANIONGAP 4 08/21/2018    BUN 8 08/21/2018    CREATININE 0 65 08/21/2018    GLUCOSE 101 08/21/2018    CALCIUM 8 3 08/21/2018    AST 20 08/21/2018    ALT 22 08/21/2018    ALKPHOS 87 08/21/2018    PROT 6 6 08/21/2018    BILITOT 0 54 08/21/2018    EGFR 108 08/21/2018     Imaging: I have personally reviewed pertinent films in PACS   8/20 CT - IMPRESSION:  1  Diffuse thickening of scattered loops of small bowel with surrounding inflammatory changes and a possible transition point in the right lower quadrant which may represent ileus versus enteritis  Clinical correlation is recommended  2   Inflammatory changes in the right anterior abdominal wall in the region of the prior diverting ileostomy  Findings may represent postsurgical changes however infection cannot be rule out  EKG, Pathology, and Other Studies: I have personally reviewed pertinent reports  Code Status: Prior  Advance Directive and Living Will:      Power of :    POLST:      Counseling / Coordination of Care  Total floor / unit time spent today 30 minutes  This involved direct patient contact where I performed a full history and physical, reviewed previous records, and reviewed laboratory and other diagnostic studies  Greater than 50% of total time was spent with the patient and / or family counseling and / or coordination of care      Sanaz Feliz MD  8/21/2018

## 2018-08-21 NOTE — ED PROVIDER NOTES
History  Chief Complaint   Patient presents with    Abdominal Pain     Pt states here two days ago for pain in abdomen s/p colostomy reversal   Pt unable to have BM and has vomited a few times     HPI     63year old male with hx recent surgery for loop ileostomy reversal ( by Dr Usama Louis due to perforated diverticulitis) presents to ED for evaluation of worsening abdominal pain and abdominal distension  Patient was seen in the emergency department yesterday, at that time, he underwent a CT scan that demonstrated questionable early partial small bowel obstruction  Patient was seen by surgery yesterday and discharged as his pain was thought to be secondary to opioid induced constipation  He has since had worsening abdominal pain/distension, patient was passing flatus, has not had been passing stool for the past 2 days  Otherwise on ROS, patient denying any othersymptoms  Prior to Admission Medications   Prescriptions Last Dose Informant Patient Reported? Taking?   doxazosin (CARDURA) 4 mg tablet 2018 at Unknown time  Yes Yes   Si mg daily at bedtime     oxyCODONE (ROXICODONE) 5 mg immediate release tablet 2018 at Unknown time  No Yes   Sig: Take 1 tablet (5 mg total) by mouth every 4 (four) hours as needed for moderate pain for up to 10 days Earliest Fill Date: 18 Max Daily Amount: 30 mg      Facility-Administered Medications: None       Past Medical History:   Diagnosis Date    Abscess, intestine     Arthritis     Diverticulitis     GERD (gastroesophageal reflux disease)     Hydronephrosis 2018       Past Surgical History:   Procedure Laterality Date    ABCESS DRAINAGE      COLON SURGERY      COLONOSCOPY N/A 2016    Procedure: COLONOSCOPY;  Surgeon: Devin Garcia MD;  Location: [facility]; Service:     COLONOSCOPY N/A 2018    Procedure: COLONOSCOPY with bx's;  Surgeon: Chetan Garcia MD;  Location: [facility];   Service: Gastroenterology   Jurgen Duarte ESOPHAGOGASTRODUODENOSCOPY      with biopsy    FOOT SURGERY Left     x2? fusion? due to arthritis  onset: 0   ILEO LOOP DIVERSION N/A 6/1/2018    Procedure: ILEOSTOMY LOOP DIVERTING;  Surgeon: Ro Matos DO;  Location: [facility];  Service: General    LAPAROTOMY N/A 6/1/2018    Procedure: LAPAROTOMY EXPLORATORY,;  Surgeon: Ro Matos DO;  [facility]: [facility];  Service: General    RI CLOSE ENTEROSTOMY N/A 8/16/2018    Procedure: CLOSURE LOOP ILEOSTOMY;  Surgeon: Carmine Cantu MD;  [facility]: [facility];  Service: General    RI CYSTOURETHROSCOPY,URETER CATHETER Left 6/9/2018    Procedure: CYSTOSCOPY RETROGRADE PYELOGRAM WITH INSERTION STENT URETERAL;  Surgeon: Jose Wilson MD;  [facility];  Service: Urology    RI PART REMOVAL COLON W ANASTOMOSIS N/A 6/1/2018    Procedure: RESECTION COLON SIGMOID;  Surgeon: Ro Matos DO;  [facility]: [facility];  Service: General       Family History   Problem Relation Age of Onset    Other Mother         head tumor    Glaucoma Father     Diabetes Father         possible diabetes    Stroke Family      I have reviewed and agree with the history as documented  Social History   Substance Use Topics    Smoking status: Former Smoker    Smokeless tobacco: Never Used    Alcohol use No        Review of Systems   Constitutional: Negative for chills, fatigue and fever  HENT: Negative for sore throat  Eyes: Negative for redness and visual disturbance  Respiratory: Negative for shortness of breath  Cardiovascular: Negative for chest pain  Gastrointestinal: Positive for abdominal pain, constipation, nausea and vomiting  Negative for diarrhea  Endocrine: Negative for polyuria  Genitourinary: Negative for difficulty urinating  Skin: Negative for rash  Psychiatric/Behavioral: Negative for dysphoric mood         Physical Exam  ED Triage Vitals [08/21/18 0824]   Temperature Pulse Respirations Blood Pressure SpO2   97 5 °F (36 4 °C) (!) 106 18 144/85 98 %      Temp Source Heart Rate Source Patient Position - Orthostatic VS BP Location FiO2 (%)   Tympanic Monitor Sitting Left arm --      Pain Score       Worst Possible Pain           Orthostatic Vital Signs  Vitals:    08/21/18 1556 08/21/18 1557 08/21/18 1558 08/21/18 1559   BP:  133/87     Pulse: 78 77 79 73   Patient Position - Orthostatic VS:           Physical Exam   Constitutional: He is oriented to person, place, and time  He appears well-developed and well-nourished  HENT:   Head: Normocephalic and atraumatic  Right Ear: External ear normal    Left Ear: External ear normal    Mouth/Throat: Oropharynx is clear and moist    Eyes: Conjunctivae and EOM are normal  Pupils are equal, round, and reactive to light  Neck: Normal range of motion  Cardiovascular: Normal rate, regular rhythm, normal heart sounds and intact distal pulses  Pulmonary/Chest: Effort normal and breath sounds normal  He has no wheezes  He exhibits no tenderness  Abdominal:   Bowel sounds distant, distended abdomen, diffusely tender   Musculoskeletal: Normal range of motion  Neurological: He is alert and oriented to person, place, and time  No cranial nerve deficit or sensory deficit  He exhibits normal muscle tone  Coordination normal           Skin: Skin is warm and dry  No rash noted  Psychiatric: He has a normal mood and affect  Nursing note and vitals reviewed        ED Medications  Medications   doxazosin (CARDURA) tablet 4 mg (4 mg Oral Given 8/21/18 1718)   oxyCODONE (ROXICODONE) IR tablet 5 mg (5 mg Oral Given 8/21/18 1547)   heparin (porcine) subcutaneous injection 5,000 Units (5,000 Units Subcutaneous Given 8/21/18 1547)   ondansetron (ZOFRAN) injection 4 mg (not administered)   sodium chloride 0 9 % infusion (125 mL/hr Intravenous New Bag 8/21/18 1109)   ketorolac (TORADOL) injection 30 mg (30 mg Intravenous Given 8/21/18 1718)   morphine (PF) 4 mg/mL injection 4 mg (4 mg Intravenous Given 8/21/18 0595)   potassium chloride 20 mEq in dextrose 5 % 100 mL IVPB (0 mEq Intravenous Stopped 8/21/18 1310)   potassium chloride 20 mEq in dextrose 5 % 100 mL IVPB (0 mEq Intravenous Stopped 8/21/18 1519)   potassium chloride 20 mEq in dextrose 5 % 100 mL IVPB (20 mEq Intravenous New Bag 8/21/18 1520)       Diagnostic Studies  Results Reviewed     Procedure Component Value Units Date/Time    Lipase [[id number]]  (Normal) Collected:  08/21/18 0919    Lab Status:  Final result Specimen:  Blood from Arm, Right Updated:  08/21/18 1010     Lipase 86 u/L     Comprehensive metabolic panel [[id number]]  (Abnormal) Collected:  08/21/18 0919    Lab Status:  Final result Specimen:  Blood from Arm, Right Updated:  08/21/18 1010     Sodium 138 mmol/L      Potassium 3 5 mmol/L      Chloride 102 mmol/L      CO2 32 mmol/L      Anion Gap 4 mmol/L      BUN 8 mg/dL      Creatinine 0 65 mg/dL      Glucose 101 mg/dL      Calcium 8 3 mg/dL      AST 20 U/L      ALT 22 U/L      Alkaline Phosphatase 87 U/L      Total Protein 6 6 g/dL      Albumin 3 1 (L) g/dL      Total Bilirubin 0 54 mg/dL      eGFR 108 ml/min/1 73sq m     Narrative:         National Kidney Disease Education Program recommendations are as follows:  GFR calculation is accurate only with a steady state creatinine  Chronic Kidney disease less than 60 ml/min/1 73 sq  meters  Kidney failure less than 15 ml/min/1 73 sq  meters  Lactic acid, plasma [[id number]]  (Normal) Collected:  08/21/18 0919    Lab Status:  Final result Specimen:  Blood from Arm, Right Updated:  08/21/18 1003     LACTIC ACID 0 9 mmol/L     Narrative:         Result may be elevated if tourniquet was used during collection      Protime-INR [[id number]]  (Normal) Collected:  08/21/18 0919    Lab Status:  Final result Specimen:  Blood from Arm, Right Updated:  08/21/18 0955     Protime 13 0 seconds      INR 0 97    APTT [[id number]]  (Normal) Collected:  08/21/18 0919    Lab Status:  Final result Specimen:  Blood from Arm, Right Updated:  08/21/18 0955     PTT 29 seconds     CBC and differential [[id number]]  (Abnormal) Collected:  08/21/18 0919    Lab Status:  Final result Specimen:  Blood from Arm, Right Updated:  08/21/18 0941     WBC 7 88 Thousand/uL      RBC 4 43 Million/uL      Hemoglobin 13 3 g/dL      Hematocrit 41 2 %      MCV 93 fL      MCH 30 0 pg      MCHC 32 3 g/dL      RDW 13 4 %      MPV 9 8 fL      Platelets 804 Thousands/uL      nRBC 0 /100 WBCs      Neutrophils Relative 76 (H) %      Immat GRANS % 0 %      Lymphocytes Relative 14 %      Monocytes Relative 9 %      Eosinophils Relative 1 %      Basophils Relative 0 %      Neutrophils Absolute 6 01 Thousands/µL      Immature Grans Absolute 0 03 Thousand/uL      Lymphocytes Absolute 1 09 Thousands/µL      Monocytes Absolute 0 69 Thousand/µL      Eosinophils Absolute 0 04 Thousand/µL      Basophils Absolute 0 02 Thousands/µL                  XR abdomen 1 view kub   Final Result by Gurpreet Echavarria DO (08/21 1531)   Increasing gaseous distention of small bowel (now up to 4 3 cm) predominantly in the left abdomen  The radiographic findings are concerning for worsening small bowel obstruction  Slight interval enlargement of gas-filled transverse colon which may related to secondary ileus  I personally discussed this study with JOVANNA CORTES on 8/21/2018 at 3:24 PM                    Workstation performed: [id number]               Procedures  Procedures      Phone Consults  ED Phone Contact    ED Course           [facility]  CritCare Time    This is a 60-year-old male presents to the ED for evaluation of a distant abdomen abdominal pain setting of nausea and vomiting  Patient had CT scan done yesterday, showed possible small bowel obstruction, surgery evaluated the patient and patient was discharged home  Patient since had intractable nausea vomiting and worsening distension    Given how patient had a CT scan from yesterday, will defer CT scan and have surgery evaluate patient  CBC CMP lipase and pre-op labs ordered  Surgery will admit to their service for observation for possible SBO under the service of Dr Josie Dolan  Disposition  Final diagnoses:   Abdominal pain   Abdominal distension   SBO (small bowel obstruction) ([address] )     Time reflects when diagnosis was documented in both MDM as applicable and the Disposition within this note     Time User Action Codes Description Comment    8/21/2018 10:54 AM Mathieu Lei Add [R10 9] Abdominal pain     8/21/2018 10:54 AM Mathieu Lei Add [R14 0] Abdominal distension     8/21/2018 10:54 AM Mathieu Lei Add [K56 609] SBO (small bowel obstruction) [facility])       ED Disposition     ED Disposition Condition Comment    Admit  Case was discussed with Surgery and the patient's admission status was agreed to be Admission Status: observation status to the service of Dr Josie Dolan  Follow-up Information    None         Current Discharge Medication List      CONTINUE these medications which have NOT CHANGED    Details   doxazosin (CARDURA) 4 mg tablet 4 mg daily at bedtime        oxyCODONE (ROXICODONE) 5 mg immediate release tablet Take 1 tablet (5 mg total) by mouth every 4 (four) hours as needed for moderate pain for up to 10 days Earliest Fill Date: 8/18/18 Max Daily Amount: 30 mg  Qty: 20 tablet, Refills: 0    Associated Diagnoses: Diverticulitis of large intestine with abscess without bleeding           No discharge procedures on file  ED Provider  Attending physically available and evaluated Di Almazan I managed the patient along with the ED Attending      Electronically Signed by     Murali Joyce MD  08/21/18 7477

## 2018-08-21 NOTE — ED NOTES
Last BM was on Sunday  Pt c/o constipation  Pt not taking stool softeners        Manuel Lawton, CRISTAL  79/83/52 [id number]

## 2018-08-21 NOTE — ED ATTENDING ATTESTATION
Reese Aguirre MD, saw and evaluated the patient  I have discussed the patient with the resident/non-physician practitioner and agree with the resident's/non-physician practitioner's findings, Plan of Care, and MDM as documented in the resident's/non-physician practitioner's note, except where noted  All available labs and Radiology studies were reviewed  At this point I agree with the current assessment done in the Emergency Department  I have conducted an independent evaluation of this patient including a focused history and a physical exam     71-year-old male, history of recent surgery for loop ileostomy reversal, presenting to the emergency department for evaluation of worsening abdominal pain, increasing abdominal distention, nausea and vomiting  Patient was seen in the emergency department yesterday, underwent a CT scan that demonstrated a questionable early partial small bowel obstruction  Patient was seen by surgery and discharged  Patient returns for worsening pain, worsening distension, and vomiting overnight  No reported fever  Patient reports he is pacing flatus but is not passing stool since Sunday  Ten systems reviewed negative except as noted in the history of present illness  The patient is resting comfortably on a stretcher in no acute respiratory distress  The patient appears nontoxic  HEENT reveals moist mucous membranes  Head is normocephalic and atraumatic  Conjunctiva and sclera are normal  Neck is nontender and supple with full range of motion to flexion, extension, lateral rotation  No meningismus appreciated  No masses are appreciated  Lungs are clear to auscultation bilaterally without any wheezes, rales or rhonchi  Heart is regular rate and rhythm without any murmurs, rubs or gallops  Abdomen is distended, with hyperactive bowel sounds  The patient is diffusely tender to palpation and percussion  The patient is hypertympanitic to percussion    Extremities appear grossly normal without any significant arthropathy  Patient is awake, alert, and oriented x3  The patient has normal interaction  Motor is 5 out of 5  Assessment and plan:  80-year-old male with history of abdominal surgery, presenting with signs and symptoms consistent with partial small-bowel obstruction  Plan is repeat surgical consultation        Labs Reviewed   CBC AND DIFFERENTIAL - Abnormal        Result Value Ref Range Status    WBC 7 88  4 31 - 10 16 Thousand/uL Final    RBC 4 43  3 88 - 5 62 Million/uL Final    Hemoglobin 13 3  12 0 - 17 0 g/dL Final    Hematocrit 41 2  36 5 - 49 3 % Final    MCV 93  82 - 98 fL Final    MCH 30 0  26 8 - 34 3 pg Final    MCHC 32 3  31 4 - 37 4 g/dL Final    RDW 13 4  11 6 - 15 1 % Final    MPV 9 8  8 9 - 12 7 fL Final    Platelets 704  596 - 390 Thousands/uL Final    nRBC 0  /100 WBCs Final    Neutrophils Relative 76 (*) 43 - 75 % Final    Immat GRANS % 0  0 - 2 % Final    Lymphocytes Relative 14  14 - 44 % Final    Monocytes Relative 9  4 - 12 % Final    Eosinophils Relative 1  0 - 6 % Final    Basophils Relative 0  0 - 1 % Final    Neutrophils Absolute 6 01  1 85 - 7 62 Thousands/µL Final    Immature Grans Absolute 0 03  0 00 - 0 20 Thousand/uL Final    Lymphocytes Absolute 1 09  0 60 - 4 47 Thousands/µL Final    Monocytes Absolute 0 69  0 17 - 1 22 Thousand/µL Final    Eosinophils Absolute 0 04  0 00 - 0 61 Thousand/µL Final    Basophils Absolute 0 02  0 00 - 0 10 Thousands/µL Final   COMPREHENSIVE METABOLIC PANEL - Abnormal     Sodium 138  136 - 145 mmol/L Final    Potassium 3 5  3 5 - 5 3 mmol/L Final    Chloride 102  100 - 108 mmol/L Final    CO2 32  21 - 32 mmol/L Final    Anion Gap 4  4 - 13 mmol/L Final    BUN 8  5 - 25 mg/dL Final    Creatinine 0 65  0 60 - 1 30 mg/dL Final    Comment: Standardized to IDMS reference method    Glucose 101  65 - 140 mg/dL Final    Comment:   If the patient is fasting, the ADA then defines impaired fasting glucose as > 100 mg/dL and diabetes as > or equal to 123 mg/dL  Specimen collection should occur prior to Sulfasalazine administration due to the potential for falsely depressed results  Specimen collection should occur prior to Sulfapyridine administration due to the potential for falsely elevated results  Calcium 8 3  8 3 - 10 1 mg/dL Final    AST 20  5 - 45 U/L Final    Comment:   Specimen collection should occur prior to Sulfasalazine administration due to the potential for falsely depressed results  ALT 22  12 - 78 U/L Final    Comment:   Specimen collection should occur prior to Sulfasalazine and/or Sulfapyridine administration due to the potential for falsely depressed results  Alkaline Phosphatase 87  46 - 116 U/L Final    Total Protein 6 6  6 4 - 8 2 g/dL Final    Albumin 3 1 (*) 3 5 - 5 0 g/dL Final    Total Bilirubin 0 54  0 20 - 1 00 mg/dL Final    eGFR 108  ml/min/1 73sq m Final    Narrative:     National Kidney Disease Education Program recommendations are as follows:  GFR calculation is accurate only with a steady state creatinine  Chronic Kidney disease less than 60 ml/min/1 73 sq  meters  Kidney failure less than 15 ml/min/1 73 sq  meters  LIPASE - Normal    Lipase 86  73 - 393 u/L Final   PROTIME-INR - Normal    Protime 13 0  11 8 - 14 2 seconds Final    INR 0 97  0 86 - 1 17 Final   APTT - Normal    PTT 29  24 - 36 seconds Final    Comment: Therapeutic Heparin Range =  60-90 seconds   LACTIC ACID, PLASMA - Normal    LACTIC ACID 0 9  0 5 - 2 0 mmol/L Final    Narrative:     Result may be elevated if tourniquet was used during collection     TYPE AND SCREEN    ABO Grouping B   Final    Rh Factor Positive   Final    Antibody Screen Negative   Final    Specimen Expiration Date [id number]   Final

## 2018-08-22 PROBLEM — K56.609 SMALL BOWEL OBSTRUCTION (HCC): Status: ACTIVE | Noted: 2018-08-22

## 2018-08-22 LAB
ANION GAP SERPL CALCULATED.3IONS-SCNC: 7 MMOL/L (ref 4–13)
BASOPHILS # BLD AUTO: 0.01 THOUSANDS/ΜL (ref 0–0.1)
BASOPHILS NFR BLD AUTO: 0 % (ref 0–1)
BUN SERPL-MCNC: 8 MG/DL (ref 5–25)
CALCIUM SERPL-MCNC: 7.9 MG/DL (ref 8.3–10.1)
CHLORIDE SERPL-SCNC: 105 MMOL/L (ref 100–108)
CO2 SERPL-SCNC: 26 MMOL/L (ref 21–32)
CREAT SERPL-MCNC: 0.58 MG/DL (ref 0.6–1.3)
EOSINOPHIL # BLD AUTO: 0.06 THOUSAND/ΜL (ref 0–0.61)
EOSINOPHIL NFR BLD AUTO: 1 % (ref 0–6)
ERYTHROCYTE [DISTWIDTH] IN BLOOD BY AUTOMATED COUNT: 13.2 % (ref 11.6–15.1)
GFR SERPL CREATININE-BSD FRML MDRD: 113 ML/MIN/1.73SQ M
GLUCOSE P FAST SERPL-MCNC: 87 MG/DL (ref 65–99)
GLUCOSE SERPL-MCNC: 81 MG/DL (ref 65–140)
GLUCOSE SERPL-MCNC: 82 MG/DL (ref 65–140)
GLUCOSE SERPL-MCNC: 86 MG/DL (ref 65–140)
GLUCOSE SERPL-MCNC: 87 MG/DL (ref 65–140)
HCT VFR BLD AUTO: 38.3 % (ref 36.5–49.3)
HGB BLD-MCNC: 12.7 G/DL (ref 12–17)
IMM GRANULOCYTES # BLD AUTO: 0.02 THOUSAND/UL (ref 0–0.2)
IMM GRANULOCYTES NFR BLD AUTO: 0 % (ref 0–2)
LYMPHOCYTES # BLD AUTO: 1.3 THOUSANDS/ΜL (ref 0.6–4.47)
LYMPHOCYTES NFR BLD AUTO: 25 % (ref 14–44)
MAGNESIUM SERPL-MCNC: 1.9 MG/DL (ref 1.6–2.6)
MCH RBC QN AUTO: 30.8 PG (ref 26.8–34.3)
MCHC RBC AUTO-ENTMCNC: 33.2 G/DL (ref 31.4–37.4)
MCV RBC AUTO: 93 FL (ref 82–98)
MONOCYTES # BLD AUTO: 0.67 THOUSAND/ΜL (ref 0.17–1.22)
MONOCYTES NFR BLD AUTO: 13 % (ref 4–12)
NEUTROPHILS # BLD AUTO: 3.24 THOUSANDS/ΜL (ref 1.85–7.62)
NEUTS SEG NFR BLD AUTO: 61 % (ref 43–75)
NRBC BLD AUTO-RTO: 0 /100 WBCS
PHOSPHATE SERPL-MCNC: 2.5 MG/DL (ref 2.7–4.5)
PLATELET # BLD AUTO: 297 THOUSANDS/UL (ref 149–390)
PMV BLD AUTO: 9.9 FL (ref 8.9–12.7)
POTASSIUM SERPL-SCNC: 3.7 MMOL/L (ref 3.5–5.3)
RBC # BLD AUTO: 4.13 MILLION/UL (ref 3.88–5.62)
SODIUM SERPL-SCNC: 138 MMOL/L (ref 136–145)
WBC # BLD AUTO: 5.3 THOUSAND/UL (ref 4.31–10.16)

## 2018-08-22 PROCEDURE — 80048 BASIC METABOLIC PNL TOTAL CA: CPT | Performed by: SURGERY

## 2018-08-22 PROCEDURE — 84100 ASSAY OF PHOSPHORUS: CPT | Performed by: STUDENT IN AN ORGANIZED HEALTH CARE EDUCATION/TRAINING PROGRAM

## 2018-08-22 PROCEDURE — 82948 REAGENT STRIP/BLOOD GLUCOSE: CPT

## 2018-08-22 PROCEDURE — 99024 POSTOP FOLLOW-UP VISIT: CPT | Performed by: SURGERY

## 2018-08-22 PROCEDURE — 83735 ASSAY OF MAGNESIUM: CPT | Performed by: STUDENT IN AN ORGANIZED HEALTH CARE EDUCATION/TRAINING PROGRAM

## 2018-08-22 PROCEDURE — 85025 COMPLETE CBC W/AUTO DIFF WBC: CPT | Performed by: STUDENT IN AN ORGANIZED HEALTH CARE EDUCATION/TRAINING PROGRAM

## 2018-08-22 RX ORDER — ACETAMINOPHEN 325 MG/1
975 TABLET ORAL EVERY 8 HOURS
Status: DISCONTINUED | OUTPATIENT
Start: 2018-08-22 | End: 2018-08-23

## 2018-08-22 RX ORDER — AMOXICILLIN 250 MG
1 CAPSULE ORAL DAILY PRN
Status: DISCONTINUED | OUTPATIENT
Start: 2018-08-22 | End: 2018-08-22

## 2018-08-22 RX ORDER — OXYCODONE HYDROCHLORIDE 5 MG/1
2.5 TABLET ORAL EVERY 4 HOURS PRN
Status: DISCONTINUED | OUTPATIENT
Start: 2018-08-22 | End: 2018-08-23

## 2018-08-22 RX ORDER — METHOCARBAMOL 500 MG/1
500 TABLET, FILM COATED ORAL EVERY 6 HOURS PRN
Status: DISCONTINUED | OUTPATIENT
Start: 2018-08-22 | End: 2018-08-22

## 2018-08-22 RX ORDER — BISACODYL 10 MG
10 SUPPOSITORY, RECTAL RECTAL ONCE
Status: COMPLETED | OUTPATIENT
Start: 2018-08-22 | End: 2018-08-22

## 2018-08-22 RX ORDER — AMOXICILLIN 250 MG
1 CAPSULE ORAL DAILY
Status: DISCONTINUED | OUTPATIENT
Start: 2018-08-22 | End: 2018-08-23

## 2018-08-22 RX ORDER — OXYCODONE HYDROCHLORIDE 5 MG/1
5 TABLET ORAL EVERY 4 HOURS PRN
Status: DISCONTINUED | OUTPATIENT
Start: 2018-08-22 | End: 2018-08-23

## 2018-08-22 RX ORDER — METHOCARBAMOL 500 MG/1
500 TABLET, FILM COATED ORAL EVERY 6 HOURS SCHEDULED
Status: DISCONTINUED | OUTPATIENT
Start: 2018-08-22 | End: 2018-08-23

## 2018-08-22 RX ADMIN — ACETAMINOPHEN 975 MG: 325 TABLET, FILM COATED ORAL at 14:45

## 2018-08-22 RX ADMIN — SENNOSIDES AND DOCUSATE SODIUM 1 TABLET: 8.6; 5 TABLET ORAL at 17:43

## 2018-08-22 RX ADMIN — METHOCARBAMOL 500 MG: 500 TABLET ORAL at 17:36

## 2018-08-22 RX ADMIN — DOXAZOSIN 4 MG: 4 TABLET ORAL at 08:13

## 2018-08-22 RX ADMIN — KETOROLAC TROMETHAMINE 30 MG: 30 INJECTION, SOLUTION INTRAMUSCULAR at 13:27

## 2018-08-22 RX ADMIN — METHOCARBAMOL 500 MG: 500 TABLET ORAL at 04:29

## 2018-08-22 RX ADMIN — BISACODYL 10 MG: 10 SUPPOSITORY RECTAL at 13:27

## 2018-08-22 RX ADMIN — KETOROLAC TROMETHAMINE 30 MG: 30 INJECTION, SOLUTION INTRAMUSCULAR at 05:59

## 2018-08-22 RX ADMIN — HEPARIN SODIUM 5000 UNITS: 5000 INJECTION, SOLUTION INTRAVENOUS; SUBCUTANEOUS at 21:29

## 2018-08-22 RX ADMIN — HEPARIN SODIUM 5000 UNITS: 5000 INJECTION, SOLUTION INTRAVENOUS; SUBCUTANEOUS at 13:27

## 2018-08-22 RX ADMIN — SENNOSIDES AND DOCUSATE SODIUM 1 TABLET: 8.6; 5 TABLET ORAL at 07:38

## 2018-08-22 RX ADMIN — OXYCODONE HYDROCHLORIDE 5 MG: 5 TABLET ORAL at 21:29

## 2018-08-22 RX ADMIN — SODIUM CHLORIDE 125 ML/HR: 0.9 INJECTION, SOLUTION INTRAVENOUS at 04:09

## 2018-08-22 RX ADMIN — KETOROLAC TROMETHAMINE 30 MG: 30 INJECTION, SOLUTION INTRAMUSCULAR at 17:36

## 2018-08-22 RX ADMIN — OXYCODONE HYDROCHLORIDE 5 MG: 5 TABLET ORAL at 08:15

## 2018-08-22 RX ADMIN — HEPARIN SODIUM 5000 UNITS: 5000 INJECTION, SOLUTION INTRAVENOUS; SUBCUTANEOUS at 05:59

## 2018-08-22 RX ADMIN — SODIUM CHLORIDE 125 ML/HR: 0.9 INJECTION, SOLUTION INTRAVENOUS at 21:30

## 2018-08-22 NOTE — PROGRESS NOTES
Patient care rounds were completed with the patient's nurse today, Berenice Kang via phone and with the charge nurse, Quita Carter in person  We discussed the plan is to allow sips of liquids as tolerated  Continue to follow abdominal exam and await for return of normal bowel function  Suppository provided earlier today, and continuing to wait bowel movement  No surgical intervention anticipated  We reviewed all of the invasive devices/lines/telemetry orders   - None  Pain Assessment / Plan:  - Continue current analgesic regimen as needed with goal to avoid opioids of possible  Mobility Assessment / Plan:  - Activity as tolerated  Goals / Barriers for discharge:  - Awaiting return of bowel function   - Case management following; case and discharge needs discussed  All questions and concerns were addressed  I spent greater than 10 minutes reviewing the plan with the patient and the nurse, and coordinating care for the day      Chanell Gomez PA-C  8/22/2018 2:19 PM

## 2018-08-22 NOTE — SOCIAL WORK
Patient identified as HRR per criteria  Call made to DC appointment hotline with information as required for CM support follow up  Informed that pt's PCP is not within [facility] network for HRR  Will inform MD of hte same & have MD advise pt when to f/u w PCP

## 2018-08-22 NOTE — CASE MANAGEMENT
Initial Clinical Review    Thank you,  Ghislaine Harrellerika  Utilization Review Department  Phone: [phone]; Fax [phone]  ATTENTION: Please call with any questions or concerns to [phone]  and carefully follow the prompts so that you are directed to the right person  Send all requests for admission clinical reviews, approved or denied determinations and any other requests to fax [phone]  All voicemails are confidential      Admission: Date/Time/Statement: Placed in Observation status on 8/21 @ 10:45 changed to Inpatient admission 8/22 @ 10:28    08/22/18 1028  Inpatient Admission     Transfer Service: Surgery-General       Question Answer   Admitting Physician Claude KINGSTON   Level of Care Med Surg   Estimated length of stay More than 2 Midnights   Certification I certify that inpatient services are medically necessary for this patient for a duration of greater than two midnights  See H&P and MD Progress Notes for additional information about the patient's course of treatment  ED: Date/Time/Mode of Arrival:   ED Arrival Information     Expected Arrival Acuity Means of Arrival Escorted By Service Admission Type    - 8/21/2018 08:14 Urgent Walk-In Self Surgery-General Urgent    Arrival Complaint    abdominal pain          Chief Complaint:   Chief Complaint   Patient presents with    Abdominal Pain     Pt states here two days ago for pain in abdomen s/p colostomy reversal   Pt unable to have BM and has vomited a few times       History of Illness: Kvng Mcneal is a 63 y o  male who presents with 2 days complaints of increasing abdominal pain since discharge s/p loop ileostomy reversal  Patient was discharged on 8/19/18 on POD #2 after tolerating diet, having bowel movement and passing flatus  Patient states that once he was home he stopped having bowel movements, was still passing flatus however his abdominal pain began to increase   He presented [facility] ED on 8/20 AM for this pain and at this time stated he had not been utilizing his pain medications nor stool softeners  He had a CT scan with IV contrast which showed thickened small bowel loops with surrounding inflammatory changes and a possible right lower quadrant transition point  Patient was sent home from ED and re-presented today with complaints of increasing pain and now an episode of emesis without hematemesis  Patient denies fevers or chills, hematochezia, but complains of constipation and right inguinal pain  ED Vital Signs:   ED Triage Vitals [08/21/18 0824]   Temperature Pulse Respirations Blood Pressure SpO2   97 5 °F (36 4 °C) (!) 106 18 144/85 98 %      Temp Source Heart Rate Source Patient Position - Orthostatic VS BP Location FiO2 (%)   Tympanic Monitor Sitting Left arm --      Pain Score       Worst Possible Pain        Wt Readings from Last 1 Encounters:   08/22/18 78 8 kg (173 lb 11 6 oz)       Vital Signs (abnormal): T Max 99 4    Abnormal Labs/Diagnostic Test Results:     CXR - no acute  CT A & P - 8/20 -  1   Diffuse thickening of scattered loops of small bowel with surrounding inflammatory changes and a possible transition point in the right lower quadrant which may represent ileus versus enteritis   Clinical correlation is recommended  2   Inflammatory changes in the right anterior abdominal wall in the region of the prior diverting ileostomy   Findings may represent postsurgical changes however infection cannot be rule out  XR ABD - KUB - Increasing gaseous distention of small bowel (now up to 4 3 cm) predominantly in the left abdomen   The radiographic findings are concerning for worsening small bowel obstruction  Slight interval enlargement of gas-filled transverse colon which may related to secondary ileus      ED Treatment:   Medication Administration from 08/21/2018 0814 to 08/21/2018 1502       Date/Time Order Dose Route Action     08/21/2018 0951 morphine (PF) 4 mg/mL injection 4 mg 4 mg Intravenous Given     08/21/2018 1110 potassium chloride 20 mEq in dextrose 5 % 100 mL IVPB 20 mEq Intravenous New Bag     08/21/2018 1314 potassium chloride 20 mEq in dextrose 5 % 100 mL IVPB 20 mEq Intravenous New Bag     08/21/2018 1109 sodium chloride 0 9 % infusion 125 mL/hr Intravenous New Bag     08/21/2018 1250 ketorolac (TORADOL) injection 30 mg 30 mg Intravenous Given       Past Medical/Surgical History:   Past Medical History:   Diagnosis Date    Abscess, intestine     Arthritis     Diverticulitis     GERD (gastroesophageal reflux disease)     Hydronephrosis 5/27/2018       Admitting Diagnosis: Abdominal distension [R14 0]  SBO (small bowel obstruction) ([facility] [address] ) [K56 609]  Abdominal pain [R10 9]    Age/Sex: 63 y o  male    Assessment/Plan:   63 M POD # 5 s/p loop ileostomy closure presenting today with complaints of emesis, lack of bowel movements and increasing abdominal pain   Post-operative ileus vs small bowel obstruction      Plan:   - Admit to General Surgery for Observation  - Electrolyte repletion - K+  - PRN PO pain medications until not tolerated              - 5mg Barbara PRN + Scheduled Toradol 30mg q6hrs for 2 days  - KUB  - Patient refusing NGT at this time              - Consider if fails conservative management  - Bowel Regimen              - Senna + Colace pending KUB results  - Diet NPO  - Maintenance IVF - NS @ 125mL/hr       Admission Orders:  Scheduled Meds:   Current Facility-Administered Medications:  doxazosin 4 mg Oral Daily    heparin (porcine) 5,000 Units Subcutaneous Q8H [address]    ketorolac 30 mg Intravenous Q6H [address]    methocarbamol 500 mg Oral Q6H PRN 8/22 x 1    ondansetron 4 mg Intravenous Q6H PRN    oxyCODONE 5 mg Oral Q4H PRN 8/21 x 1  8/22 x 1     senna-docusate sodium 1 tablet Oral Daily 8/22 x 1     Continuous Infusions:   sodium chloride 125 mL/hr     Nursing orders - VS q 4 - Daily weights - Up as tolerated - Incentive spirometry - SCD's to le's- Diet NPO - NG to ABBIE

## 2018-08-22 NOTE — PROGRESS NOTES
Pt resting in bed, new IV site inserted by charge RN  IV WDL, no complaints at this time  Will continue to monitor pt

## 2018-08-22 NOTE — PLAN OF CARE
DISCHARGE PLANNING     Discharge to home or other facility with appropriate resources Progressing        GASTROINTESTINAL - ADULT     Minimal or absence of nausea and/or vomiting Progressing     Maintains or returns to baseline bowel function Progressing        PAIN - ADULT     Verbalizes/displays adequate comfort level or baseline comfort level Progressing        SAFETY ADULT     Patient will remain free of falls Progressing     Maintain or return to baseline ADL function Progressing     Maintain or return mobility status to optimal level Progressing

## 2018-08-22 NOTE — PROGRESS NOTES
Progress Note - General Surgery   Gus Bunn 63 y o  male MRN: [id number]  Unit/Bed#: [facility] 633-01 Encounter: [id number]    Assessment:  63 M s/p ileostomy takedown 8/17 with new onset abdominal pain, N/V and constipation  Rule out post-operative ileus vs SBO vs constipation  - 8/20/18 CT scan - mild thickening of SB loops (expected post-operative finding)  - Lactic Acid 0 9, WBC 7 88    Plan:  - Continue NPO with IVF  - Bowel Regimen   - BM  - Up and Ambulate patient  - Pain control PRN   - Scheduled Toradol 30mg IV q6h   - PRN Roxicodone 5mg q4h moderate pain       Subjective/Objective   Chief Complaint:     Subjective: No acute events overnight  Patient still not having bowel movements, but states is passing some flatus  Denies nausea and vomiting, no fevers or chills  Patient did not get up and walk yesterday  Objective:     Blood pressure 118/72, pulse 70, temperature 98 6 °F (37 °C), temperature source Oral, resp  rate 18, height 5' 8" (1 727 m), weight 78 1 kg (172 lb 2 9 oz), SpO2 96 %  ,Body mass index is 26 18 kg/m²  Intake/Output Summary (Last 24 hours) at 08/22/18 0223  Last data filed at 08/21/18 2346   Gross per 24 hour   Intake             1325 ml   Output              400 ml   Net              925 ml       Invasive Devices     Peripheral Intravenous Line            Peripheral IV 08/21/18 Right Antecubital less than 1 day                Physical Exam: General: AAOx3  Respiratory: BS b/l  Abdomen: Soft, slightly distended, tympany with percussion, mildly tender in all quadrants  Bowel sounds present in all quadrants  Ostomy site - LLQ, well healing with 4x4 bandage covering  Heart: RRR, S1s2  Ext: Warm no cyanosis     Lab, Imaging and other studies:  8/21 AXR - IMPRESSION:  Increasing gaseous distention of small bowel (now up to 4 3 cm) predominantly in the left abdomen    The radiographic findings are concerning for worsening small bowel obstruction      Slight interval enlargement of gas-filled transverse colon which may related to secondary ileus  I have personally reviewed pertinent lab results    , CBC:   Lab Results   Component Value Date    WBC 7 88 08/21/2018    HGB 13 3 08/21/2018    HCT 41 2 08/21/2018    MCV 93 08/21/2018     08/21/2018    MCH 30 0 08/21/2018    MCHC 32 3 08/21/2018    RDW 13 4 08/21/2018    MPV 10 4 08/21/2018    NRBC 0 08/21/2018   , CMP:   Lab Results   Component Value Date     08/21/2018    K 4 3 08/21/2018     08/21/2018    CO2 29 08/21/2018    ANIONGAP 6 08/21/2018    BUN 7 08/21/2018    CREATININE 0 66 08/21/2018    GLUCOSE 98 08/21/2018    CALCIUM 8 2 (L) 08/21/2018    AST 21 08/21/2018    ALT 21 08/21/2018    ALKPHOS 85 08/21/2018    PROT 6 5 08/21/2018    BILITOT 0 37 08/21/2018    EGFR 107 08/21/2018     VTE Pharmacologic Prophylaxis: Heparin  VTE Mechanical Prophylaxis: sequential compression device

## 2018-08-22 NOTE — SOCIAL WORK
Pt is a less than 30 days readmission  Cm met w pt & explained Cm role  Confirmed information from last admission has not changed  Pt lives w wife and children in [address] fl[address] apt w 4-6 jud[address]  Pt was [employer], retired on [employer]  No dme  No h/o vna or rhb  Denies any MH, D&A tx  Uses [facility] on [address]  PCP Dr Young Bernard  Informed will take preferences into account if any services are needed  States will have transport home available  CM reviewed d/c planning process including the following: identifying help at home, patient preference for d/c planning needs, Discharge Lounge, [facility] Meds to Bed program, availability of treatment team to discuss questions or concerns patient and/or family may have regarding understanding medications and recognizing signs and symptoms once discharged  CM also encouraged patient to follow up with all recommended appointments after discharge  Patient advised of importance for patient and family to participate in managing patients medical well being

## 2018-08-23 LAB
GLUCOSE SERPL-MCNC: 76 MG/DL (ref 65–140)
GLUCOSE SERPL-MCNC: 82 MG/DL (ref 65–140)
GLUCOSE SERPL-MCNC: 85 MG/DL (ref 65–140)

## 2018-08-23 PROCEDURE — 99024 POSTOP FOLLOW-UP VISIT: CPT | Performed by: SURGERY

## 2018-08-23 PROCEDURE — 82948 REAGENT STRIP/BLOOD GLUCOSE: CPT

## 2018-08-23 RX ORDER — LIDOCAINE HYDROCHLORIDE 20 MG/ML
JELLY TOPICAL ONCE
Status: DISCONTINUED | OUTPATIENT
Start: 2018-08-23 | End: 2018-09-07 | Stop reason: HOSPADM

## 2018-08-23 RX ORDER — LORAZEPAM 2 MG/ML
0.5 INJECTION INTRAMUSCULAR ONCE
Status: COMPLETED | OUTPATIENT
Start: 2018-08-23 | End: 2018-08-23

## 2018-08-23 RX ORDER — XYLITOL/YERBA SANTA
5 AEROSOL, SPRAY WITH PUMP (ML) MUCOUS MEMBRANE AS NEEDED
Status: DISCONTINUED | OUTPATIENT
Start: 2018-08-23 | End: 2018-09-07 | Stop reason: HOSPADM

## 2018-08-23 RX ORDER — DEXTROSE, SODIUM CHLORIDE, AND POTASSIUM CHLORIDE 5; .45; .15 G/100ML; G/100ML; G/100ML
125 INJECTION INTRAVENOUS CONTINUOUS
Status: DISCONTINUED | OUTPATIENT
Start: 2018-08-23 | End: 2018-08-30

## 2018-08-23 RX ADMIN — HYDROMORPHONE HYDROCHLORIDE 0.5 MG: 1 INJECTION, SOLUTION INTRAMUSCULAR; INTRAVENOUS; SUBCUTANEOUS at 22:33

## 2018-08-23 RX ADMIN — KETOROLAC TROMETHAMINE 30 MG: 30 INJECTION, SOLUTION INTRAMUSCULAR at 05:34

## 2018-08-23 RX ADMIN — HEPARIN SODIUM 5000 UNITS: 5000 INJECTION, SOLUTION INTRAVENOUS; SUBCUTANEOUS at 05:34

## 2018-08-23 RX ADMIN — HEPARIN SODIUM 5000 UNITS: 5000 INJECTION, SOLUTION INTRAVENOUS; SUBCUTANEOUS at 14:49

## 2018-08-23 RX ADMIN — METHOCARBAMOL 500 MG: 500 TABLET ORAL at 00:02

## 2018-08-23 RX ADMIN — Medication 5 SPRAY: at 22:34

## 2018-08-23 RX ADMIN — SODIUM CHLORIDE 125 ML/HR: 0.9 INJECTION, SOLUTION INTRAVENOUS at 05:33

## 2018-08-23 RX ADMIN — DEXTROSE, SODIUM CHLORIDE, AND POTASSIUM CHLORIDE 100 ML/HR: 5; .45; .15 INJECTION INTRAVENOUS at 11:25

## 2018-08-23 RX ADMIN — Medication 1 SPRAY: at 22:34

## 2018-08-23 RX ADMIN — LORAZEPAM 0.5 MG: 2 INJECTION INTRAMUSCULAR; INTRAVENOUS at 06:33

## 2018-08-23 RX ADMIN — HYDROMORPHONE HYDROCHLORIDE 0.5 MG: 1 INJECTION, SOLUTION INTRAMUSCULAR; INTRAVENOUS; SUBCUTANEOUS at 06:33

## 2018-08-23 RX ADMIN — HEPARIN SODIUM 5000 UNITS: 5000 INJECTION, SOLUTION INTRAVENOUS; SUBCUTANEOUS at 22:33

## 2018-08-23 RX ADMIN — DEXTROSE, SODIUM CHLORIDE, AND POTASSIUM CHLORIDE 100 ML/HR: 5; .45; .15 INJECTION INTRAVENOUS at 20:52

## 2018-08-23 RX ADMIN — HYDROMORPHONE HYDROCHLORIDE 0.5 MG: 1 INJECTION, SOLUTION INTRAMUSCULAR; INTRAVENOUS; SUBCUTANEOUS at 05:33

## 2018-08-23 RX ADMIN — KETOROLAC TROMETHAMINE 30 MG: 30 INJECTION, SOLUTION INTRAMUSCULAR at 00:02

## 2018-08-23 RX ADMIN — LORAZEPAM 0.5 MG: 2 INJECTION INTRAMUSCULAR; INTRAVENOUS at 07:51

## 2018-08-23 RX ADMIN — HYDROMORPHONE HYDROCHLORIDE 0.5 MG: 1 INJECTION, SOLUTION INTRAMUSCULAR; INTRAVENOUS; SUBCUTANEOUS at 20:44

## 2018-08-23 NOTE — MEDICAL STUDENT
Progress Note - General Surgery   Kae Bunn 63 y o  male MRN: [id number]  Unit/Bed#: [facility] 633-01 Encounter: [id number]    Assessment:  63year old male s/p ileostomy takedown on 8/17 with new onset abdominal pain likely from small bowel obstruction  Plan:  Continue NPO with IVF  Place NGT  Continue current pain regimen  Continue current bowel regimen      Subjective/Objective   Chief Complaint: Abdominal pain    Subjective: Had an episode of emesis overnight  States he is still nauseous  Complains primarily of pain, 10/10  Complains of feeling more distended  Denied any fever or chills  Objective:     Blood pressure 123/76, pulse 82, temperature 98 8 °F (37 1 °C), temperature source Oral, resp  rate 18, height 5' 8" (1 727 m), weight 78 8 kg (173 lb 11 6 oz), SpO2 96 %  ,Body mass index is 26 41 kg/m²  I/O       08/21 0701 - 08/22 0700 08/22 0701 - 08/23 0700    P  O  0 0    I V  (mL/kg) 2354 2 (29 9) 2937 5 (37 3)    Total Intake(mL/kg) 2354 2 (29 9) 2937 5 (37 3)    Urine (mL/kg/hr) 700 425 (0 2)    Total Output 700 425    Net +1654 2 +2512 5          Unmeasured Stool Occurrence  0 x          Invasive Devices     Peripheral Intravenous Line            Peripheral IV 08/22/18 Left;Dorsal (posterior) Forearm less than 1 day                Physical Exam: /76 (BP Location: Right arm)   Pulse 82   Temp 98 8 °F (37 1 °C) (Oral)   Resp 18   Ht 5' 8" (1 727 m)   Wt 78 8 kg (173 lb 11 6 oz)   SpO2 96%   BMI 26 41 kg/m²   General appearance: alert and oriented, in no acute distress  Lungs: clear to auscultation bilaterally  Heart: regular rate and rhythm, S1, S2 normal, no murmur, click, rub or gallop  Abdomen: distended, tympanic, tender to palpation diffusely, bowel sounds diminished  Lab, Imaging and other studies:I have personally reviewed pertinent lab results

## 2018-08-23 NOTE — PROGRESS NOTES
Progress Note - General Surgery   Luzmaria Elayne Bunn 63 y o  male MRN: [id number]  Unit/Bed#: [facility] 633-01 Encounter: [id number]    Assessment:  63 M s/p ileostomy takedown 8/17 here with obstruction likely 2/2 inflammation at anastomosis   - Continues w/ nausea, distention and pain  - Patient was finally accepting of NGT placement which was put in this AM    Plan:  - Await bowel function  - NPO/NGT  - IVF  - PRN pain meds       Subjective/Objective   Subjective:   Vomiting and increased pain  Discussed with patient again importance of NGT placement which he finally accepted  This was placed with 250ml green output  Objective:     Blood pressure 126/70, pulse 89, temperature 98 3 °F (36 8 °C), temperature source Oral, resp  rate 18, height 5' 8" (1 727 m), weight 78 1 kg (172 lb 2 9 oz), SpO2 98 %  ,Body mass index is 26 18 kg/m²  Intake/Output Summary (Last 24 hours) at 08/23/18 0836  Last data filed at 08/23/18 0533   Gross per 24 hour   Intake           2937 5 ml   Output              300 ml   Net           2637 5 ml       Invasive Devices     Peripheral Intravenous Line            Peripheral IV 08/22/18 Left;Dorsal (posterior) Forearm less than 1 day          Drain            NG/OG/Enteral Tube Nasogastric Left nares less than 1 day                Physical Exam:  Gen: NAD, AAOx3  CV: RRR  Pulm: no resp distress  Abd: Soft, distended, tender      Lab, Imaging and other studies:    I have personally reviewed pertinent lab results    , CBC:   No results found for: WBC, HGB, HCT, MCV, PLT, ADJUSTEDWBC, MCH, MCHC, RDW, MPV, NRBC, CMP:   No results found for: NA, K, CL, CO2, ANIONGAP, BUN, CREATININE, GLUCOSE, CALCIUM, AST, ALT, ALKPHOS, PROT, ALBUMIN, BILITOT, EGFR  VTE Pharmacologic Prophylaxis: Heparin  VTE Mechanical Prophylaxis: sequential compression device

## 2018-08-23 NOTE — PROGRESS NOTES
Notified red sx that patient had an episode of vomiting, clear fluid  Zofran offered,  Patient denied  Physician stated,  He will keep monitoring patient  No interventions required

## 2018-08-23 NOTE — PLAN OF CARE
DISCHARGE PLANNING     Discharge to home or other facility with appropriate resources Progressing        DISCHARGE PLANNING - CARE MANAGEMENT     Discharge to post-acute care or home with appropriate resources Progressing        GASTROINTESTINAL - ADULT     Minimal or absence of nausea and/or vomiting Progressing     Maintains or returns to baseline bowel function Progressing        PAIN - ADULT     Verbalizes/displays adequate comfort level or baseline comfort level Progressing        SAFETY ADULT     Patient will remain free of falls Progressing     Maintain or return to baseline ADL function Progressing     Maintain or return mobility status to optimal level Progressing

## 2018-08-23 NOTE — PROGRESS NOTES
Patient care rounds were completed with the patient's nurse today, Tory Peck  We discussed the plan is to keep him NPO with NG tube now placed for bowel decompression  Continue IV fluids for hydration  Monitor abdominal exam and await return of normal bowel function  We reviewed all of the invasive devices/lines/telemetry orders   - None  Pain Assessment / Plan:  - Continue current analgesic regimen  Mobility Assessment / Plan:  - Activity as tolerated  Goals / Barriers for discharge:  - Awaiting return of bowel function   - Case management following; case and discharge needs discussed  All questions and concerns were addressed  I spent greater than 15 minutes reviewing the plan with the patient and the nurse, and coordinating care for the day      Michael Stokes PA-C  8/23/2018 09:05 AM

## 2018-08-23 NOTE — PROGRESS NOTES
Pt IV infiltrated and patient refused to allow me to place a new IV  Bree Stapleton Resident was called and he came and talked to patient regarding need for IV site and IV fluids  Patient would only agree to a new IV if site rite was used  I tried to use the vein finder that is available on our floor but the patient refused, wants only ICU nurse with site rite to place the IV  ICU nurse came to floor to place IV without the site rite as they are all in use at this time and patient would not allow her to place the IV without it  She will return when site rite available  Resident aware pt is still without IV site at this time

## 2018-08-24 ENCOUNTER — APPOINTMENT (INPATIENT)
Dept: RADIOLOGY | Facility: HOSPITAL | Age: 58
DRG: 390 | End: 2018-08-24
Payer: COMMERCIAL

## 2018-08-24 LAB
ANION GAP SERPL CALCULATED.3IONS-SCNC: 9 MMOL/L (ref 4–13)
BASOPHILS # BLD MANUAL: 0 THOUSAND/UL (ref 0–0.1)
BASOPHILS NFR MAR MANUAL: 0 % (ref 0–1)
BUN SERPL-MCNC: 10 MG/DL (ref 5–25)
CALCIUM SERPL-MCNC: 8.1 MG/DL (ref 8.3–10.1)
CHLORIDE SERPL-SCNC: 104 MMOL/L (ref 100–108)
CO2 SERPL-SCNC: 24 MMOL/L (ref 21–32)
CREAT SERPL-MCNC: 0.56 MG/DL (ref 0.6–1.3)
EOSINOPHIL # BLD MANUAL: 0.06 THOUSAND/UL (ref 0–0.4)
EOSINOPHIL NFR BLD MANUAL: 1 % (ref 0–6)
ERYTHROCYTE [DISTWIDTH] IN BLOOD BY AUTOMATED COUNT: 13.2 % (ref 11.6–15.1)
GFR SERPL CREATININE-BSD FRML MDRD: 115 ML/MIN/1.73SQ M
GLUCOSE SERPL-MCNC: 112 MG/DL (ref 65–140)
GLUCOSE SERPL-MCNC: 114 MG/DL (ref 65–140)
GLUCOSE SERPL-MCNC: 126 MG/DL (ref 65–140)
GLUCOSE SERPL-MCNC: 130 MG/DL (ref 65–140)
GLUCOSE SERPL-MCNC: 147 MG/DL (ref 65–140)
HCT VFR BLD AUTO: 37.7 % (ref 36.5–49.3)
HGB BLD-MCNC: 12.4 G/DL (ref 12–17)
LYMPHOCYTES # BLD AUTO: 0.95 THOUSAND/UL (ref 0.6–4.47)
LYMPHOCYTES # BLD AUTO: 16 % (ref 14–44)
MAGNESIUM SERPL-MCNC: 2.1 MG/DL (ref 1.6–2.6)
MCH RBC QN AUTO: 30.2 PG (ref 26.8–34.3)
MCHC RBC AUTO-ENTMCNC: 32.9 G/DL (ref 31.4–37.4)
MCV RBC AUTO: 92 FL (ref 82–98)
METAMYELOCYTES NFR BLD MANUAL: 1 % (ref 0–1)
MONOCYTES # BLD AUTO: 1.36 THOUSAND/UL (ref 0–1.22)
MONOCYTES NFR BLD: 23 % (ref 4–12)
NEUTROPHILS # BLD MANUAL: 3.5 THOUSAND/UL (ref 1.85–7.62)
NEUTS SEG NFR BLD AUTO: 59 % (ref 43–75)
NRBC BLD AUTO-RTO: 0 /100 WBCS
PLATELET # BLD AUTO: 372 THOUSANDS/UL (ref 149–390)
PLATELET BLD QL SMEAR: ADEQUATE
PMV BLD AUTO: 10.2 FL (ref 8.9–12.7)
POTASSIUM SERPL-SCNC: 3.4 MMOL/L (ref 3.5–5.3)
RBC # BLD AUTO: 4.1 MILLION/UL (ref 3.88–5.62)
RBC MORPH BLD: NORMAL
SODIUM SERPL-SCNC: 137 MMOL/L (ref 136–145)
WBC # BLD AUTO: 5.93 THOUSAND/UL (ref 4.31–10.16)

## 2018-08-24 PROCEDURE — 80048 BASIC METABOLIC PNL TOTAL CA: CPT | Performed by: STUDENT IN AN ORGANIZED HEALTH CARE EDUCATION/TRAINING PROGRAM

## 2018-08-24 PROCEDURE — 85027 COMPLETE CBC AUTOMATED: CPT | Performed by: STUDENT IN AN ORGANIZED HEALTH CARE EDUCATION/TRAINING PROGRAM

## 2018-08-24 PROCEDURE — 85007 BL SMEAR W/DIFF WBC COUNT: CPT | Performed by: STUDENT IN AN ORGANIZED HEALTH CARE EDUCATION/TRAINING PROGRAM

## 2018-08-24 PROCEDURE — 99024 POSTOP FOLLOW-UP VISIT: CPT | Performed by: SURGERY

## 2018-08-24 PROCEDURE — 74250 X-RAY XM SM INT 1CNTRST STD: CPT

## 2018-08-24 PROCEDURE — 83735 ASSAY OF MAGNESIUM: CPT | Performed by: STUDENT IN AN ORGANIZED HEALTH CARE EDUCATION/TRAINING PROGRAM

## 2018-08-24 PROCEDURE — 82948 REAGENT STRIP/BLOOD GLUCOSE: CPT

## 2018-08-24 RX ORDER — PROMETHAZINE HYDROCHLORIDE 25 MG/ML
25 INJECTION, SOLUTION INTRAMUSCULAR; INTRAVENOUS EVERY 6 HOURS PRN
Status: DISCONTINUED | OUTPATIENT
Start: 2018-08-24 | End: 2018-09-07 | Stop reason: HOSPADM

## 2018-08-24 RX ADMIN — ONDANSETRON 4 MG: 2 INJECTION INTRAMUSCULAR; INTRAVENOUS at 15:11

## 2018-08-24 RX ADMIN — HYDROMORPHONE HYDROCHLORIDE 0.5 MG: 1 INJECTION, SOLUTION INTRAMUSCULAR; INTRAVENOUS; SUBCUTANEOUS at 09:29

## 2018-08-24 RX ADMIN — DEXTROSE, SODIUM CHLORIDE, AND POTASSIUM CHLORIDE 100 ML/HR: 5; .45; .15 INJECTION INTRAVENOUS at 06:41

## 2018-08-24 RX ADMIN — HEPARIN SODIUM 5000 UNITS: 5000 INJECTION, SOLUTION INTRAVENOUS; SUBCUTANEOUS at 21:56

## 2018-08-24 RX ADMIN — POTASSIUM CHLORIDE 20 MEQ: 2 INJECTION, SOLUTION, CONCENTRATE INTRAVENOUS at 16:46

## 2018-08-24 RX ADMIN — IOHEXOL 200 ML: 350 INJECTION, SOLUTION INTRAVENOUS at 09:00

## 2018-08-24 RX ADMIN — POTASSIUM CHLORIDE 20 MEQ: 2 INJECTION, SOLUTION, CONCENTRATE INTRAVENOUS at 16:55

## 2018-08-24 RX ADMIN — HYDROMORPHONE HYDROCHLORIDE 0.5 MG: 1 INJECTION, SOLUTION INTRAMUSCULAR; INTRAVENOUS; SUBCUTANEOUS at 12:18

## 2018-08-24 RX ADMIN — HEPARIN SODIUM 5000 UNITS: 5000 INJECTION, SOLUTION INTRAVENOUS; SUBCUTANEOUS at 06:49

## 2018-08-24 RX ADMIN — HYDROMORPHONE HYDROCHLORIDE 0.5 MG: 1 INJECTION, SOLUTION INTRAMUSCULAR; INTRAVENOUS; SUBCUTANEOUS at 06:40

## 2018-08-24 RX ADMIN — DEXTROSE, SODIUM CHLORIDE, AND POTASSIUM CHLORIDE 100 ML/HR: 5; .45; .15 INJECTION INTRAVENOUS at 16:46

## 2018-08-24 RX ADMIN — HEPARIN SODIUM 5000 UNITS: 5000 INJECTION, SOLUTION INTRAVENOUS; SUBCUTANEOUS at 13:25

## 2018-08-24 RX ADMIN — HYDROMORPHONE HYDROCHLORIDE 0.5 MG: 1 INJECTION, SOLUTION INTRAMUSCULAR; INTRAVENOUS; SUBCUTANEOUS at 18:07

## 2018-08-24 RX ADMIN — HYDROMORPHONE HYDROCHLORIDE 0.5 MG: 1 INJECTION, SOLUTION INTRAMUSCULAR; INTRAVENOUS; SUBCUTANEOUS at 20:27

## 2018-08-24 RX ADMIN — POTASSIUM CHLORIDE 20 MEQ: 2 INJECTION, SOLUTION, CONCENTRATE INTRAVENOUS at 12:04

## 2018-08-24 RX ADMIN — HYDROMORPHONE HYDROCHLORIDE 0.5 MG: 1 INJECTION, SOLUTION INTRAMUSCULAR; INTRAVENOUS; SUBCUTANEOUS at 15:15

## 2018-08-24 NOTE — MEDICAL STUDENT
Progress Note - General Surgery   Caitlin Bunn 63 y o  male MRN: [id number]  Unit/Bed#: [facility] 633-01 Encounter: [id number]    Assessment:  63year old male s/p ileostomy takedown on 8/17 with new onset abdominal pain likely from small bowel obstruction  Plan:  Continue NPO/NGT to suction  Continue maintenance IVF  OOB/ambulation as tolerated    Subjective/Objective   Chief Complaint: Abdominal pain    Subjective: States feeling maybe a little bit better, but not much  Had temperature of 110 5 F at 3:20pm, but not temps since then  Still feeling nauseous  Denied any fever or chills  Objective:     Blood pressure 135/66, pulse 80, temperature 98 8 °F (37 1 °C), temperature source Oral, resp  rate 16, height 5' 8" (1 727 m), weight 78 1 kg (172 lb 2 9 oz), SpO2 99 %  ,Body mass index is 26 18 kg/m²  I/O       08/22 0701 - 08/23 0700 08/23 0701 - 08/24 0700    P  O  0 0    I V  (mL/kg) 2937 5 (37 6) 1450 4 (18 6)    NG/GT  60    Total Intake(mL/kg) 2937 5 (37 6) 1510 4 (19 3)    Urine (mL/kg/hr) 425 (0 2) 900 (0 5)    Emesis/NG output  900    Total Output 425 1800    Net +2512 5 -289 6          Unmeasured Stool Occurrence 0 x           Invasive Devices     Peripheral Intravenous Line            Peripheral IV 08/23/18 Left Forearm less than 1 day          Drain            NG/OG/Enteral Tube Nasogastric Left nares less than 1 day                Physical Exam: General appearance: alert and oriented, in no acute distress  Nose: NGT in place with 900ml of billious output  Lungs: clear to auscultation bilaterally  Heart: regular rate and rhythm, S1, S2 normal, no murmur, click, rub or gallop  Abdomen: Distended  Tender to palpation diffusely, but primarily in epigastic region  Lab, Imaging and other studies:I have personally reviewed pertinent lab results

## 2018-08-24 NOTE — PROGRESS NOTES
I was taking out the graduate container and the  syringe from yesterday to replace with new ones  Pt got angry and accused me of treating him like a child taking his stuff away  Pt refused to listen or look at container that had yesterday's date on  Pt angry that I disconnected him from suction after getting a phone call from Radiology  Radiology stated they did not want him hooked up to suction  Pt replied his "stomach is too full and it has to come out"  Radiology aware

## 2018-08-24 NOTE — PROGRESS NOTES
Progress Note - General Surgery   Amee Bunn 63 y o  male MRN: [id number]  Unit/Bed#: [facility] 633-01 Encounter: [id number]    Assessment:  64M POD7 s/p ileostomy takedown with early pSBO  - NGT placed 8/23 with bilious output   - 900mL out  - IV continues to have issues - placement under US observation yesterday PM    Plan:  - NPO  - Continue NGT to suction   - Able to clamp NGT for Ambulation  - IVF hydration  - Await bowel function  - OOB/Ambulate in hallways    Subjective/Objective   Chief Complaint:     Subjective: No acute events overnight  Patient febrile at 3:20PM with fever of 100 5 however no fevers past this point  Patient still with abdominal pain however improving  Able to get up and ambulate in halls  States no if minimal flatus and no bowel movements to this point  Denies chills  Objective:     Blood pressure 135/66, pulse 80, temperature 98 8 °F (37 1 °C), temperature source Oral, resp  rate 16, height 5' 8" (1 727 m), weight 78 1 kg (172 lb 2 9 oz), SpO2 99 %  ,Body mass index is 26 18 kg/m²  Intake/Output Summary (Last 24 hours) at 08/24/18 0444  Last data filed at 08/24/18 0401   Gross per 24 hour   Intake          2514 59 ml   Output             1900 ml   Net           614 59 ml       Invasive Devices     Peripheral Intravenous Line            Peripheral IV 08/23/18 Left Forearm less than 1 day          Drain            NG/OG/Enteral Tube Nasogastric Left nares less than 1 day                Physical Exam: General: AAOx3  Respiratory: BS b/l  Abdomen: Soft, distended still, bowel sounds present all 4 quadrants  Abdomen is less tender today than previously patient able to tolerate abdominal exam better today than yesterday  Ileostomy site well healing  Heart: RRR, S1s2  Ext: Warm no cyanosis     Lab, Imaging and other studies:I have personally reviewed pertinent lab results    , CBC: No results found for: WBC, HGB, HCT, MCV, PLT, ADJUSTEDWBC, MCH, MCHC, RDW, MPV, NRBC, CMP: No results found for: NA, K, CL, CO2, ANIONGAP, BUN, CREATININE, GLUCOSE, CALCIUM, AST, ALT, ALKPHOS, PROT, ALBUMIN, BILITOT, EGFR  VTE Pharmacologic Prophylaxis: Heparin  VTE Mechanical Prophylaxis: sequential compression device

## 2018-08-24 NOTE — NURSING NOTE
Had to call red surgery to get chloraseptic and mouth kote ordered for him to help with his throat pain  Gave him a few ice chips  He is aware not to eat too many ice chips as it can give a false output in his NG tube

## 2018-08-24 NOTE — NURSING NOTE
Pt was giving the day RN a hard time with getting an IV site  He finally let us put one in him and pain meds were given  He has walked the halls about 2 times this evening  NG tube was flushed 2 times and he is putting out bile  Belly is still firm and distended but improving slowly

## 2018-08-25 ENCOUNTER — APPOINTMENT (INPATIENT)
Dept: RADIOLOGY | Facility: HOSPITAL | Age: 58
DRG: 390 | End: 2018-08-25
Payer: COMMERCIAL

## 2018-08-25 LAB
ANION GAP SERPL CALCULATED.3IONS-SCNC: 6 MMOL/L (ref 4–13)
BASOPHILS # BLD MANUAL: 0 THOUSAND/UL (ref 0–0.1)
BASOPHILS NFR MAR MANUAL: 0 % (ref 0–1)
BUN SERPL-MCNC: 10 MG/DL (ref 5–25)
CALCIUM SERPL-MCNC: 8.5 MG/DL (ref 8.3–10.1)
CHLORIDE SERPL-SCNC: 103 MMOL/L (ref 100–108)
CO2 SERPL-SCNC: 31 MMOL/L (ref 21–32)
CREAT SERPL-MCNC: 0.59 MG/DL (ref 0.6–1.3)
EOSINOPHIL # BLD MANUAL: 0.31 THOUSAND/UL (ref 0–0.4)
EOSINOPHIL NFR BLD MANUAL: 3 % (ref 0–6)
ERYTHROCYTE [DISTWIDTH] IN BLOOD BY AUTOMATED COUNT: 13.3 % (ref 11.6–15.1)
GFR SERPL CREATININE-BSD FRML MDRD: 112 ML/MIN/1.73SQ M
GLUCOSE SERPL-MCNC: 111 MG/DL (ref 65–140)
GLUCOSE SERPL-MCNC: 114 MG/DL (ref 65–140)
GLUCOSE SERPL-MCNC: 124 MG/DL (ref 65–140)
GLUCOSE SERPL-MCNC: 132 MG/DL (ref 65–140)
HCT VFR BLD AUTO: 38.8 % (ref 36.5–49.3)
HGB BLD-MCNC: 12.7 G/DL (ref 12–17)
LYMPHOCYTES # BLD AUTO: 0.92 THOUSAND/UL (ref 0.6–4.47)
LYMPHOCYTES # BLD AUTO: 9 % (ref 14–44)
MAGNESIUM SERPL-MCNC: 2.1 MG/DL (ref 1.6–2.6)
MCH RBC QN AUTO: 30.5 PG (ref 26.8–34.3)
MCHC RBC AUTO-ENTMCNC: 32.7 G/DL (ref 31.4–37.4)
MCV RBC AUTO: 93 FL (ref 82–98)
MONOCYTES # BLD AUTO: 0.82 THOUSAND/UL (ref 0–1.22)
MONOCYTES NFR BLD: 8 % (ref 4–12)
NEUTROPHILS # BLD MANUAL: 8.18 THOUSAND/UL (ref 1.85–7.62)
NEUTS SEG NFR BLD AUTO: 80 % (ref 43–75)
NRBC BLD AUTO-RTO: 0 /100 WBCS
PHOSPHATE SERPL-MCNC: 2.5 MG/DL (ref 2.7–4.5)
PLATELET # BLD AUTO: 392 THOUSANDS/UL (ref 149–390)
PLATELET BLD QL SMEAR: ADEQUATE
PMV BLD AUTO: 9.8 FL (ref 8.9–12.7)
POLYCHROMASIA BLD QL SMEAR: PRESENT
POTASSIUM SERPL-SCNC: 3.8 MMOL/L (ref 3.5–5.3)
RBC # BLD AUTO: 4.17 MILLION/UL (ref 3.88–5.62)
RBC MORPH BLD: PRESENT
SODIUM SERPL-SCNC: 140 MMOL/L (ref 136–145)
WBC # BLD AUTO: 10.23 THOUSAND/UL (ref 4.31–10.16)

## 2018-08-25 PROCEDURE — 99024 POSTOP FOLLOW-UP VISIT: CPT | Performed by: SURGERY

## 2018-08-25 PROCEDURE — 74018 RADEX ABDOMEN 1 VIEW: CPT

## 2018-08-25 PROCEDURE — 83735 ASSAY OF MAGNESIUM: CPT | Performed by: SURGERY

## 2018-08-25 PROCEDURE — 82948 REAGENT STRIP/BLOOD GLUCOSE: CPT

## 2018-08-25 PROCEDURE — 80048 BASIC METABOLIC PNL TOTAL CA: CPT | Performed by: SURGERY

## 2018-08-25 PROCEDURE — C1751 CATH, INF, PER/CENT/MIDLINE: HCPCS

## 2018-08-25 PROCEDURE — 85007 BL SMEAR W/DIFF WBC COUNT: CPT | Performed by: SURGERY

## 2018-08-25 PROCEDURE — 02HV33Z INSERTION OF INFUSION DEVICE INTO SUPERIOR VENA CAVA, PERCUTANEOUS APPROACH: ICD-10-PCS | Performed by: SURGERY

## 2018-08-25 PROCEDURE — 36569 INSJ PICC 5 YR+ W/O IMAGING: CPT

## 2018-08-25 PROCEDURE — 85027 COMPLETE CBC AUTOMATED: CPT | Performed by: SURGERY

## 2018-08-25 PROCEDURE — 84100 ASSAY OF PHOSPHORUS: CPT | Performed by: SURGERY

## 2018-08-25 RX ADMIN — DEXTROSE, SODIUM CHLORIDE, AND POTASSIUM CHLORIDE 100 ML/HR: 5; .45; .15 INJECTION INTRAVENOUS at 02:46

## 2018-08-25 RX ADMIN — HEPARIN SODIUM 5000 UNITS: 5000 INJECTION, SOLUTION INTRAVENOUS; SUBCUTANEOUS at 15:09

## 2018-08-25 RX ADMIN — SODIUM CHLORIDE: 234 INJECTION INTRAMUSCULAR; INTRAVENOUS; SUBCUTANEOUS at 21:52

## 2018-08-25 RX ADMIN — DEXTROSE, SODIUM CHLORIDE, AND POTASSIUM CHLORIDE 125 ML/HR: 5; .45; .15 INJECTION INTRAVENOUS at 13:19

## 2018-08-25 RX ADMIN — HYDROMORPHONE HYDROCHLORIDE 0.5 MG: 1 INJECTION, SOLUTION INTRAMUSCULAR; INTRAVENOUS; SUBCUTANEOUS at 17:35

## 2018-08-25 RX ADMIN — HYDROMORPHONE HYDROCHLORIDE 0.5 MG: 1 INJECTION, SOLUTION INTRAMUSCULAR; INTRAVENOUS; SUBCUTANEOUS at 11:30

## 2018-08-25 RX ADMIN — HYDROMORPHONE HYDROCHLORIDE 0.5 MG: 1 INJECTION, SOLUTION INTRAMUSCULAR; INTRAVENOUS; SUBCUTANEOUS at 03:51

## 2018-08-25 RX ADMIN — HYDROMORPHONE HYDROCHLORIDE 0.5 MG: 1 INJECTION, SOLUTION INTRAMUSCULAR; INTRAVENOUS; SUBCUTANEOUS at 05:05

## 2018-08-25 RX ADMIN — HEPARIN SODIUM 5000 UNITS: 5000 INJECTION, SOLUTION INTRAVENOUS; SUBCUTANEOUS at 22:01

## 2018-08-25 RX ADMIN — HEPARIN SODIUM 5000 UNITS: 5000 INJECTION, SOLUTION INTRAVENOUS; SUBCUTANEOUS at 07:24

## 2018-08-25 RX ADMIN — PROMETHAZINE HYDROCHLORIDE 25 MG: 25 INJECTION INTRAMUSCULAR; INTRAVENOUS at 11:36

## 2018-08-25 NOTE — PLAN OF CARE
GASTROINTESTINAL - ADULT     Maintains or returns to baseline bowel function Not Progressing          DISCHARGE PLANNING     Discharge to home or other facility with appropriate resources Progressing        DISCHARGE PLANNING - CARE MANAGEMENT     Discharge to post-acute care or home with appropriate resources Progressing        GASTROINTESTINAL - ADULT     Minimal or absence of nausea and/or vomiting Progressing        PAIN - ADULT     Verbalizes/displays adequate comfort level or baseline comfort level Progressing        SAFETY ADULT     Patient will remain free of falls Progressing     Maintain or return to baseline ADL function Progressing     Maintain or return mobility status to optimal level Progressing

## 2018-08-25 NOTE — PROCEDURES
Insert PICC line  Date/Time: 8/25/2018 9:40 AM  Performed by: ZEYNEP COBB  Authorized by: Tania Peres     Patient location:  Bedside  Other Assisting Provider: Yes (comment)    Consent:     Consent obtained:  Written (obtained by physician)  Universal protocol:     Procedure explained and questions answered to patient or proxy's satisfaction: yes      Relevant documents present and verified: yes      Test results available and properly labeled: yes      Radiology Images displayed and confirmed  If images not available, report reviewed: yes      Required blood products, implants, devices, and special equipment available: yes      Site/side marked: yes      Immediately prior to procedure, a time out was called: yes      Patient identity confirmed:  Verbally with patient and arm band  Pre-procedure details:     Hand hygiene: Hand hygiene performed prior to insertion      Skin preparation:  ChloraPrep  Indications:     PICC line indications: total parenteral nutrition      PICC line indications comment:  Purple port labeled for tPN  Anesthesia (see MAR for exact dosages):      Anesthesia method:  Local infiltration    Local anesthetic:  Lidocaine 1% w/o epi (2ml)  Procedure details:     Location:  Basilic    Vessel type: vein      Laterality:  Right    Approach: percutaneous technique used      Patient position:  Flat    Procedural supplies:  Double lumen    Catheter size:  5 Fr    Landmarks identified: yes      Ultrasound guidance: yes      Sterile ultrasound techniques: Sterile gel and sterile probe covers were used      Number of attempts:  1    Successful placement: yes      Vessel of catheter tip end:  Sherlock 3CG confirmed (SVC)    Total catheter length (cm):  40    Catheter out on skin (cm):  1    Max flow rate:  999ml/hr    Arm circumference:  28  Post-procedure details:     Post-procedure:  Dressing applied and securement device placed    Assessment:  Blood return through all ports and free fluid flow    Patient tolerance of procedure:   Tolerated well, no immediate complications    Observer: Yes      Observer name:  Arden Kennedy, Inf Tech

## 2018-08-25 NOTE — NUTRITION
08/25/18 1318   Recommendations/Interventions   Interventions PN change admixture   Nutrition Recommendations Adjust EN/PN;Lab - consider order (specify)  (Suggest increase PN to provide 100% nutritional needs  Rec: 900 ml 10% aa, 900 ml 40% dex, 300 ml 20% lipids (2184 kcal, 90 gms pro, 2100 ml tv)   Monitor electrolytes  )

## 2018-08-25 NOTE — PROGRESS NOTES
Progress Note - General Surgery   Chika Bunn 63 y o  male MRN: [id number]  Unit/Bed#: [facility] 633-01 Encounter: [id number]    Assessment:  64M with SBO s/p ileostomy takedown 8/16    - SBFT 8/24 - showed minimal delayed transit into colon per radiology read suggestive of partial SBO - Recommend KUB    Plan:  - KUB 8/25 AM  - Continue NPO/NGT to Mariusz Head to clamp NGT for ambulation  - IVF Hydration  - Await bowel function  - Possible PICC line and TPN to be considered on 8/25      Subjective/Objective   Chief Complaint:     Subjective: Patient complaining of pain and asking for IV pain medicine q2hrs  Per nursing reports, patient had also been drinking various fluids throughout day prior to removal of these from room  Patient had episodes of nausea and vomiting around NGT with minimal thicker, sputum/pflegm like emesis, zofran and phenergan given  Patient is taking pain medicine around the clock with no bowel regimen prophylaxis  No longer passing flatus and no bowel movements  Objective:     Blood pressure 140/52, pulse 88, temperature 98 5 °F (36 9 °C), temperature source Oral, resp  rate 18, height 5' 8" (1 727 m), weight 78 4 kg (172 lb 13 5 oz), SpO2 98 %  ,Body mass index is 26 28 kg/m²  Intake/Output Summary (Last 24 hours) at 08/25/18 0239  Last data filed at 08/25/18 0227   Gross per 24 hour   Intake             2620 ml   Output             3975 ml   Net            -1355 ml       Invasive Devices     Peripheral Intravenous Line            Peripheral IV 08/23/18 Left Forearm 1 day          Drain            NG/OG/Enteral Tube Nasogastric Left nares 1 day                Physical Exam: General: AAOx3  Respiratory: BS b/l  Abdomen: Distended, minimal tenderness, bowel sounds distant in RLQ, not present any other quadrant  Ileostomy site - healing appropriately  NGT - was initially bilious drainage, as of this morning is clear fluid    Heart: RRR, S1s2  Ext: Warm no cyanosis     Lab, Imaging and other studies:    8/24 Small Bowel Follow Through study - IMPRESSION:     Findings suggestive of partial small bowel obstruction  Follow-up abdominal radiographs in 6 hours recommended  I have personally reviewed pertinent lab results    , CBC:   Lab Results   Component Value Date    WBC 5 93 08/24/2018    HGB 12 4 08/24/2018    HCT 37 7 08/24/2018    MCV 92 08/24/2018     08/24/2018    MCH 30 2 08/24/2018    MCHC 32 9 08/24/2018    RDW 13 2 08/24/2018    MPV 10 2 08/24/2018    NRBC 0 08/24/2018   , CMP:   Lab Results   Component Value Date     08/24/2018    K 3 4 (L) 08/24/2018     08/24/2018    CO2 24 08/24/2018    ANIONGAP 9 08/24/2018    BUN 10 08/24/2018    CREATININE 0 56 (L) 08/24/2018    GLUCOSE 114 08/24/2018    CALCIUM 8 1 (L) 08/24/2018    EGFR 115 08/24/2018     VTE Pharmacologic Prophylaxis: Heparin  VTE Mechanical Prophylaxis: sequential compression device

## 2018-08-26 LAB
ANION GAP SERPL CALCULATED.3IONS-SCNC: 2 MMOL/L (ref 4–13)
BUN SERPL-MCNC: 11 MG/DL (ref 5–25)
CALCIUM SERPL-MCNC: 7.9 MG/DL (ref 8.3–10.1)
CHLORIDE SERPL-SCNC: 104 MMOL/L (ref 100–108)
CO2 SERPL-SCNC: 32 MMOL/L (ref 21–32)
CREAT SERPL-MCNC: 0.73 MG/DL (ref 0.6–1.3)
ERYTHROCYTE [DISTWIDTH] IN BLOOD BY AUTOMATED COUNT: 13.4 % (ref 11.6–15.1)
GFR SERPL CREATININE-BSD FRML MDRD: 103 ML/MIN/1.73SQ M
GLUCOSE SERPL-MCNC: 118 MG/DL (ref 65–140)
GLUCOSE SERPL-MCNC: 122 MG/DL (ref 65–140)
GLUCOSE SERPL-MCNC: 133 MG/DL (ref 65–140)
GLUCOSE SERPL-MCNC: 136 MG/DL (ref 65–140)
HCT VFR BLD AUTO: 38.8 % (ref 36.5–49.3)
HGB BLD-MCNC: 12.3 G/DL (ref 12–17)
MCH RBC QN AUTO: 29.8 PG (ref 26.8–34.3)
MCHC RBC AUTO-ENTMCNC: 31.7 G/DL (ref 31.4–37.4)
MCV RBC AUTO: 94 FL (ref 82–98)
PLATELET # BLD AUTO: 393 THOUSANDS/UL (ref 149–390)
PMV BLD AUTO: 9.5 FL (ref 8.9–12.7)
POTASSIUM SERPL-SCNC: 3.5 MMOL/L (ref 3.5–5.3)
RBC # BLD AUTO: 4.13 MILLION/UL (ref 3.88–5.62)
SODIUM SERPL-SCNC: 138 MMOL/L (ref 136–145)
WBC # BLD AUTO: 12.59 THOUSAND/UL (ref 4.31–10.16)

## 2018-08-26 PROCEDURE — 85027 COMPLETE CBC AUTOMATED: CPT | Performed by: SURGERY

## 2018-08-26 PROCEDURE — 99024 POSTOP FOLLOW-UP VISIT: CPT | Performed by: SURGERY

## 2018-08-26 PROCEDURE — 80048 BASIC METABOLIC PNL TOTAL CA: CPT | Performed by: SURGERY

## 2018-08-26 PROCEDURE — 82948 REAGENT STRIP/BLOOD GLUCOSE: CPT

## 2018-08-26 RX ADMIN — HYDROMORPHONE HYDROCHLORIDE 0.5 MG: 1 INJECTION, SOLUTION INTRAMUSCULAR; INTRAVENOUS; SUBCUTANEOUS at 14:35

## 2018-08-26 RX ADMIN — HYDROMORPHONE HYDROCHLORIDE 0.2 MG: 1 INJECTION, SOLUTION INTRAMUSCULAR; INTRAVENOUS; SUBCUTANEOUS at 01:16

## 2018-08-26 RX ADMIN — HYDROMORPHONE HYDROCHLORIDE 0.5 MG: 1 INJECTION, SOLUTION INTRAMUSCULAR; INTRAVENOUS; SUBCUTANEOUS at 03:31

## 2018-08-26 RX ADMIN — HEPARIN SODIUM 5000 UNITS: 5000 INJECTION, SOLUTION INTRAVENOUS; SUBCUTANEOUS at 14:35

## 2018-08-26 RX ADMIN — ALTEPLASE 2 MG: 2.2 INJECTION, POWDER, LYOPHILIZED, FOR SOLUTION INTRAVENOUS at 02:14

## 2018-08-26 RX ADMIN — SODIUM CHLORIDE: 234 INJECTION INTRAMUSCULAR; INTRAVENOUS; SUBCUTANEOUS at 22:15

## 2018-08-26 RX ADMIN — HEPARIN SODIUM 5000 UNITS: 5000 INJECTION, SOLUTION INTRAVENOUS; SUBCUTANEOUS at 22:15

## 2018-08-26 RX ADMIN — HEPARIN SODIUM 5000 UNITS: 5000 INJECTION, SOLUTION INTRAVENOUS; SUBCUTANEOUS at 05:51

## 2018-08-26 RX ADMIN — HYDROMORPHONE HYDROCHLORIDE 0.5 MG: 1 INJECTION, SOLUTION INTRAMUSCULAR; INTRAVENOUS; SUBCUTANEOUS at 18:54

## 2018-08-26 RX ADMIN — PROMETHAZINE HYDROCHLORIDE 25 MG: 25 INJECTION INTRAMUSCULAR; INTRAVENOUS at 18:57

## 2018-08-26 RX ADMIN — ONDANSETRON 4 MG: 2 INJECTION INTRAMUSCULAR; INTRAVENOUS at 14:39

## 2018-08-26 NOTE — PLAN OF CARE
DISCHARGE PLANNING     Discharge to home or other facility with appropriate resources Progressing        DISCHARGE PLANNING - CARE MANAGEMENT     Discharge to post-acute care or home with appropriate resources Progressing        GASTROINTESTINAL - ADULT     Minimal or absence of nausea and/or vomiting Progressing     Maintains or returns to baseline bowel function Progressing        Nutrition/Hydration-ADULT     Nutrient/Hydration intake appropriate for improving, restoring or maintaining nutritional needs Progressing        PAIN - ADULT     Verbalizes/displays adequate comfort level or baseline comfort level Progressing        Potential for Falls     Patient will remain free of falls Progressing        SAFETY ADULT     Patient will remain free of falls Progressing     Maintain or return to baseline ADL function Progressing     Maintain or return mobility status to optimal level Progressing

## 2018-08-26 NOTE — PROGRESS NOTES
Progress Note - General Surgery   Clifford Siemens Rites 63 y o  male MRN: [id number]  Unit/Bed#: [facility] 633-01 Encounter: [id number]    Assessment:  64M with SBO s/p ileostomy takedown 8/16    - 3x BM yesterday and less distended  Plan:  - Clamp trial until 10AM  - If negative remove tube and start sips  -Continue TPN      Subjective/Objective   Chief Complaint:     Subjective:   Events as above  Having bowel movements  Objective:     Blood pressure 141/97, pulse 75, temperature 98 24 °F (36 8 °C), resp  rate 20, height 5' 8" (1 727 m), weight 77 kg (169 lb 12 1 oz), SpO2 93 %  ,Body mass index is 25 81 kg/m²  Intake/Output Summary (Last 24 hours) at 08/26/18 0743  Last data filed at 08/26/18 0601   Gross per 24 hour   Intake          1571 59 ml   Output             3350 ml   Net         -1778 41 ml       Invasive Devices     Peripherally Inserted Central Catheter Line            PICC Line 56/96/23 Right Basilic less than 1 day          Drain            NG/OG/Enteral Tube Nasogastric Left nares 2 days                Physical Exam:   Gen: NAD, AAOx3  CV: RRR  Pulm: no resp distress  Abd: Soft, distended, non-tender, incisions c/d/i      Lab, Imaging and other studies:    8/24 Small Bowel Follow Through study - IMPRESSION:     Findings suggestive of partial small bowel obstruction  Follow-up abdominal radiographs in 6 hours recommended  I have personally reviewed pertinent lab results    , CBC:   Lab Results   Component Value Date    WBC 10 23 (H) 08/25/2018    HGB 12 7 08/25/2018    HCT 38 8 08/25/2018    MCV 93 08/25/2018     (H) 08/25/2018    MCH 30 5 08/25/2018    MCHC 32 7 08/25/2018    RDW 13 3 08/25/2018    MPV 9 8 08/25/2018    NRBC 0 08/25/2018   , CMP:   Lab Results   Component Value Date     08/25/2018    K 3 8 08/25/2018     08/25/2018    CO2 31 08/25/2018    ANIONGAP 6 08/25/2018    BUN 10 08/25/2018    CREATININE 0 59 (L) 08/25/2018    GLUCOSE 124 08/25/2018    CALCIUM 8 5 08/25/2018    EGFR 112 08/25/2018     VTE Pharmacologic Prophylaxis: Heparin  VTE Mechanical Prophylaxis: sequential compression device

## 2018-08-27 LAB
ANION GAP SERPL CALCULATED.3IONS-SCNC: 4 MMOL/L (ref 4–13)
BASOPHILS # BLD AUTO: 0.04 THOUSANDS/ΜL (ref 0–0.1)
BASOPHILS NFR BLD AUTO: 0 % (ref 0–1)
BUN SERPL-MCNC: 8 MG/DL (ref 5–25)
CALCIUM SERPL-MCNC: 8 MG/DL (ref 8.3–10.1)
CHLORIDE SERPL-SCNC: 105 MMOL/L (ref 100–108)
CO2 SERPL-SCNC: 29 MMOL/L (ref 21–32)
CREAT SERPL-MCNC: 0.57 MG/DL (ref 0.6–1.3)
EOSINOPHIL # BLD AUTO: 0.2 THOUSAND/ΜL (ref 0–0.61)
EOSINOPHIL NFR BLD AUTO: 2 % (ref 0–6)
ERYTHROCYTE [DISTWIDTH] IN BLOOD BY AUTOMATED COUNT: 13.3 % (ref 11.6–15.1)
GFR SERPL CREATININE-BSD FRML MDRD: 114 ML/MIN/1.73SQ M
GLUCOSE SERPL-MCNC: 105 MG/DL (ref 65–140)
GLUCOSE SERPL-MCNC: 109 MG/DL (ref 65–140)
GLUCOSE SERPL-MCNC: 118 MG/DL (ref 65–140)
GLUCOSE SERPL-MCNC: 124 MG/DL (ref 65–140)
GLUCOSE SERPL-MCNC: 143 MG/DL (ref 65–140)
HCT VFR BLD AUTO: 38.7 % (ref 36.5–49.3)
HGB BLD-MCNC: 12.1 G/DL (ref 12–17)
IMM GRANULOCYTES # BLD AUTO: 0.09 THOUSAND/UL (ref 0–0.2)
IMM GRANULOCYTES NFR BLD AUTO: 1 % (ref 0–2)
LYMPHOCYTES # BLD AUTO: 2.79 THOUSANDS/ΜL (ref 0.6–4.47)
LYMPHOCYTES NFR BLD AUTO: 21 % (ref 14–44)
MCH RBC QN AUTO: 29.4 PG (ref 26.8–34.3)
MCHC RBC AUTO-ENTMCNC: 31.3 G/DL (ref 31.4–37.4)
MCV RBC AUTO: 94 FL (ref 82–98)
MONOCYTES # BLD AUTO: 1.3 THOUSAND/ΜL (ref 0.17–1.22)
MONOCYTES NFR BLD AUTO: 10 % (ref 4–12)
NEUTROPHILS # BLD AUTO: 8.73 THOUSANDS/ΜL (ref 1.85–7.62)
NEUTS SEG NFR BLD AUTO: 66 % (ref 43–75)
NRBC BLD AUTO-RTO: 0 /100 WBCS
PLATELET # BLD AUTO: 364 THOUSANDS/UL (ref 149–390)
PMV BLD AUTO: 9.5 FL (ref 8.9–12.7)
POTASSIUM SERPL-SCNC: 3.6 MMOL/L (ref 3.5–5.3)
RBC # BLD AUTO: 4.12 MILLION/UL (ref 3.88–5.62)
SODIUM SERPL-SCNC: 138 MMOL/L (ref 136–145)
WBC # BLD AUTO: 13.15 THOUSAND/UL (ref 4.31–10.16)

## 2018-08-27 PROCEDURE — 85025 COMPLETE CBC W/AUTO DIFF WBC: CPT | Performed by: SURGERY

## 2018-08-27 PROCEDURE — 99024 POSTOP FOLLOW-UP VISIT: CPT | Performed by: SURGERY

## 2018-08-27 PROCEDURE — 80048 BASIC METABOLIC PNL TOTAL CA: CPT | Performed by: SURGERY

## 2018-08-27 PROCEDURE — C9113 INJ PANTOPRAZOLE SODIUM, VIA: HCPCS | Performed by: SURGERY

## 2018-08-27 PROCEDURE — 82948 REAGENT STRIP/BLOOD GLUCOSE: CPT

## 2018-08-27 RX ORDER — PANTOPRAZOLE SODIUM 40 MG/1
40 INJECTION, POWDER, FOR SOLUTION INTRAVENOUS ONCE
Status: COMPLETED | OUTPATIENT
Start: 2018-08-27 | End: 2018-08-27

## 2018-08-27 RX ORDER — ACETAMINOPHEN 325 MG/1
650 TABLET ORAL EVERY 6 HOURS PRN
Status: DISCONTINUED | OUTPATIENT
Start: 2018-08-27 | End: 2018-08-28

## 2018-08-27 RX ORDER — OXYCODONE HYDROCHLORIDE 5 MG/1
5 TABLET ORAL EVERY 4 HOURS PRN
Status: DISCONTINUED | OUTPATIENT
Start: 2018-08-27 | End: 2018-09-07 | Stop reason: HOSPADM

## 2018-08-27 RX ORDER — OXYCODONE HYDROCHLORIDE 10 MG/1
10 TABLET ORAL EVERY 4 HOURS PRN
Status: DISCONTINUED | OUTPATIENT
Start: 2018-08-27 | End: 2018-09-05

## 2018-08-27 RX ADMIN — POTASSIUM CHLORIDE 20 MEQ: 2 INJECTION, SOLUTION, CONCENTRATE INTRAVENOUS at 10:11

## 2018-08-27 RX ADMIN — PANTOPRAZOLE SODIUM 40 MG: 40 INJECTION, POWDER, FOR SOLUTION INTRAVENOUS at 03:35

## 2018-08-27 RX ADMIN — HEPARIN SODIUM 5000 UNITS: 5000 INJECTION, SOLUTION INTRAVENOUS; SUBCUTANEOUS at 23:16

## 2018-08-27 RX ADMIN — HYDROMORPHONE HYDROCHLORIDE 0.5 MG: 1 INJECTION, SOLUTION INTRAMUSCULAR; INTRAVENOUS; SUBCUTANEOUS at 20:35

## 2018-08-27 RX ADMIN — OXYCODONE HYDROCHLORIDE 10 MG: 10 TABLET ORAL at 19:56

## 2018-08-27 RX ADMIN — DEXTROSE, SODIUM CHLORIDE, AND POTASSIUM CHLORIDE 76.8 ML/HR: 5; .45; .15 INJECTION INTRAVENOUS at 09:07

## 2018-08-27 RX ADMIN — POTASSIUM CHLORIDE 20 MEQ: 2 INJECTION, SOLUTION, CONCENTRATE INTRAVENOUS at 16:42

## 2018-08-27 RX ADMIN — HYDROMORPHONE HYDROCHLORIDE 0.5 MG: 1 INJECTION, SOLUTION INTRAMUSCULAR; INTRAVENOUS; SUBCUTANEOUS at 06:09

## 2018-08-27 RX ADMIN — DOXAZOSIN 4 MG: 4 TABLET ORAL at 09:04

## 2018-08-27 RX ADMIN — HYDROMORPHONE HYDROCHLORIDE 0.5 MG: 1 INJECTION, SOLUTION INTRAMUSCULAR; INTRAVENOUS; SUBCUTANEOUS at 23:15

## 2018-08-27 RX ADMIN — HEPARIN SODIUM 5000 UNITS: 5000 INJECTION, SOLUTION INTRAVENOUS; SUBCUTANEOUS at 05:51

## 2018-08-27 RX ADMIN — ACETAMINOPHEN 650 MG: 325 TABLET, FILM COATED ORAL at 09:04

## 2018-08-27 RX ADMIN — POTASSIUM CHLORIDE 20 MEQ: 2 INJECTION, SOLUTION, CONCENTRATE INTRAVENOUS at 12:56

## 2018-08-27 RX ADMIN — HYDROMORPHONE HYDROCHLORIDE 0.5 MG: 1 INJECTION, SOLUTION INTRAMUSCULAR; INTRAVENOUS; SUBCUTANEOUS at 02:42

## 2018-08-27 RX ADMIN — HEPARIN SODIUM 5000 UNITS: 5000 INJECTION, SOLUTION INTRAVENOUS; SUBCUTANEOUS at 14:46

## 2018-08-27 RX ADMIN — DEXTROSE, SODIUM CHLORIDE, AND POTASSIUM CHLORIDE 125 ML/HR: 5; .45; .15 INJECTION INTRAVENOUS at 23:23

## 2018-08-27 RX ADMIN — OXYCODONE HYDROCHLORIDE 10 MG: 10 TABLET ORAL at 14:50

## 2018-08-27 RX ADMIN — ONDANSETRON 4 MG: 2 INJECTION INTRAMUSCULAR; INTRAVENOUS at 02:44

## 2018-08-27 RX ADMIN — HYDROMORPHONE HYDROCHLORIDE 0.5 MG: 1 INJECTION, SOLUTION INTRAMUSCULAR; INTRAVENOUS; SUBCUTANEOUS at 03:27

## 2018-08-27 RX ADMIN — METHYLNALTREXONE BROMIDE 8 MG: 12 INJECTION, SOLUTION SUBCUTANEOUS at 10:16

## 2018-08-27 NOTE — PROGRESS NOTES
Progress Note - General Surgery   Viviana Bunn 63 y o  male MRN: [id number]  Unit/Bed#: [facility] 633-01 Encounter: [id number]    Assessment:  Pt is a 64M with SBO s/p ileostomy takedown 8/16  +4 watery bowel movements yesterday, denies flatus    Plan:  Clears  IVF  PICC/TPN  Prn pain control  Encourage ambulation  SQH/SCDs      Subjective/Objective   Chief Complaint:     Subjective: BERNARDO  No flatus, 4 watery BMs  Tried sipping apple juice and had a lot of cramping and pain in his abdomen  Objective:     Blood pressure 135/88, pulse 82, temperature 98 78 °F (37 1 °C), resp  rate 20, height 5' 8" (1 727 m), weight 77 kg (169 lb 12 1 oz), SpO2 94 %  ,Body mass index is 25 81 kg/m²  Intake/Output Summary (Last 24 hours) at 08/27/18 0600  Last data filed at 08/27/18 0401   Gross per 24 hour   Intake          3310 96 ml   Output              950 ml   Net          2360 96 ml       Invasive Devices     Peripherally Inserted Central Catheter Line            PICC Line 95/99/68 Right Basilic 1 day                Physical Exam: NAD  AAOX3  Normal respiratory effort  Soft, TTP RLQ over old ileostomy site, distended, tympanic  No c/c/e      Lab, Imaging and other studies:  I have personally reviewed pertinent lab results    , CBC:   Lab Results   Component Value Date    WBC 12 59 (H) 08/26/2018    HGB 12 3 08/26/2018    HCT 38 8 08/26/2018    MCV 94 08/26/2018     (H) 08/26/2018    MCH 29 8 08/26/2018    MCHC 31 7 08/26/2018    RDW 13 4 08/26/2018    MPV 9 5 08/26/2018   , CMP:   Lab Results   Component Value Date     08/26/2018    K 3 5 08/26/2018     08/26/2018    CO2 32 08/26/2018    ANIONGAP 2 (L) 08/26/2018    BUN 11 08/26/2018    CREATININE 0 73 08/26/2018    GLUCOSE 136 08/26/2018    CALCIUM 7 9 (L) 08/26/2018    EGFR 103 08/26/2018     VTE Pharmacologic Prophylaxis: Heparin  VTE Mechanical Prophylaxis: sequential compression device

## 2018-08-27 NOTE — MEDICAL STUDENT
Progress Note - General Surgery   [facility] Sepideh 63 y o  male MRN: [id number]  Unit/Bed#: [facility] 633-01 Encounter: [id number]    Assessment:  63year old male s/p ileostomy takedown on 8/17 with new onset abdominal pain likely from small bowel obstruction  Plan:  Continue clears  Out of bed/ambulate as tolerated  Continue IVF/TPN  Continue current pain regimen    Subjective/Objective   Chief Complaint: Abdominal pain    Subjective: Denies any nausea or vomiting  Has had 4 bowel movements  Complains of cramping abdominal pain once he tried sipping some apple juice last night  Also complains of being diaphoretic all night  Objective:     Blood pressure 135/88, pulse 82, temperature 98 78 °F (37 1 °C), resp  rate 20, height 5' 8" (1 727 m), weight 78 kg (171 lb 15 3 oz), SpO2 94 %  ,Body mass index is 26 15 kg/m²  I/O       08/25 0701 - 08/26 0700 08/26 0701 - 08/27 0700    P  O  0 0    I V  (mL/kg) 1178 8 (15 3) 1547 3 (19 8)     8 1060 4    Total Intake(mL/kg) 1571 6 (20 4) 2607 7 (33 4)    Urine (mL/kg/hr) 725 (0 4) 550 (0 3)    Emesis/NG output 2625 0    Total Output 3350 550    Net -1778 4 +2057 7          Unmeasured Urine Occurrence  2 x    Unmeasured Stool Occurrence 1 x           Invasive Devices     Peripherally Inserted Central Catheter Line            PICC Line 00/33/86 Right Basilic 1 day                Physical Exam: /88   Pulse 82   Temp 98 78 °F (37 1 °C)   Resp 20   Ht 5' 8" (1 727 m)   Wt 78 kg (171 lb 15 3 oz)   SpO2 94%   BMI 26 15 kg/m²   General appearance: alert and oriented, in no acute distress  Nose: NG tube no longer present  Lungs: clear to auscultation bilaterally  Heart: regular rate and rhythm, S1, S2 normal, no murmur, click, rub or gallop  Abdomen: Less distended than previously, tender to palpation diffusely, bowel sounds present in all 4 quadrants but hypoactive  Lab, Imaging and other studies:I have personally reviewed pertinent lab results

## 2018-08-28 LAB
ANION GAP SERPL CALCULATED.3IONS-SCNC: 6 MMOL/L (ref 4–13)
BASOPHILS # BLD AUTO: 0.05 THOUSANDS/ΜL (ref 0–0.1)
BASOPHILS NFR BLD AUTO: 1 % (ref 0–1)
BUN SERPL-MCNC: 6 MG/DL (ref 5–25)
CALCIUM SERPL-MCNC: 8.1 MG/DL (ref 8.3–10.1)
CHLORIDE SERPL-SCNC: 104 MMOL/L (ref 100–108)
CO2 SERPL-SCNC: 27 MMOL/L (ref 21–32)
CREAT SERPL-MCNC: 0.6 MG/DL (ref 0.6–1.3)
EOSINOPHIL # BLD AUTO: 0.21 THOUSAND/ΜL (ref 0–0.61)
EOSINOPHIL NFR BLD AUTO: 2 % (ref 0–6)
ERYTHROCYTE [DISTWIDTH] IN BLOOD BY AUTOMATED COUNT: 13.2 % (ref 11.6–15.1)
GFR SERPL CREATININE-BSD FRML MDRD: 112 ML/MIN/1.73SQ M
GLUCOSE SERPL-MCNC: 104 MG/DL (ref 65–140)
GLUCOSE SERPL-MCNC: 104 MG/DL (ref 65–140)
GLUCOSE SERPL-MCNC: 111 MG/DL (ref 65–140)
GLUCOSE SERPL-MCNC: 114 MG/DL (ref 65–140)
GLUCOSE SERPL-MCNC: 98 MG/DL (ref 65–140)
HCT VFR BLD AUTO: 37.4 % (ref 36.5–49.3)
HGB BLD-MCNC: 11.7 G/DL (ref 12–17)
IMM GRANULOCYTES # BLD AUTO: 0.14 THOUSAND/UL (ref 0–0.2)
IMM GRANULOCYTES NFR BLD AUTO: 1 % (ref 0–2)
LYMPHOCYTES # BLD AUTO: 2.39 THOUSANDS/ΜL (ref 0.6–4.47)
LYMPHOCYTES NFR BLD AUTO: 22 % (ref 14–44)
MAGNESIUM SERPL-MCNC: 2.2 MG/DL (ref 1.6–2.6)
MCH RBC QN AUTO: 29.7 PG (ref 26.8–34.3)
MCHC RBC AUTO-ENTMCNC: 31.3 G/DL (ref 31.4–37.4)
MCV RBC AUTO: 95 FL (ref 82–98)
MONOCYTES # BLD AUTO: 1.09 THOUSAND/ΜL (ref 0.17–1.22)
MONOCYTES NFR BLD AUTO: 10 % (ref 4–12)
NEUTROPHILS # BLD AUTO: 6.77 THOUSANDS/ΜL (ref 1.85–7.62)
NEUTS SEG NFR BLD AUTO: 64 % (ref 43–75)
NRBC BLD AUTO-RTO: 0 /100 WBCS
PHOSPHATE SERPL-MCNC: 3.9 MG/DL (ref 2.7–4.5)
PLATELET # BLD AUTO: 363 THOUSANDS/UL (ref 149–390)
PMV BLD AUTO: 9.9 FL (ref 8.9–12.7)
POTASSIUM SERPL-SCNC: 3.9 MMOL/L (ref 3.5–5.3)
RBC # BLD AUTO: 3.94 MILLION/UL (ref 3.88–5.62)
SODIUM SERPL-SCNC: 137 MMOL/L (ref 136–145)
WBC # BLD AUTO: 10.65 THOUSAND/UL (ref 4.31–10.16)

## 2018-08-28 PROCEDURE — 80048 BASIC METABOLIC PNL TOTAL CA: CPT | Performed by: SURGERY

## 2018-08-28 PROCEDURE — 85025 COMPLETE CBC W/AUTO DIFF WBC: CPT | Performed by: SURGERY

## 2018-08-28 PROCEDURE — 84100 ASSAY OF PHOSPHORUS: CPT | Performed by: SURGERY

## 2018-08-28 PROCEDURE — 82948 REAGENT STRIP/BLOOD GLUCOSE: CPT

## 2018-08-28 PROCEDURE — 83735 ASSAY OF MAGNESIUM: CPT | Performed by: SURGERY

## 2018-08-28 PROCEDURE — 99024 POSTOP FOLLOW-UP VISIT: CPT | Performed by: SURGERY

## 2018-08-28 RX ORDER — ACETAMINOPHEN 325 MG/1
975 TABLET ORAL EVERY 8 HOURS SCHEDULED
Status: DISCONTINUED | OUTPATIENT
Start: 2018-08-28 | End: 2018-09-03

## 2018-08-28 RX ORDER — METOCLOPRAMIDE HYDROCHLORIDE 5 MG/ML
10 INJECTION INTRAMUSCULAR; INTRAVENOUS EVERY 6 HOURS SCHEDULED
Status: COMPLETED | OUTPATIENT
Start: 2018-08-28 | End: 2018-08-30

## 2018-08-28 RX ORDER — SIMETHICONE 80 MG
80 TABLET,CHEWABLE ORAL EVERY 6 HOURS PRN
Status: DISCONTINUED | OUTPATIENT
Start: 2018-08-28 | End: 2018-09-07 | Stop reason: HOSPADM

## 2018-08-28 RX ORDER — PANTOPRAZOLE SODIUM 40 MG/1
40 TABLET, DELAYED RELEASE ORAL
Status: DISCONTINUED | OUTPATIENT
Start: 2018-08-28 | End: 2018-09-07 | Stop reason: HOSPADM

## 2018-08-28 RX ORDER — BISACODYL 10 MG
10 SUPPOSITORY, RECTAL RECTAL ONCE
Status: COMPLETED | OUTPATIENT
Start: 2018-08-28 | End: 2018-08-28

## 2018-08-28 RX ORDER — BISACODYL 10 MG
10 SUPPOSITORY, RECTAL RECTAL DAILY PRN
Status: DISCONTINUED | OUTPATIENT
Start: 2018-08-28 | End: 2018-09-07 | Stop reason: HOSPADM

## 2018-08-28 RX ORDER — IBUPROFEN 600 MG/1
600 TABLET ORAL EVERY 6 HOURS SCHEDULED
Status: COMPLETED | OUTPATIENT
Start: 2018-08-28 | End: 2018-08-31

## 2018-08-28 RX ADMIN — METOCLOPRAMIDE 10 MG: 5 INJECTION, SOLUTION INTRAMUSCULAR; INTRAVENOUS at 07:21

## 2018-08-28 RX ADMIN — SODIUM CHLORIDE: 234 INJECTION INTRAMUSCULAR; INTRAVENOUS; SUBCUTANEOUS at 22:03

## 2018-08-28 RX ADMIN — BISACODYL 10 MG: 10 SUPPOSITORY RECTAL at 14:31

## 2018-08-28 RX ADMIN — IBUPROFEN 600 MG: 600 TABLET ORAL at 11:35

## 2018-08-28 RX ADMIN — HEPARIN SODIUM 5000 UNITS: 5000 INJECTION, SOLUTION INTRAVENOUS; SUBCUTANEOUS at 21:04

## 2018-08-28 RX ADMIN — PANTOPRAZOLE SODIUM 40 MG: 40 TABLET, DELAYED RELEASE ORAL at 11:34

## 2018-08-28 RX ADMIN — ACETAMINOPHEN 975 MG: 325 TABLET, FILM COATED ORAL at 21:04

## 2018-08-28 RX ADMIN — DOXAZOSIN 4 MG: 4 TABLET ORAL at 11:34

## 2018-08-28 RX ADMIN — HEPARIN SODIUM 5000 UNITS: 5000 INJECTION, SOLUTION INTRAVENOUS; SUBCUTANEOUS at 14:30

## 2018-08-28 RX ADMIN — DEXTROSE, SODIUM CHLORIDE, AND POTASSIUM CHLORIDE 125 ML/HR: 5; .45; .15 INJECTION INTRAVENOUS at 14:30

## 2018-08-28 RX ADMIN — POTASSIUM CHLORIDE: 2 INJECTION, SOLUTION, CONCENTRATE INTRAVENOUS at 02:18

## 2018-08-28 RX ADMIN — METOCLOPRAMIDE 10 MG: 5 INJECTION, SOLUTION INTRAMUSCULAR; INTRAVENOUS at 18:01

## 2018-08-28 RX ADMIN — METOCLOPRAMIDE 10 MG: 5 INJECTION, SOLUTION INTRAMUSCULAR; INTRAVENOUS at 11:36

## 2018-08-28 RX ADMIN — HYDROMORPHONE HYDROCHLORIDE 0.5 MG: 1 INJECTION, SOLUTION INTRAMUSCULAR; INTRAVENOUS; SUBCUTANEOUS at 07:20

## 2018-08-28 RX ADMIN — OXYCODONE HYDROCHLORIDE 10 MG: 10 TABLET ORAL at 06:05

## 2018-08-28 RX ADMIN — ACETAMINOPHEN 975 MG: 325 TABLET, FILM COATED ORAL at 14:31

## 2018-08-28 RX ADMIN — BISACODYL 10 MG: 10 SUPPOSITORY RECTAL at 21:04

## 2018-08-28 RX ADMIN — HEPARIN SODIUM 5000 UNITS: 5000 INJECTION, SOLUTION INTRAVENOUS; SUBCUTANEOUS at 05:56

## 2018-08-28 RX ADMIN — IBUPROFEN 600 MG: 600 TABLET ORAL at 18:01

## 2018-08-28 RX ADMIN — SIMETHICONE CHEW TAB 80 MG 80 MG: 80 TABLET ORAL at 11:34

## 2018-08-28 NOTE — CASE MANAGEMENT
Continued Stay Review    Thank you,  Ghislaien Sharrierika  Utilization Review Department  Phone: [phone]; Fax [phone]  ATTENTION: Please call with any questions or concerns to [phone]  and carefully follow the prompts so that you are directed to the right person  Send all requests for admission clinical reviews, approved or denied determinations and any other requests to fax [phone]   All voicemails are confidential      Date: 8/28/2018    Vital Signs: /72   Pulse 76   Temp 98 °F (36 7 °C) (Oral)   Resp 18   Ht 5' 8" (1 727 m)   Wt 75 6 kg (166 lb 10 7 oz)   SpO2 95%   BMI 25 34 kg/m²      08/27 0701 08/28 0700 08/28 0701 08/28 1125  Most Recent    Temperature (°F) 98 399 1 98  98 (36 7)    Pulse 70100 76  76    Respirations 20 18  18    Blood Pressure 134/74141/82 128/72  128/72    SpO2 (%) 9698 95  Room Air Sat  - 95        Medications:   Scheduled Meds:   Current Facility-Administered Medications:  acetaminophen 975 mg Oral Q8H [facility]    doxazosin 4 mg Oral Daily    heparin (porcine) 5,000 Units Subcutaneous Q8H [facility]    HYDROmorphone 0 2 mg Intravenous Q3H PRN 8/26 x 4  8/27 x 5  8/28 x 1    ibuprofen 600 mg Oral Q6H [facility]    lidocaine  Topical Once    metoclopramide 10 mg Intravenous Q6H [facility]    ondansetron 4 mg Intravenous Q6H PRN 8/26 x 1  8/27 x 1    oxyCODONE 10 mg Oral Q4H PRN 8/27 x 2  8/28 x 1    oxyCODONE 5 mg Oral Q4H PRN    pantoprazole 40 mg Oral Early Morning    phenol 1 spray Mouth/Throat Q2H PRN    promethazine 25 mg Intravenous Q6H PRN 8/26 x 1    saliva substitute 5 spray Mouth/Throat PRN    simethicone 80 mg Oral Q6H PRN      Continuous Infusions:   Adult TPN (CUSTOM BASE/CUSTOM ELECTROLYTE)    dextrose 5 % and sodium chloride 0 45 % with KCl 20 mEq/L 125 mL/hr   IV infusion builder 48 2 ml/hr          Abnormal Labs/Diagnostic Results:   8/28 Labs - Gurvinder 8 1 - Wbc 10 65 - H/H 11 7/37 4    XR ABD - KUB  On 8/25 - Continued dilated small bowel suggesting obstruction though partial as there is contrast within the colon      Age/Sex: 63 y o  male     Assessment/Plan:   8/28 Note - Assessment:  64M with SBO s/p ileostomy takedown 8/16   - Progressing with bowel movements 8/26 and NGT removal     Plan:  - Clear Liquid Diet  - Continue TPN  - IVF continued  - OOB/ Amublate  - PRN pain control  - Stool softener  - Relistor    Discharge Plan: TBD

## 2018-08-28 NOTE — PROGRESS NOTES
Progress Note - General Surgery   Maddi Bunn 63 y o  male MRN: [id number]  Unit/Bed#: [facility] 633-01 Encounter: [id number]    Assessment:  64M with SBO s/p ileostomy takedown 8/16    - Progressing with bowel movements 8/26 and NGT removal    Plan:  - Clear Liquid Diet  - Continue TPN  - IVF continued  - OOB/ Amublate  - PRN pain control  - Stool softener  - Relistor    Subjective/Objective   Chief Complaint:     Subjective: Patient able to tolerate PO yesterday with no nausea or vomiting  No bowel movements yesterday, however is passing flatus  No fevers or chills, sob or chest pain  Able to ambulate well  Objective:     Blood pressure 141/82, pulse 100, temperature 98 3 °F (36 8 °C), temperature source Oral, resp  rate 20, height 5' 8" (1 727 m), weight 78 kg (171 lb 15 3 oz), SpO2 96 %  ,Body mass index is 26 15 kg/m²  Intake/Output Summary (Last 24 hours) at 08/28/18 0509  Last data filed at 08/28/18 1060   Gross per 24 hour   Intake          2465 92 ml   Output              400 ml   Net          2065 92 ml       Invasive Devices     Peripherally Inserted Central Catheter Line            PICC Line 56/10/06 Right Basilic 2 days                Physical Exam: General: AAOx3  Respiratory: BS b/l  Abdomen: Distended, tympanitic over epigastrum  Bowel sounds only in RLQ  Tender diffusely  Heart: Tachycardia this AM with max HR low 100's, RR, S1s2  Ext: Warm no cyanosis       Lab, Imaging and other studies:  I have personally reviewed pertinent lab results    , CBC:   Lab Results   Component Value Date    WBC 13 15 (H) 08/27/2018    HGB 12 1 08/27/2018    HCT 38 7 08/27/2018    MCV 94 08/27/2018     08/27/2018    MCH 29 4 08/27/2018    MCHC 31 3 (L) 08/27/2018    RDW 13 3 08/27/2018    MPV 9 5 08/27/2018    NRBC 0 08/27/2018   , CMP:   Lab Results   Component Value Date     08/27/2018    K 3 6 08/27/2018     08/27/2018    CO2 29 08/27/2018    BUN 8 08/27/2018    CREATININE 0 57 (L) 08/27/2018    CALCIUM 8 0 (L) 08/27/2018    EGFR 114 08/27/2018     VTE Pharmacologic Prophylaxis: Heparin  VTE Mechanical Prophylaxis: sequential compression device

## 2018-08-28 NOTE — PROGRESS NOTES
Nurse-Patient- Provider rounds were completed with the patient's nurse today, Pedrito Wong  We discussed the plan is to continue clear liquid diet and monitor abdominal stent shin as well as waiting for return of normal bowel function  Continue IV fluids and TPN until tolerating oral diet well  No surgical intervention planned at this time  Replete electrolytes as indicated  We reviewed all of the invasive devices/lines/telemetry orders   - Maintain right-sided PICC line for ongoing TPN; anticipate discontinuation of line prior to discharge  Pain Assessment / Plan:  - Continue current analgesic regimen with adjustments made today to include scheduled nonnarcotic medications in an effort to decrease his need for narcotics  Mobility Assessment / Plan:  - Activity as tolerated  Goals / Barriers for discharge:  - Awaiting return of normal bowel function and toleration of oral diet  - Case management following; case and discharge needs discussed  All questions and concerns were addressed  I spent greater than 25 minutes reviewing the plan with the patient and the nurse, and coordinating care for the day      Shaylee Hebert PA-C  8/28/2018 10:53 AM

## 2018-08-28 NOTE — MEDICAL STUDENT
Progress Note - General Surgery   Maddi Bunn 63 y o  male MRN: [id number]  Unit/Bed#: [facility] 633-01 Encounter: [id number]    Assessment:  63year old male s/p ileostomy takedown on 8/17 with new onset abdominal pain likely from small bowel obstruction  Plan:  Start stool softener  Continue IVF  Continue current pain regimen  Encourage OOB/ambulation      Subjective/Objective   Chief Complaint: Abdominal pain    Subjective: No bowel movements yesterday but is passing gas  Denied any fever, chills, nausea, or vomiting  Objective:     Blood pressure 141/82, pulse 100, temperature 98 3 °F (36 8 °C), temperature source Oral, resp  rate 20, height 5' 8" (1 727 m), weight 78 kg (171 lb 15 3 oz), SpO2 96 %  ,Body mass index is 26 15 kg/m²  I/O       08/26 0701 - 08/27 0700 08/27 0701 - 08/28 0700    P  O  0 1200    I V  (mL/kg) 1547 3 (19 8) 1265 9 (16 2)    TPN 1060 4     Total Intake(mL/kg) 2607 7 (33 4) 2465 9 (31 6)    Urine (mL/kg/hr) 550 (0 3) 400 (0 2)    Emesis/NG output 0     Total Output 550 400    Net +2057 7 +2065 9          Unmeasured Urine Occurrence 2 x 4 x          Invasive Devices     Peripherally Inserted Central Catheter Line            PICC Line 72/00/35 Right Basilic 2 days                Physical Exam: General appearance: alert and oriented, in no acute distress  Lungs: clear to auscultation bilaterally  Heart: regular rate and rhythm, S1, S2 normal, no murmur, click, rub or gallop  Abdomen: Distended, tympanic, tender to palpation diffusely, bowel sounds present in RLQ only    Lab, Imaging and other studies:I have personally reviewed pertinent lab results

## 2018-08-29 LAB
ANION GAP SERPL CALCULATED.3IONS-SCNC: 4 MMOL/L (ref 4–13)
BASOPHILS # BLD AUTO: 0.03 THOUSANDS/ΜL (ref 0–0.1)
BASOPHILS NFR BLD AUTO: 0 % (ref 0–1)
BUN SERPL-MCNC: 5 MG/DL (ref 5–25)
CALCIUM SERPL-MCNC: 7.8 MG/DL (ref 8.3–10.1)
CHLORIDE SERPL-SCNC: 105 MMOL/L (ref 100–108)
CO2 SERPL-SCNC: 28 MMOL/L (ref 21–32)
CREAT SERPL-MCNC: 0.63 MG/DL (ref 0.6–1.3)
EOSINOPHIL # BLD AUTO: 0.12 THOUSAND/ΜL (ref 0–0.61)
EOSINOPHIL NFR BLD AUTO: 2 % (ref 0–6)
ERYTHROCYTE [DISTWIDTH] IN BLOOD BY AUTOMATED COUNT: 13.3 % (ref 11.6–15.1)
GFR SERPL CREATININE-BSD FRML MDRD: 109 ML/MIN/1.73SQ M
GLUCOSE SERPL-MCNC: 112 MG/DL (ref 65–140)
GLUCOSE SERPL-MCNC: 115 MG/DL (ref 65–140)
GLUCOSE SERPL-MCNC: 117 MG/DL (ref 65–140)
GLUCOSE SERPL-MCNC: 117 MG/DL (ref 65–140)
GLUCOSE SERPL-MCNC: 126 MG/DL (ref 65–140)
HCT VFR BLD AUTO: 37.3 % (ref 36.5–49.3)
HGB BLD-MCNC: 11.8 G/DL (ref 12–17)
IMM GRANULOCYTES # BLD AUTO: 0.1 THOUSAND/UL (ref 0–0.2)
IMM GRANULOCYTES NFR BLD AUTO: 1 % (ref 0–2)
LYMPHOCYTES # BLD AUTO: 1.41 THOUSANDS/ΜL (ref 0.6–4.47)
LYMPHOCYTES NFR BLD AUTO: 18 % (ref 14–44)
MCH RBC QN AUTO: 29.8 PG (ref 26.8–34.3)
MCHC RBC AUTO-ENTMCNC: 31.6 G/DL (ref 31.4–37.4)
MCV RBC AUTO: 94 FL (ref 82–98)
MONOCYTES # BLD AUTO: 0.75 THOUSAND/ΜL (ref 0.17–1.22)
MONOCYTES NFR BLD AUTO: 10 % (ref 4–12)
NEUTROPHILS # BLD AUTO: 5.25 THOUSANDS/ΜL (ref 1.85–7.62)
NEUTS SEG NFR BLD AUTO: 69 % (ref 43–75)
NRBC BLD AUTO-RTO: 0 /100 WBCS
PLATELET # BLD AUTO: 386 THOUSANDS/UL (ref 149–390)
PMV BLD AUTO: 10.1 FL (ref 8.9–12.7)
POTASSIUM SERPL-SCNC: 4 MMOL/L (ref 3.5–5.3)
RBC # BLD AUTO: 3.96 MILLION/UL (ref 3.88–5.62)
SODIUM SERPL-SCNC: 137 MMOL/L (ref 136–145)
WBC # BLD AUTO: 7.66 THOUSAND/UL (ref 4.31–10.16)

## 2018-08-29 PROCEDURE — 85025 COMPLETE CBC W/AUTO DIFF WBC: CPT | Performed by: SURGERY

## 2018-08-29 PROCEDURE — 99024 POSTOP FOLLOW-UP VISIT: CPT | Performed by: SURGERY

## 2018-08-29 PROCEDURE — 99254 IP/OBS CNSLTJ NEW/EST MOD 60: CPT | Performed by: PSYCHIATRY & NEUROLOGY

## 2018-08-29 PROCEDURE — 80048 BASIC METABOLIC PNL TOTAL CA: CPT | Performed by: SURGERY

## 2018-08-29 PROCEDURE — 82948 REAGENT STRIP/BLOOD GLUCOSE: CPT

## 2018-08-29 RX ORDER — SODIUM PHOSPHATE, DIBASIC AND SODIUM PHOSPHATE, MONOBASIC 7; 19 G/133ML; G/133ML
1 ENEMA RECTAL ONCE
Status: COMPLETED | OUTPATIENT
Start: 2018-08-29 | End: 2018-08-29

## 2018-08-29 RX ADMIN — HEPARIN SODIUM 5000 UNITS: 5000 INJECTION, SOLUTION INTRAVENOUS; SUBCUTANEOUS at 06:00

## 2018-08-29 RX ADMIN — METOCLOPRAMIDE 10 MG: 5 INJECTION, SOLUTION INTRAMUSCULAR; INTRAVENOUS at 17:21

## 2018-08-29 RX ADMIN — METOCLOPRAMIDE 10 MG: 5 INJECTION, SOLUTION INTRAMUSCULAR; INTRAVENOUS at 23:15

## 2018-08-29 RX ADMIN — DEXTROSE, SODIUM CHLORIDE, AND POTASSIUM CHLORIDE 75.9 ML/HR: 5; .45; .15 INJECTION INTRAVENOUS at 17:22

## 2018-08-29 RX ADMIN — ACETAMINOPHEN 975 MG: 325 TABLET, FILM COATED ORAL at 05:59

## 2018-08-29 RX ADMIN — IBUPROFEN 600 MG: 600 TABLET ORAL at 23:17

## 2018-08-29 RX ADMIN — IBUPROFEN 600 MG: 600 TABLET ORAL at 17:20

## 2018-08-29 RX ADMIN — ACETAMINOPHEN 975 MG: 325 TABLET, FILM COATED ORAL at 13:25

## 2018-08-29 RX ADMIN — HEPARIN SODIUM 5000 UNITS: 5000 INJECTION, SOLUTION INTRAVENOUS; SUBCUTANEOUS at 21:23

## 2018-08-29 RX ADMIN — IBUPROFEN 600 MG: 600 TABLET ORAL at 00:33

## 2018-08-29 RX ADMIN — SODIUM PHOSPHATE 1 ENEMA: 7; 19 ENEMA RECTAL at 17:21

## 2018-08-29 RX ADMIN — HEPARIN SODIUM 5000 UNITS: 5000 INJECTION, SOLUTION INTRAVENOUS; SUBCUTANEOUS at 13:28

## 2018-08-29 RX ADMIN — DOXAZOSIN 4 MG: 4 TABLET ORAL at 10:03

## 2018-08-29 RX ADMIN — IBUPROFEN 600 MG: 600 TABLET ORAL at 13:25

## 2018-08-29 RX ADMIN — SODIUM CHLORIDE: 234 INJECTION INTRAMUSCULAR; INTRAVENOUS; SUBCUTANEOUS at 23:06

## 2018-08-29 RX ADMIN — METOCLOPRAMIDE 10 MG: 5 INJECTION, SOLUTION INTRAMUSCULAR; INTRAVENOUS at 00:34

## 2018-08-29 RX ADMIN — METOCLOPRAMIDE 10 MG: 5 INJECTION, SOLUTION INTRAMUSCULAR; INTRAVENOUS at 06:00

## 2018-08-29 RX ADMIN — METHYLNALTREXONE BROMIDE 12 MG: 12 INJECTION, SOLUTION SUBCUTANEOUS at 13:27

## 2018-08-29 RX ADMIN — PANTOPRAZOLE SODIUM 40 MG: 40 TABLET, DELAYED RELEASE ORAL at 06:00

## 2018-08-29 RX ADMIN — ACETAMINOPHEN 975 MG: 325 TABLET, FILM COATED ORAL at 21:23

## 2018-08-29 RX ADMIN — IBUPROFEN 600 MG: 600 TABLET ORAL at 05:51

## 2018-08-29 RX ADMIN — METOCLOPRAMIDE 10 MG: 5 INJECTION, SOLUTION INTRAMUSCULAR; INTRAVENOUS at 13:27

## 2018-08-29 NOTE — CONSULTS
Consultation - Jon Neena Bunn 63 y o  male MRN: [id number]  Unit/Bed#: [facility] 633-01 Encounter: [id number]      Chief Complaint:  I am feeling very frustrated    History of Present Illness   Physician Requesting Consult: Ana Ontiveros MD  Reason for Consult / Principal Problem:  Acute diverticulitis of intestine, suicidal thoughts    Vandana Perez is a 63 y o  male status post ileostomy closure presents with emesis, lack of bowel movement, and increasing abdominal pain  Patient states that he had been frustrated with his his treatment because he have increased abdominal pain after surgery, also states that he has have complication before and he gets frustrated because everything happened to him  He states that he feels depressed because he is in the hospital and secondary to his medical problems  He wants to get better because he wants to go and see his mother his brother who is  also sick and he had not seen for few years  He denies any other issues  He states that he has lot of support  He denies any suicidal thoughts plans or intent, he states that he made that statement secondary to his frustration  Psychiatric Review Of Systems:  sleep: no  appetite changes: no  weight changes: no  energy/anergy: no  interest/pleasure/anhedonia: no  somatic symptoms: no  anxiety/panic: no  angel: no  guilty/hopeless: no  self injurious behavior/risky behavior: no    Historical Information   Past Psychiatric History:   None  Currently in treatment with none  Past Suicide attempts:  None  Past Violent behavior:  None  Past Psychiatric medication trial:  None    Substance Abuse History:  He denies any drugs or alcohol history     I have assessed this patient for substance use within the past 12 months     History of IP/OP rehabilitation program:  None  Smoking history:   Former smoker  Family Psychiatric History:   None    Social History  Education: college graduate  Learning Disabilities: None  Marital history:   Living arrangement, social support: He live with his wife and 3 adult children  Occupational History: retired  Functioning Relationships: good support system    Other Pertinent History: None    Traumatic History:   Abuse: None  Other Traumatic Events: None    Past Medical History:   Diagnosis Date    Abscess, intestine     Arthritis     Diverticulitis     GERD (gastroesophageal reflux disease)     Hydronephrosis 5/27/2018       Medical Review Of Systems:  Review of Systems - Negative except abdominal distension, all other systems reviewed were negative    Meds/Allergies   current meds:   Current Facility-Administered Medications   Medication Dose Route Frequency    acetaminophen (TYLENOL) tablet 975 mg  975 mg Oral Q8H [facility]    Adult 3-in-1 TPN (custom base / custom electrolytes)   Intravenous Continuous    Adult 3-in-1 TPN (custom base / custom electrolytes)   Intravenous Continuous    bisacodyl (DULCOLAX) rectal suppository 10 mg  10 mg Rectal Daily PRN    dextrose 5 % and sodium chloride 0 45 % with KCl 20 mEq/L infusion  125 mL/hr Intravenous Continuous    doxazosin (CARDURA) tablet 4 mg  4 mg Oral Daily    heparin (porcine) subcutaneous injection 5,000 Units  5,000 Units Subcutaneous Q8H [facility]    HYDROmorphone (DILAUDID) injection 0 2 mg  0 2 mg Intravenous Q3H PRN    ibuprofen (MOTRIN) tablet 600 mg  600 mg Oral Q6H [facility]    lidocaine (URO-JET) 2 % topical gel   Topical Once    methylnaltrexone (RELISTOR) subcutaneous injection 12 mg  12 mg Subcutaneous Once    metoclopramide (REGLAN) injection 10 mg  10 mg Intravenous Q6H [facility]    ondansetron (ZOFRAN) injection 4 mg  4 mg Intravenous Q6H PRN    oxyCODONE (ROXICODONE) immediate release tablet 10 mg  10 mg Oral Q4H PRN    oxyCODONE (ROXICODONE) IR tablet 5 mg  5 mg Oral Q4H PRN    pantoprazole (PROTONIX) EC tablet 40 mg  40 mg Oral Early Morning    phenol (CHLORASEPTIC) 1 4 % mucosal liquid 1 spray  1 spray Mouth/Throat Q2H PRN    promethazine (PHENERGAN) injection 25 mg  25 mg Intravenous Q6H PRN    saliva substitute (MOUTH KOTE) mucosal solution 5 spray  5 spray Mouth/Throat PRN    simethicone (MYLICON) chewable tablet 80 mg  80 mg Oral Q6H PRN    sodium phosphate-biphosphate (FLEET) enema 1 enema  1 enema Rectal Once     Allergies   Allergen Reactions    Penicillins Rash     itching       Objective   Vital signs in last 24 hours:  Temp:  [97 6 °F (36 4 °C)-99 1 °F (37 3 °C)] 99 1 °F (37 3 °C)  HR:  [72-78] 72  Resp:  [18-20] 18  BP: (111-134)/(80-89) 122/89      Intake/Output Summary (Last 24 hours) at 08/29/18 1313  Last data filed at 08/29/18 0144   Gross per 24 hour   Intake          3328 16 ml   Output              450 ml   Net          2878 16 ml       Mental Status Evaluation:  Appearance:  age appropriate   Behavior:  cooperative   Speech:  normal pitch and normal volume   Mood:  euthymic   Affect:  mood-congruent   Language: naming objects and repeating phrases   Thought Process:  goal directed   Associations: intact associations   Thought Content:  normal   Perceptual Disturbances: None   Risk Potential: He denies any suicidal thoughts plans or intent   Sensorium:  person, place, time/date, situation, day of week and month of year   Memory:  recent and remote memory grossly intact   Cognition:  grossly intact   Consciousness:  alert and awake    Attention: attention span and concentration were age appropriate   Intellect: within normal limits   Fund of Knowledge: awareness of current events: Fair, past history: Fair and vocabulary: Fair   Insight:  fair   Judgment: fair   Muscle Strength and Tone: Within normal limits   Gait/Station: normal gait/station and normal balance   Motor Activity: no abnormal movements     Lab Results:    Lab Results   Component Value Date    WBC 7 66 08/29/2018    HGB 11 8 (L) 08/29/2018    HCT 37 3 08/29/2018    MCV 94 08/29/2018     08/29/2018     Lab Results Component Value Date    GLUCOSE 94 01/03/2016    CALCIUM 7 8 (L) 08/29/2018     08/29/2018    K 4 0 08/29/2018    CO2 28 08/29/2018     08/29/2018    BUN 5 08/29/2018    CREATININE 0 63 08/29/2018         Code Status: )Level 1 - Full Code    Assessment/Plan     Assessment:  Kvng Mcneal is a 63 y o  male status post ileostomy reversal, presented to the hospital with 2 days of increasing abdominal pain since he was discharged for the procedure  He states that he has been very sick and he is very frustrated with his treatment and the complications that he have with this procedure  He denies any other issues  He denies any suicidal thoughts plans or intent he states that he was only frustrated      Diagnosis:  Adjustment disorder with depressed mood  Plan:   Continue medical management  Discontinue one-to-one observation  No other intervention at this time  I will sign off  Risks, benefits and possible side effects of Medications:   No medication given      Carson Gaona MD

## 2018-08-29 NOTE — PROGRESS NOTES
Nurse-Patient- Provider rounds were completed with the patient's nurse today, Jess Chandra  We discussed the plan is to trial a surgical soft diet today  Continue IV fluids and TPN until tolerating oral diet adequately  Patient expressed his significant frustration with being placed on a one-to-one continue observation for depression and suicidal thoughts  He did not express any suicidal ideation or plans during his conversation with me and demonstrated to me that he was thinking clearly  He explained that the statements he had made earlier in the day regarding his depression and suicidal thoughts were out of frustration related to his treatment and the complications he has had surrounding his complicated diverticulitis and subsequent operative interventions, and not truly feelings of wanting to harm himself or to commit suicide  I explained that he will be evaluated by Psychiatry and that the one-to-one continue observation will continue until he is cleared by Psychiatry  He agreed to the treatment plan  We reviewed all of the invasive devices/lines/telemetry orders   - None  Pain Assessment / Plan:  - Continue current analgesic regimen  Mobility Assessment / Plan:  - Activity as tolerated  Goals / Barriers for discharge:  - Awaiting toleration of oral diet with return of normal bowel function and resolution of abdominal distention   - Case management following; case and discharge needs discussed  All questions and concerns were addressed  I spent greater than 25 minutes reviewing the plan with the patient and the nurse, and coordinating care for the day      Honora Goldberg, PA-C  8/29/2018 11:45 AM

## 2018-08-29 NOTE — PROGRESS NOTES
Pt expressed SI with plan to surgery residents this morning, clari consulted and pt placed on 1:1  Pt very upset with this demanding to speak to the doctor  Sondra Domingo to the bedside  Explained to patient that when he makes suicidal comments they must be taken seriously and there are policies in place for this  Pt waiting and willing to talk to Dr Melody Owens

## 2018-08-29 NOTE — PROGRESS NOTES
Progress Note - General Surgery   Yulissa Bunn 63 y o  male MRN: [id number]  Unit/Bed#: [facility] 633-01 Encounter: [id number]    Assessment:  67-year-old male with small-bowel obstruction status post ileostomy takedown on 08/16/2018  Likely from stricture at the anastomotic site    Plan:  Maintain clear liquid diet as tolerated  Okay to advance diet but patient wants to stay on clears  Continue TPN until tolerating adequate p o  Intake  Encourage out of bed and ambulation  Consider psychiatry evaluation - patient declines psych consult however  P r n  Analgesia  P r n  Suppositories  Trial of enema today  DVT prophylaxis    Subjective/Objective   Chief Complaint:     Subjective:  No acute events overnight  Passing flatus  Patient very frustrated at his lack of bowel movement since hospitalization  He is very frustrated with his overall post op recovery and mentioned some suicide ideation this morning - "I want to just go on the roof and jump off"    Objective:     Blood pressure 111/80, pulse 72, temperature 97 6 °F (36 4 °C), temperature source Oral, resp  rate 20, height 5' 8" (1 727 m), weight 75 6 kg (166 lb 10 7 oz), SpO2 98 %  ,Body mass index is 25 34 kg/m²  Intake/Output Summary (Last 24 hours) at 08/29/18 0507  Last data filed at 08/29/18 0144   Gross per 24 hour   Intake          3508 16 ml   Output             1300 ml   Net          2208 16 ml       Invasive Devices     Peripherally Inserted Central Catheter Line            PICC Line 37/86/64 Right Basilic 3 days                Physical Exam:   No acute distress  Depressed affect  Regular rate and rhythm  Nonlabored respirations on room air  Abdomen soft, mildly tender, mildly distended   Ileostomy site c/d/i    Lab, Imaging and other studies:  CBC:   Lab Results   Component Value Date    WBC 10 65 (H) 08/28/2018    HGB 11 7 (L) 08/28/2018    HCT 37 4 08/28/2018    MCV 95 08/28/2018     08/28/2018    MCH 29 7 08/28/2018    MCHC 31 3 (L) 08/28/2018    RDW 13 2 08/28/2018    MPV 9 9 08/28/2018    NRBC 0 08/28/2018   , CMP:   Lab Results   Component Value Date     08/28/2018    K 3 9 08/28/2018     08/28/2018    CO2 27 08/28/2018    BUN 6 08/28/2018    CREATININE 0 60 08/28/2018    CALCIUM 8 1 (L) 08/28/2018    EGFR 112 08/28/2018   , Coagulation: No results found for: PT, INR, APTT, Urinalysis: No results found for: Sharia Julienne, SPECGRAV, PHUR, LEUKOCYTESUR, NITRITE, PROTEINUA, GLUCOSEU, KETONESU, BILIRUBINUR, BLOODU, Amylase: No results found for: AMYLASE, Lipase: No results found for: LIPASE  VTE Pharmacologic Prophylaxis: Heparin  VTE Mechanical Prophylaxis: sequential compression device

## 2018-08-30 LAB
ANION GAP SERPL CALCULATED.3IONS-SCNC: 6 MMOL/L (ref 4–13)
ANION GAP SERPL CALCULATED.3IONS-SCNC: 7 MMOL/L (ref 4–13)
BUN SERPL-MCNC: 9 MG/DL (ref 5–25)
BUN SERPL-MCNC: 9 MG/DL (ref 5–25)
CALCIUM SERPL-MCNC: 7.4 MG/DL (ref 8.3–10.1)
CALCIUM SERPL-MCNC: 8.3 MG/DL (ref 8.3–10.1)
CHLORIDE SERPL-SCNC: 104 MMOL/L (ref 100–108)
CHLORIDE SERPL-SCNC: 105 MMOL/L (ref 100–108)
CO2 SERPL-SCNC: 24 MMOL/L (ref 21–32)
CO2 SERPL-SCNC: 26 MMOL/L (ref 21–32)
CREAT SERPL-MCNC: 0.72 MG/DL (ref 0.6–1.3)
CREAT SERPL-MCNC: 0.78 MG/DL (ref 0.6–1.3)
GFR SERPL CREATININE-BSD FRML MDRD: 100 ML/MIN/1.73SQ M
GFR SERPL CREATININE-BSD FRML MDRD: 104 ML/MIN/1.73SQ M
GLUCOSE SERPL-MCNC: 109 MG/DL (ref 65–140)
GLUCOSE SERPL-MCNC: 112 MG/DL (ref 65–140)
GLUCOSE SERPL-MCNC: 112 MG/DL (ref 65–140)
GLUCOSE SERPL-MCNC: 497 MG/DL (ref 65–140)
GLUCOSE SERPL-MCNC: 89 MG/DL (ref 65–140)
GLUCOSE SERPL-MCNC: 96 MG/DL (ref 65–140)
POTASSIUM SERPL-SCNC: 4.1 MMOL/L (ref 3.5–5.3)
POTASSIUM SERPL-SCNC: 4.1 MMOL/L (ref 3.5–5.3)
POTASSIUM SERPL-SCNC: 6.8 MMOL/L (ref 3.5–5.3)
SODIUM SERPL-SCNC: 135 MMOL/L (ref 136–145)
SODIUM SERPL-SCNC: 137 MMOL/L (ref 136–145)

## 2018-08-30 PROCEDURE — 82948 REAGENT STRIP/BLOOD GLUCOSE: CPT

## 2018-08-30 PROCEDURE — 80048 BASIC METABOLIC PNL TOTAL CA: CPT | Performed by: STUDENT IN AN ORGANIZED HEALTH CARE EDUCATION/TRAINING PROGRAM

## 2018-08-30 PROCEDURE — 84132 ASSAY OF SERUM POTASSIUM: CPT | Performed by: SURGERY

## 2018-08-30 PROCEDURE — 99024 POSTOP FOLLOW-UP VISIT: CPT | Performed by: SURGERY

## 2018-08-30 PROCEDURE — 80048 BASIC METABOLIC PNL TOTAL CA: CPT | Performed by: SURGERY

## 2018-08-30 RX ADMIN — ACETAMINOPHEN 975 MG: 325 TABLET, FILM COATED ORAL at 13:42

## 2018-08-30 RX ADMIN — IBUPROFEN 600 MG: 600 TABLET ORAL at 13:42

## 2018-08-30 RX ADMIN — IBUPROFEN 600 MG: 600 TABLET ORAL at 05:33

## 2018-08-30 RX ADMIN — ACETAMINOPHEN 975 MG: 325 TABLET, FILM COATED ORAL at 23:29

## 2018-08-30 RX ADMIN — ACETAMINOPHEN 975 MG: 325 TABLET, FILM COATED ORAL at 05:32

## 2018-08-30 RX ADMIN — OXYCODONE HYDROCHLORIDE 10 MG: 10 TABLET ORAL at 10:56

## 2018-08-30 RX ADMIN — METOCLOPRAMIDE 10 MG: 5 INJECTION, SOLUTION INTRAMUSCULAR; INTRAVENOUS at 05:35

## 2018-08-30 RX ADMIN — HYDROMORPHONE HYDROCHLORIDE 0.2 MG: 1 INJECTION, SOLUTION INTRAMUSCULAR; INTRAVENOUS; SUBCUTANEOUS at 04:44

## 2018-08-30 RX ADMIN — OXYCODONE HYDROCHLORIDE 10 MG: 10 TABLET ORAL at 03:54

## 2018-08-30 RX ADMIN — IBUPROFEN 600 MG: 600 TABLET ORAL at 18:36

## 2018-08-30 RX ADMIN — METOCLOPRAMIDE 10 MG: 5 INJECTION, SOLUTION INTRAMUSCULAR; INTRAVENOUS at 13:43

## 2018-08-30 RX ADMIN — OXYCODONE HYDROCHLORIDE 10 MG: 10 TABLET ORAL at 23:29

## 2018-08-30 RX ADMIN — DOXAZOSIN 4 MG: 4 TABLET ORAL at 10:39

## 2018-08-30 RX ADMIN — PANTOPRAZOLE SODIUM 40 MG: 40 TABLET, DELAYED RELEASE ORAL at 05:33

## 2018-08-30 RX ADMIN — METOCLOPRAMIDE 10 MG: 5 INJECTION, SOLUTION INTRAMUSCULAR; INTRAVENOUS at 23:30

## 2018-08-30 RX ADMIN — IBUPROFEN 600 MG: 600 TABLET ORAL at 23:29

## 2018-08-30 RX ADMIN — HEPARIN SODIUM 5000 UNITS: 5000 INJECTION, SOLUTION INTRAVENOUS; SUBCUTANEOUS at 23:29

## 2018-08-30 RX ADMIN — METOCLOPRAMIDE 10 MG: 5 INJECTION, SOLUTION INTRAMUSCULAR; INTRAVENOUS at 18:42

## 2018-08-30 NOTE — PROGRESS NOTES
Progress Note - General Surgery   Odette Alecia Bunn 63 y o  male MRN: [id number]  Unit/Bed#: [facility] 633-01 Encounter: [id number]    Assessment:  81-year-old male with small-bowel obstruction status post ileostomy takedown on 08/16/2018  Likely from stricture at the anastomotic site    - Multiple BMs last 24 hours    Plan:  D/c TPN  Continue surgical soft diet  Decrease IVF rate  Appreciate Psych input  Discharge planning    Subjective/Objective   Chief Complaint:     Subjective:  Multiple BMs overnight  Passing gas  Less abdominal distention  Objective:     Blood pressure 115/85, pulse 72, temperature 98 5 °F (36 9 °C), temperature source Oral, resp  rate 20, height 5' 8" (1 727 m), weight 74 3 kg (163 lb 12 8 oz), SpO2 98 %  ,Body mass index is 24 91 kg/m²        Intake/Output Summary (Last 24 hours) at 08/30/18 0629  Last data filed at 08/30/18 0300   Gross per 24 hour   Intake          2748 55 ml   Output              600 ml   Net          2148 55 ml       Invasive Devices     Peripherally Inserted Central Catheter Line            PICC Line 28/76/52 Right Basilic 4 days                Physical Exam:   Gen: NAD, AAOx3  CV: RRR  Pulm: no resp distress  Abd: Soft, improving but still distended, non-tender      Lab, Imaging and other studies:  CBC:   No results found for: WBC, HGB, HCT, MCV, PLT, ADJUSTEDWBC, MCH, MCHC, RDW, MPV, NRBC, CMP:   Lab Results   Component Value Date     (L) 08/30/2018    K 4 1 08/30/2018     08/30/2018    CO2 24 08/30/2018    BUN 9 08/30/2018    CREATININE 0 78 08/30/2018    CALCIUM 7 4 (L) 08/30/2018    EGFR 100 08/30/2018   , Coagulation: No results found for: PT, INR, APTT, Urinalysis: No results found for: Metta Prabhakar, SPECGRAV, PHUR, LEUKOCYTESUR, NITRITE, PROTEINUA, GLUCOSEU, KETONESU, BILIRUBINUR, BLOODU, Amylase: No results found for: AMYLASE, Lipase: No results found for: LIPASE  VTE Pharmacologic Prophylaxis: Heparin  VTE Mechanical Prophylaxis: sequential compression device

## 2018-08-30 NOTE — PROGRESS NOTES
Pt potassium 6 8  Neville Bowie MD aware  Placed order for redraw  Potassium 4 1  Paged red surgery  Spoke to [facility]  Said ok to restart fluids  TPN will be d/c

## 2018-08-30 NOTE — PROGRESS NOTES
Nurse-Patient- Provider rounds were completed with the patient's nurse today, Nahun Reinoso  We discussed the plan is to continue regular diet as tolerated and allowed TPN to run out today  Saline lock IV today  Continue to monitor for return of normal bowel function; his bowel function has been improving with return of flatus and some bowel movements over the last 24 hours  Continue multimodal analgesic regimen  We reviewed all of the invasive devices/lines/telemetry orders   - Maintain right-sided PICC line for IV access and continue TPN through today at least     Pain Assessment / Plan:  - Continue multimodal analgesic regimen       Mobility Assessment / Plan:  - Activity as tolerated  Goals / Barriers for discharge:  - Not yet appropriate for discharge as his bowel function is not yet returned to normal a still remains somewhat distended  - Case management following; case and discharge needs discussed  All questions and concerns were addressed  I spent greater than 20 minutes reviewing the plan with the patient and the nurse, and coordinating care for the day      Magan Skaggs PA-C  8/30/2018 1:22 PM

## 2018-08-31 ENCOUNTER — APPOINTMENT (INPATIENT)
Dept: RADIOLOGY | Facility: HOSPITAL | Age: 58
DRG: 390 | End: 2018-08-31
Payer: COMMERCIAL

## 2018-08-31 LAB
ANION GAP SERPL CALCULATED.3IONS-SCNC: 6 MMOL/L (ref 4–13)
BASOPHILS # BLD AUTO: 0.04 THOUSANDS/ΜL (ref 0–0.1)
BASOPHILS NFR BLD AUTO: 0 % (ref 0–1)
BUN SERPL-MCNC: 12 MG/DL (ref 5–25)
CALCIUM SERPL-MCNC: 8.3 MG/DL (ref 8.3–10.1)
CHLORIDE SERPL-SCNC: 105 MMOL/L (ref 100–108)
CO2 SERPL-SCNC: 26 MMOL/L (ref 21–32)
CREAT SERPL-MCNC: 0.77 MG/DL (ref 0.6–1.3)
EOSINOPHIL # BLD AUTO: 0.02 THOUSAND/ΜL (ref 0–0.61)
EOSINOPHIL NFR BLD AUTO: 0 % (ref 0–6)
ERYTHROCYTE [DISTWIDTH] IN BLOOD BY AUTOMATED COUNT: 13.7 % (ref 11.6–15.1)
GFR SERPL CREATININE-BSD FRML MDRD: 101 ML/MIN/1.73SQ M
GLUCOSE SERPL-MCNC: 106 MG/DL (ref 65–140)
GLUCOSE SERPL-MCNC: 113 MG/DL (ref 65–140)
GLUCOSE SERPL-MCNC: 118 MG/DL (ref 65–140)
GLUCOSE SERPL-MCNC: 99 MG/DL (ref 65–140)
HCT VFR BLD AUTO: 40.5 % (ref 36.5–49.3)
HGB BLD-MCNC: 12.8 G/DL (ref 12–17)
IMM GRANULOCYTES # BLD AUTO: 0.11 THOUSAND/UL (ref 0–0.2)
IMM GRANULOCYTES NFR BLD AUTO: 1 % (ref 0–2)
LYMPHOCYTES # BLD AUTO: 2 THOUSANDS/ΜL (ref 0.6–4.47)
LYMPHOCYTES NFR BLD AUTO: 15 % (ref 14–44)
MAGNESIUM SERPL-MCNC: 2.2 MG/DL (ref 1.6–2.6)
MCH RBC QN AUTO: 29.7 PG (ref 26.8–34.3)
MCHC RBC AUTO-ENTMCNC: 31.6 G/DL (ref 31.4–37.4)
MCV RBC AUTO: 94 FL (ref 82–98)
MONOCYTES # BLD AUTO: 0.73 THOUSAND/ΜL (ref 0.17–1.22)
MONOCYTES NFR BLD AUTO: 6 % (ref 4–12)
NEUTROPHILS # BLD AUTO: 10.38 THOUSANDS/ΜL (ref 1.85–7.62)
NEUTS SEG NFR BLD AUTO: 78 % (ref 43–75)
NRBC BLD AUTO-RTO: 0 /100 WBCS
PLATELET # BLD AUTO: 431 THOUSANDS/UL (ref 149–390)
PMV BLD AUTO: 9.4 FL (ref 8.9–12.7)
POTASSIUM SERPL-SCNC: 4.6 MMOL/L (ref 3.5–5.3)
RBC # BLD AUTO: 4.31 MILLION/UL (ref 3.88–5.62)
SODIUM SERPL-SCNC: 137 MMOL/L (ref 136–145)
WBC # BLD AUTO: 13.28 THOUSAND/UL (ref 4.31–10.16)

## 2018-08-31 PROCEDURE — 82948 REAGENT STRIP/BLOOD GLUCOSE: CPT

## 2018-08-31 PROCEDURE — 83735 ASSAY OF MAGNESIUM: CPT | Performed by: SURGERY

## 2018-08-31 PROCEDURE — 99024 POSTOP FOLLOW-UP VISIT: CPT | Performed by: SURGERY

## 2018-08-31 PROCEDURE — 80048 BASIC METABOLIC PNL TOTAL CA: CPT | Performed by: SURGERY

## 2018-08-31 PROCEDURE — 74022 RADEX COMPL AQT ABD SERIES: CPT

## 2018-08-31 PROCEDURE — 85025 COMPLETE CBC W/AUTO DIFF WBC: CPT | Performed by: SURGERY

## 2018-08-31 RX ADMIN — IBUPROFEN 600 MG: 600 TABLET ORAL at 07:35

## 2018-08-31 RX ADMIN — HEPARIN SODIUM 5000 UNITS: 5000 INJECTION, SOLUTION INTRAVENOUS; SUBCUTANEOUS at 13:10

## 2018-08-31 RX ADMIN — OXYCODONE HYDROCHLORIDE 10 MG: 10 TABLET ORAL at 13:10

## 2018-08-31 RX ADMIN — HYDROMORPHONE HYDROCHLORIDE 0.2 MG: 1 INJECTION, SOLUTION INTRAMUSCULAR; INTRAVENOUS; SUBCUTANEOUS at 09:50

## 2018-08-31 RX ADMIN — HEPARIN SODIUM 5000 UNITS: 5000 INJECTION, SOLUTION INTRAVENOUS; SUBCUTANEOUS at 22:25

## 2018-08-31 RX ADMIN — ACETAMINOPHEN 975 MG: 325 TABLET, FILM COATED ORAL at 07:32

## 2018-08-31 RX ADMIN — OXYCODONE HYDROCHLORIDE 10 MG: 10 TABLET ORAL at 19:43

## 2018-08-31 RX ADMIN — PANTOPRAZOLE SODIUM 40 MG: 40 TABLET, DELAYED RELEASE ORAL at 07:35

## 2018-08-31 RX ADMIN — ACETAMINOPHEN 975 MG: 325 TABLET, FILM COATED ORAL at 22:25

## 2018-08-31 RX ADMIN — HYDROMORPHONE HYDROCHLORIDE 0.2 MG: 1 INJECTION, SOLUTION INTRAMUSCULAR; INTRAVENOUS; SUBCUTANEOUS at 22:26

## 2018-08-31 RX ADMIN — HYDROMORPHONE HYDROCHLORIDE 0.2 MG: 1 INJECTION, SOLUTION INTRAMUSCULAR; INTRAVENOUS; SUBCUTANEOUS at 00:23

## 2018-08-31 RX ADMIN — SODIUM CHLORIDE: 234 INJECTION INTRAMUSCULAR; INTRAVENOUS; SUBCUTANEOUS at 22:26

## 2018-08-31 RX ADMIN — ACETAMINOPHEN 975 MG: 325 TABLET, FILM COATED ORAL at 13:10

## 2018-08-31 RX ADMIN — DOXAZOSIN 4 MG: 4 TABLET ORAL at 09:51

## 2018-08-31 RX ADMIN — HYDROMORPHONE HYDROCHLORIDE 0.2 MG: 1 INJECTION, SOLUTION INTRAMUSCULAR; INTRAVENOUS; SUBCUTANEOUS at 16:09

## 2018-08-31 RX ADMIN — OXYCODONE HYDROCHLORIDE 10 MG: 10 TABLET ORAL at 07:45

## 2018-08-31 NOTE — PROGRESS NOTES
Progress Note - General Surgery   Angie Bunn 63 y o  male MRN: [id number]  Unit/Bed#: [facility] 633-01 Encounter: [id number]    Assessment:  Pt 60-year-old male with small-bowel obstruction status post ileostomy takedown on 08/16/2018  Likely from stricture at the anastomotic site  -liquid BMs, more distended    Plan:  Diet as tolerated   Bowel regiment  Prn pain control  IS/OOB/ambualte  SQH/SCDs    Subjective/Objective   Chief Complaint: Abdominal pain    Subjective: BERNARDO  Patient feels more full and distended this am  No N/V, but says he can only take in several spoonfuls of food, 740PO intake recorded  Passing flautus, and several watery BMs last several days  Objective:     Blood pressure 129/86, pulse 84, temperature 98 4 °F (36 9 °C), resp  rate 20, height 5' 8" (1 727 m), weight 74 3 kg (163 lb 12 8 oz), SpO2 98 %  ,Body mass index is 24 91 kg/m²  Intake/Output Summary (Last 24 hours) at 08/31/18 0603  Last data filed at 08/31/18 0254   Gross per 24 hour   Intake          1497 78 ml   Output              200 ml   Net          1297 78 ml       Invasive Devices     Peripherally Inserted Central Catheter Line            PICC Line 91/15/26 Right Basilic 5 days                Physical Exam: NAD  AAOX3  Normal respiratory effort  Soft, TTP diffusely, distended  Old ostomy site c/d/i  No c/c/e      Lab, Imaging and other studies:  I have personally reviewed pertinent lab results    , CBC: No results found for: WBC, HGB, HCT, MCV, PLT, ADJUSTEDWBC, MCH, MCHC, RDW, MPV, NRBC, CMP:   Lab Results   Component Value Date     08/30/2018    K 4 1 08/30/2018     08/30/2018    CO2 26 08/30/2018    BUN 9 08/30/2018    CREATININE 0 72 08/30/2018    CALCIUM 8 3 08/30/2018    EGFR 104 08/30/2018     VTE Pharmacologic Prophylaxis: Heparin  VTE Mechanical Prophylaxis: sequential compression device

## 2018-09-01 LAB
ANION GAP SERPL CALCULATED.3IONS-SCNC: 6 MMOL/L (ref 4–13)
BASOPHILS # BLD AUTO: 0.04 THOUSANDS/ΜL (ref 0–0.1)
BASOPHILS NFR BLD AUTO: 0 % (ref 0–1)
BUN SERPL-MCNC: 16 MG/DL (ref 5–25)
CALCIUM SERPL-MCNC: 8 MG/DL (ref 8.3–10.1)
CHLORIDE SERPL-SCNC: 103 MMOL/L (ref 100–108)
CO2 SERPL-SCNC: 26 MMOL/L (ref 21–32)
CREAT SERPL-MCNC: 0.69 MG/DL (ref 0.6–1.3)
EOSINOPHIL # BLD AUTO: 0.01 THOUSAND/ΜL (ref 0–0.61)
EOSINOPHIL NFR BLD AUTO: 0 % (ref 0–6)
ERYTHROCYTE [DISTWIDTH] IN BLOOD BY AUTOMATED COUNT: 13.7 % (ref 11.6–15.1)
GFR SERPL CREATININE-BSD FRML MDRD: 105 ML/MIN/1.73SQ M
GLUCOSE SERPL-MCNC: 110 MG/DL (ref 65–140)
GLUCOSE SERPL-MCNC: 112 MG/DL (ref 65–140)
GLUCOSE SERPL-MCNC: 116 MG/DL (ref 65–140)
HCT VFR BLD AUTO: 37.2 % (ref 36.5–49.3)
HGB BLD-MCNC: 11.8 G/DL (ref 12–17)
IMM GRANULOCYTES # BLD AUTO: 0.09 THOUSAND/UL (ref 0–0.2)
IMM GRANULOCYTES NFR BLD AUTO: 1 % (ref 0–2)
LYMPHOCYTES # BLD AUTO: 1.94 THOUSANDS/ΜL (ref 0.6–4.47)
LYMPHOCYTES NFR BLD AUTO: 14 % (ref 14–44)
MAGNESIUM SERPL-MCNC: 2.1 MG/DL (ref 1.6–2.6)
MCH RBC QN AUTO: 30.1 PG (ref 26.8–34.3)
MCHC RBC AUTO-ENTMCNC: 31.7 G/DL (ref 31.4–37.4)
MCV RBC AUTO: 95 FL (ref 82–98)
MONOCYTES # BLD AUTO: 0.75 THOUSAND/ΜL (ref 0.17–1.22)
MONOCYTES NFR BLD AUTO: 6 % (ref 4–12)
NEUTROPHILS # BLD AUTO: 10.72 THOUSANDS/ΜL (ref 1.85–7.62)
NEUTS SEG NFR BLD AUTO: 79 % (ref 43–75)
NRBC BLD AUTO-RTO: 0 /100 WBCS
PHOSPHATE SERPL-MCNC: 4 MG/DL (ref 2.7–4.5)
PLATELET # BLD AUTO: 452 THOUSANDS/UL (ref 149–390)
PMV BLD AUTO: 9.4 FL (ref 8.9–12.7)
POTASSIUM SERPL-SCNC: 3.9 MMOL/L (ref 3.5–5.3)
RBC # BLD AUTO: 3.92 MILLION/UL (ref 3.88–5.62)
SODIUM SERPL-SCNC: 135 MMOL/L (ref 136–145)
WBC # BLD AUTO: 13.55 THOUSAND/UL (ref 4.31–10.16)

## 2018-09-01 PROCEDURE — 83735 ASSAY OF MAGNESIUM: CPT | Performed by: SURGERY

## 2018-09-01 PROCEDURE — 82948 REAGENT STRIP/BLOOD GLUCOSE: CPT

## 2018-09-01 PROCEDURE — 99024 POSTOP FOLLOW-UP VISIT: CPT | Performed by: SURGERY

## 2018-09-01 PROCEDURE — 85025 COMPLETE CBC W/AUTO DIFF WBC: CPT | Performed by: SURGERY

## 2018-09-01 PROCEDURE — 84100 ASSAY OF PHOSPHORUS: CPT | Performed by: SURGERY

## 2018-09-01 PROCEDURE — 80048 BASIC METABOLIC PNL TOTAL CA: CPT | Performed by: SURGERY

## 2018-09-01 RX ADMIN — HYDROMORPHONE HYDROCHLORIDE 0.2 MG: 1 INJECTION, SOLUTION INTRAMUSCULAR; INTRAVENOUS; SUBCUTANEOUS at 21:58

## 2018-09-01 RX ADMIN — ACETAMINOPHEN 975 MG: 325 TABLET, FILM COATED ORAL at 13:03

## 2018-09-01 RX ADMIN — HYDROMORPHONE HYDROCHLORIDE 0.2 MG: 1 INJECTION, SOLUTION INTRAMUSCULAR; INTRAVENOUS; SUBCUTANEOUS at 18:10

## 2018-09-01 RX ADMIN — HYDROMORPHONE HYDROCHLORIDE 0.2 MG: 1 INJECTION, SOLUTION INTRAMUSCULAR; INTRAVENOUS; SUBCUTANEOUS at 01:56

## 2018-09-01 RX ADMIN — HEPARIN SODIUM 5000 UNITS: 5000 INJECTION, SOLUTION INTRAVENOUS; SUBCUTANEOUS at 13:03

## 2018-09-01 RX ADMIN — HYDROMORPHONE HYDROCHLORIDE 0.2 MG: 1 INJECTION, SOLUTION INTRAMUSCULAR; INTRAVENOUS; SUBCUTANEOUS at 13:03

## 2018-09-01 RX ADMIN — ACETAMINOPHEN 975 MG: 325 TABLET, FILM COATED ORAL at 06:23

## 2018-09-01 RX ADMIN — PANTOPRAZOLE SODIUM 40 MG: 40 TABLET, DELAYED RELEASE ORAL at 06:23

## 2018-09-01 RX ADMIN — SODIUM CHLORIDE: 234 INJECTION INTRAMUSCULAR; INTRAVENOUS; SUBCUTANEOUS at 21:56

## 2018-09-01 RX ADMIN — HYDROMORPHONE HYDROCHLORIDE 0.2 MG: 1 INJECTION, SOLUTION INTRAMUSCULAR; INTRAVENOUS; SUBCUTANEOUS at 10:59

## 2018-09-01 RX ADMIN — OXYCODONE HYDROCHLORIDE 5 MG: 5 TABLET ORAL at 16:31

## 2018-09-01 RX ADMIN — HEPARIN SODIUM 5000 UNITS: 5000 INJECTION, SOLUTION INTRAVENOUS; SUBCUTANEOUS at 06:21

## 2018-09-01 RX ADMIN — ACETAMINOPHEN 975 MG: 325 TABLET, FILM COATED ORAL at 21:07

## 2018-09-01 RX ADMIN — OXYCODONE HYDROCHLORIDE 10 MG: 10 TABLET ORAL at 21:07

## 2018-09-01 RX ADMIN — ONDANSETRON 4 MG: 2 INJECTION INTRAMUSCULAR; INTRAVENOUS at 11:00

## 2018-09-01 RX ADMIN — HEPARIN SODIUM 5000 UNITS: 5000 INJECTION, SOLUTION INTRAVENOUS; SUBCUTANEOUS at 21:07

## 2018-09-01 RX ADMIN — OXYCODONE HYDROCHLORIDE 10 MG: 10 TABLET ORAL at 14:43

## 2018-09-01 NOTE — PROGRESS NOTES
Progress Note - General Surgery   [facility] Sep[facility] 63 y o  male MRN: [id number]  Unit/Bed#: [facility] 633-01 Encounter: [id number]    Assessment:  59yo male s/p ileostomy takedown on 8/16/18 now with possible stricture at anastomotic site  He is having bowel function however    Plan:  Continue regular diet as tolerated  Renew TPN for supplementation  Encourage ambulation  Replete electrolytes  Prn analgesia  Pulm toilet/IS  DVT ppx  Continue conservative management for now  May eventually need revision of anastomosis    Subjective/Objective   Chief Complaint:     Subjective: No acute events overnight  Denies nausea  Passing flatus and having bowel movements  Pain control this morning  Ambulating without difficulty    Objective:     Blood pressure 107/70, pulse 80, temperature 98 6 °F (37 °C), resp  rate 18, height 5' 8" (1 727 m), weight 74 4 kg (164 lb 0 4 oz), SpO2 98 %  ,Body mass index is 24 94 kg/m²  Intake/Output Summary (Last 24 hours) at 09/01/18 0935  Last data filed at 09/01/18 0256   Gross per 24 hour   Intake              340 ml   Output              300 ml   Net               40 ml       Invasive Devices     Peripherally Inserted Central Catheter Line            PICC Line 71/95/62 Right Basilic 6 days                Physical Exam:  No acute distress  Regular rate and rhythm  Nonlabored respirations on room air  Abdomen soft, slightly distended, nontender    Prior ileostomy site clean and dry    Lab, Imaging and other studies:  CBC:   Lab Results   Component Value Date    WBC 13 55 (H) 09/01/2018    HGB 11 8 (L) 09/01/2018    HCT 37 2 09/01/2018    MCV 95 09/01/2018     (H) 09/01/2018    MCH 30 1 09/01/2018    MCHC 31 7 09/01/2018    RDW 13 7 09/01/2018    MPV 9 4 09/01/2018    NRBC 0 09/01/2018   , CMP:   Lab Results   Component Value Date     (L) 09/01/2018    K 3 9 09/01/2018     09/01/2018    CO2 26 09/01/2018    BUN 16 09/01/2018    CREATININE 0 69 09/01/2018    CALCIUM 8 0 (L) 09/01/2018    EGFR 105 09/01/2018   , Coagulation: No results found for: PT, INR, APTT, Urinalysis: No results found for: COLORU, CLARITYU, SPECGRAV, PHUR, LEUKOCYTESUR, NITRITE, PROTEINUA, GLUCOSEU, KETONESU, BILIRUBINUR, BLOODU, Amylase: No results found for: AMYLASE, Lipase: No results found for: LIPASE  VTE Pharmacologic Prophylaxis: Heparin  VTE Mechanical Prophylaxis: sequential compression device

## 2018-09-02 LAB
ANION GAP SERPL CALCULATED.3IONS-SCNC: 7 MMOL/L (ref 4–13)
BUN SERPL-MCNC: 13 MG/DL (ref 5–25)
CALCIUM SERPL-MCNC: 8.2 MG/DL (ref 8.3–10.1)
CHLORIDE SERPL-SCNC: 102 MMOL/L (ref 100–108)
CO2 SERPL-SCNC: 27 MMOL/L (ref 21–32)
CREAT SERPL-MCNC: 0.68 MG/DL (ref 0.6–1.3)
GFR SERPL CREATININE-BSD FRML MDRD: 106 ML/MIN/1.73SQ M
GLUCOSE SERPL-MCNC: 103 MG/DL (ref 65–140)
GLUCOSE SERPL-MCNC: 122 MG/DL (ref 65–140)
GLUCOSE SERPL-MCNC: 140 MG/DL (ref 65–140)
MAGNESIUM SERPL-MCNC: 2 MG/DL (ref 1.6–2.6)
PHOSPHATE SERPL-MCNC: 3.3 MG/DL (ref 2.7–4.5)
POTASSIUM SERPL-SCNC: 3.9 MMOL/L (ref 3.5–5.3)
SODIUM SERPL-SCNC: 136 MMOL/L (ref 136–145)

## 2018-09-02 PROCEDURE — 84100 ASSAY OF PHOSPHORUS: CPT | Performed by: SURGERY

## 2018-09-02 PROCEDURE — 80048 BASIC METABOLIC PNL TOTAL CA: CPT | Performed by: SURGERY

## 2018-09-02 PROCEDURE — 82948 REAGENT STRIP/BLOOD GLUCOSE: CPT

## 2018-09-02 PROCEDURE — 83735 ASSAY OF MAGNESIUM: CPT | Performed by: SURGERY

## 2018-09-02 PROCEDURE — 99024 POSTOP FOLLOW-UP VISIT: CPT | Performed by: SURGERY

## 2018-09-02 RX ADMIN — HEPARIN SODIUM 5000 UNITS: 5000 INJECTION, SOLUTION INTRAVENOUS; SUBCUTANEOUS at 21:28

## 2018-09-02 RX ADMIN — HEPARIN SODIUM 5000 UNITS: 5000 INJECTION, SOLUTION INTRAVENOUS; SUBCUTANEOUS at 14:24

## 2018-09-02 RX ADMIN — OXYCODONE HYDROCHLORIDE 10 MG: 10 TABLET ORAL at 12:25

## 2018-09-02 RX ADMIN — DOXAZOSIN 4 MG: 4 TABLET ORAL at 10:00

## 2018-09-02 RX ADMIN — HEPARIN SODIUM 5000 UNITS: 5000 INJECTION, SOLUTION INTRAVENOUS; SUBCUTANEOUS at 06:02

## 2018-09-02 RX ADMIN — PANTOPRAZOLE SODIUM 40 MG: 40 TABLET, DELAYED RELEASE ORAL at 06:02

## 2018-09-02 RX ADMIN — ACETAMINOPHEN 975 MG: 325 TABLET, FILM COATED ORAL at 06:02

## 2018-09-02 RX ADMIN — OXYCODONE HYDROCHLORIDE 5 MG: 5 TABLET ORAL at 19:59

## 2018-09-02 RX ADMIN — ACETAMINOPHEN 975 MG: 325 TABLET, FILM COATED ORAL at 14:24

## 2018-09-02 RX ADMIN — ACETAMINOPHEN 975 MG: 325 TABLET, FILM COATED ORAL at 21:28

## 2018-09-02 RX ADMIN — HYDROMORPHONE HYDROCHLORIDE 0.2 MG: 1 INJECTION, SOLUTION INTRAMUSCULAR; INTRAVENOUS; SUBCUTANEOUS at 21:29

## 2018-09-02 RX ADMIN — HYDROMORPHONE HYDROCHLORIDE 0.2 MG: 1 INJECTION, SOLUTION INTRAMUSCULAR; INTRAVENOUS; SUBCUTANEOUS at 13:57

## 2018-09-02 NOTE — PROGRESS NOTES
Progress Note - General Surgery   Christy Bunn 63 y o  male MRN: [id number]  Unit/Bed#: [facility] 633-01 Encounter: [id number]    Assessment:  64M s/p ileostomy takedown now with poss stricture at anastomotic site  He is having bowel function    Plan:  -diet as tolerated  -TPN  -OOB, AAT  -pain control prn  -DVT ppx SQH    Subjective/Objective   Chief Complaint: pain    Subjective: feels better, less full  Had 2 BMs and gas overnight    Objective: Looks less distended and is resting comfortably in bed  He walks frequently    Blood pressure 125/68, pulse 76, temperature 98 9 °F (37 2 °C), temperature source Oral, resp  rate 18, height 5' 8" (1 727 m), weight 74 4 kg (164 lb 0 4 oz), SpO2 99 %  ,Body mass index is 24 94 kg/m²  Intake/Output Summary (Last 24 hours) at 09/02/18 0540  Last data filed at 09/02/18 0308   Gross per 24 hour   Intake              120 ml   Output              725 ml   Net             -605 ml       Invasive Devices     Peripherally Inserted Central Catheter Line            PICC Line 65/35/26 Right Basilic 7 days                Physical Exam:   General: No acute distress  HEENT: Normocephalic, atraumatic   Neck: Normal ROM   No tracheal deviation  Cardio: Normal rate, regular rhythm  Pulm: Normal respiratory effort  Abdomen: Soft, non-tender, non-distended  Extremities: BARRIENTOS, No edema  Neuro: Cranial nerves II-XII intact  Psych: Normal affect      Lab, Imaging and other studies:  CMP:   Lab Results   Component Value Date     (L) 09/01/2018    K 3 9 09/01/2018     09/01/2018    CO2 26 09/01/2018    BUN 16 09/01/2018    CREATININE 0 69 09/01/2018    CALCIUM 8 0 (L) 09/01/2018    EGFR 105 09/01/2018     VTE Pharmacologic Prophylaxis: Heparin  VTE Mechanical Prophylaxis: sequential compression device

## 2018-09-03 LAB
ANION GAP SERPL CALCULATED.3IONS-SCNC: 6 MMOL/L (ref 4–13)
BUN SERPL-MCNC: 9 MG/DL (ref 5–25)
CALCIUM SERPL-MCNC: 8.6 MG/DL (ref 8.3–10.1)
CHLORIDE SERPL-SCNC: 102 MMOL/L (ref 100–108)
CO2 SERPL-SCNC: 26 MMOL/L (ref 21–32)
CREAT SERPL-MCNC: 0.66 MG/DL (ref 0.6–1.3)
ERYTHROCYTE [DISTWIDTH] IN BLOOD BY AUTOMATED COUNT: 13.6 % (ref 11.6–15.1)
GFR SERPL CREATININE-BSD FRML MDRD: 107 ML/MIN/1.73SQ M
GLUCOSE SERPL-MCNC: 94 MG/DL (ref 65–140)
GLUCOSE SERPL-MCNC: 98 MG/DL (ref 65–140)
HCT VFR BLD AUTO: 37.3 % (ref 36.5–49.3)
HGB BLD-MCNC: 12.1 G/DL (ref 12–17)
MAGNESIUM SERPL-MCNC: 2.1 MG/DL (ref 1.6–2.6)
MCH RBC QN AUTO: 30.1 PG (ref 26.8–34.3)
MCHC RBC AUTO-ENTMCNC: 32.4 G/DL (ref 31.4–37.4)
MCV RBC AUTO: 93 FL (ref 82–98)
PLATELET # BLD AUTO: 524 THOUSANDS/UL (ref 149–390)
PMV BLD AUTO: 9.8 FL (ref 8.9–12.7)
POTASSIUM SERPL-SCNC: 3.8 MMOL/L (ref 3.5–5.3)
PREALB SERPL-MCNC: 14.6 MG/DL (ref 18–40)
RBC # BLD AUTO: 4.02 MILLION/UL (ref 3.88–5.62)
SODIUM SERPL-SCNC: 134 MMOL/L (ref 136–145)
WBC # BLD AUTO: 16.16 THOUSAND/UL (ref 4.31–10.16)

## 2018-09-03 PROCEDURE — 85027 COMPLETE CBC AUTOMATED: CPT | Performed by: SURGERY

## 2018-09-03 PROCEDURE — 84134 ASSAY OF PREALBUMIN: CPT | Performed by: SURGERY

## 2018-09-03 PROCEDURE — 80048 BASIC METABOLIC PNL TOTAL CA: CPT | Performed by: SURGERY

## 2018-09-03 PROCEDURE — 99024 POSTOP FOLLOW-UP VISIT: CPT | Performed by: SURGERY

## 2018-09-03 PROCEDURE — 83735 ASSAY OF MAGNESIUM: CPT | Performed by: SURGERY

## 2018-09-03 PROCEDURE — 82948 REAGENT STRIP/BLOOD GLUCOSE: CPT

## 2018-09-03 RX ORDER — POTASSIUM CHLORIDE 20 MEQ/1
20 TABLET, EXTENDED RELEASE ORAL ONCE
Status: COMPLETED | OUTPATIENT
Start: 2018-09-03 | End: 2018-09-03

## 2018-09-03 RX ORDER — ACETAMINOPHEN 325 MG/1
650 TABLET ORAL EVERY 6 HOURS PRN
Status: DISCONTINUED | OUTPATIENT
Start: 2018-09-03 | End: 2018-09-07 | Stop reason: HOSPADM

## 2018-09-03 RX ORDER — ACETAMINOPHEN 325 MG/1
975 TABLET ORAL EVERY 6 HOURS PRN
Status: DISCONTINUED | OUTPATIENT
Start: 2018-09-03 | End: 2018-09-03

## 2018-09-03 RX ADMIN — OXYCODONE HYDROCHLORIDE 10 MG: 10 TABLET ORAL at 15:21

## 2018-09-03 RX ADMIN — PANTOPRAZOLE SODIUM 40 MG: 40 TABLET, DELAYED RELEASE ORAL at 06:15

## 2018-09-03 RX ADMIN — HEPARIN SODIUM 5000 UNITS: 5000 INJECTION, SOLUTION INTRAVENOUS; SUBCUTANEOUS at 13:33

## 2018-09-03 RX ADMIN — POTASSIUM CHLORIDE 20 MEQ: 1500 TABLET, EXTENDED RELEASE ORAL at 10:12

## 2018-09-03 RX ADMIN — DOXAZOSIN 4 MG: 4 TABLET ORAL at 10:06

## 2018-09-03 RX ADMIN — HEPARIN SODIUM 5000 UNITS: 5000 INJECTION, SOLUTION INTRAVENOUS; SUBCUTANEOUS at 21:50

## 2018-09-03 RX ADMIN — OXYCODONE HYDROCHLORIDE 10 MG: 10 TABLET ORAL at 18:39

## 2018-09-03 NOTE — PROGRESS NOTES
Progress Note - General Surgery   Maddi Bunn 63 y o  male MRN: [id number]  Unit/Bed#: [facility] 633-01 Encounter: [id number]    Assessment:  78-year-old male status post ileostomy takedown on 08/16/2018 now with possible stricture at the anastomotic site  Continues to have bowel function - obstruction appears to have cleared  R groin hernia    Plan:  Diet as tolerated  Encourage ambulation  Aggressive pulmonary toilet/IS  Local wound care to prior ostomy site  Replete electrolytes as indicated  DVT prophylaxis  Disposition planning  Will need outpatient follow up for elective repair of R groin hernia    Subjective/Objective   Chief Complaint:     Subjective:  No events overnight  Tolerating crease in amounts of oral intake  Multiple bowel movements past 24 hours    Objective:     Blood pressure 122/80, pulse 91, temperature 99 5 °F (37 5 °C), resp  rate 20, height 5' 8" (1 727 m), weight 74 2 kg (163 lb 9 3 oz), SpO2 100 %  ,Body mass index is 24 87 kg/m²  Intake/Output Summary (Last 24 hours) at 09/03/18 0542  Last data filed at 09/03/18 0412   Gross per 24 hour   Intake              820 ml   Output              900 ml   Net              -80 ml       Invasive Devices     Peripherally Inserted Central Catheter Line            PICC Line 68/00/52 Right Basilic 8 days                Physical Exam:   No acute distress  Regular rate and rhythm  Nonlabored respirations on room air  Abdomen soft, minimally tender, nondistended    Prior ostomy site clean and dry  Moves all extremities    Lab, Imaging and other studies:  CBC: No results found for: WBC, HGB, HCT, MCV, PLT, ADJUSTEDWBC, MCH, MCHC, RDW, MPV, NRBC, CMP:   Lab Results   Component Value Date     09/02/2018    K 3 9 09/02/2018     09/02/2018    CO2 27 09/02/2018    BUN 13 09/02/2018    CREATININE 0 68 09/02/2018    CALCIUM 8 2 (L) 09/02/2018    EGFR 106 09/02/2018   , Coagulation: No results found for: PT, INR, APTT, Urinalysis: No results found for: Brian Puller, SPECGRAV, PHUR, LEUKOCYTESUR, NITRITE, PROTEINUA, GLUCOSEU, KETONESU, BILIRUBINUR, BLOODU, Amylase: No results found for: AMYLASE, Lipase: No results found for: LIPASE  VTE Pharmacologic Prophylaxis: Heparin  VTE Mechanical Prophylaxis: sequential compression device

## 2018-09-04 LAB
ANION GAP SERPL CALCULATED.3IONS-SCNC: 6 MMOL/L (ref 4–13)
BASOPHILS # BLD AUTO: 0.05 THOUSANDS/ΜL (ref 0–0.1)
BASOPHILS NFR BLD AUTO: 0 % (ref 0–1)
BUN SERPL-MCNC: 8 MG/DL (ref 5–25)
CALCIUM SERPL-MCNC: 8.3 MG/DL (ref 8.3–10.1)
CHLORIDE SERPL-SCNC: 102 MMOL/L (ref 100–108)
CO2 SERPL-SCNC: 28 MMOL/L (ref 21–32)
CREAT SERPL-MCNC: 0.72 MG/DL (ref 0.6–1.3)
EOSINOPHIL # BLD AUTO: 0.02 THOUSAND/ΜL (ref 0–0.61)
EOSINOPHIL NFR BLD AUTO: 0 % (ref 0–6)
ERYTHROCYTE [DISTWIDTH] IN BLOOD BY AUTOMATED COUNT: 13.6 % (ref 11.6–15.1)
GFR SERPL CREATININE-BSD FRML MDRD: 104 ML/MIN/1.73SQ M
GLUCOSE SERPL-MCNC: 81 MG/DL (ref 65–140)
HCT VFR BLD AUTO: 34.9 % (ref 36.5–49.3)
HGB BLD-MCNC: 10.9 G/DL (ref 12–17)
IMM GRANULOCYTES # BLD AUTO: 0.08 THOUSAND/UL (ref 0–0.2)
IMM GRANULOCYTES NFR BLD AUTO: 1 % (ref 0–2)
LYMPHOCYTES # BLD AUTO: 2.33 THOUSANDS/ΜL (ref 0.6–4.47)
LYMPHOCYTES NFR BLD AUTO: 15 % (ref 14–44)
MCH RBC QN AUTO: 29.3 PG (ref 26.8–34.3)
MCHC RBC AUTO-ENTMCNC: 31.2 G/DL (ref 31.4–37.4)
MCV RBC AUTO: 94 FL (ref 82–98)
MONOCYTES # BLD AUTO: 0.79 THOUSAND/ΜL (ref 0.17–1.22)
MONOCYTES NFR BLD AUTO: 5 % (ref 4–12)
NEUTROPHILS # BLD AUTO: 12.47 THOUSANDS/ΜL (ref 1.85–7.62)
NEUTS SEG NFR BLD AUTO: 79 % (ref 43–75)
NRBC BLD AUTO-RTO: 0 /100 WBCS
PLATELET # BLD AUTO: 559 THOUSANDS/UL (ref 149–390)
PMV BLD AUTO: 9.8 FL (ref 8.9–12.7)
POTASSIUM SERPL-SCNC: 3.8 MMOL/L (ref 3.5–5.3)
RBC # BLD AUTO: 3.72 MILLION/UL (ref 3.88–5.62)
SODIUM SERPL-SCNC: 136 MMOL/L (ref 136–145)
WBC # BLD AUTO: 15.74 THOUSAND/UL (ref 4.31–10.16)

## 2018-09-04 PROCEDURE — 80048 BASIC METABOLIC PNL TOTAL CA: CPT | Performed by: STUDENT IN AN ORGANIZED HEALTH CARE EDUCATION/TRAINING PROGRAM

## 2018-09-04 PROCEDURE — 85025 COMPLETE CBC W/AUTO DIFF WBC: CPT | Performed by: STUDENT IN AN ORGANIZED HEALTH CARE EDUCATION/TRAINING PROGRAM

## 2018-09-04 PROCEDURE — 99024 POSTOP FOLLOW-UP VISIT: CPT | Performed by: SURGERY

## 2018-09-04 RX ORDER — AMOXICILLIN 250 MG
1 CAPSULE ORAL
Status: DISCONTINUED | OUTPATIENT
Start: 2018-09-04 | End: 2018-09-07 | Stop reason: HOSPADM

## 2018-09-04 RX ORDER — DOCUSATE SODIUM 100 MG/1
100 CAPSULE, LIQUID FILLED ORAL 2 TIMES DAILY
Status: DISCONTINUED | OUTPATIENT
Start: 2018-09-04 | End: 2018-09-07 | Stop reason: HOSPADM

## 2018-09-04 RX ADMIN — SENNOSIDES AND DOCUSATE SODIUM 1 TABLET: 8.6; 5 TABLET ORAL at 21:02

## 2018-09-04 RX ADMIN — HEPARIN SODIUM 5000 UNITS: 5000 INJECTION, SOLUTION INTRAVENOUS; SUBCUTANEOUS at 05:27

## 2018-09-04 RX ADMIN — SENNOSIDES AND DOCUSATE SODIUM 1 TABLET: 8.6; 5 TABLET ORAL at 12:03

## 2018-09-04 RX ADMIN — DOCUSATE SODIUM 100 MG: 100 CAPSULE, LIQUID FILLED ORAL at 17:53

## 2018-09-04 RX ADMIN — SIMETHICONE CHEW TAB 80 MG 80 MG: 80 TABLET ORAL at 21:04

## 2018-09-04 RX ADMIN — OXYCODONE HYDROCHLORIDE 10 MG: 10 TABLET ORAL at 22:48

## 2018-09-04 RX ADMIN — OXYCODONE HYDROCHLORIDE 10 MG: 10 TABLET ORAL at 08:43

## 2018-09-04 RX ADMIN — PANTOPRAZOLE SODIUM 40 MG: 40 TABLET, DELAYED RELEASE ORAL at 05:27

## 2018-09-04 RX ADMIN — DOXAZOSIN 4 MG: 4 TABLET ORAL at 08:38

## 2018-09-04 NOTE — CASE MANAGEMENT
Continued Stay Review    Date: 9/2/2018    Vital Signs: 9/2 - 99 5-91-/80    Medications:   Scheduled Meds:   Current Facility-Administered Medications:  acetaminophen 650 mg Oral Q6H PRN    bisacodyl 10 mg Rectal Daily PRN    doxazosin 4 mg Oral Daily    heparin (porcine) 5,000 Units Subcutaneous Q8H [address]    HYDROmorphone 0 2 mg Intravenous Q3H PRN 9/2  X 2    lidocaine  Topical Once    ondansetron 4 mg Intravenous Q6H PRN    oxyCODONE 10 mg Oral Q4H PRN 9/2 x 1  9/3 x 2  9/4 x 1    oxyCODONE 5 mg Oral Q4H PRN 9/2 x 1    pantoprazole 40 mg Oral Early Morning    phenol 1 spray Mouth/Throat Q2H PRN    promethazine 25 mg Intravenous Q6H PRN    saliva substitute 5 spray Mouth/Throat PRN    senna-docusate sodium 1 tablet Oral HS    simethicone 80 mg Oral Q6H PRN      TPN - d/c'd on 9/2    Abnormal Labs/Diagnostic Results:   Ref Range & Units 9/4/18 0527 9/3/18 0619 9/2/18 0602 9/1/18 0628   Sodium 136 - 145 mmol/L 136  134   136  135     Potassium 3 5 - 5 3 mmol/L 3 8  3 8  3 9  3 9    Chloride 100 - 108 mmol/L 102  102  102  103    CO2 21 - 32 mmol/L 28  26  27  26    ANION GAP 4 - 13 mmol/L 6  6  7  6    BUN 5 - 25 mg/dL 8  9  13  16    Creatinine 0 60 - 1 30 mg/dL 0 72  0 66CM  0 68CM  0 69CM    Glucose 65 - 140 mg/dL 81  94CM  103CM  112CM    Calcium 8 3 - 10 1 mg/dL 8 3  8 6  8 2   8 0     eGFR ml/min/1 73sq m 104  107  106  105             Ref Range & Units 9/4/18 0527 9/3/18 0619 9/1/18 0628 8/31/18 0736 8/29/18 0600   WBC 4 31 - 10 16 Thousand/uL 15 74   16 16   13 55   13 28   7 66    RBC 3 88 - 5 62 Million/uL 3 72   4 02  3 92  4 31  3 96    Hemoglobin 12 0 - 17 0 g/dL 10 9   12 1  11 8   12 8  11 8     Hematocrit 36 5 - 49 3 % 34 9   37 3  37 2  40 5  37 3    MCV 82 - 98 fL 94  93  95  94  94    MCH 26 8 - 34 3 pg 29 3  30 1  30 1  29 7  29 8    MCHC 31 4 - 37 4 g/dL 31 2   32 4  31 7  31 6  31 6    RDW 11 6 - 15 1 % 13 6  13 6  13 7  13 7  13 3    MPV 8 9 - 12 7 fL 9 8  9 8  9 4  9 4  10 1    Platelets 149 - 390 Thousands/uL 559   524   452   431   386    nRBC /100 WBCs 0   0  0  0    Neutrophils Relative 43 - 75 % 79    79   78   69    Immat GRANS % 0 - 2 % 1   1  1  1    Lymphocytes Relative 14 - 44 % 15   14  15  18    Monocytes Relative 4 - 12 % 5   6  6  10    Eosinophils Relative 0 - 6 % 0   0  0  2    Basophils Relative 0 - 1 % 0   0  0  0    Neutrophils Absolute 1 85 - 7 62 Thousands/µL 12 47    10 72   10 38   5 25    Immature Grans Absolute 0 00 - 0 20 Thousand/uL 0 08   0 09  0 11  0 10    Lymphocytes Absolute 0 60 - 4 47 Thousands/µL 2 33   1 94  2 00  1 41    Monocytes Absolute 0 17 - 1 22 Thousand/µL 0 79   0 75  0 73  0 75    Eosinophils Absolute 0 00 - 0 61 Thousand/µL 0 02   0 01  0 02  0 12    Basophils Absolute 0 00 - 0 10 Thousands/µL 0 05   0 04  0 04  0 03            8/31 - Obstruction series -  Evolution of high grade small bowel obstruction     Age/Sex: 63 y o  male     Assessment/Plan: 64 yo m  S/p ileostomy takedown with possible stricture at anastomotic site - now having some bowel function -  Diet as tolerated - pain mgmt - R inguinal hernia - encourage ambulation -     Discharge Plan:  TBD

## 2018-09-04 NOTE — PROGRESS NOTES
Nurse-Patient- Provider rounds were completed with the patient's nurse today, Abe Mims  We discussed the plan is to continue regular diet as tolerated  Continue bowel regimen  No further need for IV fluids or TPN at this time  Continue to monitor abdominal exam and follow-up bowel function  Anticipate discharge in the next 24-48 hours pending continued progress return of normal bowel function  We reviewed all of the invasive devices/lines/telemetry orders   - Maintain right-sided PICC line for IV access; anticipate discontinuation of line prior to discharge  Pain Assessment / Plan:  - Continue current analgesic regimen  Mobility Assessment / Plan:  - Activity as tolerated  Goals / Barriers for discharge:  - Awaiting return of normal bowel function upon resumption of regular diet  Case management following; case and discharge needs discussed  All questions and concerns were addressed  I spent greater than 20 minutes reviewing the plan with the patient and the nurse, and coordinating care for the day      Michael Stokes PA-C  9/4/2018 2:49 PM

## 2018-09-04 NOTE — PROGRESS NOTES
Progress Note - General Surgery   Odette Alecia Bunn 63 y o  male MRN: [id number]  Unit/Bed#: [facility] 633-01 Encounter: [id number]    Assessment:  60-year-old male status post ileostomy takedown on 08/16/2018 now with possible stricture at the anastomotic site  Continues to have bowel function - obstruction appears to have cleared  Plan:  - diet as tolerated  - OOB/ambulate  - pulm toilet  - SQH/SCDs  - dispo planning  - outpt elective repair R inguinal hernia      Subjective/Objective     Subjective:  Having bowel movements but not back to normal yet, refusing to leave until has "normal"    Objective:     Blood pressure 131/64, pulse 89, temperature 98 5 °F (36 9 °C), temperature source Oral, resp  rate 18, height 5' 8" (1 727 m), weight 74 2 kg (163 lb 9 3 oz), SpO2 99 %  ,Body mass index is 24 87 kg/m²        Intake/Output Summary (Last 24 hours) at 09/04/18 0600  Last data filed at 09/04/18 0356   Gross per 24 hour   Intake              600 ml   Output             1275 ml   Net             -675 ml       Invasive Devices     Peripherally Inserted Central Catheter Line            PICC Line 56/50/85 Right Basilic 9 days                Physical Exam:   NAD  Norm resp effort  RRR  Abd soft, NT/ND, incision cdi  -c/c/e    Lab, Imaging and other studies:  CBC:   Lab Results   Component Value Date    WBC 16 16 (H) 09/03/2018    HGB 12 1 09/03/2018    HCT 37 3 09/03/2018    MCV 93 09/03/2018     (H) 09/03/2018    MCH 30 1 09/03/2018    MCHC 32 4 09/03/2018    RDW 13 6 09/03/2018    MPV 9 8 09/03/2018   , CMP:   Lab Results   Component Value Date     (L) 09/03/2018    K 3 8 09/03/2018     09/03/2018    CO2 26 09/03/2018    BUN 9 09/03/2018    CREATININE 0 66 09/03/2018    CALCIUM 8 6 09/03/2018    EGFR 107 09/03/2018   , Coagulation: No results found for: PT, INR, APTT, Urinalysis: No results found for: Mehdi De Jesus [address], [address] [facility], [address], NITRITE, [address] [facility], AMIN[place], Jose Olvera, BLOODU, Amylase: No results found for: AMYLASE, Lipase: No results found for: LIPASE  VTE Pharmacologic Prophylaxis: Heparin  VTE Mechanical Prophylaxis: sequential compression device

## 2018-09-05 PROCEDURE — 99024 POSTOP FOLLOW-UP VISIT: CPT | Performed by: SURGERY

## 2018-09-05 RX ORDER — BISACODYL 10 MG
10 SUPPOSITORY, RECTAL RECTAL DAILY
Status: DISCONTINUED | OUTPATIENT
Start: 2018-09-05 | End: 2018-09-07 | Stop reason: HOSPADM

## 2018-09-05 RX ADMIN — PANTOPRAZOLE SODIUM 40 MG: 40 TABLET, DELAYED RELEASE ORAL at 05:57

## 2018-09-05 RX ADMIN — PROMETHAZINE HYDROCHLORIDE 25 MG: 25 INJECTION INTRAMUSCULAR; INTRAVENOUS at 01:57

## 2018-09-05 RX ADMIN — ACETAMINOPHEN 650 MG: 325 TABLET, FILM COATED ORAL at 14:40

## 2018-09-05 RX ADMIN — ENOXAPARIN SODIUM 40 MG: 100 INJECTION SUBCUTANEOUS at 10:04

## 2018-09-05 RX ADMIN — SENNOSIDES AND DOCUSATE SODIUM 1 TABLET: 8.6; 5 TABLET ORAL at 21:40

## 2018-09-05 RX ADMIN — OXYCODONE HYDROCHLORIDE 5 MG: 5 TABLET ORAL at 10:11

## 2018-09-05 RX ADMIN — DOCUSATE SODIUM 100 MG: 100 CAPSULE, LIQUID FILLED ORAL at 10:04

## 2018-09-05 RX ADMIN — DOCUSATE SODIUM 100 MG: 100 CAPSULE, LIQUID FILLED ORAL at 19:12

## 2018-09-05 RX ADMIN — ONDANSETRON 4 MG: 2 INJECTION INTRAMUSCULAR; INTRAVENOUS at 01:55

## 2018-09-05 RX ADMIN — OXYCODONE HYDROCHLORIDE 5 MG: 5 TABLET ORAL at 21:47

## 2018-09-05 RX ADMIN — HYDROMORPHONE HYDROCHLORIDE 0.2 MG: 1 INJECTION, SOLUTION INTRAMUSCULAR; INTRAVENOUS; SUBCUTANEOUS at 01:51

## 2018-09-05 RX ADMIN — METHYLNALTREXONE BROMIDE 12 MG: 12 INJECTION, SOLUTION SUBCUTANEOUS at 10:02

## 2018-09-05 NOTE — PROGRESS NOTES
Progress Note - General Surgery   Kimani Bunn 63 y o  male MRN: [id number]  Unit/Bed#: [facility] 633-01 Encounter: [id number]    Assessment:  61-year-old male status post ileostomy takedown on 08/16/2018 now with possible stricture at the anastomotic site  Continues to have bowel function - obstruction appears to have cleared  Plan:  - diet as tolerated  - d/c majority narcotics  - relistor  - OOB/ambulate  - pulm toilet  - SQH/SCDs  - dispo planning  - outpt elective repair R inguinal hernia      Subjective/Objective     Subjective:  Passing flatus, last bm day before yesterday  Tolerating diet with no nausea or vomiting    Objective:     Blood pressure 126/79, pulse 85, temperature 98 9 °F (37 2 °C), temperature source Oral, resp  rate 20, height 5' 8" (1 727 m), weight 72 5 kg (159 lb 13 3 oz), SpO2 96 %  ,Body mass index is 24 3 kg/m²        Intake/Output Summary (Last 24 hours) at 09/05/18 0725  Last data filed at 09/05/18 0227   Gross per 24 hour   Intake              360 ml   Output              700 ml   Net             -340 ml       Invasive Devices     Peripherally Inserted Central Catheter Line            PICC Line 11/62/32 Right Basilic 10 days                Physical Exam:   NAD  Norm resp effort on RA  RRR  Abd soft, NT, ND  -c/c/e    Lab, Imaging and other studies:  CBC:   No results found for: WBC, HGB, HCT, MCV, PLT, ADJUSTEDWBC, MCH, MCHC, RDW, MPV, NRBC, CMP:   No results found for: NA, K, CL, CO2, ANIONGAP, BUN, CREATININE, GLUCOSE, CALCIUM, AST, ALT, ALKPHOS, PROT, ALBUMIN, BILITOT, EGFR, Coagulation: No results found for: PT, INR, APTT, Urinalysis: No results found for: COLORU, CLARITYU, SPECGRAV, PHUR, LEUKOCYTESUR, NITRITE, PROTEINUA, GLUCOSEU, KETONESU, BILIRUBINUR, BLOODU, Amylase: No results found for: AMYLASE, Lipase: No results found for: LIPASE  VTE Pharmacologic Prophylaxis: Heparin  VTE Mechanical Prophylaxis: sequential compression device

## 2018-09-05 NOTE — PROGRESS NOTES
Nurse / Provider rounds completed with staff nurse, Willy Irene, patient very tired and resting quietly  Relistor given this morning and reported had gas  Ambulatory and will follow on bowel regimen

## 2018-09-06 LAB
BASOPHILS # BLD AUTO: 0.04 THOUSANDS/ΜL (ref 0–0.1)
BASOPHILS NFR BLD AUTO: 0 % (ref 0–1)
EOSINOPHIL # BLD AUTO: 0.03 THOUSAND/ΜL (ref 0–0.61)
EOSINOPHIL NFR BLD AUTO: 0 % (ref 0–6)
ERYTHROCYTE [DISTWIDTH] IN BLOOD BY AUTOMATED COUNT: 13.3 % (ref 11.6–15.1)
HCT VFR BLD AUTO: 35.8 % (ref 36.5–49.3)
HGB BLD-MCNC: 11.3 G/DL (ref 12–17)
IMM GRANULOCYTES # BLD AUTO: 0.04 THOUSAND/UL (ref 0–0.2)
IMM GRANULOCYTES NFR BLD AUTO: 0 % (ref 0–2)
LYMPHOCYTES # BLD AUTO: 2.09 THOUSANDS/ΜL (ref 0.6–4.47)
LYMPHOCYTES NFR BLD AUTO: 18 % (ref 14–44)
MCH RBC QN AUTO: 29.5 PG (ref 26.8–34.3)
MCHC RBC AUTO-ENTMCNC: 31.6 G/DL (ref 31.4–37.4)
MCV RBC AUTO: 94 FL (ref 82–98)
MONOCYTES # BLD AUTO: 0.61 THOUSAND/ΜL (ref 0.17–1.22)
MONOCYTES NFR BLD AUTO: 5 % (ref 4–12)
NEUTROPHILS # BLD AUTO: 8.96 THOUSANDS/ΜL (ref 1.85–7.62)
NEUTS SEG NFR BLD AUTO: 77 % (ref 43–75)
NRBC BLD AUTO-RTO: 0 /100 WBCS
PLATELET # BLD AUTO: 590 THOUSANDS/UL (ref 149–390)
PMV BLD AUTO: 9 FL (ref 8.9–12.7)
RBC # BLD AUTO: 3.83 MILLION/UL (ref 3.88–5.62)
WBC # BLD AUTO: 11.77 THOUSAND/UL (ref 4.31–10.16)

## 2018-09-06 PROCEDURE — 85025 COMPLETE CBC W/AUTO DIFF WBC: CPT | Performed by: STUDENT IN AN ORGANIZED HEALTH CARE EDUCATION/TRAINING PROGRAM

## 2018-09-06 PROCEDURE — 99024 POSTOP FOLLOW-UP VISIT: CPT | Performed by: SURGERY

## 2018-09-06 RX ORDER — OXYCODONE HYDROCHLORIDE 10 MG/1
10 TABLET ORAL EVERY 4 HOURS PRN
Status: DISCONTINUED | OUTPATIENT
Start: 2018-09-06 | End: 2018-09-07 | Stop reason: HOSPADM

## 2018-09-06 RX ADMIN — DOCUSATE SODIUM 100 MG: 100 CAPSULE, LIQUID FILLED ORAL at 09:54

## 2018-09-06 RX ADMIN — SENNOSIDES AND DOCUSATE SODIUM 1 TABLET: 8.6; 5 TABLET ORAL at 22:20

## 2018-09-06 RX ADMIN — DOXAZOSIN 4 MG: 4 TABLET ORAL at 09:54

## 2018-09-06 RX ADMIN — DOCUSATE SODIUM 100 MG: 100 CAPSULE, LIQUID FILLED ORAL at 17:16

## 2018-09-06 RX ADMIN — OXYCODONE HYDROCHLORIDE 5 MG: 5 TABLET ORAL at 17:16

## 2018-09-06 RX ADMIN — PANTOPRAZOLE SODIUM 40 MG: 40 TABLET, DELAYED RELEASE ORAL at 06:23

## 2018-09-06 RX ADMIN — OXYCODONE HYDROCHLORIDE 10 MG: 10 TABLET ORAL at 06:23

## 2018-09-06 RX ADMIN — OXYCODONE HYDROCHLORIDE 5 MG: 5 TABLET ORAL at 09:58

## 2018-09-06 NOTE — PROGRESS NOTES
Progress Note - General Surgery   Alexander Bunn 63 y o  male MRN: [id number]  Unit/Bed#: [facility] 633-01 Encounter: [id number]    Assessment:  52-year-old male status post ileostomy takedown on 08/16/2018 now with possible stricture at the anastomotic site  Continues to have bowel function - obstruction appears to have cleared  Plan:  - diet as tolerated  - OOB/ambulate  - pulm toilet  - SQH/SCDs  - dispo planning  - outpt elective repair R inguinal hernia      Subjective/Objective     Subjective: Tolerating diet, passing flatus, having BM, now loose    Objective:     Blood pressure 108/82, pulse 84, temperature 98 6 °F (37 °C), temperature source Oral, resp  rate 20, height 5' 8" (1 727 m), weight 70 9 kg (156 lb 4 9 oz), SpO2 96 %  ,Body mass index is 23 77 kg/m²        Intake/Output Summary (Last 24 hours) at 09/06/18 0556  Last data filed at 09/06/18 0300   Gross per 24 hour   Intake              200 ml   Output              900 ml   Net             -700 ml       Invasive Devices     Peripherally Inserted Central Catheter Line            PICC Line 53/77/55 Right Basilic 11 days                Physical Exam:   NAD  Norm resp effort on RA  RRR  Abd soft, NT/ND  -c/c/e  Alert and oriented x3    Lab, Imaging and other studies:  CBC:   No results found for: WBC, HGB, HCT, MCV, PLT, ADJUSTEDWBC, MCH, MCHC, RDW, MPV, NRBC, CMP:   No results found for: NA, K, CL, CO2, ANIONGAP, BUN, CREATININE, GLUCOSE, CALCIUM, AST, ALT, ALKPHOS, PROT, ALBUMIN, BILITOT, EGFR, Coagulation: No results found for: PT, INR, APTT, Urinalysis: No results found for: COLORU, CLARITYU, SPECGRAV, PHUR, LEUKOCYTESUR, NITRITE, PROTEINUA, GLUCOSEU, KETONESU, BILIRUBINUR, BLOODU, Amylase: No results found for: AMYLASE, Lipase: No results found for: LIPASE  VTE Pharmacologic Prophylaxis: Heparin  VTE Mechanical Prophylaxis: sequential compression device

## 2018-09-06 NOTE — PROGRESS NOTES
Nurse-Patient- Provider rounds were completed with the patient's nurse today, Beth Molina  We discussed the plan is to continue diet as tolerated  Continue bowel regimen and monitor for continued bowel function  Anticipate discharge home on 09/07/2018  We reviewed all of the invasive devices/lines/telemetry orders   - None  Pain Assessment / Plan:  - Continue current analgesic regimen with focus on limiting narcotics as able  Mobility Assessment / Plan:  - Activity as tolerated  Goals / Barriers for discharge:  - Anticipate discharge home on 09/07/2018  - Case management following; case and discharge needs discussed  All questions and concerns were addressed  I spent greater than 15 minutes reviewing the plan with the patient and the nurse, and coordinating care for the day      Yfn Savage PA-C  9/6/2018 5:31 PM

## 2018-09-07 VITALS
RESPIRATION RATE: 16 BRPM | SYSTOLIC BLOOD PRESSURE: 118 MMHG | BODY MASS INDEX: 25.06 KG/M2 | HEART RATE: 83 BPM | HEIGHT: 68 IN | DIASTOLIC BLOOD PRESSURE: 76 MMHG | TEMPERATURE: 98.7 F | OXYGEN SATURATION: 98 % | WEIGHT: 165.34 LBS

## 2018-09-07 PROCEDURE — 99024 POSTOP FOLLOW-UP VISIT: CPT | Performed by: PHYSICIAN ASSISTANT

## 2018-09-07 RX ORDER — AMOXICILLIN 250 MG
1 CAPSULE ORAL
Qty: 10 TABLET | Refills: 0 | Status: SHIPPED | OUTPATIENT
Start: 2018-09-07 | End: 2019-01-28

## 2018-09-07 RX ORDER — DOCUSATE SODIUM 100 MG/1
100 CAPSULE, LIQUID FILLED ORAL 2 TIMES DAILY
Qty: 10 CAPSULE | Refills: 0
Start: 2018-09-07 | End: 2019-01-28

## 2018-09-07 RX ORDER — OXYCODONE HYDROCHLORIDE 5 MG/1
2.5-5 TABLET ORAL EVERY 4 HOURS PRN
Qty: 20 TABLET | Refills: 0
Start: 2018-09-07 | End: 2018-09-07

## 2018-09-07 RX ORDER — OXYCODONE HYDROCHLORIDE 5 MG/1
2.5-5 TABLET ORAL EVERY 4 HOURS PRN
Qty: 20 TABLET | Refills: 0 | Status: SHIPPED | OUTPATIENT
Start: 2018-09-07 | End: 2018-09-17

## 2018-09-07 RX ORDER — ACETAMINOPHEN 325 MG/1
650 TABLET ORAL EVERY 4 HOURS PRN
Qty: 30 TABLET | Refills: 0
Start: 2018-09-07 | End: 2019-01-28

## 2018-09-07 RX ORDER — SIMETHICONE 80 MG
80 TABLET,CHEWABLE ORAL EVERY 6 HOURS PRN
Qty: 30 TABLET | Refills: 0
Start: 2018-09-07 | End: 2019-01-28

## 2018-09-07 RX ADMIN — DOCUSATE SODIUM 100 MG: 100 CAPSULE, LIQUID FILLED ORAL at 11:37

## 2018-09-07 RX ADMIN — OXYCODONE HYDROCHLORIDE 10 MG: 10 TABLET ORAL at 00:06

## 2018-09-07 RX ADMIN — PANTOPRAZOLE SODIUM 40 MG: 40 TABLET, DELAYED RELEASE ORAL at 06:30

## 2018-09-07 RX ADMIN — DOXAZOSIN 4 MG: 4 TABLET ORAL at 11:37

## 2018-09-07 NOTE — PROGRESS NOTES
Progress Note - General Surgery   Chika Bunn 63 y o  male MRN: [id number]  Unit/Bed#: [facility] 633-01 Encounter: [id number]    Assessment:  60-year-old male status post ileostomy takedown on 87/93/1539 complicated by possible stricture at the anastomotic site  Patient now having normal bowel function    Plan:  Diet as tolerated  Encourage ambulation  Local wound care to prior ostomy site  Aggressive pulmonary toilet/AIS  DVT prophylaxis  Dispo: Anticipate discharge home today  Outpatient follow-up for elective repair of right groin hernia    Subjective/Objective   Chief Complaint:     Subjective:  No events overnight  Having bowel movements and passing flatus  Tolerating a regular diet    Objective:     Blood pressure 108/73, pulse 83, temperature 99 1 °F (37 3 °C), resp  rate 18, height 5' 8" (1 727 m), weight 74 9 kg (165 lb 2 oz), SpO2 96 %  ,Body mass index is 25 11 kg/m²  Intake/Output Summary (Last 24 hours) at 09/07/18 0503  Last data filed at 09/07/18 0421   Gross per 24 hour   Intake             1080 ml   Output              400 ml   Net              680 ml       Invasive Devices     Peripherally Inserted Central Catheter Line            PICC Line 80/21/99 Right Basilic 12 days                Physical Exam:   No acute distress  Regular rate and rhythm  Nonlabored respirations on room air  Abdomen soft, nontender, nondistended    Prior ostomy site clean and dry    Lab, Imaging and other studies:  CBC:   Lab Results   Component Value Date    WBC 11 77 (H) 09/06/2018    HGB 11 3 (L) 09/06/2018    HCT 35 8 (L) 09/06/2018    MCV 94 09/06/2018     (H) 09/06/2018    MCH 29 5 09/06/2018    MCHC 31 6 09/06/2018    RDW 13 3 09/06/2018    MPV 9 0 09/06/2018    NRBC 0 09/06/2018   , CMP: No results found for: NA, K, CL, CO2, ANIONGAP, BUN, CREATININE, GLUCOSE, CALCIUM, AST, ALT, ALKPHOS, PROT, ALBUMIN, BILITOT, EGFR, Coagulation: No results found for: PT, INR, APTT, Urinalysis: No results found for: Miriana Hemp, SPECGRAV, PHUR, LEUKOCYTESUR, NITRITE, PROTEINUA, GLUCOSEU, KETONESU, BILIRUBINUR, BLOODU, Amylase: No results found for: AMYLASE, Lipase: No results found for: LIPASE  VTE Pharmacologic Prophylaxis: Enoxaparin (Lovenox)  VTE Mechanical Prophylaxis: sequential compression device

## 2018-09-07 NOTE — PLAN OF CARE
DISCHARGE PLANNING     Discharge to home or other facility with appropriate resources Adequate for Discharge        DISCHARGE PLANNING - CARE MANAGEMENT     Discharge to post-acute care or home with appropriate resources Adequate for Discharge        GASTROINTESTINAL - ADULT     Minimal or absence of nausea and/or vomiting Adequate for Discharge     Maintains or returns to baseline bowel function Adequate for Discharge        Nutrition/Hydration-ADULT     Nutrient/Hydration intake appropriate for improving, restoring or maintaining nutritional needs Adequate for Discharge        PAIN - ADULT     Verbalizes/displays adequate comfort level or baseline comfort level Adequate for Discharge        Potential for Falls     Patient will remain free of falls Adequate for Discharge        SAFETY ADULT     Patient will remain free of falls Adequate for Discharge     Maintain or return to baseline ADL function Adequate for Discharge     Maintain or return mobility status to optimal level Adequate for Discharge

## 2018-09-07 NOTE — DISCHARGE INSTRUCTIONS
Acute Care Surgery Discharge Instructions    Please follow-up as instructed  Activity:  - No lifting greater than 20 pounds or strenuous physical activity or exercise for at least 2 more weeks  - Walking and normal light activities are encouraged  - Normal daily activities including climbing steps are okay  - No driving until no longer using pain medications  Diet:    - You may resume your normal diet  Wound Care:  - May shower daily  No tub baths or swimming until cleared by your surgeon   - Wash incision gently with soap and water and pat dry  - Do not apply any creams or ointments unless instructed to do so by your surgeon  Medications:    - You may resume all of your regular medications, including blood thinners and aspirin, after going home unless otherwise instructed  Please refer to your discharge medication list for further details  - Please take the pain medications as directed  - You are encouraged to use non-narcotic pain medications first and whenever possible  Reserve the use of narcotic pain medication for moderate to severe pain not controlled by non-narcotic medications   - No driving while taking narcotic pain medications  - You may become constipated, especially if taking pain medications  You may take any over the counter stool softeners or laxatives as needed  Examples: Milk of Magnesia, Colace, Senna  Additional Instructions:  - If you have any questions or concerns after discharge please call the office   - Call office or return to ER if fever greater than 101, chills, persistent nausea/vomiting, worsening/uncontrollable pain, and/or increasing redness or purulent/foul smelling drainage from incision(s)

## 2018-09-07 NOTE — ASSESSMENT & PLAN NOTE
- Postoperative small-bowel obstruction likely secondary to narrowing at the loop ileostomy closure site, now resolved with return of normal bowel function   - Diet as tolerated  - Continue oral analgesic regimen as needed  - No further surgical intervention anticipated at this time  - Continue oral bowel regimen   - Stable for discharge on 09/07/2018

## 2018-09-07 NOTE — DISCHARGE SUMMARY
Discharge- Avila Bunn 1960, 63 y o  male MRN: [id number]    Unit/Bed#: [facility] 633-01 Encounter: [id number]    Primary Care Provider: Nataliya Dennis MD   Date and time admitted to hospital: 8/21/2018  8:25 AM        * Small bowel obstruction (HCC)   Assessment & Plan    - Postoperative small-bowel obstruction likely secondary to narrowing at the loop ileostomy closure site, now resolved with return of normal bowel function   - Diet as tolerated  - Continue oral analgesic regimen as needed  - No further surgical intervention anticipated at this time  - Continue oral bowel regimen   - Stable for discharge on 09/07/2018  Nurse-Patient-Provider rounds completed on the patient today prior to discharge with the patient's nurse, Particia Niece  Discharge Summary - General Surgery   Avila Bunn 63 y o  male MRN: [id number]  Unit/Bed#: [facility] 633-01 Encounter: [id number]    Admission Date: 8/21/2018     Discharge Date: 9/7/2018     Admitting Diagnosis: Abdominal distension [R14 0]  SBO (small bowel obstruction) ([address] ) [K56 609]  Abdominal pain [R10 9]    Discharge Diagnosis: See above  Attending and Service: Dr Dr Michelle Alexander, [facility]  Consulting Physician(s): None  Imaging and Procedures Performed:     Xr Abdomen 1 View Kub    Result Date: 8/25/2018  Impression: Continued dilated small bowel suggesting obstruction though partial as there is contrast within the colon  Workstation performed: [id number]     Xr Abdomen 1 View Kub    Result Date: 8/21/2018  Impression: Increasing gaseous distention of small bowel (now up to 4 3 cm) predominantly in the left abdomen  The radiographic findings are concerning for worsening small bowel obstruction  Slight interval enlargement of gas-filled transverse colon which may related to secondary ileus   I personally discussed this study with JOVANNA CORTES on 8/21/2018 at 3:24 PM   Workstation performed: [id number]     Fl Small Bowel    Result Date: 8/24/2018  Impression: Findings suggestive of partial small bowel obstruction  Follow-up abdominal radiographs in 6 hours recommended  The study was marked in [facility] for immediate notification  Workstation performed: [id number]     Pathology: N/A    Hospital Course: Michael Macdonald is a 41-year-old male who presented with 2 day history of increasing abdominal pain since discharge home following a recent loop ileostomy reversal   He noted that he went home 2 days after surgery tolerating an oral diet and having some bowel function including bowel movements and passing flatus  Once he returned home, he stopped having bowel movements and had increasing abdominal pain, but he did note that he continued to pass some flatus  He returned to the ER at that time and noted that he had not been utilizing his pain medications or stool softeners on discharge  After initial evaluation in the emergency department, it was determined it was safe to discharge the patient from the emergency department  He then returned to the emergency department for continued complaints of increasing pain and a new episode of emesis  In addition to the presenting symptoms above, he complained of constipation and right inguinal pain  He denied any associated fever, chills, hematochezia, and/or other associated symptoms  On his initial surgical evaluation, he was tachycardic with a heart rate in the 100s and hypertensive with a blood pressure in the 140s over 80s with normal vital signs otherwise; his abdomen was soft, but distended and diffusely tender; the remainder of his exam was unremarkable  His initial workup included normal laboratory studies as well as re-review of his recent CT scan      He was admitted to the acute care surgery service with postoperative abdominal pain and distension with a differential diagnosis including postoperative ileus versus early postoperative small bowel obstruction versus constipation  He was initially kept NPO with a plan to place an NG tube if he had persistent emesis, though the patient was declining at the time of his initial evaluation, and IV fluid resuscitation  An abdominal x-ray was also ordered to re-evaluate his bowel gas pattern  Following the repeat abdominal x-ray, a bowel regimen was initiated  The patient had waxing and waning symptoms of increasing and decreasing abdominal pain and distention over the next several days  He went on to have a small-bowel follow-through with results noted above  The patient was ultimately treated with an NG tube to assist with bowel decompression  This did lead to an improvement in his abdominal pain and distension  As he began to have return of bowel function and decreased NG tube output, the NG tube was able to be discontinued  During his  Of remaining NPO, a PICC line was placed and TPN was initiated to support his nutrition until he is able to tolerate a diet well  Following removal was NG tube, he had continued slow return of bowel function with some intermittent abdominal pain and distension still occurring  The symptoms eventually resolved with return to a near normal bowel function  Once he was able to tolerate an oral diet well, is TPN was weaned and stopped  By 09/07/2018, he was deemed stable for discharge home  On discharge, the patient is instructed to follow-up with the patient's primary care provider within the next 2 weeks to review the events of the recent hospitalization  The patient is instructed to follow-up with the [facility] as scheduled on 9/19/2018 at 8:00 AM  The patient is instructed to follow the provided discharge instructions  Condition at Discharge: good     Discharge instructions/Information to patient and family:   See after visit summary for information provided to patient and family        Provisions for Follow-Up Care:  See after visit summary for information related to follow-up care and any pertinent home health orders  Disposition: See After Visit Summary for discharge disposition information  Planned Readmission: No    Discharge Statement   I spent 25 minutes discharging the patient  This time was spent on the day of discharge  I had direct contact with the patient on the day of discharge  Additional documentation is required if more than 30 minutes were spent on discharge  Discharge Medications:  See after visit summary for reconciled discharge medications provided to patient and family  I performed a search on the [address] website on this patient for past prescriptions of controlled substances      Shaylee Hebert PA-C  9/7/2018  9:27 AM

## 2018-09-18 ENCOUNTER — OFFICE VISIT (OUTPATIENT)
Dept: FAMILY MEDICINE CLINIC | Facility: CLINIC | Age: 58
End: 2018-09-18
Payer: COMMERCIAL

## 2018-09-18 VITALS
WEIGHT: 170.6 LBS | HEART RATE: 84 BPM | RESPIRATION RATE: 16 BRPM | DIASTOLIC BLOOD PRESSURE: 70 MMHG | OXYGEN SATURATION: 96 % | BODY MASS INDEX: 25.85 KG/M2 | SYSTOLIC BLOOD PRESSURE: 96 MMHG | HEIGHT: 68 IN | TEMPERATURE: 98.4 F

## 2018-09-18 DIAGNOSIS — B36.0 TINEA VERSICOLOR: ICD-10-CM

## 2018-09-18 DIAGNOSIS — K56.609 SMALL BOWEL OBSTRUCTION (HCC): Primary | ICD-10-CM

## 2018-09-18 PROCEDURE — 1111F DSCHRG MED/CURRENT MED MERGE: CPT | Performed by: FAMILY MEDICINE

## 2018-09-18 PROCEDURE — 99495 TRANSJ CARE MGMT MOD F2F 14D: CPT | Performed by: FAMILY MEDICINE

## 2018-09-18 RX ORDER — KETOCONAZOLE 20 MG/ML
1 SHAMPOO TOPICAL ONCE
Qty: 120 ML | Refills: 0 | Status: SHIPPED | OUTPATIENT
Start: 2018-09-18 | End: 2019-01-28

## 2018-09-18 NOTE — PROGRESS NOTES
Assessment/Plan:     There are no diagnoses linked to this encounter  There are no Patient Instructions on file for this visit  No Follow-up on file  Subjective:      Patient ID: Ashish Jain is a 63 y o  male  Chief Complaint   Patient presents with   [id number] 8/16/18 to 9/7/18, reversal colostomy, closure of ileostomy       HR is a 63year old male who presents for a 1 month follow up after a closure of loop ileostomy  He was in the hospital for 21 days due to complications with the surgery  He is still having a dull, aching constant 6/10 pain at the incision site at the RLQ  He does not have regular bowel movements but he denies diarrhea and constipation  He is slowly regaining his appetite  He denies fever, night sweats, chills, chest pain, shortness of breath, and wound drainage  The following portions of the patient's history were reviewed and updated as appropriate: allergies, current medications, past family history, past medical history, past social history, past surgical history and problem list     Review of Systems   Constitutional: Negative for appetite change, chills, diaphoresis, fatigue, fever and unexpected weight change  HENT: Negative for trouble swallowing  Eyes: Negative for visual disturbance  Respiratory: Negative for cough, chest tightness and shortness of breath  Cardiovascular: Negative for chest pain and palpitations  Gastrointestinal: Positive for abdominal pain  Negative for blood in stool, constipation, diarrhea, nausea and vomiting  Endocrine: Negative for cold intolerance and heat intolerance  Genitourinary: Negative for difficulty urinating and dysuria  Musculoskeletal: Negative for gait problem  Skin: Positive for rash and wound (RLQ scar from sugery)  Neurological: Negative for dizziness, syncope and headaches  Hematological: Negative for adenopathy     Psychiatric/Behavioral: Negative for behavioral problems  Current Outpatient Prescriptions   Medication Sig Dispense Refill    acetaminophen (TYLENOL) 325 mg tablet Take 2 tablets (650 mg total) by mouth every 4 (four) hours as needed for mild pain 30 tablet 0    doxazosin (CARDURA) 4 mg tablet 4 mg daily at bedtime        docusate sodium (COLACE) 100 mg capsule Take 1 capsule (100 mg total) by mouth 2 (two) times a day (Patient not taking: Reported on 9/18/2018 ) 10 capsule 0    senna-docusate sodium (SENOKOT S) 8 6-50 mg per tablet Take 1 tablet by mouth daily at bedtime (Patient not taking: Reported on 9/18/2018 ) 10 tablet 0    simethicone (MYLICON) 80 mg chewable tablet Chew 1 tablet (80 mg total) every 6 (six) hours as needed for flatulence (Patient not taking: Reported on 9/18/2018 ) 30 tablet 0     No current facility-administered medications for this visit  Objective:    BP 96/70 (BP Location: Left arm, Patient Position: Sitting, Cuff Size: Adult)   Pulse 84   Temp 98 4 °F (36 9 °C) (Tympanic)   Resp 16   Ht 5' 8" (1 727 m)   Wt 77 4 kg (170 lb 9 6 oz)   SpO2 96%   BMI 25 94 kg/m²        Physical Exam   Constitutional: He is oriented to person, place, and time  He appears well-developed and well-nourished  No distress  HENT:   Head: Normocephalic and atraumatic  Eyes: Conjunctivae and EOM are normal  Pupils are equal, round, and reactive to light  Right eye exhibits no discharge  Left eye exhibits no discharge  No scleral icterus  Neck: Normal range of motion  Neck supple  Cardiovascular: Normal rate, regular rhythm, normal heart sounds and intact distal pulses  Exam reveals no gallop and no friction rub  No murmur heard  Pulmonary/Chest: Effort normal and breath sounds normal  No respiratory distress  He has no wheezes  Abdominal: Bowel sounds are normal  He exhibits no distension  There is tenderness in the right lower quadrant  Musculoskeletal: Normal range of motion  He exhibits no edema  Lymphadenopathy:     He has no cervical adenopathy  Neurological: He is alert and oriented to person, place, and time  No cranial nerve deficit  Skin: Skin is warm  Rash (Hypopigmented macular rashes on his neck) noted  He is not diaphoretic  Psychiatric: He has a normal mood and affect  Nursing note and vitals reviewed               Carlos Keating MD

## 2018-09-19 ENCOUNTER — OFFICE VISIT (OUTPATIENT)
Dept: SURGERY | Facility: CLINIC | Age: 58
End: 2018-09-19

## 2018-09-19 VITALS
WEIGHT: 172.2 LBS | DIASTOLIC BLOOD PRESSURE: 86 MMHG | BODY MASS INDEX: 26.1 KG/M2 | SYSTOLIC BLOOD PRESSURE: 122 MMHG | HEIGHT: 68 IN | TEMPERATURE: 98.3 F

## 2018-09-19 DIAGNOSIS — Z93.2 S/P ILEOSTOMY (HCC): Primary | ICD-10-CM

## 2018-09-19 DIAGNOSIS — K40.90 RIGHT INGUINAL HERNIA: ICD-10-CM

## 2018-09-19 PROBLEM — K56.609 SMALL BOWEL OBSTRUCTION (HCC): Status: RESOLVED | Noted: 2018-08-22 | Resolved: 2018-09-19

## 2018-09-19 PROBLEM — N13.5 URETERAL OBSTRUCTION: Status: RESOLVED | Noted: 2018-06-03 | Resolved: 2018-09-19

## 2018-09-19 PROBLEM — K57.92 ACUTE DIVERTICULITIS OF INTESTINE: Status: RESOLVED | Noted: 2018-08-13 | Resolved: 2018-09-19

## 2018-09-19 PROBLEM — N13.30 HYDRONEPHROSIS: Status: RESOLVED | Noted: 2018-05-27 | Resolved: 2018-09-19

## 2018-09-19 PROBLEM — K57.92 DIVERTICULITIS: Status: RESOLVED | Noted: 2018-05-28 | Resolved: 2018-09-19

## 2018-09-19 PROBLEM — R10.13 ABDOMINAL PAIN, EPIGASTRIC: Status: RESOLVED | Noted: 2017-10-18 | Resolved: 2018-09-19

## 2018-09-19 PROCEDURE — 99024 POSTOP FOLLOW-UP VISIT: CPT | Performed by: SURGERY

## 2018-09-19 RX ORDER — DICYCLOMINE HYDROCHLORIDE 10 MG/1
10 CAPSULE ORAL
Qty: 10 CAPSULE | Refills: 0 | Status: SHIPPED | OUTPATIENT
Start: 2018-09-19 | End: 2018-11-08 | Stop reason: SDUPTHER

## 2018-09-19 NOTE — ASSESSMENT & PLAN NOTE
This is a new finding  We briefly discussed hernia  I asked that when he is comfortable he come back and we can always repair his hernia  Told him things to look out for such as increased pain  If this happens he is to call sooner

## 2018-09-19 NOTE — ASSESSMENT & PLAN NOTE
Still having some pain and discomfort at the operative site across his abdomen  Gets crampy abdominal pain  I told him we could try some Bentyl for the spasms  See back if needed for this    The

## 2018-09-19 NOTE — PROGRESS NOTES
Office Visit - General Surgery  Raina Herman MRN: [id number]  Encounter: [id number]    Assessment and Plan    Problem List Items Addressed This Visit        Other    S/P ileostomy ([address] ) - Primary     Still having some pain and discomfort at the operative site across his abdomen  Gets crampy abdominal pain  I told him we could try some Bentyl for the spasms  See back if needed for this  The         Relevant Medications    dicyclomine (BENTYL) 10 mg capsule    Right inguinal hernia     This is a new finding  We briefly discussed hernia  I asked that when he is comfortable he come back and we can always repair his hernia  Told him things to look out for such as increased pain  If this happens he is to call sooner  Chief Complaint:  Raina Herman is a 63 y o  male who presents for Post-op    Subjective  50-year-old male status post reversal of loop ileostomy  Still complains of pain at the site also crosses lower abdomen  Also noted a lump in his right groin  Bothers him on occasion    Past Medical History  Past Medical History:   Diagnosis Date    Abscess, intestine     Arthritis     Diverticulitis     GERD (gastroesophageal reflux disease)     Hydronephrosis 5/27/2018       Past Surgical History  Past Surgical History:   Procedure Laterality Date    ABCESS DRAINAGE      COLON SURGERY      COLONOSCOPY N/A 5/5/2016    Procedure: COLONOSCOPY;  Surgeon: April Byrnes MD;  Location: [facility]; Service:     COLONOSCOPY N/A 7/26/2018    Procedure: COLONOSCOPY with bx's;  Surgeon: Stefan Anthony MD;  Location: [facility]; Service: Gastroenterology    ESOPHAGOGASTRODUODENOSCOPY      with biopsy    FOOT SURGERY Left     x2? fusion? due to arthritis  onset: 0      ILEO LOOP DIVERSION N/A 6/1/2018    Procedure: ILEOSTOMY LOOP DIVERTING;  Surgeon: Joel Martinez DO;  Location: [facility];  Service: General    LAPAROTOMY N/A 6/1/2018    Procedure: LAPAROTOMY EXPLORATORY,; Surgeon: Radha Fung DO;  [facility]: [facility];  Service: General    VA CLOSE ENTEROSTOMY N/A 8/16/2018    Procedure: CLOSURE LOOP ILEOSTOMY;  Surgeon: Bradley Crow MD;  [facility]: [facility];  Service: General    VA CYSTOURETHROSCOPY,URETER CATHETER Left 6/9/2018    Procedure: CYSTOSCOPY RETROGRADE PYELOGRAM WITH INSERTION STENT URETERAL;  Surgeon: Dilip Gomez MD;  [facility];  Service: Urology    VA PART REMOVAL COLON W ANASTOMOSIS N/A 6/1/2018    Procedure: RESECTION COLON SIGMOID;  Surgeon: Radha Fung DO;  [facility]: [facility];  Service: General       Family History  Family History   Problem Relation Age of Onset    Other Mother         head tumor    Glaucoma Father     Diabetes Father         possible diabetes    Stroke Family        Medications  Current Outpatient Prescriptions on File Prior to Visit   Medication Sig Dispense Refill    doxazosin (CARDURA) 4 mg tablet 4 mg daily at bedtime        acetaminophen (TYLENOL) 325 mg tablet Take 2 tablets (650 mg total) by mouth every 4 (four) hours as needed for mild pain (Patient not taking: Reported on 9/19/2018 ) 30 tablet 0    docusate sodium (COLACE) 100 mg capsule Take 1 capsule (100 mg total) by mouth 2 (two) times a day (Patient not taking: Reported on 9/18/2018 ) 10 capsule 0    ketoconazole (NIZORAL) 2 % shampoo Apply 1 application topically once for 1 dose 120 mL 0    senna-docusate sodium (SENOKOT S) 8 6-50 mg per tablet Take 1 tablet by mouth daily at bedtime (Patient not taking: Reported on 9/18/2018 ) 10 tablet 0    simethicone (MYLICON) 80 mg chewable tablet Chew 1 tablet (80 mg total) every 6 (six) hours as needed for flatulence (Patient not taking: Reported on 9/18/2018 ) 30 tablet 0     No current facility-administered medications on file prior to visit          Allergies  Allergies   Allergen Reactions    Penicillins Rash     itching       Review of Systems    Objective  Vitals:    09/19/18 0811   BP: 122/86 Temp: 98 3 °F (36 8 °C)       Physical Exam   Abdomen:  Lower midline incision well healed  Old ostomy site scarred firmness underneath minimal tenderness   A bulging in the right groin with noted right inguinal hernia

## 2018-10-09 NOTE — PROGRESS NOTES
[facility]  Ophthalmology Full Operative Note    Pre-operative diagnosis: COMBINED FORMS OF AGE RELATED CATARACT RIGHT   Post-operative diagnosis Same   Procedure: Procedure(s):  PHACOEMULSIFICATION CATARACT EXTRACTION POSTERIOR CHAMBER LENS RIGHT - Wound Class: I-Clean   Surgeon: Marlo Mcconnell MD   Assistants(s):    Anesthesia: Combined MAC with Topical    Estimated blood loss: None    Total IV fluids: (See anesthesia record)   Specimens: None   Implants: 16 LI61AO   Findings:    Complications: None   Condition: Stable   Comments:       PROCEDURAL ANESTHESIA:     Topical/MAC.  Lidocaine 2% jelly topically and Lidocaine 1% preservative-free intracamerally.    PROCEDURE:  The patient was brought to the Operating Room and prepped and draped in a sterile manner.  A wire lid speculum was placed.  A paracentesis was created and 1% Lidocaine was instilled in the anterior chamber.  The anterior chamber was then filled with Amvisc viscoelastic.  A clear cornea temporal wound was created using a 2.8 mm keratome.  A cystotome was used to initiate a flap in the anterior capsule and a Utrata forceps was used to create a continuous tear capsulorhexis.  Hydrodissection was performed.  The phacoemulsification tip was inserted into the eye and the nucleus and epinucleus were removed using a divide and conquer technique.  The residual cortex was removed using the I/A handpiece.  The anterior chamber was then refilled with viscoelastic and the wound was enlarged with the keratome.  The intraocular lens, 16 diopter, Model LI61AO, was placed into the injector and injected into the capsular bag. It was checked to make sure that it was central and stable.  Residual viscoelastic was removed using the I/A.  The anterior chamber was refilled with BSS.  The wounds were hydrated with BSS and were noted to be watertight with no suture necessary.  Topical pilocarpine 1%, Betagan, and Vigamox was applied.  A hard shield was  Progress Note - General Surgery   Kimani Bunn 63 y o  male MRN: [id number]  Unit/Bed#: [facility] 829-01 Encounter: [id number]    Assessment:  62 yo M with locally perforated diverticulitis on antibiotics    Plan:  NPO for IR vs surgery would go for lap vs open sigmoid resection  Continue ancef /flagyl day 3  flomax  Prn pain control  SQH    Subjective/Objective     Subjective: Says having minimal BF  Still pain in RLQ  Having mild nausea with no appetite  Had CP yesterday, none now  Objective:     Vitals: Blood pressure 120/80, pulse 84, temperature 99 2 °F (37 3 °C), temperature source Oral, resp  rate 16, height 5' 8" (1 727 m), weight 75 8 kg (167 lb), SpO2 96 %  ,Body mass index is 25 39 kg/m²  I/O       05/29 0701 - 05/30 0700 05/30 0701 - 05/31 0700 05/31 0701 - 06/01 0700    P  O  130      I V  (mL/kg)       IV Piggyback  50 100    Total Intake(mL/kg) 130 (1 7) 50 (0 7) 100 (1 3)    Urine (mL/kg/hr)       Total Output          Net +130 +50 +100           Unmeasured Urine Occurrence  1 x           Physical Exam:  AAOx3  NAD  Normal respiratory rate  soft, TTP RLQ, ND  no c/c/e    Lab, Imaging and other studies: CBC with diff:   No results found for: WBC, HGB, HCT, MCV, PLT, ADJUSTEDWBC, MCH, MCHC, RDW, MPV, NRBC, BMP/CMP: No results found for: NA, K, CL, CO2, ANIONGAP, BUN, CREATININE, GLUCOSE, CALCIUM, AST, ALT, ALKPHOS, PROT, ALBUMIN, BILITOT, EGFR, Magnesium: No results found for: MAG  VTE Pharmacologic Prophylaxis: Sequential compression device (Venodyne)   VTE Mechanical Prophylaxis: sequential compression device placed.     The patient tolerated the procedure well and was sent to the Recovery Room in satisfactory condition.

## 2018-10-15 RX ORDER — LISINOPRIL 20 MG/1
TABLET ORAL
Refills: 0 | COMMUNITY
Start: 2018-10-06 | End: 2018-11-08 | Stop reason: SDUPTHER

## 2018-10-16 ENCOUNTER — OFFICE VISIT (OUTPATIENT)
Dept: FAMILY MEDICINE CLINIC | Facility: CLINIC | Age: 58
End: 2018-10-16
Payer: COMMERCIAL

## 2018-10-16 VITALS
DIASTOLIC BLOOD PRESSURE: 68 MMHG | BODY MASS INDEX: 26.25 KG/M2 | HEIGHT: 68 IN | OXYGEN SATURATION: 98 % | TEMPERATURE: 98.6 F | HEART RATE: 73 BPM | WEIGHT: 173.2 LBS | RESPIRATION RATE: 16 BRPM | SYSTOLIC BLOOD PRESSURE: 92 MMHG

## 2018-10-16 DIAGNOSIS — K43.9 VENTRAL HERNIA WITHOUT OBSTRUCTION OR GANGRENE: ICD-10-CM

## 2018-10-16 DIAGNOSIS — B36.0 TINEA VERSICOLOR: ICD-10-CM

## 2018-10-16 DIAGNOSIS — Z93.2 S/P ILEOSTOMY (HCC): Primary | ICD-10-CM

## 2018-10-16 PROCEDURE — 3008F BODY MASS INDEX DOCD: CPT | Performed by: FAMILY MEDICINE

## 2018-10-16 PROCEDURE — 99214 OFFICE O/P EST MOD 30 MIN: CPT | Performed by: FAMILY MEDICINE

## 2018-10-16 NOTE — PROGRESS NOTES
Assessment/Plan:     Diagnoses and all orders for this visit:    S/P ileostomy ([address] )  Comments:  Improving  Continue to monitor    Tinea versicolor  Comments:  He was told to use ketoconazole shampoo    Ventral hernia without obstruction or gangrene  Comments:  He is to see a surgeon    Other orders  -     lisinopril (ZESTRIL) 20 mg tablet; There are no Patient Instructions on file for this visit  Return in about 3 months (around 1/16/2019)  Subjective:      Patient ID: Inés Braswell is a 63 y o  male  Chief Complaint   Patient presents with    one month f/u [address] Bowel Obstruction    Rash     one month f/u , back of neck       Ruiz Garcia is a 62 yo male who presents 1 month after reverse ileostomy procedure  Patient notes he is still sore with a bump in the area of the ileostomy  He takes Advil with mild relief of symptoms  He denies any other abdominal pain, fever, chills, nausea, vomiting, diarrhea, constipation, chest pain, SOB  Patient states he is back to his normal diet with some light exercise  Furthermore, patient complains of non-itchy rash to the back of the neck that began 2-3 months ago  He has followed up in the past and at that time he was prescribed ketoconazole shampoo which he has been using without resolution of rash  No history of a similar rash  No new soaps or detergents  The following portions of the patient's history were reviewed and updated as appropriate: allergies, current medications, past family history, past medical history, past social history, past surgical history and problem list     Review of Systems   Constitutional: Negative for chills and fever  HENT: Negative for trouble swallowing  Eyes: Negative for visual disturbance  Respiratory: Negative for cough and shortness of breath  Cardiovascular: Negative for chest pain and palpitations  Gastrointestinal: Positive for abdominal pain (right lower at old ileostomy site)  Negative for blood in stool and vomiting  Endocrine: Negative for cold intolerance and heat intolerance  Genitourinary: Negative for difficulty urinating and dysuria  Musculoskeletal: Negative for gait problem  Skin: Positive for rash (neck)  Neurological: Negative for dizziness, syncope and headaches  Hematological: Negative for adenopathy  Psychiatric/Behavioral: Negative for behavioral problems  Current Outpatient Prescriptions   Medication Sig Dispense Refill    dicyclomine (BENTYL) 10 mg capsule Take 1 capsule (10 mg total) by mouth 4 (four) times a day (before meals and at bedtime) 10 capsule 0    doxazosin (CARDURA) 4 mg tablet 4 mg daily at bedtime        acetaminophen (TYLENOL) 325 mg tablet Take 2 tablets (650 mg total) by mouth every 4 (four) hours as needed for mild pain (Patient not taking: Reported on 9/19/2018 ) 30 tablet 0    docusate sodium (COLACE) 100 mg capsule Take 1 capsule (100 mg total) by mouth 2 (two) times a day (Patient not taking: Reported on 9/18/2018 ) 10 capsule 0    ketoconazole (NIZORAL) 2 % shampoo Apply 1 application topically once for 1 dose 120 mL 0    lisinopril (ZESTRIL) 20 mg tablet   0    senna-docusate sodium (SENOKOT S) 8 6-50 mg per tablet Take 1 tablet by mouth daily at bedtime (Patient not taking: Reported on 9/18/2018 ) 10 tablet 0    simethicone (MYLICON) 80 mg chewable tablet Chew 1 tablet (80 mg total) every 6 (six) hours as needed for flatulence (Patient not taking: Reported on 9/18/2018 ) 30 tablet 0     No current facility-administered medications for this visit  Objective:    BP 92/68 (BP Location: Left arm, Patient Position: Sitting, Cuff Size: Adult)   Pulse 73   Temp 98 6 °F (37 °C) (Tympanic)   Resp 16   Ht 5' 8" (1 727 m)   Wt 78 6 kg (173 lb 3 2 oz)   SpO2 98%   BMI 26 33 kg/m²        Physical Exam   Constitutional: He is oriented to person, place, and time  He appears well-developed and well-nourished     HENT: Head: Normocephalic and atraumatic  Eyes: Pupils are equal, round, and reactive to light  EOM are normal    Neck: Normal range of motion  Neck supple  Cardiovascular: Normal rate, regular rhythm and normal heart sounds  Pulmonary/Chest: Effort normal and breath sounds normal    Abdominal: Soft  Bowel sounds are normal  He exhibits no distension and no ascites  There is no rebound and no guarding  Musculoskeletal: Normal range of motion  He exhibits no edema  Lymphadenopathy:     He has no cervical adenopathy  Neurological: He is alert and oriented to person, place, and time  No cranial nerve deficit  Skin: Skin is warm, dry and intact  Rash noted  No abrasion, no bruising, no ecchymosis and no laceration noted  Psychiatric: He has a normal mood and affect  Nursing note and vitals reviewed               Kiley Davidson MD

## 2018-10-25 ENCOUNTER — TELEPHONE (OUTPATIENT)
Dept: INTERNAL MEDICINE CLINIC | Facility: CLINIC | Age: 58
End: 2018-10-25

## 2018-10-25 DIAGNOSIS — N20.0 KIDNEY STONE: Primary | ICD-10-CM

## 2018-10-31 ENCOUNTER — TELEPHONE (OUTPATIENT)
Dept: INTERNAL MEDICINE CLINIC | Facility: CLINIC | Age: 58
End: 2018-10-31

## 2018-11-01 NOTE — TELEPHONE ENCOUNTER
CALLED PATIENT AND HE SAID THAT HE WASN'T AWARE THAT HE NEED TO COMPLETE A U/S AND A BLOOD WORK PRIOR THIS APPT THE UROLOGIST  I GIVE THE NUMBER FOR CENTRAL SCHEDULING TO CALL AND SCHEDULE THE U/S AND ABOUT THE PSA TEST HE SAID THAT THE DOCTSOR DIDN'T GIVE HIM ANY SCRIPT FOR THIS BLOOD WORK   PATIENT SAID DEFINITELY IS GOING TO COMPLETE THE U/S

## 2018-11-01 NOTE — TELEPHONE ENCOUNTER
CALLED PATIENT AND THE PERSON WHO ANSWER THE PHONE SAID THAT HE'S NOT THERE AND HE FORGOT HIS PHONE HE SAID IF WE CAN CALL HIM BACK AROUND ONE TO TWO HOUR  WE WILL ATTEMPT AGAIN AT A LATER TIME

## 2018-11-02 ENCOUNTER — TRANSCRIBE ORDERS (OUTPATIENT)
Dept: RADIOLOGY | Facility: HOSPITAL | Age: 58
End: 2018-11-02

## 2018-11-02 ENCOUNTER — HOSPITAL ENCOUNTER (OUTPATIENT)
Dept: RADIOLOGY | Facility: HOSPITAL | Age: 58
Discharge: HOME | End: 2018-11-02
Attending: UROLOGY
Payer: COMMERCIAL

## 2018-11-02 DIAGNOSIS — N13.5 URETERAL OBSTRUCTION: ICD-10-CM

## 2018-11-02 PROCEDURE — 51798 US URINE CAPACITY MEASURE: CPT

## 2018-11-06 ENCOUNTER — OFFICE VISIT (OUTPATIENT)
Dept: MULTI SPECIALTY CLINIC | Facility: CLINIC | Age: 58
End: 2018-11-06
Payer: COMMERCIAL

## 2018-11-06 VITALS
BODY MASS INDEX: 24.87 KG/M2 | SYSTOLIC BLOOD PRESSURE: 120 MMHG | HEIGHT: 70 IN | TEMPERATURE: 99 F | HEART RATE: 64 BPM | WEIGHT: 173.72 LBS | DIASTOLIC BLOOD PRESSURE: 78 MMHG

## 2018-11-06 DIAGNOSIS — N40.1 BENIGN LOCALIZED PROSTATIC HYPERPLASIA WITH LOWER URINARY TRACT SYMPTOMS (LUTS): Primary | ICD-10-CM

## 2018-11-06 PROCEDURE — 99214 OFFICE O/P EST MOD 30 MIN: CPT | Performed by: UROLOGY

## 2018-11-06 RX ORDER — TAMSULOSIN HYDROCHLORIDE 0.4 MG/1
0.4 CAPSULE ORAL
Qty: 30 CAPSULE | Refills: 11 | Status: SHIPPED | OUTPATIENT
Start: 2018-11-06 | End: 2019-01-28

## 2018-11-06 NOTE — LETTER
November 6, 2018     Delfina Pereira MD  [address]  [address]  [facility] [address]    Patient: Ky Bunn   YOB: 1960   Date of Visit: 11/6/2018       Dear Dr Dara Goldberg: Thank you for referring Jay Altamirano to me for evaluation  Below are my notes for this consultation  If you have questions, please do not hesitate to call me  I look forward to following your patient along with you  Sincerely,        Angela Rivers MD        CC: No Recipients  Angela Rivers MD  11/6/2018  8:51 AM  Sign at close encounter  11/6/2018    Ky Bunn  1960  [id number]        Assessment  Lower urinary tract symptoms  Gross hematuria, resolved    Plan  Discussed his complaints and problems  Upper urinary tract appears resolved  His primary complaint is nocturia in addition to his hernias  He has been taking Cardura without much relief  I suggest a trial of tamsulosin  He understands that he would have to discontinue Cardura few months to take this  He wishes to do so  He will trial tamsulosin instead, and follow up in the next few months for re-evaluation  If persistent symptoms would then recommend flexible cystoscopy to evaluate his bladder outlet and consider potential intervention  History of Present Illness  Corin Hudson is a 63 y o  male with a complex history of perforated diverticulitis, status post multiple surgeries and readmission for small bowel obstruction  He he is complaining of inguinal and abdominal hernia, as well as nocturia  He has been followed intermittently by Urology for the last few years  He was hospitalized in June 2018, and was thought to have left ureteral obstruction due to an overlying pelvic drain  The drain was removed and hematuria persisted with hydronephrosis  I performed cystoscopy retrograde pyelogram, revealing obstructing clot in the distal left ureter    Stent remained in place for 4 weeks and was then removed in the office  Since then he has not complained of any renal colic  Recent ultrasound reveals no evidence of hydronephrosis with good ureteral jets  No significant postvoid residual             Review of Systems  Review of Systems   Constitutional: Negative  HENT: Negative  Respiratory: Negative  Cardiovascular: Negative  Gastrointestinal: Negative  Genitourinary:        As per HPI   Musculoskeletal: Negative  Skin: Negative  Neurological: Negative  Hematological: Negative  Past Medical History  Past Medical History:   Diagnosis Date    Abscess, intestine     Arthritis     Diverticulitis     GERD (gastroesophageal reflux disease)     Hydronephrosis 5/27/2018       Past Social History  Past Surgical History:   Procedure Laterality Date    ABCESS DRAINAGE      COLON SURGERY      COLONOSCOPY N/A 5/5/2016    Procedure: COLONOSCOPY;  Surgeon: Naveen Dawn MD;  Location: [facility]; Service:     COLONOSCOPY N/A 7/26/2018    Procedure: COLONOSCOPY with bx's;  Surgeon: Rose Kim MD;  [facility]; Service: Gastroenterology    ESOPHAGOGASTRODUODENOSCOPY      with biopsy    FOOT SURGERY Left     x2? fusion? due to arthritis  onset: 0      ILEO LOOP DIVERSION N/A 6/1/2018    Procedure: ILEOSTOMY LOOP DIVERTING;  Surgeon: Louie Bell DO;  [facility];  Service: General    LAPAROTOMY N/A 6/1/2018    Procedure: LAPAROTOMY EXPLORATORY,;  Surgeon: Louie Bell DO;  [facility];  Service: General    GA CLOSE ENTEROSTOMY N/A 8/16/2018    Procedure: CLOSURE LOOP ILEOSTOMY;  Surgeon: Pushpa Franks MD;  [facility];  Service: General    GA CYSTOURETHROSCOPY,URETER CATHETER Left 6/9/2018    Procedure: CYSTOSCOPY RETROGRADE PYELOGRAM WITH INSERTION STENT URETERAL;  Surgeon: Aidan Chacko MD;  [facility];  Service: Urology    GA PART REMOVAL COLON W ANASTOMOSIS N/A 6/1/2018    Procedure: RESECTION COLON SIGMOID;  Surgeon: Prashanth Poole DO;  [facility];  Service: General       Past Family History  Family History   Problem Relation Age of Onset    Other Mother         head tumor    Glaucoma Father     Diabetes Father         possible diabetes    Stroke Family        Past Social history  Social History     Social History    Marital status: /Civil Union     Spouse name: N/A    Number of children: N/A    Years of education: N/A     Occupational History    Not on file       Social History Main Topics    Smoking status: Former Smoker    Smokeless tobacco: Never Used    Alcohol use No    Drug use: No    Sexual activity: Not on file     Other Topics Concern    Not on file     Social History Narrative    No narrative on file     History   Smoking Status    Former Smoker   Smokeless Tobacco    Never Used       Current Medications  Current Outpatient Prescriptions   Medication Sig Dispense Refill    acetaminophen (TYLENOL) 325 mg tablet Take 2 tablets (650 mg total) by mouth every 4 (four) hours as needed for mild pain 30 tablet 0    docusate sodium (COLACE) 100 mg capsule Take 1 capsule (100 mg total) by mouth 2 (two) times a day 10 capsule 0    lisinopril (ZESTRIL) 20 mg tablet   0    dicyclomine (BENTYL) 10 mg capsule Take 1 capsule (10 mg total) by mouth 4 (four) times a day (before meals and at bedtime) 10 capsule 0    ketoconazole (NIZORAL) 2 % shampoo Apply 1 application topically once for 1 dose 120 mL 0    senna-docusate sodium (SENOKOT S) 8 6-50 mg per tablet Take 1 tablet by mouth daily at bedtime (Patient not taking: Reported on 9/18/2018 ) 10 tablet 0    simethicone (MYLICON) 80 mg chewable tablet Chew 1 tablet (80 mg total) every 6 (six) hours as needed for flatulence (Patient not taking: Reported on 9/18/2018 ) 30 tablet 0    tamsulosin (FLOMAX) 0 4 mg Take 1 capsule (0 4 mg total) by mouth daily with dinner for 30 days 30 capsule 11     No current facility-administered medications for this visit  Allergies  Allergies   Allergen Reactions    Penicillins Rash     itching       Past Medical History, Social History, Family History, medications and allergies were reviewed  Vitals  Vitals:    11/06/18 0737   BP: 120/78   BP Location: Left arm   Patient Position: Sitting   Cuff Size: Adult   Pulse: 64   Temp: 99 °F (37 2 °C)   TempSrc: Oral   Weight: 78 8 kg (173 lb 11 6 oz)   Height: 5' 10" (1 778 m)       Physical Exam  Physical Exam   Constitutional: He is oriented to person, place, and time  He appears well-developed and well-nourished  Cardiovascular: Normal rate  Pulmonary/Chest: Effort normal    Abdominal: Soft  Abdominal hernia, reducible, nontender  Genitourinary:   Genitourinary Comments: Mild prostate enlargement, about 35 g, smooth  Neurological: He is alert and oriented to person, place, and time  Skin: Skin is warm, dry and intact  Psychiatric: He has a normal mood and affect  Vitals reviewed          Results  Lab Results   Component Value Date    PSA 2 4 05/03/2016     Lab Results   Component Value Date    GLUCOSE 94 01/03/2016    CALCIUM 8 3 09/04/2018     01/03/2016    K 3 8 09/04/2018    CO2 28 09/04/2018     09/04/2018    BUN 8 09/04/2018    CREATININE 0 72 09/04/2018     Lab Results   Component Value Date    WBC 11 77 (H) 09/06/2018    HGB 11 3 (L) 09/06/2018    HCT 35 8 (L) 09/06/2018    MCV 94 09/06/2018     (H) 09/06/2018

## 2018-11-06 NOTE — PROGRESS NOTES
11/6/2018    Ky Bunn  1960  [id number]        Assessment  Lower urinary tract symptoms  Gross hematuria, resolved    Plan  Discussed his complaints and problems  Upper urinary tract appears resolved  His primary complaint is nocturia in addition to his hernias  He has been taking Cardura without much relief  I suggest a trial of tamsulosin  He understands that he would have to discontinue Cardura few months to take this  He wishes to do so  He will trial tamsulosin instead, and follow up in the next few months for re-evaluation  If persistent symptoms would then recommend flexible cystoscopy to evaluate his bladder outlet and consider potential intervention  History of Present Illness  Corin Hudson is a 63 y o  male with a complex history of perforated diverticulitis, status post multiple surgeries and readmission for small bowel obstruction  He he is complaining of inguinal and abdominal hernia, as well as nocturia  He has been followed intermittently by Urology for the last few years  He was hospitalized in June 2018, and was thought to have left ureteral obstruction due to an overlying pelvic drain  The drain was removed and hematuria persisted with hydronephrosis  I performed cystoscopy retrograde pyelogram, revealing obstructing clot in the distal left ureter  Stent remained in place for 4 weeks and was then removed in the office  Since then he has not complained of any renal colic  Recent ultrasound reveals no evidence of hydronephrosis with good ureteral jets  No significant postvoid residual             Review of Systems  Review of Systems   Constitutional: Negative  HENT: Negative  Respiratory: Negative  Cardiovascular: Negative  Gastrointestinal: Negative  Genitourinary:        As per HPI   Musculoskeletal: Negative  Skin: Negative  Neurological: Negative  Hematological: Negative            Past Medical History  Past Medical History:   Diagnosis Date    Abscess, intestine     Arthritis     Diverticulitis     GERD (gastroesophageal reflux disease)     Hydronephrosis 5/27/2018       Past Social History  Past Surgical History:   Procedure Laterality Date    ABCESS DRAINAGE      COLON SURGERY      COLONOSCOPY N/A 5/5/2016    Procedure: COLONOSCOPY;  Surgeon: Liz Tabor MD;  Location: [facility]; Service:     COLONOSCOPY N/A 7/26/2018    Procedure: COLONOSCOPY with bx's;  Surgeon: Mario Salnias MD;  [facility]; Service: Gastroenterology    ESOPHAGOGASTRODUODENOSCOPY      with biopsy    FOOT SURGERY Left     x2? fusion? due to arthritis  onset: 0   ILEO LOOP DIVERSION N/A 6/1/2018    Procedure: ILEOSTOMY LOOP DIVERTING;  Surgeon: Carlos Angelo DO;  [facility];  Service: General    LAPAROTOMY N/A 6/1/2018    Procedure: LAPAROTOMY EXPLORATORY,;  Surgeon: Carlos Angelo DO;  [facility];  Service: General    LA CLOSE ENTEROSTOMY N/A 8/16/2018    Procedure: CLOSURE LOOP ILEOSTOMY;  Surgeon: Carmencita Bagley MD;  [facility];  Service: General    LA CYSTOURETHROSCOPY,URETER CATHETER Left 6/9/2018    Procedure: CYSTOSCOPY RETROGRADE PYELOGRAM WITH INSERTION STENT URETERAL;  Surgeon: Dinora Pro MD;  [facility];  Service: Urology    LA PART REMOVAL COLON W ANASTOMOSIS N/A 6/1/2018    Procedure: RESECTION COLON SIGMOID;  Surgeon: Carlos Angelo DO;  [facility];  Service: General       Past Family History  Family History   Problem Relation Age of Onset    Other Mother         head tumor    Glaucoma Father     Diabetes Father         possible diabetes    Stroke Family        Past Social history  Social History     Social History    Marital status: /Civil Union     Spouse name: N/A    Number of children: N/A    Years of education: N/A     Occupational History    Not on file       Social History Main Topics    Smoking status: Former Smoker    Smokeless tobacco: Never Used    Alcohol use No    Drug use: No    Sexual activity: Not on file     Other Topics Concern    Not on file     Social History Narrative    No narrative on file     History   Smoking Status    Former Smoker   Smokeless Tobacco    Never Used       Current Medications  Current Outpatient Prescriptions   Medication Sig Dispense Refill    acetaminophen (TYLENOL) 325 mg tablet Take 2 tablets (650 mg total) by mouth every 4 (four) hours as needed for mild pain 30 tablet 0    docusate sodium (COLACE) 100 mg capsule Take 1 capsule (100 mg total) by mouth 2 (two) times a day 10 capsule 0    lisinopril (ZESTRIL) 20 mg tablet   0    dicyclomine (BENTYL) 10 mg capsule Take 1 capsule (10 mg total) by mouth 4 (four) times a day (before meals and at bedtime) 10 capsule 0    ketoconazole (NIZORAL) 2 % shampoo Apply 1 application topically once for 1 dose 120 mL 0    senna-docusate sodium (SENOKOT S) 8 6-50 mg per tablet Take 1 tablet by mouth daily at bedtime (Patient not taking: Reported on 9/18/2018 ) 10 tablet 0    simethicone (MYLICON) 80 mg chewable tablet Chew 1 tablet (80 mg total) every 6 (six) hours as needed for flatulence (Patient not taking: Reported on 9/18/2018 ) 30 tablet 0    tamsulosin (FLOMAX) 0 4 mg Take 1 capsule (0 4 mg total) by mouth daily with dinner for 30 days 30 capsule 11     No current facility-administered medications for this visit  Allergies  Allergies   Allergen Reactions    Penicillins Rash     itching       Past Medical History, Social History, Family History, medications and allergies were reviewed  Vitals  Vitals:    11/06/18 0737   BP: 120/78   BP Location: Left arm   Patient Position: Sitting   Cuff Size: Adult   Pulse: 64   Temp: 99 °F (37 2 °C)   TempSrc: Oral   Weight: 78 8 kg (173 lb 11 6 oz)   Height: 5' 10" (1 778 m)       Physical Exam  Physical Exam   Constitutional: He is oriented to person, place, and time  He appears well-developed and well-nourished  Cardiovascular: Normal rate  Pulmonary/Chest: Effort normal    Abdominal: Soft  Abdominal hernia, reducible, nontender  Genitourinary:   Genitourinary Comments: Mild prostate enlargement, about 35 g, smooth  Neurological: He is alert and oriented to person, place, and time  Skin: Skin is warm, dry and intact  Psychiatric: He has a normal mood and affect  Vitals reviewed          Results  Lab Results   Component Value Date    PSA 2 4 05/03/2016     Lab Results   Component Value Date    GLUCOSE 94 01/03/2016    CALCIUM 8 3 09/04/2018     01/03/2016    K 3 8 09/04/2018    CO2 28 09/04/2018     09/04/2018    BUN 8 09/04/2018    CREATININE 0 72 09/04/2018     Lab Results   Component Value Date    WBC 11 77 (H) 09/06/2018    HGB 11 3 (L) 09/06/2018    HCT 35 8 (L) 09/06/2018    MCV 94 09/06/2018     (H) 09/06/2018

## 2018-11-07 DIAGNOSIS — I10 HYPERTENSION, UNSPECIFIED TYPE: Primary | ICD-10-CM

## 2018-11-07 DIAGNOSIS — Z93.2 S/P ILEOSTOMY (HCC): ICD-10-CM

## 2018-11-07 NOTE — TELEPHONE ENCOUNTER
[facility] asking for 90 day refills for cost effectiveness    For Lisinopril 20mg, Tamsulosin 0 4mg and Doxacosin?

## 2018-11-08 RX ORDER — LISINOPRIL 20 MG/1
20 TABLET ORAL DAILY
Qty: 90 TABLET | Refills: 0 | Status: SHIPPED | OUTPATIENT
Start: 2018-11-08 | End: 2019-01-28

## 2018-11-08 RX ORDER — DICYCLOMINE HYDROCHLORIDE 10 MG/1
10 CAPSULE ORAL
Qty: 360 CAPSULE | Refills: 0 | Status: SHIPPED | OUTPATIENT
Start: 2018-11-08 | End: 2019-01-28

## 2018-11-13 ENCOUNTER — TELEPHONE (OUTPATIENT)
Dept: INTERNAL MEDICINE CLINIC | Facility: CLINIC | Age: 58
End: 2018-11-13

## 2018-11-13 NOTE — TELEPHONE ENCOUNTER
Patient states pharmacy hasn't received the Tamsulosin script  According to [facility] they did receive it  Pharmacy opens up at [address] [facility]  I will follow up with them then  If they have not received script Fernando Brambila is able to call it in for him

## 2018-11-13 NOTE — TELEPHONE ENCOUNTER
I called pharmacy  They did receive the Tamsulosin script  I tried to call patient back to make him aware  Unable to leave a message

## 2018-12-12 ENCOUNTER — CONSULT (OUTPATIENT)
Dept: SURGERY | Facility: CLINIC | Age: 58
End: 2018-12-12
Payer: COMMERCIAL

## 2018-12-12 VITALS
HEIGHT: 70 IN | SYSTOLIC BLOOD PRESSURE: 160 MMHG | WEIGHT: 179.4 LBS | DIASTOLIC BLOOD PRESSURE: 112 MMHG | BODY MASS INDEX: 25.68 KG/M2 | TEMPERATURE: 96.3 F

## 2018-12-12 DIAGNOSIS — K40.90 RIGHT INGUINAL HERNIA: Primary | ICD-10-CM

## 2018-12-12 DIAGNOSIS — K43.9 VENTRAL HERNIA WITHOUT OBSTRUCTION OR GANGRENE: ICD-10-CM

## 2018-12-12 PROBLEM — K43.2 INCISIONAL HERNIA, WITHOUT OBSTRUCTION OR GANGRENE: Status: ACTIVE | Noted: 2018-12-12

## 2018-12-12 PROCEDURE — 99213 OFFICE O/P EST LOW 20 MIN: CPT | Performed by: SURGERY

## 2018-12-12 NOTE — ASSESSMENT & PLAN NOTE
I discussed with him hernia surgery for a hernia at the ileostomy site  I described the procedure in detail including risks, benefits, and what to expect postoperatively  He understands and is agreeable to go ahead

## 2018-12-12 NOTE — PROGRESS NOTES
Office Visit - General Surgery  Ирина Severino MRN: [id number]  Encounter: [id number]    Assessment and Plan    Problem List Items Addressed This Visit        Other    Right inguinal hernia - Primary     I discussed with him what a hernia is and how it would be repaired  Since he needs a laparoscopic incisional hernia repair, would also attempt to do this laparoscopically  Discussed the surgery including risks, benefits, and what to expect postoperatively  He understands and is agreeable to go ahead  Ventral hernia without obstruction or gangrene     I discussed with him hernia surgery for a hernia at the ileostomy site  I described the procedure in detail including risks, benefits, and what to expect postoperatively  He understands and is agreeable to go ahead  Chief Complaint:  Ирина Severino is a 63 y o  male who presents for Inguinal Hernia (Consult right inguinal hernia)    Subjective  16-year-old male who to was found to have right inguinal hernia last time he was in the hospital   He has a little bit of discomfort at that site  He also has pain occasionally and a bulge at his ileostomy site  Past Medical History  Past Medical History:   Diagnosis Date    Abscess, intestine     Arthritis     Diverticulitis     GERD (gastroesophageal reflux disease)     Hydronephrosis 5/27/2018       Past Surgical History  Past Surgical History:   Procedure Laterality Date    ABCESS DRAINAGE      COLON SURGERY      COLONOSCOPY N/A 5/5/2016    Procedure: COLONOSCOPY;  Surgeon: Melonie Kline MD;  Location: [facility]; Service:     COLONOSCOPY N/A 7/26/2018    Procedure: COLONOSCOPY with bx's;  Surgeon: Leopold Ewings, MD;  [facility]; Service: Gastroenterology    ESOPHAGOGASTRODUODENOSCOPY      with biopsy    FOOT SURGERY Left     x2? fusion? due to arthritis  onset: 0      ILEO LOOP DIVERSION N/A 6/1/2018    Procedure: ILEOSTOMY LOOP DIVERTING;  Surgeon: Julian Wilson DO Pavel;  [facility];  Service: General    LAPAROTOMY N/A 6/1/2018    Procedure: LAPAROTOMY EXPLORATORY,;  Surgeon: Keven Cota DO;  [facility];  Service: General    CT CLOSE ENTEROSTOMY N/A 8/16/2018    Procedure: CLOSURE LOOP ILEOSTOMY;  Surgeon: Jose Costello MD;  [facility];  Service: General    CT CYSTOURETHROSCOPY,URETER CATHETER Left 6/9/2018    Procedure: CYSTOSCOPY RETROGRADE PYELOGRAM WITH INSERTION STENT URETERAL;  Surgeon: Marquita Brooke MD;  [facility];  Service: Urology    CT PART REMOVAL COLON W ANASTOMOSIS N/A 6/1/2018    Procedure: RESECTION COLON SIGMOID;  Surgeon: Keven Cota DO;  [facility];  Service: General       Family History  Family History   Problem Relation Age of Onset    Other Mother         head tumor    Glaucoma Father     Diabetes Father         possible diabetes    Stroke Family        Medications  Current Outpatient Prescriptions on File Prior to Visit   Medication Sig Dispense Refill    acetaminophen (TYLENOL) 325 mg tablet Take 2 tablets (650 mg total) by mouth every 4 (four) hours as needed for mild pain 30 tablet 0    dicyclomine (BENTYL) 10 mg capsule Take 1 capsule (10 mg total) by mouth 4 (four) times a day (before meals and at bedtime) 360 capsule 0    docusate sodium (COLACE) 100 mg capsule Take 1 capsule (100 mg total) by mouth 2 (two) times a day (Patient not taking: Reported on 12/12/2018 ) 10 capsule 0    ketoconazole (NIZORAL) 2 % shampoo Apply 1 application topically once for 1 dose 120 mL 0    lisinopril (ZESTRIL) 20 mg tablet Take 1 tablet (20 mg total) by mouth daily (Patient not taking: Reported on 12/12/2018 ) 90 tablet 0    senna-docusate sodium (SENOKOT S) 8 6-50 mg per tablet Take 1 tablet by mouth daily at bedtime (Patient not taking: Reported on 9/18/2018 ) 10 tablet 0    simethicone (MYLICON) 80 mg chewable tablet Chew 1 tablet (80 mg total) every 6 (six) hours as needed for flatulence (Patient not taking: Reported on 9/18/2018 ) 30 tablet 0    tamsulosin (FLOMAX) 0 4 mg Take 1 capsule (0 4 mg total) by mouth daily with dinner for 30 days 30 capsule 11     No current facility-administered medications on file prior to visit  Allergies  Allergies   Allergen Reactions    Penicillins Rash     itching       Review of Systems   Constitutional: Negative for chills, fatigue and fever  HENT: Negative for congestion, ear pain, sneezing, trouble swallowing and voice change  Eyes: Negative for pain and discharge  Respiratory: Negative for cough, shortness of breath and wheezing  Cardiovascular: Negative for palpitations and leg swelling  Gastrointestinal: Negative for constipation, diarrhea, nausea and vomiting  Endocrine: Negative for cold intolerance, heat intolerance and polyuria  Genitourinary: Negative for decreased urine volume, difficulty urinating and dysuria  Musculoskeletal: Negative for arthralgias and back pain  Skin: Negative for rash and wound  Allergic/Immunologic: Negative for environmental allergies and food allergies  Neurological: Negative for dizziness and weakness  Hematological: Negative for adenopathy  Does not bruise/bleed easily  Psychiatric/Behavioral: Negative for confusion and sleep disturbance  The patient is not nervous/anxious  All other systems reviewed and are negative  Objective  Vitals:    12/12/18 0829   BP: (!) 160/112   Temp: (!) 96 3 °F (35 7 °C)       Physical Exam   Constitutional: He is oriented to person, place, and time  He appears well-developed and well-nourished  No distress  HENT:   Head: Normocephalic and atraumatic  Right Ear: External ear normal    Left Ear: External ear normal    Eyes: Conjunctivae are normal  No scleral icterus  Neck: Normal range of motion  Neck supple  No tracheal deviation present  No thyromegaly present  Cardiovascular: Normal rate, regular rhythm and normal heart sounds  No murmur heard  Pulmonary/Chest: Effort normal and breath sounds normal  No respiratory distress  He has no wheezes  He has no rales  Abdominal: Soft  He exhibits no distension  There is no tenderness  There is no rebound and no guarding  Lower midline well-healed incision  Right lower quadrant well-healed incision  Bulging in the right groin area  There is a hernia defect about 2-3 cm at the right lower quadrant incision site  Midline incision and feels intact  There is a right inguinal hernia present there is no left inguinal hernia present   Musculoskeletal: Normal range of motion  He exhibits no edema  Lymphadenopathy:     He has no cervical adenopathy  Neurological: He is alert and oriented to person, place, and time  Skin: Skin is warm and dry  He is not diaphoretic  Psychiatric: He has a normal mood and affect  His behavior is normal  Judgment and thought content normal    Nursing note and vitals reviewed

## 2018-12-12 NOTE — ASSESSMENT & PLAN NOTE
I discussed with him what a hernia is and how it would be repaired  Since he needs a laparoscopic incisional hernia repair, would also attempt to do this laparoscopically  Discussed the surgery including risks, benefits, and what to expect postoperatively  He understands and is agreeable to go ahead

## 2018-12-17 ENCOUNTER — APPOINTMENT (OUTPATIENT)
Dept: LAB | Facility: HOSPITAL | Age: 58
End: 2018-12-17
Attending: SURGERY
Payer: COMMERCIAL

## 2018-12-17 DIAGNOSIS — K43.2 INCISIONAL HERNIA, WITHOUT OBSTRUCTION OR GANGRENE: ICD-10-CM

## 2018-12-17 DIAGNOSIS — K40.90 RIGHT INGUINAL HERNIA: ICD-10-CM

## 2018-12-17 LAB
ALBUMIN SERPL BCP-MCNC: 3.5 G/DL (ref 3.5–5)
ALP SERPL-CCNC: 83 U/L (ref 46–116)
ALT SERPL W P-5'-P-CCNC: 32 U/L (ref 12–78)
ANION GAP SERPL CALCULATED.3IONS-SCNC: 4 MMOL/L (ref 4–13)
AST SERPL W P-5'-P-CCNC: 20 U/L (ref 5–45)
BASOPHILS # BLD AUTO: 0.04 THOUSANDS/ΜL (ref 0–0.1)
BASOPHILS NFR BLD AUTO: 1 % (ref 0–1)
BILIRUB SERPL-MCNC: 0.35 MG/DL (ref 0.2–1)
BUN SERPL-MCNC: 13 MG/DL (ref 5–25)
CALCIUM SERPL-MCNC: 8.4 MG/DL (ref 8.3–10.1)
CHLORIDE SERPL-SCNC: 105 MMOL/L (ref 100–108)
CO2 SERPL-SCNC: 29 MMOL/L (ref 21–32)
CREAT SERPL-MCNC: 0.75 MG/DL (ref 0.6–1.3)
EOSINOPHIL # BLD AUTO: 0.11 THOUSAND/ΜL (ref 0–0.61)
EOSINOPHIL NFR BLD AUTO: 2 % (ref 0–6)
ERYTHROCYTE [DISTWIDTH] IN BLOOD BY AUTOMATED COUNT: 13.4 % (ref 11.6–15.1)
GFR SERPL CREATININE-BSD FRML MDRD: 102 ML/MIN/1.73SQ M
GLUCOSE P FAST SERPL-MCNC: 98 MG/DL (ref 65–99)
HCT VFR BLD AUTO: 46.1 % (ref 36.5–49.3)
HGB BLD-MCNC: 14.9 G/DL (ref 12–17)
IMM GRANULOCYTES # BLD AUTO: 0.01 THOUSAND/UL (ref 0–0.2)
IMM GRANULOCYTES NFR BLD AUTO: 0 % (ref 0–2)
LYMPHOCYTES # BLD AUTO: 2.22 THOUSANDS/ΜL (ref 0.6–4.47)
LYMPHOCYTES NFR BLD AUTO: 35 % (ref 14–44)
MCH RBC QN AUTO: 30.1 PG (ref 26.8–34.3)
MCHC RBC AUTO-ENTMCNC: 32.3 G/DL (ref 31.4–37.4)
MCV RBC AUTO: 93 FL (ref 82–98)
MONOCYTES # BLD AUTO: 0.37 THOUSAND/ΜL (ref 0.17–1.22)
MONOCYTES NFR BLD AUTO: 6 % (ref 4–12)
NEUTROPHILS # BLD AUTO: 3.65 THOUSANDS/ΜL (ref 1.85–7.62)
NEUTS SEG NFR BLD AUTO: 56 % (ref 43–75)
NRBC BLD AUTO-RTO: 0 /100 WBCS
PLATELET # BLD AUTO: 292 THOUSANDS/UL (ref 149–390)
PMV BLD AUTO: 10.8 FL (ref 8.9–12.7)
POTASSIUM SERPL-SCNC: 4.9 MMOL/L (ref 3.5–5.3)
PROT SERPL-MCNC: 7.4 G/DL (ref 6.4–8.2)
RBC # BLD AUTO: 4.95 MILLION/UL (ref 3.88–5.62)
SODIUM SERPL-SCNC: 138 MMOL/L (ref 136–145)
WBC # BLD AUTO: 6.4 THOUSAND/UL (ref 4.31–10.16)

## 2018-12-17 PROCEDURE — 36415 COLL VENOUS BLD VENIPUNCTURE: CPT

## 2018-12-17 PROCEDURE — 85025 COMPLETE CBC W/AUTO DIFF WBC: CPT

## 2018-12-17 PROCEDURE — 80053 COMPREHEN METABOLIC PANEL: CPT

## 2018-12-17 NOTE — PLAN OF CARE
DISCHARGE PLANNING     Discharge to home or other facility with appropriate resources Progressing        DISCHARGE PLANNING - CARE MANAGEMENT     Discharge to post-acute care or home with appropriate resources Progressing        INFECTION - ADULT     Absence or prevention of progression during hospitalization Progressing     Absence of fever/infection during neutropenic period Progressing        Knowledge Deficit     Patient/family/caregiver demonstrates understanding of disease process, treatment plan, medications, and discharge instructions Progressing        PAIN - ADULT     Verbalizes/displays adequate comfort level or baseline comfort level Progressing        SAFETY ADULT     Patient will remain free of falls Progressing     Maintain or return to baseline ADL function Progressing     Maintain or return mobility status to optimal level Progressing Patient is a 37y old  Male who presents with a chief complaint of OM (17 Dec 2018 14:16)      INTERVAL HPI/OVERNIGHT EVENTS:  T(C): 36.9 (12-17-18 @ 13:48), Max: 36.9 (12-17-18 @ 13:48)  HR: 92 (12-17-18 @ 13:48) (78 - 98)  BP: 125/80 (12-17-18 @ 13:48) (102/65 - 135/83)  RR: 18 (12-17-18 @ 13:48) (17 - 18)  SpO2: 100% (12-17-18 @ 13:48) (98% - 100%)  Wt(kg): --  I&O's Summary    16 Dec 2018 07:01  -  17 Dec 2018 07:00  --------------------------------------------------------  IN: 1440 mL / OUT: 0 mL / NET: 1440 mL    17 Dec 2018 07:01  -  17 Dec 2018 18:34  --------------------------------------------------------  IN: 1180 mL / OUT: 0 mL / NET: 1180 mL        LABS:    12-16    135  |  98  |  13  ----------------------------<  97  4.0   |  23  |  0.65    Ca    9.2      16 Dec 2018 07:27          CAPILLARY BLOOD GLUCOSE                MEDICATIONS  (STANDING):  ALPRAZolam 2 milliGRAM(s) Oral three times a day  amphetamine/dextroamphetamine 30 milliGRAM(s) Oral two times a day  cefepime   IVPB 2000 milliGRAM(s) IV Intermittent every 12 hours  heparin  Injectable 5000 Unit(s) SubCutaneous every 8 hours  hydrocortisone 0.5% Cream 1 Application(s) Topical two times a day  influenza   Vaccine 0.5 milliLiter(s) IntraMuscular once  nicotine - 21 mG/24Hr(s) Patch 1 patch Transdermal daily  vancomycin  IVPB 1000 milliGRAM(s) IV Intermittent every 8 hours    MEDICATIONS  (PRN):  cloNIDine 0.1 milliGRAM(s) Oral every 4 hours PRN if signs of withdrawal  oxyCODONE    IR 30 milliGRAM(s) Oral every 4 hours PRN Severe Pain (7 - 10)  QUEtiapine 50 milliGRAM(s) Oral every 6 hours PRN Anxiety, Agitation, or Insomnia          PHYSICAL EXAM:  GENERAL: NAD, well-groomed, well-developed  HEAD:  Atraumatic, Normocephalic  CHEST/LUNG: Clear to percussion bilaterally; No rales, rhonchi, wheezing, or rubs  HEART: Regular rate and rhythm; No murmurs, rubs, or gallops  ABDOMEN: Soft, Nontender, Nondistended; Bowel sounds present  EXTREMITIES:  2+ Peripheral Pulses, No clubbing, cyanosis, or edema  LYMPH: No lymphadenopathy noted  SKIN: No rashes or lesions    Care Discussed with Consultants/Other Providers [ ] YES  [ ] NO

## 2019-01-28 RX ORDER — TAMSULOSIN HYDROCHLORIDE 0.4 MG/1
0.4 CAPSULE ORAL
COMMUNITY
End: 2019-11-25 | Stop reason: SDUPTHER

## 2019-01-28 RX ORDER — LISINOPRIL 20 MG/1
20 TABLET ORAL EVERY EVENING
COMMUNITY
End: 2019-06-18 | Stop reason: SDUPTHER

## 2019-01-28 NOTE — PRE-PROCEDURE INSTRUCTIONS
Pre-Surgery Instructions:   Medication Instructions    Ibuprofen (ADVIL PO) Instructed patient per Anesthesia Guidelines   lisinopril (ZESTRIL) 20 mg tablet Instructed patient per Anesthesia Guidelines   tamsulosin (FLOMAX) 0 4 mg Instructed patient per Anesthesia Guidelines  Spoke to pt  Medication list reviewed & instructed   As of 1 28 19 pt to stop advil  Instructed on tylenol only  Pt takes lisinopril & flomax at hs  Am DOS no meds  Showering instructions given by office, reviewed at time of call  All instructions verbally understood by patient  No further questions  Callback number given  ACE/ARB Med Class     Continue this medication up to the evening before surgery/procedure, but do not take the morning of the day of surgery  Alpha-1 adrenergic blocker Med Class     Continue to take this medication on your normal schedule  If this is an oral medication and you take it in the morning, then you may take this medicine with a sip of water  NSAID Med Class     Stop taking this medication at least 3 days prior to surgery/procedure

## 2019-01-29 ENCOUNTER — TELEPHONE (OUTPATIENT)
Dept: SURGERY | Facility: CLINIC | Age: 59
End: 2019-01-29

## 2019-01-29 NOTE — TELEPHONE ENCOUNTER
Spoke with patient who is in need of transportation going home after his surgery on 1/31/19  I called and spoke with Daija Ly who stated he would be able to help with the [facility]  I needed to get time of discharge  Last time for transportation is 3:00 pm  I will inform Dr Mary Alice Coleman to have surgery done in a timely manor for his ride at d/c  Called patient to let him know that I am still working on his transportation and will get it set up for Thursday  Was unable to leave a message  His phone is not set up to receive messages  I will call him tomorrow

## 2019-01-30 ENCOUNTER — TELEPHONE (OUTPATIENT)
Dept: SURGERY | Facility: CLINIC | Age: 59
End: 2019-01-30

## 2019-01-30 NOTE — TELEPHONE ENCOUNTER
Spoke with patient informing him that Dr Tim Cohn and I spoke and that his surgery case is 1st  I informed him that Im with Gale Knight with [employer] to help with the patients transportation after his surgery  I will call the patient after Prudencmee Torres returns my call

## 2019-01-31 ENCOUNTER — HOSPITAL ENCOUNTER (OUTPATIENT)
Facility: HOSPITAL | Age: 59
Setting detail: OUTPATIENT SURGERY
Discharge: HOME | End: 2019-01-31
Attending: SURGERY | Admitting: SURGERY
Payer: COMMERCIAL

## 2019-01-31 ENCOUNTER — ANESTHESIA (OUTPATIENT)
Dept: PERIOP | Facility: HOSPITAL | Age: 59
End: 2019-01-31
Payer: COMMERCIAL

## 2019-01-31 ENCOUNTER — ANESTHESIA EVENT (OUTPATIENT)
Dept: PERIOP | Facility: HOSPITAL | Age: 59
End: 2019-01-31
Payer: COMMERCIAL

## 2019-01-31 VITALS
SYSTOLIC BLOOD PRESSURE: 135 MMHG | OXYGEN SATURATION: 100 % | RESPIRATION RATE: 16 BRPM | HEART RATE: 86 BPM | BODY MASS INDEX: 23.34 KG/M2 | WEIGHT: 163 LBS | TEMPERATURE: 98.4 F | DIASTOLIC BLOOD PRESSURE: 83 MMHG | HEIGHT: 70 IN

## 2019-01-31 DIAGNOSIS — K43.2 INCISIONAL HERNIA, WITHOUT OBSTRUCTION OR GANGRENE: ICD-10-CM

## 2019-01-31 DIAGNOSIS — K40.90 RIGHT INGUINAL HERNIA: Primary | ICD-10-CM

## 2019-01-31 PROCEDURE — 49650 LAP ING HERNIA REPAIR INIT: CPT | Performed by: SURGERY

## 2019-01-31 PROCEDURE — C1781 MESH (IMPLANTABLE): HCPCS | Performed by: SURGERY

## 2019-01-31 PROCEDURE — 49654 PR LAP, INCISIONAL HERNIA REPAIR,REDUCIBLE: CPT | Performed by: SURGERY

## 2019-01-31 DEVICE — CAPSURE PERMANENT FIXATION SYSTEM 30 PERMANENT FASTENERS
Type: IMPLANTABLE DEVICE | Site: ABDOMEN | Status: FUNCTIONAL
Brand: CAPSURE PERMANENT FIXATION SYSTEM

## 2019-01-31 DEVICE — VENTRALIGHT ST MESH WITH ECHO PS POSITONING SYSTEM
Type: IMPLANTABLE DEVICE | Site: ABDOMEN | Status: FUNCTIONAL
Brand: VENTRALIGHT ST MESH WITH ECHO PS POSITONING SYSTEM

## 2019-01-31 DEVICE — BARD 3DMAX MESH RIGHT LARGE
Type: IMPLANTABLE DEVICE | Site: INGUINAL | Status: FUNCTIONAL
Brand: BARD 3DMAX MESH

## 2019-01-31 RX ORDER — ONDANSETRON 2 MG/ML
INJECTION INTRAMUSCULAR; INTRAVENOUS AS NEEDED
Status: DISCONTINUED | OUTPATIENT
Start: 2019-01-31 | End: 2019-01-31 | Stop reason: SURG

## 2019-01-31 RX ORDER — METOCLOPRAMIDE HYDROCHLORIDE 5 MG/ML
10 INJECTION INTRAMUSCULAR; INTRAVENOUS ONCE AS NEEDED
Status: DISCONTINUED | OUTPATIENT
Start: 2019-01-31 | End: 2019-01-31 | Stop reason: HOSPADM

## 2019-01-31 RX ORDER — CEFAZOLIN SODIUM 1 G/3ML
INJECTION, POWDER, FOR SOLUTION INTRAMUSCULAR; INTRAVENOUS AS NEEDED
Status: DISCONTINUED | OUTPATIENT
Start: 2019-01-31 | End: 2019-01-31 | Stop reason: SURG

## 2019-01-31 RX ORDER — HYDROMORPHONE HCL/PF 1 MG/ML
0.2 SYRINGE (ML) INJECTION
Status: DISCONTINUED | OUTPATIENT
Start: 2019-01-31 | End: 2019-01-31 | Stop reason: HOSPADM

## 2019-01-31 RX ORDER — FENTANYL CITRATE 50 UG/ML
INJECTION, SOLUTION INTRAMUSCULAR; INTRAVENOUS AS NEEDED
Status: DISCONTINUED | OUTPATIENT
Start: 2019-01-31 | End: 2019-01-31 | Stop reason: SURG

## 2019-01-31 RX ORDER — DIPHENHYDRAMINE HYDROCHLORIDE 50 MG/ML
12.5 INJECTION INTRAMUSCULAR; INTRAVENOUS ONCE AS NEEDED
Status: DISCONTINUED | OUTPATIENT
Start: 2019-01-31 | End: 2019-01-31 | Stop reason: HOSPADM

## 2019-01-31 RX ORDER — ONDANSETRON 2 MG/ML
4 INJECTION INTRAMUSCULAR; INTRAVENOUS EVERY 4 HOURS PRN
Status: DISCONTINUED | OUTPATIENT
Start: 2019-01-31 | End: 2019-01-31 | Stop reason: HOSPADM

## 2019-01-31 RX ORDER — ONDANSETRON 2 MG/ML
4 INJECTION INTRAMUSCULAR; INTRAVENOUS ONCE AS NEEDED
Status: DISCONTINUED | OUTPATIENT
Start: 2019-01-31 | End: 2019-01-31 | Stop reason: HOSPADM

## 2019-01-31 RX ORDER — FENTANYL CITRATE/PF 50 MCG/ML
25 SYRINGE (ML) INJECTION
Status: DISCONTINUED | OUTPATIENT
Start: 2019-01-31 | End: 2019-01-31 | Stop reason: HOSPADM

## 2019-01-31 RX ORDER — OXYCODONE HYDROCHLORIDE AND ACETAMINOPHEN 5; 325 MG/1; MG/1
1 TABLET ORAL EVERY 4 HOURS PRN
Status: DISCONTINUED | OUTPATIENT
Start: 2019-01-31 | End: 2019-01-31 | Stop reason: HOSPADM

## 2019-01-31 RX ORDER — NEOSTIGMINE METHYLSULFATE 1 MG/ML
INJECTION INTRAVENOUS AS NEEDED
Status: DISCONTINUED | OUTPATIENT
Start: 2019-01-31 | End: 2019-01-31 | Stop reason: SURG

## 2019-01-31 RX ORDER — HYDROMORPHONE HCL/PF 1 MG/ML
1 SYRINGE (ML) INJECTION EVERY 4 HOURS PRN
Status: DISCONTINUED | OUTPATIENT
Start: 2019-01-31 | End: 2019-01-31 | Stop reason: HOSPADM

## 2019-01-31 RX ORDER — LIDOCAINE HYDROCHLORIDE 10 MG/ML
INJECTION, SOLUTION INFILTRATION; PERINEURAL AS NEEDED
Status: DISCONTINUED | OUTPATIENT
Start: 2019-01-31 | End: 2019-01-31 | Stop reason: SURG

## 2019-01-31 RX ORDER — PROPOFOL 10 MG/ML
INJECTION, EMULSION INTRAVENOUS AS NEEDED
Status: DISCONTINUED | OUTPATIENT
Start: 2019-01-31 | End: 2019-01-31 | Stop reason: SURG

## 2019-01-31 RX ORDER — ROCURONIUM BROMIDE 10 MG/ML
INJECTION, SOLUTION INTRAVENOUS AS NEEDED
Status: DISCONTINUED | OUTPATIENT
Start: 2019-01-31 | End: 2019-01-31 | Stop reason: SURG

## 2019-01-31 RX ORDER — EPHEDRINE SULFATE 50 MG/ML
INJECTION, SOLUTION INTRAVENOUS AS NEEDED
Status: DISCONTINUED | OUTPATIENT
Start: 2019-01-31 | End: 2019-01-31 | Stop reason: SURG

## 2019-01-31 RX ORDER — OXYCODONE HYDROCHLORIDE AND ACETAMINOPHEN 5; 325 MG/1; MG/1
2 TABLET ORAL EVERY 4 HOURS PRN
Status: DISCONTINUED | OUTPATIENT
Start: 2019-01-31 | End: 2019-01-31 | Stop reason: HOSPADM

## 2019-01-31 RX ORDER — SODIUM CHLORIDE, SODIUM LACTATE, POTASSIUM CHLORIDE, CALCIUM CHLORIDE 600; 310; 30; 20 MG/100ML; MG/100ML; MG/100ML; MG/100ML
125 INJECTION, SOLUTION INTRAVENOUS CONTINUOUS
Status: DISCONTINUED | OUTPATIENT
Start: 2019-01-31 | End: 2019-01-31 | Stop reason: HOSPADM

## 2019-01-31 RX ORDER — MIDAZOLAM HYDROCHLORIDE 1 MG/ML
INJECTION INTRAMUSCULAR; INTRAVENOUS AS NEEDED
Status: DISCONTINUED | OUTPATIENT
Start: 2019-01-31 | End: 2019-01-31 | Stop reason: SURG

## 2019-01-31 RX ORDER — BUPIVACAINE HYDROCHLORIDE AND EPINEPHRINE 5; 5 MG/ML; UG/ML
INJECTION, SOLUTION PERINEURAL AS NEEDED
Status: DISCONTINUED | OUTPATIENT
Start: 2019-01-31 | End: 2019-01-31 | Stop reason: HOSPADM

## 2019-01-31 RX ORDER — OXYCODONE HYDROCHLORIDE 5 MG/1
5 TABLET ORAL EVERY 4 HOURS PRN
Qty: 15 TABLET | Refills: 0 | Status: SHIPPED | OUTPATIENT
Start: 2019-01-31 | End: 2019-02-10

## 2019-01-31 RX ORDER — GLYCOPYRROLATE 0.2 MG/ML
INJECTION INTRAMUSCULAR; INTRAVENOUS AS NEEDED
Status: DISCONTINUED | OUTPATIENT
Start: 2019-01-31 | End: 2019-01-31 | Stop reason: SURG

## 2019-01-31 RX ADMIN — FENTANYL CITRATE 100 MCG: 50 INJECTION, SOLUTION INTRAMUSCULAR; INTRAVENOUS at 08:09

## 2019-01-31 RX ADMIN — PROPOFOL 200 MG: 10 INJECTION, EMULSION INTRAVENOUS at 08:09

## 2019-01-31 RX ADMIN — DEXAMETHASONE SODIUM PHOSPHATE 8 MG: 10 INJECTION INTRAMUSCULAR; INTRAVENOUS at 08:14

## 2019-01-31 RX ADMIN — SODIUM CHLORIDE, SODIUM LACTATE, POTASSIUM CHLORIDE, AND CALCIUM CHLORIDE: .6; .31; .03; .02 INJECTION, SOLUTION INTRAVENOUS at 07:45

## 2019-01-31 RX ADMIN — FENTANYL CITRATE 25 MCG: 50 INJECTION, SOLUTION INTRAMUSCULAR; INTRAVENOUS at 11:11

## 2019-01-31 RX ADMIN — EPHEDRINE SULFATE 10 MG: 50 INJECTION, SOLUTION INTRAMUSCULAR; INTRAVENOUS; SUBCUTANEOUS at 08:34

## 2019-01-31 RX ADMIN — FENTANYL CITRATE 25 MCG: 50 INJECTION, SOLUTION INTRAMUSCULAR; INTRAVENOUS at 10:48

## 2019-01-31 RX ADMIN — MIDAZOLAM 2 MG: 1 INJECTION INTRAMUSCULAR; INTRAVENOUS at 07:58

## 2019-01-31 RX ADMIN — ROCURONIUM BROMIDE 10 MG: 10 INJECTION INTRAVENOUS at 09:42

## 2019-01-31 RX ADMIN — FENTANYL CITRATE 25 MCG: 50 INJECTION, SOLUTION INTRAMUSCULAR; INTRAVENOUS at 11:17

## 2019-01-31 RX ADMIN — SODIUM CHLORIDE, SODIUM LACTATE, POTASSIUM CHLORIDE, AND CALCIUM CHLORIDE: .6; .31; .03; .02 INJECTION, SOLUTION INTRAVENOUS at 09:20

## 2019-01-31 RX ADMIN — ONDANSETRON 4 MG: 2 INJECTION INTRAMUSCULAR; INTRAVENOUS at 10:17

## 2019-01-31 RX ADMIN — LIDOCAINE HYDROCHLORIDE 50 MG: 10 INJECTION, SOLUTION INFILTRATION; PERINEURAL at 08:09

## 2019-01-31 RX ADMIN — HYDROMORPHONE HYDROCHLORIDE 0.2 MG: 1 INJECTION, SOLUTION INTRAMUSCULAR; INTRAVENOUS; SUBCUTANEOUS at 11:34

## 2019-01-31 RX ADMIN — ROCURONIUM BROMIDE 50 MG: 10 INJECTION INTRAVENOUS at 08:09

## 2019-01-31 RX ADMIN — EPHEDRINE SULFATE 15 MG: 50 INJECTION, SOLUTION INTRAMUSCULAR; INTRAVENOUS; SUBCUTANEOUS at 08:38

## 2019-01-31 RX ADMIN — FENTANYL CITRATE 25 MCG: 50 INJECTION, SOLUTION INTRAMUSCULAR; INTRAVENOUS at 11:24

## 2019-01-31 RX ADMIN — GLYCOPYRROLATE 0.4 MG: 0.2 INJECTION, SOLUTION INTRAMUSCULAR; INTRAVENOUS at 10:29

## 2019-01-31 RX ADMIN — CEFAZOLIN 1000 MG: 1 INJECTION, POWDER, FOR SOLUTION INTRAVENOUS at 08:04

## 2019-01-31 RX ADMIN — ROCURONIUM BROMIDE 20 MG: 10 INJECTION INTRAVENOUS at 08:47

## 2019-01-31 RX ADMIN — OXYCODONE HYDROCHLORIDE AND ACETAMINOPHEN 1 TABLET: 5; 325 TABLET ORAL at 12:29

## 2019-01-31 RX ADMIN — NEOSTIGMINE METHYLSULFATE 3 MG: 1 INJECTION INTRAVENOUS at 10:29

## 2019-01-31 NOTE — TELEPHONE ENCOUNTER
Alana Borne return my call and informed he has a ride coming for him at 3:00 pm  I informed the patient of his ride and also call and spoke with Ms Madrigal Reny of his ride and to have someone take him to [facility] for his ride  I then received a call from [facility] at [facility] when patient was  being discharged  She stated that the son was coming to the hospital to take him home  Vicky called to inform Angeles Roberts to cancel the ride

## 2019-01-31 NOTE — ANESTHESIA POSTPROCEDURE EVALUATION
Post-Op Assessment Note      CV Status:  Stable    Mental Status:  Awake    Hydration Status:  Stable    PONV Controlled:  None    Airway Patency:  Patent    Post Op Vitals Reviewed: Yes          Staff: AnesthesiologistMICHAEL           BP   123/64   Temp   96 8   Pulse  75   Resp   16   SpO2   98

## 2019-01-31 NOTE — PROGRESS NOTES
Pt states that he would like to go home and rest but also pain in a 8/10//  I explained to pt that I will treat his pain but that he cant go home until his pain is under control//  Pt stated that I keep 'bothering him' when I ask him how he is doing  I stated that I am trying to take care of him

## 2019-01-31 NOTE — PROGRESS NOTES
Pt states pain 8/10 but pt desated to 86% after dilaudid given// explained to patient that it is not safe for me to continue to give narcotics at this time  Pt voiced understanding// pt now on the phone trying to call a family member to come and pick him up //  Pt stated that he will walk home// explained to pt that it is unsafe and that she must have a ride home today

## 2019-01-31 NOTE — H&P
Assessment and Plan         Problem List Items Addressed This Visit               Other     Right inguinal hernia - Primary       I discussed with him what a hernia is and how it would be repaired  Since he needs a laparoscopic incisional hernia repair, would also attempt to do this laparoscopically  Discussed the surgery including risks, benefits, and what to expect postoperatively  He understands and is agreeable to go ahead            Ventral hernia without obstruction or gangrene       I discussed with him hernia surgery for a hernia at the ileostomy site  I described the procedure in detail including risks, benefits, and what to expect postoperatively  He understands and is agreeable to go ahead                    Chief Complaint:  Domenico Asif is a 63 y o  male who presents for Inguinal Hernia (Consult right inguinal hernia)     Subjective  71-year-old male who to was found to have right inguinal hernia last time he was in the hospital   He has a little bit of discomfort at that site  He also has pain occasionally and a bulge at his ileostomy site      Past Medical History  Medical History   Past Medical History:   Diagnosis Date    Abscess, intestine      Arthritis      Diverticulitis      GERD (gastroesophageal reflux disease)      Hydronephrosis 5/27/2018            Past Surgical History  Surgical History         Past Surgical History:   Procedure Laterality Date    ABCESS DRAINAGE        COLON SURGERY        COLONOSCOPY N/A 5/5/2016     Procedure: COLONOSCOPY;  Surgeon: Arik Castro MD;  Location: [facility]; Service:     COLONOSCOPY N/A 7/26/2018     Procedure: COLONOSCOPY with bx's;  Surgeon: Amelia Rod MD;  Location: [facility]; Service: Gastroenterology    ESOPHAGOGASTRODUODENOSCOPY         with biopsy    FOOT SURGERY Left       x2? fusion? due to arthritis  onset: 0      ILEO LOOP DIVERSION N/A 6/1/2018     Procedure: ILEOSTOMY LOOP DIVERTING;  Surgeon: Roverto Mckinley DO Pavel;  [facility];  Service: General    LAPAROTOMY N/A 6/1/2018     Procedure: LAPAROTOMY EXPLORATORY,;  Surgeon: Steven Crowe DO;  [facility];  Service: General    WA CLOSE ENTEROSTOMY N/A 8/16/2018     Procedure: CLOSURE LOOP ILEOSTOMY;  Surgeon: Alyson Valdez MD;  [facility];  Service: General    WA CYSTOURETHROSCOPY,URETER CATHETER Left 6/9/2018     Procedure: CYSTOSCOPY RETROGRADE PYELOGRAM WITH INSERTION STENT URETERAL;  Surgeon: Betsey Pollard MD;  [facility];  Service: Urology    WA PART REMOVAL COLON W ANASTOMOSIS N/A 6/1/2018     Procedure: RESECTION COLON SIGMOID;  Surgeon: Steven Crowe DO;  [facility];  Service: General            Family History        Family History   Problem Relation Age of Onset    Other Mother           head tumor    Glaucoma Father      Diabetes Father           possible diabetes    Stroke Family           Medications         Current Outpatient Prescriptions on File Prior to Visit   Medication Sig Dispense Refill    acetaminophen (TYLENOL) 325 mg tablet Take 2 tablets (650 mg total) by mouth every 4 (four) hours as needed for mild pain 30 tablet 0    dicyclomine (BENTYL) 10 mg capsule Take 1 capsule (10 mg total) by mouth 4 (four) times a day (before meals and at bedtime) 360 capsule 0    docusate sodium (COLACE) 100 mg capsule Take 1 capsule (100 mg total) by mouth 2 (two) times a day (Patient not taking: Reported on 12/12/2018 ) 10 capsule 0    ketoconazole (NIZORAL) 2 % shampoo Apply 1 application topically once for 1 dose 120 mL 0    lisinopril (ZESTRIL) 20 mg tablet Take 1 tablet (20 mg total) by mouth daily (Patient not taking: Reported on 12/12/2018 ) 90 tablet 0    senna-docusate sodium (SENOKOT S) 8 6-50 mg per tablet Take 1 tablet by mouth daily at bedtime (Patient not taking: Reported on 9/18/2018 ) 10 tablet 0    simethicone (MYLICON) 80 mg chewable tablet Chew 1 tablet (80 mg total) every 6 (six) hours as needed for flatulence (Patient not taking: Reported on 9/18/2018 ) 30 tablet 0    tamsulosin (FLOMAX) 0 4 mg Take 1 capsule (0 4 mg total) by mouth daily with dinner for 30 days 30 capsule 11      No current facility-administered medications on file prior to visit           Allergies        Allergies   Allergen Reactions    Penicillins Rash       itching         Review of Systems   Constitutional: Negative for chills, fatigue and fever  HENT: Negative for congestion, ear pain, sneezing, trouble swallowing and voice change  Eyes: Negative for pain and discharge  Respiratory: Negative for cough, shortness of breath and wheezing  Cardiovascular: Negative for palpitations and leg swelling  Gastrointestinal: Negative for constipation, diarrhea, nausea and vomiting  Endocrine: Negative for cold intolerance, heat intolerance and polyuria  Genitourinary: Negative for decreased urine volume, difficulty urinating and dysuria  Musculoskeletal: Negative for arthralgias and back pain  Skin: Negative for rash and wound  Allergic/Immunologic: Negative for environmental allergies and food allergies  Neurological: Negative for dizziness and weakness  Hematological: Negative for adenopathy  Does not bruise/bleed easily  Psychiatric/Behavioral: Negative for confusion and sleep disturbance  The patient is not nervous/anxious  All other systems reviewed and are negative         Objective                           Physical Exam   Constitutional: He is oriented to person, place, and time  He appears well-developed and well-nourished  No distress  HENT:   Head: Normocephalic and atraumatic  Right Ear: External ear normal    Left Ear: External ear normal    Eyes: Conjunctivae are normal  No scleral icterus  Neck: Normal range of motion  Neck supple  No tracheal deviation present  No thyromegaly present  Cardiovascular: Normal rate, regular rhythm and normal heart sounds      No murmur heard   Pulmonary/Chest: Effort normal and breath sounds normal  No respiratory distress  He has no wheezes  He has no rales  Abdominal: Soft  He exhibits no distension  There is no tenderness  There is no rebound and no guarding  Lower midline well-healed incision  Right lower quadrant well-healed incision  Bulging in the right groin area  There is a hernia defect about 2-3 cm at the right lower quadrant incision site  Midline incision and feels intact  There is a right inguinal hernia present there is no left inguinal hernia present   Musculoskeletal: Normal range of motion  He exhibits no edema  Lymphadenopathy:     He has no cervical adenopathy  Neurological: He is alert and oriented to person, place, and time  Skin: Skin is warm and dry  He is not diaphoretic  Psychiatric: He has a normal mood and affect   His behavior is normal  Judgment and thought content normal

## 2019-01-31 NOTE — DISCHARGE INSTRUCTIONS
Laparoscopic Herniorrhaphy   WHAT YOU NEED TO KNOW:   Laparoscopic herniorrhaphy is surgery to repair a hernia  DISCHARGE INSTRUCTIONS:   Medicines:   · Prescription pain medicine  may be given  Ask your healthcare provider how to take this medicine safely  · Take your medicine as directed  Contact your healthcare provider if you think your medicine is not helping or if you have side effects  Tell him if you are allergic to any medicine  Keep a list of the medicines, vitamins, and herbs you take  Include the amounts, and when and why you take them  Bring the list or the pill bottles to follow-up visits  Carry your medicine list with you in case of an emergency  Follow up with your healthcare provider or surgeon as directed:  Write down your questions so you remember to ask them during your visits  Activity:  You may need to avoid heavy lifting until your surgery site heals  Ask your healthcare provider when you may return to your normal activities  Do not exercise until your healthcare provider says it is okay  Ask your healthcare provider to help you plan your exercise program    Bowel movements:  Exercise, such as walking, can help you have regular bowel movements  Eat high-fiber foods and drink more liquids  Some foods high in fiber include fruit, vegetables, and whole grains  Healthcare providers may give you a stool softener to help make your bowel movements softer and more regular  Wound care: You may have a bandage over your surgery site when you leave the hospital  Do not take the bandage off until your healthcare provider says it is okay  Ask your healthcare provider how to care for your wound at home  Contact your healthcare provider or surgeon if:   · You are bleeding more than expected from your surgery site  · You have a fever  · Your surgery site is swollen, red, or has pus coming from it  · You have questions or concerns about your condition or care    Seek care immediately or call 911 if:   · You feel lightheaded, short of breath, and have chest pain  · You cough up blood  · Your arm or leg feels warm, tender, and painful  It may look swollen and red  · You have blood clots or fluid around your surgery site  · You have pain in your groin or surgery site that does not get better after you take pain medicine  · You have trouble urinating  · You notice a new lump at your surgery site  · You suddenly have numbness in your groin area  © 2017 [facility] Information is for End User's use only and may not be sold, redistributed or otherwise used for commercial purposes  All illustrations and images included in CareNotes® are the copyrighted property of [employer]  or Cody Rodrigues  The above information is an  only  It is not intended as medical advice for individual conditions or treatments  Talk to your doctor, nurse or pharmacist before following any medical regimen to see if it is safe and effective for you
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
## 2019-01-31 NOTE — OP NOTE
OPERATIVE REPORT  PATIENT NAME: Igor Bunn    :  1960  MRN: [id number]  Pt Location: [facility] OR ROOM 06    SURGERY DATE: 2019    Surgeon(s) and Role:     * Michael Romero MD - Primary     * Kaia Griffiths MD - Assisting    Preop Diagnosis:  Right inguinal hernia [K40 90]  Incisional hernia, without obstruction or gangrene [K43 2]    Post-Op Diagnosis Codes:     * Right inguinal hernia [K40 90]     * Incisional hernia, without obstruction or gangrene [K43 2]    Procedure(s) (LRB):  REPAIR HERNIA INGUINAL, LAPAROSCOPIC (Right)  REPAIR HERNIA INCISIONAL LAPAROSCOPIC (N/A)    Specimen(s):  * No specimens in log *    Estimated Blood Loss:   Minimal    Drains:  Urethral Catheter Latex 16 Fr  (Active)   Number of days: 0       Anesthesia Type:   General    Operative Indications:  Right inguinal hernia [K40 90]  Incisional hernia, without obstruction or gangrene [K43 2]    Operative Findings:  Fat containing right inguinal hernia, fat containing right-sided incisional hernia    Complications:   None    Procedure and Technique:  The patient was brought to the operating room identified by name and armband  After induction of general anesthesia, the patient was prepped and draped in usual sterile fashion  A time-out was performed confirming patient procedure  A curvilinear incision was made infraumbilically and the soft tissues were divided down to the level of the anterior abdominal fascia  A pursestring suture was placed and a vertical incision was made in the anterior abdominal fascia  The muscle spread and the preperitoneal space was entered and a 12 mm trocar was introduced  The preperitoneal space was insufflated and the operative site was examined  The preperitoneal operations was developed medially to expose the superior aspect of the pubic tubercle and laterally to expose the ileopubic tract  The spermatic cord was identified and the space and a direct inguinal hernia was identified    The pre peritoneal fat was reduced from the hernia defect and the spermatic cord was skeletonized to expose the peritoneal hernia sac  This was reduced from the hernia defect and attention was then turned to the incisional hernia  The preperitoneal space was developed superiorly and the contents of the incisional hernia were dissected free from the hernia defect  During this dissection, the peritoneum was violated and the intra-abdominal space was entered  A Veress needle was then placed for decompression for the continuation of the operation  After measurement of the incisional hernia at the previous ileostomy site, a 10 x 15 cm mesh was introduced into the preperitoneal space  This was retracted anteriorly with assistance of a suture Passer through the center of the hernia defect to ensure proper position  The balloon was desufflated and removed and the patch was then satisfactory position  This was tacked inferiorly, superiorly and medially  Attention was then turned to the inguinal hernia defect  A polypropylene mesh was introduced in the preperitoneal space and oriented correctly to patch the hernia defect at the internal ring  This was tacked medially at Chandan's ligament  The preperitoneal space was then desufflated after confirming adequate positioning of both meshes  The anterior abdominal wall in the midline was closed with the previously placed Vicryl pursestring suture  The skin was closed with Monocryl  Local anesthesia was administered and the patient was woken from general anesthesia in stable condition  All counts were correct for instruments sponges  RF wanding was negative for retained sponges  The attending was present for the procedure in its entirety       Patient Disposition:  PACU     SIGNATURE: Castillo Ambriz MD  DATE: January 31, 2019  TIME: 10:50 AM

## 2019-02-05 ENCOUNTER — OFFICE VISIT (OUTPATIENT)
Dept: MULTI SPECIALTY CLINIC | Facility: CLINIC | Age: 59
End: 2019-02-05

## 2019-02-05 VITALS
DIASTOLIC BLOOD PRESSURE: 82 MMHG | SYSTOLIC BLOOD PRESSURE: 110 MMHG | HEIGHT: 70 IN | WEIGHT: 172.84 LBS | TEMPERATURE: 98.5 F | HEART RATE: 92 BPM | BODY MASS INDEX: 24.74 KG/M2

## 2019-02-05 DIAGNOSIS — N20.0 KIDNEY STONE: ICD-10-CM

## 2019-02-05 PROCEDURE — 99214 OFFICE O/P EST MOD 30 MIN: CPT | Performed by: UROLOGY

## 2019-02-05 NOTE — PROGRESS NOTES
Assessment:  62 yo male with BPH    Plan:  Pt to continue Tamsulosin  Pt to have yearly PSA and prostate exam, can be done by PMD       Chief Complaint: urinary obstruction    HPI: Agustín Villalta is a 63 y o  male who is here for follow up  Pt had a complicated case of perforated diverticulitis with colon resection and ileostomy  He was hospitalized in 6/18 for left ureteral obstruction with hydronephrosis  Dr Fry performed retrograde pyelogram, revealing obstruction clot in the distal left ureter  Stent remained for 4 weeks and removed in office, follow up ultrasound within normal limits  Pt returned to clinic in November with complaint of nocturia with use of Cardura, pt switched to tamsulosin which has helped  Pt also four days post op from inguinal and ventral hernia repair by general surgery  Pt states tamsulosin has helped his symptoms  Pt's only complaint is that sometimes he feels urinary obstruction and when he tries to push urine out, semen then comes out and he then is able to empty his bladder  Review of Systems:  Pain ans soreness s/p hernia repair  Past Medical History:  Past Medical History:   Diagnosis Date    Abscess, intestine     Arthritis     Diverticulitis     GERD (gastroesophageal reflux disease)     History of high blood pressure     Hydronephrosis 5/27/2018       Past Surgical History:  Past Surgical History:   Procedure Laterality Date    ABCESS DRAINAGE      COLON SURGERY      COLONOSCOPY N/A 5/5/2016    Procedure: COLONOSCOPY;  Surgeon: Jacey Cordero MD;  Location: [facility]; Service:     COLONOSCOPY N/A 7/26/2018    Procedure: COLONOSCOPY with bx's;  Surgeon: Federico Romano MD;  Location: [facility]; Service: Gastroenterology    ESOPHAGOGASTRODUODENOSCOPY      with biopsy    FOOT SURGERY Left     x2? fusion? due to arthritis  onset: 0      ILEO LOOP DIVERSION N/A 6/1/2018    Procedure: ILEOSTOMY LOOP DIVERTING;  Surgeon: Kimi Valdez DO;  [facility];  Service: General    LAPAROTOMY N/A 6/1/2018    Procedure: LAPAROTOMY EXPLORATORY,;  Surgeon: Angelica Johnson DO;  [facility];  Service: General    [place] CLOSE ENTEROSTOMY N/A 8/16/2018    Procedure: CLOSURE LOOP ILEOSTOMY;  Surgeon: Sheryl Lambert MD;  [facility];  Service: General    [place] CYSTOURETHROSCOPY,URETER CATHETER Left 6/9/2018    Procedure: CYSTOSCOPY RETROGRADE PYELOGRAM WITH INSERTION STENT URETERAL;  Surgeon: Ai Hooper MD;  [facility];  Service: Urology    FL [facility] N/A 1/31/2019    Procedure: REPAIR HERNIA INCISIONAL LAPAROSCOPIC;  Surgeon: Sheryl Lambert MD;  [facility];  Service: General    FL Emily Hartman 19 Right 1/31/2019    Procedure: REPAIR HERNIA Roshni Henle;  Surgeon: Sheryl Lambert MD;  [facility];  Service: General    [place] PART REMOVAL COLON W ANASTOMOSIS N/A 6/1/2018    Procedure: RESECTION COLON SIGMOID;  Surgeon: Angelica Johnson DO;  [facility];  Service: General       Social History:  History   Alcohol Use    Yes     Comment: 1 drink / weekend      History   Drug Use No     History   Smoking Status    Former Smoker   Smokeless Tobacco    Never Used     Comment: quit > 1 year        Family History:  Family History   Problem Relation Age of Onset    Other Mother         head tumor    Glaucoma Father     Diabetes Father         possible diabetes    Stroke Family        Allergies: Allergies   Allergen Reactions    Penicillins Rash     itching       Medications:  current meds:   No current facility-administered medications for this visit          Vitals:  /82   Pulse 92   Temp 98 5 °F (36 9 °C)   Ht 5' 10" (1 778 m)   Wt 78 4 kg (172 lb 13 5 oz)   BMI 24 80 kg/m²     Lab Results and Cultures:   CBC with diff:   Lab Results   Component Value Date    WBC 6 40 12/17/2018    HGB 14 9 12/17/2018    HCT 46 1 12/17/2018    MCV 93 12/17/2018     12/17/2018    MCH 30 1 12/17/2018    MCHC 32 3 12/17/2018    RDW 13 4 12/17/2018    MPV 10 8 12/17/2018    NRBC 0 12/17/2018   ,   BMP/CMP:  Lab Results   Component Value Date     01/03/2016    K 4 9 12/17/2018    K 4 1 01/03/2016     12/17/2018     01/03/2016    CO2 29 12/17/2018    CO2 28 01/03/2016    ANIONGAP 6 01/03/2016    BUN 13 12/17/2018    BUN 5 01/03/2016    CREATININE 0 75 12/17/2018    CREATININE 0 57 (L) 01/03/2016    GLUCOSE 94 01/03/2016    CALCIUM 8 4 12/17/2018    CALCIUM 8 2 (L) 01/03/2016    AST 20 12/17/2018    ALT 32 12/17/2018    ALKPHOS 83 12/17/2018    EGFR 102 12/17/2018   ,   Lipid Panel: No results found for: CHOL,   Coags:   Lab Results   Component Value Date    PTT 29 08/21/2018    INR 0 97 08/21/2018   ,     Blood Culture:   Lab Results   Component Value Date    BLOODCX No Growth After 5 Days  06/04/2018    BLOODCX No Growth After 5 Days   06/04/2018   ,   Urinalysis:   Lab Results   Component Value Date    COLORU Cornelia 07/08/2018    COLORU Moore 12/31/2015    CLARITYU Clear 07/08/2018    CLARITYU Clear 12/31/2015    SPECGRAV >=1 030 07/08/2018    SPECGRAV 1 020 12/31/2015    PHUR 5 5 07/08/2018    PHUR 7 0 12/31/2015    LEUKOCYTESUR Trace (A) 07/08/2018    LEUKOCYTESUR Negative 12/31/2015    NITRITE Negative 07/08/2018    NITRITE Negative 12/31/2015    PROTEINUA 30 (1+) (A) 12/31/2015    GLUCOSEU Negative 07/08/2018    GLUCOSEU Negative 12/31/2015    KETONESU Trace (A) 07/08/2018    KETONESU 15 (1+) (A) 12/31/2015    BILIRUBINUR Interference- unable to analyze (A) 07/08/2018    BILIRUBINUR Negative 12/31/2015    BLOODU Large (A) 07/08/2018    BLOODU Negative 12/31/2015   ,   Urine Culture:   Lab Results   Component Value Date    URINECX No Growth <1000 cfu/mL 07/08/2018   ,   Wound Culure: No results found for: WOUNDCULT    Imaging:  Discussed in HPI

## 2019-02-20 ENCOUNTER — OFFICE VISIT (OUTPATIENT)
Dept: SURGERY | Facility: CLINIC | Age: 59
End: 2019-02-20

## 2019-02-20 VITALS
DIASTOLIC BLOOD PRESSURE: 78 MMHG | BODY MASS INDEX: 24.97 KG/M2 | HEIGHT: 70 IN | TEMPERATURE: 96.9 F | WEIGHT: 174.4 LBS | SYSTOLIC BLOOD PRESSURE: 104 MMHG

## 2019-02-20 DIAGNOSIS — Z87.19 STATUS POST HERNIA REPAIR: Primary | ICD-10-CM

## 2019-02-20 DIAGNOSIS — Z98.890 STATUS POST HERNIA REPAIR: Primary | ICD-10-CM

## 2019-02-20 PROBLEM — K43.9 VENTRAL HERNIA WITHOUT OBSTRUCTION OR GANGRENE: Status: RESOLVED | Noted: 2018-10-16 | Resolved: 2019-02-20

## 2019-02-20 PROBLEM — K43.2 INCISIONAL HERNIA, WITHOUT OBSTRUCTION OR GANGRENE: Status: RESOLVED | Noted: 2018-12-12 | Resolved: 2019-02-20

## 2019-02-20 PROBLEM — K40.90 RIGHT INGUINAL HERNIA: Status: RESOLVED | Noted: 2018-09-19 | Resolved: 2019-02-20

## 2019-02-20 PROCEDURE — 99024 POSTOP FOLLOW-UP VISIT: CPT | Performed by: SURGERY

## 2019-02-20 NOTE — ASSESSMENT & PLAN NOTE
Doing fairly well  He can continue use of a binder when he is up and around a lot  I told him he could take it off at bedtime  Swelling and discomfort I told him should gradually get better over the next couple of weeks  Recheck in a month

## 2019-02-20 NOTE — PROGRESS NOTES
Office Visit - General Surgery  Tabby Cortez MRN: [id number]  Encounter: [id number]    Assessment and Plan    Problem List Items Addressed This Visit        Other    Status post hernia repair - Primary     Doing fairly well  He can continue use of a binder when he is up and around a lot  I told him he could take it off at bedtime  Swelling and discomfort I told him should gradually get better over the next couple of weeks  Recheck in a month  Relevant Orders    Abdominal binder          Chief Complaint:  Tabby Cortez is a 63 y o  male who presents for Post-op (p/o lap incisional and right inguinal hernia  Patient states still alittle sore  Appetite good, bowel and bladder functioning normally )    Subjective  81-year-old male status post right inguinal and right incisional hernia repair laparoscopically in the preperitoneal fashion  He still has some pain at the site  Somewhat better than it has been  Past Medical History  Past Medical History:   Diagnosis Date    Abscess, intestine     Arthritis     Diverticulitis     GERD (gastroesophageal reflux disease)     History of high blood pressure     Hydronephrosis 5/27/2018       Past Surgical History  Past Surgical History:   Procedure Laterality Date    ABCESS DRAINAGE      COLON SURGERY      COLONOSCOPY N/A 5/5/2016    Procedure: COLONOSCOPY;  Surgeon: Alison Glass MD;  Location: [facility]; Service:     COLONOSCOPY N/A 7/26/2018    Procedure: COLONOSCOPY with bx's;  Surgeon: Ramón Grullon MD;  [facility]; Service: Gastroenterology    ESOPHAGOGASTRODUODENOSCOPY      with biopsy    FOOT SURGERY Left     x2? fusion? due to arthritis  onset: 0      HERNIA REPAIR      ILEO LOOP DIVERSION N/A 6/1/2018    Procedure: ILEOSTOMY LOOP DIVERTING;  Surgeon: Steven Crowe DO;  [facility];  Service: General    LAPAROTOMY N/A 6/1/2018    Procedure: LAPAROTOMY EXPLORATORY,;  Surgeon: Steven Crowe DO; [facility];  Service: General    [place] CLOSE ENTEROSTOMY N/A 8/16/2018    Procedure: CLOSURE LOOP ILEOSTOMY;  Surgeon: Prabhakar Nichols MD;  [facility];  Service: General    [place] CYSTOURETHROSCOPY,URETER CATHETER Left 6/9/2018    Procedure: CYSTOSCOPY RETROGRADE PYELOGRAM WITH INSERTION STENT URETERAL;  Surgeon: Fabian Coleman MD;  [facility];  Service: Urology    [place] [facility] N/A 1/31/2019    Procedure: REPAIR HERNIA INCISIONAL LAPAROSCOPIC;  Surgeon: Prabhakar Nichols MD;  [facility];  Service: General    [place] Emily Hartman 19 Right 1/31/2019    Procedure: REPAIR HERNIA Zachariah Lisa;  Surgeon: Prabhakar Nichols MD;  [facility];  Service: General    [place] PART REMOVAL COLON W ANASTOMOSIS N/A 6/1/2018    Procedure: RESECTION COLON SIGMOID;  Surgeon: Carito Vela DO;  [facility];  Service: General       Family History  Family History   Problem Relation Age of Onset    Other Mother         head tumor    Glaucoma Father     Diabetes Father         possible diabetes    Stroke Family        Social History  Social History     Socioeconomic History    Marital status: /Civil Union     Spouse name: None    Number of children: None    Years of education: None    Highest education level: None   Occupational History    None   Social Needs    Financial resource strain: None    Food insecurity:     Worry: None     Inability: None    Transportation needs:     Medical: None     Non-medical: None   Tobacco Use    Smoking status: Former Smoker    Smokeless tobacco: Never Used    Tobacco comment: quit > 1 year    Substance and Sexual Activity    Alcohol use: Yes     Comment: 1 drink / weekend     Drug use: No    Sexual activity: None   Lifestyle    Physical activity:     Days per week: None     Minutes per session: None    Stress: None   Relationships    Social connections:     Talks on phone: None     Gets together: None     Attends Mu-ism service: None     Active member of club or organization: None     Attends meetings of clubs or organizations: None     Relationship status: None    Intimate partner violence:     Fear of current or ex partner: None     Emotionally abused: None     Physically abused: None     Forced sexual activity: None   Other Topics Concern    None   Social History Narrative    None        Medications  Current Outpatient Medications on File Prior to Visit   Medication Sig Dispense Refill    Ibuprofen (ADVIL PO) Take by mouth as needed        lisinopril (ZESTRIL) 20 mg tablet Take 20 mg by mouth every evening      tamsulosin (FLOMAX) 0 4 mg Take 0 4 mg by mouth daily with dinner       No current facility-administered medications on file prior to visit  Allergies  Allergies   Allergen Reactions    Penicillins Rash     itching       Review of Systems    Objective  Vitals:    02/20/19 0758   BP: 104/78   Temp: (!) 96 9 °F (36 1 °C)       Physical Exam   Abdomen:  Laparoscopic incisions healing well  Little bit of swelling and puffiness in the groin region  No hernias appreciated    An area next his umbilicus he was worried about feels to be scar

## 2019-03-20 ENCOUNTER — OFFICE VISIT (OUTPATIENT)
Dept: SURGERY | Facility: CLINIC | Age: 59
End: 2019-03-20

## 2019-03-20 VITALS
SYSTOLIC BLOOD PRESSURE: 126 MMHG | DIASTOLIC BLOOD PRESSURE: 82 MMHG | BODY MASS INDEX: 25.25 KG/M2 | WEIGHT: 176.4 LBS | HEIGHT: 70 IN | TEMPERATURE: 96.3 F

## 2019-03-20 DIAGNOSIS — Z98.890 STATUS POST HERNIA REPAIR: Primary | ICD-10-CM

## 2019-03-20 DIAGNOSIS — Z87.19 STATUS POST HERNIA REPAIR: Primary | ICD-10-CM

## 2019-03-20 PROCEDURE — 99024 POSTOP FOLLOW-UP VISIT: CPT | Performed by: SURGERY

## 2019-03-20 NOTE — PROGRESS NOTES
Office Visit - General Surgery  Inés Braswell MRN: [id number]  Encounter: [id number]    Assessment and Plan    Problem List Items Addressed This Visit        Other    Status post hernia repair - Primary     He seems to be doing better  I told him he may have a little bit of pain but this should gradually improve as time goes on  I told him if he continues to have 1 area of point tenderness, we could do a nerve block or trigger-point injection of that site  He will see us in a month and will go from there  Otherwise activity diet as tolerated  He uses a binder for comfort  Chief Complaint:  Inés Braswell is a 63 y o  male who presents for Hernia (1 month f u bilateral inguinal hernia repair)    Subjective  52-year-old male status post a right inguinal and right side incisional hernia repair still has some pain in the right lower quadrant below the old ileostomy site  Past Medical History  Past Medical History:   Diagnosis Date    Abscess, intestine     Arthritis     Diverticulitis     GERD (gastroesophageal reflux disease)     History of high blood pressure     Hydronephrosis 5/27/2018       Past Surgical History  Past Surgical History:   Procedure Laterality Date    ABCESS DRAINAGE      COLON SURGERY      COLONOSCOPY N/A 5/5/2016    Procedure: COLONOSCOPY;  Surgeon: Donald Sierra MD;  Location: [facility]; Service:     COLONOSCOPY N/A 7/26/2018    Procedure: COLONOSCOPY with bx's;  Surgeon: Lorin Lafleur MD;  [facility]; Service: Gastroenterology    ESOPHAGOGASTRODUODENOSCOPY      with biopsy    FOOT SURGERY Left     x2? fusion? due to arthritis  onset: 0      HERNIA REPAIR      ILEO LOOP DIVERSION N/A 6/1/2018    Procedure: ILEOSTOMY LOOP DIVERTING;  Surgeon: Nicho Infante DO;  [facility];  Service: General    LAPAROTOMY N/A 6/1/2018    Procedure: LAPAROTOMY EXPLORATORY,;  Surgeon: Nicho Infante DO;  [facility];  Service: General    [place] CLOSE ENTEROSTOMY N/A 8/16/2018    Procedure: CLOSURE LOOP ILEOSTOMY;  Surgeon: Angela Yarbrough MD;  [facility];  Service: General    [place] CYSTOURETHROSCOPY,URETER CATHETER Left 6/9/2018    Procedure: CYSTOSCOPY RETROGRADE PYELOGRAM WITH INSERTION STENT URETERAL;  Surgeon: Ruth Blood MD;  [facility];  Service: Urology    [facility] N/A 1/31/2019    Procedure: REPAIR HERNIA INCISIONAL LAPAROSCOPIC;  Surgeon: Angela Yarbrough MD;  [facility];  Service: General    [place] Emily Hartman 19 Right 1/31/2019    Procedure: REPAIR HERNIA Robet Grover;  Surgeon: Angela Yarbrough MD;  [facility];  Service: General    [place] PART REMOVAL COLON W ANASTOMOSIS N/A 6/1/2018    Procedure: RESECTION COLON SIGMOID;  Surgeon: Evelin Herrera DO;  [facility];  Service: General       Family History  Family History   Problem Relation Age of Onset    Other Mother         head tumor    Glaucoma Father     Diabetes Father         possible diabetes    Stroke Family        Social History  Social History     Socioeconomic History    Marital status: /Civil Union     Spouse name: None    Number of children: None    Years of education: None    Highest education level: None   Occupational History    None   Social Needs    Financial resource strain: None    Food insecurity:     Worry: None     Inability: None    Transportation needs:     Medical: None     Non-medical: None   Tobacco Use    Smoking status: Former Smoker    Smokeless tobacco: Never Used    Tobacco comment: quit > 1 year    Substance and Sexual Activity    Alcohol use: Yes     Comment: 1 drink / weekend     Drug use: No    Sexual activity: None   Lifestyle    Physical activity:     Days per week: None     Minutes per session: None    Stress: None   Relationships    Social connections:     Talks on phone: None     Gets together: None Attends Roman [employer] service: None     Active member of club or organization: None     Attends meetings of clubs or organizations: None     Relationship status: None    Intimate partner violence:     Fear of current or ex partner: None     Emotionally abused: None     Physically abused: None     Forced sexual activity: None   Other Topics Concern    None   Social History Narrative    None        Medications  Current Outpatient Medications on File Prior to Visit   Medication Sig Dispense Refill    Ibuprofen (ADVIL PO) Take by mouth as needed        lisinopril (ZESTRIL) 20 mg tablet Take 20 mg by mouth every evening      tamsulosin (FLOMAX) 0 4 mg Take 0 4 mg by mouth daily with dinner       No current facility-administered medications on file prior to visit  Allergies  Allergies   Allergen Reactions    Penicillins Rash     itching       Review of Systems    Objective  Vitals:    03/20/19 0754   BP: 126/82   Temp: (!) 96 3 °F (35 7 °C)       Physical Exam   Abdomen:  Lower midline incision healed  Laparoscopic incisions healed well  Right lower quadrant well-healed scar  Minimal tenderness below that site  No hernias appreciated    Remaining abdomen soft

## 2019-03-20 NOTE — ASSESSMENT & PLAN NOTE
He seems to be doing better  I told him he may have a little bit of pain but this should gradually improve as time goes on  I told him if he continues to have 1 area of point tenderness, we could do a nerve block or trigger-point injection of that site  He will see us in a month and will go from there  Otherwise activity diet as tolerated  He uses a binder for comfort

## 2019-05-08 ENCOUNTER — OFFICE VISIT (OUTPATIENT)
Dept: SURGERY | Facility: CLINIC | Age: 59
End: 2019-05-08

## 2019-05-08 VITALS
WEIGHT: 174 LBS | SYSTOLIC BLOOD PRESSURE: 116 MMHG | HEIGHT: 70 IN | TEMPERATURE: 97.1 F | BODY MASS INDEX: 24.91 KG/M2 | DIASTOLIC BLOOD PRESSURE: 80 MMHG

## 2019-05-08 DIAGNOSIS — Z87.19 STATUS POST HERNIA REPAIR: Primary | ICD-10-CM

## 2019-05-08 DIAGNOSIS — Z98.890 STATUS POST HERNIA REPAIR: Primary | ICD-10-CM

## 2019-05-08 PROCEDURE — 99024 POSTOP FOLLOW-UP VISIT: CPT | Performed by: SURGERY

## 2019-06-18 ENCOUNTER — OFFICE VISIT (OUTPATIENT)
Dept: FAMILY MEDICINE CLINIC | Facility: CLINIC | Age: 59
End: 2019-06-18
Payer: COMMERCIAL

## 2019-06-18 VITALS
HEART RATE: 82 BPM | TEMPERATURE: 98.7 F | OXYGEN SATURATION: 96 % | SYSTOLIC BLOOD PRESSURE: 122 MMHG | WEIGHT: 174.6 LBS | BODY MASS INDEX: 25 KG/M2 | HEIGHT: 70 IN | DIASTOLIC BLOOD PRESSURE: 80 MMHG | RESPIRATION RATE: 16 BRPM

## 2019-06-18 DIAGNOSIS — N52.1 ERECTILE DYSFUNCTION DUE TO DISEASES CLASSIFIED ELSEWHERE: ICD-10-CM

## 2019-06-18 DIAGNOSIS — G89.18 POSTOPERATIVE PAIN: Primary | ICD-10-CM

## 2019-06-18 DIAGNOSIS — I10 ESSENTIAL HYPERTENSION: Primary | ICD-10-CM

## 2019-06-18 DIAGNOSIS — Z98.890 STATUS POST INGUINAL HERNIA REPAIR: ICD-10-CM

## 2019-06-18 DIAGNOSIS — I10 ESSENTIAL HYPERTENSION: ICD-10-CM

## 2019-06-18 DIAGNOSIS — Z11.59 NEED FOR HEPATITIS C SCREENING TEST: ICD-10-CM

## 2019-06-18 DIAGNOSIS — Z87.19 STATUS POST INGUINAL HERNIA REPAIR: ICD-10-CM

## 2019-06-18 PROCEDURE — 3074F SYST BP LT 130 MM HG: CPT | Performed by: FAMILY MEDICINE

## 2019-06-18 PROCEDURE — 99214 OFFICE O/P EST MOD 30 MIN: CPT | Performed by: FAMILY MEDICINE

## 2019-06-18 PROCEDURE — 3079F DIAST BP 80-89 MM HG: CPT | Performed by: FAMILY MEDICINE

## 2019-06-18 PROCEDURE — 1036F TOBACCO NON-USER: CPT | Performed by: FAMILY MEDICINE

## 2019-06-18 PROCEDURE — 3725F SCREEN DEPRESSION PERFORMED: CPT | Performed by: FAMILY MEDICINE

## 2019-06-18 PROCEDURE — 3008F BODY MASS INDEX DOCD: CPT | Performed by: FAMILY MEDICINE

## 2019-06-18 RX ORDER — LISINOPRIL 20 MG/1
TABLET ORAL
Qty: 90 TABLET | Refills: 0 | Status: SHIPPED | OUTPATIENT
Start: 2019-06-18 | End: 2019-10-01 | Stop reason: SDUPTHER

## 2019-06-18 RX ORDER — GABAPENTIN 300 MG/1
300 CAPSULE ORAL 3 TIMES DAILY
Qty: 90 CAPSULE | Refills: 1 | Status: SHIPPED | OUTPATIENT
Start: 2019-06-18 | End: 2019-10-30 | Stop reason: SDUPTHER

## 2019-06-18 RX ORDER — SILDENAFIL 100 MG/1
100 TABLET, FILM COATED ORAL DAILY PRN
Qty: 5 TABLET | Refills: 1 | Status: SHIPPED | OUTPATIENT
Start: 2019-06-18 | End: 2021-03-05 | Stop reason: SDUPTHER

## 2019-06-24 ENCOUNTER — APPOINTMENT (OUTPATIENT)
Dept: LAB | Facility: HOSPITAL | Age: 59
End: 2019-06-24
Attending: UROLOGY
Payer: COMMERCIAL

## 2019-06-24 DIAGNOSIS — N13.5 URETERAL OBSTRUCTION: ICD-10-CM

## 2019-06-24 DIAGNOSIS — N13.30 HYDRONEPHROSIS, UNSPECIFIED HYDRONEPHROSIS TYPE: ICD-10-CM

## 2019-06-24 DIAGNOSIS — Z11.59 NEED FOR HEPATITIS C SCREENING TEST: ICD-10-CM

## 2019-06-24 LAB
HCV AB SER QL: NORMAL
PSA SERPL-MCNC: 2.4 NG/ML (ref 0–4)

## 2019-06-24 PROCEDURE — G0103 PSA SCREENING: HCPCS

## 2019-06-24 PROCEDURE — 86803 HEPATITIS C AB TEST: CPT

## 2019-06-24 PROCEDURE — 36415 COLL VENOUS BLD VENIPUNCTURE: CPT

## 2019-09-20 ENCOUNTER — OFFICE VISIT (OUTPATIENT)
Dept: FAMILY MEDICINE CLINIC | Facility: CLINIC | Age: 59
End: 2019-09-20
Payer: COMMERCIAL

## 2019-09-20 VITALS
SYSTOLIC BLOOD PRESSURE: 118 MMHG | RESPIRATION RATE: 16 BRPM | HEART RATE: 92 BPM | WEIGHT: 171.4 LBS | HEIGHT: 70 IN | TEMPERATURE: 99.3 F | OXYGEN SATURATION: 95 % | DIASTOLIC BLOOD PRESSURE: 80 MMHG | BODY MASS INDEX: 24.54 KG/M2

## 2019-09-20 DIAGNOSIS — G89.18 POSTOPERATIVE PAIN: ICD-10-CM

## 2019-09-20 DIAGNOSIS — K21.9 GASTROESOPHAGEAL REFLUX DISEASE, ESOPHAGITIS PRESENCE NOT SPECIFIED: ICD-10-CM

## 2019-09-20 DIAGNOSIS — N40.1 BENIGN PROSTATIC HYPERPLASIA WITH LOWER URINARY TRACT SYMPTOMS, SYMPTOM DETAILS UNSPECIFIED: ICD-10-CM

## 2019-09-20 DIAGNOSIS — N52.9 ED (ERECTILE DYSFUNCTION) OF ORGANIC ORIGIN: ICD-10-CM

## 2019-09-20 DIAGNOSIS — Z00.00 HEALTHCARE MAINTENANCE: ICD-10-CM

## 2019-09-20 DIAGNOSIS — I10 ESSENTIAL HYPERTENSION: Primary | ICD-10-CM

## 2019-09-20 PROCEDURE — 3079F DIAST BP 80-89 MM HG: CPT | Performed by: FAMILY MEDICINE

## 2019-09-20 PROCEDURE — 3074F SYST BP LT 130 MM HG: CPT | Performed by: FAMILY MEDICINE

## 2019-09-20 PROCEDURE — 99214 OFFICE O/P EST MOD 30 MIN: CPT | Performed by: FAMILY MEDICINE

## 2019-09-20 PROCEDURE — G0439 PPPS, SUBSEQ VISIT: HCPCS | Performed by: FAMILY MEDICINE

## 2019-09-20 NOTE — PROGRESS NOTES
Assessment and Plan:     Problem List Items Addressed This Visit        Digestive    Gastroesophageal reflux disease     Controlled  Continue same  Will continue to monitor  Cardiovascular and Mediastinum    Essential hypertension - Primary     Well controlled  Continue same  Will continue to check  Relevant Orders    Comprehensive metabolic panel    TSH, 3rd generation with Free T4 reflex       Genitourinary    Benign prostatic hyperplasia     Patient complains of tamsulosin not helping  Continue to follow up with urologist           ED (erectile dysfunction) of organic origin     [address] working well  Continue same  Other    Postoperative pain     Patient discussed about the option of medical marijuana  He was told to see a physician who prescribed medical marijuana  Healthcare maintenance     Discussed diet, exercise, and immunizations  Relevant Orders    Lipid Panel with Direct LDL reflex        BMI Counseling: Body mass index is 24 59 kg/m²  The BMI is above normal  Nutrition recommendations include encouraging healthy choices of fruits and vegetables  Preventive health issues were discussed with patient, and age appropriate screening tests were ordered as noted in patient's After Visit Summary  Personalized health advice and appropriate referrals for health education or preventive services given if needed, as noted in patient's After Visit Summary       History of Present Illness:     Patient presents for Medicare Annual Wellness visit    Patient Care Team:  Epi Anthony MD as PCP - General (Family Medicine)  LINDSAY Laurent MD as Endoscopist     Problem List:     Patient Active Problem List   Diagnosis    Acromioclavicular joint arthritis    Arthralgia    Back pain    Benign prostatic hyperplasia    Chronic back pain    Chronic pain of left ankle    ED (erectile dysfunction) of organic origin    Gastroesophageal reflux disease    Headache    Knee pain    Shoulder joint pain    S/P ileostomy (HCC)    Tinea versicolor    Status post inguinal hernia repair    Need for hepatitis C screening test    Postoperative pain    Essential hypertension    Healthcare maintenance      Past Medical and Surgical History:     Past Medical History:   Diagnosis Date    Abscess, intestine     Arthritis     Diverticulitis     GERD (gastroesophageal reflux disease)     History of high blood pressure     Hydronephrosis 5/27/2018     Past Surgical History:   Procedure Laterality Date    ABCESS DRAINAGE      COLON SURGERY      COLONOSCOPY N/A 5/5/2016    Procedure: COLONOSCOPY;  Surgeon: Gaye Ruelas MD;  [facility]; Service:     COLONOSCOPY N/A 7/26/2018    Procedure: COLONOSCOPY with bx's;  Surgeon: Ashish Waiet MD;  Location: [facility]; Service: Gastroenterology    ESOPHAGOGASTRODUODENOSCOPY      with biopsy    FOOT SURGERY Left     x2? fusion? due to arthritis  onset: 0      HERNIA REPAIR      ILEO LOOP DIVERSION N/A 6/1/2018    Procedure: ILEOSTOMY LOOP DIVERTING;  Surgeon: Diamante Alvarez DO;  [facility];  Service: General    LAPAROTOMY N/A 6/1/2018    Procedure: LAPAROTOMY EXPLORATORY,;  Surgeon: Diamante Alvarez DO;  [facility];  Service: General    NH CLOSE ENTEROSTOMY N/A 8/16/2018    Procedure: CLOSURE LOOP ILEOSTOMY;  Surgeon: Bea Mcmillan MD;  [facility];  Service: General    NH CYSTOURETHROSCOPY,URETER CATHETER Left 6/9/2018    Procedure: CYSTOSCOPY RETROGRADE PYELOGRAM WITH INSERTION STENT URETERAL;  Surgeon: Wally Purdy MD;  [facility];  Service: Urology    [facility] N/A 1/31/2019    Procedure: REPAIR HERNIA INCISIONAL LAPAROSCOPIC;  Surgeon: Bea Mcmillan MD;  [facility];  Service: General    NH Emily Hartman 20 Right 1/31/2019    Procedure: REPAIR HERNIA INGUINAL, LAPAROSCOPIC; Surgeon: Rubio Cruz MD;  [facility];  Service: General    KS PART REMOVAL COLON W ANASTOMOSIS N/A 6/1/2018    Procedure: RESECTION COLON SIGMOID;  Surgeon: Philippe Urban DO;  [facility];  Service: General      Family History:     Family History   Problem Relation Age of Onset    Other Mother         head tumor    Glaucoma Father     Diabetes Father         possible diabetes    Stroke Family       Social History:     Social History     Socioeconomic History    Marital status: /Civil Union     Spouse name: None    Number of children: None    Years of education: None    Highest education level: None   Occupational History    None   Social Needs    Financial resource strain: None    Food insecurity:     Worry: None     Inability: None    Transportation needs:     Medical: None     Non-medical: None   Tobacco Use    Smoking status: Former Smoker    Smokeless tobacco: Never Used    Tobacco comment: quit > 1 year    Substance and Sexual Activity    Alcohol use: Yes     Comment: 1 drink / weekend     Drug use: No    Sexual activity: None   Lifestyle    Physical activity:     Days per week: None     Minutes per session: None    Stress: None   Relationships    Social connections:     Talks on phone: None     Gets together: None     Attends Anabaptist service: None     Active member of club or organization: None     Attends meetings of clubs or organizations: None     Relationship status: None    Intimate partner violence:     Fear of current or ex partner: None     Emotionally abused: None     Physically abused: None     Forced sexual activity: None   Other Topics Concern    None   Social History Narrative    None       Medications and Allergies:     Current Outpatient Medications   Medication Sig Dispense Refill    gabapentin (NEURONTIN) 300 mg capsule Take 1 capsule (300 mg total) by mouth 3 (three) times a day 90 capsule 1    Ibuprofen (ADVIL PO) Take by mouth as needed        lisinopril (ZESTRIL) 20 mg tablet TAKE 1 TABLET (20 MG TOTAL) BY MOUTH DAILY 90 tablet 0    sildenafil (VIAGRA) 100 mg tablet Take 1 tablet (100 mg total) by mouth daily as needed for erectile dysfunction 5 tablet 1    tamsulosin (FLOMAX) 0 4 mg Take 0 4 mg by mouth daily with dinner       No current facility-administered medications for this visit  Allergies   Allergen Reactions    Penicillins Rash     itching      Immunizations:     Immunization History   Administered Date(s) Administered    Tdap 01/27/2012      Health Maintenance:         Topic Date Due    CRC Screening: Colonoscopy  07/26/2028    Hepatitis C Screening  Completed         Topic Date Due    INFLUENZA VACCINE  07/01/2019      Medicare Health Risk Assessment:     /80 (BP Location: Left arm, Patient Position: Sitting, Cuff Size: Standard)   Pulse 92   Temp 99 3 °F (37 4 °C) (Tympanic)   Resp 16   Ht 5' 10" (1 778 m)   Wt 77 7 kg (171 lb 6 4 oz)   SpO2 95%   BMI 24 59 kg/m²      Ranjeet Salas is here for his Subsequent Wellness visit  Health Risk Assessment:   Patient rates overall health as fair  Patient feels that their physical health rating is slightly worse  Eyesight was rated as slightly worse  Hearing was rated as same  Patient feels that their emotional and mental health rating is same  Pain experienced in the last 7 days has been a lot  Patient's pain rating has been 7/10  Patient states that he has experienced no weight loss or gain in last 6 months  Depression Screening:   PHQ-2 Score: 0      Fall Risk Screening: In the past year, patient has experienced: no history of falling in past year      Home Safety:  Patient does not have trouble with stairs inside or outside of their home  Patient has working smoke alarms and has no working carbon monoxide detector  Home safety hazards include: none  Nutrition:   Current diet is Regular       Medications:   Patient is currently taking over-the-counter supplements  OTC medications include: see medication list  Patient is able to manage medications  Activities of Daily Living (ADLs)/Instrumental Activities of Daily Living (IADLs):   Walk and transfer into and out of bed and chair?: Yes  Dress and groom yourself?: Yes    Bathe or shower yourself?: Yes    Feed yourself?  Yes  Do your laundry/housekeeping?: Yes  Manage your money, pay your bills and track your expenses?: Yes  Make your own meals?: Yes    Do your own shopping?: Yes    Previous Hospitalizations:   Any hospitalizations or ED visits within the last 12 months?: Yes    How many hospitalizations have you had in the last year?: 1-2    Advance Care Planning:   Living will: No    Durable POA for healthcare: No      Cognitive Screening:   Provider or family/friend/caregiver concerned regarding cognition?: No    PREVENTIVE SCREENINGS      Cardiovascular Screening:    General: Risks and Benefits Discussed    Due for: Lipid Panel      Diabetes Screening:     General: Screening Current      Colorectal Cancer Screening:     General: Screening Current      Prostate Cancer Screening:    General: Screening Current      Osteoporosis Screening:    General: Risks and Benefits Discussed and Patient Declines      Abdominal Aortic Aneurysm (AAA) Screening:    Risk factors include: tobacco use        General: Risks and Benefits Discussed and Patient Declines      Lung Cancer Screening:     General: Screening Not Indicated      Hepatitis C Screening:    General: Screening Current      Mely Chavez MD

## 2019-09-20 NOTE — PROGRESS NOTES
Assessment/Plan:  Gastroesophageal reflux disease  Controlled  Continue same  Will continue to monitor  Essential hypertension  Well controlled  Continue same  Will continue to check  ED (erectile dysfunction) of organic origin  [address] working well  Continue same  Benign prostatic hyperplasia  Patient complains of tamsulosin not helping  Continue to follow up with urologist      Healthcare maintenance  Discussed diet, exercise, and immunizations  Postoperative pain  Patient discussed about the option of medical marijuana  He was told to see a physician who prescribed medical marijuana  Diagnoses and all orders for this visit:    Essential hypertension  -     Comprehensive metabolic panel; Future  -     TSH, 3rd generation with Free T4 reflex; Future    Gastroesophageal reflux disease, esophagitis presence not specified    ED (erectile dysfunction) of organic origin    Benign prostatic hyperplasia with lower urinary tract symptoms, symptom details unspecified    Healthcare maintenance  -     Lipid Panel with Direct LDL reflex; Future    Postoperative pain          There are no Patient Instructions on file for this visit  Return in about 6 months (around 3/20/2020)  Subjective:      Patient ID: Sly Aviles is a 63 y o  male  Chief Complaint   Patient presents with    Medicare Wellness Visit    Abdominal Pain     pull at surgical site       Kermit Quinn is a 63 y o  M with multiple medical problems presenting to the office today for his AWV as well as treatment of his post op pain  He was given gabapentin for it, but that provides no relief  He has tried Tylenol which has not worked either  The pain is constant and itchy and feels almost like his incision site is being pulled apart  He otherwise has no complaints today  He is no longer taking his gabapentin, but the rest of his medications are being tolerated well and without side effects          The following portions of the patient's history were reviewed and updated as appropriate: allergies, current medications, past family history, past medical history, past social history, past surgical history and problem list     Review of Systems   Constitutional: Negative for chills and fever  HENT: Negative for trouble swallowing  Eyes: Negative for visual disturbance  Respiratory: Negative for cough and shortness of breath  Cardiovascular: Negative for chest pain and palpitations  Gastrointestinal: Positive for abdominal pain (around surgical sites for hernia repair)  Negative for blood in stool and vomiting  Endocrine: Negative for cold intolerance and heat intolerance  Genitourinary: Negative for difficulty urinating and dysuria  Musculoskeletal: Negative for gait problem  Skin: Negative for rash  Neurological: Negative for dizziness, syncope and headaches  Hematological: Negative for adenopathy  Psychiatric/Behavioral: Negative for behavioral problems  Current Outpatient Medications   Medication Sig Dispense Refill    gabapentin (NEURONTIN) 300 mg capsule Take 1 capsule (300 mg total) by mouth 3 (three) times a day 90 capsule 1    Ibuprofen (ADVIL PO) Take by mouth as needed        lisinopril (ZESTRIL) 20 mg tablet TAKE 1 TABLET (20 MG TOTAL) BY MOUTH DAILY 90 tablet 0    sildenafil (VIAGRA) 100 mg tablet Take 1 tablet (100 mg total) by mouth daily as needed for erectile dysfunction 5 tablet 1    tamsulosin (FLOMAX) 0 4 mg Take 0 4 mg by mouth daily with dinner       No current facility-administered medications for this visit  Objective:    /80 (BP Location: Left arm, Patient Position: Sitting, Cuff Size: Standard)   Pulse 92   Temp 99 3 °F (37 4 °C) (Tympanic)   Resp 16   Ht 5' 10" (1 778 m)   Wt 77 7 kg (171 lb 6 4 oz)   SpO2 95%   BMI 24 59 kg/m²        Physical Exam   Constitutional: He is oriented to person, place, and time  He appears well-developed and well-nourished  HENT:   Head: Normocephalic and atraumatic  Eyes: Pupils are equal, round, and reactive to light  EOM are normal    Neck: Normal range of motion  Neck supple  Cardiovascular: Normal rate, regular rhythm and normal heart sounds  Exam reveals no gallop  No murmur heard  Pulmonary/Chest: Effort normal and breath sounds normal  No respiratory distress  He has no wheezes  He has no rhonchi  He has no rales  Abdominal: Soft  Bowel sounds are normal  He exhibits no distension  There is tenderness (In RLQ  tender all the time and to palpation  ) in the right lower quadrant  Musculoskeletal: Normal range of motion  He exhibits no edema  Lymphadenopathy:     He has no cervical adenopathy  Neurological: He is alert and oriented to person, place, and time  No cranial nerve deficit  Skin: Skin is warm and dry  Capillary refill takes less than 2 seconds  No rash noted  Psychiatric: He has a normal mood and affect  His behavior is normal    Nursing note and vitals reviewed               Nayeli Phillips MD

## 2019-09-20 NOTE — ASSESSMENT & PLAN NOTE
Patient discussed about the option of medical marijuana  He was told to see a physician who prescribed medical marijuana

## 2019-10-01 DIAGNOSIS — I10 ESSENTIAL HYPERTENSION: ICD-10-CM

## 2019-10-01 RX ORDER — LISINOPRIL 20 MG/1
TABLET ORAL
Qty: 90 TABLET | Refills: 0 | Status: SHIPPED | OUTPATIENT
Start: 2019-10-01 | End: 2019-11-08 | Stop reason: SDUPTHER

## 2019-10-07 NOTE — PROGRESS NOTES
OFFICE VISIT      Patient: Danny Shipley Date of Service: 10/9/2019   : 2000 MRN: [id number]     SUBJECTIVE:   HISTORY OF PRESENT ILLNESS:  Danny Shipley is a 19 year old female who presents today for   Chief Complaint   Patient presents with   • Sore Throat      x 4-5 days OTC Mucinex   • Ear Problem      x4-5 days b/l ear   • Headache      x4-5 days   • Nose Problem      x4-5 days OTC Mucinex   • Dizziness     x 4-5 days       Pt presents to clinic not feeling well.  She has a bad sore throat.  Low grade fevers 99.4.  She has nasal congestion.  She has pounding sensation in the ears.  Left ear popping and right ear achy.  She is getting headaches, dizziness.  She has body aches and chills.  She has a cough that can be productive.  She took Mucinex a few days.    Recently reinjured right ankle.  Has history of recurrent ankle sprains.  Would like to return to physical therapy for ankle strengthening.  She reports she is working on weight loss.      PAST MEDICAL HISTORY:  No past medical history on file.    MEDICATIONS:  Current Outpatient Medications   Medication Sig   • amoxicillin (AMOXIL) 875 MG tablet Take 1 tablet by mouth 2 times daily for 10 days.     No current facility-administered medications for this visit.        ALLERGIES:  ALLERGIES:  No Known Allergies    PAST SURGICAL HISTORY:  No past surgical history on file.    FAMILY HISTORY:  No family history on file.    SOCIAL HISTORY:  Social History     Tobacco Use   • Smoking status: Never Smoker   • Smokeless tobacco: Never Used   Substance Use Topics   • Alcohol use: No     Frequency: Never   • Drug use: No       Review of Systems      OBJECTIVE:     Visit Vitals  /58   Pulse 82   Temp 99.4 °F (37.4 °C) (Tympanic)   Resp 16   Ht 5' 7.5\" (1.715 m)   Wt (!) 112.1 kg (247 lb 0.4 oz)   LMP 2019   BMI 38.12 kg/m²       Physical Exam   Constitutional: She appears well-developed and well-nourished.   HENT:   Head: Normocephalic and atraumatic.  Patient care rounds were completed with the patient's nurse today, Biju Ch  We discussed the plan is to continue clear liquid diet as tolerated, but patient encouraged to limit oral intake if he continues to have abdominal distention and belching  He does have some function including both gas and stool from his ostomy  Continue JENN drain for now  Closely follow urine output and continue to monitor BMP with noted increase in the creatinine today following removal of urinary catheter and ureteral stents on 06/03/2018  If urine output not adequate, repeat BMP later today  Continue PCA until able to transition to oral analgesic regimen  We reviewed all of the invasive devices/lines/telemetry orders   - None  Pain Assessment / Plan:  - Continue PCA with plan to transition to oral analgesic regimen once tolerating oral diet  Mobility Assessment / Plan:  - Activity as tolerated  Goals / Barriers for discharge:  - Awaiting return of normal bowel function with resolution of ileus and toleration of oral diet  - Case management following; case and discharge needs discussed  All questions and concerns were addressed  I spent greater than 20 minutes reviewing the plan with the patient and the nurse, and coordinating his care for the day      Berkley Wright PA-C  6/4/2018 11:06 AM   Right Ear: No lacerations. No drainage, swelling or tenderness. No foreign bodies. No mastoid tenderness. Tympanic membrane is injected, erythematous and bulging. Tympanic membrane is not scarred, not perforated and not retracted. No middle ear effusion. No hemotympanum.   Left Ear: Tympanic membrane, external ear and ear canal normal.   Nose: Mucosal edema and rhinorrhea present.   Mouth/Throat: Uvula is midline and oropharynx is clear and moist.   Neck: Trachea normal. No thyromegaly present.   Cardiovascular: Normal rate and regular rhythm.   No murmur heard.  Pulmonary/Chest: Effort normal and breath sounds normal. Musculoskeletal:      Right ankle: She exhibits normal range of motion, no swelling, no ecchymosis, no deformity, no laceration and normal pulse. Tenderness. Lateral malleolus and AITFL tenderness found.         DIAGNOSTIC STUDIES:   LAB RESULTS:    Lab Results Reviewed    Office Visit on 10/07/2019   Component Date Value Ref Range Status   • GRP A STREP 10/07/2019 Negative  Negative Final   • Internal Procedural Controls Accep* 10/07/2019 Yes   Final       Assessment AND PLAN:   This is a 19 year old year-old female who presents with   Chief Complaint   Patient presents with   • Sore Throat      x 4-5 days OTC Mucinex   • Ear Problem      x4-5 days b/l ear   • Headache      x4-5 days   • Nose Problem      x4-5 days OTC Mucinex   • Dizziness     x 4-5 days   .      Danny was seen today for sore throat, ear problem, headache, nose problem and dizziness.    Diagnoses and all orders for this visit:    Right otitis media, unspecified otitis media type  -     amoxicillin (AMOXIL) 875 MG tablet; Take 1 tablet by mouth 2 times daily for 10 days.    Sore throat  -     POCT RAPID STREP A    Sprain of right ankle, unspecified ligament, subsequent encounter  -     SERVICE TO PHYSICAL THERAPY    Right otitis media-   rapid strep is negative.  Start amoxicillin 875 1 p.o. twice daily for 10 days.  Take Motrin  for ear pain.  Push fluids and get rest.    Recurrent right ankle sprain-referral to physical therapy    Overweight-recommend patient count calories and do 150 minutes of exercise weekly      Follow-up after physical therapy if needed      Instructions provided as documented in the AVS.          The patient indicated understanding of the diagnosis and agreed with the plan of care.      Polly Welch PA-C

## 2019-10-09 ENCOUNTER — OFFICE VISIT (OUTPATIENT)
Dept: SURGERY | Facility: CLINIC | Age: 59
End: 2019-10-09
Payer: COMMERCIAL

## 2019-10-09 VITALS
BODY MASS INDEX: 25.2 KG/M2 | HEART RATE: 72 BPM | DIASTOLIC BLOOD PRESSURE: 84 MMHG | HEIGHT: 70 IN | SYSTOLIC BLOOD PRESSURE: 140 MMHG | TEMPERATURE: 97.7 F | WEIGHT: 176 LBS

## 2019-10-09 DIAGNOSIS — G89.18 POSTOPERATIVE PAIN: Primary | ICD-10-CM

## 2019-10-09 PROCEDURE — 99212 OFFICE O/P EST SF 10 MIN: CPT | Performed by: SURGERY

## 2019-10-09 NOTE — PROGRESS NOTES
Office Visit - General Surgery  Cindy Jones MRN: [id number]  Encounter: [id number]    Assessment and Plan    Problem List Items Addressed This Visit        Other    Postoperative pain - Primary     Will order CT scan to evaluate for recurrent hernia and other causes for right lower quadrant pain  Will call with results         Relevant Orders    CT abdomen pelvis w contrast          Chief Complaint:  Cindy Jones is a 63 y o  male who presents for Follow-up (C/o still having pain at closure site of colostomy )    Subjective  63year old male s/p ileostomy reversal and hernia repair who complains of pain and swelling at the right lower quadrant incision  Sharp pain immediately below incision    Past Medical History  Past Medical History:   Diagnosis Date    Abscess, intestine     Arthritis     Diverticulitis     GERD (gastroesophageal reflux disease)     History of high blood pressure     Hydronephrosis 5/27/2018       Past Surgical History  Past Surgical History:   Procedure Laterality Date    ABCESS DRAINAGE      COLON SURGERY      COLONOSCOPY N/A 5/5/2016    Procedure: COLONOSCOPY;  Surgeon: Kellie White MD;  Location: [facility]; Service:     COLONOSCOPY N/A 7/26/2018    Procedure: COLONOSCOPY with bx's;  Surgeon: Lord Brian MD;  [facility]; Service: Gastroenterology    ESOPHAGOGASTRODUODENOSCOPY      with biopsy    FOOT SURGERY Left     x2? fusion? due to arthritis  onset: 0      HERNIA REPAIR      ILEO LOOP DIVERSION N/A 6/1/2018    Procedure: ILEOSTOMY LOOP DIVERTING;  Surgeon: Raz Robertson DO;  [facility];  Service: General    LAPAROTOMY N/A 6/1/2018    Procedure: LAPAROTOMY EXPLORATORY,;  Surgeon: Raz Robertson DO;  [facility];  Service: General    AK CLOSE ENTEROSTOMY N/A 8/16/2018    Procedure: CLOSURE LOOP ILEOSTOMY;  Surgeon: Jose Enrique Gavin MD;  [facility];  Service: General    AK CYSTOURETHROSCOPY,URETER CATHETER Left 6/9/2018    Procedure: CYSTOSCOPY RETROGRADE PYELOGRAM WITH INSERTION STENT URETERAL;  Surgeon: Lonnie Smith MD;  [facility];  Service: Urology    [place] [facility] N/A 1/31/2019    Procedure: REPAIR HERNIA INCISIONAL LAPAROSCOPIC;  Surgeon: Maame Marrero MD;  [facility];  Service: General    WI Emily Hartman 19 Right 1/31/2019    Procedure: REPAIR HERNIA Bandar Deist;  Surgeon: Maame Marrero MD;  [facility];  Service: General    WI PART REMOVAL COLON W ANASTOMOSIS N/A 6/1/2018    Procedure: RESECTION COLON SIGMOID;  Surgeon: Gracie Stone DO;  [facility];  Service: General       Family History  Family History   Problem Relation Age of Onset    Other Mother         head tumor    Glaucoma Father     Diabetes Father         possible diabetes    Stroke Family        Social History  Social History     Socioeconomic History    Marital status: /Civil Union     Spouse name: None    Number of children: None    Years of education: None    Highest education level: None   Occupational History    None   Social Needs    Financial resource strain: None    Food insecurity:     Worry: None     Inability: None    Transportation needs:     Medical: None     Non-medical: None   Tobacco Use    Smoking status: Former Smoker    Smokeless tobacco: Never Used    Tobacco comment: quit > 1 year    Substance and Sexual Activity    Alcohol use: Yes     Comment: 1 drink / weekend     Drug use: No    Sexual activity: None   Lifestyle    Physical activity:     Days per week: None     Minutes per session: None    Stress: None   Relationships    Social connections:     Talks on phone: None     Gets together: None     Attends Alevism service: None     Active member of club or organization: None     Attends meetings of clubs or organizations: None     Relationship status: None    Intimate partner violence:     Fear of current or ex partner: None     Emotionally abused: None     Physically abused: None     Forced sexual activity: None   Other Topics Concern    None   Social History Narrative    None        Medications  Current Outpatient Medications on File Prior to Visit   Medication Sig Dispense Refill    gabapentin (NEURONTIN) 300 mg capsule Take 1 capsule (300 mg total) by mouth 3 (three) times a day 90 capsule 1    Ibuprofen (ADVIL PO) Take by mouth as needed        lisinopril (ZESTRIL) 20 mg tablet take 1 tablet (20 MG TOTAL) by mouth once daily 90 tablet 0    sildenafil (VIAGRA) 100 mg tablet Take 1 tablet (100 mg total) by mouth daily as needed for erectile dysfunction 5 tablet 1    tamsulosin (FLOMAX) 0 4 mg Take 0 4 mg by mouth daily with dinner       No current facility-administered medications on file prior to visit  Allergies  Allergies   Allergen Reactions    Penicillins Rash     itching       Review of Systems    Objective  Vitals:    10/09/19 0804   BP: 140/84   Pulse: 72   Temp: 97 7 °F (36 5 °C)       Physical Exam   Abdomen: lower midline incision and right lower quadrant incision well healed  Fullness right lower quadrant compared to left  Tender below right lower quadrant incision  No hernia appreciated

## 2019-10-09 NOTE — ASSESSMENT & PLAN NOTE
Will order CT scan to evaluate for recurrent hernia and other causes for right lower quadrant pain    Will call with results

## 2019-10-23 ENCOUNTER — HOSPITAL ENCOUNTER (OUTPATIENT)
Dept: RADIOLOGY | Facility: HOSPITAL | Age: 59
Discharge: HOME | End: 2019-10-23
Attending: SURGERY
Payer: COMMERCIAL

## 2019-10-23 ENCOUNTER — TRANSCRIBE ORDERS (OUTPATIENT)
Dept: RADIOLOGY | Facility: HOSPITAL | Age: 59
End: 2019-10-23

## 2019-10-23 DIAGNOSIS — G89.18 POSTOPERATIVE PAIN: ICD-10-CM

## 2019-10-23 PROCEDURE — 74177 CT ABD & PELVIS W/CONTRAST: CPT

## 2019-10-23 RX ADMIN — IOHEXOL 100 ML: 350 INJECTION, SOLUTION INTRAVENOUS at 07:20

## 2019-10-25 ENCOUNTER — TELEPHONE (OUTPATIENT)
Dept: SURGERY | Facility: CLINIC | Age: 59
End: 2019-10-25

## 2019-10-30 ENCOUNTER — TELEPHONE (OUTPATIENT)
Dept: SURGERY | Facility: CLINIC | Age: 59
End: 2019-10-30

## 2019-10-30 DIAGNOSIS — G89.18 POSTOPERATIVE PAIN: ICD-10-CM

## 2019-10-30 RX ORDER — GABAPENTIN 300 MG/1
300 CAPSULE ORAL 3 TIMES DAILY
Qty: 90 CAPSULE | Refills: 1 | Status: SHIPPED | OUTPATIENT
Start: 2019-10-30

## 2019-10-30 NOTE — TELEPHONE ENCOUNTER
I explained to him that there is no hernia in the site of the previous the ileostomy as the cause of his pain  I did tell him there is a small umbilical hernia neck could be taking care of at some point in the future  As far as the pain a told him I do not have anything to do surgery  I told him we could try some medicines such as gabapentin to see if that helps  He would be agreeable to that  We will send the prescription for that

## 2019-11-08 DIAGNOSIS — I10 ESSENTIAL HYPERTENSION: ICD-10-CM

## 2019-11-10 RX ORDER — LISINOPRIL 20 MG/1
20 TABLET ORAL DAILY
Qty: 90 TABLET | Refills: 0 | Status: SHIPPED | OUTPATIENT
Start: 2019-11-10 | End: 2019-11-12 | Stop reason: SDUPTHER

## 2019-11-12 DIAGNOSIS — I10 ESSENTIAL HYPERTENSION: ICD-10-CM

## 2019-11-13 RX ORDER — LISINOPRIL 20 MG/1
20 TABLET ORAL DAILY
Qty: 90 TABLET | Refills: 0 | Status: SHIPPED | OUTPATIENT
Start: 2019-11-13 | End: 2020-11-24 | Stop reason: SDUPTHER

## 2019-11-24 DIAGNOSIS — N40.1 BENIGN LOCALIZED PROSTATIC HYPERPLASIA WITH LOWER URINARY TRACT SYMPTOMS (LUTS): Primary | ICD-10-CM

## 2019-11-25 RX ORDER — TAMSULOSIN HYDROCHLORIDE 0.4 MG/1
CAPSULE ORAL
Qty: 30 CAPSULE | Refills: 11 | Status: SHIPPED | OUTPATIENT
Start: 2019-11-25 | End: 2019-12-03 | Stop reason: SDUPTHER

## 2019-12-02 ENCOUNTER — TELEPHONE (OUTPATIENT)
Dept: FAMILY MEDICINE CLINIC | Facility: CLINIC | Age: 59
End: 2019-12-02

## 2019-12-02 DIAGNOSIS — N40.1 BENIGN PROSTATIC HYPERPLASIA WITH LOWER URINARY TRACT SYMPTOMS, SYMPTOM DETAILS UNSPECIFIED: Primary | ICD-10-CM

## 2019-12-02 NOTE — TELEPHONE ENCOUNTER
Call from [facility], urology    Patient has an appt and there is no referral in Epic? ?     Also asked for insurance referral but he has gateway medicare assured so does not need one    [phone]

## 2019-12-03 ENCOUNTER — OFFICE VISIT (OUTPATIENT)
Dept: MULTI SPECIALTY CLINIC | Facility: CLINIC | Age: 59
End: 2019-12-03

## 2019-12-03 ENCOUNTER — OFFICE VISIT (OUTPATIENT)
Dept: FAMILY MEDICINE CLINIC | Facility: CLINIC | Age: 59
End: 2019-12-03
Payer: COMMERCIAL

## 2019-12-03 VITALS
DIASTOLIC BLOOD PRESSURE: 84 MMHG | HEART RATE: 64 BPM | SYSTOLIC BLOOD PRESSURE: 122 MMHG | BODY MASS INDEX: 25.88 KG/M2 | WEIGHT: 180.78 LBS | TEMPERATURE: 97.3 F | HEIGHT: 70 IN

## 2019-12-03 VITALS
HEART RATE: 67 BPM | WEIGHT: 182 LBS | BODY MASS INDEX: 26.05 KG/M2 | SYSTOLIC BLOOD PRESSURE: 120 MMHG | DIASTOLIC BLOOD PRESSURE: 84 MMHG | HEIGHT: 70 IN | TEMPERATURE: 97.8 F | RESPIRATION RATE: 16 BRPM | OXYGEN SATURATION: 97 %

## 2019-12-03 DIAGNOSIS — N40.1 BENIGN LOCALIZED PROSTATIC HYPERPLASIA WITH LOWER URINARY TRACT SYMPTOMS (LUTS): ICD-10-CM

## 2019-12-03 DIAGNOSIS — R35.0 URINARY FREQUENCY: Primary | ICD-10-CM

## 2019-12-03 DIAGNOSIS — Z93.2 S/P ILEOSTOMY (HCC): ICD-10-CM

## 2019-12-03 DIAGNOSIS — G89.18 POSTOPERATIVE PAIN: Primary | ICD-10-CM

## 2019-12-03 DIAGNOSIS — R91.1 LUNG NODULE: ICD-10-CM

## 2019-12-03 PROCEDURE — 99214 OFFICE O/P EST MOD 30 MIN: CPT | Performed by: FAMILY MEDICINE

## 2019-12-03 PROCEDURE — 99214 OFFICE O/P EST MOD 30 MIN: CPT | Performed by: UROLOGY

## 2019-12-03 PROCEDURE — 1036F TOBACCO NON-USER: CPT | Performed by: FAMILY MEDICINE

## 2019-12-03 RX ORDER — DULOXETIN HYDROCHLORIDE 30 MG/1
30 CAPSULE, DELAYED RELEASE ORAL DAILY
Qty: 30 CAPSULE | Refills: 5 | Status: SHIPPED | OUTPATIENT
Start: 2019-12-03

## 2019-12-03 RX ORDER — TAMSULOSIN HYDROCHLORIDE 0.4 MG/1
0.4 CAPSULE ORAL 2 TIMES DAILY
Qty: 60 CAPSULE | Refills: 11 | Status: SHIPPED | OUTPATIENT
Start: 2019-12-03 | End: 2020-12-24

## 2019-12-03 RX ORDER — LEVOFLOXACIN 5 MG/ML
750 INJECTION, SOLUTION INTRAVENOUS ONCE
Status: CANCELLED | OUTPATIENT
Start: 2019-12-03 | End: 2019-12-03

## 2019-12-03 NOTE — PROGRESS NOTES
[address] [facility]  [facility]  [address] [facility]  [place], [address] [place] [address]  [phone]  www  [facility]  org      NAME: Arley Bunn  AGE: 63 y o  SEX: male  : 1960   MRN: [id number]    DATE: 12/3/2019  TIME: 7:40 AM    Assessment and Plan:  BPH with obstruction, dependent upon Flomax  I think he needs cystoscopy and he wishes to have this done in the operating room which we will do  He does not wish to have TURP at the time of cystoscopy but he wishes to see what we find and then make a decision and make another appointment depending on what we find with the cystoscopy  Risks of bleeding infection explained  Chief Complaint     Chief Complaint   Patient presents with    Follow-up     kidney stone       History of Present Illness   71-year-old man with history of BPH with obstruction, and 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 for which he underwent retrograde pyelography and stent placement by Dr Stephanie Bonds in 2018  He did not have a stone at the time  Complains of pressure and discomfort in his suprapubic region and difficulty urinating  He has been out of the medication for the past 5 days  Void 7 times per day  CT scan 10/23/2019 showed normal kidneys, prostate was mildly enlarged  Unremarkable bladder  I personally reviewed his CT scan  He has no stones  PSA 2019 was normal at 2 4, which was unchanged from 2016  He gets up at least 4-5 times per night, and urinates at least 10 times per day  When he is off Flomax he has great difficulty voiding      The following portions of the patient's history were reviewed and updated as appropriate: allergies, current medications, past family history, past medical history, past social history, past surgical history and problem list   Past Medical History:   Diagnosis Date    Abscess, intestine     Arthritis     Diverticulitis     GERD (gastroesophageal reflux disease)     History of high blood pressure     Hydronephrosis 5/27/2018     Past Surgical History:   Procedure Laterality Date    ABCESS DRAINAGE      COLON SURGERY      COLONOSCOPY N/A 5/5/2016    Procedure: COLONOSCOPY;  Surgeon: Nidhi Gonzalez MD;  Location: [facility] GI LAB; Service:     COLONOSCOPY N/A 7/26/2018    Procedure: COLONOSCOPY with bx's;  Surgeon: Humberto Rivera MD;  [facility]; Service: Gastroenterology    ESOPHAGOGASTRODUODENOSCOPY      with biopsy    FOOT SURGERY Left     x2? fusion? due to arthritis  onset: 0   HERNIA REPAIR      ILEO LOOP DIVERSION N/A 6/1/2018    Procedure: ILEOSTOMY LOOP DIVERTING;  Surgeon: Sharri Nguyen DO;  [facility];  Service: General    LAPAROTOMY N/A 6/1/2018    Procedure: LAPAROTOMY EXPLORATORY,;  Surgeon: Sharri Nguyen DO;  [facility];  Service: General    IN CLOSE ENTEROSTOMY N/A 8/16/2018    Procedure: CLOSURE LOOP ILEOSTOMY;  Surgeon: Lord John MD;  [facility];  Service: General    IN CYSTOURETHROSCOPY,URETER CATHETER Left 6/9/2018    Procedure: CYSTOSCOPY RETROGRADE PYELOGRAM WITH INSERTION STENT URETERAL;  Surgeon: Skye Talbert MD;  [facility];  Service: Urology    IN [facility] N/A 1/31/2019    Procedure: REPAIR HERNIA INCISIONAL LAPAROSCOPIC;  Surgeon: Lord John MD;  [facility];  Service: General    IN Shonda Marvin Right 1/31/2019    Procedure: REPAIR HERNIA INGUINAL, LAPAROSCOPIC;  Surgeon: Lord John MD;  [facility];  Service: General    IN PART REMOVAL COLON W ANASTOMOSIS N/A 6/1/2018    Procedure: RESECTION COLON SIGMOID;  Surgeon: Sharri Nguyen DO;  [facility];  Service: General     shoulder  Review of Systems   Review of Systems   Gastrointestinal: Negative  Genitourinary: Positive for difficulty urinating, frequency and urgency         Active Problem List     Patient Active Problem List   Diagnosis    Acromioclavicular joint arthritis    Arthralgia    Back pain    Benign prostatic hyperplasia    Chronic back pain    Chronic pain of left ankle    ED (erectile dysfunction) of organic origin    Gastroesophageal reflux disease    Headache    Knee pain    Shoulder joint pain    S/P ileostomy (HCC)    Tinea versicolor    Status post inguinal hernia repair    Need for hepatitis C screening test    Postoperative pain    Essential hypertension    Healthcare maintenance       Objective   /84 (BP Location: Right arm, Patient Position: Sitting, Cuff Size: Adult)   Pulse 64   Temp (!) 97 3 °F (36 3 °C) (Oral)   Ht 5' 10" (1 778 m)   Wt 82 kg (180 lb 12 4 oz)   BMI 25 94 kg/m²     Physical Exam   Constitutional: He is oriented to person, place, and time  He appears well-developed and well-nourished  HENT:   Head: Normocephalic and atraumatic  Eyes: EOM are normal    Neck: Normal range of motion  Cardiovascular: Normal rate, regular rhythm and normal heart sounds  Pulmonary/Chest: Effort normal and breath sounds normal  No stridor  No respiratory distress  He has no wheezes  Abdominal: Soft  Genitourinary: Rectum normal, prostate normal and penis normal    Genitourinary Comments: Normal uncircumcised phallus, testicles descended bilaterally and within normal limits  Rectal exam reveals normal tone no masses and his prostate is about 40-50 g, smooth symmetric and benign  No nodules  Musculoskeletal: Normal range of motion  Neurological: He is alert and oriented to person, place, and time  Skin: Skin is warm and dry  Psychiatric: He has a normal mood and affect   His behavior is normal  Judgment and thought content normal            Current Medications     Current Outpatient Medications:     gabapentin (NEURONTIN) 300 mg capsule, Take 1 capsule (300 mg total) by mouth 3 (three) times a day, Disp: 90 capsule, Rfl: 1    Ibuprofen (ADVIL PO), Take by mouth as needed  , Disp: , Rfl:     lisinopril (ZESTRIL) 20 mg tablet, Take 1 tablet (20 mg total) by mouth daily, Disp: 90 tablet, Rfl: 0    tamsulosin (FLOMAX) 0 4 mg, TAKE 1 CAPSULE (0 4 MG TOTAL) BY MOUTH DAILY WITH DINNER FOR 30 DAYS , Disp: 30 capsule, Rfl: 11    sildenafil (VIAGRA) 100 mg tablet, Take 1 tablet (100 mg total) by mouth daily as needed for erectile dysfunction (Patient not taking: Reported on 12/3/2019), Disp: 5 tablet, Rfl: 1        Leland Cardenas MD

## 2019-12-03 NOTE — PROGRESS NOTES
Assessment/Plan:       Diagnoses and all orders for this visit:    Postoperative pain  Comments:  RLQ after ileostomy reversal and hernia repair  Gabapentin providing no relief  I am going to start him on Cymbalta 30 mg  I will consider increase to 60  Orders:  -     DULoxetine (CYMBALTA) 30 mg delayed release capsule; Take 1 capsule (30 mg total) by mouth daily    S/P ileostomy ([address] )  Comments:  Continue with abdominal pain most probably due to adhesion  Lung nodule  Comments:  I will check lung CT for further eval   Orders:  -     CT chest wo contrast; Future          Subjective:      Patient ID: Nikunj Bernal is a 63 y o  male  Seen today for continued post-operative abdominal pain in his RLQ  He had surgery for ileostomy reversal and hernia repair in January and since has had constant, shooting RLQ pain  The pain is with palpation or pressure and has prevented him from being able to live a normal active life  He reports no mass or direct injury to the area  He has tried treating the pain with gabapentin but has had no relief  He has not done anything else to try to combat the pain but is extremely frustrated that it is ongoing and that he cannot find relief  He denies any fevers, chills, chest pain, palpitations, abdominal distention, bruising or bleeding, blood in his stools or changes in bowel habits  CT scan in October showed post-operative changes in the RLQ but no evidence of ongoing hernia  Incidental finding of left lung nodule was instructed to be followed by CT of his chest       The following portions of the patient's history were reviewed and updated as appropriate: allergies, current medications, past family history, past medical history, past social history, past surgical history and problem list       Review of Systems   Constitutional: Negative for chills, diaphoresis, fatigue and fever  HENT: Negative  Negative for trouble swallowing      Eyes: Negative for pain and visual disturbance  Respiratory: Negative for cough, choking and shortness of breath  Cardiovascular: Negative for chest pain and palpitations  Gastrointestinal: Positive for abdominal pain (RLQ pain)  Negative for abdominal distention, blood in stool, constipation, diarrhea, nausea and vomiting  Endocrine: Negative  Negative for cold intolerance and heat intolerance  Genitourinary: Positive for difficulty urinating (Trouble urinating without medication)  Negative for dysuria, enuresis, frequency, hematuria and urgency  Musculoskeletal: Positive for arthralgias (Ongoing arthritis in left ankle)  Negative for back pain, gait problem and joint swelling  Skin: Negative for color change and rash  Neurological: Positive for weakness  Negative for dizziness, syncope, light-headedness, numbness and headaches  Hematological: Negative for adenopathy  Does not bruise/bleed easily  Psychiatric/Behavioral: Negative for behavioral problems and dysphoric mood  The patient is not nervous/anxious  Objective:      /84 (BP Location: Left arm, Patient Position: Sitting, Cuff Size: Large)   Pulse 67   Temp 97 8 °F (36 6 °C) (Tympanic)   Resp 16   Ht 5' 10" (1 778 m)   Wt 82 6 kg (182 lb)   SpO2 97%   BMI 26 11 kg/m²          Physical Exam   Constitutional: He is oriented to person, place, and time  He appears well-developed and well-nourished  Non-toxic appearance  He does not appear ill  No distress  HENT:   Head: Normocephalic and atraumatic  Mouth/Throat: Oropharynx is clear and moist    Cardiovascular: Normal rate, regular rhythm, normal heart sounds and intact distal pulses  Pulmonary/Chest: Effort normal and breath sounds normal  No respiratory distress  He has no wheezes  Abdominal: Soft  Normal appearance  He exhibits no distension and no mass  Bowel sounds are absent  There is tenderness in the right lower quadrant     Neurological: He is alert and oriented to person, place, and time    Skin: Skin is warm and dry  Psychiatric: He has a normal mood and affect  His behavior is normal    Nursing note and vitals reviewed

## 2019-12-03 NOTE — H&P
[address] [facility]  [facility]  [address]  [place], [address] [place] [address]  [phone]  www  [facility]  org      NAME: Mai Bunn  AGE: 63 y o  SEX: male  : 1960   MRN: [id number]    DATE: 12/3/2019  TIME: 7:40 AM    Assessment and Plan:  BPH with obstruction, dependent upon Flomax  I think he needs cystoscopy and he wishes to have this done in the operating room which we will do  He does not wish to have TURP at the time of cystoscopy but he wishes to see what we find and then make a decision and make another appointment depending on what we find with the cystoscopy  Risks of bleeding infection explained  Chief Complaint     Chief Complaint   Patient presents with    Follow-up     kidney stone       History of Present Illness   15-year-old man with history of BPH with obstruction, and 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 for which he underwent retrograde pyelography and stent placement by Dr Shiela Carvajal in 2018  He did not have a stone at the time  Complains of pressure and discomfort in his suprapubic region and difficulty urinating  He has been out of the medication for the past 5 days  Void 7 times per day  CT scan 10/23/2019 showed normal kidneys, prostate was mildly enlarged  Unremarkable bladder  I personally reviewed his CT scan  He has no stones  PSA 2019 was normal at 2 4, which was unchanged from 2016  He gets up at least 4-5 times per night, and urinates at least 10 times per day  When he is off Flomax he has great difficulty voiding      The following portions of the patient's history were reviewed and updated as appropriate: allergies, current medications, past family history, past medical history, past social history, past surgical history and problem list   Past Medical History:   Diagnosis Date    Abscess, intestine     Arthritis     Diverticulitis     GERD (gastroesophageal reflux disease)     History of high blood pressure     Hydronephrosis 5/27/2018     Past Surgical History:   Procedure Laterality Date    ABCESS DRAINAGE      COLON SURGERY      COLONOSCOPY N/A 5/5/2016    Procedure: COLONOSCOPY;  Surgeon: Oral Rae MD;  Location: [facility] GI LAB; Service:     COLONOSCOPY N/A 7/26/2018    Procedure: COLONOSCOPY with bx's;  Surgeon: Flor Becker MD;  [facility]; Service: Gastroenterology    ESOPHAGOGASTRODUODENOSCOPY      with biopsy    FOOT SURGERY Left     x2? fusion? due to arthritis  onset: 0   HERNIA REPAIR      ILEO LOOP DIVERSION N/A 6/1/2018    Procedure: ILEOSTOMY LOOP DIVERTING;  Surgeon: Betsey Jean Baptiste DO;  [facility];  Service: General    LAPAROTOMY N/A 6/1/2018    Procedure: LAPAROTOMY EXPLORATORY,;  Surgeon: Betsey Jean Baptiste DO;  [facility];  Service: General    MO CLOSE ENTEROSTOMY N/A 8/16/2018    Procedure: CLOSURE LOOP ILEOSTOMY;  Surgeon: Merlinda Garin, MD;  [facility];  Service: General    MO CYSTOURETHROSCOPY,URETER CATHETER Left 6/9/2018    Procedure: CYSTOSCOPY RETROGRADE PYELOGRAM WITH INSERTION STENT URETERAL;  Surgeon: Sylvia Cobb MD;  [facility];  Service: Urology    MO [facility] N/A 1/31/2019    Procedure: REPAIR HERNIA INCISIONAL LAPAROSCOPIC;  Surgeon: Merlinda Garin, MD;  [facility];  Service: General    MO Emily Hartman 19 Right 1/31/2019    Procedure: REPAIR HERNIA INGUINAL, LAPAROSCOPIC;  Surgeon: Merlinda Garin, MD;  [facility];  Service: General    MO PART REMOVAL COLON W ANASTOMOSIS N/A 6/1/2018    Procedure: RESECTION COLON SIGMOID;  Surgeon: Betsey Jean Baptiste DO;  [facility];  Service: General     shoulder  Review of Systems   Review of Systems   Gastrointestinal: Negative  Genitourinary: Positive for difficulty urinating, frequency and urgency         Active Problem List     Patient Active Problem List   Diagnosis    Acromioclavicular joint arthritis    Arthralgia    Back pain    Benign prostatic hyperplasia    Chronic back pain    Chronic pain of left ankle    ED (erectile dysfunction) of organic origin    Gastroesophageal reflux disease    Headache    Knee pain    Shoulder joint pain    S/P ileostomy (HCC)    Tinea versicolor    Status post inguinal hernia repair    Need for hepatitis C screening test    Postoperative pain    Essential hypertension    Healthcare maintenance       Objective   /84 (BP Location: Right arm, Patient Position: Sitting, Cuff Size: Adult)   Pulse 64   Temp (!) 97 3 °F (36 3 °C) (Oral)   Ht 5' 10" (1 778 m)   Wt 82 kg (180 lb 12 4 oz)   BMI 25 94 kg/m²      Physical Exam   Constitutional: He is oriented to person, place, and time  He appears well-developed and well-nourished  HENT:   Head: Normocephalic and atraumatic  Eyes: EOM are normal    Neck: Normal range of motion  Cardiovascular: Normal rate, regular rhythm and normal heart sounds  Pulmonary/Chest: Effort normal and breath sounds normal  No stridor  No respiratory distress  He has no wheezes  Abdominal: Soft  Genitourinary: Rectum normal, prostate normal and penis normal    Genitourinary Comments: Normal uncircumcised phallus, testicles descended bilaterally and within normal limits  Rectal exam reveals normal tone no masses and his prostate is about 40-50 g, smooth symmetric and benign  No nodules  Musculoskeletal: Normal range of motion  Neurological: He is alert and oriented to person, place, and time  Skin: Skin is warm and dry  Psychiatric: He has a normal mood and affect   His behavior is normal  Judgment and thought content normal            Current Medications     Current Outpatient Medications:     gabapentin (NEURONTIN) 300 mg capsule, Take 1 capsule (300 mg total) by mouth 3 (three) times a day, Disp: 90 capsule, Rfl: 1    Ibuprofen (ADVIL PO), Take by mouth as needed  , Disp: , Rfl:     lisinopril (ZESTRIL) 20 mg tablet, Take 1 tablet (20 mg total) by mouth daily, Disp: 90 tablet, Rfl: 0    tamsulosin (FLOMAX) 0 4 mg, TAKE 1 CAPSULE (0 4 MG TOTAL) BY MOUTH DAILY WITH DINNER FOR 30 DAYS , Disp: 30 capsule, Rfl: 11    sildenafil (VIAGRA) 100 mg tablet, Take 1 tablet (100 mg total) by mouth daily as needed for erectile dysfunction (Patient not taking: Reported on 12/3/2019), Disp: 5 tablet, Rfl: 1        Hilda Silva MD

## 2019-12-03 NOTE — LETTER
December 3, 2019     Angela Bowman MD  [address] [facility] [address]    Patient: Viktoriya Bunn   YOB: 1960   Date of Visit: 12/3/2019       Dear Dr Araceli Eller: Thank you for referring Laxmi Seek to me for evaluation  Below are my notes for this consultation  If you have questions, please do not hesitate to call me  I look forward to following your patient along with you  Sincerely,        [phone]        CC: No Recipients  Jill Gomez MD  12/3/2019  8:07 AM  Sign at close encounter  [address] [facility]  [address], [address] [place] [address]  [phone]  www  [facility]  org      NAME: Viktoriya Bunn  AGE: 63 y o  SEX: male  : 1960   MRN: [id number]    DATE: 12/3/2019  TIME: 7:40 AM    Assessment and Plan:  BPH with obstruction, dependent upon Flomax  I think he needs cystoscopy and he wishes to have this done in the operating room which we will do  He does not wish to have TURP at the time of cystoscopy but he wishes to see what we find and then make a decision and make another appointment depending on what we find with the cystoscopy  Risks of bleeding infection explained  Chief Complaint     Chief Complaint   Patient presents with    Follow-up     kidney stone       History of Present Illness   25-year-old man with history of BPH with obstruction, and 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 for which he underwent retrograde pyelography and stent placement by Dr Samantha Solomon in 2018  He did not have a stone at the time  Complains of pressure and discomfort in his suprapubic region and difficulty urinating  He has been out of the medication for the past 5 days  Void 7 times per day  CT scan 10/23/2019 showed normal kidneys, prostate was mildly enlarged  Unremarkable bladder  I personally reviewed his CT scan    He has no stones  PSA 6/24/2019 was normal at 2 4, which was unchanged from 2016  He gets up at least 4-5 times per night, and urinates at least 10 times per day  When he is off Flomax he has great difficulty voiding  The following portions of the patient's history were reviewed and updated as appropriate: allergies, current medications, past family history, past medical history, past social history, past surgical history and problem list   Past Medical History:   Diagnosis Date    Abscess, intestine     Arthritis     Diverticulitis     GERD (gastroesophageal reflux disease)     History of high blood pressure     Hydronephrosis 5/27/2018     Past Surgical History:   Procedure Laterality Date    ABCESS DRAINAGE      COLON SURGERY      COLONOSCOPY N/A 5/5/2016    Procedure: COLONOSCOPY;  Surgeon: Katie Nieto MD;  [facility]; Service:     COLONOSCOPY N/A 7/26/2018    Procedure: COLONOSCOPY with bx's;  Surgeon: Tariq Negron MD;  [facility]; Service: Gastroenterology    ESOPHAGOGASTRODUODENOSCOPY      with biopsy    FOOT SURGERY Left     x2? fusion? due to arthritis  onset: 0      HERNIA REPAIR      ILEO LOOP DIVERSION N/A 6/1/2018    Procedure: ILEOSTOMY LOOP DIVERTING;  Surgeon: Mike Arroyo DO;  [facility];  Service: General    LAPAROTOMY N/A 6/1/2018    Procedure: LAPAROTOMY EXPLORATORY,;  Surgeon: Mike Arroyo DO;  [facility];  Service: General    [place] CLOSE ENTEROSTOMY N/A 8/16/2018    Procedure: CLOSURE LOOP ILEOSTOMY;  Surgeon: Elver Lopes MD;  [facility];  Service: General    [place] CYSTOURETHROSCOPY,URETER CATHETER Left 6/9/2018    Procedure: CYSTOSCOPY RETROGRADE PYELOGRAM WITH INSERTION STENT URETERAL;  Surgeon: Anaid Quiles MD;  [facility];  Service: Urology    [place] [facility] N/A 1/31/2019    Procedure: REPAIR HERNIA INCISIONAL LAPAROSCOPIC;  Surgeon: Elver Lopes MD;  [facility];  Service: General    IN LAP,INGUINAL HERNIA REPR,INITIAL Right 1/31/2019    Procedure: REPAIR HERNIA INGUINAL, LAPAROSCOPIC;  Surgeon: Arjun Alston MD;  [facility];  Service: General    IN PART REMOVAL COLON W ANASTOMOSIS N/A 6/1/2018    Procedure: RESECTION COLON SIGMOID;  Surgeon: Michaela Landis DO;  [facility];  Service: General     shoulder  Review of Systems   Review of Systems   Gastrointestinal: Negative  Genitourinary: Positive for difficulty urinating, frequency and urgency  Active Problem List     Patient Active Problem List   Diagnosis    Acromioclavicular joint arthritis    Arthralgia    Back pain    Benign prostatic hyperplasia    Chronic back pain    Chronic pain of left ankle    ED (erectile dysfunction) of organic origin    Gastroesophageal reflux disease    Headache    Knee pain    Shoulder joint pain    S/P ileostomy (HCC)    Tinea versicolor    Status post inguinal hernia repair    Need for hepatitis C screening test    Postoperative pain    Essential hypertension    Healthcare maintenance       Objective   /84 (BP Location: Right arm, Patient Position: Sitting, Cuff Size: Adult)   Pulse 64   Temp (!) 97 3 °F (36 3 °C) (Oral)   Ht 5' 10" (1 778 m)   Wt 82 kg (180 lb 12 4 oz)   BMI 25 94 kg/m²      Physical Exam   Constitutional: He is oriented to person, place, and time  He appears well-developed and well-nourished  HENT:   Head: Normocephalic and atraumatic  Eyes: EOM are normal    Neck: Normal range of motion  Cardiovascular: Normal rate, regular rhythm and normal heart sounds  Pulmonary/Chest: Effort normal and breath sounds normal  No stridor  No respiratory distress  He has no wheezes  Abdominal: Soft  Genitourinary: Rectum normal, prostate normal and penis normal    Genitourinary Comments: Normal uncircumcised phallus, testicles descended bilaterally and within normal limits    Rectal exam reveals normal tone no masses and his prostate is about 40-50 g, smooth symmetric and benign  No nodules  Musculoskeletal: Normal range of motion  Neurological: He is alert and oriented to person, place, and time  Skin: Skin is warm and dry  Psychiatric: He has a normal mood and affect   His behavior is normal  Judgment and thought content normal            Current Medications     Current Outpatient Medications:     gabapentin (NEURONTIN) 300 mg capsule, Take 1 capsule (300 mg total) by mouth 3 (three) times a day, Disp: 90 capsule, Rfl: 1    Ibuprofen (ADVIL PO), Take by mouth as needed  , Disp: , Rfl:     lisinopril (ZESTRIL) 20 mg tablet, Take 1 tablet (20 mg total) by mouth daily, Disp: 90 tablet, Rfl: 0    tamsulosin (FLOMAX) 0 4 mg, TAKE 1 CAPSULE (0 4 MG TOTAL) BY MOUTH DAILY WITH DINNER FOR 30 DAYS , Disp: 30 capsule, Rfl: 11    sildenafil (VIAGRA) 100 mg tablet, Take 1 tablet (100 mg total) by mouth daily as needed for erectile dysfunction (Patient not taking: Reported on 12/3/2019), Disp: 5 tablet, Rfl: 1        Aydee Harley MD

## 2019-12-04 ENCOUNTER — TELEPHONE (OUTPATIENT)
Dept: UROLOGY | Facility: MEDICAL CENTER | Age: 59
End: 2019-12-04

## 2019-12-04 PROBLEM — R91.1 LUNG NODULE: Status: ACTIVE | Noted: 2019-12-04

## 2019-12-04 PROBLEM — R35.0 URINARY FREQUENCY: Status: ACTIVE | Noted: 2019-12-04

## 2019-12-04 PROBLEM — N40.1 BENIGN LOCALIZED PROSTATIC HYPERPLASIA WITH LOWER URINARY TRACT SYMPTOMS (LUTS): Status: ACTIVE | Noted: 2019-12-04

## 2019-12-04 NOTE — TELEPHONE ENCOUNTER
----- Message from Minal Whiting MD sent at 12/3/2019  8:10 AM EST -----  Please call patient with a surgery date for cystoscopy at in-hospital-case request placed

## 2019-12-15 ENCOUNTER — APPOINTMENT (EMERGENCY)
Dept: RADIOLOGY | Facility: HOSPITAL | Age: 59
End: 2019-12-15
Payer: COMMERCIAL

## 2019-12-15 ENCOUNTER — HOSPITAL ENCOUNTER (EMERGENCY)
Facility: HOSPITAL | Age: 59
Discharge: HOME | End: 2019-12-16
Attending: EMERGENCY MEDICINE | Admitting: EMERGENCY MEDICINE
Payer: COMMERCIAL

## 2019-12-15 DIAGNOSIS — R19.7 NAUSEA VOMITING AND DIARRHEA: ICD-10-CM

## 2019-12-15 DIAGNOSIS — R10.9 ABDOMINAL PAIN: Primary | ICD-10-CM

## 2019-12-15 DIAGNOSIS — R11.2 NAUSEA VOMITING AND DIARRHEA: ICD-10-CM

## 2019-12-15 LAB
ALBUMIN SERPL BCP-MCNC: 4.1 G/DL (ref 3.5–5)
ALP SERPL-CCNC: 87 U/L (ref 46–116)
ALT SERPL W P-5'-P-CCNC: 34 U/L (ref 12–78)
ANION GAP SERPL CALCULATED.3IONS-SCNC: 4 MMOL/L (ref 4–13)
AST SERPL W P-5'-P-CCNC: 16 U/L (ref 5–45)
BASOPHILS # BLD AUTO: 0.03 THOUSANDS/ΜL (ref 0–0.1)
BASOPHILS NFR BLD AUTO: 0 % (ref 0–1)
BILIRUB SERPL-MCNC: 0.82 MG/DL (ref 0.2–1)
BUN SERPL-MCNC: 19 MG/DL (ref 5–25)
CALCIUM SERPL-MCNC: 9.1 MG/DL (ref 8.3–10.1)
CHLORIDE SERPL-SCNC: 110 MMOL/L (ref 100–108)
CO2 SERPL-SCNC: 24 MMOL/L (ref 21–32)
CREAT SERPL-MCNC: 0.89 MG/DL (ref 0.6–1.3)
EOSINOPHIL # BLD AUTO: 0.02 THOUSAND/ΜL (ref 0–0.61)
EOSINOPHIL NFR BLD AUTO: 0 % (ref 0–6)
ERYTHROCYTE [DISTWIDTH] IN BLOOD BY AUTOMATED COUNT: 12.4 % (ref 11.6–15.1)
GFR SERPL CREATININE-BSD FRML MDRD: 94 ML/MIN/1.73SQ M
GLUCOSE SERPL-MCNC: 111 MG/DL (ref 65–140)
HCT VFR BLD AUTO: 50.6 % (ref 36.5–49.3)
HGB BLD-MCNC: 17 G/DL (ref 12–17)
IMM GRANULOCYTES # BLD AUTO: 0.04 THOUSAND/UL (ref 0–0.2)
IMM GRANULOCYTES NFR BLD AUTO: 0 % (ref 0–2)
LIPASE SERPL-CCNC: 130 U/L (ref 73–393)
LYMPHOCYTES # BLD AUTO: 0.41 THOUSANDS/ΜL (ref 0.6–4.47)
LYMPHOCYTES NFR BLD AUTO: 3 % (ref 14–44)
MCH RBC QN AUTO: 30.9 PG (ref 26.8–34.3)
MCHC RBC AUTO-ENTMCNC: 33.6 G/DL (ref 31.4–37.4)
MCV RBC AUTO: 92 FL (ref 82–98)
MONOCYTES # BLD AUTO: 0.64 THOUSAND/ΜL (ref 0.17–1.22)
MONOCYTES NFR BLD AUTO: 4 % (ref 4–12)
NEUTROPHILS # BLD AUTO: 13.6 THOUSANDS/ΜL (ref 1.85–7.62)
NEUTS SEG NFR BLD AUTO: 93 % (ref 43–75)
NRBC BLD AUTO-RTO: 0 /100 WBCS
PLATELET # BLD AUTO: 307 THOUSANDS/UL (ref 149–390)
PMV BLD AUTO: 10.6 FL (ref 8.9–12.7)
POTASSIUM SERPL-SCNC: 3.9 MMOL/L (ref 3.5–5.3)
PROT SERPL-MCNC: 8.3 G/DL (ref 6.4–8.2)
RBC # BLD AUTO: 5.51 MILLION/UL (ref 3.88–5.62)
SODIUM SERPL-SCNC: 138 MMOL/L (ref 136–145)
WBC # BLD AUTO: 14.74 THOUSAND/UL (ref 4.31–10.16)

## 2019-12-15 PROCEDURE — 74177 CT ABD & PELVIS W/CONTRAST: CPT

## 2019-12-15 PROCEDURE — 80053 COMPREHEN METABOLIC PANEL: CPT | Performed by: EMERGENCY MEDICINE

## 2019-12-15 PROCEDURE — 99284 EMERGENCY DEPT VISIT MOD MDM: CPT

## 2019-12-15 PROCEDURE — 85025 COMPLETE CBC W/AUTO DIFF WBC: CPT | Performed by: EMERGENCY MEDICINE

## 2019-12-15 PROCEDURE — 83690 ASSAY OF LIPASE: CPT | Performed by: EMERGENCY MEDICINE

## 2019-12-15 PROCEDURE — 36415 COLL VENOUS BLD VENIPUNCTURE: CPT

## 2019-12-15 PROCEDURE — 96374 THER/PROPH/DIAG INJ IV PUSH: CPT

## 2019-12-15 PROCEDURE — 96361 HYDRATE IV INFUSION ADD-ON: CPT

## 2019-12-15 RX ORDER — ONDANSETRON 2 MG/ML
4 INJECTION INTRAMUSCULAR; INTRAVENOUS ONCE
Status: COMPLETED | OUTPATIENT
Start: 2019-12-15 | End: 2019-12-15

## 2019-12-15 RX ADMIN — ONDANSETRON 4 MG: 2 INJECTION INTRAMUSCULAR; INTRAVENOUS at 22:41

## 2019-12-15 RX ADMIN — IOHEXOL 100 ML: 350 INJECTION, SOLUTION INTRAVENOUS at 23:35

## 2019-12-15 RX ADMIN — SODIUM CHLORIDE 1000 ML: 0.9 INJECTION, SOLUTION INTRAVENOUS at 22:20

## 2019-12-16 VITALS
BODY MASS INDEX: 26.98 KG/M2 | TEMPERATURE: 98.6 F | RESPIRATION RATE: 18 BRPM | SYSTOLIC BLOOD PRESSURE: 152 MMHG | DIASTOLIC BLOOD PRESSURE: 77 MMHG | OXYGEN SATURATION: 97 % | HEART RATE: 83 BPM | WEIGHT: 178 LBS | HEIGHT: 68 IN

## 2019-12-16 LAB
BILIRUB UR QL STRIP: NEGATIVE
CLARITY UR: CLEAR
CLARITY, POC: CLEAR
COLOR UR: YELLOW
COLOR, POC: YELLOW
GLUCOSE UR STRIP-MCNC: NEGATIVE MG/DL
HGB UR QL STRIP.AUTO: NEGATIVE
KETONES UR STRIP-MCNC: NEGATIVE MG/DL
LACTATE SERPL-SCNC: 1.2 MMOL/L (ref 0.5–2)
LEUKOCYTE ESTERASE UR QL STRIP: NEGATIVE
NITRITE UR QL STRIP: NEGATIVE
PH UR STRIP.AUTO: 5 [PH] (ref 4.5–8)
PROT UR STRIP-MCNC: NEGATIVE MG/DL
SP GR UR STRIP.AUTO: <=1.005 (ref 1–1.03)
UROBILINOGEN UR QL STRIP.AUTO: 0.2 E.U./DL

## 2019-12-16 PROCEDURE — 81003 URINALYSIS AUTO W/O SCOPE: CPT

## 2019-12-16 PROCEDURE — 36415 COLL VENOUS BLD VENIPUNCTURE: CPT | Performed by: EMERGENCY MEDICINE

## 2019-12-16 PROCEDURE — 99284 EMERGENCY DEPT VISIT MOD MDM: CPT | Performed by: EMERGENCY MEDICINE

## 2019-12-16 PROCEDURE — 83605 ASSAY OF LACTIC ACID: CPT | Performed by: EMERGENCY MEDICINE

## 2019-12-16 RX ORDER — ONDANSETRON 4 MG/1
4 TABLET, ORALLY DISINTEGRATING ORAL EVERY 8 HOURS PRN
Qty: 15 TABLET | Refills: 0 | Status: SHIPPED | OUTPATIENT
Start: 2019-12-16 | End: 2022-04-12 | Stop reason: ALTCHOICE

## 2019-12-16 NOTE — ED PROVIDER NOTES
History  Chief Complaint   Patient presents with    Flu Symptoms     abd pain, vomiting, diarrhea, fevers since yesterday     64 yo M presents to ED for abdominal pain, nausea/vomiting, diarrhea  Subjective fever at home  Abdominal pain is diffuse  No blood in stool or emesis  Symptoms started yesterday  Pt says this feels like prior perforated diverticulitis  No urinary symptoms  No URI symptoms  No known sick contacts  No recent antibiotic use  Pt says he is going on vacation next week and is concerned that he might miss his trip  Prior to Admission Medications   Prescriptions Last Dose Informant Patient Reported? Taking? DULoxetine (CYMBALTA) 30 mg delayed release capsule   No Yes   Sig: Take 1 capsule (30 mg total) by mouth daily   Ibuprofen (ADVIL PO)   Yes Yes   Sig: Take by mouth as needed     gabapentin (NEURONTIN) 300 mg capsule   No Yes   Sig: Take 1 capsule (300 mg total) by mouth 3 (three) times a day   lisinopril (ZESTRIL) 20 mg tablet   No Yes   Sig: Take 1 tablet (20 mg total) by mouth daily   sildenafil (VIAGRA) 100 mg tablet   No No   Sig: Take 1 tablet (100 mg total) by mouth daily as needed for erectile dysfunction   Patient not taking: Reported on 12/3/2019   tamsulosin (FLOMAX) 0 4 mg   No Yes   Sig: Take 1 capsule (0 4 mg total) by mouth 2 (two) times a day      Facility-Administered Medications: None       Past Medical History:   Diagnosis Date    Abscess, intestine     Arthritis     Diverticulitis     GERD (gastroesophageal reflux disease)     History of high blood pressure     Hydronephrosis 5/27/2018       Past Surgical History:   Procedure Laterality Date    ABCESS DRAINAGE      COLON SURGERY      COLONOSCOPY N/A 5/5/2016    Procedure: COLONOSCOPY;  Surgeon: Jam Butcher MD;  [facility]; Service:     COLONOSCOPY N/A 7/26/2018    Procedure: COLONOSCOPY with bx's;  Surgeon: Padilla Macias MD;  [facility];   Service: Gastroenterology   [facility] ESOPHAGOGASTRODUODENOSCOPY      with biopsy    FOOT SURGERY Left     x2? fusion? due to arthritis  onset: 0   HERNIA REPAIR      ILEO LOOP DIVERSION N/A 6/1/2018    Procedure: ILEOSTOMY LOOP DIVERTING;  Surgeon: Nick Bruce DO;  [facility];  Service: General    LAPAROTOMY N/A 6/1/2018    Procedure: LAPAROTOMY EXPLORATORY,;  Surgeon: Nick Bruce DO;  [facility];  Service: General    [place] CLOSE ENTEROSTOMY N/A 8/16/2018    Procedure: CLOSURE LOOP ILEOSTOMY;  Surgeon: Nemesio Sanchez MD;  [facility];  Service: General    [place] CYSTOURETHROSCOPY,URETER CATHETER Left 6/9/2018    Procedure: CYSTOSCOPY RETROGRADE PYELOGRAM WITH INSERTION STENT URETERAL;  Surgeon: Selma Lang MD;  [facility];  Service: Urology    [facility] N/A 1/31/2019    Procedure: REPAIR HERNIA INCISIONAL LAPAROSCOPIC;  Surgeon: Nemesio Sanchez MD;  [facility];  Service: General    OH Emily Hartman 20 Right 1/31/2019    Procedure: REPAIR HERNIA INGUINAL, LAPAROSCOPIC;  Surgeon: Nemesio Sanchez MD;  [facility];  Service: General    [place] PART REMOVAL COLON W ANASTOMOSIS N/A 6/1/2018    Procedure: RESECTION COLON SIGMOID;  Surgeon: Nick Bruce DO;  [facility];  Service: General       Family History   Problem Relation Age of Onset    Other Mother         head tumor    Glaucoma Father     Diabetes Father         possible diabetes    Stroke Family      I have reviewed and agree with the history as documented  Social History     Tobacco Use    Smoking status: Former Smoker    Smokeless tobacco: Never Used    Tobacco comment: quit > 1 year    Substance Use Topics    Alcohol use: Yes     Comment: 1 drink / weekend     Drug use: No        Review of Systems   Constitutional: Positive for appetite change  Negative for chills and fatigue     HENT: Negative for congestion, rhinorrhea, sore throat and trouble swallowing  Eyes: Negative for pain, discharge and visual disturbance  Respiratory: Negative for cough, chest tightness and shortness of breath  Cardiovascular: Negative for chest pain, palpitations and leg swelling  Gastrointestinal: Positive for abdominal pain, diarrhea, nausea and vomiting  Negative for blood in stool  Genitourinary: Negative for decreased urine volume, dysuria, frequency and urgency  Musculoskeletal: Negative for gait problem, neck pain and neck stiffness  Skin: Negative for color change, rash and wound  Neurological: Negative for dizziness, syncope, light-headedness and headaches  Physical Exam  ED Triage Vitals   Temperature Pulse Respirations Blood Pressure SpO2   12/15/19 2015 12/15/19 2014 12/15/19 2014 12/15/19 2015 12/15/19 2014   98 6 °F (37 °C) (!) 112 18 (!) 187/103 99 %      Temp Source Heart Rate Source Patient Position - Orthostatic VS BP Location FiO2 (%)   12/15/19 2015 12/15/19 2014 12/15/19 2220 12/15/19 2220 --   Oral Monitor Lying Right arm       Pain Score       12/15/19 2015       8             Orthostatic Vital Signs  Vitals:    12/15/19 2015 12/15/19 2220 12/15/19 2330 12/16/19 0035   BP: (!) 187/103 (!) 150/118  152/77   Pulse:  94 92 83   Patient Position - Orthostatic VS:  Lying  Lying       Physical Exam   Constitutional: He is oriented to person, place, and time  He appears well-developed and well-nourished  No distress  HENT:   Head: Normocephalic and atraumatic  Mouth/Throat: Oropharynx is clear and moist    Eyes: Conjunctivae and EOM are normal  Right eye exhibits no discharge  Left eye exhibits no discharge  Neck: Normal range of motion  Neck supple  No thyromegaly present  Cardiovascular: Regular rhythm and intact distal pulses  Mild tachycardia   Pulmonary/Chest: Effort normal and breath sounds normal  No stridor  No respiratory distress  He exhibits no tenderness  Abdominal: Soft   Bowel sounds are normal  There is tenderness (diffuse)  There is no rebound and no guarding  Musculoskeletal: Normal range of motion  He exhibits no edema or tenderness  Neurological: He is alert and oriented to person, place, and time  No sensory deficit  He exhibits normal muscle tone  Skin: Skin is warm and dry  Capillary refill takes less than 2 seconds  Nursing note and vitals reviewed  ED Medications  Medications   ondansetron (ZOFRAN) injection 4 mg (4 mg Intravenous Given 12/15/19 2241)   sodium chloride 0 9 % bolus 1,000 mL (0 mL Intravenous Stopped 12/15/19 2347)   iohexol (OMNIPAQUE) 350 MG/ML injection (MULTI-DOSE) 100 mL (100 mL Intravenous Given 12/15/19 2335)       Diagnostic Studies  Results Reviewed     Procedure Component Value Units Date/Time    POCT urinalysis dipstick [[id number]]  (Normal) Resulted:  12/16/19 0032    Lab Status:  Final result Updated:  12/16/19 0032     Color, UA yellow     Clarity, UA clear    Lactic acid, plasma [[id number]]  (Normal) Collected:  12/16/19 0001    Lab Status:  Final result Specimen:  Blood from Arm, Right Updated:  12/16/19 0026     LACTIC ACID 1 2 mmol/L     Narrative:       Result may be elevated if tourniquet was used during collection      Urine Macroscopic, POC [[id number]] Collected:  12/16/19 0025    Lab Status:  Final result Specimen:  Urine Updated:  12/16/19 0024     Color, UA Yellow     Clarity, UA Clear     pH, UA 5 0     Leukocytes, UA Negative     Nitrite, UA Negative     Protein, UA Negative mg/dl      Glucose, UA Negative mg/dl      Ketones, UA Negative mg/dl      Urobilinogen, UA 0 2 E U /dl      Bilirubin, UA Negative     Blood, UA Negative     Specific Gravity, UA <=1 005    Narrative:       CLINITEK RESULT    CBC and differential [[id number]]  (Abnormal) Collected:  12/15/19 2220    Lab Status:  Final result Specimen:  Blood from Arm, Right Updated:  12/15/19 2347     WBC 14 74 Thousand/uL      RBC 5 51 Million/uL      Hemoglobin 17 0 g/dL      Hematocrit 50 6 %      MCV 92 fL MCH 30 9 pg      MCHC 33 6 g/dL      RDW 12 4 %      MPV 10 6 fL      Platelets 152 Thousands/uL      nRBC 0 /100 WBCs      Neutrophils Relative 93 %      Immat GRANS % 0 %      Lymphocytes Relative 3 %      Monocytes Relative 4 %      Eosinophils Relative 0 %      Basophils Relative 0 %      Neutrophils Absolute 13 60 Thousands/µL      Immature Grans Absolute 0 04 Thousand/uL      Lymphocytes Absolute 0 41 Thousands/µL      Monocytes Absolute 0 64 Thousand/µL      Eosinophils Absolute 0 02 Thousand/µL      Basophils Absolute 0 03 Thousands/µL     Narrative: This is an appended report  These results have been appended to a previously verified report      Comprehensive metabolic panel [[id number]]  (Abnormal) Collected:  12/15/19 2220    Lab Status:  Final result Specimen:  Blood from Arm, Right Updated:  12/15/19 2251     Sodium 138 mmol/L      Potassium 3 9 mmol/L      Chloride 110 mmol/L      CO2 24 mmol/L      ANION GAP 4 mmol/L      BUN 19 mg/dL      Creatinine 0 89 mg/dL      Glucose 111 mg/dL      Calcium 9 1 mg/dL      AST 16 U/L      ALT 34 U/L      Alkaline Phosphatase 87 U/L      Total Protein 8 3 g/dL      Albumin 4 1 g/dL      Total Bilirubin 0 82 mg/dL      eGFR 94 ml/min/1 73sq m     Narrative:       [place] guidelines for Chronic Kidney Disease (CKD):     Stage 1 with normal or high GFR (GFR > 90 mL/min/1 73 square meters)    Stage 2 Mild CKD (GFR = 60-89 mL/min/1 73 square meters)    Stage 3A Moderate CKD (GFR = 45-59 mL/min/1 73 square meters)    Stage 3B Moderate CKD (GFR = 30-44 mL/min/1 73 square meters)    Stage 4 Severe CKD (GFR = 15-29 mL/min/1 73 square meters)    Stage 5 End Stage CKD (GFR <15 mL/min/1 73 square meters)  Note: GFR calculation is accurate only with a steady state creatinine    Lipase [[id number]]  (Normal) Collected:  12/15/19 2220    Lab Status:  Final result Specimen:  Blood from Arm, Right Updated:  12/15/19 2251     Lipase 130 u/L CT abdomen pelvis with contrast   Final Result by Naya Ramirez MD (12/16 0008)      1  Fluid-filled distal small bowel with diffuse mild wall thickening may be seen in setting of nonspecific enteritis  Small amount of fluid in the colon may be due to diarrheal illness  No bowel obstruction  2   Diverticulosis without evidence of diverticulitis  3   Other findings as above  Workstation performed: [id number]               Procedures  Procedures      ED Course                               MDM  Number of Diagnoses or Management Options  Abdominal pain:   Nausea vomiting and diarrhea:   Diagnosis management comments: Diffuse tenderness but no peritoneal signs  Normal vital signs except for elevated bp  Pt well appearing  Tachycardic resolved with IVF  Leukocytosis but labs otherwise unremarkable  CT ab/pelv with likely enteritis  Results discussed with pt  Will discharge home with zofran  Return precautions discussed        Disposition  Final diagnoses:   Abdominal pain   Nausea vomiting and diarrhea     Time reflects when diagnosis was documented in both MDM as applicable and the Disposition within this note     Time User Action Codes Description Comment    12/16/2019 12:38 AM [place] Francheska Add [R10 9] Abdominal pain     12/16/2019 12:38 AM Dawit Francheska Add [R11 2,  R19 7] Nausea vomiting and diarrhea       ED Disposition     ED Disposition Condition Date/Time Comment    Discharge Stable Mon Dec 16, 2019 12:37 AM [address] discharge to home/self care              Follow-up Information     Follow up With Specialties Details Why Contact Info Additional [address] Emergency Department Emergency Medicine  As needed, If symptoms worsen [address]  [phone]  ED, [address] [place], [place], [address]    Alvino Andujar MD Family Medicine Call in 3 days As needed [address] [facility] [address]             Discharge Medication List as of 12/16/2019 12:40 AM      START taking these medications    Details   ondansetron (ZOFRAN-ODT) 4 mg disintegrating tablet Take 1 tablet (4 mg total) by mouth every 8 (eight) hours as needed for nausea or vomiting, Starting Mon 12/16/2019, Print         CONTINUE these medications which have NOT CHANGED    Details   DULoxetine (CYMBALTA) 30 mg delayed release capsule Take 1 capsule (30 mg total) by mouth daily, Starting Tue 12/3/2019, Normal      gabapentin (NEURONTIN) 300 mg capsule Take 1 capsule (300 mg total) by mouth 3 (three) times a day, Starting Wed 10/30/2019, Normal      Ibuprofen (ADVIL PO) Take by mouth as needed  , Historical Med      lisinopril (ZESTRIL) 20 mg tablet Take 1 tablet (20 mg total) by mouth daily, Starting Wed 11/13/2019, Normal      sildenafil (VIAGRA) 100 mg tablet Take 1 tablet (100 mg total) by mouth daily as needed for erectile dysfunction, Starting Tue 6/18/2019, Normal      tamsulosin (FLOMAX) 0 4 mg Take 1 capsule (0 4 mg total) by mouth 2 (two) times a day, Starting Tue 12/3/2019, Normal           No discharge procedures on file  ED Provider  Attending physically available and evaluated Gustavo Poe I managed the patient along with the ED Attending      Electronically Signed by         Katelyn Murray MD  12/16/19 9596

## 2019-12-16 NOTE — ED ATTENDING ATTESTATION
12/15/2019  I, Griselda Pick, MD, saw and evaluated the patient  I have discussed the patient with the resident/non-physician practitioner and agree with the resident's/non-physician practitioner's findings, Plan of Care, and MDM as documented in the resident's/non-physician practitioner's note, except where noted  All available labs and Radiology studies were reviewed  I was present for key portions of any procedure(s) performed by the resident/non-physician practitioner and I was immediately available to provide assistance  At this point I agree with the current assessment done in the Emergency Department  I have conducted an independent evaluation of this patient a history and physical is as follows:    OA: 64 y/o m with abdominal pain x 2 days  + associated fevers/chills  + nausea and loose, non-bloody stools  Pain is over RLQ, aching to sharp and constant  Similar to previous pain he experienced with perforated diverticulitis s/p ileostomy reversal and hernia repair  Denies cp/sob, no lightheadedness/dizziness  No numbness/waekness/tingling  No URI sxms  PE, well developed m, uncomfortable but nontoxic appearing, HTN otherwise VSS, NC/AT, MMM, clear sclera/conjunctiva, neck supple/FROM, RR, lungs CTAB, abd soft, + ttp over RLQ suprapubic region, no CVA ttp, - r/g, - mass, well healed surgical scars, - edema, - calf ttp, + 2 distal pulses and capillary refill < 2 sec, AAO  A/p fevers with abdominal pain with history of diverticular abscess/perforation s/p ileostomy  CT, labs, monitor, treat accordingly    ED Course   Pt with imaging and re-evaluation pending at end of shift         Critical Care Time  Procedures

## 2020-01-09 ENCOUNTER — TELEPHONE (OUTPATIENT)
Dept: FAMILY MEDICINE CLINIC | Facility: CLINIC | Age: 60
End: 2020-01-09

## 2020-05-05 NOTE — PROGRESS NOTES
CRNA at bedside to manage airway and hemodynamics.  See Anesthesia report.  Pt grabbed 60 ml syringe and inserted 30 ml of water into his NGT  Pt c/o being too full  I checked the pressure settings and suction, tube is draining appropriately  Pt has also been increasing his own pressure gauge and playing around with NGT

## 2020-06-01 ENCOUNTER — OFFICE VISIT (OUTPATIENT)
Dept: FAMILY MEDICINE CLINIC | Facility: CLINIC | Age: 60
End: 2020-06-01
Payer: COMMERCIAL

## 2020-06-01 VITALS
WEIGHT: 176 LBS | HEIGHT: 68 IN | TEMPERATURE: 98.2 F | BODY MASS INDEX: 26.67 KG/M2 | RESPIRATION RATE: 16 BRPM | DIASTOLIC BLOOD PRESSURE: 70 MMHG | OXYGEN SATURATION: 98 % | HEART RATE: 82 BPM | SYSTOLIC BLOOD PRESSURE: 122 MMHG
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
## 2020-06-01 DIAGNOSIS — Z98.890 STATUS POST INGUINAL HERNIA REPAIR: ICD-10-CM

## 2020-06-01 DIAGNOSIS — I10 ESSENTIAL HYPERTENSION: Primary | ICD-10-CM

## 2020-06-01 DIAGNOSIS — Z87.19 STATUS POST INGUINAL HERNIA REPAIR: ICD-10-CM

## 2020-06-01 PROCEDURE — 99214 OFFICE O/P EST MOD 30 MIN: CPT | Performed by: FAMILY MEDICINE

## 2020-06-01 PROCEDURE — 3008F BODY MASS INDEX DOCD: CPT | Performed by: FAMILY MEDICINE

## 2020-06-01 PROCEDURE — 3074F SYST BP LT 130 MM HG: CPT | Performed by: FAMILY MEDICINE

## 2020-06-01 PROCEDURE — 3078F DIAST BP <80 MM HG: CPT | Performed by: FAMILY MEDICINE

## 2020-06-01 PROCEDURE — 1036F TOBACCO NON-USER: CPT | Performed by: FAMILY MEDICINE

## 2020-06-01 RX ORDER — DOXYCYCLINE HYCLATE 100 MG
100 TABLET ORAL 2 TIMES DAILY
Qty: 2 TABLET | Refills: 0 | Status: SHIPPED | OUTPATIENT
Start: 2020-06-01 | End: 2020-06-02

## 2020-06-26 ENCOUNTER — OFFICE VISIT (OUTPATIENT)
Dept: URGENT CARE | Age: 60
End: 2020-06-26
Payer: COMMERCIAL

## 2020-06-26 VITALS
BODY MASS INDEX: 26.98 KG/M2 | DIASTOLIC BLOOD PRESSURE: 86 MMHG | OXYGEN SATURATION: 100 % | SYSTOLIC BLOOD PRESSURE: 133 MMHG | RESPIRATION RATE: 18 BRPM | HEART RATE: 62 BPM | WEIGHT: 178 LBS | HEIGHT: 68 IN | TEMPERATURE: 98.3 F

## 2020-06-26 DIAGNOSIS — J02.0 STREP PHARYNGITIS: Primary | ICD-10-CM

## 2020-06-26 DIAGNOSIS — J02.9 SORE THROAT: ICD-10-CM

## 2020-06-26 LAB — S PYO AG THROAT QL: POSITIVE

## 2020-06-26 PROCEDURE — 99213 OFFICE O/P EST LOW 20 MIN: CPT | Performed by: PHYSICIAN ASSISTANT

## 2020-06-26 PROCEDURE — 87880 STREP A ASSAY W/OPTIC: CPT | Performed by: PHYSICIAN ASSISTANT

## 2020-06-26 RX ORDER — METHYLPREDNISOLONE 4 MG/1
TABLET ORAL
Qty: 21 TABLET | Refills: 0 | Status: SHIPPED | OUTPATIENT
Start: 2020-06-26 | End: 2022-04-12 | Stop reason: ALTCHOICE

## 2020-06-26 RX ORDER — AZITHROMYCIN 250 MG/1
TABLET, FILM COATED ORAL
Qty: 6 TABLET | Refills: 0 | Status: SHIPPED | OUTPATIENT
Start: 2020-06-26 | End: 2020-06-30

## 2020-09-14 DIAGNOSIS — I10 ESSENTIAL HYPERTENSION: ICD-10-CM

## 2020-09-14 RX ORDER — LISINOPRIL 20 MG/1
20 TABLET ORAL DAILY
Qty: 90 TABLET | Refills: 0 | Status: CANCELLED | OUTPATIENT
Start: 2020-09-14

## 2020-09-15 NOTE — TELEPHONE ENCOUNTER
Per office note 6/1/20 pt is not on lisinopril    Medication order will be removed from this encounter

## 2020-10-20 ENCOUNTER — OFFICE VISIT (OUTPATIENT)
Dept: FAMILY MEDICINE CLINIC | Facility: CLINIC | Age: 60
End: 2020-10-20
Payer: COMMERCIAL

## 2020-10-20 ENCOUNTER — HOSPITAL ENCOUNTER (OUTPATIENT)
Dept: RADIOLOGY | Facility: IMAGING CENTER | Age: 60
Discharge: HOME | End: 2020-10-20
Payer: COMMERCIAL

## 2020-10-20 VITALS
SYSTOLIC BLOOD PRESSURE: 114 MMHG | DIASTOLIC BLOOD PRESSURE: 82 MMHG | OXYGEN SATURATION: 96 % | WEIGHT: 174 LBS | BODY MASS INDEX: 26.37 KG/M2 | HEART RATE: 74 BPM | HEIGHT: 68 IN | RESPIRATION RATE: 16 BRPM | TEMPERATURE: 98.6 F

## 2020-10-20 DIAGNOSIS — S69.91XA HAND INJURY, RIGHT, INITIAL ENCOUNTER: ICD-10-CM

## 2020-10-20 DIAGNOSIS — H60.391 OTHER INFECTIVE ACUTE OTITIS EXTERNA OF RIGHT EAR: ICD-10-CM

## 2020-10-20 DIAGNOSIS — Z00.00 HEALTHCARE MAINTENANCE: ICD-10-CM

## 2020-10-20 DIAGNOSIS — S59.911A INJURY OF RIGHT FOREARM, INITIAL ENCOUNTER: ICD-10-CM

## 2020-10-20 DIAGNOSIS — S59.911A INJURY OF RIGHT FOREARM, INITIAL ENCOUNTER: Primary | ICD-10-CM

## 2020-10-20 PROBLEM — H60.90 OTITIS EXTERNA: Status: ACTIVE | Noted: 2020-10-20

## 2020-10-20 PROCEDURE — 73090 X-RAY EXAM OF FOREARM: CPT

## 2020-10-20 PROCEDURE — 99214 OFFICE O/P EST MOD 30 MIN: CPT | Performed by: FAMILY MEDICINE

## 2020-10-20 PROCEDURE — 1036F TOBACCO NON-USER: CPT | Performed by: FAMILY MEDICINE

## 2020-10-20 PROCEDURE — 3079F DIAST BP 80-89 MM HG: CPT | Performed by: FAMILY MEDICINE

## 2020-10-20 PROCEDURE — 73130 X-RAY EXAM OF HAND: CPT

## 2020-10-20 PROCEDURE — 3725F SCREEN DEPRESSION PERFORMED: CPT | Performed by: FAMILY MEDICINE

## 2020-10-20 PROCEDURE — G0439 PPPS, SUBSEQ VISIT: HCPCS | Performed by: FAMILY MEDICINE

## 2020-11-20 ENCOUNTER — TELEPHONE (OUTPATIENT)
Dept: FAMILY MEDICINE CLINIC | Facility: CLINIC | Age: 60
End: 2020-11-20

## 2020-11-24 DIAGNOSIS — I10 ESSENTIAL HYPERTENSION: ICD-10-CM

## 2020-11-25 RX ORDER — LISINOPRIL 20 MG/1
20 TABLET ORAL DAILY
Qty: 30 TABLET | Refills: 0 | Status: SHIPPED | OUTPATIENT
Start: 2020-11-25 | End: 2021-03-08 | Stop reason: SDUPTHER

## 2020-12-02 ENCOUNTER — LAB (OUTPATIENT)
Dept: LAB | Facility: IMAGING CENTER | Age: 60
End: 2020-12-02
Payer: COMMERCIAL

## 2020-12-02 DIAGNOSIS — I10 ESSENTIAL HYPERTENSION: ICD-10-CM

## 2020-12-02 LAB
ALBUMIN SERPL BCP-MCNC: 4.1 G/DL (ref 3.5–5)
ALP SERPL-CCNC: 84 U/L (ref 46–116)
ALT SERPL W P-5'-P-CCNC: 28 U/L (ref 12–78)
ANION GAP SERPL CALCULATED.3IONS-SCNC: 5 MMOL/L (ref 4–13)
AST SERPL W P-5'-P-CCNC: 14 U/L (ref 5–45)
BASOPHILS # BLD AUTO: 0.04 THOUSANDS/ΜL (ref 0–0.1)
BASOPHILS NFR BLD AUTO: 1 % (ref 0–1)
BILIRUB SERPL-MCNC: 0.44 MG/DL (ref 0.2–1)
BUN SERPL-MCNC: 13 MG/DL (ref 5–25)
CALCIUM SERPL-MCNC: 9.1 MG/DL (ref 8.3–10.1)
CHLORIDE SERPL-SCNC: 110 MMOL/L (ref 100–108)
CHOLEST SERPL-MCNC: 218 MG/DL (ref 50–200)
CO2 SERPL-SCNC: 27 MMOL/L (ref 21–32)
CREAT SERPL-MCNC: 0.88 MG/DL (ref 0.6–1.3)
EOSINOPHIL # BLD AUTO: 0.11 THOUSAND/ΜL (ref 0–0.61)
EOSINOPHIL NFR BLD AUTO: 2 % (ref 0–6)
ERYTHROCYTE [DISTWIDTH] IN BLOOD BY AUTOMATED COUNT: 12.5 % (ref 11.6–15.1)
GFR SERPL CREATININE-BSD FRML MDRD: 94 ML/MIN/1.73SQ M
GLUCOSE P FAST SERPL-MCNC: 98 MG/DL (ref 65–99)
HCT VFR BLD AUTO: 49.3 % (ref 36.5–49.3)
HDLC SERPL-MCNC: 54 MG/DL
HGB BLD-MCNC: 15.2 G/DL (ref 12–17)
IMM GRANULOCYTES # BLD AUTO: 0.02 THOUSAND/UL (ref 0–0.2)
IMM GRANULOCYTES NFR BLD AUTO: 0 % (ref 0–2)
LDLC SERPL CALC-MCNC: 149 MG/DL (ref 0–100)
LYMPHOCYTES # BLD AUTO: 1.7 THOUSANDS/ΜL (ref 0.6–4.47)
LYMPHOCYTES NFR BLD AUTO: 23 % (ref 14–44)
MCH RBC QN AUTO: 29.9 PG (ref 26.8–34.3)
MCHC RBC AUTO-ENTMCNC: 30.8 G/DL (ref 31.4–37.4)
MCV RBC AUTO: 97 FL (ref 82–98)
MONOCYTES # BLD AUTO: 0.4 THOUSAND/ΜL (ref 0.17–1.22)
MONOCYTES NFR BLD AUTO: 6 % (ref 4–12)
NEUTROPHILS # BLD AUTO: 5.01 THOUSANDS/ΜL (ref 1.85–7.62)
NEUTS SEG NFR BLD AUTO: 68 % (ref 43–75)
NRBC BLD AUTO-RTO: 0 /100 WBCS
PLATELET # BLD AUTO: 325 THOUSANDS/UL (ref 149–390)
PMV BLD AUTO: 11.3 FL (ref 8.9–12.7)
POTASSIUM SERPL-SCNC: 4.7 MMOL/L (ref 3.5–5.3)
PROT SERPL-MCNC: 7.4 G/DL (ref 6.4–8.2)
RBC # BLD AUTO: 5.08 MILLION/UL (ref 3.88–5.62)
SODIUM SERPL-SCNC: 142 MMOL/L (ref 136–145)
TRIGL SERPL-MCNC: 73 MG/DL
TSH SERPL DL<=0.05 MIU/L-ACNC: 1.6 UIU/ML (ref 0.36–3.74)
WBC # BLD AUTO: 7.28 THOUSAND/UL (ref 4.31–10.16)

## 2020-12-02 PROCEDURE — 85025 COMPLETE CBC W/AUTO DIFF WBC: CPT

## 2020-12-02 PROCEDURE — 36415 COLL VENOUS BLD VENIPUNCTURE: CPT

## 2020-12-02 PROCEDURE — 84443 ASSAY THYROID STIM HORMONE: CPT

## 2020-12-02 PROCEDURE — 80061 LIPID PANEL: CPT

## 2020-12-02 PROCEDURE — 80053 COMPREHEN METABOLIC PANEL: CPT

## 2020-12-14 ENCOUNTER — OFFICE VISIT (OUTPATIENT)
Dept: FAMILY MEDICINE CLINIC | Facility: CLINIC | Age: 60
End: 2020-12-14
Payer: COMMERCIAL

## 2020-12-14 VITALS
HEIGHT: 68 IN | RESPIRATION RATE: 16 BRPM | SYSTOLIC BLOOD PRESSURE: 132 MMHG | TEMPERATURE: 97.7 F | DIASTOLIC BLOOD PRESSURE: 84 MMHG | BODY MASS INDEX: 26.67 KG/M2 | WEIGHT: 176 LBS | OXYGEN SATURATION: 97 % | HEART RATE: 79 BPM

## 2020-12-14 DIAGNOSIS — Z20.822 ENCOUNTER FOR SCREENING LABORATORY TESTING FOR COVID-19 VIRUS: ICD-10-CM

## 2020-12-14 DIAGNOSIS — K21.9 GASTROESOPHAGEAL REFLUX DISEASE WITHOUT ESOPHAGITIS: ICD-10-CM

## 2020-12-14 DIAGNOSIS — E78.49 OTHER HYPERLIPIDEMIA: ICD-10-CM

## 2020-12-14 DIAGNOSIS — J01.00 ACUTE NON-RECURRENT MAXILLARY SINUSITIS: Primary | ICD-10-CM

## 2020-12-14 DIAGNOSIS — I10 ESSENTIAL HYPERTENSION: ICD-10-CM

## 2020-12-14 PROBLEM — Z11.52 ENCOUNTER FOR SCREENING LABORATORY TESTING FOR COVID-19 VIRUS: Status: ACTIVE | Noted: 2020-12-14

## 2020-12-14 PROCEDURE — 3079F DIAST BP 80-89 MM HG: CPT | Performed by: FAMILY MEDICINE

## 2020-12-14 PROCEDURE — 99214 OFFICE O/P EST MOD 30 MIN: CPT | Performed by: FAMILY MEDICINE

## 2020-12-14 PROCEDURE — 3075F SYST BP GE 130 - 139MM HG: CPT | Performed by: FAMILY MEDICINE

## 2020-12-14 PROCEDURE — 3008F BODY MASS INDEX DOCD: CPT | Performed by: FAMILY MEDICINE

## 2020-12-14 PROCEDURE — 1036F TOBACCO NON-USER: CPT | Performed by: FAMILY MEDICINE

## 2020-12-14 RX ORDER — AZITHROMYCIN 250 MG/1
TABLET, FILM COATED ORAL
Qty: 6 TABLET | Refills: 0 | Status: SHIPPED | OUTPATIENT
Start: 2020-12-14 | End: 2020-12-18

## 2020-12-15 DIAGNOSIS — Z20.822 ENCOUNTER FOR SCREENING LABORATORY TESTING FOR COVID-19 VIRUS: ICD-10-CM

## 2020-12-15 PROCEDURE — U0003 INFECTIOUS AGENT DETECTION BY NUCLEIC ACID (DNA OR RNA); SEVERE ACUTE RESPIRATORY SYNDROME CORONAVIRUS 2 (SARS-COV-2) (CORONAVIRUS DISEASE [COVID-19]), AMPLIFIED PROBE TECHNIQUE, MAKING USE OF HIGH THROUGHPUT TECHNOLOGIES AS DESCRIBED BY CMS-2020-01-R: HCPCS | Performed by: FAMILY MEDICINE

## 2020-12-16 LAB — SARS-COV-2 RNA SPEC QL NAA+PROBE: NOT DETECTED

## 2020-12-18 ENCOUNTER — TELEPHONE (OUTPATIENT)
Dept: FAMILY MEDICINE CLINIC | Facility: CLINIC | Age: 60
End: 2020-12-18

## 2020-12-24 DIAGNOSIS — N40.1 BENIGN LOCALIZED PROSTATIC HYPERPLASIA WITH LOWER URINARY TRACT SYMPTOMS (LUTS): ICD-10-CM

## 2020-12-24 RX ORDER — TAMSULOSIN HYDROCHLORIDE 0.4 MG/1
CAPSULE ORAL
Qty: 90 CAPSULE | Refills: 3 | Status: SHIPPED | OUTPATIENT
Start: 2020-12-24 | End: 2022-02-07

## 2021-03-05 DIAGNOSIS — N52.1 ERECTILE DYSFUNCTION DUE TO DISEASES CLASSIFIED ELSEWHERE: ICD-10-CM

## 2021-03-08 DIAGNOSIS — I10 ESSENTIAL HYPERTENSION: ICD-10-CM

## 2021-03-08 RX ORDER — SILDENAFIL 100 MG/1
100 TABLET, FILM COATED ORAL DAILY PRN
Qty: 5 TABLET | Refills: 1 | Status: SHIPPED | OUTPATIENT
Start: 2021-03-08 | End: 2021-04-09 | Stop reason: SDUPTHER

## 2021-03-09 RX ORDER — LISINOPRIL 20 MG/1
20 TABLET ORAL DAILY
Qty: 30 TABLET | Refills: 2 | Status: SHIPPED | OUTPATIENT
Start: 2021-03-09 | End: 2021-04-26 | Stop reason: SDUPTHER

## 2021-04-09 ENCOUNTER — TELEPHONE (OUTPATIENT)
Dept: FAMILY MEDICINE CLINIC | Facility: CLINIC | Age: 61
End: 2021-04-09

## 2021-04-09 ENCOUNTER — OFFICE VISIT (OUTPATIENT)
Dept: FAMILY MEDICINE CLINIC | Facility: CLINIC | Age: 61
End: 2021-04-09
Payer: COMMERCIAL

## 2021-04-09 VITALS
TEMPERATURE: 97.3 F | BODY MASS INDEX: 26.56 KG/M2 | WEIGHT: 175.25 LBS | HEIGHT: 68 IN | OXYGEN SATURATION: 97 % | SYSTOLIC BLOOD PRESSURE: 120 MMHG | HEART RATE: 71 BPM | RESPIRATION RATE: 16 BRPM | DIASTOLIC BLOOD PRESSURE: 82 MMHG

## 2021-04-09 DIAGNOSIS — E78.49 OTHER HYPERLIPIDEMIA: ICD-10-CM

## 2021-04-09 DIAGNOSIS — N52.1 ERECTILE DYSFUNCTION DUE TO DISEASES CLASSIFIED ELSEWHERE: ICD-10-CM

## 2021-04-09 DIAGNOSIS — I10 ESSENTIAL HYPERTENSION: ICD-10-CM

## 2021-04-09 DIAGNOSIS — Z93.2 S/P ILEOSTOMY (HCC): ICD-10-CM

## 2021-04-09 DIAGNOSIS — N40.1 BENIGN LOCALIZED PROSTATIC HYPERPLASIA WITH LOWER URINARY TRACT SYMPTOMS (LUTS): Primary | ICD-10-CM

## 2021-04-09 PROCEDURE — 3074F SYST BP LT 130 MM HG: CPT | Performed by: FAMILY MEDICINE

## 2021-04-09 PROCEDURE — 3008F BODY MASS INDEX DOCD: CPT | Performed by: FAMILY MEDICINE

## 2021-04-09 PROCEDURE — 99214 OFFICE O/P EST MOD 30 MIN: CPT | Performed by: FAMILY MEDICINE

## 2021-04-09 PROCEDURE — 3079F DIAST BP 80-89 MM HG: CPT | Performed by: FAMILY MEDICINE

## 2021-04-09 PROCEDURE — 1036F TOBACCO NON-USER: CPT | Performed by: FAMILY MEDICINE

## 2021-04-09 RX ORDER — SILDENAFIL 100 MG/1
100 TABLET, FILM COATED ORAL DAILY PRN
Qty: 30 TABLET | Refills: 0 | Status: SHIPPED | OUTPATIENT
Start: 2021-04-09 | End: 2021-04-09 | Stop reason: SDUPTHER

## 2021-04-09 RX ORDER — SILDENAFIL 100 MG/1
100 TABLET, FILM COATED ORAL DAILY PRN
Qty: 30 TABLET | Refills: 0 | Status: SHIPPED | OUTPATIENT
Start: 2021-04-09 | End: 2021-12-17 | Stop reason: SDUPTHER

## 2021-04-09 NOTE — ASSESSMENT & PLAN NOTE
Not well controlled  Discussed about low-fat diet and regular exercise  Will continue to monitor  He was told to get his blood work done

## 2021-04-09 NOTE — PROGRESS NOTES
Assessment/Plan:    Erectile dysfunction due to diseases classified elsewhere  Well controlled, was given refill for Viagra  Benign localized prostatic hyperplasia with lower urinary tract symptoms (LUTS)  Not well controlled  Follow up with scheduled Urology appointment  S/P ileostomy ([facility])  Stable, continue to monitor  Essential hypertension    Well controlled  Continue same  Will continue to monitor  Other hyperlipidemia   Not well controlled  Discussed about low-fat diet and regular exercise  Will continue to monitor  He was told to get his blood work done  Problem List Items Addressed This Visit        Cardiovascular and Mediastinum    Essential hypertension       Well controlled  Continue same  Will continue to monitor  Genitourinary    Benign localized prostatic hyperplasia with lower urinary tract symptoms (LUTS) - Primary     Not well controlled  Follow up with scheduled Urology appointment  Relevant Medications    sildenafil (VIAGRA) 100 mg tablet       Other    S/P ileostomy ([facility])     Stable, continue to monitor  Other hyperlipidemia      Not well controlled  Discussed about low-fat diet and regular exercise  Will continue to monitor  He was told to get his blood work done  Erectile dysfunction due to diseases classified elsewhere     Well controlled, was given refill for Viagra  Relevant Medications    sildenafil (VIAGRA) 100 mg tablet            Subjective:      Patient ID: Tamica Esteban is a 62 y o  male  Patient presents for 6 month follow up  He denies any complaints today  He is taking his medications and denies any side effects  He reports that the Viagra helps with his BPH symptoms  He has an upcoming appointment with urology         The following portions of the patient's history were reviewed and updated as appropriate: allergies, current medications, past family history, past medical history, past social history, past surgical history and problem list     Review of Systems   Constitutional: Negative for chills and fever  HENT: Negative for trouble swallowing  Eyes: Negative for visual disturbance  Respiratory: Negative for cough and shortness of breath  Cardiovascular: Negative for chest pain and palpitations  Gastrointestinal: Negative for abdominal pain, blood in stool and vomiting  Endocrine: Negative for cold intolerance and heat intolerance  Genitourinary: Positive for frequency  Negative for difficulty urinating and dysuria  Musculoskeletal: Negative for gait problem  Skin: Negative for rash  Neurological: Negative for dizziness, syncope and headaches  Hematological: Negative for adenopathy  Psychiatric/Behavioral: Negative for behavioral problems  Objective:      /82 (BP Location: Left arm, Patient Position: Sitting, Cuff Size: Adult)   Pulse 71   Temp (!) 97 3 °F (36 3 °C) (Tympanic)   Resp 16   Ht 5' 8" (1 727 m)   Wt 79 5 kg (175 lb 4 oz)   SpO2 97%   BMI 26 65 kg/m²          Physical Exam  Vitals signs and nursing note reviewed  Constitutional:       Appearance: Normal appearance  He is well-developed  HENT:      Head: Normocephalic and atraumatic  Eyes:      Pupils: Pupils are equal, round, and reactive to light  Neck:      Musculoskeletal: Normal range of motion and neck supple  Cardiovascular:      Rate and Rhythm: Normal rate and regular rhythm  Heart sounds: Normal heart sounds  Pulmonary:      Effort: Pulmonary effort is normal       Breath sounds: Normal breath sounds  Abdominal:      General: Bowel sounds are normal       Palpations: Abdomen is soft  Musculoskeletal: Normal range of motion  Lymphadenopathy:      Cervical: No cervical adenopathy  Skin:     General: Skin is warm  Findings: No rash  Neurological:      Mental Status: He is alert and oriented to person, place, and time        Cranial Nerves: No cranial nerve deficit

## 2021-04-19 ENCOUNTER — IMMUNIZATIONS (OUTPATIENT)
Dept: FAMILY MEDICINE CLINIC | Facility: HOSPITAL | Age: 61
End: 2021-04-19

## 2021-04-19 DIAGNOSIS — Z23 ENCOUNTER FOR IMMUNIZATION: Primary | ICD-10-CM

## 2021-04-19 PROCEDURE — 91301 SARS-COV-2 / COVID-19 MRNA VACCINE (MODERNA) 100 MCG: CPT

## 2021-04-19 PROCEDURE — 0011A SARS-COV-2 / COVID-19 MRNA VACCINE (MODERNA) 100 MCG: CPT

## 2021-04-26 DIAGNOSIS — I10 ESSENTIAL HYPERTENSION: ICD-10-CM

## 2021-04-26 RX ORDER — LISINOPRIL 20 MG/1
20 TABLET ORAL DAILY
Qty: 90 TABLET | Refills: 0 | Status: SHIPPED | OUTPATIENT
Start: 2021-04-26

## 2021-04-26 NOTE — TELEPHONE ENCOUNTER
Pt last seen 4/9/21 and [facility] called for a refill on lisinopril 20 mg daily 90 day supply  She can be reached at [phone]   Refill sent to provider for approval

## 2021-05-18 ENCOUNTER — IMMUNIZATIONS (OUTPATIENT)
Dept: FAMILY MEDICINE CLINIC | Facility: HOSPITAL | Age: 61
End: 2021-05-18

## 2021-05-18 DIAGNOSIS — Z23 ENCOUNTER FOR IMMUNIZATION: Primary | ICD-10-CM

## 2021-05-18 PROCEDURE — 0012A SARS-COV-2 / COVID-19 MRNA VACCINE (MODERNA) 100 MCG: CPT

## 2021-05-18 PROCEDURE — 91301 SARS-COV-2 / COVID-19 MRNA VACCINE (MODERNA) 100 MCG: CPT

## 2021-10-05 ENCOUNTER — TELEPHONE (OUTPATIENT)
Dept: HEMATOLOGY ONCOLOGY | Facility: CLINIC | Age: 61
End: 2021-10-05

## 2021-10-27 ENCOUNTER — TELEPHONE (OUTPATIENT)
Dept: FAMILY MEDICINE CLINIC | Facility: CLINIC | Age: 61
End: 2021-10-27

## 2021-10-27 NOTE — TELEPHONE ENCOUNTER
Pt took a covid test 10/24 and got positive   10/27/21 today and only took test because he was supposed to get a plane this afternoon to go to [facility] to see his wife for her birthday    SO he knows he has to quarantine for 10 days total so he is saying it will be over on 11/3  Not sure if he needs to see you or make a follow up after qurantine or anything he can take  He feels fine with no symptoms

## 2021-10-29 ENCOUNTER — TELEMEDICINE (OUTPATIENT)
Dept: FAMILY MEDICINE CLINIC | Facility: CLINIC | Age: 61
End: 2021-10-29
Payer: COMMERCIAL

## 2021-10-29 VITALS — BODY MASS INDEX: 26.58 KG/M2 | WEIGHT: 175.4 LBS | HEIGHT: 68 IN

## 2021-10-29 DIAGNOSIS — U07.1 COVID-19 VIRUS INFECTION: Primary | ICD-10-CM

## 2021-10-29 PROCEDURE — G0071 COMM SVCS BY RHC/FQHC 5 MIN: HCPCS | Performed by: NURSE PRACTITIONER

## 2021-11-18 NOTE — TELEPHONE ENCOUNTER
Fax from [facility] for refill of Lisinopril 20mg      Last visit 12/14/20
Refill sent to provider for approval
29-Mar-2021

## 2021-12-17 ENCOUNTER — TELEPHONE (OUTPATIENT)
Dept: FAMILY MEDICINE CLINIC | Facility: CLINIC | Age: 61
End: 2021-12-17

## 2021-12-17 ENCOUNTER — OFFICE VISIT (OUTPATIENT)
Dept: FAMILY MEDICINE CLINIC | Facility: CLINIC | Age: 61
End: 2021-12-17
Payer: COMMERCIAL

## 2021-12-17 VITALS
RESPIRATION RATE: 16 BRPM | HEART RATE: 67 BPM | DIASTOLIC BLOOD PRESSURE: 80 MMHG | TEMPERATURE: 97.1 F | HEIGHT: 68 IN | BODY MASS INDEX: 26.52 KG/M2 | OXYGEN SATURATION: 98 % | WEIGHT: 175 LBS | SYSTOLIC BLOOD PRESSURE: 128 MMHG

## 2021-12-17 DIAGNOSIS — Z12.11 COLON CANCER SCREENING: Primary | ICD-10-CM

## 2021-12-17 DIAGNOSIS — Z00.00 HEALTHCARE MAINTENANCE: ICD-10-CM

## 2021-12-17 DIAGNOSIS — I10 ESSENTIAL HYPERTENSION: ICD-10-CM

## 2021-12-17 DIAGNOSIS — E78.49 OTHER HYPERLIPIDEMIA: Primary | ICD-10-CM

## 2021-12-17 DIAGNOSIS — K21.9 GASTROESOPHAGEAL REFLUX DISEASE WITHOUT ESOPHAGITIS: ICD-10-CM

## 2021-12-17 DIAGNOSIS — B07.0 PLANTAR WART: ICD-10-CM

## 2021-12-17 DIAGNOSIS — Z12.5 PROSTATE CANCER SCREENING: ICD-10-CM

## 2021-12-17 DIAGNOSIS — N52.1 ERECTILE DYSFUNCTION DUE TO DISEASES CLASSIFIED ELSEWHERE: ICD-10-CM

## 2021-12-17 PROCEDURE — 3725F SCREEN DEPRESSION PERFORMED: CPT | Performed by: FAMILY MEDICINE

## 2021-12-17 PROCEDURE — 1036F TOBACCO NON-USER: CPT | Performed by: FAMILY MEDICINE

## 2021-12-17 PROCEDURE — 3008F BODY MASS INDEX DOCD: CPT | Performed by: FAMILY MEDICINE

## 2021-12-17 PROCEDURE — 3074F SYST BP LT 130 MM HG: CPT | Performed by: FAMILY MEDICINE

## 2021-12-17 PROCEDURE — 17110 DESTRUCTION B9 LES UP TO 14: CPT | Performed by: FAMILY MEDICINE

## 2021-12-17 PROCEDURE — G0439 PPPS, SUBSEQ VISIT: HCPCS | Performed by: FAMILY MEDICINE

## 2021-12-17 PROCEDURE — 99214 OFFICE O/P EST MOD 30 MIN: CPT | Performed by: FAMILY MEDICINE

## 2021-12-17 PROCEDURE — 3079F DIAST BP 80-89 MM HG: CPT | Performed by: FAMILY MEDICINE

## 2021-12-17 RX ORDER — SILDENAFIL 100 MG/1
100 TABLET, FILM COATED ORAL DAILY PRN
Qty: 15 TABLET | Refills: 0 | Status: SHIPPED | OUTPATIENT
Start: 2021-12-17

## 2022-01-20 ENCOUNTER — OFFICE VISIT (OUTPATIENT)
Dept: FAMILY MEDICINE CLINIC | Facility: CLINIC | Age: 62
End: 2022-01-20
Payer: MEDICARE

## 2022-01-20 VITALS
HEART RATE: 76 BPM | HEIGHT: 68 IN | SYSTOLIC BLOOD PRESSURE: 122 MMHG | DIASTOLIC BLOOD PRESSURE: 88 MMHG | BODY MASS INDEX: 26.69 KG/M2 | OXYGEN SATURATION: 99 % | TEMPERATURE: 97.7 F | WEIGHT: 176.13 LBS | RESPIRATION RATE: 16 BRPM

## 2022-01-20 DIAGNOSIS — Z93.2 S/P ILEOSTOMY (HCC): ICD-10-CM

## 2022-01-20 DIAGNOSIS — M54.31 RIGHT SCIATIC NERVE PAIN: Primary | ICD-10-CM

## 2022-01-20 DIAGNOSIS — I10 ESSENTIAL HYPERTENSION: ICD-10-CM

## 2022-01-20 PROCEDURE — 99214 OFFICE O/P EST MOD 30 MIN: CPT | Performed by: FAMILY MEDICINE

## 2022-01-20 PROCEDURE — 3008F BODY MASS INDEX DOCD: CPT | Performed by: FAMILY MEDICINE

## 2022-01-20 RX ORDER — PREDNISONE 50 MG/1
50 TABLET ORAL DAILY
Qty: 5 TABLET | Refills: 0 | Status: SHIPPED | OUTPATIENT
Start: 2022-01-20 | End: 2022-01-25

## 2022-01-20 RX ORDER — METHOCARBAMOL 500 MG/1
500 TABLET, FILM COATED ORAL
Qty: 30 TABLET | Refills: 0 | Status: SHIPPED | OUTPATIENT
Start: 2022-01-20

## 2022-01-20 NOTE — PROGRESS NOTES
Assessment/Plan:    Right sciatic nerve pain  Prescription given for 5 day course of prednisone and a muscle relaxer for symptoms relief  If symptoms persist despite pharmacologic therapy, will consider referral to orthopedics       Essential hypertension  Well controlled  Continue same  Will continue to monitor  It was discussed with patient that the prednisone can affect his blood pressure  Problem List Items Addressed This Visit        Cardiovascular and Mediastinum    Essential hypertension     Well controlled  Continue same  Will continue to monitor  It was discussed with patient that the prednisone can affect his blood pressure  Nervous and Auditory    Right sciatic nerve pain - Primary     Prescription given for 5 day course of prednisone and a muscle relaxer for symptoms relief  If symptoms persist despite pharmacologic therapy, will consider referral to orthopedics            Relevant Medications    predniSONE 50 mg tablet    methocarbamol (ROBAXIN) 500 mg tablet      Other Visit Diagnoses     S/P ileostomy ([address] )                Subjective:      Patient ID: Edilia Young is a 62 y o  male  HPI     Patient reports to the office for evaluation of 10/10 R buttock pain with intermittent radiation down the back of the thigh and calf  He states that the pain has been there for 10 days and has been affecting his ability to walk and drive  He notes that any physical activity worsens the pain  The pain is constant  He has tried tylenol and icy-hot cream to no relief  The pain is better when staying still and when laying in a prone position  He reports having had a similar episode of pain 5-6 years ago which required PT   No history of trauma to the leg     The following portions of the patient's history were reviewed and updated as appropriate: allergies, current medications, past family history, past medical history, past social history, past surgical history and problem list     Review of Systems   Constitutional: Negative for activity change, appetite change, fatigue, fever and unexpected weight change  HENT: Negative  Eyes: Negative  Respiratory: Negative for cough and shortness of breath  Cardiovascular: Negative for chest pain, palpitations and leg swelling  Gastrointestinal: Negative for abdominal pain, constipation, nausea and vomiting  Endocrine: Negative  Genitourinary: Negative  Musculoskeletal: Positive for back pain  Negative for arthralgias, gait problem, joint swelling, myalgias, neck pain and neck stiffness  Skin: Negative  Neurological: Negative  Psychiatric/Behavioral: Negative  Objective:      /88 (BP Location: Left arm, Patient Position: Sitting, Cuff Size: Large)   Pulse 76   Temp 97 7 °F (36 5 °C) (Tympanic)   Resp 16   Ht 5' 8" (1 727 m)   Wt 79 9 kg (176 lb 2 oz)   SpO2 99%   BMI 26 78 kg/m²          Physical Exam  Constitutional:       General: He is in acute distress (secondary to pain )  Appearance: Normal appearance  He is not ill-appearing  HENT:      Head: Normocephalic and atraumatic  Right Ear: External ear normal       Left Ear: External ear normal    Eyes:      Conjunctiva/sclera: Conjunctivae normal    Cardiovascular:      Rate and Rhythm: Normal rate and regular rhythm  Heart sounds: Normal heart sounds  No murmur heard  Pulmonary:      Effort: Pulmonary effort is normal  No respiratory distress  Breath sounds: No stridor  No wheezing, rhonchi or rales  Musculoskeletal:         General: No swelling, tenderness, deformity or signs of injury  Cervical back: Normal range of motion and neck supple  Right lower leg: No edema  Left lower leg: No edema  Neurological:      General: No focal deficit present  Mental Status: He is alert  Cranial Nerves: No cranial nerve deficit  Sensory: No sensory deficit  Motor: No weakness        Gait: Gait normal    Psychiatric:         Mood and Affect: Mood normal

## 2022-01-26 ENCOUNTER — OFFICE VISIT (OUTPATIENT)
Dept: DENTISTRY | Facility: CLINIC | Age: 62
End: 2022-01-26

## 2022-01-26 ENCOUNTER — TELEPHONE (OUTPATIENT)
Dept: GASTROENTEROLOGY | Facility: MEDICAL CENTER | Age: 62
End: 2022-01-26

## 2022-01-26 VITALS — HEART RATE: 69 BPM | SYSTOLIC BLOOD PRESSURE: 138 MMHG | DIASTOLIC BLOOD PRESSURE: 96 MMHG | TEMPERATURE: 96 F

## 2022-01-26 DIAGNOSIS — Z01.20 ENCOUNTER FOR DENTAL EXAMINATION: Primary | ICD-10-CM

## 2022-01-26 PROCEDURE — D0274 BITEWINGS - 4 RADIOGRAPHIC IMAGES: HCPCS

## 2022-01-26 PROCEDURE — D1110 PROPHYLAXIS - ADULT: HCPCS

## 2022-01-26 PROCEDURE — D1310 NUTRITIONAL COUNSELING FOR CONTROL OF DENTAL DISEASE: HCPCS

## 2022-01-26 PROCEDURE — D1330 ORAL HYGIENE INSTRUCTIONS: HCPCS

## 2022-01-26 PROCEDURE — D0120 PERIODIC ORAL EVALUATION - ESTABLISHED PATIENT: HCPCS | Performed by: DENTIST

## 2022-01-26 NOTE — PROGRESS NOTES
Adult Prophy     Exams:  Periodic exam  Xrays:    4 BWX     Type of Treatment:  Adult Prophy - used Ultrasonic and Hand scaling,  Polished, Flossed  Reviewed OHI  Brush:  2X/day and Floss 1X/day  Recommended Listerine    EO/OCS Exams:  No significant findings  IO: No significant findings  Oral Hygiene:   Fair    Plaque: Moderate   Calculus: Moderate    Bleeding:  Light to Moderate    Gingiva:  Slight generalized gingival inflammation - last prophy was 07/06/20  Stain:   Moderate   Perio Charting: Spot Probing - 3-4mm posteriors - probed prior to cleaning - readings may not be accurate  Periocharting was not completed  - need to do full probe at next recall - no time today  Caries Findings:   Teeth #8 and 15 - recommended extraction of #15 but pt refuses and wants to save it  Explained that the tooth has no opposing tooth    Treatment Plan:  Updated   Dr  Exam:  Dr Cortney Billings  Referral:  No referral given   NV:  #29 - RCT  NVV:  #29 - Post/Core   NVVV:  PFM [facility]  NVVVV:  #5 - PFFM [facility]

## 2022-01-28 ENCOUNTER — TELEPHONE (OUTPATIENT)
Dept: FAMILY MEDICINE CLINIC | Facility: CLINIC | Age: 62
End: 2022-01-28

## 2022-01-28 DIAGNOSIS — M54.31 RIGHT SCIATIC NERVE PAIN: Primary | ICD-10-CM

## 2022-01-28 DIAGNOSIS — M54.41 CHRONIC RIGHT-SIDED LOW BACK PAIN WITH RIGHT-SIDED SCIATICA: ICD-10-CM

## 2022-01-28 DIAGNOSIS — G89.29 CHRONIC RIGHT-SIDED LOW BACK PAIN WITH RIGHT-SIDED SCIATICA: ICD-10-CM

## 2022-01-28 NOTE — TELEPHONE ENCOUNTER
Phone call from pt, states he was seen recently &  the medication did not help  He is still having sciatica pain  Please advise pt

## 2022-02-06 DIAGNOSIS — N40.1 BENIGN LOCALIZED PROSTATIC HYPERPLASIA WITH LOWER URINARY TRACT SYMPTOMS (LUTS): ICD-10-CM

## 2022-02-07 DIAGNOSIS — N40.1 BENIGN LOCALIZED PROSTATIC HYPERPLASIA WITH LOWER URINARY TRACT SYMPTOMS (LUTS): ICD-10-CM

## 2022-02-07 RX ORDER — TAMSULOSIN HYDROCHLORIDE 0.4 MG/1
0.4 CAPSULE ORAL
Qty: 90 CAPSULE | Refills: 3 | Status: CANCELLED | OUTPATIENT
Start: 2022-02-07

## 2022-02-07 RX ORDER — TAMSULOSIN HYDROCHLORIDE 0.4 MG/1
CAPSULE ORAL
Qty: 90 CAPSULE | Refills: 3 | Status: SHIPPED | OUTPATIENT
Start: 2022-02-07

## 2022-02-07 NOTE — TELEPHONE ENCOUNTER
Phone call from pt, refill Tamsulosin 0 4 mg   Call to [facility], [facility]

## 2022-02-08 ENCOUNTER — OFFICE VISIT (OUTPATIENT)
Dept: OBGYN CLINIC | Facility: CLINIC | Age: 62
End: 2022-02-08
Payer: MEDICARE

## 2022-02-08 VITALS
WEIGHT: 175 LBS | DIASTOLIC BLOOD PRESSURE: 86 MMHG | BODY MASS INDEX: 26.52 KG/M2 | HEIGHT: 68 IN | SYSTOLIC BLOOD PRESSURE: 136 MMHG

## 2022-02-08 DIAGNOSIS — M54.31 SCIATICA OF RIGHT SIDE: Primary | ICD-10-CM

## 2022-02-08 PROCEDURE — 99203 OFFICE O/P NEW LOW 30 MIN: CPT | Performed by: PHYSICIAN ASSISTANT

## 2022-02-08 NOTE — PROGRESS NOTES
Patient Name:  Sarah Bunn  MRN:  [id number]    Assessment & Plan     Right lower extremity sciatica  1  Referral to physical therapy  2  Continue Tylenol and heating pad  3  Activities as tolerated with modification to avoid pain  4  Follow up in 6 weeks with primary care sports medicine, Dr Federica Ramos  Chief Complaint     Right lower extremity pain    History of the Present Illness     Sarah Bunn is a 62 y o  male who reports to the office today for evaluation of his right lower extremity  He notes an onset of pain approximately one month ago  He denies any injury or trauma but states the pain began while traveling  Pain originates in the right buttock region with radiation distally to the mid calf  He describes the pain as sharp  Pain is worse with prolonged standing as well as prolonged driving  He denies any weakness or instability  No numbness or tingling  No fevers or chills  He did see his PCP initially who prescribed a prednisone taper which provided minimal relief  He denies any gait or balance issues  No bowel or bladder dysfunction  No saddle paresthesias  Physical Exam     /86   Ht 5' 8" (1 727 m)   Wt 79 4 kg (175 lb)   BMI 26 61 kg/m²     Lumbar spine:  No gross deformity  Skin intact  No tenderness to palpation midline and paraspinal musculature  No tenderness to palpation SI joints bilaterally  Tenderness to palpation right piriformis muscle  Motor/sensation intact L2-S1 bilaterally with 5/5 strength  Negative straight leg raise bilaterally  Eyes: Anicteric sclerae  ENT: Trachea midline  Lungs: Normal respiratory effort  CV: Capillary refill is less than 2 seconds  Skin: Intact without erythema  Lymph: No palpable lymphadenopathy  Neuro: Sensation is grossly intact to light touch  Psych: Mood and affect are appropriate      Data Review     I have personally reviewed pertinent films in PACS, and my interpretation follows:    X-rays lumbar spine 2/8/22:  No acute osseous abnormalities  No fracture or spondylolisthesis noted  Significant degenerative changes at L5-S1  Past Medical History:   Diagnosis Date    Abscess, intestine     Arthritis     Diverticulitis     GERD (gastroesophageal reflux disease)     History of high blood pressure     Hydronephrosis 5/27/2018       Past Surgical History:   Procedure Laterality Date    ABCESS DRAINAGE      COLON SURGERY      COLONOSCOPY N/A 5/5/2016    Procedure: COLONOSCOPY;  Surgeon: Gregoria Coleman MD;  Location: [facility]; Service:     COLONOSCOPY N/A 7/26/2018    Procedure: COLONOSCOPY with bx's;  Surgeon: Primo Jordan MD;  Location: [facility]; Service: Gastroenterology    ESOPHAGOGASTRODUODENOSCOPY      with biopsy    FOOT SURGERY Left     x2? fusion? due to arthritis  onset: 0      HERNIA REPAIR      ILEO LOOP DIVERSION N/A 6/1/2018    Procedure: ILEOSTOMY LOOP DIVERTING;  Surgeon: Sreedhar Small DO;  [facility]: [facility];  Service: General    LAPAROTOMY N/A 6/1/2018    Procedure: LAPAROTOMY EXPLORATORY,;  Surgeon: Sreedhar Small DO;  [facility];  Service: General    MS CLOSE ENTEROSTOMY N/A 8/16/2018    Procedure: CLOSURE LOOP ILEOSTOMY;  Surgeon: Donavon Olsen MD;  [facility];  Service: General    MS CYSTOURETHROSCOPY,URETER CATHETER Left 6/9/2018    Procedure: CYSTOSCOPY RETROGRADE PYELOGRAM WITH INSERTION STENT URETERAL;  Surgeon: Helio Luna MD;  [facility];  Service: Urology    MS [facility] N/A 1/31/2019    Procedure: REPAIR HERNIA INCISIONAL LAPAROSCOPIC;  Surgeon: Donavon Olsen MD;  [facility];  Service: General    MS [facility] 19 Right 1/31/2019    Procedure: REPAIR HERNIA INGUINAL, LAPAROSCOPIC;  Surgeon: Donavon Olsen MD;  [facility];  Service: General    MS PART REMOVAL COLON W ANASTOMOSIS N/A 6/1/2018    Procedure: RESECTION COLON SIGMOID; Surgeon: Matt Alberto DO;  [facility]: [facility];  Service: General       Allergies   Allergen Reactions    Penicillins Rash     itching       Current Outpatient Medications on File Prior to Visit   Medication Sig Dispense Refill    DULoxetine (CYMBALTA) 30 mg delayed release capsule Take 1 capsule (30 mg total) by mouth daily 30 capsule 5    gabapentin (NEURONTIN) 300 mg capsule Take 1 capsule (300 mg total) by mouth 3 (three) times a day 90 capsule 1    Ibuprofen (ADVIL PO) Take by mouth as needed        lisinopril (ZESTRIL) 20 mg tablet Take 1 tablet (20 mg total) by mouth daily 90 tablet 0    methocarbamol (ROBAXIN) 500 mg tablet Take 1 tablet (500 mg total) by mouth daily at bedtime as needed for muscle spasms 30 tablet 0    methylPREDNISolone 4 MG tablet therapy pack Use as directed on package (Patient not taking: Reported on 10/20/2020) 21 tablet 0    ondansetron (ZOFRAN-ODT) 4 mg disintegrating tablet Take 1 tablet (4 mg total) by mouth every 8 (eight) hours as needed for nausea or vomiting (Patient not taking: Reported on 10/29/2021) 15 tablet 0    sildenafil (VIAGRA) 100 mg tablet Take 1 tablet (100 mg total) by mouth daily as needed for erectile dysfunction 15 tablet 0    tamsulosin (FLOMAX) 0 4 mg take 1 capsule by mouth daily with dinner 90 capsule 3     No current facility-administered medications on file prior to visit  Social History     Tobacco Use    Smoking status: Former Smoker    Smokeless tobacco: Never Used    Tobacco comment: quit > 1 year    Vaping Use    Vaping Use: Never used   Substance Use Topics    Alcohol use: Yes     Comment: 1 drink / weekend     Drug use: No       Family History   Problem Relation Age of Onset    Other Mother         head tumor    Glaucoma Father     Diabetes Father         possible diabetes    Stroke Family        Review of Systems     As stated in the HPI  All other systems reviewed and are negative

## 2022-02-23 ENCOUNTER — EVALUATION (OUTPATIENT)
Dept: PHYSICAL THERAPY | Age: 62
End: 2022-02-23
Payer: MEDICARE

## 2022-02-23 DIAGNOSIS — M54.31 RIGHT SIDED SCIATICA: Primary | ICD-10-CM

## 2022-02-23 PROCEDURE — 97110 THERAPEUTIC EXERCISES: CPT

## 2022-02-23 PROCEDURE — 97161 PT EVAL LOW COMPLEX 20 MIN: CPT

## 2022-02-23 NOTE — PROGRESS NOTES
PT Evaluation     Today's date: 2022  Patient name: Reginaldo Bunn  : 1960  MRN: [id number]  Referring provider: Bradford Wren*  Dx:   Encounter Diagnosis     ICD-10-CM    1  Right sided sciatica  M54 31                   Assessment  Assessment details: Pt is a 62 y o  male presents to IE with chief c/o R leg pain originating in gluteal region and extending to calf  Signs and symptoms indicate probable diagnosis of R sided sciatica  Exhibits flexion bias in supine  He has primary impairments of decreased lumbar ROM, decreased LE flexibility, abnormal gait pattern 2/2 pain, decreased LE strength, and increased pain  Pt's impairments are limiting his ability to complete ADL's and participate in usual leisure activities  Pt was given exercises as part of an HEP and was able to complete exercises on IE with good effort and minimal VC's for proper form  Educated pt on proper completion of HEP and normal response to exercises  He verbalizes understanding of all education provided  All questions answered   Pt would benefit from skilled OP PT in order to improve upon impairments and return to PLOF   Impairments: abnormal gait, abnormal or restricted ROM, activity intolerance, impaired physical strength, lacks appropriate home exercise program, pain with function, poor posture  and poor body mechanics  Understanding of Dx/Px/POC: good  Goals  Short term goals  Pt will improve strength by 1/2 grade in order to perform ADL's within 2-3 weeks   Pt will be able to bend down to  object off of the floor with a 50% reduction in symptoms within 2-3 weeks  Pt will be able to stand for >30 minutes with a 50% reduction in symptoms within 2-3 weeks  Pt will improve lumbar ROM by at least 50% within 2-3 weeks    Long term goals  Pt will be able to tolerate driving with a 43% reduction in symptoms by d/c  Pt will be I with HEP  Pt will be able to walk for >30 minutes with <2/10 pain by d/c  Pt will be able to stand for >1 hour with <2/10  Pt will be able to complete all ADL's without pain by d/c      Plan  Planned modality interventions: thermotherapy: hydrocollator packs, cryotherapy and TENS  Planned therapy interventions: patient education, therapeutic exercise, graded exercise, functional ROM exercises, flexibility, home exercise program, manual therapy, activity modification, strengthening, stretching, joint mobilization, massage and therapeutic activities  Frequency: 1x week  Duration in visits: 6  Duration in weeks: 6  Treatment plan discussed with: patient        Subjective Evaluation    History of Present Illness  Mechanism of injury: Pt states that pain in January when he came back from [facility]  He was laying on the ground in the airport for about 10 hours  And then a few days later he started having the sciatic pain  He had this same sciatic issue a few years ago where he received physical therapy for it which resolved the issue  Pain starts in his buttock region and radiates into his calf  The problem increases when he is driving as he turns his leg from the gas to the brake  He has to keep his leg bent to reduce symptoms or he gets up and walks around  Driving is the most aggravating factor  Pain is constant throughout the day  Denies numbness/tingling, only pain that stops into his calf  Standing for long periods of time aggravates his symptoms             Recurrent probem    Pain  Current pain ratin  At best pain ratin  At worst pain ratin  Quality: sharp  Relieving factors: rest and change in position  Aggravating factors: lifting, standing and walking  Progression: worsening      Diagnostic Tests  X-ray: normal  Treatments  Previous treatment: physical therapy  Current treatment: physical therapy  Patient Goals  Patient goals for therapy: decreased pain  Patient goal: Get rid of the pain        Objective     Active Range of Motion     Lumbar   Flexion:  Restriction level: minimal  Extension:  Restriction level: minimal  Left lateral flexion:  Restriction level: moderate  Right lateral flexion:  Restriction level: moderate    Additional Active Range of Motion Details  Lumbar ROM (cm from third finger to floor)  Flex - 16 cm  L lat flexion - 56 cm  R lat flexion - 60 cm  Extension - 25 degrees   Mechanical Assessment    Cervical      Thoracic      Lumbar    Standing flexion: repeated movements   Pain level: increased  Lying flexion: repeated movements  Pain intensity: better  Pain level: decreased  Standing extension:   Pain location: no change  Pain level: decreased    Strength/Myotome Testing     Left Hip   Planes of Motion   Flexion: 4  Abduction: 4-  Adduction: 4    Right Hip   Planes of Motion   Flexion: 4  Abduction: 4-  Adduction: 4    Left Knee   Flexion: 4  Extension: 4    Right Knee   Flexion: 4  Extension: 4    Left Ankle/Foot   Dorsiflexion: 4+    Right Ankle/Foot   Dorsiflexion: 4+    Tests     Lumbar     Right   Positive passive SLR and slump test      Right Hip   Negative KEV and FADIR                Precautions: None      Manuals 2/23/22            STM piriformis 5'                                                   Neuro Re-Ed                                                                                                        Ther Ex             Hamstring stretch 3x30s            Piriformis stretch 3x30s            SKTC 5sx15            Sciatic nerve glide 2x20            Nu step             Supine bridge             Hip abd             Repeated flexion             TA ball press             Hip IR/ER             Ther Activity                                       Gait Training                                       Modalities

## 2022-02-28 ENCOUNTER — OFFICE VISIT (OUTPATIENT)
Dept: PHYSICAL THERAPY | Age: 62
End: 2022-02-28
Payer: MEDICARE

## 2022-02-28 DIAGNOSIS — M54.31 RIGHT SIDED SCIATICA: Primary | ICD-10-CM

## 2022-02-28 PROCEDURE — 97110 THERAPEUTIC EXERCISES: CPT

## 2022-02-28 NOTE — PROGRESS NOTES
Daily Note     Today's date: 2022  Patient name: Radha Bunn  : 1960  MRN: [id number]  Referring provider: Gavi Jama*  Dx:   Encounter Diagnosis     ICD-10-CM    1  Right sided sciatica  M54 31                   Subjective: Pt states that he started having pain on the other side of his lower back and also his neck but does not know why  Objective: See treatment diary below      Assessment: Pt was able to complete all exercises without increase in symptoms  He had mild difficulty with supine SLR flexion due to hip and core weakness  Reviewed HEP exercises which pt is completing with good form  Tolerated treatment well  Patient demonstrated fatigue post treatment, exhibited good technique with therapeutic exercises and would benefit from continued PT      Plan: Continue per plan of care  Progress treatment as tolerated         Precautions: None      Manuals 22           STM piriformis 5'                                                   Neuro Re-Ed                                                                                                        Ther Ex             Hamstring stretch 3x30s 3x30s           Piriformis stretch 3x30s 3x30s           SKTC 5sx15 5sx15 b/l           Sciatic nerve glide 2x20            Bike   10'            Supine bridge  3x10           Hip abd TB  3x10 black TB           Repeated flexion  x30 w/ physioball           TA ball press             Hip IR/ER  NT           Standing SLR  3x10 hip abd/ext            Ther Activity                                       Gait Training                                       Modalities

## 2022-03-01 ENCOUNTER — OFFICE VISIT (OUTPATIENT)
Dept: UROLOGY | Facility: AMBULATORY SURGERY CENTER | Age: 62
End: 2022-03-01
Payer: MEDICARE

## 2022-03-01 ENCOUNTER — APPOINTMENT (OUTPATIENT)
Dept: LAB | Facility: CLINIC | Age: 62
End: 2022-03-01
Payer: MEDICARE

## 2022-03-01 VITALS
WEIGHT: 176 LBS | BODY MASS INDEX: 26.67 KG/M2 | SYSTOLIC BLOOD PRESSURE: 140 MMHG | HEART RATE: 70 BPM | HEIGHT: 68 IN | DIASTOLIC BLOOD PRESSURE: 80 MMHG

## 2022-03-01 DIAGNOSIS — R35.0 BENIGN PROSTATIC HYPERPLASIA WITH URINARY FREQUENCY: Primary | ICD-10-CM

## 2022-03-01 DIAGNOSIS — Z12.5 PROSTATE CANCER SCREENING: ICD-10-CM

## 2022-03-01 DIAGNOSIS — N40.1 BENIGN PROSTATIC HYPERPLASIA WITH URINARY FREQUENCY: Primary | ICD-10-CM

## 2022-03-01 DIAGNOSIS — N40.1 BENIGN PROSTATIC HYPERPLASIA WITH URINARY FREQUENCY: ICD-10-CM

## 2022-03-01 DIAGNOSIS — R35.0 BENIGN PROSTATIC HYPERPLASIA WITH URINARY FREQUENCY: ICD-10-CM

## 2022-03-01 LAB — PSA SERPL-MCNC: 2.8 NG/ML (ref 0–4)

## 2022-03-01 PROCEDURE — 99214 OFFICE O/P EST MOD 30 MIN: CPT | Performed by: NURSE PRACTITIONER

## 2022-03-01 PROCEDURE — G0103 PSA SCREENING: HCPCS

## 2022-03-01 PROCEDURE — 36415 COLL VENOUS BLD VENIPUNCTURE: CPT

## 2022-03-01 PROCEDURE — 87086 URINE CULTURE/COLONY COUNT: CPT

## 2022-03-01 RX ORDER — TADALAFIL 20 MG/1
TABLET ORAL
COMMUNITY
Start: 2021-12-20

## 2022-03-01 NOTE — PROGRESS NOTES
3/1/2022      Chief Complaint   Patient presents with    New Patient Visit    Benign Prostatic Hypertrophy     Assessment and Plan    62 y o  male managed by Dr Americo Basurto    1  Benign Prostatic Hyperplasia with lower urinary tract symtoms  · Continue tamsulosin  · OR case requested-cystoscopy/TRUS  · Discussed dietary behavior modifications to reduce irritative symptoms  · AVELINO-45 g prostate with no nodules  · PSA to be performed in 2 weeks  · Urine culture ordered    2  Erectile dysfunction  · Continue tadalafil best previously prescribed-prescription refill not needed at this time    History of Present Illness  Jared Bunn is a 62 y o  male here for follow up evaluation of  urinary symptoms secondary benign prostatic hyperplasia  Patient with a 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 and underwent retrograde pyelogram and stent placement by Dr Keira Lan 06/2018  He reports over the course of the last few years having increasing difficulties with urinary frequency, urgency and nocturia  He was initiated on tamsulosin but given sexual side effects discontinue medication  At that point after discontinuance, he reported worsening urinary symptoms and decided to reinitiate therapy with tamsulosin  On prior office evaluation it was discussed to proceed with cystoscopy given worsening urinary symptoms  Patient reports he needs to be sedated and cystoscopies to be performed in the operating room in order for him to be able to proceed  He currently complains of urinary frequency, urgency and nocturia upwards to 5 times at night  He has a weak urinary stream     Patient denies complications secondary to anesthesia  He reports smoking daily  He denies use/abuse of illicit substances and alcohol  He reports snoring  Review of Systems   Constitutional: Negative for chills and fever  Respiratory: Negative for cough and shortness of breath  Cardiovascular: Negative for chest pain  Gastrointestinal: Negative for abdominal distention, abdominal pain, blood in stool, nausea and vomiting  Genitourinary: Positive for difficulty urinating, frequency and urgency  Negative for dysuria, enuresis, flank pain and hematuria  Nocturia x5   Skin: Negative for rash  AUA SYMPTOM SCORE      Most Recent Value   AUA SYMPTOM SCORE    How often have you had a sensation of not emptying your bladder completely after you finished urinating? 3   How often have you had to urinate again less than two hours after you finished urinating? 5   How often have you found you stopped and started again several times when you urinate? 5   How often have you found it difficult to postpone urination? 5   How often have you had a weak urinary stream? 3   How often have you had to push or strain to begin urination? 3   How many times did you most typically get up to urinate from the time you went to bed at night until the time you got up in the morning? 5   Quality of Life: If you were to spend the rest of your life with your urinary condition just the way it is now, how would you feel about that? 6   AUA SYMPTOM SCORE 29             Past Medical History  Past Medical History:   Diagnosis Date    Abscess, intestine     Arthritis     Diverticulitis     GERD (gastroesophageal reflux disease)     History of high blood pressure     Hydronephrosis 5/27/2018       Past Social History  Past Surgical History:   Procedure Laterality Date    ABCESS DRAINAGE      COLON SURGERY      COLONOSCOPY N/A 5/5/2016    Procedure: COLONOSCOPY;  Surgeon: Lissa Baxter MD;  Location: [facility]; Service:     COLONOSCOPY N/A 7/26/2018    Procedure: COLONOSCOPY with bx's;  Surgeon: Ema Murphy MD;  Location: [facility]; Service: Gastroenterology    ESOPHAGOGASTRODUODENOSCOPY      with biopsy    FOOT SURGERY Left     x2? fusion? due to arthritis  onset: 0      HERNIA REPAIR      ILEO LOOP DIVERSION N/A 6/1/2018 Procedure: ILEOSTOMY LOOP DIVERTING;  Surgeon: Sreedhar Small DO;  [facility];  Service: General    LAPAROTOMY N/A 6/1/2018    Procedure: LAPAROTOMY EXPLORATORY,;  Surgeon: Sreedhar Small DO;  [facility];  Service: General    [facility] CLOSE ENTEROSTOMY N/A 8/16/2018    Procedure: CLOSURE LOOP ILEOSTOMY;  Surgeon: Donavon Olsen MD;  [facility];  Service: General    [place] CYSTOURETHROSCOPY,URETER CATHETER Left 6/9/2018    Procedure: CYSTOSCOPY RETROGRADE PYELOGRAM WITH INSERTION STENT URETERAL;  Surgeon: Helio Luna MD;  [facility];  Service: Urology    [facility] N/A 1/31/2019    Procedure: REPAIR HERNIA INCISIONAL LAPAROSCOPIC;  Surgeon: Donavon Olsen MD;  [facility];  Service: General    [facility] Emily Hartman 19 Right 1/31/2019    Procedure: REPAIR HERNIA INGUINAL, LAPAROSCOPIC;  Surgeon: Donavon Olsen MD;  [facility];  Service: General    [place] PART REMOVAL COLON W ANASTOMOSIS N/A 6/1/2018    Procedure: RESECTION COLON SIGMOID;  Surgeon: Sreedhar Small DO;  [facility];  Service: General     Social History     Tobacco Use   Smoking Status Former Smoker   Smokeless Tobacco Never Used   Tobacco Comment    quit > 1 year        Past Family History  Family History   Problem Relation Age of Onset    Other Mother         head tumor    Glaucoma Father     Diabetes Father         possible diabetes    Stroke Family        Past Social history  Social History     Socioeconomic History    Marital status: /Civil Union     Spouse name: Not on file    Number of children: Not on file    Years of education: Not on file    Highest education level: Not on file   Occupational History    Not on file   Tobacco Use    Smoking status: Former Smoker    Smokeless tobacco: Never Used    Tobacco comment: quit > 1 year    Vaping Use    Vaping Use: Never used   Substance and Sexual Activity    Alcohol use: Yes     Comment: 1 drink / weekend     Drug use: No    Sexual activity: Not on file   Other Topics Concern    Not on file   Social History Narrative    Not on file     Social Determinants of Health     Financial Resource Strain: Not on file   Food Insecurity: Not on file   Transportation Needs: Not on file   Physical Activity: Not on file   Stress: Not on file   Social Connections: Not on file   Intimate Partner Violence: Not on file   Housing Stability: Not on file       Current Medications  Current Outpatient Medications   Medication Sig Dispense Refill    DULoxetine (CYMBALTA) 30 mg delayed release capsule Take 1 capsule (30 mg total) by mouth daily 30 capsule 5    gabapentin (NEURONTIN) 300 mg capsule Take 1 capsule (300 mg total) by mouth 3 (three) times a day 90 capsule 1    Ibuprofen (ADVIL PO) Take by mouth as needed        lisinopril (ZESTRIL) 20 mg tablet Take 1 tablet (20 mg total) by mouth daily 90 tablet 0    methocarbamol (ROBAXIN) 500 mg tablet Take 1 tablet (500 mg total) by mouth daily at bedtime as needed for muscle spasms 30 tablet 0    sildenafil (VIAGRA) 100 mg tablet Take 1 tablet (100 mg total) by mouth daily as needed for erectile dysfunction 15 tablet 0    tadalafil (CIALIS) 20 MG tablet TAKE 1 TABLET BY MOUTH 30 MIN BEFORE SEXUAL ACTIVITY, AS NEEDED FOR ERECTILE DYSFUNCTION  TAKE NO MORE THAN 3 TIMES PER WEEK      tamsulosin (FLOMAX) 0 4 mg take 1 capsule by mouth daily with dinner 90 capsule 3    methylPREDNISolone 4 MG tablet therapy pack Use as directed on package (Patient not taking: Reported on 10/20/2020) 21 tablet 0    ondansetron (ZOFRAN-ODT) 4 mg disintegrating tablet Take 1 tablet (4 mg total) by mouth every 8 (eight) hours as needed for nausea or vomiting (Patient not taking: Reported on 10/29/2021) 15 tablet 0     No current facility-administered medications for this visit         Allergies  Allergies   Allergen Reactions    Penicillins Rash     itching The following portions of the patient's history were reviewed and updated as appropriate: allergies, current medications, past medical history, past social history, past surgical history and problem list       Vitals  Vitals:    03/01/22 0757   BP: 140/80   BP Location: Left arm   Patient Position: Sitting   Cuff Size: Adult   Pulse: 70   Weight: 79 8 kg (176 lb)   Height: 5' 8" (1 727 m)           Physical Exam  Physical Exam  Vitals reviewed  Constitutional:       General: He is not in acute distress  Appearance: Normal appearance  He is normal weight  HENT:      Head: Normocephalic  Eyes:      Pupils: Pupils are equal, round, and reactive to light  Cardiovascular:      Rate and Rhythm: Normal rate  Pulmonary:      Effort: No respiratory distress  Breath sounds: Normal breath sounds  Abdominal:      General: There is no distension  Palpations: Abdomen is soft  Tenderness: There is no abdominal tenderness  Genitourinary:     Comments: AVELINO-prostate 45 g with no nodules  Musculoskeletal:         General: Normal range of motion  Skin:     General: Skin is warm and dry  Neurological:      General: No focal deficit present  Mental Status: He is alert and oriented to person, place, and time     Psychiatric:         Mood and Affect: Mood normal          Behavior: Behavior normal            Results  No results found for this or any previous visit (from the past 1 hour(s)) ]  Lab Results   Component Value Date    PSA 2 4 06/24/2019    PSA 2 4 05/03/2016     Lab Results   Component Value Date    GLUCOSE 94 01/03/2016    CALCIUM 9 1 12/02/2020     01/03/2016    K 4 7 12/02/2020    CO2 27 12/02/2020     (H) 12/02/2020    BUN 13 12/02/2020    CREATININE 0 88 12/02/2020     Lab Results   Component Value Date    WBC 7 28 12/02/2020    HGB 15 2 12/02/2020    HCT 49 3 12/02/2020    MCV 97 12/02/2020     12/02/2020           Orders  Orders Placed This Encounter Procedures    Urine culture     Standing Status:   Future     Number of Occurrences:   1     Standing Expiration Date:   3/1/2023     Order Specific Question:   Release to patient through [facility]     Answer:   Immediate    PSA, Total Screen     This is a patient instruction: This test is non-fasting  Please drink two glasses of water morning of bloodwork          Standing Status:   Future     Number of Occurrences:   1     Standing Expiration Date:   9/1/2023       GLENDY Ibarra

## 2022-03-01 NOTE — PATIENT INSTRUCTIONS
Cystoscopy   AMBULATORY CARE:   A cystoscopy  is a procedure to look inside of your urethra and bladder using a cystoscope  A cystoscope is a small tube with a light and magnifying camera on the end  The procedure is used to diagnose and treat conditions of the bladder, urethra, and prostate  The procedure is also done to remove stones or blood clots from the urethra or bladder  Your healthcare provider may do other tests, such as ureteroscopy, during a cystoscopy  Prepare for your cystoscopy: You may need to stop smoking several days before your procedure, if you are having general anesthesia  Tell your healthcare provider what medicines you take  Your healthcare provider will tell you what medicines to take and not to take on the day of your procedure  You may need to stop taking medicines such as anticoagulants, aspirin, and ibuprofen several days before your procedure  He may tell you stop eating after midnight the night before your procedure  You may be asked to drink a large amount of liquids before your procedure  Make plans for someone to drive you home after your procedure  During your cystoscopy:   · You may be given general anesthesia to keep you asleep and pain free during your procedure  Your healthcare provider may give you anesthesia in your spine  With spinal anesthesia the lower part of your body will be numb  You will not feel pain during your procedure  Your healthcare provider may instead use local anesthesia that is put into your urethra and bladder  You will not feel pain, but you may be able to feel some pressure during your procedure  With local anesthesia, you may feel burning or need to urinate when the cystoscope is put in and removed  · You will be placed on your back and your feet may be placed in stirrups  The cystoscope will be will be placed through your urethra and into your bladder   The urologist will look at the walls of your urethra as the scope goes through to your bladder  Your bladder may be filled with an irrigation liquid to help your urologist see inside of your bladder more clearly   Special tools may used to remove tissue or stones  Your urologist may use a special tool to stop bleeding in your bladder  If there are blood clots in your bladder, your healthcare provider will inject an irrigation fluid into your bladder  Then he will use suction to remove the fluid and blood clots  After a cystoscopy:  After you are fully awake, you will go home  After your cystoscopy, it is normal to have pink-colored urine  It is also normal to have an increased need to urinate  You may have burning when you urinate  If you had general anesthesia, it may take at least 24 hours before you feel like your usual self  Risks of a cystoscopy: You may bleed more than expected or develop an infection  Swelling caused by the cystoscopy may cause a blockage or slow urine flow  Seek care immediately if:   · Your urine turns from pink to red, or you have clots in your urine  · You cannot urinate and your bladder feels full  · Your pain or burning becomes worse or lasts longer than 2 days  Contact your healthcare provider or urologist if:   · Your urine stays pink for longer than 3 days  · Your pain or burning becomes worse  · Your skin is itchy, swollen, or has a new rash  · You have a fever and chills  · You have questions or concerns about your condition or care  After your cystoscopy: It is normal to have pink-colored urine  It is also normal to have an increased need to urinate and burning when you urinate  If you had general anesthesia, it may take at least 24 hours before you feel like your usual self  · Drink at least 3 to 4 glasses of water daily for 2 days after your procedure  Avoid acidic juices such as orange juice and lemonade  Water can help prevent blood clots from forming   It can also help decrease the amount of acid in your urine that can cause burning  · Sit in a warm tub of water  Warm baths relieve pain and bladder spasms  · Do not have sex  until your healthcare provider tells you it is okay  Sex may increase your risk for a urinary tract infection  Medicines: You may  be given any of the following:  · Antibiotics  help treat or prevent a bacterial infection  · Acetaminophen  decreases pain and fever  It is available without a doctor's order  Ask how much to take and how often to take it  Follow directions  Read the labels of all other medicines you are using to see if they also contain acetaminophen, or ask your doctor or pharmacist  Acetaminophen can cause liver damage if not taken correctly  Do not use more than 4 grams (4,000 milligrams) total of acetaminophen in one day  · Take your medicine as directed  Contact your healthcare provider if you think your medicine is not helping or if you have side effects  Tell him or her if you are allergic to any medicine  Keep a list of the medicines, vitamins, and herbs you take  Include the amounts, and when and why you take them  Bring the list or the pill bottles to follow-up visits  Carry your medicine list with you in case of an emergency  Follow up with your healthcare provider as directed: You may need to have another cystoscopy  Write down your questions so you remember to ask them during your visits  © 2017 [employer]  Information is for End User's use only and may not be sold, redistributed or otherwise used for commercial purposes  All illustrations and images included in CareNotes® are the copyrighted property of [employer]  or [employer]  The above information is an  only  It is not intended as medical advice for individual conditions or treatments  Talk to your doctor, nurse or pharmacist before following any medical regimen to see if it is safe and effective for you

## 2022-03-02 ENCOUNTER — TELEPHONE (OUTPATIENT)
Dept: UROLOGY | Facility: AMBULATORY SURGERY CENTER | Age: 62
End: 2022-03-02

## 2022-03-02 ENCOUNTER — TELEPHONE (OUTPATIENT)
Dept: UROLOGY | Facility: MEDICAL CENTER | Age: 62
End: 2022-03-02

## 2022-03-02 DIAGNOSIS — R35.0 URINARY FREQUENCY: Primary | ICD-10-CM

## 2022-03-02 DIAGNOSIS — R39.9 UTI SYMPTOMS: ICD-10-CM

## 2022-03-02 LAB — BACTERIA UR CULT: ABNORMAL

## 2022-03-02 RX ORDER — SULFAMETHOXAZOLE AND TRIMETHOPRIM 800; 160 MG/1; MG/1
1 TABLET ORAL EVERY 12 HOURS SCHEDULED
Qty: 10 TABLET | Refills: 0 | Status: SHIPPED | OUTPATIENT
Start: 2022-03-02 | End: 2022-03-07

## 2022-03-02 NOTE — TELEPHONE ENCOUNTER
Prescription for Bactrim sent to pharmacy on file in response to urine testing performed in the office at last office visit

## 2022-03-02 NOTE — TELEPHONE ENCOUNTER
I spoke to the patient and scheduled his Cysto, TRUS in the OR for 4/13/2022 at [facility] with Dr Chuyita Milligan       -instructions given verbally and mailed  -CBC, CMP, T&S, Urine C&S   2 weeks prior  -patient will avoid any potentially blood thinning medications 7 days prior  -patient aware to use an enema 1 hour prior to leaving his house day of procedure  -HBS/PA MA - on auth tracker 3/2/2022

## 2022-03-02 NOTE — TELEPHONE ENCOUNTER
Spoke with patient and informed him of antibiotic being sent to [facility] on file  He wanted to have his results emailed to him  Offered to send patient email to sign up for mychart therefore he can access all results  Patient agreed and mychart activation email sent

## 2022-03-07 ENCOUNTER — OFFICE VISIT (OUTPATIENT)
Dept: PHYSICAL THERAPY | Age: 62
End: 2022-03-07
Payer: MEDICARE

## 2022-03-07 DIAGNOSIS — M54.31 RIGHT SIDED SCIATICA: Primary | ICD-10-CM

## 2022-03-07 PROCEDURE — 97110 THERAPEUTIC EXERCISES: CPT

## 2022-03-07 NOTE — PROGRESS NOTES
Daily Note     Today's date: 3/7/2022  Patient name: Reginaldo Bunn  : 1960  MRN: [id number]  Referring provider: Bradford Wren*  Dx:   Encounter Diagnosis     ICD-10-CM    1  Right sided sciatica  M54 31                    Subjective: Pt reports he really only has pain when he is moving his foot and driving  When he is laying down or standing up he is usually fine  Objective: See treatment diary below      Assessment: Pt was able to complete all exercises without a significant increase in symptoms  Incorporated additional LE strengthening exercises and lumbar mobility exercises today  Pt exhibits fatigue with hip abduction and leg press indicating slight weakness  Tolerated treatment well  Patient demonstrated fatigue post treatment, exhibited good technique with therapeutic exercises and would benefit from continued PT      Plan: Continue per plan of care  Progress treatment as tolerated         Precautions: None      Manuals 22 3          STM piriformis 5'                                                   Neuro Re-Ed                                                                                                        Ther Ex             Hamstring stretch 3x30s 3x30s 3x30s          Piriformis stretch 3x30s 3x30s 3x30s          Supine hip flexor stretch   3x30s          SKTC 5sx15 5sx15 b/l           Sciatic nerve glide 2x20  2x20           Bike   10'  10'           Supine bridge  3x10 3x10           Hip abd  3x10 black TB 3x10 40#          Repeated flexion  x30 w/ physioball 5sx30 w/ physioball           TA ball press   5sx15           Hip IR/ER  NT           Standing SLR  3x10 hip abd/ext  3x10 hip abd/ext          Hooklying lumbar rotation   5sx15 b/l          Supine flex SLR   3x10           Leg press   3x10 65#          Ther Activity                                       Gait Training                                       Modalities

## 2022-03-14 ENCOUNTER — OFFICE VISIT (OUTPATIENT)
Dept: PHYSICAL THERAPY | Age: 62
End: 2022-03-14
Payer: MEDICARE

## 2022-03-14 DIAGNOSIS — M54.31 RIGHT SIDED SCIATICA: Primary | ICD-10-CM

## 2022-03-14 PROCEDURE — 97110 THERAPEUTIC EXERCISES: CPT

## 2022-03-14 NOTE — PROGRESS NOTES
Daily Note     Today's date: 3/14/2022  Patient name: Ashok Bunn  : 1960  MRN: [id number]  Referring provider: Marissa Winn*  Dx:   Encounter Diagnosis     ICD-10-CM    1  Right sided sciatica  M54 31                    Subjective: Pt reports he feels "so-so" this morning, notes he has symptom's particularly while driving  Objective: See treatment diary below      Assessment: Pt tolerated treatment well  Demonstrates fatigue with hip abd, requires decreased weight after first set to complete exercise  Exhibits quad fatigue with SLR  Pt able to complete all interventions without c/o increased pain or change in sx's  Pt notes feeling better post treatment  Patient demonstrated fatigue post treatment, exhibited good technique with therapeutic exercises and would benefit from continued PT  Plan: Continue per plan of care  Progress treatment as tolerated         Precautions: None      Manuals 2/23/22 2/28 3/7 3/14         STM piriformis 5'                                                   Neuro Re-Ed                                                                                                        Ther Ex             Hamstring stretch 3x30s 3x30s 3x30s 3x30s         Piriformis stretch 3x30s 3x30s 3x30s 3x30s         Supine hip flexor stretch   3x30s 3x30s         SKTC 5sx15 5sx15 b/l           Sciatic nerve glide 2x20  2x20           Bike   10'  10'  10'         Supine bridge  3x10 3x10  3x10         Hip abd  3x10 black TB 3x10 40# 3x10  40#  30#         Repeated flexion  x30 w/ physioball 5sx30 w/ physioball  5x30 w/physioball         TA ball press   5sx15  5sx15         Hip IR/ER  NT           Standing SLR  3x10 hip abd/ext  3x10 hip abd/ext 3x10  Hip abd/ext         Hooklying lumbar rotation   5sx15 b/l 5sx15 /bl         Supine flex SLR   3x10  3x10         Leg press   3x10 65# 3x10  65#         Ther Activity                                       Gait Training Modalities

## 2022-03-17 ENCOUNTER — OFFICE VISIT (OUTPATIENT)
Dept: DENTISTRY | Facility: CLINIC | Age: 62
End: 2022-03-17

## 2022-03-17 VITALS — SYSTOLIC BLOOD PRESSURE: 142 MMHG | DIASTOLIC BLOOD PRESSURE: 89 MMHG | TEMPERATURE: 97.9 F | HEART RATE: 71 BPM

## 2022-03-17 DIAGNOSIS — Z01.21 ENCOUNTER FOR DENTAL EXAMINATION AND CLEANING WITH ABNORMAL FINDINGS: Primary | ICD-10-CM

## 2022-03-17 PROCEDURE — WIS3000 RC ACCESS: Performed by: DENTIST

## 2022-03-18 NOTE — PROGRESS NOTES
RCT - Stage 1    Nichelle Keith Sepideh presents for stage I endo #29  Patient had faulty restoration on tooth 29 that was causing pain  Tooth #29 was symptomatic  PMH reviewed, no changes  Applied topical benzocaine, administered 2 carps 2% lido 1:100k epi via ROBB block and local infiltration  Clamp and rubber dam placed  Existing restoration removed and pulp chamber accessed with round carbide  Tooth had one canal  Early coronal flare with SX rotatory file  Working length (21 mm)  determined with apex locater, hand filed to 15 k file with NaOCl  irrigation  Dried canal with paper points  Placed CaOH2, cotton pellet, and restored with IRM  ? Canal/Ref/WL  Canal: 1   Ref: buccal cusp  WL: 21mm    ?   NV: take a new PA, rotary file and obturation #29

## 2022-03-21 ENCOUNTER — OFFICE VISIT (OUTPATIENT)
Dept: PHYSICAL THERAPY | Age: 62
End: 2022-03-21
Payer: MEDICARE

## 2022-03-21 DIAGNOSIS — M54.31 RIGHT SIDED SCIATICA: Primary | ICD-10-CM

## 2022-03-21 PROCEDURE — 97110 THERAPEUTIC EXERCISES: CPT

## 2022-03-21 NOTE — PROGRESS NOTES
Daily Note     Today's date: 3/21/2022  Patient name: Juan A Bunn  : 1960  MRN: [id number]  Referring provider: Roberto Vail*  Dx:   Encounter Diagnosis     ICD-10-CM    1  Right sided sciatica  M54 31                   Subjective: Pt states he does not have symptoms throughout the day but he still has pain when he is driving  Objective: See treatment diary below      Assessment: Pt was able to tolerate progression LE strengthening with standing SLR and leg press  Incorporated standing extension to improve lumbar ROM  Educated pt to continue exercises in HEP  He demonstrates understanding  Tolerated treatment well  Patient demonstrated fatigue post treatment, exhibited good technique with therapeutic exercises and would benefit from continued PT      Plan: Continue per plan of care  Progress treatment as tolerated         Precautions: None      Manuals 2/23/22 2/28 3/7 3/14 3/21        STM piriformis 5'                                                   Neuro Re-Ed                                                                                                        Ther Ex             Hamstring stretch 3x30s 3x30s 3x30s 3x30s 3x30s        Piriformis stretch 3x30s 3x30s 3x30s 3x30s 3x30s        Supine hip flexor stretch   3x30s 3x30s 3x30s        SKTC 5sx15 5sx15 b/l           Sciatic nerve glide 2x20  2x20   3x20        Bike   10'  10'  10' 10'         Supine bridge  3x10 3x10  3x10 3x10         Hip abd  3x10 black TB 3x10 40# 3x10  40#  30# 3x10 40#        Repeated flexion  x30 w/ physioball 5sx30 w/ physioball  5x30 w/physioball x30 w/ physioball        TA ball press   5sx15  5sx15 5sx15        Hip IR/ER  NT           Standing SLR  3x10 hip abd/ext  3x10 hip abd/ext 3x10  Hip abd/ext 3x10 abd/ext 2#        Hooklying lumbar rotation   5sx15 b/l 5sx15 /bl 5sx15 b/l        Supine flex SLR   3x10  3x10 3x10         Leg press   3x10 65# 3x10  65# 3x10 70#        Repeated ext      2x10 Ther Activity                                       Gait Training                                       Modalities

## 2022-03-22 ENCOUNTER — OFFICE VISIT (OUTPATIENT)
Dept: GASTROENTEROLOGY | Facility: CLINIC | Age: 62
End: 2022-03-22
Payer: MEDICARE

## 2022-03-22 ENCOUNTER — OFFICE VISIT (OUTPATIENT)
Dept: OBGYN CLINIC | Facility: CLINIC | Age: 62
End: 2022-03-22
Payer: MEDICARE

## 2022-03-22 VITALS
HEIGHT: 68 IN | BODY MASS INDEX: 26.37 KG/M2 | DIASTOLIC BLOOD PRESSURE: 74 MMHG | WEIGHT: 174 LBS | SYSTOLIC BLOOD PRESSURE: 126 MMHG | OXYGEN SATURATION: 98 % | HEART RATE: 77 BPM

## 2022-03-22 VITALS
DIASTOLIC BLOOD PRESSURE: 77 MMHG | TEMPERATURE: 98.1 F | WEIGHT: 175 LBS | BODY MASS INDEX: 26.52 KG/M2 | HEIGHT: 68 IN | SYSTOLIC BLOOD PRESSURE: 128 MMHG | HEART RATE: 76 BPM

## 2022-03-22 DIAGNOSIS — Z12.11 COLON CANCER SCREENING: ICD-10-CM

## 2022-03-22 DIAGNOSIS — G89.29 CHRONIC RIGHT-SIDED LOW BACK PAIN WITH RIGHT-SIDED SCIATICA: ICD-10-CM

## 2022-03-22 DIAGNOSIS — K21.9 GASTROESOPHAGEAL REFLUX DISEASE WITHOUT ESOPHAGITIS: ICD-10-CM

## 2022-03-22 DIAGNOSIS — M54.41 CHRONIC RIGHT-SIDED LOW BACK PAIN WITH RIGHT-SIDED SCIATICA: ICD-10-CM

## 2022-03-22 DIAGNOSIS — M54.31 RIGHT SCIATIC NERVE PAIN: ICD-10-CM

## 2022-03-22 PROCEDURE — 99214 OFFICE O/P EST MOD 30 MIN: CPT | Performed by: FAMILY MEDICINE

## 2022-03-22 PROCEDURE — 99204 OFFICE O/P NEW MOD 45 MIN: CPT | Performed by: INTERNAL MEDICINE

## 2022-03-22 RX ORDER — POLYETHYLENE GLYCOL 3350 17 G/17G
POWDER, FOR SOLUTION ORAL
Qty: 238 G | Refills: 0 | Status: SHIPPED | OUTPATIENT
Start: 2022-03-22 | End: 2022-04-12 | Stop reason: ALTCHOICE

## 2022-03-22 RX ORDER — GABAPENTIN 100 MG/1
CAPSULE ORAL
Qty: 90 CAPSULE | Refills: 1 | Status: SHIPPED | OUTPATIENT
Start: 2022-03-22 | End: 2022-04-12 | Stop reason: SDUPTHER

## 2022-03-22 NOTE — PATIENT INSTRUCTIONS
F/u 3 wks  Continue therapy  Continue home exercise  Restart Gabapentin  Icing/heat/OTC pain meds as needed  Consider MRI if no improvement

## 2022-03-22 NOTE — PATIENT INSTRUCTIONS
Scheduled date of colonoscopy/egd  (as of today): 4/29/22  Physician performing colonoscopy: Dr Santo Rhoeds  Location of colonoscopy: [facility] - [place], [place]  Bowel prep reviewed with patient: miralax/dulcolax  Instructions reviewed with patient by: Phylicia   Clearances:  n/a

## 2022-03-22 NOTE — PROGRESS NOTES
[facility] - Outpatient Consultation  Jo Bunn 62 y o  male MRN: [id number]  Encounter: [id number]          ASSESSMENT AND PLAN:          1  Colon cancer screening  Ambulatory referral to Gastroenterology    Colonoscopy    polyethylene glycol (GLYCOLAX) 17 GM/SCOOP powder   2  Gastroesophageal reflux disease without esophagitis  Ambulatory referral to Gastroenterology    EGD     Will proceed with screening colonoscopy and upper endoscopy for reflux to rule out hiatus hernia and marie's esophagus  The rationale for colonoscopy with possible biopsy, possible polypectomy, with IV sedation as well as all risks, benefits, and alternatives were discussed with the patient in detail  Risks including but not limited to perforation, bleeding, need for blood transfusion or emergent surgery, and missed neoplasm were reviewed in detail with the patient  The patient demonstrates full understanding and wishes to proceed with the colonoscopy   ______________________________________________________________    HPI:  Sabrina Molina is a 62y o  year old male who presents to the office for evaluation for colorectal cancer screening  Reported symptoms: none  Family history: no known risk factors  Previous colonoscopy: none    No blood in stool  No weight loss  No family history of colon cancer  No change in bowel habits  They are not on blood thinners  He does have reflux intermittently and will take OTC medications  Not on PPI at this time  This has bene going on for years  REVIEW OF SYSTEMS:    CONSTITUTIONAL: Denies any fever, chills, rigors, and weight loss  HEENT: No earache or tinnitus  Denies hearing loss or visual disturbances  CARDIOVASCULAR: No chest pain or palpitations  RESPIRATORY: Denies any cough, hemoptysis, shortness of breath or dyspnea on exertion  GASTROINTESTINAL: As noted in the History of Present Illness     GENITOURINARY: No problems with urination  Denies any hematuria or dysuria  NEUROLOGIC: No dizziness or vertigo, denies headaches  MUSCULOSKELETAL: Denies any muscle or joint pain  SKIN: Denies skin rashes or itching  ENDOCRINE: Denies excessive thirst  Denies intolerance to heat or cold  PSYCHOSOCIAL: Denies depression or anxiety  Denies any recent memory loss  Historical Information   Past Medical History:   Diagnosis Date    Abscess, intestine     Arthritis     Diverticulitis     GERD (gastroesophageal reflux disease)     History of high blood pressure     Hydronephrosis 5/27/2018     Past Surgical History:   Procedure Laterality Date    ABCESS DRAINAGE      COLON SURGERY      COLONOSCOPY N/A 5/5/2016    Procedure: COLONOSCOPY;  Surgeon: Naveen Dawn MD;  Location: [facility]; Service:     COLONOSCOPY N/A 7/26/2018    Procedure: COLONOSCOPY with bx's;  Surgeon: Rose Kim MD;  Location: [facility]; Service: Gastroenterology    ESOPHAGOGASTRODUODENOSCOPY      with biopsy    FOOT SURGERY Left     x2? fusion? due to arthritis  onset: 0      HERNIA REPAIR      ILEO LOOP DIVERSION N/A 6/1/2018    Procedure: ILEOSTOMY LOOP DIVERTING;  Surgeon: Louie Bell DO;  [facility];  Service: General    LAPAROTOMY N/A 6/1/2018    Procedure: LAPAROTOMY EXPLORATORY,;  Surgeon: Louie Bell DO;  [facility];  Service: General    SC CLOSE ENTEROSTOMY N/A 8/16/2018    Procedure: CLOSURE LOOP ILEOSTOMY;  Surgeon: Pushpa Franks MD;  [facility];  Service: General    SC CYSTOURETHROSCOPY,URETER CATHETER Left 6/9/2018    Procedure: CYSTOSCOPY RETROGRADE PYELOGRAM WITH INSERTION STENT URETERAL;  Surgeon: Aidan Chacko MD;  [facility];  Service: Urology    SC [facility] N/A 1/31/2019    Procedure: REPAIR HERNIA INCISIONAL LAPAROSCOPIC;  Surgeon: Pushpa Franks MD;  [facility];  Service: General    SC Emily Hartman 19 Right 1/31/2019    Procedure: REPAIR HERNIA INGUINAL, LAPAROSCOPIC;  Surgeon: Maximo Seo MD;  [facility];  Service: General    SC PART REMOVAL COLON W ANASTOMOSIS N/A 6/1/2018    Procedure: RESECTION COLON SIGMOID;  Surgeon: Mina Guerra DO;  [facility];  Service: General     Social History   Social History     Substance and Sexual Activity   Alcohol Use Yes    Comment: 1 drink / weekend      Social History     Substance and Sexual Activity   Drug Use No     Social History     Tobacco Use   Smoking Status Former Smoker   Smokeless Tobacco Never Used   Tobacco Comment    quit > 1 year      Family History   Problem Relation Age of Onset    Other Mother         head tumor    Glaucoma Father     Diabetes Father         possible diabetes    Stroke Family        Meds/Allergies       Current Outpatient Medications:     DULoxetine (CYMBALTA) 30 mg delayed release capsule    gabapentin (NEURONTIN) 300 mg capsule    Ibuprofen (ADVIL PO)    lisinopril (ZESTRIL) 20 mg tablet    methocarbamol (ROBAXIN) 500 mg tablet    sildenafil (VIAGRA) 100 mg tablet    tadalafil (CIALIS) 20 MG tablet    tamsulosin (FLOMAX) 0 4 mg    methylPREDNISolone 4 MG tablet therapy pack    ondansetron (ZOFRAN-ODT) 4 mg disintegrating tablet    Allergies   Allergen Reactions    Penicillins Rash     itching           Objective     Blood pressure 128/77, pulse 76, temperature 98 1 °F (36 7 °C), temperature source Tympanic, height 5' 8" (1 727 m), weight 79 4 kg (175 lb)  Body mass index is 26 61 kg/m²  PHYSICAL EXAM:      General Appearance:   Alert, cooperative, no distress   HEENT:   Normocephalic, atraumatic, anicteric       Lungs:   Equal chest rise and unlabored breathing, normal cough   Heart:   No visualized JVD   Abdomen:   Soft, non-tender, non-distended; no masses, no organomegaly    Genitalia:   Deferred    Rectal:   Deferred    Extremities:  No cyanosis, clubbing or edema    Pulses:  Musculoskeletal: 2+ and symmetric  Normal range of motion visualized    Skin:  Neuro:  No jaundice, rashes, or lesions   Alert and appropriate       Lab Results:   No visits with results within 1 Day(s) from this visit  Latest known visit with results is:   Appointment on 03/01/2022   Component Date Value    PSA 03/01/2022 2 8     Urine Culture 03/01/2022 <10,000 cfu/ml Gram Negative Eldon*         Radiology Results:   No results found

## 2022-03-22 NOTE — PROGRESS NOTES
[facility] [address]  [id number]  [address] [id number] [phone]      Chief Complaint:  Chief Complaint   Patient presents with    Right Hip - Follow-up, Pain       Vitals:  /74   Pulse 77   Ht 5' 8" (1 727 m)   Wt 78 9 kg (174 lb)   SpO2 98%   BMI 26 46 kg/m²     The following portions of the patient's history were reviewed and updated as appropriate: allergies, current medications, past family history, past medical history, past social history, past surgical history, and problem list       Subjective:   Patient ID: Jermaine Childress is a 62 y o  male  Here for f/u R sided sciatica  Going to PT for 5 wks  He is about the same  Pain continues to radiate down R leg  He is not taking the gabapentin  Taking tylenol/ibuprofen  N/t intermittently      Review of Systems   Constitutional: Negative for fatigue and fever  Respiratory: Negative for shortness of breath  Cardiovascular: Negative for chest pain  Gastrointestinal: Negative for abdominal pain and nausea  Genitourinary: Negative for dysuria  Musculoskeletal: Positive for back pain  Skin: Negative for rash and wound  Neurological: Negative for weakness and headaches  Objective:  Back Exam     Tenderness   The patient is experiencing tenderness in the sacroiliac and lumbar (buttocks TTP)  Range of Motion   The patient has normal back ROM  Muscle Strength   The patient has normal back strength  Tests   Straight leg raise right: positive  Straight leg raise left: negative    Reflexes   Patellar: normal    Comments:  Pain with flex/rot B/            Physical Exam  Constitutional:       Appearance: Normal appearance  He is normal weight  Eyes:      Extraocular Movements: Extraocular movements intact  Pulmonary:      Effort: Pulmonary effort is normal    Musculoskeletal:      Cervical back: Normal range of motion        Lumbar back: Positive right straight leg raise test  Negative left straight leg raise test    Skin:     General: Skin is warm and dry  Neurological:      General: No focal deficit present  Mental Status: He is alert and oriented to person, place, and time  Mental status is at baseline  Psychiatric:         Mood and Affect: Mood normal          Behavior: Behavior normal          Thought Content: Thought content normal          Judgment: Judgment normal                Assessment/Plan:  Assessment/Plan   Diagnoses and all orders for this visit:    Right sciatic nerve pain  -     Ambulatory Referral to Orthopedic Surgery  -     gabapentin (NEURONTIN) 100 mg capsule; Take 1 T PO tonight, then 1T PO BID tomorrow, then 1T PO TID thereafter  Chronic right-sided low back pain with right-sided sciatica  -     Ambulatory Referral to Orthopedic Surgery  -     gabapentin (NEURONTIN) 100 mg capsule; Take 1 T PO tonight, then 1T PO BID tomorrow, then 1T PO TID thereafter  No follow-ups on file       Carson Gill MD

## 2022-04-04 ENCOUNTER — OFFICE VISIT (OUTPATIENT)
Dept: PHYSICAL THERAPY | Age: 62
End: 2022-04-04
Payer: MEDICARE

## 2022-04-04 DIAGNOSIS — M54.31 RIGHT SIDED SCIATICA: Primary | ICD-10-CM

## 2022-04-04 PROCEDURE — 97110 THERAPEUTIC EXERCISES: CPT

## 2022-04-04 NOTE — PROGRESS NOTES
Daily Note     Today's date: 2022  Patient name: Kenroy Bunn  : 1960  MRN: [id number]  Referring provider: Kenroy Tapia*  Dx:   Encounter Diagnosis     ICD-10-CM    1  Right sided sciatica  M54 31                   Subjective: Pt states he has been going to the gym and doing machines  He is feeling okay  He couldn't make it last week because he had "stuff" to do  Objective: See treatment diary below      Assessment: Pt was able to tolerate progression of LE and core strengthening exercises without an increase in symptoms  He continues to improve in these areas as well as flexibility  Tolerated treatment well  Patient demonstrated fatigue post treatment, exhibited good technique with therapeutic exercises and would benefit from continued PT      Plan: Continue per plan of care        Precautions: None      Manuals 2/23/22 2/28 3/7 3/14 3/21 4       STM piriformis 5'                                                   Neuro Re-Ed                                                                                                        Ther Ex             Hamstring stretch 3x30s 3x30s 3x30s 3x30s 3x30s 3x30s       Piriformis stretch 3x30s 3x30s 3x30s 3x30s 3x30s 3x30s       Supine hip flexor stretch   3x30s 3x30s 3x30s 3x30s       SKTC 5sx15 5sx15 b/l           Sciatic nerve glide 2x20  2x20   3x20 3x20        Bike   10'  10'  10' 10'  10'        Supine bridge  3x10 3x10  3x10 3x10         Hip abd  3x10 black TB 3x10 40# 3x10  40#  30# 3x10 40# 3x10 40#       Repeated flexion  x30 w/ physioball 5sx30 w/ physioball  5x30 w/physioball x30 w/ physioball x30 F/L/R       TA ball press   5sx15  5sx15 5sx15 5sx15       Hip IR/ER  NT           Standing SLR  3x10 hip abd/ext  3x10 hip abd/ext 3x10  Hip abd/ext 3x10 abd/ext 2# 3x10 3 way 2#       Hooklying lumbar rotation   5sx15 b/l 5sx15 /bl 5sx15 b/l        Supine flex SLR   3x10  3x10 3x10  3x10        Leg press   3x10 65# 3x10  65# 3x10 70# 3x10 90#       Repeated ext      2x10         LAQ      3x10 35#       Ham curl      3x10 55#       Paloff press      5"x15 L/R 11#       Ther Activity                                       Gait Training                                       Modalities

## 2022-04-06 ENCOUNTER — OFFICE VISIT (OUTPATIENT)
Dept: DENTISTRY | Facility: CLINIC | Age: 62
End: 2022-04-06

## 2022-04-06 VITALS — SYSTOLIC BLOOD PRESSURE: 137 MMHG | TEMPERATURE: 98 F | DIASTOLIC BLOOD PRESSURE: 88 MMHG | HEART RATE: 61 BPM

## 2022-04-06 DIAGNOSIS — Z01.21 ENCOUNTER FOR DENTAL EXAMINATION AND CLEANING WITH ABNORMAL FINDINGS: Primary | ICD-10-CM

## 2022-04-06 PROCEDURE — D3320 ENDODONTIC THERAPY, PREMOLAR TOOTH (EXCLUDING FINAL RESTORATION): HCPCS | Performed by: DENTIST

## 2022-04-06 NOTE — PROGRESS NOTES
RCT - Single Visit    Latrellnubia Josejoyce presents for RCT #29  PMH reviewed, no changes  Tooth was accessed last visit and filed with endo K 10 and 15 files  Tooth had one canal and working length was 21 mm  Applied topical benzocaine, administered 2 carps 2% lidocaine 1:100k epi via ROBB block local infiltration  Clamp and rubber dam placed  Working length determined again with apex locater (21 mm)  Hand filed with 10 and 15 and took the glider on rotatory to 21 mm  Canal was rotary filed to primary with NaOCL irrigation and RC prep to 21 mm  Size verifier was placed and took a PA radiograph  Dried canal with paper points, placed sealer with / paper point  Obturated canal with Fermin core and removed excess carrier with round bur  Placed cotton pellet, restored with Cavit and occlusion verified  Obtained PA radiograph  Obturation is dense with no porosities and filled to apex, minor sealer extrusion present  Pt left ambulatory and satisfied       Nv: core build up and crown on tooth #29

## 2022-04-11 ENCOUNTER — OFFICE VISIT (OUTPATIENT)
Dept: PHYSICAL THERAPY | Age: 62
End: 2022-04-11
Payer: MEDICARE

## 2022-04-11 ENCOUNTER — ANESTHESIA EVENT (OUTPATIENT)
Dept: PERIOP | Facility: HOSPITAL | Age: 62
End: 2022-04-11
Payer: MEDICARE

## 2022-04-11 DIAGNOSIS — M54.31 RIGHT SIDED SCIATICA: Primary | ICD-10-CM

## 2022-04-11 PROCEDURE — 97110 THERAPEUTIC EXERCISES: CPT

## 2022-04-11 NOTE — PROGRESS NOTES
Daily Note     Today's date: 2022  Patient name: Kenroy Bunn  : 1960  MRN: [id number]  Referring provider: Kenroy Tapia*  Dx:   Encounter Diagnosis     ICD-10-CM    1  Right sided sciatica  M54 31                   Subjective: Pt states N/T in leg is about the same  He has been doing strengthening exercises at the gym but does not do as much stretching  He feels good doing exercises but it is only when he is driving when he feels the pain/symptoms  Objective: See treatment diary below      Assessment: Educated pt to focus more on stretching and nerve glides for home program  He verbalizes understanding  Pt was able to tolerate both repeated flexion and repeated extension without symptoms into his RLE  Tolerated treatment well  Patient demonstrated fatigue post treatment, exhibited good technique with therapeutic exercises and would benefit from continued PT      Plan: Continue per plan of care        Precautions: None      Manuals 2/23/22 2/28 3/7 3/14 3/21 4/4 4/11      STM piriformis 5'                                                   Neuro Re-Ed                                                                                                        Ther Ex             Hamstring stretch 3x30s 3x30s 3x30s 3x30s 3x30s 3x30s 3x30s      Piriformis stretch 3x30s 3x30s 3x30s 3x30s 3x30s 3x30s       Supine hip flexor stretch   3x30s 3x30s 3x30s 3x30s 3x30s      SKTC 5sx15 5sx15 b/l     DKTC 10sx10      Sciatic nerve glide 2x20  2x20   3x20 3x20        Bike   10'  10'  10' 10'  10'  10'       Supine bridge  3x10 3x10  3x10 3x10         Hip abd  3x10 black TB 3x10 40# 3x10  40#  30# 3x10 40# 3x10 40# 3x10 50#      Repeated flexion  x30 w/ physioball 5sx30 w/ physioball  5x30 w/physioball x30 w/ physioball x30 F/L/R x30 F/L/R      TA ball press   5sx15  5sx15 5sx15 5sx15 5sx15       Hip IR/ER  NT           Standing SLR  3x10 hip abd/ext  3x10 hip abd/ext 3x10  Hip abd/ext 3x10 abd/ext 2# 3x10 3 way 2# 3x10 2#      Hooklying lumbar rotation   5sx15 b/l 5sx15 /bl 5sx15 b/l        Supine flex SLR   3x10  3x10 3x10  3x10        Leg press   3x10 65# 3x10  65# 3x10 70# 3x10 90# 3x10 110#      Repeated ext      2x10   2x10       LAQ      3x10 35# 3x10 35#      Ham curl      3x10 55# 3x10       Paloff press      5"x15 L/R 11# 5"x15 11#      Ther Activity                                       Gait Training                                       Modalities

## 2022-04-12 ENCOUNTER — APPOINTMENT (OUTPATIENT)
Dept: PREADMISSION TESTING | Facility: HOSPITAL | Age: 62
End: 2022-04-12
Payer: MEDICARE

## 2022-04-12 ENCOUNTER — APPOINTMENT (OUTPATIENT)
Dept: LAB | Facility: HOSPITAL | Age: 62
End: 2022-04-12
Attending: FAMILY MEDICINE
Payer: MEDICARE

## 2022-04-12 DIAGNOSIS — E78.49 OTHER HYPERLIPIDEMIA: ICD-10-CM

## 2022-04-12 DIAGNOSIS — R35.0 BENIGN PROSTATIC HYPERPLASIA WITH URINARY FREQUENCY: ICD-10-CM

## 2022-04-12 DIAGNOSIS — N40.1 BENIGN PROSTATIC HYPERPLASIA WITH URINARY FREQUENCY: ICD-10-CM

## 2022-04-12 DIAGNOSIS — Z12.5 PROSTATE CANCER SCREENING: ICD-10-CM

## 2022-04-12 DIAGNOSIS — I10 ESSENTIAL HYPERTENSION: ICD-10-CM

## 2022-04-12 LAB
ALBUMIN SERPL BCP-MCNC: 3.6 G/DL (ref 3.5–5)
ALP SERPL-CCNC: 77 U/L (ref 46–116)
ALT SERPL W P-5'-P-CCNC: 27 U/L (ref 12–78)
ANION GAP SERPL CALCULATED.3IONS-SCNC: 2 MMOL/L (ref 4–13)
AST SERPL W P-5'-P-CCNC: 22 U/L (ref 5–45)
BASOPHILS # BLD AUTO: 0.04 THOUSANDS/ΜL (ref 0–0.1)
BASOPHILS NFR BLD AUTO: 1 % (ref 0–1)
BILIRUB SERPL-MCNC: 0.44 MG/DL (ref 0.2–1)
BUN SERPL-MCNC: 18 MG/DL (ref 5–25)
CALCIUM SERPL-MCNC: 9.1 MG/DL (ref 8.3–10.1)
CHLORIDE SERPL-SCNC: 111 MMOL/L (ref 100–108)
CO2 SERPL-SCNC: 29 MMOL/L (ref 21–32)
CREAT SERPL-MCNC: 0.99 MG/DL (ref 0.6–1.3)
EOSINOPHIL # BLD AUTO: 0.08 THOUSAND/ΜL (ref 0–0.61)
EOSINOPHIL NFR BLD AUTO: 1 % (ref 0–6)
ERYTHROCYTE [DISTWIDTH] IN BLOOD BY AUTOMATED COUNT: 13 % (ref 11.6–15.1)
GFR SERPL CREATININE-BSD FRML MDRD: 81 ML/MIN/1.73SQ M
GLUCOSE SERPL-MCNC: 94 MG/DL (ref 65–140)
HCT VFR BLD AUTO: 45.9 % (ref 36.5–49.3)
HGB BLD-MCNC: 14.9 G/DL (ref 12–17)
IMM GRANULOCYTES # BLD AUTO: 0.02 THOUSAND/UL (ref 0–0.2)
IMM GRANULOCYTES NFR BLD AUTO: 0 % (ref 0–2)
LYMPHOCYTES # BLD AUTO: 1.47 THOUSANDS/ΜL (ref 0.6–4.47)
LYMPHOCYTES NFR BLD AUTO: 21 % (ref 14–44)
MCH RBC QN AUTO: 31 PG (ref 26.8–34.3)
MCHC RBC AUTO-ENTMCNC: 32.5 G/DL (ref 31.4–37.4)
MCV RBC AUTO: 95 FL (ref 82–98)
MONOCYTES # BLD AUTO: 0.42 THOUSAND/ΜL (ref 0.17–1.22)
MONOCYTES NFR BLD AUTO: 6 % (ref 4–12)
NEUTROPHILS # BLD AUTO: 5.13 THOUSANDS/ΜL (ref 1.85–7.62)
NEUTS SEG NFR BLD AUTO: 71 % (ref 43–75)
NRBC BLD AUTO-RTO: 0 /100 WBCS
PLATELET # BLD AUTO: 309 THOUSANDS/UL (ref 149–390)
PMV BLD AUTO: 10.6 FL (ref 8.9–12.7)
POTASSIUM SERPL-SCNC: 5 MMOL/L (ref 3.5–5.3)
PROT SERPL-MCNC: 7 G/DL (ref 6.4–8.2)
PSA SERPL-MCNC: 1.9 NG/ML (ref 0–4)
RBC # BLD AUTO: 4.81 MILLION/UL (ref 3.88–5.62)
SODIUM SERPL-SCNC: 142 MMOL/L (ref 136–145)
TSH SERPL DL<=0.05 MIU/L-ACNC: 1.14 UIU/ML (ref 0.45–4.5)
WBC # BLD AUTO: 7.16 THOUSAND/UL (ref 4.31–10.16)

## 2022-04-12 PROCEDURE — 87086 URINE CULTURE/COLONY COUNT: CPT

## 2022-04-12 PROCEDURE — 36415 COLL VENOUS BLD VENIPUNCTURE: CPT

## 2022-04-12 PROCEDURE — 84443 ASSAY THYROID STIM HORMONE: CPT

## 2022-04-12 PROCEDURE — G0103 PSA SCREENING: HCPCS

## 2022-04-12 PROCEDURE — 80053 COMPREHEN METABOLIC PANEL: CPT

## 2022-04-12 PROCEDURE — 85025 COMPLETE CBC W/AUTO DIFF WBC: CPT

## 2022-04-12 NOTE — PRE-PROCEDURE INSTRUCTIONS
Pre-Surgery Instructions:   Medication Instructions    DULoxetine (CYMBALTA) 30 mg delayed release capsule takes in the evening     gabapentin (NEURONTIN) 300 mg capsule Take day of surgery   lisinopril (ZESTRIL) 20 mg tablet Hold day of surgery   methocarbamol (ROBAXIN) 500 mg tablet takes at bedtime    sildenafil (VIAGRA) 100 mg tablet Hold day of surgery   tadalafil (CIALIS) 20 MG tablet Hold day of surgery      tamsulosin (FLOMAX) 0 4 mg takes in the evening      Pt verbalizes understanding of the following:    - Bathing instructions, shower in the am  - No lotions, powders, sprays, deodorant, jewelry, body piercings     - NPO after MN  - Avoid OTC non-directed Vit/ Suppl/ Herbals 7 days prior to surgery to ensure no drug interactions with perioperative surgical/ anesthetic meds  - Avoid NSAIDs 3 days prior  - Avoid ASA containing products 5 days prior        - Bring list of meds with last dose noted  - [employer] cards & photo id

## 2022-04-13 ENCOUNTER — TELEPHONE (OUTPATIENT)
Dept: UROLOGY | Facility: MEDICAL CENTER | Age: 62
End: 2022-04-13

## 2022-04-13 ENCOUNTER — ANESTHESIA (OUTPATIENT)
Dept: PERIOP | Facility: HOSPITAL | Age: 62
End: 2022-04-13
Payer: MEDICARE

## 2022-04-13 ENCOUNTER — HOSPITAL ENCOUNTER (OUTPATIENT)
Facility: HOSPITAL | Age: 62
Setting detail: OUTPATIENT SURGERY
Discharge: HOME | End: 2022-04-13
Attending: UROLOGY | Admitting: UROLOGY
Payer: MEDICARE

## 2022-04-13 VITALS
TEMPERATURE: 97.6 F | HEIGHT: 68 IN | SYSTOLIC BLOOD PRESSURE: 131 MMHG | HEART RATE: 60 BPM | DIASTOLIC BLOOD PRESSURE: 75 MMHG | BODY MASS INDEX: 26.96 KG/M2 | RESPIRATION RATE: 18 BRPM | OXYGEN SATURATION: 98 % | WEIGHT: 177.91 LBS

## 2022-04-13 DIAGNOSIS — R35.0 URINARY FREQUENCY: Primary | ICD-10-CM

## 2022-04-13 DIAGNOSIS — N40.1 BENIGN LOCALIZED PROSTATIC HYPERPLASIA WITH LOWER URINARY TRACT SYMPTOMS (LUTS): ICD-10-CM

## 2022-04-13 DIAGNOSIS — R35.1 NOCTURIA: ICD-10-CM

## 2022-04-13 LAB — BACTERIA UR CULT: NORMAL

## 2022-04-13 PROCEDURE — 52000 CYSTOURETHROSCOPY: CPT | Performed by: UROLOGY

## 2022-04-13 PROCEDURE — 76872 US TRANSRECTAL: CPT | Performed by: UROLOGY

## 2022-04-13 PROCEDURE — NC001 PR NO CHARGE: Performed by: UROLOGY

## 2022-04-13 RX ORDER — PHENAZOPYRIDINE HYDROCHLORIDE 200 MG/1
TABLET, FILM COATED ORAL
Status: COMPLETED
Start: 2022-04-13 | End: 2022-04-13

## 2022-04-13 RX ORDER — LIDOCAINE HYDROCHLORIDE 20 MG/ML
JELLY TOPICAL AS NEEDED
Status: DISCONTINUED | OUTPATIENT
Start: 2022-04-13 | End: 2022-04-13 | Stop reason: HOSPADM

## 2022-04-13 RX ORDER — ONDANSETRON 2 MG/ML
INJECTION INTRAMUSCULAR; INTRAVENOUS AS NEEDED
Status: DISCONTINUED | OUTPATIENT
Start: 2022-04-13 | End: 2022-04-13

## 2022-04-13 RX ORDER — FENTANYL CITRATE 50 UG/ML
INJECTION, SOLUTION INTRAMUSCULAR; INTRAVENOUS AS NEEDED
Status: DISCONTINUED | OUTPATIENT
Start: 2022-04-13 | End: 2022-04-13

## 2022-04-13 RX ORDER — ACETAMINOPHEN 500 MG
1000 TABLET ORAL EVERY 6 HOURS PRN
COMMUNITY

## 2022-04-13 RX ORDER — MIDAZOLAM HYDROCHLORIDE 2 MG/2ML
INJECTION, SOLUTION INTRAMUSCULAR; INTRAVENOUS AS NEEDED
Status: DISCONTINUED | OUTPATIENT
Start: 2022-04-13 | End: 2022-04-13

## 2022-04-13 RX ORDER — LEVOFLOXACIN 5 MG/ML
750 INJECTION, SOLUTION INTRAVENOUS ONCE
Status: COMPLETED | OUTPATIENT
Start: 2022-04-13 | End: 2022-04-13

## 2022-04-13 RX ORDER — PHENAZOPYRIDINE HYDROCHLORIDE 200 MG/1
200 TABLET, FILM COATED ORAL ONCE
Status: COMPLETED | OUTPATIENT
Start: 2022-04-13 | End: 2022-04-13

## 2022-04-13 RX ORDER — SODIUM CHLORIDE, SODIUM LACTATE, POTASSIUM CHLORIDE, CALCIUM CHLORIDE 600; 310; 30; 20 MG/100ML; MG/100ML; MG/100ML; MG/100ML
125 INJECTION, SOLUTION INTRAVENOUS CONTINUOUS
Status: DISCONTINUED | OUTPATIENT
Start: 2022-04-13 | End: 2022-04-13 | Stop reason: HOSPADM

## 2022-04-13 RX ORDER — MEPERIDINE HYDROCHLORIDE 25 MG/ML
12.5 INJECTION INTRAMUSCULAR; INTRAVENOUS; SUBCUTANEOUS
Status: DISCONTINUED | OUTPATIENT
Start: 2022-04-13 | End: 2022-04-13 | Stop reason: HOSPADM

## 2022-04-13 RX ORDER — PHENAZOPYRIDINE HYDROCHLORIDE 200 MG/1
200 TABLET, FILM COATED ORAL 3 TIMES DAILY PRN
Qty: 10 TABLET | Refills: 0 | Status: SHIPPED | OUTPATIENT
Start: 2022-04-13

## 2022-04-13 RX ORDER — PROPOFOL 10 MG/ML
INJECTION, EMULSION INTRAVENOUS AS NEEDED
Status: DISCONTINUED | OUTPATIENT
Start: 2022-04-13 | End: 2022-04-13

## 2022-04-13 RX ORDER — LIDOCAINE HYDROCHLORIDE 10 MG/ML
INJECTION, SOLUTION EPIDURAL; INFILTRATION; INTRACAUDAL; PERINEURAL AS NEEDED
Status: DISCONTINUED | OUTPATIENT
Start: 2022-04-13 | End: 2022-04-13

## 2022-04-13 RX ORDER — ONDANSETRON 2 MG/ML
4 INJECTION INTRAMUSCULAR; INTRAVENOUS ONCE AS NEEDED
Status: DISCONTINUED | OUTPATIENT
Start: 2022-04-13 | End: 2022-04-13 | Stop reason: HOSPADM

## 2022-04-13 RX ORDER — FENTANYL CITRATE/PF 50 MCG/ML
25 SYRINGE (ML) INJECTION
Status: DISCONTINUED | OUTPATIENT
Start: 2022-04-13 | End: 2022-04-13 | Stop reason: HOSPADM

## 2022-04-13 RX ADMIN — FENTANYL CITRATE 50 MCG: 50 INJECTION INTRAMUSCULAR; INTRAVENOUS at 07:40

## 2022-04-13 RX ADMIN — PROPOFOL 200 MG: 10 INJECTION, EMULSION INTRAVENOUS at 07:22

## 2022-04-13 RX ADMIN — PHENAZOPYRIDINE 200 MG: 200 TABLET ORAL at 09:11

## 2022-04-13 RX ADMIN — ONDANSETRON 4 MG: 2 INJECTION INTRAMUSCULAR; INTRAVENOUS at 07:30

## 2022-04-13 RX ADMIN — FENTANYL CITRATE 50 MCG: 50 INJECTION INTRAMUSCULAR; INTRAVENOUS at 07:30

## 2022-04-13 RX ADMIN — LEVOFLOXACIN: 5 INJECTION, SOLUTION INTRAVENOUS at 07:11

## 2022-04-13 RX ADMIN — SODIUM CHLORIDE, SODIUM LACTATE, POTASSIUM CHLORIDE, AND CALCIUM CHLORIDE 125 ML/HR: .6; .31; .03; .02 INJECTION, SOLUTION INTRAVENOUS at 06:38

## 2022-04-13 RX ADMIN — PHENAZOPYRIDINE HYDROCHLORIDE 200 MG: 200 TABLET, FILM COATED ORAL at 09:11

## 2022-04-13 RX ADMIN — MIDAZOLAM 2 MG: 1 INJECTION INTRAMUSCULAR; INTRAVENOUS at 07:12

## 2022-04-13 RX ADMIN — LIDOCAINE HYDROCHLORIDE 60 MG: 10 INJECTION, SOLUTION EPIDURAL; INFILTRATION; INTRACAUDAL; PERINEURAL at 07:22

## 2022-04-13 NOTE — INTERVAL H&P NOTE
H&P reviewed  After examining the patient I find no changes in the patients condition since the H&P had been written  Pt for cysto/TRUS for BPH treatment planning - has q1hr nocturia, has retrograde ejaculation which he is not happy with  Gives me permission to do TURBT or any other procedures if something incidental is found      Vitals:    04/13/22 0559   BP: 126/82   Pulse: 68   Resp: 16   Temp: 98 °F (36 7 °C)   SpO2: 97%

## 2022-04-13 NOTE — ANESTHESIA POSTPROCEDURE EVALUATION
Post-Op Assessment Note    CV Status:  Stable    Pain management: adequate     Mental Status:  Alert and awake   Hydration Status:  Euvolemic   PONV Controlled:  Controlled   Airway Patency:  Patent      Post Op Vitals Reviewed: Yes      Staff: Anesthesiologist         No complications documented      BP      Temp      Pulse     Resp      SpO2      /75   Pulse 60   Temp 97 6 °F (36 4 °C) (Temporal)   Resp 18   Ht 5' 8" (1 727 m)   Wt 80 7 kg (177 lb 14 6 oz)   SpO2 98%   BMI 27 05 kg/m²

## 2022-04-13 NOTE — DISCHARGE INSTRUCTIONS
Expect to have burning urgency and frequency of urination  Call for fever greater than 101 5°, inability to urinate  You may resume all normal activities tomorrow  You might see blood in the urine -this is normal   The bladder was normal   The prostate was obstructing, and measures 45 cc  Regular diet

## 2022-04-13 NOTE — OP NOTE
OPERATIVE REPORT  PATIENT NAME: Lesly Bunn    :  1960  MRN: [id number]  Pt Location: [facility]    SURGERY DATE: 2022    Surgeon(s) and Role:     Pippa Hameed MD - Primary    Preop Diagnosis:  Benign prostatic hyperplasia with urinary frequency [N40 1, R35  0]And nocturia    Post-Op Diagnosis Codes: * Benign prostatic hyperplasia with urinary frequency [N40 1, R35 0] and nocturia    Procedure(s) (LRB):  CYSTO (N/A)  TRANSRECTAL US GUIDANCE (N/A)    Specimen(s):  * No specimens in log *    Estimated Blood Loss:   Minimal    Drains:  * No LDAs found *    Anesthesia Type:   Choice    Operative Indications:  Benign prostatic hyperplasia with urinary frequency [N40 1, R35 0]  Nocturia    Operative Findings:  Transrectal ultrasound-length 5 6 cm, width 4 7 cm, height 3 3 cm with a ellipsoid volume of 45 5 cc-total gland volume  Mild median lobe  Bilobar hypertrophy, high bladder neck  Normal bladder  No stricture  Complications:   None    Procedure and Technique:  71-year-old man with BPH with obstruction on Flomax which gives and retrograde ejaculation and nocturia q 1 hour  He is very bothered by the symptoms and would like to go under evaluation for possible further treatments  He was offered cystoscopy and transrectal ultrasound to evaluate this  The risks of bleeding infection have been explained and he gives consent  He also gives me permission to biopsy or transurethral resected any incidental findings such as bladder tumors abnormal lesions etc     The patient was brought to the operating room identified properly  LMA was induced the patient was prepped and draped in dorsal lithotomy position usual fashion  A time-out was performed  Cystoscopy was carried out with a 22 [place] cystoscopy sheath with 30 degree lens  Urethra was normal without stricture    The prostate showed bilobar hypertrophy with a high bladder neck and a moderate/mild median lobe component with friable vessels  This was moderately to highly obstructive  The bladder itself was smooth not trabeculated there are no stones tumors or lesions  The orifices orthotopic intact  I then withdrew the scope and I placed the transrectal ultrasound probe into the rectum  I visualized the prostate saw no overt abnormalities  Measurements were as above with a total volume of 45 5 cc  The probe was withdrawn     I was present for the entire procedure and A qualified resident physician was not available    Patient Disposition:  PACU  and hemodynamically stable      SIGNATURE: Hamilton Jaquez MD  DATE: April 13, 2022  TIME: 7:29 AM

## 2022-04-13 NOTE — H&P
[address], CRNP   Nurse Practitioner   Specialty:  Urology   Progress Notes       Attested   Encounter Date:  3/1/2022                H/P- updated by Kelly Long MD      Attestation signed by Hawa Mcdaniel MD at 3/2/2022  7:44 AM   I did not examine or speak with this patient  I have reviewed the advanced practioner's note and agree with their plan of care      Since patient has history of not being able to tolerate office based procedures performing under sedation is reasonable      Expand All Collapse All        3/1/2022            Chief Complaint   Patient presents with    New Patient Visit    Benign Prostatic Hypertrophy      Assessment and Plan     62 y o  male managed by Dr López Harrison     1  Benign Prostatic Hyperplasia with lower urinary tract symtoms  ? Continue tamsulosin  ? OR case requested-cystoscopy/TRUS  ? Discussed dietary behavior modifications to reduce irritative symptoms  ? AVELINO-45 g prostate with no nodules  ? PSA to be performed in 2 weeks  ? Urine culture ordered     2  Erectile dysfunction  ? 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 and underwent retrograde pyelogram and stent placement by Dr Evon Kay 06/2018  He reports over the course of the last few years having increasing difficulties with urinary frequency, urgency and nocturia  He was initiated on tamsulosin but given sexual side effects discontinue medication  At that point after discontinuance, he reported worsening urinary symptoms and decided to reinitiate therapy with tamsulosin  On prior office evaluation it was discussed to proceed with cystoscopy given worsening urinary symptoms    Patient reports he needs to be sedated and cystoscopies to be performed in the operating room in order for him to be able to proceed  He currently complains of urinary frequency, urgency and nocturia upwards to 5 times at night  He has a weak urinary stream      Patient denies complications secondary to anesthesia  He reports smoking daily  He denies use/abuse of illicit substances and alcohol  He reports snoring           Review of Systems   Constitutional: Negative for chills and fever  Respiratory: Negative for cough and shortness of breath  Cardiovascular: Negative for chest pain  Gastrointestinal: Negative for abdominal distention, abdominal pain, blood in stool, nausea and vomiting  Genitourinary: Positive for difficulty urinating, frequency and urgency  Negative for dysuria, enuresis, flank pain and hematuria  Nocturia x5   Skin: Negative for rash                    AUA SYMPTOM SCORE      Most Recent Value   AUA SYMPTOM SCORE     How often have you had a sensation of not emptying your bladder completely after you finished urinating? 3   How often have you had to urinate again less than two hours after you finished urinating? 5   How often have you found you stopped and started again several times when you urinate? 5   How often have you found it difficult to postpone urination? 5   How often have you had a weak urinary stream? 3   How often have you had to push or strain to begin urination? 3   How many times did you most typically get up to urinate from the time you went to bed at night until the time you got up in the morning?  5   Quality of Life: If you were to spend the rest of your life with your urinary condition just the way it is now, how would you feel about that? 6   AUA SYMPTOM SCORE 29                Past Medical History  Medical History        Past Medical History:   Diagnosis Date    Abscess, intestine      Arthritis      Diverticulitis      GERD (gastroesophageal reflux disease)      History of high blood pressure      Hydronephrosis 5/27/2018            Past Social History  Surgical History         Past Surgical History:   Procedure Laterality Date    ABCESS DRAINAGE        COLON SURGERY        COLONOSCOPY N/A 5/5/2016     Procedure: COLONOSCOPY;  Surgeon: Jonathon Kang MD;  Location: [facility]; Service:     COLONOSCOPY N/A 7/26/2018     Procedure: COLONOSCOPY with bx's;  Surgeon: Albino Amezquita MD;  Location: [facility]; Service: Gastroenterology    ESOPHAGOGASTRODUODENOSCOPY         with biopsy    FOOT SURGERY Left       x2? fusion? due to arthritis  onset: 0      HERNIA REPAIR        ILEO LOOP DIVERSION N/A 6/1/2018     Procedure: ILEOSTOMY LOOP DIVERTING;  Surgeon: Cortez Damon DO;  [facility];  Service: General    LAPAROTOMY N/A 6/1/2018     Procedure: LAPAROTOMY EXPLORATORY,;  Surgeon: Cortez Damon DO;  [facility];  Service: General    IN CLOSE ENTEROSTOMY N/A 8/16/2018     Procedure: CLOSURE LOOP ILEOSTOMY;  Surgeon: Gaby Delcid MD;  [facility];  Service: General    IN CYSTOURETHROSCOPY,URETER CATHETER Left 6/9/2018     Procedure: CYSTOSCOPY RETROGRADE PYELOGRAM WITH INSERTION STENT URETERAL;  Surgeon: Harry Astorga MD;  [facility];  Service: Urology    IN LAP, INCISIONAL HERNIA REPAIR,REDUCIBLE N/A 1/31/2019     Procedure: REPAIR HERNIA INCISIONAL LAPAROSCOPIC;  Surgeon: Gaby Delcid MD;  [facility];  Service: General    IN Emily Hartman 19 Right 1/31/2019     Procedure: REPAIR HERNIA INGUINAL, LAPAROSCOPIC;  Surgeon: Gaby Delcid MD;  [facility];  Service: General    IN PART REMOVAL COLON W ANASTOMOSIS N/A 6/1/2018     Procedure: RESECTION COLON SIGMOID;  Surgeon: Cortez Damon DO;  [facility];  Service: General         Social History           Tobacco Use   Smoking Status Former Smoker   Smokeless Tobacco Never Used   Tobacco Comment     quit > 1 year          Past Family History        Family History   Problem Relation Age of Onset    Other Mother           head tumor    Glaucoma Father      Diabetes Father           possible diabetes    Stroke Family           Past Social history  Social History               Socioeconomic History    Marital status: /Civil Union       Spouse name: Not on file    Number of children: Not on file    Years of education: Not on file    Highest education level: Not on file   Occupational History    Not on file   Tobacco Use    Smoking status: Former Smoker    Smokeless tobacco: Never Used    Tobacco comment: quit > 1 year    Vaping Use    Vaping Use: Never used   Substance and Sexual Activity    Alcohol use:  Yes       Comment: 1 drink / weekend     Drug use: No    Sexual activity: Not on file   Other Topics Concern    Not on file   Social History Narrative    Not on file      Social Determinants of Health      Financial Resource Strain: Not on file   Food Insecurity: Not on file   Transportation Needs: Not on file   Physical Activity: Not on file   Stress: Not on file   Social Connections: Not on file   Intimate Partner Violence: Not on file   Housing Stability: Not on file            Current Medications  Current Medications          Current Outpatient Medications   Medication Sig Dispense Refill    DULoxetine (CYMBALTA) 30 mg delayed release capsule Take 1 capsule (30 mg total) by mouth daily 30 capsule 5    gabapentin (NEURONTIN) 300 mg capsule Take 1 capsule (300 mg total) by mouth 3 (three) times a day 90 capsule 1    Ibuprofen (ADVIL PO) Take by mouth as needed          lisinopril (ZESTRIL) 20 mg tablet Take 1 tablet (20 mg total) by mouth daily 90 tablet 0    methocarbamol (ROBAXIN) 500 mg tablet Take 1 tablet (500 mg total) by mouth daily at bedtime as needed for muscle spasms 30 tablet 0    sildenafil (VIAGRA) 100 mg tablet Take 1 tablet (100 mg total) by mouth daily as needed for erectile dysfunction 15 tablet 0    tadalafil (CIALIS) 20 MG tablet TAKE 1 TABLET BY MOUTH 30 MIN BEFORE SEXUAL ACTIVITY, AS NEEDED FOR ERECTILE DYSFUNCTION  TAKE NO MORE THAN 3 TIMES PER WEEK        tamsulosin (FLOMAX) 0 4 mg take 1 capsule by mouth daily with dinner 90 capsule 3    methylPREDNISolone 4 MG tablet therapy pack Use as directed on package (Patient not taking: Reported on 10/20/2020) 21 tablet 0    ondansetron (ZOFRAN-ODT) 4 mg disintegrating tablet Take 1 tablet (4 mg total) by mouth every 8 (eight) hours as needed for nausea or vomiting (Patient not taking: Reported on 10/29/2021) 15 tablet 0      No current facility-administered medications for this visit             Allergies        Allergies   Allergen Reactions    Penicillins Rash       itching            The following portions of the patient's history were reviewed and updated as appropriate: allergies, current medications, past medical history, past social history, past surgical history and problem list         Vitals  Vitals       Vitals:     03/01/22 0757   BP: 140/80   BP Location: Left arm   Patient Position: Sitting   Cuff Size: Adult   Pulse: 70   Weight: 79 8 kg (176 lb)   Height: 5' 8" (1 727 m)                  Physical Exam  Physical Exam  Vitals reviewed  Constitutional:       General: He is not in acute distress  Appearance: Normal appearance  He is normal weight  HENT:      Head: Normocephalic  Eyes:      Pupils: Pupils are equal, round, and reactive to light  Cardiovascular:      Rate and Rhythm: Normal rate  Pulmonary:      Effort: No respiratory distress  Breath sounds: Normal breath sounds  Abdominal:      General: There is no distension  Palpations: Abdomen is soft  Tenderness: There is no abdominal tenderness  Genitourinary:     Comments: AVELINO-prostate 45 g with no nodules  Musculoskeletal:         General: Normal range of motion  Skin:     General: Skin is warm and dry  Neurological:      General: No focal deficit present        Mental Status: He is alert and oriented to person, place, and time  Psychiatric:         Mood and Affect: Mood normal          Behavior: Behavior normal                Results  Recent Results   No results found for this or any previous visit (from the past 1 hour(s))    ]  Lab Results   Component Value Date     PSA 2 4 06/24/2019     PSA 2 4 05/03/2016            Lab Results   Component Value Date     GLUCOSE 94 01/03/2016     CALCIUM 9 1 12/02/2020      01/03/2016     K 4 7 12/02/2020     CO2 27 12/02/2020      (H) 12/02/2020     BUN 13 12/02/2020     CREATININE 0 88 12/02/2020            Lab Results   Component Value Date     WBC 7 28 12/02/2020     HGB 15 2 12/02/2020     HCT 49 3 12/02/2020     MCV 97 12/02/2020      12/02/2020               Orders        Orders Placed This Encounter   Procedures    Urine culture       Standing Status:   Future       Number of Occurrences:   1       Standing Expiration Date:   3/1/2023       Order Specific Question:   Release to patient through [facility]       Answer:   Immediate    PSA, Total Screen       This is a patient instruction: This test is non-fasting   Please drink two glasses of water morning of bloodwork            Standing Status:   Future       Number of Occurrences:   1       Standing Expiration Date:   9/1/2023         GLENDY Brooks      Electronically signed by GLENDY Kidd at 3/1/2022  9:08 AM   Electronically signed by Darrell Maher MD at 3/2/2022  7:44 AM

## 2022-04-13 NOTE — ANESTHESIA PREPROCEDURE EVALUATION
Procedure:  CYSTO (N/A Bladder)  TRANSRECTAL US GUIDANCE (N/A Urethra)    Relevant Problems   CARDIO   (+) Essential hypertension   (+) Other hyperlipidemia      GI/HEPATIC   (+) Gastroesophageal reflux disease      /RENAL   (+) Benign localized prostatic hyperplasia with lower urinary tract symptoms (LUTS)   (+) Benign prostatic hyperplasia      MUSCULOSKELETAL   (+) Acromioclavicular joint arthritis   (+) Back pain   (+) Chronic back pain   (+) Right sciatic nerve pain      NEURO/PSYCH   (+) Chronic back pain   (+) Chronic pain of left ankle   (+) Headache        Physical Exam    Airway    Mallampati score: II  TM Distance: >3 FB  Neck ROM: full     Dental       Cardiovascular  Rhythm: regular, Rate: normal, Cardiovascular exam normal    Pulmonary  Pulmonary exam normal Breath sounds clear to auscultation,     Other Findings        Anesthesia Plan  ASA Score- 2     Anesthesia Type- general with ASA Monitors  Additional Monitors:   Airway Plan: LMA  Plan Factors-Exercise tolerance (METS): >4 METS  Chart reviewed  Existing labs reviewed  Patient is not a current smoker  Obstructive sleep apnea risk education given perioperatively  Induction- intravenous  Postoperative Plan-     Informed Consent- Anesthetic plan and risks discussed with patient

## 2022-04-14 ENCOUNTER — TELEPHONE (OUTPATIENT)
Dept: FAMILY MEDICINE CLINIC | Facility: CLINIC | Age: 62
End: 2022-04-14

## 2022-04-14 NOTE — TELEPHONE ENCOUNTER
----- Message from [facility] sent at 4/14/2022  8:37 AM EDT -----  All labs are stable

## 2022-04-15 ENCOUNTER — OFFICE VISIT (OUTPATIENT)
Dept: DENTISTRY | Facility: CLINIC | Age: 62
End: 2022-04-15

## 2022-04-15 VITALS — TEMPERATURE: 97.8 F

## 2022-04-15 DIAGNOSIS — Z01.21 ENCOUNTER FOR DENTAL EXAMINATION AND CLEANING WITH ABNORMAL FINDINGS: Primary | ICD-10-CM

## 2022-04-15 PROCEDURE — D0191 ASSESSMENT OF A PATIENT: HCPCS | Performed by: DENTIST

## 2022-04-15 RX ORDER — METHYLPREDNISOLONE 4 MG/1
TABLET ORAL
Qty: 21 TABLET | Refills: 0 | Status: SHIPPED | OUTPATIENT
Start: 2022-04-15 | End: 2022-04-29 | Stop reason: ALTCHOICE

## 2022-04-15 NOTE — PROGRESS NOTES
Patient presented to the clinic for emergency appointment  He stated that he is in pain and has been having pain since the root canal was completed on tooth #29  PA of the tooth was taken  There was no fracture that could be seen on radiograph and there was no radiolucency  There is a bit of sealer extrusion at the apical region  Dr Harpal Zelaya evaluated the patient and suggested that it could be due to inflammation, fracture or there could be an accessory canal    He checked occlusion and probed around the tooth to check if there is a fracture  Patient was made aware of the findings  He was prescribed Methylprednisolone therapy pack 21 tablets  He left in stable condition       Nv: Follow up with any resident

## 2022-04-18 ENCOUNTER — OFFICE VISIT (OUTPATIENT)
Dept: PHYSICAL THERAPY | Age: 62
End: 2022-04-18
Payer: MEDICARE

## 2022-04-18 DIAGNOSIS — M54.31 RIGHT SIDED SCIATICA: Primary | ICD-10-CM

## 2022-04-18 PROCEDURE — 97110 THERAPEUTIC EXERCISES: CPT

## 2022-04-18 NOTE — PROGRESS NOTES
Daily Note     Today's date: 2022  Patient name: Easton Bunn  : 1960  MRN: [id number]  Referring provider: Maral George*  Dx:   Encounter Diagnosis     ICD-10-CM    1  Right sided sciatica  M54 31                   Subjective: Pt states he is "50/50"       Objective: See treatment diary below      Assessment: Pt reports he is completing stretches and nerve glides at home but continues to require verbal cues in order to complete them properly  He does not have any symptoms while completing session or following session as he continues to only have symptoms while driving  He has not had significant relief from therapy so far  He has an appointment with orthopedics and he would like to have an MRI done on his lumbar spine  Tolerated treatment fair  Patient demonstrated fatigue post treatment and would benefit from continued PT via adherence to HEP  Plan: Continue per plan of care        Precautions: None      Manuals 2/23/22 2/28 3/7 3/14 3/21 4/4 4/11 4/18     STM piriformis 5'                                                   Neuro Re-Ed                                                                                                        Ther Ex             Hamstring stretch 3x30s 3x30s 3x30s 3x30s 3x30s 3x30s 3x30s 3x30s     Piriformis stretch 3x30s 3x30s 3x30s 3x30s 3x30s 3x30s  3x30s     Supine hip flexor stretch   3x30s 3x30s 3x30s 3x30s 3x30s 3x30s     SKTC 5sx15 5sx15 b/l     DKTC 10sx10      Sciatic nerve glide 2x20  2x20   3x20 3x20        Bike   10'  10'  10' 10'  10'  10'  10'      Supine bridge  3x10 3x10  3x10 3x10         Hip abd  3x10 black TB 3x10 40# 3x10  40#  30# 3x10 40# 3x10 40# 3x10 50# 3x10 50#     Repeated flexion  x30 w/ physioball 5sx30 w/ physioball  5x30 w/physioball x30 w/ physioball x30 F/L/R x30 F/L/R x30 F/L/R     TA ball press   5sx15  5sx15 5sx15 5sx15 5sx15  5sx15     Hip IR/ER  NT           Standing SLR  3x10 hip abd/ext  3x10 hip abd/ext 3x10  Hip abd/ext 3x10 abd/ext 2# 3x10 3 way 2# 3x10 2#      Hooklying lumbar rotation   5sx15 b/l 5sx15 /bl 5sx15 b/l   5sx15     Supine flex SLR   3x10  3x10 3x10  3x10        Leg press   3x10 65# 3x10  65# 3x10 70# 3x10 90# 3x10 110# 3x10 115#     Repeated ext      2x10   2x10       LAQ      3x10 35# 3x10 35# 3x10 35#     Ham curl      3x10 55# 3x10  3x10 50#     Paloff press      5"x15 L/R 11# 5"x15 11# 5"x15 13#     Resisted lumbar rotation        3x10 10#     Ther Activity                                       Gait Training                                       Modalities

## 2022-04-20 ENCOUNTER — APPOINTMENT (OUTPATIENT)
Dept: RADIOLOGY | Facility: MEDICAL CENTER | Age: 62
End: 2022-04-20
Payer: MEDICARE

## 2022-04-20 ENCOUNTER — OFFICE VISIT (OUTPATIENT)
Dept: OBGYN CLINIC | Facility: CLINIC | Age: 62
End: 2022-04-20
Payer: MEDICARE

## 2022-04-20 VITALS
HEART RATE: 61 BPM | BODY MASS INDEX: 26.83 KG/M2 | SYSTOLIC BLOOD PRESSURE: 148 MMHG | HEIGHT: 68 IN | RESPIRATION RATE: 17 BRPM | WEIGHT: 177 LBS | DIASTOLIC BLOOD PRESSURE: 92 MMHG | OXYGEN SATURATION: 99 %

## 2022-04-20 DIAGNOSIS — M54.31 SCIATICA OF RIGHT SIDE: ICD-10-CM

## 2022-04-20 DIAGNOSIS — M54.41 CHRONIC RIGHT-SIDED LOW BACK PAIN WITH RIGHT-SIDED SCIATICA: ICD-10-CM

## 2022-04-20 DIAGNOSIS — S46.011A ROTATOR CUFF STRAIN, RIGHT, INITIAL ENCOUNTER: ICD-10-CM

## 2022-04-20 DIAGNOSIS — S49.91XA SHOULDER INJURY, RIGHT, INITIAL ENCOUNTER: ICD-10-CM

## 2022-04-20 DIAGNOSIS — M54.31 RIGHT SCIATIC NERVE PAIN: ICD-10-CM

## 2022-04-20 DIAGNOSIS — S49.91XA SHOULDER INJURY, RIGHT, INITIAL ENCOUNTER: Primary | ICD-10-CM

## 2022-04-20 DIAGNOSIS — G89.29 CHRONIC RIGHT-SIDED LOW BACK PAIN WITH RIGHT-SIDED SCIATICA: ICD-10-CM

## 2022-04-20 PROCEDURE — 73030 X-RAY EXAM OF SHOULDER: CPT

## 2022-04-20 PROCEDURE — 99214 OFFICE O/P EST MOD 30 MIN: CPT | Performed by: FAMILY MEDICINE

## 2022-04-20 NOTE — PATIENT INSTRUCTIONS
F/u after MRI  MRI- lower back  Lower back pain, radiculopathy  >6 wks of PT  XR neg  R shoulder- home exercises  Consider PT if no improvement  Icing/heat/OTC pain meds as needed  Exercises for Internal and External Shoulder Rotation   WHAT YOU NEED TO KNOW:   Exercises for internal shoulder rotation work the muscles in your chest and front of your shoulder  Exercises for external shoulder rotation work the muscles in the back of your shoulder and upper back  DISCHARGE INSTRUCTIONS:   Contact your healthcare provider if:   · You have sharp or worsening pain during exercise or at rest     · You have questions or concerns about your shoulder exercises  Before you exercise:  Warm up and stretch before you exercise  Walk or ride a stationary bike for 5 to 10 minutes to help you warm up  Stretching helps increase range of motion  It may also decrease muscle soreness and help prevent another injury  Your healthcare provider will tell you which of the following stretches to do:  · Crossover arm stretch:  Relax your shoulders  Hold your upper arm with the opposite hand  Pull your arm across your chest until you feel a stretch  Hold the stretch for 30 seconds  Return to the starting position  · Shoulder flexion stretch:  Stand facing a wall  Slowly walk your fingers up the wall until you feel a stretch  Hold the stretch for 30 seconds  Return to the starting position  · Sleeper stretch:  Lie on your injured side on a firm, flat surface  Bend the elbow of your injured arm 90° with your hand facing up  Use your arm that is not injured to slowly push your injured arm down  Stop when you feel a stretch at the back of your injured shoulder  Hold the stretch for 30 seconds  Slowly return to the starting position  How to exercise with a weight:  Your healthcare provider will tell you how much weight to exercise with  · Shoulder internal rotation:  Sit in a chair   Place a rolled up towel between your elbow and your side  Bend your elbow to 90°  Gently squeeze the towel with your elbow to prevent it from falling out  Hold the weight with your thumb pointing up  Slowly move the weight across your chest  Stop when your hand reaches your opposite arm  Hold this position for as many seconds as directed  Slowly return to the starting position  · Shoulder external rotation:  Lie on your side with your injured shoulder facing up  Bend your elbow 90°  Place a rolled up towel between your elbow and your side  Hold a weight in your hand  Gently squeeze the towel with your elbow to prevent it from falling out  Slowly rotate your arm outward, but keep your elbow bent  Stop when you feel a stretch  Hold this position for 30 seconds or as directed  Slowly return to the starting position  How to exercise with an exercise band:   · Shoulder internal rotation:  Tie one end of the exercise band to a heavy, secure object  Sit in a chair  Place a rolled up towel between your elbow and your side  Bend your elbow to 90°  Gently squeeze the towel with your elbow to prevent it from falling out  Slowly pull the band across your chest  Stop when your hand reaches your opposite arm  Hold this position for as many seconds as directed  Slowly return to the starting position  · Shoulder external rotation:  Hold one end of the exercise band on the side that is not injured  Place a rolled up towel between your elbow and your side  Bend your elbow 90°  Squeeze the towel with your elbow  Grab the end of the band and slowly turn your arm outward, but keep your elbow bent  Stop when you feel a stretch  Hold this position for 30 seconds or as directed  Slowly return to the starting position  Follow up with your physical therapist as directed:  Write down your questions so you remember to ask them at your visits     © Copyright [employer] 2022 Information is for End User's use only and may not be sold, redistributed or otherwise used for commercial purposes  All illustrations and images included in CareNotes® are the copyrighted property of A D A M , Inc  or David Cabrera  The above information is an  only  It is not intended as medical advice for individual conditions or treatments  Talk to your doctor, nurse or pharmacist before following any medical regimen to see if it is safe and effective for you

## 2022-04-20 NOTE — PROGRESS NOTES
[facility] [address]  [address]  [facility] [id number] [phone]      Chief Complaint:  Chief Complaint   Patient presents with    Lower Back - Follow-up       Vitals:  /92   Pulse 61   Resp 17   Ht 5' 8" (1 727 m)   Wt 80 3 kg (177 lb)   SpO2 99%   BMI 26 91 kg/m²     The following portions of the patient's history were reviewed and updated as appropriate: allergies, current medications, past family history, past medical history, past social history, past surgical history, and problem list       Subjective:   Patient ID: Yuan Mari is a 65 y o  male  Here for f/u R sided sciatica  Going to PT= no improvement  Worse pain with driving  Pain continues to radiate down R leg  He is taking the gabapentin  Taking tylenol/ibuprofen- not much help  N/t intermittently    He is also having R shoulder pain  Started after working out at gym lifting 15 lbs  Sharp pain  Hurts to sleep on  Taking tylenol/motrin- not much help  Salon pas  Hurts to reach up/back  Pain for 5 days          Review of Systems   Constitutional: Negative for fatigue and fever  Respiratory: Negative for shortness of breath  Cardiovascular: Negative for chest pain  Gastrointestinal: Negative for abdominal pain and nausea  Genitourinary: Negative for dysuria  Musculoskeletal: Positive for arthralgias and back pain  Skin: Negative for rash and wound  Neurological: Positive for numbness  Negative for weakness and headaches  Objective:  Back Exam     Tenderness   The patient is experiencing tenderness in the lumbar (R buttocks TTP)  Range of Motion   The patient has normal back ROM  Tests   Straight leg raise right: positive  Straight leg raise left: negative    Comments:  Pain with flex/rot R/lat flex B      Right Shoulder Exam     Tenderness   The patient is experiencing tenderness in the biceps tendon and acromion      Range of Motion   The patient has normal right shoulder ROM  Muscle Strength   The patient has normal right shoulder strength  Tests   Wong test: positive  Impingement: positive    Comments:  Pos empty can /push off test  Pain with ROM            Physical Exam  Constitutional:       Appearance: Normal appearance  He is normal weight  Eyes:      Extraocular Movements: Extraocular movements intact  Pulmonary:      Effort: Pulmonary effort is normal    Musculoskeletal:      Cervical back: Normal range of motion  Lumbar back: Positive right straight leg raise test  Negative left straight leg raise test    Skin:     General: Skin is warm and dry  Neurological:      General: No focal deficit present  Mental Status: He is alert and oriented to person, place, and time  Mental status is at baseline  Psychiatric:         Mood and Affect: Mood normal          Behavior: Behavior normal          Thought Content: Thought content normal          Judgment: Judgment normal          I have personally reviewed pertinent films in PACS and my interpretation is XR R shoulder-  mild AC, GH joint DJD, no fx  Assessment/Plan:  Assessment/Plan   Diagnoses and all orders for this visit:    Shoulder injury, right, initial encounter  -     XR shoulder 2+ vw right; Future    Chronic right-sided low back pain with right-sided sciatica  -     MRI lumbar spine wo contrast; Future    Sciatica of right side  -     MRI lumbar spine wo contrast; Future    Right sciatic nerve pain  -     MRI lumbar spine wo contrast; Future        Return for f/u after MRI Lumbar spine, Recheck       Myron Harrington MD

## 2022-04-29 ENCOUNTER — ANESTHESIA (OUTPATIENT)
Dept: GASTROENTEROLOGY | Facility: HOSPITAL | Age: 62
End: 2022-04-29

## 2022-04-29 ENCOUNTER — HOSPITAL ENCOUNTER (OUTPATIENT)
Dept: GASTROENTEROLOGY | Facility: HOSPITAL | Age: 62
Setting detail: OUTPATIENT SURGERY
Discharge: HOME | End: 2022-04-29
Attending: INTERNAL MEDICINE | Admitting: INTERNAL MEDICINE
Payer: MEDICARE

## 2022-04-29 ENCOUNTER — ANESTHESIA EVENT (OUTPATIENT)
Dept: GASTROENTEROLOGY | Facility: HOSPITAL | Age: 62
End: 2022-04-29

## 2022-04-29 VITALS
RESPIRATION RATE: 16 BRPM | TEMPERATURE: 97.5 F | HEIGHT: 68 IN | OXYGEN SATURATION: 96 % | DIASTOLIC BLOOD PRESSURE: 62 MMHG | HEART RATE: 59 BPM | WEIGHT: 175 LBS | SYSTOLIC BLOOD PRESSURE: 100 MMHG | BODY MASS INDEX: 26.52 KG/M2

## 2022-04-29 DIAGNOSIS — Z86.010 PERSONAL HISTORY OF COLONIC POLYPS: ICD-10-CM

## 2022-04-29 DIAGNOSIS — K21.9 GASTROESOPHAGEAL REFLUX DISEASE WITHOUT ESOPHAGITIS: ICD-10-CM

## 2022-04-29 DIAGNOSIS — Z87.19 HISTORY OF COLITIS: ICD-10-CM

## 2022-04-29 DIAGNOSIS — Z12.11 COLON CANCER SCREENING: ICD-10-CM

## 2022-04-29 PROCEDURE — 88305 TISSUE EXAM BY PATHOLOGIST: CPT | Performed by: PATHOLOGY

## 2022-04-29 PROCEDURE — 45385 COLONOSCOPY W/LESION REMOVAL: CPT | Performed by: INTERNAL MEDICINE

## 2022-04-29 PROCEDURE — 43239 EGD BIOPSY SINGLE/MULTIPLE: CPT | Performed by: INTERNAL MEDICINE

## 2022-04-29 RX ORDER — PROPOFOL 10 MG/ML
INJECTION, EMULSION INTRAVENOUS CONTINUOUS PRN
Status: DISCONTINUED | OUTPATIENT
Start: 2022-04-29 | End: 2022-04-29

## 2022-04-29 RX ORDER — PROPOFOL 10 MG/ML
INJECTION, EMULSION INTRAVENOUS AS NEEDED
Status: DISCONTINUED | OUTPATIENT
Start: 2022-04-29 | End: 2022-04-29

## 2022-04-29 RX ORDER — SODIUM CHLORIDE 9 MG/ML
INJECTION, SOLUTION INTRAVENOUS CONTINUOUS PRN
Status: DISCONTINUED | OUTPATIENT
Start: 2022-04-29 | End: 2022-04-29

## 2022-04-29 RX ADMIN — SODIUM CHLORIDE: 9 INJECTION, SOLUTION INTRAVENOUS at 09:43

## 2022-04-29 RX ADMIN — PROPOFOL 100 MG: 10 INJECTION, EMULSION INTRAVENOUS at 09:47

## 2022-04-29 RX ADMIN — PROPOFOL 50 MG: 10 INJECTION, EMULSION INTRAVENOUS at 09:52

## 2022-04-29 RX ADMIN — PROPOFOL 40 MG: 10 INJECTION, EMULSION INTRAVENOUS at 10:19

## 2022-04-29 RX ADMIN — PROPOFOL 40 MG: 10 INJECTION, EMULSION INTRAVENOUS at 09:49

## 2022-04-29 RX ADMIN — PROPOFOL 120 MCG/KG/MIN: 10 INJECTION, EMULSION INTRAVENOUS at 10:02

## 2022-04-29 RX ADMIN — PROPOFOL 100 MG: 10 INJECTION, EMULSION INTRAVENOUS at 09:46

## 2022-04-29 RX ADMIN — PROPOFOL 30 MG: 10 INJECTION, EMULSION INTRAVENOUS at 09:55

## 2022-04-29 NOTE — ANESTHESIA POSTPROCEDURE EVALUATION
Post-Op Assessment Note    CV Status:  Stable  Pain Score: 0    Pain management: adequate     Mental Status:  Alert and awake   Hydration Status:  Euvolemic and stable   PONV Controlled:  Controlled   Airway Patency:  Patent and adequate      Post Op Vitals Reviewed: Yes      Staff: CRNA         No complications documented      BP  98/60    Temp     Pulse 58   Resp 16   SpO2 98%

## 2022-04-29 NOTE — H&P
History and Physical - [facility]  Shahzad Bunn 65 y o  male MRN: [id number]                  HPI: Latisha De Anda is a 65y o  year old male who presents for GERD and colorectal cancer screening      REVIEW OF SYSTEMS: Per the HPI, and otherwise unremarkable  Historical Information   Past Medical History:   Diagnosis Date    Abscess, intestine     Arthritis     Diverticulitis     GERD (gastroesophageal reflux disease)     Hydronephrosis 5/27/2018    Hypertension     Wears glasses      Past Surgical History:   Procedure Laterality Date    ABCESS DRAINAGE      COLON SURGERY      COLONOSCOPY N/A 5/5/2016    Procedure: COLONOSCOPY;  Surgeon: Chico Sahu MD;  Location: [facility]; Service:     COLONOSCOPY N/A 7/26/2018    Procedure: COLONOSCOPY with bx's;  Surgeon: Sherry Coleman MD;  Location: [facility]; Service: Gastroenterology    ESOPHAGOGASTRODUODENOSCOPY      with biopsy    FOOT SURGERY Left     x2? fusion? due to arthritis  onset: 0      HERNIA REPAIR      ILEO LOOP DIVERSION N/A 6/1/2018    Procedure: ILEOSTOMY LOOP DIVERTING;  Surgeon: Judith Young DO;  [facility];  Service: General    INSERTION OF FIDUCIAL MARKER (TRANSRECTAL ULTRASOUND GUIDANCE) N/A 4/13/2022    Procedure: TRANSRECTAL US GUIDANCE;  Surgeon: Cristofer Frazier MD;  [facility];  Service: Urology    LAPAROTOMY N/A 6/1/2018    Procedure: LAPAROTOMY EXPLORATORY,;  Surgeon: Judith Young DO;  [facility];  Service: General    NM CLOSE ENTEROSTOMY N/A 8/16/2018    Procedure: CLOSURE LOOP ILEOSTOMY;  Surgeon: Haylee Gomez MD;  [facility];  Service: General    NM CYSTOURETHROSCOPY N/A 4/13/2022    Procedure: Annella Luca;  Surgeon: Cristofer Frazier MD;  [facility];  Service: Urology    NM CYSTOURETHROSCOPY,URETER CATHETER Left 6/9/2018    Procedure: CYSTOSCOPY RETROGRADE PYELOGRAM WITH INSERTION STENT URETERAL;  Surgeon: Denver Fey, MD; [facility];  Service: Urology    DE LAP, INCISIONAL HERNIA REPAIR,REDUCIBLE N/A 2019    Procedure: REPAIR HERNIA INCISIONAL LAPAROSCOPIC;  Surgeon: Nemesio Sanchez MD;  [facility];  Service: General    DE Emily Hartman 19 Right 2019    Procedure: REPAIR HERNIA INGUINAL, LAPAROSCOPIC;  Surgeon: Nemesoi Sanchez MD;  [facility];  Service: General    DE PART REMOVAL COLON W ANASTOMOSIS N/A 2018    Procedure: RESECTION COLON SIGMOID;  Surgeon: Nick Bruce DO;  [facility];  Service: General     Social History   Social History     Substance and Sexual Activity   Alcohol Use Not Currently    Comment: daily until - quit 2022     Social History     Substance and Sexual Activity   Drug Use No     Social History     Tobacco Use   Smoking Status Former Smoker    Quit date:     Years since quittin 3   Smokeless Tobacco Never Used     Family History   Problem Relation Age of Onset    Other Mother         head tumor    Glaucoma Father     Diabetes Father         possible diabetes    Stroke Family        Meds/Allergies       Current Outpatient Medications:     acetaminophen (TYLENOL) 500 mg tablet    DULoxetine (CYMBALTA) 30 mg delayed release capsule    gabapentin (NEURONTIN) 300 mg capsule    Ibuprofen (ADVIL PO)    lisinopril (ZESTRIL) 20 mg tablet    methocarbamol (ROBAXIN) 500 mg tablet    phenazopyridine (PYRIDIUM) 200 mg tablet    sildenafil (VIAGRA) 100 mg tablet    tadalafil (CIALIS) 20 MG tablet    tamsulosin (FLOMAX) 0 4 mg    Allergies   Allergen Reactions    Penicillins Itching and Rash       Objective     /60   Pulse 91   Temp (!) 97 2 °F (36 2 °C) (Tympanic)   Resp 18   Ht 5' 8" (1 727 m)   Wt 79 4 kg (175 lb)   SpO2 97%   BMI 26 61 kg/m²       PHYSICAL EXAM    Gen: NAD  Head: NCAT  CV: RRR  CHEST: Clear  ABD: soft, NT/ND  EXT: no edema      ASSESSMENT/PLAN:  This is a 65y o  year old male here for EGD and colonoscopy, and he is stable and optimized for his procedure

## 2022-04-29 NOTE — ANESTHESIA PREPROCEDURE EVALUATION
Procedure:  COLONOSCOPY  EGD    Relevant Problems   CARDIO   (+) Essential hypertension   (+) Other hyperlipidemia      GI/HEPATIC   (+) Gastroesophageal reflux disease      /RENAL   (+) Benign localized prostatic hyperplasia with lower urinary tract symptoms (LUTS)   (+) Benign prostatic hyperplasia      MUSCULOSKELETAL   (+) Acromioclavicular joint arthritis   (+) Back pain   (+) Chronic back pain   (+) Right sciatic nerve pain      NEURO/PSYCH   (+) Chronic back pain   (+) Chronic pain of left ankle   (+) Headache        Physical Exam    Airway    Mallampati score: I  TM Distance: >3 FB  Neck ROM: full     Dental   No notable dental hx     Cardiovascular  Cardiovascular exam normal    Pulmonary  Pulmonary exam normal     Other Findings        Anesthesia Plan  ASA Score- 2     Anesthesia Type- IV sedation with anesthesia with ASA Monitors  Additional Monitors:   Airway Plan:           Plan Factors-    Chart reviewed  Existing labs reviewed  Patient summary reviewed  Induction- intravenous  Postoperative Plan-     Informed Consent- Anesthetic plan and risks discussed with patient  I personally reviewed this patient with the CRNA  Discussed and agreed on the Anesthesia Plan with the CRNA  Lea Bryant

## 2022-05-09 ENCOUNTER — OFFICE VISIT (OUTPATIENT)
Dept: UROLOGY | Facility: MEDICAL CENTER | Age: 62
End: 2022-05-09
Payer: MEDICARE

## 2022-05-09 VITALS
WEIGHT: 175 LBS | HEIGHT: 68 IN | BODY MASS INDEX: 26.52 KG/M2 | SYSTOLIC BLOOD PRESSURE: 122 MMHG | DIASTOLIC BLOOD PRESSURE: 80 MMHG

## 2022-05-09 DIAGNOSIS — N40.1 BENIGN PROSTATIC HYPERPLASIA WITH URINARY FREQUENCY: ICD-10-CM

## 2022-05-09 DIAGNOSIS — Z12.5 PROSTATE CANCER SCREENING: ICD-10-CM

## 2022-05-09 DIAGNOSIS — M62.89 PELVIC FLOOR DYSFUNCTION: Primary | ICD-10-CM

## 2022-05-09 DIAGNOSIS — R35.0 URINARY FREQUENCY: ICD-10-CM

## 2022-05-09 DIAGNOSIS — R35.1 NOCTURIA: ICD-10-CM

## 2022-05-09 DIAGNOSIS — R35.0 BENIGN PROSTATIC HYPERPLASIA WITH URINARY FREQUENCY: ICD-10-CM

## 2022-05-09 PROCEDURE — 99214 OFFICE O/P EST MOD 30 MIN: CPT | Performed by: UROLOGY

## 2022-05-12 ENCOUNTER — OFFICE VISIT (OUTPATIENT)
Dept: DENTISTRY | Facility: CLINIC | Age: 62
End: 2022-05-12

## 2022-05-12 VITALS — DIASTOLIC BLOOD PRESSURE: 92 MMHG | HEART RATE: 96 BPM | TEMPERATURE: 97.1 F | SYSTOLIC BLOOD PRESSURE: 139 MMHG

## 2022-05-12 DIAGNOSIS — Z01.21 ENCOUNTER FOR DENTAL EXAMINATION AND CLEANING WITH ABNORMAL FINDINGS: Primary | ICD-10-CM

## 2022-05-12 PROCEDURE — D0191 ASSESSMENT OF A PATIENT: HCPCS | Performed by: DENTIST

## 2022-05-12 NOTE — PROGRESS NOTES
62yo M presents for evaluation of #29 (RCT completed 5/6/22) due to continuous painful sensation  2 PA at different angles taken today  Revealing no abnormalities of RCT, minor sealer extrusion present  Pt came in for emergency visit 4/15/22 and was prescribed 21 day course methylprednisone  He states this uncomfortable "sensation" is continuous and has not gone away since the day of the RCT despite the steroid use  Percussion (+)  Palpation (-)  Air sensitivity in interproximal region between #29 and 30  Cold (+)    Of note, pt describes continuous "sensation" present in the tooth rather than a "sharp pain "     E/O: no asymmetry or swelling  I/O: slight gingival recession on adjacent #30 (with large amalgam restoration) and #28, no vestibular swelling or TTP, #29 with cavit in place, nonmobile, periodontal probings around #29 WNL  Gluma desensitizer was placed on cervical regions of #30 and #28  Occlusion of #30 and #29 was reduced with high speed handpiece very slightly       NV: re-evaluation of #29 w/ Dr Timmy Redd 60 mins

## 2022-05-19 ENCOUNTER — HOSPITAL ENCOUNTER (OUTPATIENT)
Dept: RADIOLOGY | Facility: HOSPITAL | Age: 62
Discharge: HOME | End: 2022-05-19
Attending: FAMILY MEDICINE
Payer: MEDICARE

## 2022-05-19 DIAGNOSIS — M54.41 CHRONIC RIGHT-SIDED LOW BACK PAIN WITH RIGHT-SIDED SCIATICA: ICD-10-CM

## 2022-05-19 DIAGNOSIS — M54.31 RIGHT SCIATIC NERVE PAIN: ICD-10-CM

## 2022-05-19 DIAGNOSIS — M54.31 SCIATICA OF RIGHT SIDE: ICD-10-CM

## 2022-05-19 DIAGNOSIS — G89.29 CHRONIC RIGHT-SIDED LOW BACK PAIN WITH RIGHT-SIDED SCIATICA: ICD-10-CM

## 2022-05-19 PROCEDURE — G1004 CDSM NDSC: HCPCS

## 2022-05-19 PROCEDURE — 72148 MRI LUMBAR SPINE W/O DYE: CPT

## 2022-05-20 NOTE — PROGRESS NOTES
PT Evaluation /Discharge Summary    Today's date: 2022  Patient name: Janet Bunn  : 1960  MRN: [id number]  Referring provider: Kin Wright MD  Dx:   Encounter Diagnosis     ICD-10-CM    1  Pelvic floor dysfunction  M62 89 Ambulatory referral to Physical Therapy   2  Urinary frequency  R35 0 Ambulatory referral to Physical Therapy   3  Nocturia  R35 1 Ambulatory referral to Physical Therapy   4  Benign prostatic hyperplasia with urinary frequency  N40 1 Ambulatory referral to Physical Therapy    R35 0        Start Time: 08  Stop Time: 08  Total time in clinic (min): 40 minutes    Assessment  Assessment details: Collin Franklin is a 65y o  year old male with complaints of nocturia  The following impairments were found on evaluation: poor bladder habits  Patient's presentation is consistent with poor bladder habits  These impairments contribute to the following functional limitations: decreased sleep quality and amount; and the following disability: decreased quality of life  Collin Franklin  is a good candidate for therapy and would benefit from skilled physical therapy to address the above impairments in order to allow the patient to achieve below goals and return to his prior level of function  Patient education provided on PFM anatomy and function, healthy bladder habits and urge information, urge deferral strategies  Patient educated on diagnosis, plan of care and prognosis  Carter Flaming are in agreement with recommended plan of care, goals for therapy and demonstrates motivation for active participation in proposed plan of care      Thank you Dr Valentina Lantigua for this referral!    Impairments: abnormal coordination, abnormal muscle firing, abnormal muscle tone, impaired physical strength, lacks appropriate home exercise program and poor posture   Barriers to therapy: none  Understanding of Dx/Px/POC: good   Prognosis: good    Goals  LTG to be completed in 8 weeks from 2022 or upon discharge from PFPT:  1  Vu Ordonez will be independent with advanced home exercise program for self-management of symptoms  [address] will demonstrate ability to appropriately activate deep core muscles with [id number] mobility tasks  [address] will report waking to urinate 2 or less times per night  Jacob Suarez6 will be independent with urge deferral strategies  Plan  Patient would benefit from: skilled physical therapy  Planned modality interventions: biofeedback, cryotherapy, thermotherapy: hydrocollator packs and ultrasound  Planned therapy interventions: abdominal trunk stabilization, activity modification, balance, balance/weight bearing training, behavior modification, body mechanics training, breathing training, coordination, flexibility, functional ROM exercises, gait training, graded activity, graded exercise, graded motor, home exercise program, joint mobilization, manual therapy, massage, motor coordination training, neuromuscular re-education, patient education, postural training, strengthening, stretching, therapeutic activities, therapeutic exercise and therapeutic training  Frequency: 1x week  Duration in weeks: 12  Plan of Care beginning date: 5/23/2022  Plan of Care expiration date: 7/18/2022  Treatment plan discussed with: patient, PTA and referring physician        PT Pelvic Floor Subjective:   History of Present Illness:   Gustavo Poe is a 65 y o  male who presents to OPPT with primary complaint of urinary frequency, difficulty with urination  They are referred to OPPT by Dr Leland Cardenas urology  Vu Ordonez reports he wakes up 4-5 x a night to urinate  He reports this began a couple months ago  During the day he denies symptoms  He is taking flomax and this helps him to urinate- no longer has to strain or have as much hesitation  From Urology Note:   "BPH with obstruction, and he has nocturia every hour and daytime frequency of every hour    The symptoms are quite bothersome and at night he does have some difficulty urinating  However I think some of this may be pelvic floor dysfunction and also he may have an irritable bladder from his history of diverticulitis/diverticulosis with segmental colectomy     My recommendation is start physical therapy with pelvic health, continue with Flomax at the present dose  His sciatica is pretty significant  He is quite uncomfortable and he looks uncomfortable in the office  He has to adjust a lot of his activities and this may also play a role in his voiding symptoms  Reassess in 3 months "  Social Support:     Lives in:  Apartment (no issues on the stairs )    Lives with:  Adult children (lives with adult child 25 )    Relationship status: /committed    Work status: retired  Diet and Exercise:    Diet:balanced nutrition and low sodium diet    Exercise type: weight training and biking    Exercise frequency: daily    Started going about 2 months ago   Co-morbidities:    GERD, HTN, BPH, chronic back pain, ED, s/p inguinal hernia repair (right 2019), urinary frequency, multiple abdominal surgeries  Bladder Function:     Voiding Difficulties positive for: urgency (nightime ), straining and incomplete emptying      Voiding Difficulties negative for: frequent urination and hesitancy (had with flomax is helpful )      Voiding Difficulties comments:     Voiding frequency: every 1-2 hours and every 3-4 hours    Urinary leakage: no urine leakage    Urinary leakage not aggravated by: hearing running water, key-in-the-door syndrome, seeing a toilet and post-void dribble    Nocturia (episodes per night): 4    Painful urination: No      Fluid Intake Type: Water    Intake (ounces):      Intake (ounces) comment: Coffee in the AM small amount   Water half gallon a day   Incontinence Management:     Pads/Diaper Use:  None  Bowel Function:     Bowel frequency: daily (1-2 x a day )    [place] Stool Scale: type 3 and type 4    Stool softener use: no stool softeners  Sexual Function:     Sexually Active:  Sexually active and single partner    Pain during intercourse: No    Pain:     No pain reported by patient  Patient Goals:     Patient goals for therapy:  Fully empty bladder or bowels, improved bladder or bowel function, improved quality of life and improved sleep    Other patient goals:  "better control of the urine"       Objective  Gait WNL, slight forward head and rounded shoulders   Slouches posture- sacral sitting throughout evaluation     Declined pelvic floor exam- internal or external          Precautions: standard      5/23/2022           Patient Ed           PFM anatomy and function [facility]          Healthy bladder function [facility]          Urge deferral [facility]          Decrease water before bed [facility]          Urge info  [facility]                                Neuro Re-Ed                                                                                        Ther Ex                                                                                                   Ther Activity                                 Manual                                 Modalities                                    Discharge    Patient discharged on 6/6/2022 due to not seen for follow up/self-discharge  Pt no showed eval 6/6/2022  Called pt, with no answer  Unable to leave VM due to inbox full  No further visits scheduled    Pt discharged

## 2022-05-23 ENCOUNTER — EVALUATION (OUTPATIENT)
Dept: PHYSICAL THERAPY | Facility: REHABILITATION | Age: 62
End: 2022-05-23
Payer: MEDICARE

## 2022-05-23 DIAGNOSIS — M62.89 PELVIC FLOOR DYSFUNCTION: ICD-10-CM

## 2022-05-23 DIAGNOSIS — N40.1 BENIGN PROSTATIC HYPERPLASIA WITH URINARY FREQUENCY: ICD-10-CM

## 2022-05-23 DIAGNOSIS — R35.0 BENIGN PROSTATIC HYPERPLASIA WITH URINARY FREQUENCY: ICD-10-CM

## 2022-05-23 DIAGNOSIS — R35.1 NOCTURIA: ICD-10-CM

## 2022-05-23 DIAGNOSIS — R35.0 URINARY FREQUENCY: ICD-10-CM

## 2022-05-23 PROCEDURE — 97530 THERAPEUTIC ACTIVITIES: CPT

## 2022-05-23 PROCEDURE — 97161 PT EVAL LOW COMPLEX 20 MIN: CPT

## 2022-05-25 ENCOUNTER — OFFICE VISIT (OUTPATIENT)
Dept: FAMILY MEDICINE CLINIC | Facility: CLINIC | Age: 62
End: 2022-05-25
Payer: MEDICARE

## 2022-05-25 VITALS
RESPIRATION RATE: 16 BRPM | BODY MASS INDEX: 26.49 KG/M2 | WEIGHT: 174.8 LBS | DIASTOLIC BLOOD PRESSURE: 80 MMHG | SYSTOLIC BLOOD PRESSURE: 126 MMHG | TEMPERATURE: 98.7 F | HEIGHT: 68 IN | OXYGEN SATURATION: 96 % | HEART RATE: 66 BPM

## 2022-05-25 DIAGNOSIS — K21.9 GASTROESOPHAGEAL REFLUX DISEASE WITHOUT ESOPHAGITIS: ICD-10-CM

## 2022-05-25 DIAGNOSIS — I10 ESSENTIAL HYPERTENSION: Primary | ICD-10-CM

## 2022-05-25 DIAGNOSIS — E78.49 OTHER HYPERLIPIDEMIA: ICD-10-CM

## 2022-05-25 PROCEDURE — 99214 OFFICE O/P EST MOD 30 MIN: CPT | Performed by: FAMILY MEDICINE

## 2022-05-25 NOTE — PROGRESS NOTES
Assessment/Plan:  Gastroesophageal reflux disease  Well controlled  Continue same  Will continue to monitor  Essential hypertension  Well controlled  Continue same medications  Will continue to monitor his blood pressure  Other hyperlipidemia  Not well controlled  It was discussed about low-fat diet and regular exercise  He is to continue with his exercising  I will repeat blood work before next appointment  BMI 26 0-26 9,adult  Discussed about low carb diet and regular exercise  He was told it is okay to increase protein in his diet to help with his muscle building  Diagnoses and all orders for this visit:    Essential hypertension  -     CBC and differential; Future  -     Comprehensive metabolic panel; Future  -     Lipid Panel with Direct LDL reflex; Future  -     TSH, 3rd generation with Free T4 reflex; Future    Other hyperlipidemia  -     CBC and differential; Future  -     Comprehensive metabolic panel; Future  -     Lipid Panel with Direct LDL reflex; Future  -     TSH, 3rd generation with Free T4 reflex; Future    Gastroesophageal reflux disease without esophagitis    BMI 26 0-26 9,adult        There are no Patient Instructions on file for this visit  Return in about 3 months (around 8/25/2022)  Subjective:      Patient ID: Kimberly Romeo is a 65 y o  male  Chief Complaint   Patient presents with    Follow-up     Discuss body building       Is here today for follow-up multiple medical problems  He has been taking medications  Denies any side effects  He has been going to the gym and exercising 1 hour in the morning and 2 hours in the afternoon  He is interested in taking creatinine to help with his muscle building  He denies any complaint other than sciatic back pain on and off  He has been following with Ortho        The following portions of the patient's history were reviewed and updated as appropriate: allergies, current medications, past family history, past medical history, past social history, past surgical history and problem list     Review of Systems   Constitutional: Negative for chills and fever  HENT: Negative for trouble swallowing  Eyes: Negative for visual disturbance  Respiratory: Negative for cough and shortness of breath  Cardiovascular: Negative for chest pain and palpitations  Gastrointestinal: Negative for abdominal pain, blood in stool and vomiting  Endocrine: Negative for cold intolerance and heat intolerance  Genitourinary: Negative for difficulty urinating and dysuria  Musculoskeletal: Positive for back pain  Negative for gait problem  Skin: Negative for rash  Neurological: Negative for dizziness, syncope and headaches  Hematological: Negative for adenopathy  Psychiatric/Behavioral: Negative for behavioral problems           Current Outpatient Medications   Medication Sig Dispense Refill    acetaminophen (TYLENOL) 500 mg tablet Take 1,000 mg by mouth every 6 (six) hours as needed for mild pain      DULoxetine (CYMBALTA) 30 mg delayed release capsule Take 1 capsule (30 mg total) by mouth daily 30 capsule 5    gabapentin (NEURONTIN) 300 mg capsule Take 1 capsule (300 mg total) by mouth 3 (three) times a day 90 capsule 1    Ibuprofen (ADVIL PO) Take by mouth as needed        lisinopril (ZESTRIL) 20 mg tablet Take 1 tablet (20 mg total) by mouth daily 90 tablet 0    sildenafil (VIAGRA) 100 mg tablet Take 1 tablet (100 mg total) by mouth daily as needed for erectile dysfunction 15 tablet 0    tadalafil (CIALIS) 20 MG tablet       tamsulosin (FLOMAX) 0 4 mg take 1 capsule by mouth daily with dinner 90 capsule 3    methocarbamol (ROBAXIN) 500 mg tablet Take 1 tablet (500 mg total) by mouth daily at bedtime as needed for muscle spasms (Patient not taking: Reported on 5/25/2022) 30 tablet 0    phenazopyridine (PYRIDIUM) 200 mg tablet Take 1 tablet (200 mg total) by mouth 3 (three) times a day as needed for bladder spasms (Patient not taking: Reported on 5/25/2022) 10 tablet 0     No current facility-administered medications for this visit  Objective:    /80 (BP Location: Left arm, Patient Position: Sitting, Cuff Size: Standard)   Pulse 66   Temp 98 7 °F (37 1 °C) (Tympanic)   Resp 16   Ht 5' 8" (1 727 m)   Wt 79 3 kg (174 lb 12 8 oz)   SpO2 96%   BMI 26 58 kg/m²        Physical Exam  Vitals and nursing note reviewed  Constitutional:       Appearance: He is well-developed  HENT:      Head: Normocephalic and atraumatic  Eyes:      Pupils: Pupils are equal, round, and reactive to light  Cardiovascular:      Rate and Rhythm: Normal rate and regular rhythm  Heart sounds: Normal heart sounds  Pulmonary:      Effort: Pulmonary effort is normal       Breath sounds: Normal breath sounds  Abdominal:      General: Bowel sounds are normal       Palpations: Abdomen is soft  Musculoskeletal:      Cervical back: Normal range of motion and neck supple  Right lower leg: No edema  Left lower leg: No edema  Lymphadenopathy:      Cervical: No cervical adenopathy  Skin:     General: Skin is warm  Findings: No rash  Neurological:      Mental Status: He is alert and oriented to person, place, and time  Cranial Nerves: No cranial nerve deficit                  Chris Kaplan MD

## 2022-05-25 NOTE — ASSESSMENT & PLAN NOTE
Not well controlled  It was discussed about low-fat diet and regular exercise  He is to continue with his exercising  I will repeat blood work before next appointment

## 2022-05-25 NOTE — ASSESSMENT & PLAN NOTE
Discussed about low carb diet and regular exercise  He was told it is okay to increase protein in his diet to help with his muscle building

## 2022-06-03 ENCOUNTER — OFFICE VISIT (OUTPATIENT)
Dept: FAMILY MEDICINE CLINIC | Facility: CLINIC | Age: 62
End: 2022-06-03
Payer: MEDICARE

## 2022-06-03 VITALS
WEIGHT: 174 LBS | HEART RATE: 78 BPM | TEMPERATURE: 97.4 F | SYSTOLIC BLOOD PRESSURE: 120 MMHG | RESPIRATION RATE: 16 BRPM | BODY MASS INDEX: 26.37 KG/M2 | DIASTOLIC BLOOD PRESSURE: 76 MMHG | HEIGHT: 68 IN | OXYGEN SATURATION: 96 %

## 2022-06-03 DIAGNOSIS — I83.893 VARICOSE VEINS OF BILATERAL LOWER EXTREMITIES WITH OTHER COMPLICATIONS: Primary | ICD-10-CM

## 2022-06-03 PROBLEM — I83.93 ASYMPTOMATIC VARICOSE VEINS OF BOTH LOWER EXTREMITIES: Status: ACTIVE | Noted: 2022-06-03

## 2022-06-03 PROCEDURE — 99213 OFFICE O/P EST LOW 20 MIN: CPT | Performed by: FAMILY MEDICINE

## 2022-06-03 NOTE — ASSESSMENT & PLAN NOTE
Patient has been exercising more frequently over the past 2 months and has noticed his veins protruding on his right leg and bilateral calves  Patient denies any leg edema, leg cramping, SOB, chest pressure, or history of blood clots  Patient states they are more bothersome than painful  Plan is to refer patient to vascular surgeon for potential further management including Venaseal or sclerotherapy

## 2022-06-03 NOTE — PROGRESS NOTES
Assessment/Plan:    Varicose veins of bilateral lower extremities with other complications  Patient has been exercising more frequently over the past 2 months and has noticed his veins protruding on his right leg and bilateral calves  Patient denies any leg edema, leg cramping, SOB, chest pressure, or history of blood clots  Patient states they are more bothersome than painful  Plan is to refer patient to vascular surgeon for potential further management including Venaseal or sclerotherapy  Problem List Items Addressed This Visit        Cardiovascular and Mediastinum    Varicose veins of bilateral lower extremities with other complications - Primary     Patient has been exercising more frequently over the past 2 months and has noticed his veins protruding on his right leg and bilateral calves  Patient denies any leg edema, leg cramping, SOB, chest pressure, or history of blood clots  Patient states they are more bothersome than painful  Plan is to refer patient to vascular surgeon for potential further management including Venaseal or sclerotherapy  Relevant Orders    Ambulatory Referral to Vascular Surgery            Subjective:      Patient ID: Gustavo Poe is a 65 y o  male  KAMALJIT Ordonez is a 66 yo male presenting to the office with a concern of "painful and noticeable veins " Patient admits he has been exercising more frequently for the past two months and has noticed his veins protruding on the front of his right leg as well as on both calves  Patient states they are not painful, but more so bothersome  Patient denies any leg cramping, calf swelling, or warmth to the legs  Patient denies any SOB or chest discomfort  Patient denies any personal or family history of blood clots       The following portions of the patient's history were reviewed and updated as appropriate: allergies, current medications, past family history, past medical history, past social history, past surgical history and problem list     Review of Systems   Constitutional: Negative for chills and fever  Respiratory: Negative for chest tightness, shortness of breath and wheezing  Cardiovascular: Negative for chest pain, palpitations and leg swelling  Gastrointestinal: Negative for abdominal pain  Musculoskeletal: Negative for arthralgias, back pain and joint swelling  Skin: Negative for color change and rash  Neurological: Negative for dizziness and headaches  Objective:      /76 (BP Location: Left arm, Patient Position: Sitting, Cuff Size: Adult)   Pulse 78   Temp (!) 97 4 °F (36 3 °C) (Tympanic)   Resp 16   Ht 5' 8" (1 727 m)   Wt 78 9 kg (174 lb)   SpO2 96%   BMI 26 46 kg/m²          Physical Exam  Vitals and nursing note reviewed  Constitutional:       Appearance: Normal appearance  He is normal weight  HENT:      Head: Normocephalic and atraumatic  Eyes:      Pupils: Pupils are equal, round, and reactive to light  Cardiovascular:      Rate and Rhythm: Normal rate and regular rhythm  Pulses: Normal pulses  Heart sounds: Normal heart sounds  Pulmonary:      Effort: Pulmonary effort is normal       Breath sounds: Normal breath sounds  Musculoskeletal:         General: No tenderness  Right lower leg: No edema  Left lower leg: No edema  Skin:     General: Skin is warm and dry  Neurological:      General: No focal deficit present  Mental Status: He is alert

## 2022-06-20 ENCOUNTER — OFFICE VISIT (OUTPATIENT)
Dept: DENTISTRY | Facility: CLINIC | Age: 62
End: 2022-06-20

## 2022-06-20 VITALS — SYSTOLIC BLOOD PRESSURE: 118 MMHG | HEART RATE: 67 BPM | TEMPERATURE: 98 F | DIASTOLIC BLOOD PRESSURE: 81 MMHG

## 2022-06-20 DIAGNOSIS — Z01.20 ENCOUNTER FOR DENTAL EXAMINATION: Primary | ICD-10-CM

## 2022-06-20 NOTE — PROGRESS NOTES
Pt presented complaining from severe pain when eating at the root canal treated tooth# 29  also he is complaining from pain when brushing this tooth or when food trapped in the proximal areas there  That requires him to use the dental floss every time after he eat any thing  No pain any more when drinking hot or cold liquids  Reviewing [address] and the existing x-rays  # 29 had RCT (one canal ) and Pt  Having pain since that   Pain Level : 10 (Wheneating or touching # 29 ONLY )     Clinical exam did not reveals any redness,swelling, or fistula at this area  there is no occlusal trauma there too  Pt did not allows us to due any percussion on any tooth in the area of # 29  Pt  Is not feeling comfortable and he is strongly dissatisfied with this treatment and the actual result  Pt  Will benefit from  either having CBCT for this tooth in the next visit,or   From being referred to endodontist  To clarify the endo circumstances        [place] : Consultation  Appointment with Dr HARMON

## 2022-06-21 ENCOUNTER — OFFICE VISIT (OUTPATIENT)
Dept: DENTISTRY | Facility: CLINIC | Age: 62
End: 2022-06-21

## 2022-06-21 VITALS — SYSTOLIC BLOOD PRESSURE: 124 MMHG | TEMPERATURE: 97.6 F | HEART RATE: 77 BPM | DIASTOLIC BLOOD PRESSURE: 85 MMHG

## 2022-06-21 DIAGNOSIS — K02.9 CARIES: Primary | ICD-10-CM

## 2022-06-21 PROCEDURE — D0191 ASSESSMENT OF A PATIENT: HCPCS | Performed by: DENTIST

## 2022-06-21 PROCEDURE — WIS1000 RESIDENT TEACHING CASE: Performed by: DENTIST

## 2022-07-19 ENCOUNTER — OFFICE VISIT (OUTPATIENT)
Dept: FAMILY MEDICINE CLINIC | Facility: CLINIC | Age: 62
End: 2022-07-19
Payer: MEDICARE

## 2022-07-19 VITALS
HEIGHT: 68 IN | OXYGEN SATURATION: 96 % | TEMPERATURE: 97.6 F | RESPIRATION RATE: 16 BRPM | WEIGHT: 169 LBS | SYSTOLIC BLOOD PRESSURE: 118 MMHG | BODY MASS INDEX: 25.61 KG/M2 | HEART RATE: 71 BPM | DIASTOLIC BLOOD PRESSURE: 66 MMHG

## 2022-07-19 DIAGNOSIS — M75.21 BICEPS TENDINITIS OF RIGHT UPPER EXTREMITY: Primary | ICD-10-CM

## 2022-07-19 DIAGNOSIS — T14.8XXA HEMATOMA: ICD-10-CM

## 2022-07-19 PROCEDURE — 99214 OFFICE O/P EST MOD 30 MIN: CPT | Performed by: FAMILY MEDICINE

## 2022-07-19 RX ORDER — PREDNISONE 50 MG/1
50 TABLET ORAL DAILY
Qty: 5 TABLET | Refills: 0 | Status: SHIPPED | OUTPATIENT
Start: 2022-07-19 | End: 2022-07-24

## 2022-07-19 RX ORDER — MELOXICAM 15 MG/1
15 TABLET ORAL DAILY
Qty: 30 TABLET | Refills: 0 | Status: SHIPPED | OUTPATIENT
Start: 2022-07-19

## 2022-07-19 NOTE — ASSESSMENT & PLAN NOTE
He was given prescription for prednisone for 5 days and then start on meloxicam 1 daily in a m  after breakfast   Discussed with patient about avoiding movement that triggered the pain and apply ice  If not improving I will consider referral to Ortho

## 2022-07-19 NOTE — ASSESSMENT & PLAN NOTE
Discussed with patient will improve in the next couple weeks  He is to continue to apply ice  Take Tylenol if needed

## 2022-07-19 NOTE — PROGRESS NOTES
Assessment/Plan:  Hematoma  Discussed with patient will improve in the next couple weeks  He is to continue to apply ice  Take Tylenol if needed  Biceps tendinitis of right upper extremity  He was given prescription for prednisone for 5 days and then start on meloxicam 1 daily in a m  after breakfast   Discussed with patient about avoiding movement that triggered the pain and apply ice  If not improving I will consider referral to Ortho  Diagnoses and all orders for this visit:    Biceps tendinitis of right upper extremity  -     predniSONE 50 mg tablet; Take 1 tablet (50 mg total) by mouth daily for 5 days  -     meloxicam (Mobic) 15 mg tablet; Take 1 tablet (15 mg total) by mouth daily    Hematoma    BMI 25 0-25 9,adult        There are no Patient Instructions on file for this visit  Return if symptoms worsen or fail to improve  Subjective:      Patient ID: Agustín Villalta is a 65 y o  male  Chief Complaint   Patient presents with    Leg Pain     Bruise on thigh   Started about a week ago        He is here today with complaint of the bruise on right medial aspect of his thigh  He denies any recent history of injury or trauma  Denies any pain  No fever or chills  She also complained of right shoulder pain she specially with lifting weight  He has been going to the gym every day  The following portions of the patient's history were reviewed and updated as appropriate: allergies, current medications, past family history, past medical history, past social history, past surgical history and problem list     Review of Systems   Constitutional: Negative for chills and fever  HENT: Negative for trouble swallowing  Eyes: Negative for visual disturbance  Respiratory: Negative for cough and shortness of breath  Cardiovascular: Negative for chest pain and palpitations  Gastrointestinal: Negative for abdominal pain, blood in stool and vomiting     Endocrine: Negative for cold intolerance and heat intolerance  Genitourinary: Negative for difficulty urinating and dysuria  Musculoskeletal: Positive for arthralgias  Negative for gait problem  Right shoulder pain   Skin: Positive for color change  Neurological: Negative for dizziness, syncope and headaches  Hematological: Negative for adenopathy  Psychiatric/Behavioral: Negative for behavioral problems  Current Outpatient Medications   Medication Sig Dispense Refill    acetaminophen (TYLENOL) 500 mg tablet Take 1,000 mg by mouth every 6 (six) hours as needed for mild pain      DULoxetine (CYMBALTA) 30 mg delayed release capsule Take 1 capsule (30 mg total) by mouth daily 30 capsule 5    gabapentin (NEURONTIN) 300 mg capsule Take 1 capsule (300 mg total) by mouth 3 (three) times a day 90 capsule 1    Ibuprofen (ADVIL PO) Take by mouth as needed        lisinopril (ZESTRIL) 20 mg tablet Take 1 tablet (20 mg total) by mouth daily 90 tablet 0    meloxicam (Mobic) 15 mg tablet Take 1 tablet (15 mg total) by mouth daily 30 tablet 0    predniSONE 50 mg tablet Take 1 tablet (50 mg total) by mouth daily for 5 days 5 tablet 0    sildenafil (VIAGRA) 100 mg tablet Take 1 tablet (100 mg total) by mouth daily as needed for erectile dysfunction 15 tablet 0    tadalafil (CIALIS) 20 MG tablet       tamsulosin (FLOMAX) 0 4 mg take 1 capsule by mouth daily with dinner 90 capsule 3    methocarbamol (ROBAXIN) 500 mg tablet Take 1 tablet (500 mg total) by mouth daily at bedtime as needed for muscle spasms (Patient not taking: No sig reported) 30 tablet 0    phenazopyridine (PYRIDIUM) 200 mg tablet Take 1 tablet (200 mg total) by mouth 3 (three) times a day as needed for bladder spasms (Patient not taking: No sig reported) 10 tablet 0     No current facility-administered medications for this visit         Objective:    /66 (BP Location: Left arm, Patient Position: Sitting, Cuff Size: Adult)   Pulse 71   Temp 97 6 °F (36 4 °C) (Tympanic)   Resp 16   Ht 5' 8" (1 727 m)   Wt 76 7 kg (169 lb)   SpO2 96%   BMI 25 70 kg/m²        Physical Exam  Vitals and nursing note reviewed  Constitutional:       Appearance: Normal appearance  He is well-developed  HENT:      Head: Normocephalic and atraumatic  Eyes:      Pupils: Pupils are equal, round, and reactive to light  Cardiovascular:      Rate and Rhythm: Normal rate and regular rhythm  Heart sounds: Normal heart sounds  Pulmonary:      Effort: Pulmonary effort is normal       Breath sounds: Normal breath sounds  Abdominal:      General: Bowel sounds are normal       Palpations: Abdomen is soft  Musculoskeletal:         General: Tenderness (Tenderness to palpation over the right biceps tendon with limited range of motion due to pain ) present  Cervical back: Normal range of motion and neck supple  Right lower leg: No edema  Left lower leg: No edema  Lymphadenopathy:      Cervical: No cervical adenopathy  Skin:     General: Skin is warm  Findings: Bruising present  Neurological:      Mental Status: He is alert and oriented to person, place, and time  Cranial Nerves: No cranial nerve deficit  Rosie Augustine MD BMI Counseling: Body mass index is 25 7 kg/m²  The BMI is above normal  Nutrition recommendations include reducing portion sizes, decreasing overall calorie intake and 3-5 servings of fruits/vegetables daily  Exercise recommendations include moderate aerobic physical activity for 150 minutes/week

## 2022-08-01 ENCOUNTER — OFFICE VISIT (OUTPATIENT)
Dept: FAMILY MEDICINE CLINIC | Facility: CLINIC | Age: 62
End: 2022-08-01
Payer: MEDICARE

## 2022-08-01 VITALS
SYSTOLIC BLOOD PRESSURE: 120 MMHG | HEART RATE: 71 BPM | DIASTOLIC BLOOD PRESSURE: 70 MMHG | WEIGHT: 171 LBS | OXYGEN SATURATION: 95 % | HEIGHT: 68 IN | TEMPERATURE: 71 F | BODY MASS INDEX: 25.91 KG/M2 | RESPIRATION RATE: 16 BRPM

## 2022-08-01 DIAGNOSIS — M75.21 BICEPS TENDINITIS OF RIGHT UPPER EXTREMITY: Primary | ICD-10-CM

## 2022-08-01 PROCEDURE — 99213 OFFICE O/P EST LOW 20 MIN: CPT | Performed by: FAMILY MEDICINE

## 2022-08-01 RX ORDER — PREDNISONE 50 MG/1
50 TABLET ORAL DAILY
Qty: 5 TABLET | Refills: 0 | Status: SHIPPED | OUTPATIENT
Start: 2022-08-01 | End: 2022-08-06

## 2022-08-01 NOTE — PROGRESS NOTES
Assessment/Plan:  Biceps tendinitis of right upper extremity  Improving but not well controlled  He was given another prescription for prednisone 50 mg 1 daily for 5 days  It was discussed about possible side effects  He was told to avoid movement that triggered the pain  Apply ice  He was told if it does not resolved to call back and I will consider referral to see Ortho for possible injection  Diagnoses and all orders for this visit:    Biceps tendinitis of right upper extremity  -     predniSONE 50 mg tablet; Take 1 tablet (50 mg total) by mouth daily for 5 days        There are no Patient Instructions on file for this visit  Return if symptoms worsen or fail to improve  Subjective:      Patient ID: Dayanna Mcclain is a 65 y o  male  Chief Complaint   Patient presents with    Follow-up     Leg pain       He is here today for follow-up for his right shoulder pain  He was started on the prednisone 50 mg 1 daily for 5 days and then moved to meloxicam   He stated his pain improved more when he was on the prednisone  He stated his pain is better in general but continues to be there  He is back to the gym and has been tolerating lifting weight better  The following portions of the patient's history were reviewed and updated as appropriate: allergies, current medications, past family history, past medical history, past social history, past surgical history and problem list     Review of Systems   Constitutional: Negative for chills and fever  HENT: Negative for trouble swallowing  Eyes: Negative for visual disturbance  Respiratory: Negative for cough and shortness of breath  Cardiovascular: Negative for chest pain and palpitations  Gastrointestinal: Negative for abdominal pain, blood in stool and vomiting  Endocrine: Negative for cold intolerance and heat intolerance  Genitourinary: Negative for difficulty urinating and dysuria     Musculoskeletal: Negative for gait problem  Right shoulder pain   Skin: Negative for rash  Neurological: Negative for dizziness, syncope and headaches  Hematological: Negative for adenopathy  Psychiatric/Behavioral: Negative for behavioral problems  Current Outpatient Medications   Medication Sig Dispense Refill    acetaminophen (TYLENOL) 500 mg tablet Take 1,000 mg by mouth every 6 (six) hours as needed for mild pain      DULoxetine (CYMBALTA) 30 mg delayed release capsule Take 1 capsule (30 mg total) by mouth daily 30 capsule 5    gabapentin (NEURONTIN) 300 mg capsule Take 1 capsule (300 mg total) by mouth 3 (three) times a day 90 capsule 1    Ibuprofen (ADVIL PO) Take by mouth as needed        lisinopril (ZESTRIL) 20 mg tablet Take 1 tablet (20 mg total) by mouth daily 90 tablet 0    meloxicam (Mobic) 15 mg tablet Take 1 tablet (15 mg total) by mouth daily 30 tablet 0    predniSONE 50 mg tablet Take 1 tablet (50 mg total) by mouth daily for 5 days 5 tablet 0    sildenafil (VIAGRA) 100 mg tablet Take 1 tablet (100 mg total) by mouth daily as needed for erectile dysfunction 15 tablet 0    tadalafil (CIALIS) 20 MG tablet       tamsulosin (FLOMAX) 0 4 mg take 1 capsule by mouth daily with dinner 90 capsule 3    methocarbamol (ROBAXIN) 500 mg tablet Take 1 tablet (500 mg total) by mouth daily at bedtime as needed for muscle spasms (Patient not taking: No sig reported) 30 tablet 0    phenazopyridine (PYRIDIUM) 200 mg tablet Take 1 tablet (200 mg total) by mouth 3 (three) times a day as needed for bladder spasms (Patient not taking: No sig reported) 10 tablet 0     No current facility-administered medications for this visit  Objective:    /70 (BP Location: Left arm, Patient Position: Sitting, Cuff Size: Adult)   Pulse 71   Temp (!) 71 °F (21 7 °C) (Tympanic)   Resp 16   Ht 5' 8" (1 727 m)   Wt 77 6 kg (171 lb)   SpO2 95%   BMI 26 00 kg/m²        Physical Exam  Vitals and nursing note reviewed  Constitutional:       Appearance: He is well-developed  HENT:      Head: Normocephalic and atraumatic  Eyes:      Pupils: Pupils are equal, round, and reactive to light  Cardiovascular:      Rate and Rhythm: Normal rate and regular rhythm  Heart sounds: Normal heart sounds  Pulmonary:      Effort: Pulmonary effort is normal       Breath sounds: Normal breath sounds  Abdominal:      General: Bowel sounds are normal       Palpations: Abdomen is soft  Musculoskeletal:         General: Tenderness (Minimum tenderness over the anterior aspect of his biceps tendon) present  Cervical back: Normal range of motion and neck supple  Lymphadenopathy:      Cervical: No cervical adenopathy  Skin:     General: Skin is warm  Findings: No rash  Neurological:      Mental Status: He is alert and oriented to person, place, and time  Cranial Nerves: No cranial nerve deficit                  Nayeli Stewart MD

## 2022-08-01 NOTE — ASSESSMENT & PLAN NOTE
Improving but not well controlled  He was given another prescription for prednisone 50 mg 1 daily for 5 days  It was discussed about possible side effects  He was told to avoid movement that triggered the pain  Apply ice  He was told if it does not resolved to call back and I will consider referral to see Ortho for possible injection

## 2022-08-24 ENCOUNTER — OFFICE VISIT (OUTPATIENT)
Dept: FAMILY MEDICINE CLINIC | Facility: CLINIC | Age: 62
End: 2022-08-24
Payer: MEDICARE

## 2022-08-24 VITALS
WEIGHT: 170 LBS | HEART RATE: 61 BPM | BODY MASS INDEX: 25.76 KG/M2 | HEIGHT: 68 IN | DIASTOLIC BLOOD PRESSURE: 80 MMHG | TEMPERATURE: 98 F | OXYGEN SATURATION: 98 % | SYSTOLIC BLOOD PRESSURE: 130 MMHG | RESPIRATION RATE: 14 BRPM

## 2022-08-24 DIAGNOSIS — M54.9 OTHER CHRONIC BACK PAIN: ICD-10-CM

## 2022-08-24 DIAGNOSIS — K21.9 GASTROESOPHAGEAL REFLUX DISEASE WITHOUT ESOPHAGITIS: ICD-10-CM

## 2022-08-24 DIAGNOSIS — G89.29 OTHER CHRONIC BACK PAIN: ICD-10-CM

## 2022-08-24 DIAGNOSIS — I10 ESSENTIAL HYPERTENSION: ICD-10-CM

## 2022-08-24 DIAGNOSIS — N40.1 BENIGN PROSTATIC HYPERPLASIA WITH LOWER URINARY TRACT SYMPTOMS, SYMPTOM DETAILS UNSPECIFIED: ICD-10-CM

## 2022-08-24 DIAGNOSIS — M75.21 BICEPS TENDINITIS OF RIGHT UPPER EXTREMITY: Primary | ICD-10-CM

## 2022-08-24 DIAGNOSIS — E78.49 OTHER HYPERLIPIDEMIA: ICD-10-CM

## 2022-08-24 PROCEDURE — 99214 OFFICE O/P EST MOD 30 MIN: CPT | Performed by: FAMILY MEDICINE

## 2022-08-24 NOTE — ASSESSMENT & PLAN NOTE
Not well controlled  Continue on meloxicam   Referral to see Ortho  Avoid movement that triggered the pain apply ice

## 2022-08-24 NOTE — PROGRESS NOTES
Assessment/Plan:  Essential hypertension  Well controlled  Continue same  Will continue to monitor  Gastroesophageal reflux disease  Stable  Continue same  Will continue to monitor  Biceps tendinitis of right upper extremity  Not well controlled  Continue on meloxicam   Referral to see Ortho  Avoid movement that triggered the pain apply ice  Benign prostatic hyperplasia  Not very well controlled  Continue follow-up with Urology  Other hyperlipidemia  Not well controlled  Discussed about low-fat diet and regular exercise  Continue to monitor fasting lipid profile  Chronic back pain  His back pain has been improving since he start exercising  Continue with back exercises  Diagnoses and all orders for this visit:    Biceps tendinitis of right upper extremity  -     Ambulatory Referral to Orthopedic Surgery; Future    Essential hypertension    Gastroesophageal reflux disease without esophagitis    Benign prostatic hyperplasia with lower urinary tract symptoms, symptom details unspecified    Other hyperlipidemia    Other chronic back pain        There are no Patient Instructions on file for this visit  Return in about 4 months (around 12/19/2022)  Subjective:      Patient ID: Trini Lazo is a 65 y o  male  Chief Complaint   Patient presents with    Hypertension    Shoulder Pain       He is here today for follow-up multiple medical problems  He has been taking his medications  Denies any side effects  He has been having problems with his right shoulder  He was given prednisone twice and that help with his pain  He continues to lift weight  The following portions of the patient's history were reviewed and updated as appropriate: allergies, current medications, past family history, past medical history, past social history, past surgical history and problem list     Review of Systems   Constitutional: Negative for chills and fever     HENT: Negative for trouble swallowing  Eyes: Negative for visual disturbance  Respiratory: Negative for cough and shortness of breath  Cardiovascular: Negative for chest pain and palpitations  Gastrointestinal: Negative for abdominal pain, blood in stool and vomiting  Endocrine: Negative for cold intolerance and heat intolerance  Genitourinary: Negative for difficulty urinating and dysuria  Musculoskeletal: Positive for arthralgias (Right shoulder pain)  Negative for gait problem  Skin: Negative for rash  Neurological: Negative for dizziness, syncope and headaches  Hematological: Negative for adenopathy  Psychiatric/Behavioral: Negative for behavioral problems           Current Outpatient Medications   Medication Sig Dispense Refill    acetaminophen (TYLENOL) 500 mg tablet Take 1,000 mg by mouth every 6 (six) hours as needed for mild pain      DULoxetine (CYMBALTA) 30 mg delayed release capsule Take 1 capsule (30 mg total) by mouth daily 30 capsule 5    gabapentin (NEURONTIN) 300 mg capsule Take 1 capsule (300 mg total) by mouth 3 (three) times a day 90 capsule 1    Ibuprofen (ADVIL PO) Take by mouth as needed        lisinopril (ZESTRIL) 20 mg tablet Take 1 tablet (20 mg total) by mouth daily 90 tablet 0    meloxicam (Mobic) 15 mg tablet Take 1 tablet (15 mg total) by mouth daily 30 tablet 0    sildenafil (VIAGRA) 100 mg tablet Take 1 tablet (100 mg total) by mouth daily as needed for erectile dysfunction 15 tablet 0    tadalafil (CIALIS) 20 MG tablet       tamsulosin (FLOMAX) 0 4 mg take 1 capsule by mouth daily with dinner 90 capsule 3    methocarbamol (ROBAXIN) 500 mg tablet Take 1 tablet (500 mg total) by mouth daily at bedtime as needed for muscle spasms (Patient not taking: No sig reported) 30 tablet 0    phenazopyridine (PYRIDIUM) 200 mg tablet Take 1 tablet (200 mg total) by mouth 3 (three) times a day as needed for bladder spasms (Patient not taking: No sig reported) 10 tablet 0     No current facility-administered medications for this visit  Objective:    /80 (BP Location: Left arm, Patient Position: Sitting, Cuff Size: Adult)   Pulse 61   Temp 98 °F (36 7 °C) (Tympanic)   Resp 14   Ht 5' 8" (1 727 m)   Wt 77 1 kg (170 lb)   SpO2 98%   BMI 25 85 kg/m²        Physical Exam  Vitals and nursing note reviewed  Constitutional:       Appearance: He is well-developed  HENT:      Head: Normocephalic and atraumatic  Eyes:      Pupils: Pupils are equal, round, and reactive to light  Cardiovascular:      Rate and Rhythm: Normal rate and regular rhythm  Heart sounds: Normal heart sounds  Pulmonary:      Effort: Pulmonary effort is normal       Breath sounds: Normal breath sounds  Abdominal:      General: Bowel sounds are normal       Palpations: Abdomen is soft  Musculoskeletal:         General: Tenderness (Anterior aspect of right shoulder) present  Normal range of motion  Cervical back: Normal range of motion and neck supple  Lymphadenopathy:      Cervical: No cervical adenopathy  Skin:     General: Skin is warm  Findings: No rash  Neurological:      Mental Status: He is alert and oriented to person, place, and time  Cranial Nerves: No cranial nerve deficit                  Joy Villegas MD

## 2022-08-24 NOTE — ASSESSMENT & PLAN NOTE
Not well controlled  Discussed about low-fat diet and regular exercise  Continue to monitor fasting lipid profile

## 2022-09-01 ENCOUNTER — OFFICE VISIT (OUTPATIENT)
Dept: OBGYN CLINIC | Facility: MEDICAL CENTER | Age: 62
End: 2022-09-01
Payer: MEDICARE

## 2022-09-01 VITALS
DIASTOLIC BLOOD PRESSURE: 75 MMHG | HEART RATE: 61 BPM | SYSTOLIC BLOOD PRESSURE: 110 MMHG | HEIGHT: 68 IN | WEIGHT: 165 LBS | BODY MASS INDEX: 25.01 KG/M2

## 2022-09-01 DIAGNOSIS — M75.101 ROTATOR CUFF SYNDROME OF RIGHT SHOULDER: Primary | ICD-10-CM

## 2022-09-01 DIAGNOSIS — M75.21 TENDONITIS OF LONG HEAD OF BICEPS BRACHII OF RIGHT SHOULDER: ICD-10-CM

## 2022-09-01 PROCEDURE — 99214 OFFICE O/P EST MOD 30 MIN: CPT | Performed by: PHYSICIAN ASSISTANT

## 2022-09-01 PROCEDURE — 20610 DRAIN/INJ JOINT/BURSA W/O US: CPT | Performed by: PHYSICIAN ASSISTANT

## 2022-09-01 RX ORDER — MELOXICAM 15 MG/1
15 TABLET ORAL DAILY
Qty: 30 TABLET | Refills: 0 | Status: SHIPPED | OUTPATIENT
Start: 2022-09-01

## 2022-09-01 RX ORDER — METHYLPREDNISOLONE ACETATE 40 MG/ML
1 INJECTION, SUSPENSION INTRA-ARTICULAR; INTRALESIONAL; INTRAMUSCULAR; SOFT TISSUE
Status: COMPLETED | OUTPATIENT
Start: 2022-09-01 | End: 2022-09-01

## 2022-09-01 RX ORDER — LIDOCAINE HYDROCHLORIDE 10 MG/ML
4 INJECTION, SOLUTION INFILTRATION; PERINEURAL
Status: COMPLETED | OUTPATIENT
Start: 2022-09-01 | End: 2022-09-01

## 2022-09-01 RX ADMIN — METHYLPREDNISOLONE ACETATE 1 ML: 40 INJECTION, SUSPENSION INTRA-ARTICULAR; INTRALESIONAL; INTRAMUSCULAR; SOFT TISSUE at 15:26

## 2022-09-01 RX ADMIN — LIDOCAINE HYDROCHLORIDE 4 ML: 10 INJECTION, SOLUTION INFILTRATION; PERINEURAL at 15:26

## 2022-09-01 NOTE — PROGRESS NOTES
Patient Name:  Jared Bunn  MRN:  [id number]    Assessment & Plan     Right shoulder rotator cuff tendinitis/bursitis, long head biceps tendinitis  1  Corticosteroid injection performed today into the right shoulder subacromial space  2  Referral to physical therapy for home exercise program   3  Refill provided of meloxicam   4  Activities as tolerated modification to avoid pain  Follow-up in six weeks for repeat evaluation  Consider MRI at that time  Patient wishes to follow-up with me  Chief Complaint     Right shoulder pain    History of the Present Illness     Jared Bunn is a 65 y o  right-hand-dominant male who reports to the office today for evaluation of his right shoulder  He notes an onset of pain approximately five months ago  He states he was working out at [employer] and performing shoulder flat exercises which resulted in pain  He denies any specific injury or trauma  Since then he notes pain along the anterior and lateral aspect of the shoulder  Pain is worse with reaching overhead behind his back as well as lying on his right side at night  He notes weakness due to pain  He denies any instability  He denies any radiation of the pain distally along the right upper extremity  He denies any numbness and tingling  No fevers or chills  Initial treatment includes oral prednisone as well as meloxicam with mild improvement  Physical Exam     /75   Pulse 61   Ht 5' 8" (1 727 m)   Wt 74 8 kg (165 lb)   BMI 25 09 kg/m²     Right shoulder:  No gross deformity  Skin intact  No tenderness to palpation AC joint  Mild tenderness to palpation lateral shoulder and anterior shoulder  No tenderness to palpation posterior shoulder  PROM is , ER-abd 90, IR-abd 50  Impingement signs are positive  Empty can test is mildly positive  Mildly positive speed's test   Negative cross-body adduction test   5/5 forward flexion strength  Eyes: Anicteric sclerae    ENT: Trachea midline  Lungs: Normal respiratory effort  CV: Capillary refill is less than 2 seconds  Skin: Intact without erythema  Lymph: No palpable lymphadenopathy  Neuro: Sensation is grossly intact to light touch  Psych: Mood and affect are appropriate  Data Review     I have personally reviewed pertinent films in PACS, and my interpretation follows:    X-rays right shoulder 4/20/22:  No acute osseous abnormalities  No fracture or dislocation  Minimal degenerative changes AC and glenohumeral joints  Past Medical History:   Diagnosis Date    Abscess, intestine     Arthritis     Diverticulitis     GERD (gastroesophageal reflux disease)     Hydronephrosis 5/27/2018    Hypertension     Wears glasses        Past Surgical History:   Procedure Laterality Date    ABCESS DRAINAGE      COLON SURGERY      COLONOSCOPY N/A 5/5/2016    Procedure: COLONOSCOPY;  Surgeon: Sahra Dumont MD;  Location: [facility]; Service:     COLONOSCOPY N/A 7/26/2018    Procedure: COLONOSCOPY with bx's;  Surgeon: Cristina Davila MD;  [facility]: [facility]; Service: Gastroenterology    ESOPHAGOGASTRODUODENOSCOPY      with biopsy    FOOT SURGERY Left     x2? fusion? due to arthritis  onset: 0      HERNIA REPAIR      ILEO LOOP DIVERSION N/A 6/1/2018    Procedure: ILEOSTOMY LOOP DIVERTING;  Surgeon: Evelin Herrera DO;  [facility];  Service: General    INSERTION OF FIDUCIAL MARKER (TRANSRECTAL ULTRASOUND GUIDANCE) N/A 4/13/2022    Procedure: TRANSRECTAL US GUIDANCE;  Surgeon: Julienne Machuca MD;  [facility];  Service: Urology    LAPAROTOMY N/A 6/1/2018    Procedure: LAPAROTOMY EXPLORATORY,;  Surgeon: Evelin Herrera DO;  [facility];  Service: General    [place] CLOSE ENTEROSTOMY N/A 8/16/2018    Procedure: CLOSURE LOOP ILEOSTOMY;  Surgeon: Angela Yarbrough MD;  [facility];  Service: General    AR CYSTOURETHROSCOPY N/A 4/13/2022    Procedure: Doree Quitter;  Surgeon: Julienne Machuca MD;  [facility];  Service: Urology    MA CYSTOURETHROSCOPY,URETER CATHETER Left 6/9/2018    Procedure: CYSTOSCOPY RETROGRADE PYELOGRAM WITH INSERTION STENT URETERAL;  Surgeon: Jaylen Maher MD;  [facility];  Service: Urology    [facility] N/A 1/31/2019    Procedure: REPAIR HERNIA INCISIONAL LAPAROSCOPIC;  Surgeon: Emir Sheikh MD;  [facility];  Service: General    MA Emily Hartman 20 Right 1/31/2019    Procedure: REPAIR HERNIA INGUINAL, LAPAROSCOPIC;  Surgeon: Emir Sheikh MD;  [facility];  Service: General    MA PART REMOVAL COLON W ANASTOMOSIS N/A 6/1/2018    Procedure: RESECTION COLON SIGMOID;  Surgeon: Prashanth Poole DO;  [facility];  Service: General       Allergies   Allergen Reactions    Penicillins Itching and Rash       Current Outpatient Medications on File Prior to Visit   Medication Sig Dispense Refill    acetaminophen (TYLENOL) 500 mg tablet Take 1,000 mg by mouth every 6 (six) hours as needed for mild pain      DULoxetine (CYMBALTA) 30 mg delayed release capsule Take 1 capsule (30 mg total) by mouth daily 30 capsule 5    gabapentin (NEURONTIN) 300 mg capsule Take 1 capsule (300 mg total) by mouth 3 (three) times a day 90 capsule 1    Ibuprofen (ADVIL PO) Take by mouth as needed        lisinopril (ZESTRIL) 20 mg tablet Take 1 tablet (20 mg total) by mouth daily 90 tablet 0    meloxicam (Mobic) 15 mg tablet Take 1 tablet (15 mg total) by mouth daily 30 tablet 0    sildenafil (VIAGRA) 100 mg tablet Take 1 tablet (100 mg total) by mouth daily as needed for erectile dysfunction 15 tablet 0    tadalafil (CIALIS) 20 MG tablet       tamsulosin (FLOMAX) 0 4 mg take 1 capsule by mouth daily with dinner 90 capsule 3    methocarbamol (ROBAXIN) 500 mg tablet Take 1 tablet (500 mg total) by mouth daily at bedtime as needed for muscle spasms (Patient not taking: No sig reported) 30 tablet 0    phenazopyridine (PYRIDIUM) 200 mg tablet Take 1 tablet (200 mg total) by mouth 3 (three) times a day as needed for bladder spasms (Patient not taking: No sig reported) 10 tablet 0     No current facility-administered medications on file prior to visit  Social History     Tobacco Use    Smoking status: Former Smoker     Quit date:      Years since quittin 6    Smokeless tobacco: Never Used   Vaping Use    Vaping Use: Never used   Substance Use Topics    Alcohol use: Not Currently     Comment: daily until - quit 2022    Drug use: No       Family History   Problem Relation Age of Onset    Other Mother         head tumor    Glaucoma Father     Diabetes Father         possible diabetes    Stroke Family        Review of Systems     As stated in the HPI  All other systems reviewed and are negative        Large joint arthrocentesis: R subacromial bursa  Procedure Details  Location: shoulder - R subacromial bursa  Needle size: 22 G  Ultrasound guidance: no  Approach: posterior  Medications administered: 4 mL lidocaine 1 %; 1 mL methylPREDNISolone acetate 40 mg/mL    Patient tolerance: patient tolerated the procedure well with no immediate complications  Dressing:  Sterile dressing applied

## 2022-09-20 ENCOUNTER — TELEPHONE (OUTPATIENT)
Dept: FAMILY MEDICINE CLINIC | Facility: CLINIC | Age: 62
End: 2022-09-20

## 2022-10-12 PROBLEM — J01.00 ACUTE NON-RECURRENT MAXILLARY SINUSITIS: Status: RESOLVED | Noted: 2020-12-14 | Resolved: 2022-10-12

## 2022-11-25 ENCOUNTER — OFFICE VISIT (OUTPATIENT)
Dept: OBGYN CLINIC | Facility: CLINIC | Age: 62
End: 2022-11-25

## 2022-11-25 VITALS
DIASTOLIC BLOOD PRESSURE: 74 MMHG | BODY MASS INDEX: 24.55 KG/M2 | HEIGHT: 68 IN | WEIGHT: 162 LBS | SYSTOLIC BLOOD PRESSURE: 118 MMHG

## 2022-11-25 DIAGNOSIS — M75.102 ROTATOR CUFF SYNDROME, LEFT: ICD-10-CM

## 2022-11-25 DIAGNOSIS — M75.101 ROTATOR CUFF SYNDROME OF RIGHT SHOULDER: Primary | ICD-10-CM

## 2022-11-25 RX ORDER — METHYLPREDNISOLONE ACETATE 40 MG/ML
1 INJECTION, SUSPENSION INTRA-ARTICULAR; INTRALESIONAL; INTRAMUSCULAR; SOFT TISSUE
Status: COMPLETED | OUTPATIENT
Start: 2022-11-25 | End: 2022-11-25

## 2022-11-25 RX ORDER — LIDOCAINE HYDROCHLORIDE 10 MG/ML
2 INJECTION, SOLUTION INFILTRATION; PERINEURAL
Status: COMPLETED | OUTPATIENT
Start: 2022-11-25 | End: 2022-11-25

## 2022-11-25 RX ADMIN — LIDOCAINE HYDROCHLORIDE 2 ML: 10 INJECTION, SOLUTION INFILTRATION; PERINEURAL at 15:34

## 2022-11-25 RX ADMIN — METHYLPREDNISOLONE ACETATE 1 ML: 40 INJECTION, SUSPENSION INTRA-ARTICULAR; INTRALESIONAL; INTRAMUSCULAR; SOFT TISSUE at 15:34

## 2022-11-25 NOTE — PROGRESS NOTES
Patient Name:  Janet Bunn  MRN:  [id number]    Assessment & Plan     Bilateral shoulder rotator cuff tendinitis/bursitis  1  Corticosteroid injection performed today into the bilateral shoulder subacromial spaces  2  Continue home exercises  3  Anti-inflammatories as needed  4  Follow-up as needed  Discussed obtaining MRI if pain persists  Patient may call to arrange MRI over the phone  Chief Complaint     Follow-up right shoulder pain, left shoulder pain  History of the Present Illness     Janet Bunn is a 65 y o  male who returns to the office today for follow-up regarding his right shoulder  He was initially seen on 9/1/22  At that time he received a corticosteroid injection into the right shoulder subacromial space  He was also referred to physical therapy and prescribed meloxicam   He noted significant improvement after undergoing the injection  He notes return of his pain approximately two weeks ago  He now notes bilateral shoulder pain  Pain is localized primarily to the lateral aspects of the bilateral shoulders  Pain is worse with reaching overhead behind his back  He also notes pain with lying on the side at night  He notes increased pain with lifting as well  He notes weakness associated with the pain  No instability  No numbness or tingling  No fevers or chills  Patient is requesting shoulder corticosteroid injections today  Physical Exam     Ht 5' 8" (1 727 m)   Wt 73 5 kg (162 lb)   BMI 24 63 kg/m²     Bilateral shoulders: No gross deformity  Skin intact  No tenderness to palpation about the shoulder  PROM is /160, ER-abd 90/90, IR-abd 50/50  Impingement signs are positive  Empty can test is mildly positive  Speed's test is negative  Negative cross-body adduction test   5/5 forward flexion strength  Eyes: Anicteric sclerae  ENT: Trachea midline  Lungs: Normal respiratory effort  CV: Capillary refill is less than 2 seconds    Skin: Intact without erythema  Lymph: No palpable lymphadenopathy  Neuro: Sensation is grossly intact to light touch  Psych: Mood and affect are appropriate  Data Review     I have personally reviewed pertinent films in PACS, and my interpretation follows:    X-rays right shoulder 4/20/22:  No acute osseous abnormalities  No fracture or dislocation  Minimal degenerative changes AC and glenohumeral joints  Past Medical History:   Diagnosis Date   • Abscess, intestine    • Arthritis    • Diverticulitis    • GERD (gastroesophageal reflux disease)    • Hydronephrosis 5/27/2018   • Hypertension    • Wears glasses        Past Surgical History:   Procedure Laterality Date   • ABCESS DRAINAGE     • COLON SURGERY     • COLONOSCOPY N/A 5/5/2016    Procedure: COLONOSCOPY;  Surgeon: Ashleigh Bynum MD;  Location: [facility]; Service:    • COLONOSCOPY N/A 7/26/2018    Procedure: COLONOSCOPY with bx's;  Surgeon: Janna Lindo MD;  [facility]; Service: Gastroenterology   • ESOPHAGOGASTRODUODENOSCOPY      with biopsy   • FOOT SURGERY Left     x2? fusion? due to arthritis  onset: 0     • HERNIA REPAIR     • ILEO LOOP DIVERSION N/A 6/1/2018    Procedure: ILEOSTOMY LOOP DIVERTING;  Surgeon: Asha Wan DO;  [facility];  Service: General   • INSERTION OF FIDUCIAL MARKER (TRANSRECTAL ULTRASOUND GUIDANCE) N/A 4/13/2022    Procedure: TRANSRECTAL US GUIDANCE;  Surgeon: Miguel Stephens MD;  [facility];  Service: Urology   • LAPAROTOMY N/A 6/1/2018    Procedure: LAPAROTOMY EXPLORATORY,;  Surgeon: Asha Wan DO;  [facility];  Service: General   • [place] CLOSE ENTEROSTOMY N/A 8/16/2018    Procedure: CLOSURE LOOP ILEOSTOMY;  Surgeon: Gwendolyn Talavera MD;  [facility];  Service: General   • [place] CYSTOURETHROSCOPY N/A 4/13/2022    Procedure: Vedia [place];  Surgeon: Miguel Stephens MD;  [facility];  Service: Urology   • [place] CYSTOURETHROSCOPY,URETER CATHETER Left 6/9/2018    Procedure: CYSTOSCOPY RETROGRADE PYELOGRAM WITH INSERTION STENT URETERAL;  Surgeon: Silvestre Cortes MD;  [facility];  Service: Urology   • MD JENNIFER Lonnie N/A 1/31/2019    Procedure: REPAIR HERNIA INCISIONAL LAPAROSCOPIC;  Surgeon: Angel Schultz MD;  [facility];  Service: General   • MD Flo Chandra Right 1/31/2019    Procedure: REPAIR HERNIA INGUINAL, LAPAROSCOPIC;  Surgeon: Angel Schultz MD;  [facility];  Service: General   • MD PART REMOVAL COLON W ANASTOMOSIS N/A 6/1/2018    Procedure: RESECTION COLON SIGMOID;  Surgeon: Mello Hopper DO;  [facility];  Service: General       Allergies   Allergen Reactions   • Penicillins Itching and Rash       Current Outpatient Medications on File Prior to Visit   Medication Sig Dispense Refill   • acetaminophen (TYLENOL) 500 mg tablet Take 1,000 mg by mouth every 6 (six) hours as needed for mild pain     • DULoxetine (CYMBALTA) 30 mg delayed release capsule Take 1 capsule (30 mg total) by mouth daily 30 capsule 5   • gabapentin (NEURONTIN) 300 mg capsule Take 1 capsule (300 mg total) by mouth 3 (three) times a day 90 capsule 1   • Ibuprofen (ADVIL PO) Take by mouth as needed       • lisinopril (ZESTRIL) 20 mg tablet Take 1 tablet (20 mg total) by mouth daily 90 tablet 0   • meloxicam (Mobic) 15 mg tablet Take 1 tablet (15 mg total) by mouth daily 30 tablet 0   • meloxicam (Mobic) 15 mg tablet Take 1 tablet (15 mg total) by mouth daily 30 tablet 0   • sildenafil (VIAGRA) 100 mg tablet Take 1 tablet (100 mg total) by mouth daily as needed for erectile dysfunction 15 tablet 0   • tadalafil (CIALIS) 20 MG tablet      • tamsulosin (FLOMAX) 0 4 mg take 1 capsule by mouth daily with dinner 90 capsule 3   • methocarbamol (ROBAXIN) 500 mg tablet Take 1 tablet (500 mg total) by mouth daily at bedtime as needed for muscle spasms (Patient not taking: Reported on 5/25/2022) 30 tablet 0   • phenazopyridine (PYRIDIUM) 200 mg tablet Take 1 tablet (200 mg total) by mouth 3 (three) times a day as needed for bladder spasms (Patient not taking: Reported on 2022) 10 tablet 0     No current facility-administered medications on file prior to visit  Social History     Tobacco Use   • Smoking status: Former     Types: Cigarettes     Quit date:      Years since quittin 9   • Smokeless tobacco: Never   Vaping Use   • Vaping Use: Never used   Substance Use Topics   • Alcohol use: Not Currently     Comment: daily until - quit 2022   • Drug use: No       Family History   Problem Relation Age of Onset   • Other Mother         head tumor   • Glaucoma Father    • Diabetes Father         possible diabetes   • Stroke Family        Review of Systems     As stated in the HPI  All other systems reviewed and are negative        Large joint arthrocentesis: bilateral subacromial bursa  Procedure Details  Location: shoulder - bilateral subacromial bursa  Needle size: 22 G  Ultrasound guidance: no  Approach: posterior    Medications (Right): 2 mL lidocaine 1 %; 1 mL methylPREDNISolone acetate 40 mg/mLMedications (Left): 2 mL lidocaine 1 %; 1 mL methylPREDNISolone acetate 40 mg/mL   Patient tolerance: patient tolerated the procedure well with no immediate complications  Dressing:  Sterile dressing applied

## 2022-12-26 DIAGNOSIS — N40.1 BENIGN LOCALIZED PROSTATIC HYPERPLASIA WITH LOWER URINARY TRACT SYMPTOMS (LUTS): ICD-10-CM

## 2022-12-27 DIAGNOSIS — N40.1 BENIGN LOCALIZED PROSTATIC HYPERPLASIA WITH LOWER URINARY TRACT SYMPTOMS (LUTS): ICD-10-CM

## 2022-12-27 RX ORDER — TAMSULOSIN HYDROCHLORIDE 0.4 MG/1
0.4 CAPSULE ORAL
Qty: 90 CAPSULE | Refills: 1 | Status: CANCELLED | OUTPATIENT
Start: 2022-12-27

## 2022-12-27 RX ORDER — TAMSULOSIN HYDROCHLORIDE 0.4 MG/1
CAPSULE ORAL
Qty: 90 CAPSULE | Refills: 1 | Status: SHIPPED | OUTPATIENT
Start: 2022-12-27

## 2023-03-27 ENCOUNTER — OFFICE VISIT (OUTPATIENT)
Dept: FAMILY MEDICINE CLINIC | Facility: CLINIC | Age: 63
End: 2023-03-27

## 2023-03-27 VITALS
RESPIRATION RATE: 16 BRPM | HEIGHT: 68 IN | HEART RATE: 83 BPM | DIASTOLIC BLOOD PRESSURE: 74 MMHG | BODY MASS INDEX: 25.31 KG/M2 | WEIGHT: 167 LBS | SYSTOLIC BLOOD PRESSURE: 118 MMHG | TEMPERATURE: 98.2 F | OXYGEN SATURATION: 98 %

## 2023-03-27 DIAGNOSIS — N40.1 BENIGN LOCALIZED PROSTATIC HYPERPLASIA WITH LOWER URINARY TRACT SYMPTOMS (LUTS): ICD-10-CM

## 2023-03-27 DIAGNOSIS — E78.49 OTHER HYPERLIPIDEMIA: ICD-10-CM

## 2023-03-27 DIAGNOSIS — Z00.00 MEDICARE ANNUAL WELLNESS VISIT, SUBSEQUENT: ICD-10-CM

## 2023-03-27 DIAGNOSIS — N52.1 ERECTILE DYSFUNCTION DUE TO DISEASES CLASSIFIED ELSEWHERE: ICD-10-CM

## 2023-03-27 DIAGNOSIS — I10 ESSENTIAL HYPERTENSION: ICD-10-CM

## 2023-03-27 DIAGNOSIS — R73.9 HYPERGLYCEMIA: ICD-10-CM

## 2023-03-27 DIAGNOSIS — R29.818 SUSPECTED SLEEP APNEA: Primary | ICD-10-CM

## 2023-03-27 DIAGNOSIS — Z12.5 PROSTATE CANCER SCREENING: ICD-10-CM

## 2023-03-27 RX ORDER — TADALAFIL 20 MG/1
20 TABLET ORAL DAILY PRN
Qty: 90 TABLET | Refills: 0 | Status: SHIPPED | OUTPATIENT
Start: 2023-03-27

## 2023-03-27 RX ORDER — LISINOPRIL 20 MG/1
20 TABLET ORAL DAILY
Qty: 90 TABLET | Refills: 0 | Status: SHIPPED | OUTPATIENT
Start: 2023-03-27

## 2023-03-27 NOTE — PROGRESS NOTES
Assessment and Plan:     Problem List Items Addressed This Visit        Cardiovascular and Mediastinum    Essential hypertension     Well-controlled on lisinopril 20 mg daily  Continue same  We will continue to monitor  Relevant Medications    lisinopril (ZESTRIL) 20 mg tablet    Other Relevant Orders    CBC and differential    Comprehensive metabolic panel    Lipid Panel with Direct LDL reflex    TSH, 3rd generation with Free T4 reflex       Genitourinary    Benign localized prostatic hyperplasia with lower urinary tract symptoms (LUTS)     Continue Flomax  Continue to follow-up with urology  Relevant Medications    tadalafil (CIALIS) 20 MG tablet       Other    Other hyperlipidemia     Not well controlled  Discussed about low-fat diet and regular exercise  Was discussed with patient to get his blood work done and we will consider medications depend on the results  Relevant Orders    CBC and differential    Comprehensive metabolic panel    Lipid Panel with Direct LDL reflex    TSH, 3rd generation with Free T4 reflex    Erectile dysfunction due to diseases classified elsewhere     He was given refills for Cialis  Relevant Medications    tadalafil (CIALIS) 20 MG tablet    Suspected sleep apnea - Primary    Relevant Orders    Ambulatory Referral to Sleep Medicine    Hyperglycemia     Discussed about low-carb diet  I am going to check his A1c  Relevant Orders    Hemoglobin A1C    Medicare annual wellness visit, subsequent     It was discussed about immunizations, diet, exercise and safety measures  Other Visit Diagnoses     Prostate cancer screening        Relevant Orders    PSA, Total Screen    BMI 25 0-25 9,adult               Preventive health issues were discussed with patient, and age appropriate screening tests were ordered as noted in patient's After Visit Summary    Personalized health advice and appropriate referrals for health education or preventive services given if needed, as noted in patient's After Visit Summary  History of Present Illness:     Patient presents for a Medicare Wellness Visit    He is here today for follow-up multiple medical problems  He has been taking his medications  Denies any side effects  He did not get the blood work done yet  He continues to have a problem with his right shoulder  He continues to follow-up with orthopedic  He has been having problems with snoring and feeling tired during the day  Patient Care Team:  Marisol Berry MD as PCP - General (Family Medicine)  LINDSAY Vaughn MD as Endoscopist     Review of Systems:     Review of Systems   Constitutional: Positive for fatigue  Negative for chills and fever  HENT: Negative for trouble swallowing  Eyes: Negative for visual disturbance  Respiratory: Negative for cough and shortness of breath  Cardiovascular: Negative for chest pain and palpitations  Gastrointestinal: Negative for abdominal pain, blood in stool and vomiting  Endocrine: Negative for cold intolerance and heat intolerance  Genitourinary: Negative for difficulty urinating and dysuria  Musculoskeletal: Negative for gait problem  Skin: Negative for rash  Neurological: Negative for dizziness, syncope and headaches  Hematological: Negative for adenopathy  Psychiatric/Behavioral: Negative for behavioral problems          Problem List:     Patient Active Problem List   Diagnosis   • Acromioclavicular joint arthritis   • Arthralgia   • Back pain   • Benign prostatic hyperplasia   • Chronic back pain   • Chronic pain of left ankle   • ED (erectile dysfunction) of organic origin   • Gastroesophageal reflux disease   • Headache   • Knee pain   • Shoulder joint pain   • Tinea versicolor   • Status post inguinal hernia repair   • Need for hepatitis C screening test   • Postoperative pain   • Essential hypertension   • Healthcare maintenance   • Benign localized prostatic hyperplasia with lower urinary tract symptoms (LUTS)   • Urinary frequency   • Lung nodule   • Tick bite   • Injury of right forearm   • Hand injury, right, initial encounter   • Otitis externa   • Encounter for screening laboratory testing for COVID-19 virus   • Other hyperlipidemia   • Erectile dysfunction due to diseases classified elsewhere   • COVID-19 virus infection   • Plantar wart   • Right sciatic nerve pain   • BMI 26 0-26 9,adult   • Asymptomatic varicose veins of both lower extremities   • Varicose veins of bilateral lower extremities with other complications   • Biceps tendinitis of right upper extremity   • Hematoma   • Suspected sleep apnea   • Hyperglycemia   • Medicare annual wellness visit, subsequent      Past Medical and Surgical History:     Past Medical History:   Diagnosis Date   • Abscess, intestine    • Arthritis    • Diverticulitis    • GERD (gastroesophageal reflux disease)    • Hydronephrosis 5/27/2018   • Hypertension    • Wears glasses      Past Surgical History:   Procedure Laterality Date   • ABCESS DRAINAGE     • COLON SURGERY     • COLONOSCOPY N/A 5/5/2016    Procedure: COLONOSCOPY;  Surgeon: Bri Pulido MD;  Location: [facility]; Service:    • COLONOSCOPY N/A 7/26/2018    Procedure: COLONOSCOPY with bx's;  Surgeon: Dawood Lopze MD;  Location: [facility]; Service: Gastroenterology   • ESOPHAGOGASTRODUODENOSCOPY      with biopsy   • FOOT SURGERY Left     x2? fusion? due to arthritis  onset: 0     • HERNIA REPAIR     • ILEO LOOP DIVERSION N/A 6/1/2018    Procedure: ILEOSTOMY LOOP DIVERTING;  Surgeon: Joel Stokes DO;  [facility];  Service: General   • INSERTION OF FIDUCIAL MARKER (TRANSRECTAL ULTRASOUND GUIDANCE) N/A 4/13/2022    Procedure: TRANSRECTAL US GUIDANCE;  Surgeon: Rosibel Silva MD;  [facility];  Service: Urology   • LAPAROTOMY N/A 6/1/2018    Procedure: LAPAROTOMY EXPLORATORY,;  Surgeon: Joel Stokes DO;  [facility]; Service: General   • AR CLOSURE ENTEROSTOMY LG/SMALL INTESTINE N/A 2018    Procedure: CLOSURE LOOP ILEOSTOMY;  Surgeon: Arnie Seo MD;  [facility];  Service: General   • AR COLECTOMY PARTIAL W/ANASTOMOSIS N/A 2018    Procedure: RESECTION COLON SIGMOID;  Surgeon: Geovanna Diop DO;  [facility];  Service: General   • AR CYSTO BLADDER W/URETERAL CATHETERIZATION Left 2018    Procedure: CYSTOSCOPY RETROGRADE PYELOGRAM WITH INSERTION STENT URETERAL;  Surgeon: Maddie Lala MD;  [facility];  Service: Urology   • AR CYSTOURETHROSCOPY N/A 2022    Procedure: Cassandra Bard;  Surgeon: Tony Villalobos MD;  [facility];  Service: Urology   • AR LAP RPR HRNA XCPT INCAL/INGUN NCRC8/STRANGULATED N/A 2019    Procedure: REPAIR HERNIA INCISIONAL LAPAROSCOPIC;  Surgeon: Arnie Seo MD;  [facility];  Service: General   • AR LAPAROSCOPY SURG RPR INITIAL INGUINAL HERNIA Right 2019    Procedure: REPAIR HERNIA INGUINAL, LAPAROSCOPIC;  Surgeon: Arnie Seo MD;  [facility];  Service: General      Family History:     Family History   Problem Relation Age of Onset   • Other Mother         head tumor   • Glaucoma Father    • Diabetes Father         possible diabetes   • Stroke Family       Social History:     Social History     Socioeconomic History   • Marital status: /Civil Union     Spouse name: None   • Number of children: None   • Years of education: None   • Highest education level: None   Occupational History   • None   Tobacco Use   • Smoking status: Former     Types: Cigarettes     Quit date:      Years since quittin 2   • Smokeless tobacco: Never   Vaping Use   • Vaping Use: Never used   Substance and Sexual Activity   • Alcohol use: Not Currently     Comment: daily until - quit 2022   • Drug use: No   • Sexual activity: None   Other Topics Concern   • None   Social History Narrative   • None     Social Determinants of Health     Financial Resource Strain: Not on file   Food Insecurity: Not on file   Transportation Needs: Not on file   Physical Activity: Not on file   Stress: Not on file   Social Connections: Not on file   Intimate Partner Violence: Not on file   Housing Stability: Not on file      Medications and Allergies:     Current Outpatient Medications   Medication Sig Dispense Refill   • acetaminophen (TYLENOL) 500 mg tablet Take 1,000 mg by mouth every 6 (six) hours as needed for mild pain     • DULoxetine (CYMBALTA) 30 mg delayed release capsule Take 1 capsule (30 mg total) by mouth daily 30 capsule 5   • gabapentin (NEURONTIN) 300 mg capsule Take 1 capsule (300 mg total) by mouth 3 (three) times a day 90 capsule 1   • Ibuprofen (ADVIL PO) Take by mouth as needed       • lisinopril (ZESTRIL) 20 mg tablet Take 1 tablet (20 mg total) by mouth daily 90 tablet 0   • tadalafil (CIALIS) 20 MG tablet Take 1 tablet (20 mg total) by mouth daily as needed for erectile dysfunction 90 tablet 0   • tamsulosin (FLOMAX) 0 4 mg take 1 capsule by mouth daily with dinner 90 capsule 1   • meloxicam (Mobic) 15 mg tablet Take 1 tablet (15 mg total) by mouth daily (Patient not taking: Reported on 3/27/2023) 30 tablet 0   • meloxicam (Mobic) 15 mg tablet Take 1 tablet (15 mg total) by mouth daily (Patient not taking: Reported on 3/27/2023) 30 tablet 0   • methocarbamol (ROBAXIN) 500 mg tablet Take 1 tablet (500 mg total) by mouth daily at bedtime as needed for muscle spasms (Patient not taking: Reported on 5/25/2022) 30 tablet 0   • phenazopyridine (PYRIDIUM) 200 mg tablet Take 1 tablet (200 mg total) by mouth 3 (three) times a day as needed for bladder spasms (Patient not taking: Reported on 5/25/2022) 10 tablet 0   • sildenafil (VIAGRA) 100 mg tablet Take 1 tablet (100 mg total) by mouth daily as needed for erectile dysfunction (Patient not taking: Reported on 3/27/2023) 15 tablet 0     No current facility-administered medications for this visit  Allergies   Allergen Reactions   • Penicillins Itching and Rash      Immunizations:     Immunization History   Administered Date(s) Administered   • COVID-19 MODERNA VACC 0 5 ML IM 04/19/2021, 05/18/2021   • Tdap 01/27/2012      Health Maintenance:         Topic Date Due   • HIV Screening  Never done   • Colorectal Cancer Screening  04/28/2025   • Hepatitis C Screening  Completed         Topic Date Due   • COVID-19 Vaccine (3 - Booster for Moderna series) 07/13/2021   • Influenza Vaccine (1) Never done      Medicare Screening Tests and Risk Assessments:     Sandip Osborn is here for his Subsequent Wellness visit  Health Risk Assessment:   Patient rates overall health as good  Patient feels that their physical health rating is same  Patient is satisfied with their life  Eyesight was rated as slightly worse  Hearing was rated as same  Patient feels that their emotional and mental health rating is same  Patients states they are sometimes angry  Patient states they are sometimes unusually tired/fatigued  Pain experienced in the last 7 days has been a lot  Patient's pain rating has been 8/10  Patient states that he has experienced no weight loss or gain in last 6 months  Depression Screening:   PHQ-2 Score: 0      Fall Risk Screening: In the past year, patient has experienced: no history of falling in past year      Home Safety:  Patient does not have trouble with stairs inside or outside of their home  Patient has working smoke alarms and has working carbon monoxide detector  Home safety hazards include: none  Nutrition:   Current diet is Regular  Low salt    Medications:   Patient is currently taking over-the-counter supplements  OTC medications include: see medication list  Patient is able to manage medications       Activities of Daily Living (ADLs)/Instrumental Activities of Daily Living (IADLs):   Walk and transfer into and out of bed and chair?: Yes  Dress and groom yourself?: Yes    Bathe or "shower yourself?: Yes    Feed yourself? Yes  Do your laundry/housekeeping?: Yes  Manage your money, pay your bills and track your expenses?: Yes  Make your own meals?: Yes    Do your own shopping?: Yes    Previous Hospitalizations:   Any hospitalizations or ED visits within the last 12 months?: No      Advance Care Planning:   Living will: No    Durable POA for healthcare: No    Advanced directive: No      Cognitive Screening:   Provider or family/friend/caregiver concerned regarding cognition?: No    PREVENTIVE SCREENINGS      Cardiovascular Screening:    General: Screening Not Indicated and History Lipid Disorder      Diabetes Screening:     General: Screening Current      Colorectal Cancer Screening:     General: Screening Current      Prostate Cancer Screening:    General: Screening Current      Osteoporosis Screening:    General: Risks and Benefits Discussed      Abdominal Aortic Aneurysm (AAA) Screening:    Risk factors include: tobacco use        General: Risks and Benefits Discussed      Lung Cancer Screening:     General: Screening Not Indicated      Hepatitis C Screening:    General: Screening Current    Screening, Brief Intervention, and Referral to Treatment (SBIRT)    Screening  Typical number of drinks in a day: 0  Typical number of drinks in a week: 0  Interpretation: Low risk drinking behavior  Brief Intervention  Alcohol & drug use screenings were reviewed  No concerns regarding substance use disorder identified  Other Counseling Topics:   Regular weightbearing exercise and calcium and vitamin D intake  No results found  Physical Exam:     /74 (BP Location: Left arm, Patient Position: Sitting, Cuff Size: Standard)   Pulse 83   Temp 98 2 °F (36 8 °C) (Tympanic)   Resp 16   Ht 5' 8\" (1 727 m)   Wt 75 8 kg (167 lb)   SpO2 98%   BMI 25 39 kg/m²     Physical Exam  Vitals and nursing note reviewed  Constitutional:       General: He is not in acute distress       Appearance: He " is well-developed  HENT:      Head: Normocephalic and atraumatic  Eyes:      Conjunctiva/sclera: Conjunctivae normal    Cardiovascular:      Rate and Rhythm: Normal rate and regular rhythm  Heart sounds: No murmur heard  Pulmonary:      Effort: Pulmonary effort is normal  No respiratory distress  Breath sounds: Normal breath sounds  Abdominal:      Palpations: Abdomen is soft  Tenderness: There is no abdominal tenderness  Musculoskeletal:         General: No swelling  Cervical back: Neck supple  Skin:     General: Skin is warm and dry  Capillary Refill: Capillary refill takes less than 2 seconds  Neurological:      Mental Status: He is alert  Psychiatric:         Mood and Affect: Mood normal           Jacques Zavala MD BMI Counseling: Body mass index is 25 39 kg/m²  The BMI is above normal  Nutrition recommendations include reducing portion sizes, decreasing overall calorie intake and 3-5 servings of fruits/vegetables daily  Exercise recommendations include moderate aerobic physical activity for 150 minutes/week

## 2023-03-27 NOTE — ASSESSMENT & PLAN NOTE
Not well controlled  Discussed about low-fat diet and regular exercise  Was discussed with patient to get his blood work done and we will consider medications depend on the results

## 2023-04-28 ENCOUNTER — TELEPHONE (OUTPATIENT)
Dept: FAMILY MEDICINE CLINIC | Facility: CLINIC | Age: 63
End: 2023-04-28

## 2023-04-28 NOTE — TELEPHONE ENCOUNTER
----- Message from Aster Mora MD sent at 4/28/2023  6:30 AM EDT -----  Blood work came back showing elevated fasting glucose and cholesterol  All the rest of blood work came back stable  I will discuss further at his next appointment

## 2023-04-28 NOTE — TELEPHONE ENCOUNTER
Pt aware of results and lost connection during call  I tried to call him back because he wanted to schedule a visit but was unable to leave message

## 2023-05-10 ENCOUNTER — OFFICE VISIT (OUTPATIENT)
Dept: OBGYN CLINIC | Facility: CLINIC | Age: 63
End: 2023-05-10

## 2023-05-10 VITALS
BODY MASS INDEX: 25.31 KG/M2 | SYSTOLIC BLOOD PRESSURE: 127 MMHG | HEIGHT: 68 IN | WEIGHT: 167 LBS | DIASTOLIC BLOOD PRESSURE: 70 MMHG

## 2023-05-10 DIAGNOSIS — M84.374A STRESS FRACTURE OF METATARSAL BONE OF RIGHT FOOT, INITIAL ENCOUNTER: Primary | ICD-10-CM

## 2023-05-10 DIAGNOSIS — M75.41 IMPINGEMENT SYNDROME OF RIGHT SHOULDER: ICD-10-CM

## 2023-05-10 RX ORDER — METHYLPREDNISOLONE ACETATE 40 MG/ML
1 INJECTION, SUSPENSION INTRA-ARTICULAR; INTRALESIONAL; INTRAMUSCULAR; SOFT TISSUE
Status: COMPLETED | OUTPATIENT
Start: 2023-05-10 | End: 2023-05-10

## 2023-05-10 RX ORDER — LIDOCAINE HYDROCHLORIDE 10 MG/ML
2 INJECTION, SOLUTION INFILTRATION; PERINEURAL
Status: COMPLETED | OUTPATIENT
Start: 2023-05-10 | End: 2023-05-10

## 2023-05-10 RX ORDER — BUPIVACAINE HYDROCHLORIDE 2.5 MG/ML
2 INJECTION, SOLUTION INFILTRATION; PERINEURAL
Status: COMPLETED | OUTPATIENT
Start: 2023-05-10 | End: 2023-05-10

## 2023-05-10 RX ORDER — MELOXICAM 15 MG/1
15 TABLET ORAL DAILY
Qty: 30 TABLET | Refills: 0 | Status: SHIPPED | OUTPATIENT
Start: 2023-05-10

## 2023-05-10 RX ADMIN — BUPIVACAINE HYDROCHLORIDE 2 ML: 2.5 INJECTION, SOLUTION INFILTRATION; PERINEURAL at 15:01

## 2023-05-10 RX ADMIN — LIDOCAINE HYDROCHLORIDE 2 ML: 10 INJECTION, SOLUTION INFILTRATION; PERINEURAL at 15:01

## 2023-05-10 RX ADMIN — METHYLPREDNISOLONE ACETATE 1 ML: 40 INJECTION, SUSPENSION INTRA-ARTICULAR; INTRALESIONAL; INTRAMUSCULAR; SOFT TISSUE at 15:01

## 2023-05-10 NOTE — PROGRESS NOTES
Patient Name:  Anna Bunn  MRN:  [id number]    Assessment & Plan     Right foot pain, possible stress reaction base of the fifth metatarsal   1  Patient notes significant pain at the base of his fifth metatarsal   At this time there is concern for a stress reaction at the base of his fifth metatarsal   2  Patient will be placed in a hard soled shoe  Advise he wear this at all times and avoid painful weightbearing  3  Prescription for meloxicam   4  Follow-up in 4 to 6 weeks with podiatry, Dr Norma Lo  Patient also has a history of right shoulder rotator cuff tendinitis/bursitis  He did undergo an injection into the right shoulder subacromial space in November 2022  He noted an increase in his pain recently over the past 2 weeks  Patient quested corticosteroid injection today  Corticosteroid injection performed today into the right shoulder subacromial space  He may follow-up as needed with regards to the shoulder  Chief Complaint     Right foot pain    History of the Present Illness     Anna Bunn is a 59 y o  male who reports to the office today for evaluation of his right foot  Patient notes an onset of pain approximately 4 months ago  He denies any specific injury or trauma  Patient is very active with playing soccer and going to the gym  He notes pain on the lateral aspect of the midfoot primarily at the base of the fifth metatarsal   Pain is worse with direct pressure as well as bearing weight and ambulating  He notes swelling on the lateral aspect of the foot as well  He has been taking Tylenol with minimal improvement  He denies any weakness or instability  No numbness or tingling  No fevers or chills  Patient also reports increased right shoulder pain today  He has a history of right shoulder rotator cuff tendinitis/bursitis    He did undergo a corticosteroid injection into the right shoulder subacromial space in November 2022 which did provide significant "improvement  He notes a recent return of his pain approximately 2 weeks ago  He denies any recent injury or trauma  He notes lateral based shoulder pain worse with reaching overhead behind his back as well as lying on his side at night  Patient is requesting repeat corticosteroid injection today  Physical Exam     /70   Ht 5' 8\" (1 727 m)   Wt 75 8 kg (167 lb)   BMI 25 39 kg/m²     Right foot: No gross deformity  Skin intact  No erythema or ecchymosis  There is soft tissue swelling over the lateral foot at the base of the fifth metatarsal   There is associated tenderness at the base of the fifth metatarsal as well  No tenderness about the midfoot and forefoot  No tenderness hindfoot  Full ankle range of motion without pain  Toes are warm and mobile  Brisk capillary refill  2+ DP pulse  Right shoulder: No gross deformity  No tenderness to palpation  PROM is /160, ER-abd 90/90, IR-abd 50/50  Impingement signs are positive  Empty can test is mildly positive  Speed's test is negative  Negative cross-body adduction test   5/5 forward flexion strength    Eyes: Anicteric sclerae  ENT: Trachea midline  Lungs: Normal respiratory effort  CV: Capillary refill is less than 2 seconds  Skin: Intact without erythema  Lymph: No palpable lymphadenopathy  Neuro: Sensation is grossly intact to light touch  Psych: Mood and affect are appropriate  Data Review     I have personally reviewed pertinent films in PACS, and my interpretation follows:    X-rays right foot: No fracture or dislocation  No acute osseous abnormality  No significant degenerative changes      Past Medical History:   Diagnosis Date   • Abscess, intestine    • Arthritis    • Diverticulitis    • GERD (gastroesophageal reflux disease)    • Hydronephrosis 5/27/2018   • Hypertension    • Wears glasses        Past Surgical History:   Procedure Laterality Date   • ABCESS DRAINAGE     • COLON SURGERY     • COLONOSCOPY N/A " 5/5/2016    Procedure: COLONOSCOPY;  Surgeon: Akshat Tran MD;  Location: [facility]; Service:    • COLONOSCOPY N/A 7/26/2018    Procedure: COLONOSCOPY with bx's;  Surgeon: Jose Armando Camp MD;  [facility]: [facility]; Service: Gastroenterology   • ESOPHAGOGASTRODUODENOSCOPY      with biopsy   • FOOT SURGERY Left     x2? fusion? due to arthritis  onset: 0     • HERNIA REPAIR     • ILEO LOOP DIVERSION N/A 6/1/2018    Procedure: ILEOSTOMY LOOP DIVERTING;  Surgeon: Radha Smith DO;  [facility];  Service: General   • INSERTION OF FIDUCIAL MARKER (TRANSRECTAL ULTRASOUND GUIDANCE) N/A 4/13/2022    Procedure: TRANSRECTAL US GUIDANCE;  Surgeon: Kimi Faust MD;  [facility];  Service: Urology   • LAPAROTOMY N/A 6/1/2018    Procedure: LAPAROTOMY EXPLORATORY,;  Surgeon: Radha Smith DO;  [facility];  Service: General   • TX CLOSURE ENTEROSTOMY LG/SMALL INTESTINE N/A 8/16/2018    Procedure: CLOSURE LOOP ILEOSTOMY;  Surgeon: Yovany Silver MD;  [facility];  Service: General   • TX COLECTOMY PARTIAL W/ANASTOMOSIS N/A 6/1/2018    Procedure: RESECTION COLON SIGMOID;  Surgeon: Radha Smith DO;  [facility];  Service: General   • TX CYSTO BLADDER W/URETERAL CATHETERIZATION Left 6/9/2018    Procedure: CYSTOSCOPY RETROGRADE PYELOGRAM WITH INSERTION STENT URETERAL;  Surgeon: Rickey Carlson MD;  [facility];  Service: Urology   • TX CYSTOURETHROSCOPY N/A 4/13/2022    Procedure: Sean Cushing;  Surgeon: Kimi Faust MD;  [facility];  Service: Urology   • TX LAP RPR HRNA XCPT INCAL/INGUN NCRC8/STRANGULATED N/A 1/31/2019    Procedure: REPAIR HERNIA INCISIONAL LAPAROSCOPIC;  Surgeon: Yovany Silver MD;  [facility];  Service: General   • TX LAPAROSCOPY SURG RPR INITIAL INGUINAL HERNIA Right 1/31/2019    Procedure: REPAIR HERNIA INGUINAL, LAPAROSCOPIC;  Surgeon: Yovany Silver MD;  [facility];  Service: General       Allergies   Allergen Reactions • Penicillins Itching and Rash       Current Outpatient Medications on File Prior to Visit   Medication Sig Dispense Refill   • acetaminophen (TYLENOL) 500 mg tablet Take 1,000 mg by mouth every 6 (six) hours as needed for mild pain     • DULoxetine (CYMBALTA) 30 mg delayed release capsule Take 1 capsule (30 mg total) by mouth daily 30 capsule 5   • gabapentin (NEURONTIN) 300 mg capsule Take 1 capsule (300 mg total) by mouth 3 (three) times a day 90 capsule 1   • Ibuprofen (ADVIL PO) Take by mouth as needed       • lisinopril (ZESTRIL) 20 mg tablet Take 1 tablet (20 mg total) by mouth daily 90 tablet 0   • tadalafil (CIALIS) 20 MG tablet Take 1 tablet (20 mg total) by mouth daily as needed for erectile dysfunction 90 tablet 0   • tamsulosin (FLOMAX) 0 4 mg take 1 capsule by mouth daily with dinner 90 capsule 1   • meloxicam (Mobic) 15 mg tablet Take 1 tablet (15 mg total) by mouth daily (Patient not taking: Reported on 3/27/2023) 30 tablet 0   • meloxicam (Mobic) 15 mg tablet Take 1 tablet (15 mg total) by mouth daily (Patient not taking: Reported on 3/27/2023) 30 tablet 0   • methocarbamol (ROBAXIN) 500 mg tablet Take 1 tablet (500 mg total) by mouth daily at bedtime as needed for muscle spasms (Patient not taking: Reported on 2022) 30 tablet 0   • phenazopyridine (PYRIDIUM) 200 mg tablet Take 1 tablet (200 mg total) by mouth 3 (three) times a day as needed for bladder spasms (Patient not taking: Reported on 2022) 10 tablet 0   • sildenafil (VIAGRA) 100 mg tablet Take 1 tablet (100 mg total) by mouth daily as needed for erectile dysfunction (Patient not taking: Reported on 3/27/2023) 15 tablet 0     No current facility-administered medications on file prior to visit         Social History     Tobacco Use   • Smoking status: Former     Types: Cigarettes     Quit date:      Years since quittin 3   • Smokeless tobacco: Never   Vaping Use   • Vaping Use: Never used   Substance Use Topics   • Alcohol use: Not Currently     Comment: daily until - quit 2/2022   • Drug use: No       Family History   Problem Relation Age of Onset   • Other Mother         head tumor   • Glaucoma Father    • Diabetes Father         possible diabetes   • Stroke Family        Review of Systems     As stated in the HPI  All other systems reviewed and are negative        Large joint arthrocentesis: R subacromial bursa  Procedure Details  Location: shoulder - R subacromial bursa  Needle size: 22 G  Ultrasound guidance: no  Approach: posterior  Medications administered: 2 mL bupivacaine 0 25 %; 2 mL lidocaine 1 %; 1 mL methylPREDNISolone acetate 40 mg/mL    Patient tolerance: patient tolerated the procedure well with no immediate complications  Dressing:  Sterile dressing applied

## 2023-05-26 PROBLEM — Z00.00 MEDICARE ANNUAL WELLNESS VISIT, SUBSEQUENT: Status: RESOLVED | Noted: 2023-03-27 | Resolved: 2023-05-26

## 2023-06-02 DIAGNOSIS — N40.1 BENIGN LOCALIZED PROSTATIC HYPERPLASIA WITH LOWER URINARY TRACT SYMPTOMS (LUTS): ICD-10-CM

## 2023-06-05 DIAGNOSIS — N40.1 BENIGN LOCALIZED PROSTATIC HYPERPLASIA WITH LOWER URINARY TRACT SYMPTOMS (LUTS): ICD-10-CM

## 2023-06-05 RX ORDER — TAMSULOSIN HYDROCHLORIDE 0.4 MG/1
CAPSULE ORAL
Qty: 90 CAPSULE | Refills: 1 | Status: SHIPPED | OUTPATIENT
Start: 2023-06-05 | End: 2023-06-05 | Stop reason: SDUPTHER

## 2023-06-05 RX ORDER — TAMSULOSIN HYDROCHLORIDE 0.4 MG/1
0.4 CAPSULE ORAL
Qty: 90 CAPSULE | Refills: 1 | Status: SHIPPED | OUTPATIENT
Start: 2023-06-05

## 2023-06-05 NOTE — TELEPHONE ENCOUNTER
Patient ran out of flomax, pharmacy gave him 3 pills to hold him over until today  Asking #[phone] of Flomax   [facility] in [place]

## 2023-06-20 ENCOUNTER — CONSULT (OUTPATIENT)
Dept: PODIATRY | Facility: CLINIC | Age: 63
End: 2023-06-20
Payer: MEDICARE

## 2023-06-20 VITALS
SYSTOLIC BLOOD PRESSURE: 120 MMHG | BODY MASS INDEX: 25.76 KG/M2 | DIASTOLIC BLOOD PRESSURE: 80 MMHG | HEIGHT: 68 IN | WEIGHT: 170 LBS

## 2023-06-20 DIAGNOSIS — M76.71 PERONEAL TENDONITIS, RIGHT: ICD-10-CM

## 2023-06-20 DIAGNOSIS — M77.8 ENTHESOPATHY OF RIGHT FOOT: Primary | ICD-10-CM

## 2023-06-20 PROCEDURE — 99203 OFFICE O/P NEW LOW 30 MIN: CPT | Performed by: PODIATRIST

## 2023-06-20 PROCEDURE — 20550 NJX 1 TENDON SHEATH/LIGAMENT: CPT | Performed by: PODIATRIST

## 2023-06-20 RX ORDER — TRIAMCINOLONE ACETONIDE 40 MG/ML
40 INJECTION, SUSPENSION INTRA-ARTICULAR; INTRAMUSCULAR ONCE
Status: COMPLETED | OUTPATIENT
Start: 2023-06-20 | End: 2023-06-20

## 2023-06-20 RX ORDER — LIDOCAINE HYDROCHLORIDE 10 MG/ML
1 INJECTION, SOLUTION INFILTRATION; PERINEURAL ONCE
Status: COMPLETED | OUTPATIENT
Start: 2023-06-20 | End: 2023-06-20

## 2023-06-20 RX ADMIN — TRIAMCINOLONE ACETONIDE 40 MG: 40 INJECTION, SUSPENSION INTRA-ARTICULAR; INTRAMUSCULAR at 15:10

## 2023-06-20 RX ADMIN — LIDOCAINE HYDROCHLORIDE 1 ML: 10 INJECTION, SOLUTION INFILTRATION; PERINEURAL at 15:09

## 2023-06-20 NOTE — PROGRESS NOTES
"Assessment/Plan:     Diagnoses and all orders for this visit:    Enthesopathy of right foot  -     Ambulatory Referral to Podiatry  -     X-ray foot right 3+ views; Future  -     Foot injection  -     lidocaine (XYLOCAINE) 1 % injection 1 mL  -     triamcinolone acetonide (KENALOG-40) 40 mg/mL injection 40 mg    Peroneal tendonitis, right  -     Foot injection  -     lidocaine (XYLOCAINE) 1 % injection 1 mL  -     triamcinolone acetonide (KENALOG-40) 40 mg/mL injection 40 mg      Diagnosis and options discussed with patient  Patient agreeable to the plan as stated below  Xr reviewed with patient    RICE protocol discussed    Surgical shoe 2 weeks    Patient absent after injection  Foot injection     Date/Time 6/20/2023 3:00 PM     Performed by  Catie Tom DPM   Authorized by Catie Tom DPM     Universal Protocol   Consent: Verbal consent obtained  Risks and benefits: risks, benefits and alternatives were discussed  Consent given by: patient  Time out: Immediately prior to procedure a \"time out\" was called to verify the correct patient, procedure, equipment, support staff and site/side marked as required  Timeout called at: 6/20/2023 3:05 PM   Patient understanding: patient states understanding of the procedure being performed  Patient identity confirmed: verbally with patient        Local anesthesia used: yes      Anesthesia: local infiltration     Anesthesia   Local anesthesia used: yes  Local Anesthetic: lidocaine 1% without epinephrine  Anesthetic total: 1 mL     Procedure Details   Procedure Notes: 0 5cc kenalog 40 and 1cc 1% lidocaine plain injected around the peroneal brevis insertion right foot  Pain improved  Subjective:      Patient ID: Ann Gabriel is a 59 y o  male  5/10/2023 initial visit with orthoOsiel: Ann Gabriel is a 59 y o  male who reports to the office today for evaluation of his right foot    Patient notes an onset of " "pain approximately 4 months ago  He denies any specific injury or trauma  Patient is very active with playing soccer and going to the gym  He notes pain on the lateral aspect of the midfoot primarily at the base of the fifth metatarsal   Pain is worse with direct pressure as well as bearing weight and ambulating  He notes swelling on the lateral aspect of the foot as well  He has been taking Tylenol with minimal improvement  He denies any weakness or instability  No numbness or tingling  No fevers or chills  6/20/2023: Patient was diagnosed with suspected 5th metatarsal stress fracture  He was given surgical shoe and told to rest  HE was prescribed meloxicam  In the boot he feels good but in a sneaker or barefoot it still hurts  Pain is at the base of the 5th metatarsal        The following portions of the patient's history were reviewed and updated as appropriate: allergies, current medications, past family history, past medical history, past social history, past surgical history and problem list     Review of Systems    Constitutional: Negative  Respiratory: Negative for cough and shortness of breath  Gastrointestinal: Negative for diarrhea, nausea and vomiting  Musculoskeletal: metatarsal pain  Skin: Negative for rash or wound  Neurological: Negative for weakness, numbness and headaches  Objective:      /80   Ht 5' 8\" (1 727 m)   Wt 77 1 kg (170 lb)   BMI 25 85 kg/m²          Physical Exam  Vitals reviewed  Constitutional:       Appearance: He is not ill-appearing or diaphoretic  Cardiovascular:      Rate and Rhythm: Normal rate  Pulses: Normal pulses  Pulmonary:      Effort: Pulmonary effort is normal  No respiratory distress  Musculoskeletal:         General: Tenderness (sharp pain right 5th metatarsal base  No peroneal pain with stressed eversion  MMT normal at ankle;) present  Skin:     Capillary Refill: Capillary refill takes less than 2 seconds        " Findings: No erythema  Neurological:      Mental Status: He is alert and oriented to person, place, and time  Psychiatric:         Mood and Affect: Mood normal        XRay 3 views of the right foot personally read by Dr Romario Lyles in office today and discussed with patient:  Compared to previous XR, no changes to suggest stress fracture   The imaging is normal

## 2023-06-21 DIAGNOSIS — I10 ESSENTIAL HYPERTENSION: ICD-10-CM

## 2023-06-21 RX ORDER — LISINOPRIL 20 MG/1
TABLET ORAL
Qty: 90 TABLET | Refills: 0 | Status: SHIPPED | OUTPATIENT
Start: 2023-06-21

## 2023-08-19 NOTE — PROGRESS NOTES
[address] [facility]  [facility]  [address] [facility] [place]  [place], [address] [facility]  [phone]  www  [facility]  org      NAME: Mark Bunn  AGE: 65 y o  SEX: male  : 1960   MRN: [id number]    DATE: 2022  TIME: 10:01 AM    Assessment and Plan:    BPH with obstruction, and he has nocturia every hour and daytime frequency of every hour  The symptoms are quite bothersome and at night he does have some difficulty urinating  However I think some of this may be pelvic floor dysfunction and also he may have an irritable bladder from his history of diverticulitis/diverticulosis with segmental colectomy     My recommendation is start physical therapy with pelvic health, continue with Flomax at the present dose  His sciatica is pretty significant  He is quite uncomfortable and he looks uncomfortable in the office  He has to adjust a lot of his activities and this may also play a role in his voiding symptoms  Reassess in 3 months  Chief Complaint     Chief Complaint   Patient presents with    Follow-up       History of Present Illness   61-year-old man with BPH with obstruction on Flomax which gives and retrograde ejaculation and nocturia q 1 hour  He has been very bothered by the symptoms and underwent under evaluation for possible further treatmentswith cysto and TRUS by me in OR 22     Findings-Transrectal ultrasound-length 5 6 cm, width 4 7 cm, height 3 3 cm with a ellipsoid volume of 45 5 cc-total gland volume  Mild median lobe  Bilobar hypertrophy, high bladder neck  Normal bladder  No stricture  Current symptoms: Even on the flomax, has Nocturia q 1 hour  He wakes up with the urge to void but does not urinate a large amount  He feels that he empties his bladder okay about 50% of the time, other times he feels that there is a blockage and he has to force his urination    He urinates frequently during the daytime but he does not have the problem where he struggles to urinate like he does at night  He has to urinate about every hour to hour and a half during the daytime  He has prescriptions for gabapentin and Robaxin for sciatica but he does not use them  He is currently being evaluated for diverticulitis surgery  We discussed the possible role of this in his symptoms as the sigmoid colon sits right on top of the bladder  Prostate cancer screening:  PSA was 1 9 4/12/2022  It had been checked a month before it was 2 8  The following portions of the patient's history were reviewed and updated as appropriate: allergies, current medications, past family history, past medical history, past social history, past surgical history and problem list   Past Medical History:   Diagnosis Date    Abscess, intestine     Arthritis     Diverticulitis     GERD (gastroesophageal reflux disease)     Hydronephrosis 5/27/2018    Hypertension     Wears glasses      Past Surgical History:   Procedure Laterality Date    ABCESS DRAINAGE      COLON SURGERY      COLONOSCOPY N/A 5/5/2016    Procedure: COLONOSCOPY;  Surgeon: Shabnam Varghese MD;  Location: [facility]; Service:     COLONOSCOPY N/A 7/26/2018    Procedure: COLONOSCOPY with bx's;  Surgeon: Nabila Cintron MD;  Location: [facility]; Service: Gastroenterology    ESOPHAGOGASTRODUODENOSCOPY      with biopsy    FOOT SURGERY Left     x2? fusion? due to arthritis  onset: 0      HERNIA REPAIR      ILEO LOOP DIVERSION N/A 6/1/2018    Procedure: ILEOSTOMY LOOP DIVERTING;  Surgeon: Citlali Zhao DO;  [facility]: [facility];  Service: General    INSERTION OF FIDUCIAL MARKER (TRANSRECTAL ULTRASOUND GUIDANCE) N/A 4/13/2022    Procedure: TRANSRECTAL US GUIDANCE;  Surgeon: Dia Costa MD;  Location: [facility];  Service: Urology    LAPAROTOMY N/A 6/1/2018    Procedure: LAPAROTOMY EXPLORATORY,;  Surgeon: Citlali Zhao DO;  [facility];  Service: General    VA CLOSE ENTEROSTOMY N/A 8/16/2018    Procedure: CLOSURE LOOP ILEOSTOMY;  Surgeon: Jennie Goldsmith MD;  [facility];  Service: General    MA CYSTOURETHROSCOPY N/A 4/13/2022    Procedure: Terrace Cea;  Surgeon: June Cornell MD;  [facility]: [facility];  Service: Urology    MA CYSTOURETHROSCOPY,URETER CATHETER Left 6/9/2018    Procedure: CYSTOSCOPY RETROGRADE PYELOGRAM WITH INSERTION STENT URETERAL;  Surgeon: Ame Diaz MD;  [facility];  Service: Urology    MA LAP, INCISIONAL HERNIA REPAIR,REDUCIBLE N/A 1/31/2019    Procedure: REPAIR HERNIA INCISIONAL LAPAROSCOPIC;  Surgeon: Jennie Goldsmith MD;  [facility];  Service: General    MA Emily Segurae 19 Right 1/31/2019    Procedure: REPAIR HERNIA INGUINAL, LAPAROSCOPIC;  Surgeon: Jennie Goldsmith MD;  [facility];  Service: General    MA PART REMOVAL COLON W ANASTOMOSIS N/A 6/1/2018    Procedure: RESECTION COLON SIGMOID;  Surgeon: Chikis Cameron DO;  [facility];  Service: General     shoulder  Review of Systems   Review of Systems    Active Problem List     Patient Active Problem List   Diagnosis    Acromioclavicular joint arthritis    Arthralgia    Back pain    Benign prostatic hyperplasia    Chronic back pain    Chronic pain of left ankle    ED (erectile dysfunction) of organic origin    Gastroesophageal reflux disease    Headache    Knee pain    Shoulder joint pain    Tinea versicolor    Status post inguinal hernia repair    Need for hepatitis C screening test    Postoperative pain    Essential hypertension    Healthcare maintenance    Benign localized prostatic hyperplasia with lower urinary tract symptoms (LUTS)    Urinary frequency    Lung nodule    Tick bite    Injury of right forearm    Hand injury, right, initial encounter    Otitis externa    Acute non-recurrent maxillary sinusitis    Encounter for screening laboratory testing for COVID-19 virus    Other hyperlipidemia    Erectile dysfunction due to diseases classified elsewhere    COVID-19 virus infection    Plantar wart    Right sciatic nerve pain       Objective   /80   Ht 5' 8" (1 727 m)   Wt 79 4 kg (175 lb)   BMI 26 61 kg/m²     Physical Exam        Current Medications     Current Outpatient Medications:     acetaminophen (TYLENOL) 500 mg tablet, Take 1,000 mg by mouth every 6 (six) hours as needed for mild pain, Disp: , Rfl:     DULoxetine (CYMBALTA) 30 mg delayed release capsule, Take 1 capsule (30 mg total) by mouth daily, Disp: 30 capsule, Rfl: 5    gabapentin (NEURONTIN) 300 mg capsule, Take 1 capsule (300 mg total) by mouth 3 (three) times a day, Disp: 90 capsule, Rfl: 1    Ibuprofen (ADVIL PO), Take by mouth as needed  , Disp: , Rfl:     lisinopril (ZESTRIL) 20 mg tablet, Take 1 tablet (20 mg total) by mouth daily, Disp: 90 tablet, Rfl: 0    methocarbamol (ROBAXIN) 500 mg tablet, Take 1 tablet (500 mg total) by mouth daily at bedtime as needed for muscle spasms, Disp: 30 tablet, Rfl: 0    phenazopyridine (PYRIDIUM) 200 mg tablet, Take 1 tablet (200 mg total) by mouth 3 (three) times a day as needed for bladder spasms, Disp: 10 tablet, Rfl: 0    sildenafil (VIAGRA) 100 mg tablet, Take 1 tablet (100 mg total) by mouth daily as needed for erectile dysfunction, Disp: 15 tablet, Rfl: 0    tadalafil (CIALIS) 20 MG tablet, TAKE 1 TABLET BY MOUTH 30 MIN BEFORE SEXUAL ACTIVITY, AS NEEDED FOR ERECTILE DYSFUNCTION   TAKE NO MORE THAN 3 TIMES PER WEEK, Disp: , Rfl:     tamsulosin (FLOMAX) 0 4 mg, take 1 capsule by mouth daily with dinner, Disp: 90 capsule, Rfl: 3        Urbano Serrano MD no

## 2023-08-24 ENCOUNTER — TELEPHONE (OUTPATIENT)
Age: 63
End: 2023-08-24

## 2023-08-24 ENCOUNTER — OFFICE VISIT (OUTPATIENT)
Dept: PODIATRY | Facility: CLINIC | Age: 63
End: 2023-08-24
Payer: MEDICARE

## 2023-08-24 VITALS
BODY MASS INDEX: 25.01 KG/M2 | WEIGHT: 165 LBS | SYSTOLIC BLOOD PRESSURE: 134 MMHG | HEIGHT: 68 IN | DIASTOLIC BLOOD PRESSURE: 86 MMHG | HEART RATE: 71 BPM

## 2023-08-24 DIAGNOSIS — G57.62 MORTON'S NEUROMA OF THIRD INTERSPACE OF LEFT FOOT: Primary | ICD-10-CM

## 2023-08-24 PROCEDURE — 64450 NJX AA&/STRD OTHER PN/BRANCH: CPT | Performed by: PODIATRIST

## 2023-08-24 PROCEDURE — 99213 OFFICE O/P EST LOW 20 MIN: CPT | Performed by: PODIATRIST

## 2023-08-24 PROCEDURE — 64455 NJX AA&/STRD PLTR COM DG NRV: CPT | Performed by: PODIATRIST

## 2023-08-24 RX ORDER — TRIAMCINOLONE ACETONIDE 40 MG/ML
40 INJECTION, SUSPENSION INTRA-ARTICULAR; INTRAMUSCULAR ONCE
Status: COMPLETED | OUTPATIENT
Start: 2023-08-24 | End: 2023-08-24

## 2023-08-24 RX ORDER — LIDOCAINE HYDROCHLORIDE 10 MG/ML
1 INJECTION, SOLUTION INFILTRATION; PERINEURAL ONCE
Status: COMPLETED | OUTPATIENT
Start: 2023-08-24 | End: 2023-08-24

## 2023-08-24 RX ADMIN — LIDOCAINE HYDROCHLORIDE 1 ML: 10 INJECTION, SOLUTION INFILTRATION; PERINEURAL at 13:12

## 2023-08-24 RX ADMIN — TRIAMCINOLONE ACETONIDE 40 MG: 40 INJECTION, SUSPENSION INTRA-ARTICULAR; INTRAMUSCULAR at 13:12

## 2023-08-24 NOTE — PATIENT INSTRUCTIONS
Fung Neuroma   WHAT YOU NEED TO KNOW:   What is Fung neuroma? Fung neuroma is inflammation of one of the nerves in your foot. It usually occurs in the ball of your foot, between your third and fourth toes. What increases my risk for Fung neuroma? Tight shoes or shoes with high heels or pointed toes    Repeated trauma from high-impact sports, such as running    What are the signs and symptoms of Fung neuroma? Pain in your foot, especially when you walk    Achy or burning sensation    Feeling like you are stepping on a small stone or a wrinkled sock    Numbness, tingling, or prickling that may spread to your toes    How is Lonza Poles neuroma diagnosed? A foot and ankle exam  will be done by your healthcare provider. He or she will press on your foot to feel for thickened tissue. An x-ray, ultrasound, or MRI  may be done to check for thickened tissue. These tests can also show if other problems may be causing your pain. Do not enter the MRI room with anything metal. Metal can cause serious injury. Tell the healthcare provider if you have any metal in or on your body. How is Fung neuroma treated? The goal of treatment is to decrease pressure, pain, and swelling. NSAIDs  help decrease swelling and pain or fever. This medicine is available with or without a doctor's order. NSAIDs can cause stomach bleeding or kidney problems in certain people. If you take blood thinner medicine, always ask your healthcare provider if NSAIDs are safe for you. Always read the medicine label and follow directions. An injection  of steroids, ethanol, or numbing medicine may decrease pain and swelling. Surgery  may be needed if other treatments do not work. The tissues around the nerve may be cut to relieve pressure. The nerve may also be removed completely. How can I manage my symptoms? Wear flat shoes with a wide toe box. This will decrease the pressure on the front of your foot.     Wear orthotics, arch supports, or foot pads. These help relieve pressure and cushion the ball of your foot. You may need a medical shoe insert ordered by your healthcare provider. Do an ice massage to decrease pain and swelling. Freeze a paper or foam cup filled with water and roll it under your foot. Do this for 20 minutes, 2 times each day. When should I contact my healthcare provider? Your symptoms spread to your toes. Your symptoms do not improve after treatment. You have questions or concerns about your condition or care. CARE AGREEMENT:   You have the right to help plan your care. Learn about your health condition and how it may be treated. Discuss treatment options with your healthcare providers to decide what care you want to receive. You always have the right to refuse treatment. The above information is an  only. It is not intended as medical advice for individual conditions or treatments. Talk to your doctor, nurse or pharmacist before following any medical regimen to see if it is safe and effective for you. © Copyright [facility] 2022 Information is for End User's use only and may not be sold, redistributed or otherwise used for commercial purposes.

## 2023-08-24 NOTE — PROGRESS NOTES
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 Reduce activity 50% for 2-4 weeks  Discussed proper shoe gear      Nerve block    Date/Time: 8/24/2023 1:15 PM    Performed by: Dmitry Weber DPM  Authorized by: Dmitry Weber DPM    Patient location:  [facility]  [place] Protocol:  Consent: Verbal consent obtained. Risks and benefits: risks, benefits and alternatives were discussed  Consent given by: patient  Time out: Immediately prior to procedure a "time out" was called to verify the correct patient, procedure, equipment, support staff and site/side marked as required. Timeout called at: 8/24/2023 1:08 PM.  Patient understanding: patient states understanding of the procedure being performed  Patient identity confirmed: verbally with patient      Indications:     Indications:  Pain relief  Location:     Body area:  Lower extremity    Lower extremity nerve:  Plantar and deep peroneal    Nerve type:  Peripheral    Laterality:  Left (3rd space)  Skin anesthesia (see MAR for exact dosages):     Skin anesthesia method:  Topical application  Procedure details (see MAR for exact dosages): Block needle gauge:  25 G    Anesthetic injected:  Lidocaine 1% w/o epi    Steroid injected:  Triamcinolone    Injection procedure:  Anatomic landmarks identified and anatomic landmarks palpated  Post-procedure details:     Dressing:  Sterile dressing    Outcome:  Pain relieved    Patient tolerance of procedure: Tolerated well, no immediate complications          Subjective:      Patient ID: Deloras Phoenix is a 59 y.o. male. Patient presents with pain in the ball of his left foot.  IT has been getting worse for a month or 2. IT hurts when walking. He denies trauma. He is pointing to the ball of his left foot. The following portions of the patient's history were reviewed and updated as appropriate: allergies, current medications, past family history, past medical history, past social history, past surgical history and problem list.    Review of Systems    Constitutional: Negative. Respiratory: Negative for cough and shortness of breath. Gastrointestinal: Negative for diarrhea, nausea and vomiting. Musculoskeletal: left foot pain  Skin: Negative for rash or wound. Neurological: Negative for weakness, numbness and headaches. Objective:      /86   Pulse 71   Ht 5' 8" (1.727 m)   Wt 74.8 kg (165 lb)   BMI 25.09 kg/m²          Physical Exam  Vitals reviewed. Cardiovascular:      Pulses: Normal pulses. Musculoskeletal:         General: Tenderness (positive mortons sign left 3rd interspace. NO mtpj instability) present. Skin:     Capillary Refill: Capillary refill takes less than 2 seconds. Findings: No erythema. Neurological:      Mental Status: He is alert.

## 2023-09-18 ENCOUNTER — APPOINTMENT (OUTPATIENT)
Dept: LAB | Age: 63
End: 2023-09-18
Payer: MEDICARE

## 2023-09-18 ENCOUNTER — APPOINTMENT (OUTPATIENT)
Dept: RADIOLOGY | Age: 63
End: 2023-09-18
Payer: MEDICARE

## 2023-09-18 ENCOUNTER — OFFICE VISIT (OUTPATIENT)
Dept: FAMILY MEDICINE CLINIC | Facility: CLINIC | Age: 63
End: 2023-09-18
Payer: MEDICARE

## 2023-09-18 VITALS
BODY MASS INDEX: 25.55 KG/M2 | HEIGHT: 68 IN | OXYGEN SATURATION: 98 % | RESPIRATION RATE: 16 BRPM | WEIGHT: 168.6 LBS | SYSTOLIC BLOOD PRESSURE: 112 MMHG | TEMPERATURE: 97.4 F | DIASTOLIC BLOOD PRESSURE: 70 MMHG | HEART RATE: 70 BPM

## 2023-09-18 DIAGNOSIS — Z12.5 PROSTATE CANCER SCREENING: ICD-10-CM

## 2023-09-18 DIAGNOSIS — E78.49 OTHER HYPERLIPIDEMIA: ICD-10-CM

## 2023-09-18 DIAGNOSIS — I10 ESSENTIAL HYPERTENSION: ICD-10-CM

## 2023-09-18 DIAGNOSIS — R73.9 HYPERGLYCEMIA: ICD-10-CM

## 2023-09-18 DIAGNOSIS — M54.2 NECK PAIN: Primary | ICD-10-CM

## 2023-09-18 DIAGNOSIS — M54.2 NECK PAIN: ICD-10-CM

## 2023-09-18 LAB
ALBUMIN SERPL BCP-MCNC: 4.1 G/DL (ref 3.5–5)
ALP SERPL-CCNC: 72 U/L (ref 34–104)
ALT SERPL W P-5'-P-CCNC: 12 U/L (ref 7–52)
ANION GAP SERPL CALCULATED.3IONS-SCNC: 5 MMOL/L
AST SERPL W P-5'-P-CCNC: 14 U/L (ref 13–39)
BASOPHILS # BLD AUTO: 0.04 THOUSANDS/ÂΜL (ref 0–0.1)
BASOPHILS NFR BLD AUTO: 0 % (ref 0–1)
BILIRUB SERPL-MCNC: 0.28 MG/DL (ref 0.2–1)
BUN SERPL-MCNC: 15 MG/DL (ref 5–25)
CALCIUM SERPL-MCNC: 8.9 MG/DL (ref 8.4–10.2)
CHLORIDE SERPL-SCNC: 107 MMOL/L (ref 96–108)
CHOLEST SERPL-MCNC: 207 MG/DL
CO2 SERPL-SCNC: 29 MMOL/L (ref 21–32)
CREAT SERPL-MCNC: 0.96 MG/DL (ref 0.6–1.3)
EOSINOPHIL # BLD AUTO: 0.11 THOUSAND/ÂΜL (ref 0–0.61)
EOSINOPHIL NFR BLD AUTO: 1 % (ref 0–6)
ERYTHROCYTE [DISTWIDTH] IN BLOOD BY AUTOMATED COUNT: 12.8 % (ref 11.6–15.1)
GFR SERPL CREATININE-BSD FRML MDRD: 84 ML/MIN/1.73SQ M
GLUCOSE P FAST SERPL-MCNC: 96 MG/DL (ref 65–99)
HCT VFR BLD AUTO: 43.8 % (ref 36.5–49.3)
HDLC SERPL-MCNC: 51 MG/DL
HGB BLD-MCNC: 14.9 G/DL (ref 12–17)
IMM GRANULOCYTES # BLD AUTO: 0.03 THOUSAND/UL (ref 0–0.2)
IMM GRANULOCYTES NFR BLD AUTO: 0 % (ref 0–2)
LDLC SERPL CALC-MCNC: 97 MG/DL (ref 0–100)
LYMPHOCYTES # BLD AUTO: 1.88 THOUSANDS/ÂΜL (ref 0.6–4.47)
LYMPHOCYTES NFR BLD AUTO: 21 % (ref 14–44)
MCH RBC QN AUTO: 32.3 PG (ref 26.8–34.3)
MCHC RBC AUTO-ENTMCNC: 34 G/DL (ref 31.4–37.4)
MCV RBC AUTO: 95 FL (ref 82–98)
MONOCYTES # BLD AUTO: 0.53 THOUSAND/ÂΜL (ref 0.17–1.22)
MONOCYTES NFR BLD AUTO: 6 % (ref 4–12)
NEUTROPHILS # BLD AUTO: 6.47 THOUSANDS/ÂΜL (ref 1.85–7.62)
NEUTS SEG NFR BLD AUTO: 72 % (ref 43–75)
NRBC BLD AUTO-RTO: 0 /100 WBCS
PLATELET # BLD AUTO: 279 THOUSANDS/UL (ref 149–390)
PMV BLD AUTO: 11.4 FL (ref 8.9–12.7)
POTASSIUM SERPL-SCNC: 4.9 MMOL/L (ref 3.5–5.3)
PROT SERPL-MCNC: 6.6 G/DL (ref 6.4–8.4)
PSA SERPL-MCNC: 2.53 NG/ML (ref 0–4)
RBC # BLD AUTO: 4.62 MILLION/UL (ref 3.88–5.62)
SODIUM SERPL-SCNC: 141 MMOL/L (ref 135–147)
TRIGL SERPL-MCNC: 297 MG/DL
TSH SERPL DL<=0.05 MIU/L-ACNC: 1.48 UIU/ML (ref 0.45–4.5)
WBC # BLD AUTO: 9.06 THOUSAND/UL (ref 4.31–10.16)

## 2023-09-18 PROCEDURE — 84443 ASSAY THYROID STIM HORMONE: CPT

## 2023-09-18 PROCEDURE — 72050 X-RAY EXAM NECK SPINE 4/5VWS: CPT

## 2023-09-18 PROCEDURE — 80061 LIPID PANEL: CPT

## 2023-09-18 PROCEDURE — 36415 COLL VENOUS BLD VENIPUNCTURE: CPT

## 2023-09-18 PROCEDURE — 99214 OFFICE O/P EST MOD 30 MIN: CPT | Performed by: FAMILY MEDICINE

## 2023-09-18 PROCEDURE — 85025 COMPLETE CBC W/AUTO DIFF WBC: CPT

## 2023-09-18 PROCEDURE — G0103 PSA SCREENING: HCPCS

## 2023-09-18 PROCEDURE — 80053 COMPREHEN METABOLIC PANEL: CPT

## 2023-09-18 RX ORDER — METHOCARBAMOL 500 MG/1
500 TABLET, FILM COATED ORAL 2 TIMES DAILY PRN
Qty: 60 TABLET | Refills: 0 | Status: SHIPPED | OUTPATIENT
Start: 2023-09-18

## 2023-09-18 RX ORDER — MELOXICAM 15 MG/1
15 TABLET ORAL DAILY
Qty: 30 TABLET | Refills: 0 | Status: SHIPPED | OUTPATIENT
Start: 2023-09-18

## 2023-09-18 NOTE — ASSESSMENT & PLAN NOTE
He was given prescriptions for meloxicam and Robaxin. Discussed with possible side effects. I am going to check x-ray of his cervical spine. Discussed about stretching exercises. If not improving call or come back.

## 2023-09-18 NOTE — ASSESSMENT & PLAN NOTE
Not well controlled. It was discussed about low-fat diet and regular exercise. We will continue to monitor fasting lipid profile.

## 2023-09-18 NOTE — PROGRESS NOTES
Name: Baudilio Bunn      : 1960      MRN: [id number]  Encounter Provider: Asuncion Crowley MD  Encounter Date: 2023   Encounter department: [facility]     1. Neck pain  Assessment & Plan:  He was given prescriptions for meloxicam and Robaxin. Discussed with possible side effects. I am going to check x-ray of his cervical spine. Discussed about stretching exercises. If not improving call or come back. Orders:  -     methocarbamol (ROBAXIN) 500 mg tablet; Take 1 tablet (500 mg total) by mouth 2 (two) times a day as needed for muscle spasms  -     meloxicam (Mobic) 15 mg tablet; Take 1 tablet (15 mg total) by mouth daily  -     XR spine cervical complete 4 or 5 vw non injury; Future; Expected date: 2023    2. Essential hypertension  Assessment & Plan:  Well-controlled on lisinopril 20 mg daily. Continue same. We will continue to monitor. Orders:  -     CBC and differential; Future  -     Comprehensive metabolic panel; Future  -     Lipid Panel with Direct LDL reflex; Future  -     TSH, 3rd generation with Free T4 reflex; Future    3. Other hyperlipidemia  Assessment & Plan:  Not well controlled. It was discussed about low-fat diet and regular exercise. We will continue to monitor fasting lipid profile. Orders:  -     CBC and differential; Future  -     Comprehensive metabolic panel; Future  -     Lipid Panel with Direct LDL reflex; Future  -     TSH, 3rd generation with Free T4 reflex; Future    4. Prostate cancer screening  -     PSA, Total Screen; Future    5. Hyperglycemia  Assessment & Plan: It was discussed about low-carb diet and regular exercise. Continue to monitor fasting glucose and A1c. Subjective     Is here today for follow-up multiple medical problems. He has been taking his medication. Denies any side effect. He complains of pain in the left side of his neck worse with movement.   Denies any recent history of injury or trauma. Denies any radiation of the pain to the upper extremities. Review of Systems   Constitutional: Negative for chills and fever. HENT: Negative for trouble swallowing. Eyes: Negative for visual disturbance. Respiratory: Negative for cough and shortness of breath. Cardiovascular: Negative for chest pain and palpitations. Gastrointestinal: Negative for abdominal pain, blood in stool and vomiting. Endocrine: Negative for cold intolerance and heat intolerance. Genitourinary: Negative for difficulty urinating and dysuria. Musculoskeletal: Positive for neck pain. Negative for gait problem. Skin: Negative for rash. Neurological: Negative for dizziness, syncope and headaches. Hematological: Negative for adenopathy. Psychiatric/Behavioral: Negative for behavioral problems. Past Medical History:   Diagnosis Date   • Abscess, intestine    • Arthritis    • Diverticulitis    • GERD (gastroesophageal reflux disease)    • Hydronephrosis 5/27/2018   • Hypertension    • Wears glasses      Past Surgical History:   Procedure Laterality Date   • ABCESS DRAINAGE     • COLON SURGERY     • COLONOSCOPY N/A 5/5/2016    Procedure: COLONOSCOPY;  Surgeon: Ashley Guerra MD;  Location: [facility]; Service:    • COLONOSCOPY N/A 7/26/2018    Procedure: COLONOSCOPY with bx's;  Surgeon: Ju Gaming MD;  Location: [facility]; Service: Gastroenterology   • ESOPHAGOGASTRODUODENOSCOPY      with biopsy   • FOOT SURGERY Left     x2? fusion? due to arthritis. onset: 0.    • HERNIA REPAIR     • ILEO LOOP DIVERSION N/A 6/1/2018    Procedure: ILEOSTOMY LOOP DIVERTING;  Surgeon: Cr Coe DO;  Location:  MAIN OR;  Service: General   • INSERTION OF FIDUCIAL MARKER (TRANSRECTAL ULTRASOUND GUIDANCE) N/A 4/13/2022    Procedure: TRANSRECTAL US GUIDANCE;  Surgeon: Holger Hutson MD;  Location: [facility];  Service: Urology   • LAPAROTOMY N/A 6/1/2018    Procedure: LAPAROTOMY EXPLORATORY,; Surgeon: Evelia Berrios DO;  Location: [facility];  Service: General   • OR CLOSURE ENTEROSTOMY LG/SMALL INTESTINE N/A 2018    Procedure: CLOSURE LOOP ILEOSTOMY;  Surgeon: Leandro Merritt MD;  Location: [facility];  Service: General   • OR COLECTOMY PARTIAL W/ANASTOMOSIS N/A 2018    Procedure: RESECTION COLON SIGMOID;  Surgeon: Evelia Berrios DO;  Location: [facility];  Service: General   • OR CYSTO BLADDER W/URETERAL CATHETERIZATION Left 2018    Procedure: CYSTOSCOPY RETROGRADE PYELOGRAM WITH INSERTION STENT URETERAL;  Surgeon: Cele Ramirez MD;  [facility];  Service: Urology   • OR CYSTOURETHROSCOPY N/A 2022    Procedure: Tanner Ellis;  Surgeon: Isabela Young MD;  Location: [facility];  Service: Urology   • OR LAP RPR HRNA XCPT INCAL/INGUN NCRC8/STRANGULATED N/A 2019    Procedure: REPAIR HERNIA INCISIONAL LAPAROSCOPIC;  Surgeon: Leandro Merritt MD;  [facility];  Service: General   • OR LAPAROSCOPY SURG RPR INITIAL INGUINAL HERNIA Right 2019    Procedure: REPAIR HERNIA Gwynda Whitney;  Surgeon: Leandro Merritt MD;  [facility];  Service: General     Family History   Problem Relation Age of Onset   • Other Mother         head tumor   • Glaucoma Father    • Diabetes Father         possible diabetes   • Stroke Family      Social History     Socioeconomic History   • Marital status: /Civil Union     Spouse name: None   • Number of children: None   • Years of education: None   • Highest education level: None   Occupational History   • None   Tobacco Use   • Smoking status: Former     Types: Cigarettes     Quit date:      Years since quittin.7   • Smokeless tobacco: Never   Vaping Use   • Vaping Use: Never used   Substance and Sexual Activity   • Alcohol use: Not Currently     Comment: daily until - quit 2022   • Drug use: No   • Sexual activity: None   Other Topics Concern   • None   Social History Narrative   • None     Social Determinants of Health     Financial Resource Strain: Not on file   Food Insecurity: Not on file   Transportation Needs: Not on file   Physical Activity: Not on file   Stress: Not on file   Social Connections: Not on file   Intimate Partner Violence: Not on file   Housing Stability: Not on file     Current Outpatient Medications on File Prior to Visit   Medication Sig   • acetaminophen (TYLENOL) 500 mg tablet Take 1,000 mg by mouth every 6 (six) hours as needed for mild pain   • DULoxetine (CYMBALTA) 30 mg delayed release capsule Take 1 capsule (30 mg total) by mouth daily   • gabapentin (NEURONTIN) 300 mg capsule Take 1 capsule (300 mg total) by mouth 3 (three) times a day   • Ibuprofen (ADVIL PO) Take by mouth as needed     • lisinopril (ZESTRIL) 20 mg tablet take 1 tablet by mouth once daily   • meloxicam (Mobic) 15 mg tablet Take 1 tablet (15 mg total) by mouth daily   • tadalafil (CIALIS) 20 MG tablet Take 1 tablet (20 mg total) by mouth daily as needed for erectile dysfunction   • tamsulosin (FLOMAX) 0.4 mg Take 1 capsule (0.4 mg total) by mouth daily with dinner   • meloxicam (Mobic) 15 mg tablet Take 1 tablet (15 mg total) by mouth daily (Patient not taking: Reported on 3/27/2023)   • phenazopyridine (PYRIDIUM) 200 mg tablet Take 1 tablet (200 mg total) by mouth 3 (three) times a day as needed for bladder spasms (Patient not taking: Reported on 5/25/2022)   • sildenafil (VIAGRA) 100 mg tablet Take 1 tablet (100 mg total) by mouth daily as needed for erectile dysfunction (Patient not taking: Reported on 3/27/2023)   • [DISCONTINUED] meloxicam (Mobic) 15 mg tablet Take 1 tablet (15 mg total) by mouth daily (Patient not taking: Reported on 3/27/2023)   • [DISCONTINUED] methocarbamol (ROBAXIN) 500 mg tablet Take 1 tablet (500 mg total) by mouth daily at bedtime as needed for muscle spasms (Patient not taking: Reported on 5/25/2022)     Allergies   Allergen Reactions   • Penicillins Itching and Rash Immunization History   Administered Date(s) Administered   • COVID-19 MODERNA VACC 0.5 ML IM 04/19/2021, 05/18/2021   • Tdap 01/27/2012       Objective     /70 (BP Location: Left arm, Patient Position: Sitting, Cuff Size: Large)   Pulse 70   Temp (!) 97.4 °F (36.3 °C) (Tympanic)   Resp 16   Ht 5' 8" (1.727 m)   Wt 76.5 kg (168 lb 9.6 oz)   SpO2 98%   BMI 25.64 kg/m²     Physical Exam  Vitals and nursing note reviewed. Constitutional:       Appearance: He is well-developed. HENT:      Head: Normocephalic and atraumatic. Eyes:      Pupils: Pupils are equal, round, and reactive to light. Cardiovascular:      Rate and Rhythm: Normal rate and regular rhythm. Heart sounds: Normal heart sounds. Pulmonary:      Effort: Pulmonary effort is normal.      Breath sounds: Normal breath sounds. Abdominal:      General: Bowel sounds are normal.      Palpations: Abdomen is soft. Musculoskeletal:         General: Tenderness (to palpation on lateral aspect of left neck) present. Cervical back: Tenderness present. Lymphadenopathy:      Cervical: No cervical adenopathy. Skin:     General: Skin is warm. Findings: No rash. Neurological:      Mental Status: He is alert and oriented to person, place, and time. Cranial Nerves: No cranial nerve deficit.        Erasmo Dang MD

## 2023-09-18 NOTE — ASSESSMENT & PLAN NOTE
It was discussed about low-carb diet and regular exercise. Continue to monitor fasting glucose and A1c.

## 2023-09-21 ENCOUNTER — OFFICE VISIT (OUTPATIENT)
Dept: DENTISTRY | Facility: CLINIC | Age: 63
End: 2023-09-21

## 2023-09-21 VITALS — HEART RATE: 56 BPM | DIASTOLIC BLOOD PRESSURE: 77 MMHG | SYSTOLIC BLOOD PRESSURE: 129 MMHG

## 2023-09-21 DIAGNOSIS — K02.9 DENTAL CARIES: Primary | ICD-10-CM

## 2023-09-21 PROCEDURE — D0220 INTRAORAL - PERIAPICAL FIRST RADIOGRAPHIC IMAGE: HCPCS

## 2023-09-21 PROCEDURE — D0270 BITEWING - SINGLE RADIOGRAPHIC IMAGE: HCPCS

## 2023-09-21 PROCEDURE — D7140 EXTRACTION, ERUPTED TOOTH OR EXPOSED ROOT (ELEVATION AND/OR FORCEPS REMOVAL): HCPCS

## 2023-09-21 NOTE — DENTAL PROCEDURE DETAILS
Pt presents for pain and chief complaint of "my tooth broke". Verbal consent for treatment given in addition to the forms. Medical History: non-contributory, ASA II     Chief concern: pain  Upper left   HPI: Pain began a week ago and a couple days ago the tooth broke. Pt went to his doctor because the pain was on the side on his neck/ear and thought it was muscle pain. Doctor informed him to see a dentist for referred pain since teeth share common nerves in that area. Constant pain, sharp, 9/10. 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 Pt became upset and asked if we could save it. Informed pt that it may be possible to save with a RCT, post and core and crown but that would necessitate a referral as the tooth is too difficult for us to treat with an RCT here. Pt became upset, saying we have no yung in ourselves and should challenge difficult cases. Informed pt that we are trying to do no harm and look for his best interests and that we recommend extracting the tooth due to its poor prognosis of restorability and no opposing tooth, making it non-functional. Dr Xavier Olvera came over and re-iterated our recommendation to extract #15. Pt understood and agreed to have #15 extracted. Time out to indicate correct tooth planned for extraction.       Universal Protocol  Other Assisting Provider: Yes, Lily Maria (Assistant)  Verbal consent obtained? YES  Written consent obtained? YES  Risks, benefits and alternatives discussed?: YES  Consent given by: Pt Jesús Bunn     Time Out  Immediately prior to the procedure a time out was called: YES  Time Out: 2:15 pm  A time out verifies correct patient, procedure, equipment, support staff and site/side marked as required. Parent states understanding of procedure being performed: YES  Parent's understanding of procedure matches consent: YES  Procedure consent matches procedure scheduled: YES  Test results available and properly labeled: N/A  Site verified with patient: YES  Radiology Images displayed and confirmed. If images not available, report reviewed:  YES   Required items - Required blood products, implants, devices and special equipment available: YES  Patient identity confirmed:  YES     Tooth #15 Diagnosis: symptomatic irreversible pulpitis     Applied topical benzocaine, administered 1 carps of 2% Lidocaine w/ 1:100,000 via infiltrations,.  tooth #15 luxated with straight elevators and removed with forceps. Curettage socket, saline rinse, and manual alveolar compression. Placed gauze soaked in NS and provided verbal and written POI. Pt tolerated procedure well, left satisfied and ambulatory. Advised watch for lip/cheek biting due to local anesthesia, avoiding eating until dissipation of local anesthesia, and alternating  Tylenol and Motrin q4-6h prn discomfort/pain.      NV: evaluate #29 (previously endodontically treated)

## 2023-11-03 ENCOUNTER — OFFICE VISIT (OUTPATIENT)
Dept: DENTISTRY | Facility: CLINIC | Age: 63
End: 2023-11-03

## 2023-11-03 VITALS — SYSTOLIC BLOOD PRESSURE: 170 MMHG | HEART RATE: 67 BPM | DIASTOLIC BLOOD PRESSURE: 98 MMHG

## 2023-11-03 DIAGNOSIS — N40.1 BENIGN LOCALIZED PROSTATIC HYPERPLASIA WITH LOWER URINARY TRACT SYMPTOMS (LUTS): ICD-10-CM

## 2023-11-03 DIAGNOSIS — K04.7 DENTAL ABSCESS: Primary | ICD-10-CM

## 2023-11-03 PROCEDURE — D1110 PROPHYLAXIS - ADULT: HCPCS

## 2023-11-03 PROCEDURE — D0120 PERIODIC ORAL EVALUATION - ESTABLISHED PATIENT: HCPCS

## 2023-11-03 RX ORDER — CLINDAMYCIN HYDROCHLORIDE 300 MG/1
300 CAPSULE ORAL 4 TIMES DAILY
Qty: 28 CAPSULE | Refills: 0 | Status: SHIPPED | OUTPATIENT
Start: 2023-11-03 | End: 2023-11-10

## 2023-11-03 RX ORDER — TAMSULOSIN HYDROCHLORIDE 0.4 MG/1
0.4 CAPSULE ORAL
Qty: 90 CAPSULE | Refills: 1 | Status: SHIPPED | OUTPATIENT
Start: 2023-11-03

## 2023-11-03 NOTE — DENTAL PROCEDURE DETAILS
Prophylaxis completed with ultrasonic  and hand instrumentation. Soft plaque removed and supragingival calculus removed from all quads. Polished with prophy cup and paste. Flossed and provided Oral Health Instructions. Demonstrated proper brushing and flossing technique. Patient left satisfied and ambulatory. PERIODIC EXAM, ADULT PROPHY    REVIEWED MED HX: meds, allergies, health changes reviewed in [facility]. All consents signed. CHIEF CONCERN: " I am having pain with the the tooth here."  Previously root canal treated tooth #29 is causing discomfort. The root canal was completed on April 2022 and patient is returning to have the tooth evaluated on 11/21/2023. PAIN SCALE:  0  ASA CLASS:  II  PLAQUE:   moderate  CALCULUS: moderate  -  heavy   BLEEDING: moderate  STAIN :  moderate  ORAL HYGIENE:  fair   PERIO: Needs to be completed at [facility]    Hand scaled, polished and flossed. Used Cavitron. Oral Hygiene Instruction:  recommended brushing 2 x daily for 2 minutes MIN, recommended flossing daily, reviewed dietary precautions. Dispensed: toothbrush, toothpaste and floss    Visual and Tactile Intraoral/ Extraoral evaluation: Oral and Oropharyngeal cancer evaluation. No findings     Dr. Gomes Erps  exam=   Reviewed with patient clinical and radiographic findings and patient verbalized understanding. All questions and concerns addressed.  provided antibiotics for #29.     REFERRALS: no referrals needed    CARIES FINDINGS:   5-post and core followed by a crown  8-MOL  29-post and core followed by a crown  30-crown       TREATMENT  PLAN :   1) #29 will be re evaluated on 11/21/2023    Next Recall: 6 month recall with periodic exam and 4BWX

## 2023-11-12 NOTE — ASSESSMENT & PLAN NOTE
Prescription given for 5 day course of prednisone and a muscle relaxer for symptoms relief      If symptoms persist despite pharmacologic therapy, will consider referral to orthopedics
Well controlled  Continue same  Will continue to monitor  It was discussed with patient that the prednisone can affect his blood pressure 
Admission

## 2023-11-17 PROBLEM — Z12.5 PROSTATE CANCER SCREENING: Status: RESOLVED | Noted: 2023-09-18 | Resolved: 2023-11-17

## 2023-11-21 ENCOUNTER — OFFICE VISIT (OUTPATIENT)
Dept: DENTISTRY | Facility: CLINIC | Age: 63
End: 2023-11-21

## 2023-11-21 VITALS — DIASTOLIC BLOOD PRESSURE: 88 MMHG | SYSTOLIC BLOOD PRESSURE: 131 MMHG | HEART RATE: 65 BPM

## 2023-11-21 DIAGNOSIS — Z01.20 ENCOUNTER FOR DENTAL EXAMINATION: Primary | ICD-10-CM

## 2023-11-21 PROCEDURE — D0220 INTRAORAL - PERIAPICAL FIRST RADIOGRAPHIC IMAGE: HCPCS

## 2023-11-21 PROCEDURE — D0140 LIMITED ORAL EVALUATION - PROBLEM FOCUSED: HCPCS

## 2023-11-21 PROCEDURE — D0270 BITEWING - SINGLE RADIOGRAPHIC IMAGE: HCPCS

## 2023-11-21 NOTE — DENTAL PROCEDURE DETAILS
Jesús Bunn presents for a Limited exam. Verbal consent for treatment given in addition to the forms. CC- "I still have pain where the root canal was"   Reviewed health history - Patient is ASA 3  Consents signed: Yes  Perio: gingivitis  Pain Scale: 10  Caries Assessment: medium  PA and BW #29    Pt states that the root canal has bothered him since it was completed over a year ago. Pain to chewing and is constant pain that keeps him up at night. Pointed to tooth #29. Tooth #29 excess sealer present past the apex with an accessory canal filled. Tooth #29 . Palpation (++),  percussion (++), cold test (++) perio WNL mobility 0. Diagnosis of previously endodontic treated. Tooth is restorable. Pt would like to save the tooth. No caries or periodontal disease noted. CBCT reviewed from 6/21/22. No additional canals or fractures noted. Informed pt that we cannot locate the source of the pain but his symptoms most likely resemble pulpitis. Pt told that there is excess sealer that has not resorbed beyond the apex but his symptoms do not correlate with that paraesthesia. Informed pt that we can try to ReTx endo #29 to see if symptoms resolve. If not, referral to endo would be necessary or extraction of tooth. Pt understood. Treatment Plan:  Infection control: Retx of #29  Re-evaluate symptoms. Prognosis is Very guarded. Referrals needed: No  Next visit: Retx endo #29 .

## 2023-11-22 RX ORDER — METHYLPREDNISOLONE 4 MG/1
4 TABLET ORAL SEE ADMIN INSTRUCTIONS
Qty: 21 TABLET | Refills: 0 | Status: SHIPPED | OUTPATIENT
Start: 2023-11-22

## 2023-12-06 ENCOUNTER — OFFICE VISIT (OUTPATIENT)
Dept: DENTISTRY | Facility: CLINIC | Age: 63
End: 2023-12-06

## 2023-12-06 VITALS — HEART RATE: 55 BPM | DIASTOLIC BLOOD PRESSURE: 79 MMHG | SYSTOLIC BLOOD PRESSURE: 145 MMHG

## 2023-12-06 DIAGNOSIS — G89.29 CHRONIC DENTAL PAIN: Primary | ICD-10-CM

## 2023-12-06 DIAGNOSIS — K08.9 CHRONIC DENTAL PAIN: Primary | ICD-10-CM

## 2023-12-06 PROCEDURE — WIS3001 RC INSTRUMENTATION

## 2023-12-06 PROCEDURE — WIS1000 RESIDENT TEACHING CASE

## 2023-12-06 RX ORDER — IBUPROFEN, ACETAMINOPHEN 125; 250 MG/1; MG/1
1 TABLET, FILM COATED ORAL EVERY 8 HOURS PRN
Qty: 30 TABLET | Refills: 0 | Status: SHIPPED | OUTPATIENT
Start: 2023-12-06

## 2023-12-06 NOTE — DENTAL PROCEDURE DETAILS
Re-treatment of RCT #29    Yolis Bunn presents for RCT ReTx. Explained risks, benefits, alteratives and pt provided verbal and written consent. Reviewed health history, no changes pt is ASA 2. Applied topical benzocaine, administered 3 carps 2% lido 1:100k epi via ROBB and infiltration. Clamp and rubber dam placed. Removed temporary filling and excess maxi percha in chamber. Protaper GP removing bur D2 used to remove GP in B canal. S1, S2, F1, F2, and F3 rotary files taken to length (21 mm) with NaOCL irrigation and verified clean shaped canals with radiograph. Dried canal with paper points and noticed heme on paper point. Irrigated with EDTA and Chlorhexidine solution and re dried with paper points. Master cone radiograph with F3 single cone captured and confirmed instrumentation to length and full seating of master cone. Gently applied CaOH with paper point to length and over orifice with needle tip. Sterile cotton pellet applied and restored with cavit and occlusion verified (not in any occlusion). POI given. Prescribed Advil Dual action to pt's pharmacy. Pt informed that due to bleeding in canal we will not obturate tooth and instead medicate and wait for symptoms to resolve. Pt left satisfied and ambulatory. NV: 3-4 week follow up with Dr Tamiko Callahan to see if symptoms resolve.

## 2023-12-11 ENCOUNTER — TELEPHONE (OUTPATIENT)
Dept: DENTISTRY | Facility: CLINIC | Age: 63
End: 2023-12-11

## 2023-12-11 NOTE — TELEPHONE ENCOUNTER
Patient was called and asked if he is still experiencing pain and pt states he is and the pain has not changed at all after root canal retreatment. Informed patient that he may need referral to endodontist if he wishes to save tooth and we will re assess at next visit.  Pt understood

## 2023-12-22 ENCOUNTER — OFFICE VISIT (OUTPATIENT)
Dept: FAMILY MEDICINE CLINIC | Facility: CLINIC | Age: 63
End: 2023-12-22
Payer: MEDICARE

## 2023-12-22 VITALS
RESPIRATION RATE: 16 BRPM | OXYGEN SATURATION: 97 % | BODY MASS INDEX: 25.43 KG/M2 | DIASTOLIC BLOOD PRESSURE: 80 MMHG | SYSTOLIC BLOOD PRESSURE: 124 MMHG | TEMPERATURE: 97.6 F | HEART RATE: 64 BPM | HEIGHT: 68 IN | WEIGHT: 167.8 LBS

## 2023-12-22 DIAGNOSIS — J34.89 SINUS PRESSURE: Primary | ICD-10-CM

## 2023-12-22 DIAGNOSIS — I10 ESSENTIAL HYPERTENSION: ICD-10-CM

## 2023-12-22 DIAGNOSIS — R29.818 SUSPECTED SLEEP APNEA: ICD-10-CM

## 2023-12-22 DIAGNOSIS — E78.49 OTHER HYPERLIPIDEMIA: ICD-10-CM

## 2023-12-22 PROCEDURE — 99214 OFFICE O/P EST MOD 30 MIN: CPT | Performed by: FAMILY MEDICINE

## 2023-12-22 RX ORDER — PREDNISONE 50 MG/1
50 TABLET ORAL DAILY
Qty: 5 TABLET | Refills: 0 | Status: SHIPPED | OUTPATIENT
Start: 2023-12-22 | End: 2023-12-27

## 2023-12-22 RX ORDER — FLUTICASONE PROPIONATE 50 MCG
2 SPRAY, SUSPENSION (ML) NASAL DAILY
Qty: 16 G | Refills: 0 | Status: SHIPPED | OUTPATIENT
Start: 2023-12-22

## 2023-12-22 NOTE — PROGRESS NOTES
Name: Jesús Bunn      : 1960      MRN: [id number]  Encounter Provider: Hussein Bhandari MD  Encounter Date: 2023   Encounter department:  [facility]    Assessment & Plan     1. Sinus pressure  Assessment & Plan:  He was given prescriptions for prednisone and Flonase nasal spray.  Increase oral hydration and use a humidifier at home.  I will consider referral to see ENT if not improving.    Orders:  -     predniSONE 50 mg tablet; Take 1 tablet (50 mg total) by mouth daily for 5 days  -     fluticasone (FLONASE) 50 mcg/act nasal spray; 2 sprays into each nostril daily    2. Suspected sleep apnea  Assessment & Plan:  I am going to refer him to sleep medicine.    Orders:  -     Ambulatory Referral to Sleep Medicine; Future    3. Other hyperlipidemia  Assessment & Plan:  Not well-controlled.  Discussed about low-fat diet and regular exercise.  Continue to monitor fasting lipid profile.    Orders:  -     CBC and differential; Future  -     Comprehensive metabolic panel; Future  -     Lipid Panel with Direct LDL reflex; Future  -     TSH, 3rd generation with Free T4 reflex; Future    4. Essential hypertension  Assessment & Plan:  Well-controlled.  Continue on lisinopril.  Will continue to monitor.    Orders:  -     CBC and differential; Future  -     Comprehensive metabolic panel; Future  -     Lipid Panel with Direct LDL reflex; Future  -     TSH, 3rd generation with Free T4 reflex; Future    5. BMI 25.0-25.9,adult      Depression Screening and Follow-up Plan: Patient was screened for depression during today's encounter. They screened negative with a PHQ-2 score of 0.        Subjective     Is here today with complaint of having problems with his snoring and feeling fatigue and tired during the day.  He stated she feels he is always congested and not affecting his snoring.  He has history of hyperlipidemia and hypertension.  He is due for blood work.      Review of Systems    Constitutional:  Positive for fatigue. Negative for chills and fever.   HENT:  Positive for congestion. Negative for trouble swallowing.    Eyes:  Negative for visual disturbance.   Respiratory:  Negative for cough and shortness of breath.    Cardiovascular:  Negative for chest pain and palpitations.   Gastrointestinal:  Negative for abdominal pain, blood in stool and vomiting.   Endocrine: Negative for cold intolerance and heat intolerance.   Genitourinary:  Negative for difficulty urinating and dysuria.   Musculoskeletal:  Negative for gait problem.   Skin:  Negative for rash.   Neurological:  Negative for dizziness, syncope and headaches.   Hematological:  Negative for adenopathy.   Psychiatric/Behavioral:  Negative for behavioral problems.        Past Medical History:   Diagnosis Date   • Abscess, intestine    • Arthritis    • Diverticulitis    • GERD (gastroesophageal reflux disease)    • Hydronephrosis 5/27/2018   • Hypertension    • Wears glasses      Past Surgical History:   Procedure Laterality Date   • ABCESS DRAINAGE     • COLON SURGERY     • COLONOSCOPY N/A 5/5/2016    Procedure: COLONOSCOPY;  Surgeon: Renato Barcenas MD;  Location: [facility];  Service:    • COLONOSCOPY N/A 7/26/2018    Procedure: COLONOSCOPY with bx's;  Surgeon: Nathaniel Cha MD;  [facility]: [facility];  Service: Gastroenterology   • ESOPHAGOGASTRODUODENOSCOPY      with biopsy   • FOOT SURGERY Left     x2? fusion? due to arthritis. onset: 1997.   • HERNIA REPAIR     • ILEO LOOP DIVERSION N/A 6/1/2018    Procedure: ILEOSTOMY LOOP DIVERTING;  Surgeon: Irineo Zhao DO;  [facility];  Service: General   • INSERTION OF FIDUCIAL MARKER (TRANSRECTAL ULTRASOUND GUIDANCE) N/A 4/13/2022    Procedure: TRANSRECTAL US GUIDANCE;  Surgeon: Gerardo Xiao MD;  [facility];  Service: Urology   • LAPAROTOMY N/A 6/1/2018    Procedure: LAPAROTOMY EXPLORATORY,;  Surgeon: Irineo Zhao DO;  [facility];  Service: General   • IA  CLOSURE ENTEROSTOMY LG/SMALL INTESTINE N/A 2018    Procedure: CLOSURE LOOP ILEOSTOMY;  Surgeon: Yan Ruelas MD;  [facility];  Service: General   • AR COLECTOMY PARTIAL W/ANASTOMOSIS N/A 2018    Procedure: RESECTION COLON SIGMOID;  Surgeon: Irineo Zhao DO;  [facility];  Service: General   • AR CYSTO BLADDER W/URETERAL CATHETERIZATION Left 2018    Procedure: CYSTOSCOPY RETROGRADE PYELOGRAM WITH INSERTION STENT URETERAL;  Surgeon: Ted Fry MD;  [facility];  Service: Urology   • AR CYSTOURETHROSCOPY N/A 2022    Procedure: CYSTO;  Surgeon: Gerardo Xiao MD;  [facility];  Service: Urology   • AR LAP RPR HRNA XCPT INCAL/INGUN NCRC8/STRANGULATED N/A 2019    Procedure: REPAIR HERNIA INCISIONAL LAPAROSCOPIC;  Surgeon: Yan Ruelas MD;  [facility];  Service: General   • AR LAPAROSCOPY SURG RPR INITIAL INGUINAL HERNIA Right 2019    Procedure: REPAIR HERNIA INGUINAL, LAPAROSCOPIC;  Surgeon: Yan Ruelas MD;  [facility];  Service: General     Family History   Problem Relation Age of Onset   • Other Mother         head tumor   • Glaucoma Father    • Diabetes Father         possible diabetes   • Stroke Family      Social History     Socioeconomic History   • Marital status: /Civil Union     Spouse name: None   • Number of children: None   • Years of education: None   • Highest education level: None   Occupational History   • None   Tobacco Use   • Smoking status: Former     Current packs/day: 0.00     Types: Cigarettes     Quit date:      Years since quittin.0   • Smokeless tobacco: Never   Vaping Use   • Vaping status: Never Used   Substance and Sexual Activity   • Alcohol use: Not Currently     Comment: daily until - quit 2022   • Drug use: No   • Sexual activity: None   Other Topics Concern   • None   Social History Narrative   • None     Social Determinants of Health     Financial Resource Strain:  Not on file   Food Insecurity: Not on file   Transportation Needs: Not on file   Physical Activity: Not on file   Stress: Not on file   Social Connections: Not on file   Intimate Partner Violence: Not on file   Housing Stability: Not on file     Current Outpatient Medications on File Prior to Visit   Medication Sig   • acetaminophen (TYLENOL) 500 mg tablet Take 1,000 mg by mouth every 6 (six) hours as needed for mild pain   • DULoxetine (CYMBALTA) 30 mg delayed release capsule Take 1 capsule (30 mg total) by mouth daily   • Ibuprofen (ADVIL PO) Take by mouth as needed     • Ibuprofen-Acetaminophen (Advil Dual Action) 125-250 MG TABS Take 1 tablet by mouth every 8 (eight) hours as needed (for pain)   • tadalafil (CIALIS) 20 MG tablet Take 1 tablet (20 mg total) by mouth daily as needed for erectile dysfunction   • tamsulosin (FLOMAX) 0.4 mg Take 1 capsule (0.4 mg total) by mouth daily with dinner   • gabapentin (NEURONTIN) 300 mg capsule Take 1 capsule (300 mg total) by mouth 3 (three) times a day (Patient not taking: Reported on 11/3/2023)   • lisinopril (ZESTRIL) 20 mg tablet take 1 tablet by mouth once daily (Patient not taking: Reported on 11/3/2023)   • meloxicam (Mobic) 15 mg tablet Take 1 tablet (15 mg total) by mouth daily (Patient not taking: Reported on 12/22/2023)   • meloxicam (Mobic) 15 mg tablet Take 1 tablet (15 mg total) by mouth daily   • meloxicam (Mobic) 15 mg tablet Take 1 tablet (15 mg total) by mouth daily (Patient not taking: Reported on 12/22/2023)   • methocarbamol (ROBAXIN) 500 mg tablet Take 1 tablet (500 mg total) by mouth 2 (two) times a day as needed for muscle spasms (Patient not taking: Reported on 12/22/2023)   • methylprednisolone (MEDROL) 4 mg tablet Take 1 tablet (4 mg total) by mouth see administration instructions (Patient not taking: Reported on 12/22/2023)   • phenazopyridine (PYRIDIUM) 200 mg tablet Take 1 tablet (200 mg total) by mouth 3 (three) times a day as needed for  "bladder spasms (Patient not taking: Reported on 12/22/2023)   • sildenafil (VIAGRA) 100 mg tablet Take 1 tablet (100 mg total) by mouth daily as needed for erectile dysfunction (Patient not taking: Reported on 3/27/2023)     Allergies   Allergen Reactions   • Penicillins Itching and Rash     Immunization History   Administered Date(s) Administered   • COVID-19 MODERNA VACC 0.5 ML IM 04/19/2021, 05/18/2021   • Tdap 01/27/2012       Objective     /80 (BP Location: Left arm, Patient Position: Sitting, Cuff Size: Standard)   Pulse 64   Temp 97.6 °F (36.4 °C) (Tympanic)   Resp 16   Ht 5' 8\" (1.727 m)   Wt 76.1 kg (167 lb 12.8 oz)   SpO2 97%   BMI 25.51 kg/m²     Physical Exam  Vitals and nursing note reviewed.   Constitutional:       Appearance: He is well-developed.   HENT:      Head: Normocephalic and atraumatic.   Eyes:      Pupils: Pupils are equal, round, and reactive to light.   Cardiovascular:      Rate and Rhythm: Normal rate and regular rhythm.      Heart sounds: Normal heart sounds.   Pulmonary:      Effort: Pulmonary effort is normal.      Breath sounds: Normal breath sounds.   Abdominal:      General: Bowel sounds are normal.      Palpations: Abdomen is soft.   Musculoskeletal:      Cervical back: Normal range of motion and neck supple.   Lymphadenopathy:      Cervical: No cervical adenopathy.   Skin:     General: Skin is warm.      Findings: No rash.   Neurological:      Mental Status: He is alert and oriented to person, place, and time.       Hussein Bhandari MD  BMI Counseling: Body mass index is 25.51 kg/m². The BMI is above normal. Nutrition recommendations include reducing portion sizes, decreasing overall calorie intake, and 3-5 servings of fruits/vegetables daily. Exercise recommendations include moderate aerobic physical activity for 150 minutes/week.  "

## 2023-12-24 PROBLEM — J34.89 SINUS PRESSURE: Status: ACTIVE | Noted: 2023-12-24

## 2023-12-24 NOTE — ASSESSMENT & PLAN NOTE
He was given prescriptions for prednisone and Flonase nasal spray.  Increase oral hydration and use a humidifier at home.  I will consider referral to see ENT if not improving.

## 2023-12-24 NOTE — ASSESSMENT & PLAN NOTE
Not well-controlled.  Discussed about low-fat diet and regular exercise.  Continue to monitor fasting lipid profile.

## 2024-01-05 ENCOUNTER — OFFICE VISIT (OUTPATIENT)
Dept: DENTISTRY | Facility: CLINIC | Age: 64
End: 2024-01-05

## 2024-01-05 DIAGNOSIS — Z01.20 ENCOUNTER FOR DENTAL EXAMINATION: Primary | ICD-10-CM

## 2024-01-05 PROCEDURE — D0191 ASSESSMENT OF A PATIENT: HCPCS

## 2024-01-05 NOTE — DENTAL PROCEDURE DETAILS
Patient presents for examination 4 weeks after having root canal retreatment done on tooth #29.  Patient still experiencing same pain that he has been for almost 2 years since initial root canal was completed.  Informed patient that source of pain is not due to root canal since we retreated tooth and confirmed all of that approach was taken out of canal with radiograph.      Informed patient that pain is most likely due to excess sealer extruded from canal and he would need surgical intervention to possibly remove it.  Informed patient that best prognosis for this is to seek care of specialist for evaluation of tooth #29 as case complexity is out of the scope of our practice.  Patient understood and was given opportunity to ask questions all questions answered.  Referral provided to oral surgeon office for further treatment.

## 2024-01-29 DIAGNOSIS — N40.1 BENIGN LOCALIZED PROSTATIC HYPERPLASIA WITH LOWER URINARY TRACT SYMPTOMS (LUTS): ICD-10-CM

## 2024-01-29 DIAGNOSIS — J34.89 SINUS PRESSURE: ICD-10-CM

## 2024-01-29 RX ORDER — TAMSULOSIN HYDROCHLORIDE 0.4 MG/1
0.4 CAPSULE ORAL
Qty: 90 CAPSULE | Refills: 1 | Status: SHIPPED | OUTPATIENT
Start: 2024-01-29 | End: 2024-01-30 | Stop reason: SDUPTHER

## 2024-01-29 RX ORDER — FLUTICASONE PROPIONATE 50 MCG
2 SPRAY, SUSPENSION (ML) NASAL DAILY
Qty: 16 G | Refills: 0 | Status: SHIPPED | OUTPATIENT
Start: 2024-01-29 | End: 2024-01-30 | Stop reason: SDUPTHER

## 2024-01-30 DIAGNOSIS — J34.89 SINUS PRESSURE: ICD-10-CM

## 2024-01-30 DIAGNOSIS — N40.1 BENIGN LOCALIZED PROSTATIC HYPERPLASIA WITH LOWER URINARY TRACT SYMPTOMS (LUTS): ICD-10-CM

## 2024-01-30 RX ORDER — TAMSULOSIN HYDROCHLORIDE 0.4 MG/1
0.4 CAPSULE ORAL
Qty: 90 CAPSULE | Refills: 1 | Status: SHIPPED | OUTPATIENT
Start: 2024-01-30

## 2024-01-30 RX ORDER — FLUTICASONE PROPIONATE 50 MCG
2 SPRAY, SUSPENSION (ML) NASAL DAILY
Qty: 16 G | Refills: 3 | Status: SHIPPED | OUTPATIENT
Start: 2024-01-30

## 2024-01-30 NOTE — TELEPHONE ENCOUNTER
Jesús called in and stated that he needs refills on Flonase and Flomax. Patient would like them sent to the [id number] - [place], [place] - [address] [[id number]] . Patient has very little left.     Please call jesús at  when the medication is called in.    Thank you.

## 2024-01-30 NOTE — TELEPHONE ENCOUNTER
Patient called in to verify that orders have been sent to [id number], confirmed and provided patient with pharmacy phone number .

## 2024-02-08 ENCOUNTER — OFFICE VISIT (OUTPATIENT)
Dept: FAMILY MEDICINE CLINIC | Facility: CLINIC | Age: 64
End: 2024-02-08
Payer: MEDICARE

## 2024-02-08 ENCOUNTER — OFFICE VISIT (OUTPATIENT)
Dept: OBGYN CLINIC | Facility: CLINIC | Age: 64
End: 2024-02-08
Payer: MEDICARE

## 2024-02-08 VITALS
BODY MASS INDEX: 24.71 KG/M2 | TEMPERATURE: 97.9 F | WEIGHT: 163 LBS | HEIGHT: 68 IN | SYSTOLIC BLOOD PRESSURE: 124 MMHG | DIASTOLIC BLOOD PRESSURE: 80 MMHG | OXYGEN SATURATION: 98 % | HEART RATE: 67 BPM | RESPIRATION RATE: 16 BRPM

## 2024-02-08 VITALS
WEIGHT: 163 LBS | DIASTOLIC BLOOD PRESSURE: 80 MMHG | HEIGHT: 68 IN | SYSTOLIC BLOOD PRESSURE: 124 MMHG | BODY MASS INDEX: 24.71 KG/M2

## 2024-02-08 DIAGNOSIS — E78.49 OTHER HYPERLIPIDEMIA: ICD-10-CM

## 2024-02-08 DIAGNOSIS — B07.0 PLANTAR WART OF RIGHT FOOT: ICD-10-CM

## 2024-02-08 DIAGNOSIS — M25.561 ACUTE PAIN OF RIGHT KNEE: Primary | ICD-10-CM

## 2024-02-08 DIAGNOSIS — G89.29 CHRONIC PAIN OF RIGHT KNEE: ICD-10-CM

## 2024-02-08 DIAGNOSIS — R73.9 HYPERGLYCEMIA: ICD-10-CM

## 2024-02-08 DIAGNOSIS — I10 ESSENTIAL HYPERTENSION: ICD-10-CM

## 2024-02-08 DIAGNOSIS — R29.818 SUSPECTED SLEEP APNEA: ICD-10-CM

## 2024-02-08 DIAGNOSIS — M25.561 CHRONIC PAIN OF RIGHT KNEE: ICD-10-CM

## 2024-02-08 DIAGNOSIS — R06.83 SNORING: Primary | ICD-10-CM

## 2024-02-08 PROCEDURE — 99214 OFFICE O/P EST MOD 30 MIN: CPT | Performed by: PHYSICIAN ASSISTANT

## 2024-02-08 PROCEDURE — 99214 OFFICE O/P EST MOD 30 MIN: CPT | Performed by: FAMILY MEDICINE

## 2024-02-08 PROCEDURE — 20610 DRAIN/INJ JOINT/BURSA W/O US: CPT | Performed by: PHYSICIAN ASSISTANT

## 2024-02-08 RX ORDER — LIDOCAINE HYDROCHLORIDE 10 MG/ML
2 INJECTION, SOLUTION INFILTRATION; PERINEURAL
Status: COMPLETED | OUTPATIENT
Start: 2024-02-08 | End: 2024-02-08

## 2024-02-08 RX ORDER — BUPIVACAINE HYDROCHLORIDE 2.5 MG/ML
2 INJECTION, SOLUTION INFILTRATION; PERINEURAL
Status: COMPLETED | OUTPATIENT
Start: 2024-02-08 | End: 2024-02-08

## 2024-02-08 RX ORDER — METHYLPREDNISOLONE ACETATE 40 MG/ML
1 INJECTION, SUSPENSION INTRA-ARTICULAR; INTRALESIONAL; INTRAMUSCULAR; SOFT TISSUE
Status: COMPLETED | OUTPATIENT
Start: 2024-02-08 | End: 2024-02-08

## 2024-02-08 RX ADMIN — BUPIVACAINE HYDROCHLORIDE 2 ML: 2.5 INJECTION, SOLUTION INFILTRATION; PERINEURAL at 08:30

## 2024-02-08 RX ADMIN — LIDOCAINE HYDROCHLORIDE 2 ML: 10 INJECTION, SOLUTION INFILTRATION; PERINEURAL at 08:30

## 2024-02-08 RX ADMIN — METHYLPREDNISOLONE ACETATE 1 ML: 40 INJECTION, SUSPENSION INTRA-ARTICULAR; INTRALESIONAL; INTRAMUSCULAR; SOFT TISSUE at 08:30

## 2024-02-08 NOTE — PROGRESS NOTES
"Patient Name:  Jesús Bunn  MRN:  [id number]    Assessment & Plan     Right knee pain.  Possible mild DJD versus patellofemoral inflammation.  Patient was offered and accepted a right knee intra-articular corticosteroid injection today.  Referral to physical therapy.  Anti-inflammatories as needed.  Activities as tolerated with modification avoid pain.  Follow-up in 6 weeks.  Discussed obtaining MRI if symptoms persist.    Chief Complaint     Right knee pain    History of the Present Illness     Jesús Bunn is a 63 y.o. male who reports to the office today for evaluation of his right knee.  He notes an onset of pain 1 to 2 months ago.  He denies any injury or trauma.  He notes constant stabbing pain in the knee localized primarily to the anteromedial aspect.  Pain is rated 8-9 out of 10 in intensity.  He does note intermittent swelling.  He denies any significant stiffness weakness or instability.  Pain is worse with increased activity as well as walking up and down steps.  He currently takes nothing for pain.  No numbness or tingling.  No fevers or chills.    Physical Exam     /80   Ht 5' 8\" (1.727 m)   Wt 73.9 kg (163 lb)   BMI 24.78 kg/m²     Right knee: No gross deformity.  Skin intact.  No erythema ecchymosis or swelling.  No effusion.  There is tenderness over the anteromedial joint line.  No lateral joint line tenderness.  Full range of motion with pain and crepitation appreciated.  Stable to varus and valgus rest without pain.  Stable Lachman test.  Negative posterior drawer test.  Negative Ernie's test.  Extensor mechanism is intact.    Eyes: Anicteric sclerae.  ENT: Trachea midline.  Lungs: Normal respiratory effort.  CV: Capillary refill is less than 2 seconds.  Skin: Intact without erythema.  Lymph: No palpable lymphadenopathy.  Neuro: Sensation is grossly intact to light touch.  Psych: Mood and affect are appropriate.    Data Review     I have personally reviewed pertinent " films in PACS, and my interpretation follows:    X-rays right knee 2/8/2024: No acute osseous abnormality.  No fracture or dislocation.  Minimal degenerative changes patellofemoral and medial compartments.    Past Medical History:   Diagnosis Date    Abscess, intestine     Arthritis     Diverticulitis     GERD (gastroesophageal reflux disease)     Hydronephrosis 5/27/2018    Hypertension     Wears glasses        Past Surgical History:   Procedure Laterality Date    ABCESS DRAINAGE      COLON SURGERY      COLONOSCOPY N/A 5/5/2016    Procedure: COLONOSCOPY;  Surgeon: Renato Barcenas MD;  Location: [facility];  Service:     COLONOSCOPY N/A 7/26/2018    Procedure: COLONOSCOPY with bx's;  Surgeon: Nathaniel Cha MD;  [facility];  Service: Gastroenterology    ESOPHAGOGASTRODUODENOSCOPY      with biopsy    FOOT SURGERY Left     x2? fusion? due to arthritis. onset: 1997.    HERNIA REPAIR      ILEO LOOP DIVERSION N/A 6/1/2018    Procedure: ILEOSTOMY LOOP DIVERTING;  Surgeon: Irineo Zhao DO;  [facility];  Service: General    INSERTION OF FIDUCIAL MARKER (TRANSRECTAL ULTRASOUND GUIDANCE) N/A 4/13/2022    Procedure: TRANSRECTAL US GUIDANCE;  Surgeon: Gerardo Xiao MD;  [facility];  Service: Urology    LAPAROTOMY N/A 6/1/2018    Procedure: LAPAROTOMY EXPLORATORY,;  Surgeon: Irineo Zhao DO;  [facility];  Service: General    HI CLOSURE ENTEROSTOMY LG/SMALL INTESTINE N/A 8/16/2018    Procedure: CLOSURE LOOP ILEOSTOMY;  Surgeon: Yan Ruelas MD;  [facility];  Service: General    HI COLECTOMY PARTIAL W/ANASTOMOSIS N/A 6/1/2018    Procedure: RESECTION COLON SIGMOID;  Surgeon: Irineo Zhao DO;  [facility];  Service: General    HI CYSTO BLADDER W/URETERAL CATHETERIZATION Left 6/9/2018    Procedure: CYSTOSCOPY RETROGRADE PYELOGRAM WITH INSERTION STENT URETERAL;  Surgeon: Ted Fry MD;  [facility];  Service: Urology    HI CYSTOURETHROSCOPY N/A  4/13/2022    Procedure: CYSTO;  Surgeon: Gerardo Xiao MD;  [facility];  Service: Urology    GA LAP RPR HRNA XCPT INCAL/INGUN NCRC8/STRANGULATED N/A 1/31/2019    Procedure: REPAIR HERNIA INCISIONAL LAPAROSCOPIC;  Surgeon: Yan Ruelas MD;  [facility];  Service: General    GA LAPAROSCOPY SURG RPR INITIAL INGUINAL HERNIA Right 1/31/2019    Procedure: REPAIR HERNIA INGUINAL, LAPAROSCOPIC;  Surgeon: Yan Ruelas MD;  [facility];  Service: General       Allergies   Allergen Reactions    Penicillins Itching and Rash       Current Outpatient Medications on File Prior to Visit   Medication Sig Dispense Refill    acetaminophen (TYLENOL) 500 mg tablet Take 1,000 mg by mouth every 6 (six) hours as needed for mild pain      DULoxetine (CYMBALTA) 30 mg delayed release capsule Take 1 capsule (30 mg total) by mouth daily 30 capsule 5    fluticasone (FLONASE) 50 mcg/act nasal spray 2 sprays into each nostril daily 16 g 3    Ibuprofen (ADVIL PO) Take by mouth as needed        Ibuprofen-Acetaminophen (Advil Dual Action) 125-250 MG TABS Take 1 tablet by mouth every 8 (eight) hours as needed (for pain) 30 tablet 0    meloxicam (Mobic) 15 mg tablet Take 1 tablet (15 mg total) by mouth daily 30 tablet 0    tadalafil (CIALIS) 20 MG tablet Take 1 tablet (20 mg total) by mouth daily as needed for erectile dysfunction 90 tablet 0    tamsulosin (FLOMAX) 0.4 mg Take 1 capsule (0.4 mg total) by mouth daily with dinner 90 capsule 1    gabapentin (NEURONTIN) 300 mg capsule Take 1 capsule (300 mg total) by mouth 3 (three) times a day (Patient not taking: Reported on 11/3/2023) 90 capsule 1    lisinopril (ZESTRIL) 20 mg tablet take 1 tablet by mouth once daily (Patient not taking: Reported on 11/3/2023) 90 tablet 0    [DISCONTINUED] meloxicam (Mobic) 15 mg tablet Take 1 tablet (15 mg total) by mouth daily (Patient not taking: Reported on 12/22/2023) 30 tablet 0    [DISCONTINUED] meloxicam (Mobic) 15 mg tablet  Take 1 tablet (15 mg total) by mouth daily 30 tablet 0    [DISCONTINUED] methocarbamol (ROBAXIN) 500 mg tablet Take 1 tablet (500 mg total) by mouth 2 (two) times a day as needed for muscle spasms 60 tablet 0    [DISCONTINUED] methylprednisolone (MEDROL) 4 mg tablet Take 1 tablet (4 mg total) by mouth see administration instructions 21 tablet 0    [DISCONTINUED] phenazopyridine (PYRIDIUM) 200 mg tablet Take 1 tablet (200 mg total) by mouth 3 (three) times a day as needed for bladder spasms 10 tablet 0    [DISCONTINUED] sildenafil (VIAGRA) 100 mg tablet Take 1 tablet (100 mg total) by mouth daily as needed for erectile dysfunction 15 tablet 0     No current facility-administered medications on file prior to visit.       Social History     Tobacco Use    Smoking status: Former     Current packs/day: 0.00     Types: Cigarettes     Quit date:      Years since quittin.1    Smokeless tobacco: Never   Vaping Use    Vaping status: Never Used   Substance Use Topics    Alcohol use: Not Currently     Comment: daily until - quit 2022    Drug use: No       Family History   Problem Relation Age of Onset    Other Mother         head tumor    Glaucoma Father     Diabetes Father         possible diabetes    Stroke Family        Review of Systems     As stated in the HPI. All other systems reviewed and are negative.      Large joint arthrocentesis: R knee  Procedure Details  Location: knee - R knee  Needle size: 22 G  Ultrasound guidance: no  Approach: anterolateral  Medications administered: 2 mL bupivacaine 0.25 %; 2 mL lidocaine 1 %; 1 mL methylPREDNISolone acetate 40 mg/mL    Patient tolerance: patient tolerated the procedure well with no immediate complications  Dressing:  Sterile dressing applied

## 2024-02-08 NOTE — PROGRESS NOTES
Name: Jesús Bunn      : 1960      MRN: [id number]  Encounter Provider: Hussein Bhandari MD  Encounter Date: 2024   Encounter department: [facility] [address] [facility]    Assessment & Plan     1. Snoring  Assessment & Plan:  Refer to ENT.    Orders:  -     Ambulatory Referral to Otolaryngology; Future    2. Suspected sleep apnea  Assessment & Plan:  Has an appointment with sleep medicine in May.      3. Chronic pain of right knee  Assessment & Plan:  Referral to see Ortho.    Orders:  -     Ambulatory Referral to Orthopedic Surgery; Future    4. Other hyperlipidemia  Assessment & Plan:  Not well-controlled.  Discussed about low-fat diet and regular exercise. Continue to monitor lipid profile.    Orders:  -     CBC and differential; Future  -     Comprehensive metabolic panel; Future  -     Lipid Panel with Direct LDL reflex; Future  -     TSH, 3rd generation with Free T4 reflex; Future    5. Essential hypertension  Assessment & Plan:  Well-controlled.  We will continue to monitor.    Orders:  -     CBC and differential; Future  -     Comprehensive metabolic panel; Future  -     Lipid Panel with Direct LDL reflex; Future  -     TSH, 3rd generation with Free T4 reflex; Future    6. Hyperglycemia  Assessment & Plan:  It was discussed about low-carb diet.  Continue to monitor fasting glucose and A1c.    Orders:  -     Hemoglobin A1C; Future    7. Plantar wart of right foot           Subjective     Is here today with complaint that he continues to snore and death causing a lot of problems at home.  He has been using nasal spray without benefit.  He also complains of right knee pain denies any recent history of injury.  Complaining of lesion on the right foot compatible with a plantar wart.    Knee Pain       Review of Systems   Constitutional:  Negative for chills and fever.   HENT:  Negative for trouble swallowing.    Eyes:  Negative for visual disturbance.   Respiratory:  Negative for  cough and shortness of breath.    Cardiovascular:  Negative for chest pain and palpitations.   Gastrointestinal:  Negative for abdominal pain, blood in stool and vomiting.   Endocrine: Negative for cold intolerance and heat intolerance.   Genitourinary:  Negative for difficulty urinating and dysuria.   Musculoskeletal:  Positive for arthralgias. Negative for gait problem.        Right knee pain   Skin:  Negative for rash.        Planter wart right foot   Neurological:  Negative for dizziness, syncope and headaches.   Hematological:  Negative for adenopathy.   Psychiatric/Behavioral:  Negative for behavioral problems.        Past Medical History:   Diagnosis Date    Abscess, intestine     Arthritis     Diverticulitis     GERD (gastroesophageal reflux disease)     Hydronephrosis 5/27/2018    Hypertension     Wears glasses      Past Surgical History:   Procedure Laterality Date    ABCESS DRAINAGE      COLON SURGERY      COLONOSCOPY N/A 5/5/2016    Procedure: COLONOSCOPY;  Surgeon: Renato Barcenas MD;  Location: [facility];  Service:     COLONOSCOPY N/A 7/26/2018    Procedure: COLONOSCOPY with bx's;  Surgeon: Nathaniel Cha MD;  Location: [facility];  Service: Gastroenterology    ESOPHAGOGASTRODUODENOSCOPY      with biopsy    FOOT SURGERY Left     x2? fusion? due to arthritis. onset: 1997.    HERNIA REPAIR      ILEO LOOP DIVERSION N/A 6/1/2018    Procedure: ILEOSTOMY LOOP DIVERTING;  Surgeon: Irineo Zhao DO;  [facility];  Service: General    INSERTION OF FIDUCIAL MARKER (TRANSRECTAL ULTRASOUND GUIDANCE) N/A 4/13/2022    Procedure: TRANSRECTAL US GUIDANCE;  Surgeon: Gerardo Xiao MD;  [facility];  Service: Urology    LAPAROTOMY N/A 6/1/2018    Procedure: LAPAROTOMY EXPLORATORY,;  Surgeon: Irineo Zhao DO;  [facility];  Service: General    AL CLOSURE ENTEROSTOMY LG/SMALL INTESTINE N/A 8/16/2018    Procedure: CLOSURE LOOP ILEOSTOMY;  Surgeon: Yan Reulas MD;  [facility];   Service: General    ME COLECTOMY PARTIAL W/ANASTOMOSIS N/A 2018    Procedure: RESECTION COLON SIGMOID;  Surgeon: Irineo Zhao DO;  [facility];  Service: General    ME CYSTO BLADDER W/URETERAL CATHETERIZATION Left 2018    Procedure: CYSTOSCOPY RETROGRADE PYELOGRAM WITH INSERTION STENT URETERAL;  Surgeon: Ted Fry MD;  [facility];  Service: Urology    ME CYSTOURETHROSCOPY N/A 2022    Procedure: CYSTO;  Surgeon: Gerardo Xiao MD;  [facility];  Service: Urology    ME LAP RPR HRNA XCPT INCAL/INGUN NCRC8/STRANGULATED N/A 2019    Procedure: REPAIR HERNIA INCISIONAL LAPAROSCOPIC;  Surgeon: Yan Ruelas MD;  [facility];  Service: General    ME LAPAROSCOPY SURG RPR INITIAL INGUINAL HERNIA Right 2019    Procedure: REPAIR HERNIA INGUINAL, LAPAROSCOPIC;  Surgeon: Yan Ruelas MD;  [facility];  Service: General     Family History   Problem Relation Age of Onset    Other Mother         head tumor    Glaucoma Father     Diabetes Father         possible diabetes    Stroke Family      Social History     Socioeconomic History    Marital status: /Civil Union     Spouse name: None    Number of children: None    Years of education: None    Highest education level: None   Occupational History    None   Tobacco Use    Smoking status: Former     Current packs/day: 0.00     Types: Cigarettes     Quit date:      Years since quittin.1    Smokeless tobacco: Never   Vaping Use    Vaping status: Never Used   Substance and Sexual Activity    Alcohol use: Not Currently     Comment: daily until - quit 2022    Drug use: No    Sexual activity: None   Other Topics Concern    None   Social History Narrative    None     Social Determinants of Health     Financial Resource Strain: Not on file   Food Insecurity: Not on file   Transportation Needs: Not on file   Physical Activity: Not on file   Stress: Not on file   Social Connections: Not on file    Intimate Partner Violence: Not on file   Housing Stability: Not on file     Current Outpatient Medications on File Prior to Visit   Medication Sig    acetaminophen (TYLENOL) 500 mg tablet Take 1,000 mg by mouth every 6 (six) hours as needed for mild pain    DULoxetine (CYMBALTA) 30 mg delayed release capsule Take 1 capsule (30 mg total) by mouth daily    fluticasone (FLONASE) 50 mcg/act nasal spray 2 sprays into each nostril daily    Ibuprofen (ADVIL PO) Take by mouth as needed      Ibuprofen-Acetaminophen (Advil Dual Action) 125-250 MG TABS Take 1 tablet by mouth every 8 (eight) hours as needed (for pain)    meloxicam (Mobic) 15 mg tablet Take 1 tablet (15 mg total) by mouth daily    tadalafil (CIALIS) 20 MG tablet Take 1 tablet (20 mg total) by mouth daily as needed for erectile dysfunction    tamsulosin (FLOMAX) 0.4 mg Take 1 capsule (0.4 mg total) by mouth daily with dinner    gabapentin (NEURONTIN) 300 mg capsule Take 1 capsule (300 mg total) by mouth 3 (three) times a day (Patient not taking: Reported on 11/3/2023)    lisinopril (ZESTRIL) 20 mg tablet take 1 tablet by mouth once daily (Patient not taking: Reported on 11/3/2023)    [DISCONTINUED] meloxicam (Mobic) 15 mg tablet Take 1 tablet (15 mg total) by mouth daily (Patient not taking: Reported on 12/22/2023)    [DISCONTINUED] meloxicam (Mobic) 15 mg tablet Take 1 tablet (15 mg total) by mouth daily    [DISCONTINUED] methocarbamol (ROBAXIN) 500 mg tablet Take 1 tablet (500 mg total) by mouth 2 (two) times a day as needed for muscle spasms    [DISCONTINUED] methylprednisolone (MEDROL) 4 mg tablet Take 1 tablet (4 mg total) by mouth see administration instructions    [DISCONTINUED] phenazopyridine (PYRIDIUM) 200 mg tablet Take 1 tablet (200 mg total) by mouth 3 (three) times a day as needed for bladder spasms    [DISCONTINUED] sildenafil (VIAGRA) 100 mg tablet Take 1 tablet (100 mg total) by mouth daily as needed for erectile dysfunction     Allergies  "  Allergen Reactions    Penicillins Itching and Rash     Immunization History   Administered Date(s) Administered    COVID-19 MODERNA VACC 0.5 ML IM 04/19/2021, 05/18/2021, 11/27/2021    Tdap 01/27/2012       Objective     /80 (BP Location: Left arm, Patient Position: Sitting, Cuff Size: Adult)   Pulse 67   Temp 97.9 °F (36.6 °C) (Tympanic)   Resp 16   Ht 5' 8\" (1.727 m)   Wt 73.9 kg (163 lb)   SpO2 98%   BMI 24.78 kg/m²     Physical Exam  Vitals and nursing note reviewed.   Constitutional:       Appearance: He is well-developed.   HENT:      Head: Normocephalic and atraumatic.   Eyes:      Pupils: Pupils are equal, round, and reactive to light.   Cardiovascular:      Rate and Rhythm: Normal rate and regular rhythm.      Heart sounds: Normal heart sounds.   Pulmonary:      Effort: Pulmonary effort is normal.      Breath sounds: Normal breath sounds.   Abdominal:      General: Bowel sounds are normal.      Palpations: Abdomen is soft.   Musculoskeletal:      Cervical back: Normal range of motion and neck supple.        Feet:    Feet:      Comments: Plantar wart  Lymphadenopathy:      Cervical: No cervical adenopathy.   Skin:     General: Skin is warm.      Findings: Lesion present. No rash.   Neurological:      Mental Status: He is alert and oriented to person, place, and time.     Lesion Destruction    Date/Time: 2/8/2024 7:40 AM    Performed by: Hussein Bhandari MD  Authorized by: Hussein Bhandari MD  Universal Protocol:  Consent: Verbal consent obtained.  Consent given by: patient  Patient understanding: patient states understanding of the procedure being performed  Patient identity confirmed: verbally with patient    Procedure Details - Lesion Destruction:     Number of Lesions:  1  Lesion 1:     Body area:  Lower extremity    Lower extremity location:  R foot    Initial size (mm):  10    Final defect size (mm):  10    Malignancy: benign hyperkeratotic lesion      Destruction method: cryotherapy  "       Hussein Bhandari MD

## 2024-02-08 NOTE — ASSESSMENT & PLAN NOTE
Not well-controlled.  Discussed about low-fat diet and regular exercise. Continue to monitor lipid profile.

## 2024-02-21 DIAGNOSIS — N52.1 ERECTILE DYSFUNCTION DUE TO DISEASES CLASSIFIED ELSEWHERE: ICD-10-CM

## 2024-02-21 PROBLEM — Z11.59 NEED FOR HEPATITIS C SCREENING TEST: Status: RESOLVED | Noted: 2019-06-18 | Resolved: 2024-02-21

## 2024-02-21 PROBLEM — Z00.00 HEALTHCARE MAINTENANCE: Status: RESOLVED | Noted: 2019-09-20 | Resolved: 2024-02-21

## 2024-02-22 RX ORDER — TADALAFIL 20 MG/1
TABLET ORAL
Qty: 90 TABLET | Refills: 1 | Status: SHIPPED | OUTPATIENT
Start: 2024-02-22

## 2024-03-27 ENCOUNTER — RA CDI HCC (OUTPATIENT)
Dept: OTHER | Facility: HOSPITAL | Age: 64
End: 2024-03-27

## 2024-04-08 ENCOUNTER — OFFICE VISIT (OUTPATIENT)
Dept: FAMILY MEDICINE CLINIC | Facility: CLINIC | Age: 64
End: 2024-04-08
Payer: MEDICARE

## 2024-04-08 VITALS
TEMPERATURE: 97.6 F | HEIGHT: 68 IN | RESPIRATION RATE: 16 BRPM | DIASTOLIC BLOOD PRESSURE: 80 MMHG | SYSTOLIC BLOOD PRESSURE: 124 MMHG | WEIGHT: 166 LBS | HEART RATE: 66 BPM | BODY MASS INDEX: 25.16 KG/M2 | OXYGEN SATURATION: 98 %

## 2024-04-08 DIAGNOSIS — N52.1 ERECTILE DYSFUNCTION DUE TO DISEASES CLASSIFIED ELSEWHERE: ICD-10-CM

## 2024-04-08 DIAGNOSIS — Z00.00 MEDICARE ANNUAL WELLNESS VISIT, SUBSEQUENT: ICD-10-CM

## 2024-04-08 DIAGNOSIS — J34.89 SINUS PRESSURE: ICD-10-CM

## 2024-04-08 DIAGNOSIS — K21.9 GASTROESOPHAGEAL REFLUX DISEASE WITHOUT ESOPHAGITIS: ICD-10-CM

## 2024-04-08 DIAGNOSIS — R73.9 HYPERGLYCEMIA: ICD-10-CM

## 2024-04-08 DIAGNOSIS — E78.49 OTHER HYPERLIPIDEMIA: Primary | ICD-10-CM

## 2024-04-08 DIAGNOSIS — I10 ESSENTIAL HYPERTENSION: ICD-10-CM

## 2024-04-08 DIAGNOSIS — G25.81 RESTLESS LEG: ICD-10-CM

## 2024-04-08 PROCEDURE — G0439 PPPS, SUBSEQ VISIT: HCPCS | Performed by: FAMILY MEDICINE

## 2024-04-08 PROCEDURE — G0444 DEPRESSION SCREEN ANNUAL: HCPCS | Performed by: FAMILY MEDICINE

## 2024-04-08 PROCEDURE — 99214 OFFICE O/P EST MOD 30 MIN: CPT | Performed by: FAMILY MEDICINE

## 2024-04-08 RX ORDER — GABAPENTIN 100 MG/1
100 CAPSULE ORAL 3 TIMES DAILY
Qty: 90 CAPSULE | Refills: 5 | Status: SHIPPED | OUTPATIENT
Start: 2024-04-08

## 2024-04-08 NOTE — PROGRESS NOTES
Assessment and Plan:     Problem List Items Addressed This Visit     Gastroesophageal reflux disease     Stable.  Continue same.  Will continue to monitor.         Essential hypertension     Well-controlled.  We will continue to monitor.         Relevant Orders    CBC and differential    Comprehensive metabolic panel    Lipid Panel with Direct LDL reflex    TSH, 3rd generation with Free T4 reflex    Other hyperlipidemia - Primary     Not well-controlled.  Discussed about low-fat diet and regular exercise. Continue to monitor lipid profile.         Relevant Orders    CBC and differential    Comprehensive metabolic panel    Lipid Panel with Direct LDL reflex    TSH, 3rd generation with Free T4 reflex    Erectile dysfunction due to diseases classified elsewhere     Continue on Cialis.         Hyperglycemia     It was discussed about low-carb diet.  Continue to monitor fasting glucose and A1c.         Relevant Orders    Hemoglobin A1C    Medicare annual wellness visit, subsequent     It was discussed about immunizations, diet, exercise and safety measures.         Restless leg     He was given prescription for gabapentin 100 mg and he was told to take 1, 2 or 3 tablets at bedtime and see if that helps.         Relevant Medications    gabapentin (NEURONTIN) 100 mg capsule        Preventive health issues were discussed with patient, and age appropriate screening tests were ordered as noted in patient's After Visit Summary.  Personalized health advice and appropriate referrals for health education or preventive services given if needed, as noted in patient's After Visit Summary.     History of Present Illness:     Patient presents for a Medicare Wellness Visit    Is here today for follow-up multiple medical problems.  He has been taking his medications.  Denies any side effects to his medications.  He has been complaining of having cramps at nighttime both legs.  Stated it wakes him up from sleep with his legs movements.   He denies any chest pain or shortness of breath.    Hypertension  Pertinent negatives include no chest pain, headaches, palpitations or shortness of breath.      Patient Care Team:  Hussein Bhandari MD as PCP - General (Family Medicine)  LINDSAY Avalos MD as Endoscopist     Review of Systems:     Review of Systems   Constitutional:  Negative for chills and fever.   HENT:  Negative for trouble swallowing.    Eyes:  Negative for visual disturbance.   Respiratory:  Negative for cough and shortness of breath.    Cardiovascular:  Negative for chest pain and palpitations.   Gastrointestinal:  Negative for abdominal pain, blood in stool and vomiting.   Endocrine: Negative for cold intolerance and heat intolerance.   Genitourinary:  Negative for difficulty urinating and dysuria.   Musculoskeletal:  Negative for gait problem.   Skin:  Negative for rash.   Neurological:  Negative for dizziness, syncope and headaches.   Hematological:  Negative for adenopathy.   Psychiatric/Behavioral:  Negative for behavioral problems.         Problem List:     Patient Active Problem List   Diagnosis   • Acromioclavicular joint arthritis   • Arthralgia   • Back pain   • Benign prostatic hyperplasia   • Chronic back pain   • Chronic pain of left ankle   • ED (erectile dysfunction) of organic origin   • Gastroesophageal reflux disease   • Headache   • Knee pain   • Shoulder joint pain   • Tinea versicolor   • Status post inguinal hernia repair   • Postoperative pain   • Essential hypertension   • Benign localized prostatic hyperplasia with lower urinary tract symptoms (LUTS)   • Urinary frequency   • Lung nodule   • Tick bite   • Injury of right forearm   • Hand injury, right, initial encounter   • Otitis externa   • Encounter for screening laboratory testing for COVID-19 virus   • Other hyperlipidemia   • Erectile dysfunction due to diseases classified elsewhere   • COVID-19 virus infection   • Plantar wart   • Right  sciatic nerve pain   • BMI 26.0-26.9,adult   • Asymptomatic varicose veins of both lower extremities   • Varicose veins of bilateral lower extremities with other complications   • Biceps tendinitis of right upper extremity   • Hematoma   • Suspected sleep apnea   • Hyperglycemia   • Medicare annual wellness visit, subsequent   • Metatarsal stress fracture of right foot   • Neck pain   • Sinus pressure   • Snoring   • Plantar wart of right foot   • Restless leg      Past Medical and Surgical History:     Past Medical History:   Diagnosis Date   • Abscess, intestine    • Arthritis    • Diverticulitis    • GERD (gastroesophageal reflux disease)    • Hydronephrosis 5/27/2018   • Hypertension    • Wears glasses      Past Surgical History:   Procedure Laterality Date   • ABCESS DRAINAGE     • COLON SURGERY     • COLONOSCOPY N/A 5/5/2016    Procedure: COLONOSCOPY;  Surgeon: Renato Barcenas MD;  Location: [facility];  Service:    • COLONOSCOPY N/A 7/26/2018    Procedure: COLONOSCOPY with bx's;  Surgeon: Nathaniel Cha MD;  [facility];  Service: Gastroenterology   • ESOPHAGOGASTRODUODENOSCOPY      with biopsy   • FOOT SURGERY Left     x2? fusion? due to arthritis. onset: 1997.   • HERNIA REPAIR     • ILEO LOOP DIVERSION N/A 6/1/2018    Procedure: ILEOSTOMY LOOP DIVERTING;  Surgeon: Irineo Zhao DO;  [facility];  Service: General   • INSERTION OF FIDUCIAL MARKER (TRANSRECTAL ULTRASOUND GUIDANCE) N/A 4/13/2022    Procedure: TRANSRECTAL US GUIDANCE;  Surgeon: Gerardo Xiao MD;  [facility];  Service: Urology   • LAPAROTOMY N/A 6/1/2018    Procedure: LAPAROTOMY EXPLORATORY,;  Surgeon: Irineo Zhao DO;  [facility];  Service: General   • IL CLOSURE ENTEROSTOMY LG/SMALL INTESTINE N/A 8/16/2018    Procedure: CLOSURE LOOP ILEOSTOMY;  Surgeon: Yan Ruelas MD;  [facility];  Service: General   • IL COLECTOMY PARTIAL W/ANASTOMOSIS N/A 6/1/2018    Procedure: RESECTION COLON SIGMOID;   Surgeon: Irineo Zhao DO;  [facility];  Service: General   • NE CYSTO BLADDER W/URETERAL CATHETERIZATION Left 2018    Procedure: CYSTOSCOPY RETROGRADE PYELOGRAM WITH INSERTION STENT URETERAL;  Surgeon: Ted Fry MD;  [facility];  Service: Urology   • NE CYSTOURETHROSCOPY N/A 2022    Procedure: CYSTO;  Surgeon: Gerardo Xiao MD;  [facility];  Service: Urology   • NE LAP RPR HRNA XCPT INCAL/INGUN NCRC8/STRANGULATED N/A 2019    Procedure: REPAIR HERNIA INCISIONAL LAPAROSCOPIC;  Surgeon: Yan Ruelas MD;  [facility];  Service: General   • NE LAPAROSCOPY SURG RPR INITIAL INGUINAL HERNIA Right 2019    Procedure: REPAIR HERNIA INGUINAL, LAPAROSCOPIC;  Surgeon: Yan Ruelas MD;  [facility];  Service: General      Family History:     Family History   Problem Relation Age of Onset   • Other Mother         head tumor   • Glaucoma Father    • Diabetes Father         possible diabetes   • Stroke Family       Social History:     Social History     Socioeconomic History   • Marital status: /Civil Union     Spouse name: None   • Number of children: None   • Years of education: None   • Highest education level: None   Occupational History   • None   Tobacco Use   • Smoking status: Former     Current packs/day: 0.00     Types: Cigarettes     Quit date:      Years since quittin.2   • Smokeless tobacco: Never   Vaping Use   • Vaping status: Never Used   Substance and Sexual Activity   • Alcohol use: Not Currently     Comment: daily until - quit 2022   • Drug use: No   • Sexual activity: None   Other Topics Concern   • None   Social History Narrative   • None     Social Determinants of Health     Financial Resource Strain: Not on file   Food Insecurity: Not on file   Transportation Needs: Not on file   Physical Activity: Not on file   Stress: Not on file   Social Connections: Not on file   Intimate Partner Violence: Not on file    Housing Stability: Not on file      Medications and Allergies:     Current Outpatient Medications   Medication Sig Dispense Refill   • acetaminophen (TYLENOL) 500 mg tablet Take 1,000 mg by mouth every 6 (six) hours as needed for mild pain     • DULoxetine (CYMBALTA) 30 mg delayed release capsule Take 1 capsule (30 mg total) by mouth daily 30 capsule 5   • fluticasone (FLONASE) 50 mcg/act nasal spray 2 sprays into each nostril daily 16 g 3   • gabapentin (NEURONTIN) 100 mg capsule Take 1 capsule (100 mg total) by mouth 3 (three) times a day 90 capsule 5   • Ibuprofen (ADVIL PO) Take by mouth as needed       • Ibuprofen-Acetaminophen (Advil Dual Action) 125-250 MG TABS Take 1 tablet by mouth every 8 (eight) hours as needed (for pain) 30 tablet 0   • tadalafil (CIALIS) 20 MG tablet take 1 tablet by mouth daily if needed for ERECTILE DYSFUNCTION 90 tablet 1   • tamsulosin (FLOMAX) 0.4 mg Take 1 capsule (0.4 mg total) by mouth daily with dinner 90 capsule 1   • lisinopril (ZESTRIL) 20 mg tablet take 1 tablet by mouth once daily (Patient not taking: Reported on 11/3/2023) 90 tablet 0   • meloxicam (Mobic) 15 mg tablet Take 1 tablet (15 mg total) by mouth daily (Patient not taking: Reported on 4/8/2024) 30 tablet 0     No current facility-administered medications for this visit.     Allergies   Allergen Reactions   • Penicillins Itching and Rash      Immunizations:     Immunization History   Administered Date(s) Administered   • COVID-19 MODERNA VACC 0.5 ML IM 04/19/2021, 05/18/2021, 11/27/2021   • Tdap 01/27/2012      Health Maintenance:         Topic Date Due   • HIV Screening  Never done   • Colorectal Cancer Screening  04/28/2025   • Hepatitis C Screening  Completed         Topic Date Due   • Influenza Vaccine (1) Never done   • COVID-19 Vaccine (4 - 2023-24 season) 09/01/2023      Medicare Screening Tests and Risk Assessments:     Jesús is here for his Subsequent Wellness visit.     Health Risk Assessment:    Patient rates overall health as fair. Patient feels that their physical health rating is same. Patient is dissatisfied with their life. Eyesight was rated as slightly worse. Hearing was rated as same. Patient feels that their emotional and mental health rating is same. Patients states they are sometimes angry. Patient states they are often unusually tired/fatigued. Pain experienced in the last 7 days has been a lot. Patient's pain rating has been 7/10. Patient states that he has experienced weight loss or gain in last 6 months.     Depression Screening:   PHQ-2 Score: 2      Fall Risk Screening:   In the past year, patient has experienced: no history of falling in past year      Home Safety:  Patient does not have trouble with stairs inside or outside of their home. Patient has working smoke alarms and has no working carbon monoxide detector. Home safety hazards include: none.     Nutrition:   Current diet is Regular.     Medications:   Patient is not currently taking any over-the-counter supplements. Patient is able to manage medications.     Activities of Daily Living (ADLs)/Instrumental Activities of Daily Living (IADLs):   Walk and transfer into and out of bed and chair?: Yes  Dress and groom yourself?: Yes    Bathe or shower yourself?: Yes    Feed yourself? Yes  Do your laundry/housekeeping?: Yes  Manage your money, pay your bills and track your expenses?: Yes  Make your own meals?: Yes    Do your own shopping?: Yes    Previous Hospitalizations:   Any hospitalizations or ED visits within the last 12 months?: No      Advance Care Planning:   Living will: No    Durable POA for healthcare: No    Advanced directive: No      Cognitive Screening:   Provider or family/friend/caregiver concerned regarding cognition?: No    PREVENTIVE SCREENINGS      Cardiovascular Screening:    General: Screening Not Indicated and History Lipid Disorder      Diabetes Screening:     General: Screening Current      Colorectal Cancer  "Screening:     General: Screening Current      Prostate Cancer Screening:    General: Screening Current      Osteoporosis Screening:    General: Risks and Benefits Discussed      Abdominal Aortic Aneurysm (AAA) Screening:    Risk factors include: tobacco use        General: Risks and Benefits Discussed      Lung Cancer Screening:     General: Screening Not Indicated      Hepatitis C Screening:    General: Screening Current    Screening, Brief Intervention, and Referral to Treatment (SBIRT)    Screening  Typical number of drinks in a day: 0  Typical number of drinks in a week: 0  Interpretation: Low risk drinking behavior.    Single Item Drug Screening:  How often have you used an illegal drug (including marijuana) or a prescription medication for non-medical reasons in the past year? never    Single Item Drug Screen Score: 0  Interpretation: Negative screen for possible drug use disorder    Brief Intervention  Alcohol & drug use screenings were reviewed. No concerns regarding substance use disorder identified.     Annual Depression Screening  Time spent screening and evaluating the patient for depression during today's encounter was 6 minutes.    Other Counseling Topics:   Calcium and vitamin D intake and regular weightbearing exercise.     No results found.     Physical Exam:     /80 (BP Location: Left arm, Patient Position: Sitting, Cuff Size: Large)   Pulse 66   Temp 97.6 °F (36.4 °C) (Tympanic)   Resp 16   Ht 5' 8\" (1.727 m)   Wt 75.3 kg (166 lb)   SpO2 98%   BMI 25.24 kg/m²     Physical Exam  Vitals and nursing note reviewed.   Constitutional:       General: He is not in acute distress.     Appearance: He is well-developed.   HENT:      Head: Normocephalic and atraumatic.   Eyes:      Conjunctiva/sclera: Conjunctivae normal.   Cardiovascular:      Rate and Rhythm: Normal rate and regular rhythm.      Heart sounds: No murmur heard.  Pulmonary:      Effort: Pulmonary effort is normal. No respiratory " distress.      Breath sounds: Normal breath sounds.   Abdominal:      Palpations: Abdomen is soft.      Tenderness: There is no abdominal tenderness.   Musculoskeletal:         General: No swelling.      Cervical back: Neck supple.   Skin:     General: Skin is warm and dry.      Capillary Refill: Capillary refill takes less than 2 seconds.   Neurological:      Mental Status: He is alert.   Psychiatric:         Mood and Affect: Mood normal.          Hussein Bhandari MD

## 2024-04-08 NOTE — ASSESSMENT & PLAN NOTE
He was given prescription for gabapentin 100 mg and he was told to take 1, 2 or 3 tablets at bedtime and see if that helps.

## 2024-04-09 RX ORDER — FLUTICASONE PROPIONATE 50 MCG
2 SPRAY, SUSPENSION (ML) NASAL DAILY
Qty: 16 G | Refills: 5 | Status: SHIPPED | OUTPATIENT
Start: 2024-04-09

## 2024-04-09 NOTE — TELEPHONE ENCOUNTER
Medication: Fluticasone    Dose/Frequency: 50 mcg/act nasal spray    Quantity: 16 g    Pharmacy: [id number] - [place], [place] - [address] [[id number]]     Office:   [x] PCP/Provider -   [] Speciality/Provider -     Does the patient have enough for 3 days?   [] Yes   [x] No - Send as HP to POD       chest pain

## 2024-04-16 ENCOUNTER — APPOINTMENT (OUTPATIENT)
Dept: LAB | Facility: CLINIC | Age: 64
End: 2024-04-16
Payer: MEDICARE

## 2024-04-16 DIAGNOSIS — R73.9 HYPERGLYCEMIA: ICD-10-CM

## 2024-04-16 DIAGNOSIS — I10 ESSENTIAL HYPERTENSION: ICD-10-CM

## 2024-04-16 DIAGNOSIS — E78.49 OTHER HYPERLIPIDEMIA: ICD-10-CM

## 2024-04-16 LAB
ALBUMIN SERPL BCP-MCNC: 3.8 G/DL (ref 3.5–5)
ALP SERPL-CCNC: 70 U/L (ref 34–104)
ALT SERPL W P-5'-P-CCNC: 10 U/L (ref 7–52)
ANION GAP SERPL CALCULATED.3IONS-SCNC: 2 MMOL/L (ref 4–13)
AST SERPL W P-5'-P-CCNC: 12 U/L (ref 13–39)
BASOPHILS # BLD AUTO: 0.04 THOUSANDS/ÂΜL (ref 0–0.1)
BASOPHILS NFR BLD AUTO: 1 % (ref 0–1)
BILIRUB SERPL-MCNC: 0.35 MG/DL (ref 0.2–1)
BUN SERPL-MCNC: 11 MG/DL (ref 5–25)
CALCIUM SERPL-MCNC: 8.5 MG/DL (ref 8.4–10.2)
CHLORIDE SERPL-SCNC: 106 MMOL/L (ref 96–108)
CHOLEST SERPL-MCNC: 190 MG/DL
CO2 SERPL-SCNC: 31 MMOL/L (ref 21–32)
CREAT SERPL-MCNC: 0.79 MG/DL (ref 0.6–1.3)
EOSINOPHIL # BLD AUTO: 0.11 THOUSAND/ÂΜL (ref 0–0.61)
EOSINOPHIL NFR BLD AUTO: 2 % (ref 0–6)
ERYTHROCYTE [DISTWIDTH] IN BLOOD BY AUTOMATED COUNT: 13.1 % (ref 11.6–15.1)
EST. AVERAGE GLUCOSE BLD GHB EST-MCNC: 114 MG/DL
GFR SERPL CREATININE-BSD FRML MDRD: 95 ML/MIN/1.73SQ M
GLUCOSE P FAST SERPL-MCNC: 99 MG/DL (ref 65–99)
HBA1C MFR BLD: 5.6 %
HCT VFR BLD AUTO: 46 % (ref 36.5–49.3)
HDLC SERPL-MCNC: 62 MG/DL
HGB BLD-MCNC: 14.7 G/DL (ref 12–17)
IMM GRANULOCYTES # BLD AUTO: 0.01 THOUSAND/UL (ref 0–0.2)
IMM GRANULOCYTES NFR BLD AUTO: 0 % (ref 0–2)
LDLC SERPL CALC-MCNC: 116 MG/DL (ref 0–100)
LYMPHOCYTES # BLD AUTO: 1.62 THOUSANDS/ÂΜL (ref 0.6–4.47)
LYMPHOCYTES NFR BLD AUTO: 26 % (ref 14–44)
MCH RBC QN AUTO: 30.6 PG (ref 26.8–34.3)
MCHC RBC AUTO-ENTMCNC: 32 G/DL (ref 31.4–37.4)
MCV RBC AUTO: 96 FL (ref 82–98)
MONOCYTES # BLD AUTO: 0.39 THOUSAND/ÂΜL (ref 0.17–1.22)
MONOCYTES NFR BLD AUTO: 6 % (ref 4–12)
NEUTROPHILS # BLD AUTO: 3.97 THOUSANDS/ÂΜL (ref 1.85–7.62)
NEUTS SEG NFR BLD AUTO: 65 % (ref 43–75)
NRBC BLD AUTO-RTO: 0 /100 WBCS
PLATELET # BLD AUTO: 263 THOUSANDS/UL (ref 149–390)
PMV BLD AUTO: 11.1 FL (ref 8.9–12.7)
POTASSIUM SERPL-SCNC: 4.7 MMOL/L (ref 3.5–5.3)
PROT SERPL-MCNC: 6.2 G/DL (ref 6.4–8.4)
RBC # BLD AUTO: 4.8 MILLION/UL (ref 3.88–5.62)
SODIUM SERPL-SCNC: 139 MMOL/L (ref 135–147)
TRIGL SERPL-MCNC: 58 MG/DL
TSH SERPL DL<=0.05 MIU/L-ACNC: 2.22 UIU/ML (ref 0.45–4.5)
WBC # BLD AUTO: 6.14 THOUSAND/UL (ref 4.31–10.16)

## 2024-04-16 PROCEDURE — 36415 COLL VENOUS BLD VENIPUNCTURE: CPT

## 2024-04-16 PROCEDURE — 85025 COMPLETE CBC W/AUTO DIFF WBC: CPT

## 2024-04-16 PROCEDURE — 80053 COMPREHEN METABOLIC PANEL: CPT

## 2024-04-16 PROCEDURE — 84443 ASSAY THYROID STIM HORMONE: CPT

## 2024-04-16 PROCEDURE — 80061 LIPID PANEL: CPT

## 2024-04-16 PROCEDURE — 83036 HEMOGLOBIN GLYCOSYLATED A1C: CPT

## 2024-04-25 DIAGNOSIS — N40.1 BENIGN LOCALIZED PROSTATIC HYPERPLASIA WITH LOWER URINARY TRACT SYMPTOMS (LUTS): ICD-10-CM

## 2024-04-25 NOTE — TELEPHONE ENCOUNTER
Medication: Flomax    Dose/Frequency: 0.4 mg    Quantity: 90    Pharmacy: [facility] [place]    Office:   [x] PCP/Provider -   [] Speciality/Provider -     Does the patient have enough for 3 days?   [x] Yes   [] No - Send as HP to POD

## 2024-04-26 RX ORDER — TAMSULOSIN HYDROCHLORIDE 0.4 MG/1
0.4 CAPSULE ORAL
Qty: 90 CAPSULE | Refills: 1 | Status: SHIPPED | OUTPATIENT
Start: 2024-04-26

## 2024-05-08 PROBLEM — Z00.00 MEDICARE ANNUAL WELLNESS VISIT, SUBSEQUENT: Status: RESOLVED | Noted: 2023-03-27 | Resolved: 2024-05-08

## 2024-05-10 ENCOUNTER — TELEPHONE (OUTPATIENT)
Dept: FAMILY MEDICINE CLINIC | Facility: CLINIC | Age: 64
End: 2024-05-10

## 2024-05-10 NOTE — TELEPHONE ENCOUNTER
----- Message from Hussein Bhandari MD sent at 5/9/2024 11:00 PM EDT -----  Slightly elevated LDL otherwise the blood work came back normal.

## 2024-08-07 NOTE — TELEPHONE ENCOUNTER
----- Message from Jennyfer Becker sent at 4/13/2022  8:33 AM EDT -----    ----- Message -----  From: Jill Gomez MD  Sent: 4/13/2022   7:54 AM EDT  To: [facility]    Please have patient see me in the next 2 months or so-can work him in where available  Underwent cystoscopy and transrectal ultrasound today 
Called, but no answer and mailbox full  Will try again 
Please help with scheduling 
Pt returned call, advised him of appt info and sent appt card to him 
35

## 2024-12-03 NOTE — TELEPHONE ENCOUNTER
Pt had an appt today with Dr Castro Germain   I offered to sched his 6 mo f/u, pt states he will call us back to sched appt [Time Spent: ___ minutes] : I have spent [unfilled] minutes of time on the encounter which excludes teaching and separately reported services.

## 2025-04-01 NOTE — PROGRESS NOTES
Assumed care of patient at this time  [facility] [address]  Patient in supine position with HOB elevated to 60 degrees  In NAD  Order placed  See 3/31/25 e advice encounter

## 2025-04-11 ENCOUNTER — TELEPHONE (OUTPATIENT)
Dept: OBGYN CLINIC | Facility: HOSPITAL | Age: 65
End: 2025-04-11

## 2025-04-11 NOTE — TELEPHONE ENCOUNTER
Caller: Patient     Doctor: Dr. Jak Chong    Reason for call: please send patient appointment card with appt date and time and address 4/16/2025 @ 11:30 AM    Call back#: n/a

## 2025-04-14 ENCOUNTER — APPOINTMENT (OUTPATIENT)
Dept: RADIOLOGY | Age: 65
End: 2025-04-14
Payer: MEDICARE

## 2025-04-14 ENCOUNTER — OFFICE VISIT (OUTPATIENT)
Dept: FAMILY MEDICINE CLINIC | Facility: CLINIC | Age: 65
End: 2025-04-14
Payer: MEDICARE

## 2025-04-14 VITALS
SYSTOLIC BLOOD PRESSURE: 120 MMHG | WEIGHT: 164 LBS | OXYGEN SATURATION: 98 % | HEART RATE: 63 BPM | HEIGHT: 68 IN | TEMPERATURE: 97.8 F | BODY MASS INDEX: 24.86 KG/M2 | RESPIRATION RATE: 16 BRPM | DIASTOLIC BLOOD PRESSURE: 78 MMHG

## 2025-04-14 DIAGNOSIS — M79.642 HAND PAIN, LEFT: Primary | ICD-10-CM

## 2025-04-14 DIAGNOSIS — M25.551 RIGHT HIP PAIN: ICD-10-CM

## 2025-04-14 DIAGNOSIS — M25.571 ACUTE RIGHT ANKLE PAIN: ICD-10-CM

## 2025-04-14 DIAGNOSIS — R73.9 HYPERGLYCEMIA: ICD-10-CM

## 2025-04-14 DIAGNOSIS — I10 ESSENTIAL HYPERTENSION: ICD-10-CM

## 2025-04-14 DIAGNOSIS — N40.1 BENIGN LOCALIZED PROSTATIC HYPERPLASIA WITH LOWER URINARY TRACT SYMPTOMS (LUTS): ICD-10-CM

## 2025-04-14 DIAGNOSIS — E78.49 OTHER HYPERLIPIDEMIA: ICD-10-CM

## 2025-04-14 DIAGNOSIS — Z12.5 PROSTATE CANCER SCREENING: ICD-10-CM

## 2025-04-14 DIAGNOSIS — G25.81 RESTLESS LEG: ICD-10-CM

## 2025-04-14 DIAGNOSIS — N52.1 ERECTILE DYSFUNCTION DUE TO DISEASES CLASSIFIED ELSEWHERE: ICD-10-CM

## 2025-04-14 DIAGNOSIS — J34.89 SINUS PRESSURE: ICD-10-CM

## 2025-04-14 DIAGNOSIS — Z00.00 MEDICARE ANNUAL WELLNESS VISIT, SUBSEQUENT: ICD-10-CM

## 2025-04-14 PROBLEM — B07.0 PLANTAR WART: Status: RESOLVED | Noted: 2021-12-17 | Resolved: 2025-04-14
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
## 2025-04-14 PROBLEM — B07.0 PLANTAR WART OF RIGHT FOOT: Status: RESOLVED | Noted: 2024-02-08 | Resolved: 2025-04-14

## 2025-04-14 PROCEDURE — 99214 OFFICE O/P EST MOD 30 MIN: CPT | Performed by: FAMILY MEDICINE

## 2025-04-14 PROCEDURE — G0444 DEPRESSION SCREEN ANNUAL: HCPCS | Performed by: FAMILY MEDICINE

## 2025-04-14 PROCEDURE — 73502 X-RAY EXAM HIP UNI 2-3 VIEWS: CPT

## 2025-04-14 PROCEDURE — G0439 PPPS, SUBSEQ VISIT: HCPCS | Performed by: FAMILY MEDICINE

## 2025-04-14 PROCEDURE — G2211 COMPLEX E/M VISIT ADD ON: HCPCS | Performed by: FAMILY MEDICINE

## 2025-04-14 PROCEDURE — 73610 X-RAY EXAM OF ANKLE: CPT

## 2025-04-14 RX ORDER — TADALAFIL 20 MG/1
20 TABLET ORAL DAILY PRN
Qty: 90 TABLET | Refills: 1 | Status: SHIPPED | OUTPATIENT
Start: 2025-04-14

## 2025-04-14 RX ORDER — GABAPENTIN 100 MG/1
100 CAPSULE ORAL 3 TIMES DAILY
Qty: 90 CAPSULE | Refills: 5 | Status: SHIPPED | OUTPATIENT
Start: 2025-04-14

## 2025-04-14 RX ORDER — DICLOFENAC SODIUM 75 MG/1
75 TABLET, DELAYED RELEASE ORAL 2 TIMES DAILY
Qty: 60 TABLET | Refills: 1 | Status: SHIPPED | OUTPATIENT
Start: 2025-04-14

## 2025-04-14 RX ORDER — TAMSULOSIN HYDROCHLORIDE 0.4 MG/1
0.4 CAPSULE ORAL
Qty: 90 CAPSULE | Refills: 1 | Status: SHIPPED | OUTPATIENT
Start: 2025-04-14

## 2025-04-14 RX ORDER — FLUTICASONE PROPIONATE 50 MCG
2 SPRAY, SUSPENSION (ML) NASAL DAILY
Qty: 16 G | Refills: 5 | Status: SHIPPED | OUTPATIENT
Start: 2025-04-14

## 2025-04-14 NOTE — PROGRESS NOTES
Name: Jesús Bunn      : 1960      MRN: [id number]  Encounter Provider: Hussein Bhandari MD  Encounter Date: 2025   Encounter department: [facility]  :  Assessment & Plan  Hand pain, left    Orders:  •  Ambulatory Referral to Orthopedic Surgery; Future  •  diclofenac (VOLTAREN) 75 mg EC tablet; Take 1 tablet (75 mg total) by mouth 2 (two) times a day    Right hip pain    Orders:  •  diclofenac (VOLTAREN) 75 mg EC tablet; Take 1 tablet (75 mg total) by mouth 2 (two) times a day    Acute right ankle pain    Orders:  •  XR ankle 3+ vw right; Future  •  diclofenac (VOLTAREN) 75 mg EC tablet; Take 1 tablet (75 mg total) by mouth 2 (two) times a day    Benign localized prostatic hyperplasia with lower urinary tract symptoms (LUTS)  Fair control on tamsulosin.  He was given refills.  Orders:  •  tamsulosin (FLOMAX) 0.4 mg; Take 1 capsule (0.4 mg total) by mouth daily with dinner    Erectile dysfunction due to diseases classified elsewhere  He was given prescription for Cialis.  Discussed with patient about possible interaction if he takes it at the same time with tamsulosin.  Orders:  •  tadalafil (CIALIS) 20 MG tablet; Take 1 tablet (20 mg total) by mouth daily as needed for erectile dysfunction    Sinus pressure  Continue Flonase.  Continue to monitor.  Orders:  •  fluticasone (FLONASE) 50 mcg/act nasal spray; 2 sprays into each nostril daily    Restless leg  Continue on gabapentin.  Continue to monitor.  Orders:  •  gabapentin (NEURONTIN) 100 mg capsule; Take 1 capsule (100 mg total) by mouth 3 (three) times a day    Other hyperlipidemia  Not well-controlled.  Discussed about low-fat diet and regular exercise. Continue to monitor lipid profile.  Orders:  •  CBC and differential; Future  •  Comprehensive metabolic panel; Future  •  Lipid Panel with Direct LDL reflex; Future  •  TSH, 3rd generation with Free T4 reflex; Future    Hyperglycemia  It was discussed about  low-carb diet.  Continue to monitor fasting glucose and A1c.  Orders:  •  Hemoglobin A1C; Future    Essential hypertension  Well-controlled on medications.  Will continue to monitor.  Orders:  •  CBC and differential; Future  •  Comprehensive metabolic panel; Future  •  Lipid Panel with Direct LDL reflex; Future  •  TSH, 3rd generation with Free T4 reflex; Future    Prostate cancer screening    Orders:  •  PSA, Total Screen; Future    Medicare annual wellness visit, subsequent  It was discussed about immunizations, diet, exercise and safety measures.          Preventive health issues were discussed with patient, and age appropriate screening tests were ordered as noted in patient's After Visit Summary. Personalized health advice and appropriate referrals for health education or preventive services given if needed, as noted in patient's After Visit Summary.    History of Present Illness     He is here today for follow-up multiple medical problems and with multiple complaints including left hand and shoulder pain.  He stated he has been worsening lately and now he is having numbness and tingling in his left to little and ring fingers.  He also complained of pain in his right ankle and hip status post fall a month ago.  He denies any head injury.  He is due for blood work.       Patient Care Team:  Hussein Bhandari MD as PCP - General (Family Medicine)  LINDSAY Avalos MD as Endoscopist    Review of Systems   Constitutional:  Negative for chills and fever.   HENT:  Negative for trouble swallowing.    Eyes:  Negative for visual disturbance.   Respiratory:  Negative for cough and shortness of breath.    Cardiovascular:  Negative for chest pain and palpitations.   Gastrointestinal:  Negative for abdominal pain, blood in stool and vomiting.   Endocrine: Negative for cold intolerance and heat intolerance.   Genitourinary:  Negative for difficulty urinating and dysuria.   Musculoskeletal:  Positive for  arthralgias and back pain. Negative for gait problem.   Skin:  Negative for rash.   Neurological:  Negative for dizziness, syncope and headaches.   Hematological:  Negative for adenopathy.   Psychiatric/Behavioral:  Negative for behavioral problems.      Medical History Reviewed by provider this encounter:  Tobacco  Allergies  Meds  Problems  Med Hx  Surg Hx  Fam Hx       Annual Wellness Visit Questionnaire   Jesús is here for his Subsequent Wellness visit.     Health Risk Assessment:   Patient rates overall health as poor. Patient feels that their physical health rating is slightly worse. Patient is satisfied with their life. Eyesight was rated as slightly worse. Hearing was rated as same. Patient feels that their emotional and mental health rating is slightly better. Patients states they are sometimes angry. Patient states they are never, rarely unusually tired/fatigued. Pain experienced in the last 7 days has been some. Patient's pain rating has been 9/10.     Depression Screening:   PHQ-2 Score: 1      Fall Risk Screening:   In the past year, patient has experienced: history of falling in past year    Number of falls: 2 or more  Injured during fall?: Yes    Feels unsteady when standing or walking?: No    Worried about falling?: No      Home Safety:  Patient does not have trouble with stairs inside or outside of their home. Patient has working smoke alarms and has working carbon monoxide detector. Home safety hazards include: none.     Nutrition:   Current diet is Regular.     Medications:   Patient is currently taking over-the-counter supplements. OTC medications include: see medication list. Patient is able to manage medications.     Activities of Daily Living (ADLs)/Instrumental Activities of Daily Living (IADLs):   Walk and transfer into and out of bed and chair?: Yes  Dress and groom yourself?: Yes    Bathe or shower yourself?: Yes    Feed yourself? Yes  Do your laundry/housekeeping?: Yes  Manage your  "money, pay your bills and track your expenses?: Yes  Make your own meals?: Yes    Do your own shopping?: Yes    Previous Hospitalizations:   Any hospitalizations or ED visits within the last 12 months?: No      Advance Care Planning:   Living will: No    Durable POA for healthcare: No    Advanced directive: No      Cognitive Screening:   Provider or family/friend/caregiver concerned regarding cognition?: No    Preventive Screenings      Cardiovascular Screening:    General: Screening Not Indicated and History Lipid Disorder      Diabetes Screening:     General: Screening Current      Colorectal Cancer Screening:     General: Screening Current      Prostate Cancer Screening:    General: Risks and Benefits Discussed    Due for: PSA      Osteoporosis Screening:    General: Risks and Benefits Discussed      Abdominal Aortic Aneurysm (AAA) Screening:    Risk factors include: tobacco use        General: Risks and Benefits Discussed      Lung Cancer Screening:     General: Screening Not Indicated      Hepatitis C Screening:    General: Screening Current    Immunizations:  - Immunizations due: Influenza and Zoster (Shingrix)    Screening, Brief Intervention, and Referral to Treatment (SBIRT)     Screening  Typical number of drinks in a day: 0  Typical number of drinks in a week: 0  Interpretation: Low risk drinking behavior.    Brief Intervention  Alcohol & drug use screenings were reviewed. No concerns regarding substance use disorder identified.     Annual Depression Screening  Time spent screening and evaluating the patient for depression during today's encounter was 7 minutes.    Other Counseling Topics:   Calcium and vitamin D intake and regular weightbearing exercise.        No results found.    Objective   /78 (BP Location: Left arm, Patient Position: Sitting, Cuff Size: Standard)   Pulse 63   Temp 97.8 °F (36.6 °C) (Tympanic)   Resp 16   Ht 5' 8\" (1.727 m)   Wt 74.4 kg (164 lb)   SpO2 98%   BMI 24.94 " kg/m²     Physical Exam  Vitals and nursing note reviewed.   Constitutional:       Appearance: He is well-developed.   HENT:      Head: Normocephalic and atraumatic.   Eyes:      Pupils: Pupils are equal, round, and reactive to light.   Cardiovascular:      Rate and Rhythm: Normal rate and regular rhythm.      Heart sounds: Normal heart sounds.   Pulmonary:      Effort: Pulmonary effort is normal.      Breath sounds: Normal breath sounds.   Abdominal:      General: Bowel sounds are normal.      Palpations: Abdomen is soft.   Musculoskeletal:         General: Tenderness present.      Cervical back: Normal range of motion and neck supple.   Lymphadenopathy:      Cervical: No cervical adenopathy.   Skin:     General: Skin is warm.      Findings: No rash.   Neurological:      Mental Status: He is alert and oriented to person, place, and time.

## 2025-04-14 NOTE — ASSESSMENT & PLAN NOTE
It was discussed about low-carb diet.  Continue to monitor fasting glucose and A1c.  Orders:  •  Hemoglobin A1C; Future

## 2025-04-14 NOTE — ASSESSMENT & PLAN NOTE
Orders:  •  XR ankle 3+ vw right; Future  •  diclofenac (VOLTAREN) 75 mg EC tablet; Take 1 tablet (75 mg total) by mouth 2 (two) times a day

## 2025-04-14 NOTE — ASSESSMENT & PLAN NOTE
Not well-controlled.  Discussed about low-fat diet and regular exercise. Continue to monitor lipid profile.  Orders:  •  CBC and differential; Future  •  Comprehensive metabolic panel; Future  •  Lipid Panel with Direct LDL reflex; Future  •  TSH, 3rd generation with Free T4 reflex; Future

## 2025-04-14 NOTE — ASSESSMENT & PLAN NOTE
He was given prescription for Cialis.  Discussed with patient about possible interaction if he takes it at the same time with tamsulosin.  Orders:  •  tadalafil (CIALIS) 20 MG tablet; Take 1 tablet (20 mg total) by mouth daily as needed for erectile dysfunction

## 2025-04-14 NOTE — ASSESSMENT & PLAN NOTE
Orders:  •  Ambulatory Referral to Orthopedic Surgery; Future  •  diclofenac (VOLTAREN) 75 mg EC tablet; Take 1 tablet (75 mg total) by mouth 2 (two) times a day

## 2025-04-14 NOTE — ASSESSMENT & PLAN NOTE
Continue on gabapentin.  Continue to monitor.  Orders:  •  gabapentin (NEURONTIN) 100 mg capsule; Take 1 capsule (100 mg total) by mouth 3 (three) times a day

## 2025-04-14 NOTE — ASSESSMENT & PLAN NOTE
Fair control on tamsulosin.  He was given refills.  Orders:  •  tamsulosin (FLOMAX) 0.4 mg; Take 1 capsule (0.4 mg total) by mouth daily with dinner

## 2025-04-14 NOTE — ASSESSMENT & PLAN NOTE
Continue Flonase.  Continue to monitor.  Orders:  •  fluticasone (FLONASE) 50 mcg/act nasal spray; 2 sprays into each nostril daily

## 2025-04-14 NOTE — ASSESSMENT & PLAN NOTE
Orders:  •  diclofenac (VOLTAREN) 75 mg EC tablet; Take 1 tablet (75 mg total) by mouth 2 (two) times a day

## 2025-04-15 ENCOUNTER — TELEPHONE (OUTPATIENT)
Age: 65
End: 2025-04-15

## 2025-04-15 NOTE — TELEPHONE ENCOUNTER
Reason for call:   [x] Prior Auth  [] Other:     Caller:  [x] Patient  [] Pharmacy  Name:   Address:   Callback Number:     Medication:   tadalafil (CIALIS) 20 MG tablet     Dose/Frequency: Take 1 tablet (20 mg total) by mouth daily as needed for erectile dysfunction     Quantity: 90 tablet     Ordering Provider:   [x] PCP/Provider - Hussein Bhandari MD   [] Speciality/Provider -     Has the patient tried other medications and failed? If failed, which medications did they fail?    [x] No   [] Yes -     Is the patient's insurance updated in EPIC?   [x] Yes   [] No     Is a copy of the patient's insurance scanned in EPIC?   [x] Yes   [] No

## 2025-04-15 NOTE — TELEPHONE ENCOUNTER
PA for tadalafil (CIALIS) 20 MG tablet  DENIED    Reason:(Screenshot if applicable)        Message sent to office clinical pool Yes    Denial letter scanned into Media Yes    Appeal started No (Provider will need to decide if appeal is warranted and send clinical documentation to Prior Authorization Team for initiation.)    **Please follow up with your patient regarding denial and next steps**

## 2025-04-15 NOTE — TELEPHONE ENCOUNTER
Pt cialis is not covered under medicare for ED but would be consider if he had any other medical reason for needling the medication.  Please advise

## 2025-04-15 NOTE — TELEPHONE ENCOUNTER
Pt called stating that he seen Dr. Bhandari yesterday and scripts were sent to the pharmacy.    Pt stated that when he picked up his medication, the pharmacy only gave him   diclofenac (VOLTAREN) 75 mg EC tablet   tamsulosin (FLOMAX) 0.4 mg and  gabapentin (NEURONTIN) 100 mg capsule     Pt reporting that he did not get:  fluticasone (FLONASE) 50 mcg/act nasal spray or  tadalafil (CIALIS) 20 MG tablet     Spoke with the the pharmacy staff who confirmed that the fluticasone was out of stock and should be in today. Recommended pt check back today after 4 PM.   The tadalafil requires prior authorization.    Pt notified and prior authorization request sent to the prior auth team.

## 2025-04-15 NOTE — TELEPHONE ENCOUNTER
PA for     tadalafil (CIALIS) 20 MG tablet   SUBMITTED to [employer]    via    [x]CMM-KEY: [id number]  []Surescripts-Case ID #   []Availity-Auth ID # NDC #   []Faxed to plan   []Other website   []Phone call Case ID #     [x]PA sent as URGENT    All office notes, labs and other pertaining documents and studies sent. Clinical questions answered. Awaiting determination from insurance company.     Turnaround time for your insurance to make a decision on your Prior Authorization can take 7-21 business days.

## 2025-04-16 ENCOUNTER — OFFICE VISIT (OUTPATIENT)
Age: 65
End: 2025-04-16
Payer: MEDICARE

## 2025-04-16 ENCOUNTER — APPOINTMENT (OUTPATIENT)
Age: 65
End: 2025-04-16
Attending: PHYSICIAN ASSISTANT
Payer: MEDICARE

## 2025-04-16 VITALS — BODY MASS INDEX: 24.86 KG/M2 | HEIGHT: 68 IN | WEIGHT: 164.02 LBS

## 2025-04-16 DIAGNOSIS — M75.42 IMPINGEMENT SYNDROME OF LEFT SHOULDER: Primary | ICD-10-CM

## 2025-04-16 DIAGNOSIS — G56.22 CUBITAL TUNNEL SYNDROME ON LEFT: ICD-10-CM

## 2025-04-16 DIAGNOSIS — M25.512 LEFT SHOULDER PAIN, UNSPECIFIED CHRONICITY: ICD-10-CM

## 2025-04-16 PROCEDURE — 73030 X-RAY EXAM OF SHOULDER: CPT

## 2025-04-16 PROCEDURE — 99214 OFFICE O/P EST MOD 30 MIN: CPT | Performed by: PHYSICIAN ASSISTANT

## 2025-04-16 PROCEDURE — 20610 DRAIN/INJ JOINT/BURSA W/O US: CPT | Performed by: PHYSICIAN ASSISTANT

## 2025-04-16 RX ORDER — TADALAFIL 20 MG/1
TABLET ORAL
Qty: 90 TABLET | Refills: 1 | OUTPATIENT
Start: 2025-04-16

## 2025-04-16 RX ORDER — LIDOCAINE HYDROCHLORIDE 10 MG/ML
4 INJECTION, SOLUTION INFILTRATION; PERINEURAL
Status: COMPLETED | OUTPATIENT
Start: 2025-04-16 | End: 2025-04-16

## 2025-04-16 RX ORDER — METHYLPREDNISOLONE ACETATE 40 MG/ML
1 INJECTION, SUSPENSION INTRA-ARTICULAR; INTRALESIONAL; INTRAMUSCULAR; SOFT TISSUE
Status: COMPLETED | OUTPATIENT
Start: 2025-04-16 | End: 2025-04-16

## 2025-04-16 RX ADMIN — LIDOCAINE HYDROCHLORIDE 4 ML: 10 INJECTION, SOLUTION INFILTRATION; PERINEURAL at 11:30

## 2025-04-16 RX ADMIN — METHYLPREDNISOLONE ACETATE 1 ML: 40 INJECTION, SUSPENSION INTRA-ARTICULAR; INTRALESIONAL; INTRAMUSCULAR; SOFT TISSUE at 11:30

## 2025-04-16 NOTE — PROGRESS NOTES
"Patient Name:  Jesús Bunn  MRN:  [id number]    Assessment & Plan     1. Impingement syndrome of left shoulder  -     XR shoulder 2+ vw left; Future; Expected date: 04/16/2025  -     Ambulatory Referral to Physical Therapy; Future  2. Cubital tunnel syndrome on left  -     Ambulatory Referral to Physical Therapy; Future      Left shoulder rotator cuff tendinitis/bursitis.  Left cubital tunnel syndrome.    Patient was offered and accepted a left shoulder subacromial space corticosteroid injection today.  Referral to physical therapy for the left shoulder and left cubital tunnel syndrome.  Initiate Voltaren tablets that were prescribed by his PCP.  Activities as tolerated with modification avoid pain.  Follow-up in 6 weeks.  Discussed ultrasound of the left elbow and MRI of the left shoulder if symptoms persist.    Chief Complaint     Left shoulder pain    History of the Present Illness     Jesús Bunn is a 65 y.o. right-hand-dominant male who reports to the office today for evaluation of his left shoulder.  He notes an onset of pain approximately 1 month ago.  He denies any injury or trauma.  He notes primarily lateral and posterior shoulder pain with intermittent radiation distally to the elbow.  Pain is worse with reaching overhead and behind his back.  He also notes pain with lying on his left side at night.  He notes weakness due to the pain.  No instability.  No fevers or chills.  He currently takes nothing for pain.    He also notes left upper extremity numbness and tingling that originates in the elbow and radiates distally into the small and ring fingers.  He states this occurs at night and causes him to wake up.  He states it also occurs throughout the day at certain times.    Physical Exam     Ht 5' 8\" (1.727 m)   Wt 74.4 kg (164 lb 0.4 oz)   BMI 24.94 kg/m²     Left upper extremity: No gross deformity.  Skin intact without erythema ecchymosis or swelling.  No thenar atrophy or first " dorsal interosseous space atrophy.  There is tenderness over the lateral shoulder.  No tenderness anterior shoulder and posterior shoulder.  No tenderness AC joint.  Full shoulder range of motion in all planes.  Impingement signs are positive.  Empty can and speeds tests are positive.  Cross-body adduction test is negative.  5 out of 5 forward flexion strength.  Elbow range of motion is intact and full without pain.  Positive Tinel's over the cubital tunnel.  Sensation intact median and radial nerve distributions.  Sensation intact but diminished in the ulnar nerve distribution.  2+ radial pulse.    Eyes: Anicteric sclerae.  ENT: Trachea midline.  Lungs: Normal respiratory effort.  CV: Capillary refill is less than 2 seconds.  Skin: Intact without erythema.  Lymph: No palpable lymphadenopathy.  Neuro: Sensation is grossly intact to light touch.  Psych: Mood and affect are appropriate.    Data Review     I have personally reviewed pertinent films in PACS, and my interpretation follows:    X-rays left shoulder 4/16/2025: No acute osseous abnormality.  No fracture or dislocation.  No significant degenerative changes.    Past Medical History:   Diagnosis Date    Abscess, intestine     Arthritis     Diverticulitis     GERD (gastroesophageal reflux disease)     Hydronephrosis 5/27/2018    Hypertension     Wears glasses        Past Surgical History:   Procedure Laterality Date    ABCESS DRAINAGE      COLON SURGERY      COLONOSCOPY N/A 5/5/2016    Procedure: COLONOSCOPY;  Surgeon: Renato Barcenas MD;  Location: [facility];  Service:     COLONOSCOPY N/A 7/26/2018    Procedure: COLONOSCOPY with bx's;  Surgeon: Nathaniel Cha MD;  Location: [facility];  Service: Gastroenterology    ESOPHAGOGASTRODUODENOSCOPY      with biopsy    FOOT SURGERY Left     x2? fusion? due to arthritis. onset: 1997.    HERNIA REPAIR      ILEO LOOP DIVERSION N/A 6/1/2018    Procedure: ILEOSTOMY LOOP DIVERTING;  Surgeon: Irineo Zhao DO;  Location:   MAIN OR;  Service: General    INSERTION OF FIDUCIAL MARKER (TRANSRECTAL ULTRASOUND GUIDANCE) N/A 4/13/2022    Procedure: TRANSRECTAL US GUIDANCE;  Surgeon: Gerardo Xiao MD;  Location: [facility];  Service: Urology    LAPAROTOMY N/A 6/1/2018    Procedure: LAPAROTOMY EXPLORATORY,;  Surgeon: Irineo Zhao DO;  [facility]: [facility];  Service: General    GA CLOSURE ENTEROSTOMY LG/SMALL INTESTINE N/A 8/16/2018    Procedure: CLOSURE LOOP ILEOSTOMY;  Surgeon: aYn Ruelas MD;  Location: [facility];  Service: General    GA COLECTOMY PARTIAL W/ANASTOMOSIS N/A 6/1/2018    Procedure: RESECTION COLON SIGMOID;  Surgeon: Irineo Zhao DO;  [facility]: [facility];  Service: General    GA CYSTOURETHROSCOPY N/A 4/13/2022    Procedure: CYSTO;  Surgeon: Gerardo Xiao MD;  Location[facility];  Service: Urology    GA CYSTOURETHROSCOPY W/URETERAL CATHETERIZATION Left 6/9/2018    Procedure: CYSTOSCOPY RETROGRADE PYELOGRAM WITH INSERTION STENT URETERAL;  Surgeon: Ted Fry MD;  [facility];  Service: Urology    GA LAP RPR HRNA XCPT INCAL/INGUN NCRC8/STRANGULATED N/A 1/31/2019    Procedure: REPAIR HERNIA INCISIONAL LAPAROSCOPIC;  Surgeon: Yan Ruelas MD;  [facility];  Service: General    GA LAPAROSCOPY SURG RPR INITIAL INGUINAL HERNIA Right 1/31/2019    Procedure: REPAIR HERNIA INGUINAL, LAPAROSCOPIC;  Surgeon: Yan Ruelas MD;  [facility];  Service: General       Allergies   Allergen Reactions    Penicillins Itching and Rash       Current Outpatient Medications on File Prior to Visit   Medication Sig Dispense Refill    acetaminophen (TYLENOL) 500 mg tablet Take 1,000 mg by mouth every 6 (six) hours as needed for mild pain      diclofenac (VOLTAREN) 75 mg EC tablet Take 1 tablet (75 mg total) by mouth 2 (two) times a day 60 tablet 1    DULoxetine (CYMBALTA) 30 mg delayed release capsule Take 1 capsule (30 mg total) by mouth daily 30 capsule 5    fluticasone (FLONASE) 50 mcg/act nasal spray 2  sprays into each nostril daily 16 g 5    gabapentin (NEURONTIN) 100 mg capsule Take 1 capsule (100 mg total) by mouth 3 (three) times a day 90 capsule 5    Ibuprofen (ADVIL PO) Take by mouth as needed        Ibuprofen-Acetaminophen (Advil Dual Action) 125-250 MG TABS Take 1 tablet by mouth every 8 (eight) hours as needed (for pain) 30 tablet 0    tadalafil (CIALIS) 20 MG tablet Take 1 tablet (20 mg total) by mouth daily as needed for erectile dysfunction 90 tablet 1    tamsulosin (FLOMAX) 0.4 mg Take 1 capsule (0.4 mg total) by mouth daily with dinner 90 capsule 1     No current facility-administered medications on file prior to visit.       Social History     Tobacco Use    Smoking status: Former     Current packs/day: 0.00     Types: Cigarettes     Quit date:      Years since quittin.3    Smokeless tobacco: Never   Vaping Use    Vaping status: Never Used   Substance Use Topics    Alcohol use: Not Currently     Comment: daily until - quit 2022    Drug use: No       Family History   Problem Relation Age of Onset    Other Mother         head tumor    Glaucoma Father     Diabetes Father         possible diabetes    Stroke Family        Review of Systems     As stated in the HPI. All other systems reviewed and are negative.      Large joint arthrocentesis: L subacromial bursa  Procedure Details  Location: shoulder - L subacromial bursa  Needle size: 22 G  Ultrasound guidance: no  Approach: posterior  Medications administered: 4 mL lidocaine 1 %; 1 mL methylPREDNISolone acetate 40 mg/mL    Patient tolerance: patient tolerated the procedure well with no immediate complications  Dressing:  Sterile dressing applied

## 2025-04-17 ENCOUNTER — RESULTS FOLLOW-UP (OUTPATIENT)
Dept: FAMILY MEDICINE CLINIC | Facility: CLINIC | Age: 65
End: 2025-04-17

## 2025-04-17 DIAGNOSIS — M25.571 ACUTE RIGHT ANKLE PAIN: Primary | ICD-10-CM

## 2025-04-25 ENCOUNTER — TELEPHONE (OUTPATIENT)
Age: 65
End: 2025-04-25

## 2025-04-25 NOTE — TELEPHONE ENCOUNTER
Caller: Patient    Doctor/Office: Dr Adames    Call regarding :  appt      Call was transferred to: Pod

## 2025-05-14 ENCOUNTER — OFFICE VISIT (OUTPATIENT)
Dept: FAMILY MEDICINE CLINIC | Facility: CLINIC | Age: 65
End: 2025-05-14
Payer: MEDICARE

## 2025-05-14 VITALS
HEART RATE: 58 BPM | BODY MASS INDEX: 24.83 KG/M2 | HEIGHT: 68 IN | DIASTOLIC BLOOD PRESSURE: 84 MMHG | TEMPERATURE: 95.9 F | RESPIRATION RATE: 16 BRPM | SYSTOLIC BLOOD PRESSURE: 126 MMHG | OXYGEN SATURATION: 99 % | WEIGHT: 163.8 LBS

## 2025-05-14 DIAGNOSIS — M54.50 ACUTE RIGHT-SIDED LOW BACK PAIN WITHOUT SCIATICA: Primary | ICD-10-CM

## 2025-05-14 PROBLEM — Z00.00 MEDICARE ANNUAL WELLNESS VISIT, SUBSEQUENT: Status: RESOLVED | Noted: 2023-03-27 | Resolved: 2025-05-14

## 2025-05-14 PROCEDURE — G2211 COMPLEX E/M VISIT ADD ON: HCPCS | Performed by: FAMILY MEDICINE

## 2025-05-14 PROCEDURE — 99213 OFFICE O/P EST LOW 20 MIN: CPT | Performed by: FAMILY MEDICINE

## 2025-05-14 RX ORDER — METHOCARBAMOL 500 MG/1
500 TABLET, FILM COATED ORAL 4 TIMES DAILY
Qty: 30 TABLET | Refills: 0 | Status: SHIPPED | OUTPATIENT
Start: 2025-05-14

## 2025-05-14 RX ORDER — PREDNISONE 50 MG/1
50 TABLET ORAL DAILY
Qty: 5 TABLET | Refills: 0 | Status: SHIPPED | OUTPATIENT
Start: 2025-05-14 | End: 2025-05-19

## 2025-05-21 ENCOUNTER — TELEPHONE (OUTPATIENT)
Age: 65
End: 2025-05-21

## 2025-05-27 NOTE — ASSESSMENT & PLAN NOTE
Orders:  •  predniSONE 50 mg tablet; Take 1 tablet (50 mg total) by mouth daily for 5 days  •  methocarbamol (ROBAXIN) 500 mg tablet; Take 1 tablet (500 mg total) by mouth 4 (four) times a day

## 2025-05-27 NOTE — PROGRESS NOTES
"Name: Jesús Bunn      : 1960      MRN: [id number]  Encounter Provider: Hussein Bhandari MD  Encounter Date: 2025   Encounter department: [facility]  :  Assessment & Plan  Acute right-sided low back pain without sciatica    Orders:  •  predniSONE 50 mg tablet; Take 1 tablet (50 mg total) by mouth daily for 5 days  •  methocarbamol (ROBAXIN) 500 mg tablet; Take 1 tablet (500 mg total) by mouth 4 (four) times a day           History of Present Illness   He is here today with complaint of pain in the right side of his back started few days ago and has been getting worse.  He denies any recent history of injury or trauma.  He denies any radiation of the pain to the lower extremity.  Denies any numbness, tingling or weakness in his lower extremity.    Back Pain  Pertinent negatives include no abdominal pain, chest pain, dysuria, fever or headaches.     Review of Systems   Constitutional:  Negative for chills and fever.   HENT:  Negative for trouble swallowing.    Eyes:  Negative for visual disturbance.   Respiratory:  Negative for cough and shortness of breath.    Cardiovascular:  Negative for chest pain and palpitations.   Gastrointestinal:  Negative for abdominal pain, blood in stool and vomiting.   Endocrine: Negative for cold intolerance and heat intolerance.   Genitourinary:  Negative for difficulty urinating and dysuria.   Musculoskeletal:  Positive for back pain. Negative for gait problem.   Skin:  Negative for rash.   Neurological:  Negative for dizziness, syncope and headaches.   Hematological:  Negative for adenopathy.   Psychiatric/Behavioral:  Negative for behavioral problems.        Objective   /84 (BP Location: Left arm, Patient Position: Sitting, Cuff Size: Standard)   Pulse 58   Temp (!) 95.9 °F (35.5 °C) (Tympanic)   Resp 16   Ht 5' 8\" (1.727 m)   Wt 74.3 kg (163 lb 12.8 oz)   SpO2 99%   BMI 24.91 kg/m²      Physical Exam  Vitals and nursing " note reviewed.   Constitutional:       Appearance: He is well-developed.   HENT:      Head: Normocephalic and atraumatic.     Eyes:      Pupils: Pupils are equal, round, and reactive to light.       Cardiovascular:      Rate and Rhythm: Normal rate and regular rhythm.      Heart sounds: Normal heart sounds.   Pulmonary:      Effort: Pulmonary effort is normal.      Breath sounds: Normal breath sounds.   Abdominal:      General: Bowel sounds are normal.      Palpations: Abdomen is soft.     Musculoskeletal:         General: Tenderness (low back) present.      Cervical back: Normal range of motion and neck supple.   Lymphadenopathy:      Cervical: No cervical adenopathy.     Skin:     General: Skin is warm.      Findings: No rash.     Neurological:      Mental Status: He is alert and oriented to person, place, and time.

## 2025-06-08 DIAGNOSIS — M25.571 ACUTE RIGHT ANKLE PAIN: ICD-10-CM

## 2025-06-08 DIAGNOSIS — M25.551 RIGHT HIP PAIN: ICD-10-CM

## 2025-06-08 DIAGNOSIS — M79.642 HAND PAIN, LEFT: ICD-10-CM

## 2025-06-09 RX ORDER — DICLOFENAC SODIUM 75 MG/1
75 TABLET, DELAYED RELEASE ORAL 2 TIMES DAILY
Qty: 60 TABLET | Refills: 1 | Status: SHIPPED | OUTPATIENT
Start: 2025-06-09

## 2025-07-07 ENCOUNTER — TELEPHONE (OUTPATIENT)
Dept: OTHER | Facility: OTHER | Age: 65
End: 2025-07-07

## 2025-07-07 NOTE — TELEPHONE ENCOUNTER
"Pt stated, \"I would like to request a refill for my diclofenac, tamsulosin, and flonase.\"    Pt made aware all medications have refills and should be available at [facility] pharmacy.    "

## 2025-07-17 ENCOUNTER — OFFICE VISIT (OUTPATIENT)
Age: 65
End: 2025-07-17
Payer: MEDICARE

## 2025-07-17 DIAGNOSIS — M24.812 INTERNAL DERANGEMENT OF LEFT SHOULDER: Primary | ICD-10-CM

## 2025-07-17 PROCEDURE — 99213 OFFICE O/P EST LOW 20 MIN: CPT | Performed by: PHYSICIAN ASSISTANT

## 2025-07-17 NOTE — PROGRESS NOTES
Assessment:  Assessment & Plan  Internal derangement of left shoulder  Left shoulder pain and weakness, possible rotator cuff tear.  Orders:    MRI shoulder left wo contrast; Future  Patient notes persistent left shoulder pain and weakness despite conservative management including corticosteroid injections, oral anti-inflammatories, activity modification, and home exercises.  Due to persistent symptoms despite extensive conservative management MRI of the left shoulder will be obtained for further evaluation.  Follow-up after MRI with one of our sports surgeons.       To do next visit:  Return for after MRI with Dr. Pinzon([facility]).    The above stated was discussed in layman's terms and the patient expressed understanding.  All questions were answered to the patient's satisfaction.         Subjective:   Jesús Bunn is a 65 y.o. male who presents for follow-up regarding his left shoulder.  He was last seen on 4/16/2025.  At that time he received a left shoulder subacromial space corticosteroid injection.  He is also referred to physical therapy.  He did note initial improvement after receiving the injection but notes a return of his pain.  Pain can range from 5-7 out of 10 in intensity.  Pain is localized primarily to the lateral and posterior shoulder and is worse with reaching overhead and behind his back.  He also notes pain with lying on his side at night.  He notes weakness due to the pain but denies any instability.  He has been performing home exercises without significant improvement.  He currently takes diclofenac tablets with mild improvement as well.  He denies any numbness and tingling at this time.  No fevers or chills.      Review of systems negative unless otherwise specified in HPI      Past Medical History[1]    Past Surgical History[2]    Family History[3]    Social History     Occupational History    Not on file   Tobacco Use    Smoking status: Former     Current packs/day: 0.00      "Types: Cigarettes     Quit date:      Years since quittin.5    Smokeless tobacco: Never   Vaping Use    Vaping status: Never Used   Substance and Sexual Activity    Alcohol use: Not Currently     Comment: daily until - quit 2022    Drug use: No    Sexual activity: Not on file       Current Medications[4]    Allergies[5]       There were no vitals filed for this visit.    Objective:  Gen: No acute distress, resting comfortably in bed  HEENT: Eyes clear, moist mucus membranes, hearing intact  Respiratory: No audible wheezing or stridor  Cardiovascular: Well Perfused peripherally, 2+ distal pulse  Abdomen: nondistended, no peritoneal signs                     Left shoulder: No gross deformity.  No tenderness anterior shoulder, posterior shoulder, and AC joint.  There is some tenderness lateral shoulder. PROM is , ER-abd 90, IR-abd 50.  Impingement signs are positive.  Empty can test is positive.  Speed's Test is positive.  Cross-body adduction test is negative.  4 out of 5 forward flexion strength on the left compared to 5 out of 5 on the right.  Elbow wrist and digital range of motion intact.  Sensation intact axillary, median, ulnar and radial nerves.  2+ radial pulse.    Portions of the record may have been created with voice recognition software.  Occasional wrong word or \"sound a like\" substitutions may have occurred due to the inherent limitations of voice recognition software.  Read the chart carefully and recognize, using context, where substitutions have occurred.         [1]   Past Medical History:  Diagnosis Date    Abscess, intestine     Arthritis     Diverticulitis     GERD (gastroesophageal reflux disease)     Hydronephrosis 2018    Hypertension     Wears glasses    [2]   Past Surgical History:  Procedure Laterality Date    ABCESS DRAINAGE      COLON SURGERY      COLONOSCOPY N/A 2016    Procedure: COLONOSCOPY;  Surgeon: Renato Barcenas MD;  Location: [facility];  Service:     " COLONOSCOPY N/A 7/26/2018    Procedure: COLONOSCOPY with bx's;  Surgeon: Nathaniel Cha MD;  Location: [facility];  Service: Gastroenterology    ESOPHAGOGASTRODUODENOSCOPY      with biopsy    FOOT SURGERY Left     x2? fusion? due to arthritis. onset: 1997.    HERNIA REPAIR      ILEO LOOP DIVERSION N/A 6/1/2018    Procedure: ILEOSTOMY LOOP DIVERTING;  Surgeon: Irineo Zhao DO;  Location: [facility];  Service: General    INSERTION OF FIDUCIAL MARKER (TRANSRECTAL ULTRASOUND GUIDANCE) N/A 4/13/2022    Procedure: TRANSRECTAL US GUIDANCE;  Surgeon: Gerardo Xiao MD;  Location: [facility];  Service: Urology    LAPAROTOMY N/A 6/1/2018    Procedure: LAPAROTOMY EXPLORATORY,;  Surgeon: Irineo Zhao DO;  Location: [facility];  Service: General    WA CLOSURE ENTEROSTOMY LG/SMALL INTESTINE N/A 8/16/2018    Procedure: CLOSURE LOOP ILEOSTOMY;  Surgeon: Yan Ruelas MD;  [facility]: [facility];  Service: General    WA COLECTOMY PARTIAL W/ANASTOMOSIS N/A 6/1/2018    Procedure: RESECTION COLON SIGMOID;  Surgeon: Irineo Zhao DO;  [facility];  Service: General    WA CYSTOURETHROSCOPY N/A 4/13/2022    Procedure: CYSTO;  Surgeon: Gerardo Xiao MD;  [facility];  Service: Urology    WA CYSTOURETHROSCOPY W/URETERAL CATHETERIZATION Left 6/9/2018    Procedure: CYSTOSCOPY RETROGRADE PYELOGRAM WITH INSERTION STENT URETERAL;  Surgeon: Ted Fry MD;  [facility];  Service: Urology    WA LAP RPR HRNA XCPT INCAL/INGUN NCRC8/STRANGULATED N/A 1/31/2019    Procedure: REPAIR HERNIA INCISIONAL LAPAROSCOPIC;  Surgeon: Yan Ruelas MD;  [facility];  Service: General    WA LAPAROSCOPY SURG RPR INITIAL INGUINAL HERNIA Right 1/31/2019    Procedure: REPAIR HERNIA INGUINAL, LAPAROSCOPIC;  Surgeon: Yan Ruelas MD;  [facility]: [facility];  Service: General   [3]   Family History  Problem Relation Name Age of Onset    Other Mother          head tumor    Glaucoma Father      Diabetes Father           possible diabetes    Stroke Family     [4]   Current Outpatient Medications:     acetaminophen (TYLENOL) 500 mg tablet, Take 1,000 mg by mouth every 6 (six) hours as needed for mild pain, Disp: , Rfl:     diclofenac (VOLTAREN) 75 mg EC tablet, TAKE 1 TABLET BY MOUTH TWICE A DAY, Disp: 60 tablet, Rfl: 1    DULoxetine (CYMBALTA) 30 mg delayed release capsule, Take 1 capsule (30 mg total) by mouth daily, Disp: 30 capsule, Rfl: 5    fluticasone (FLONASE) 50 mcg/act nasal spray, 2 sprays into each nostril daily, Disp: 16 g, Rfl: 5    gabapentin (NEURONTIN) 100 mg capsule, Take 1 capsule (100 mg total) by mouth 3 (three) times a day, Disp: 90 capsule, Rfl: 5    Ibuprofen (ADVIL PO), Take by mouth as needed, Disp: , Rfl:     Ibuprofen-Acetaminophen (Advil Dual Action) 125-250 MG TABS, Take 1 tablet by mouth every 8 (eight) hours as needed (for pain), Disp: 30 tablet, Rfl: 0    methocarbamol (ROBAXIN) 500 mg tablet, Take 1 tablet (500 mg total) by mouth 4 (four) times a day, Disp: 30 tablet, Rfl: 0    tadalafil (CIALIS) 20 MG tablet, Take 1 tablet (20 mg total) by mouth daily as needed for erectile dysfunction, Disp: 90 tablet, Rfl: 1    tamsulosin (FLOMAX) 0.4 mg, Take 1 capsule (0.4 mg total) by mouth daily with dinner, Disp: 90 capsule, Rfl: 1  [5]   Allergies  Allergen Reactions    Penicillins Itching and Rash

## 2025-07-21 ENCOUNTER — OFFICE VISIT (OUTPATIENT)
Dept: FAMILY MEDICINE CLINIC | Facility: CLINIC | Age: 65
End: 2025-07-21
Payer: MEDICARE

## 2025-07-21 ENCOUNTER — APPOINTMENT (OUTPATIENT)
Dept: LAB | Age: 65
End: 2025-07-21
Payer: MEDICARE

## 2025-07-21 VITALS
WEIGHT: 155.8 LBS | TEMPERATURE: 97.8 F | DIASTOLIC BLOOD PRESSURE: 84 MMHG | BODY MASS INDEX: 23.61 KG/M2 | OXYGEN SATURATION: 99 % | RESPIRATION RATE: 16 BRPM | HEART RATE: 58 BPM | SYSTOLIC BLOOD PRESSURE: 128 MMHG | HEIGHT: 68 IN

## 2025-07-21 DIAGNOSIS — E78.49 OTHER HYPERLIPIDEMIA: ICD-10-CM

## 2025-07-21 DIAGNOSIS — G89.18 POSTOPERATIVE PAIN: ICD-10-CM

## 2025-07-21 DIAGNOSIS — N40.1 BENIGN LOCALIZED PROSTATIC HYPERPLASIA WITH LOWER URINARY TRACT SYMPTOMS (LUTS): ICD-10-CM

## 2025-07-21 DIAGNOSIS — J34.89 SINUS PRESSURE: Primary | ICD-10-CM

## 2025-07-21 DIAGNOSIS — Z12.5 PROSTATE CANCER SCREENING: ICD-10-CM

## 2025-07-21 DIAGNOSIS — I10 ESSENTIAL HYPERTENSION: ICD-10-CM

## 2025-07-21 DIAGNOSIS — R73.9 HYPERGLYCEMIA: ICD-10-CM

## 2025-07-21 DIAGNOSIS — T14.8XXA INFECTED WOUND: ICD-10-CM

## 2025-07-21 DIAGNOSIS — L08.9 INFECTED WOUND: ICD-10-CM

## 2025-07-21 LAB
ALBUMIN SERPL BCG-MCNC: 4.2 G/DL (ref 3.5–5)
ALP SERPL-CCNC: 74 U/L (ref 34–104)
ALT SERPL W P-5'-P-CCNC: 25 U/L (ref 7–52)
ANION GAP SERPL CALCULATED.3IONS-SCNC: 3 MMOL/L (ref 4–13)
AST SERPL W P-5'-P-CCNC: 15 U/L (ref 13–39)
BASOPHILS # BLD AUTO: 0.04 THOUSANDS/ÂΜL (ref 0–0.1)
BASOPHILS NFR BLD AUTO: 1 % (ref 0–1)
BILIRUB SERPL-MCNC: 0.44 MG/DL (ref 0.2–1)
BUN SERPL-MCNC: 14 MG/DL (ref 5–25)
CALCIUM SERPL-MCNC: 8.9 MG/DL (ref 8.4–10.2)
CHLORIDE SERPL-SCNC: 104 MMOL/L (ref 96–108)
CHOLEST SERPL-MCNC: 203 MG/DL (ref ?–200)
CO2 SERPL-SCNC: 32 MMOL/L (ref 21–32)
CREAT SERPL-MCNC: 0.72 MG/DL (ref 0.6–1.3)
EOSINOPHIL # BLD AUTO: 0.06 THOUSAND/ÂΜL (ref 0–0.61)
EOSINOPHIL NFR BLD AUTO: 1 % (ref 0–6)
ERYTHROCYTE [DISTWIDTH] IN BLOOD BY AUTOMATED COUNT: 12.6 % (ref 11.6–15.1)
EST. AVERAGE GLUCOSE BLD GHB EST-MCNC: 114 MG/DL
GFR SERPL CREATININE-BSD FRML MDRD: 98 ML/MIN/1.73SQ M
GLUCOSE P FAST SERPL-MCNC: 83 MG/DL (ref 65–99)
HBA1C MFR BLD: 5.6 %
HCT VFR BLD AUTO: 45.6 % (ref 36.5–49.3)
HDLC SERPL-MCNC: 64 MG/DL
HGB BLD-MCNC: 14.7 G/DL (ref 12–17)
IMM GRANULOCYTES # BLD AUTO: 0.02 THOUSAND/UL (ref 0–0.2)
IMM GRANULOCYTES NFR BLD AUTO: 0 % (ref 0–2)
LDLC SERPL CALC-MCNC: 124 MG/DL (ref 0–100)
LYMPHOCYTES # BLD AUTO: 1.7 THOUSANDS/ÂΜL (ref 0.6–4.47)
LYMPHOCYTES NFR BLD AUTO: 23 % (ref 14–44)
MCH RBC QN AUTO: 30.9 PG (ref 26.8–34.3)
MCHC RBC AUTO-ENTMCNC: 32.2 G/DL (ref 31.4–37.4)
MCV RBC AUTO: 96 FL (ref 82–98)
MONOCYTES # BLD AUTO: 0.48 THOUSAND/ÂΜL (ref 0.17–1.22)
MONOCYTES NFR BLD AUTO: 6 % (ref 4–12)
NEUTROPHILS # BLD AUTO: 5.24 THOUSANDS/ÂΜL (ref 1.85–7.62)
NEUTS SEG NFR BLD AUTO: 69 % (ref 43–75)
NRBC BLD AUTO-RTO: 0 /100 WBCS
PLATELET # BLD AUTO: 275 THOUSANDS/UL (ref 149–390)
PMV BLD AUTO: 11.3 FL (ref 8.9–12.7)
POTASSIUM SERPL-SCNC: 4.5 MMOL/L (ref 3.5–5.3)
PROT SERPL-MCNC: 6.5 G/DL (ref 6.4–8.4)
PSA SERPL-MCNC: 2.6 NG/ML (ref 0–4)
RBC # BLD AUTO: 4.76 MILLION/UL (ref 3.88–5.62)
SODIUM SERPL-SCNC: 139 MMOL/L (ref 135–147)
TRIGL SERPL-MCNC: 76 MG/DL (ref ?–150)
TSH SERPL DL<=0.05 MIU/L-ACNC: 1.37 UIU/ML (ref 0.45–4.5)
WBC # BLD AUTO: 7.54 THOUSAND/UL (ref 4.31–10.16)

## 2025-07-21 PROCEDURE — 80053 COMPREHEN METABOLIC PANEL: CPT

## 2025-07-21 PROCEDURE — G0103 PSA SCREENING: HCPCS

## 2025-07-21 PROCEDURE — 84443 ASSAY THYROID STIM HORMONE: CPT

## 2025-07-21 PROCEDURE — 99214 OFFICE O/P EST MOD 30 MIN: CPT | Performed by: FAMILY MEDICINE

## 2025-07-21 PROCEDURE — G2211 COMPLEX E/M VISIT ADD ON: HCPCS | Performed by: FAMILY MEDICINE

## 2025-07-21 PROCEDURE — 85025 COMPLETE CBC W/AUTO DIFF WBC: CPT

## 2025-07-21 PROCEDURE — 80061 LIPID PANEL: CPT

## 2025-07-21 PROCEDURE — 36415 COLL VENOUS BLD VENIPUNCTURE: CPT

## 2025-07-21 PROCEDURE — 83036 HEMOGLOBIN GLYCOSYLATED A1C: CPT

## 2025-07-21 RX ORDER — FLUTICASONE PROPIONATE 50 MCG
2 SPRAY, SUSPENSION (ML) NASAL DAILY
Qty: 16 G | Refills: 5 | Status: SHIPPED | OUTPATIENT
Start: 2025-07-21

## 2025-07-21 RX ORDER — MUPIROCIN 2 %
OINTMENT (GRAM) TOPICAL 3 TIMES DAILY
Qty: 30 G | Refills: 1 | Status: SHIPPED | OUTPATIENT
Start: 2025-07-21

## 2025-07-21 RX ORDER — DULOXETIN HYDROCHLORIDE 30 MG/1
30 CAPSULE, DELAYED RELEASE ORAL DAILY
Qty: 30 CAPSULE | Refills: 5 | Status: SHIPPED | OUTPATIENT
Start: 2025-07-21

## 2025-07-22 NOTE — ASSESSMENT & PLAN NOTE
It was discussed about increase oral hydration and use a humidifier.  He was given Flonase nasal spray.  Orders:  •  fluticasone (FLONASE) 50 mcg/act nasal spray; 2 sprays into each nostril daily

## 2025-07-22 NOTE — ASSESSMENT & PLAN NOTE
Continue on Cymbalta.  He was given a refill.  Orders:  •  DULoxetine (CYMBALTA) 30 mg delayed release capsule; Take 1 capsule (30 mg total) by mouth daily

## 2025-07-22 NOTE — ASSESSMENT & PLAN NOTE
Not well-controlled.  Continue on Flomax and I am going to refer him to see urologist.  Orders:  •  Ambulatory Referral to Urology; Future

## 2025-07-22 NOTE — ASSESSMENT & PLAN NOTE
He was given prescription for Bactroban.  Orders:  •  mupirocin (BACTROBAN) 2 % ointment; Apply topically 3 (three) times a day

## 2025-07-22 NOTE — PROGRESS NOTES
Name: Jesús Bunn      : 1960      MRN: [id number]  Encounter Provider: Hussein Bhandari MD  Encounter Date: 2025   Encounter department: [facility]  :  Assessment & Plan  Sinus pressure  It was discussed about increase oral hydration and use a humidifier.  He was given Flonase nasal spray.  Orders:  •  fluticasone (FLONASE) 50 mcg/act nasal spray; 2 sprays into each nostril daily    Postoperative pain  Continue on Cymbalta.  He was given a refill.  Orders:  •  DULoxetine (CYMBALTA) 30 mg delayed release capsule; Take 1 capsule (30 mg total) by mouth daily    Benign localized prostatic hyperplasia with lower urinary tract symptoms (LUTS)  Not well-controlled.  Continue on Flomax and I am going to refer him to see urologist.  Orders:  •  Ambulatory Referral to Urology; Future    Infected wound  He was given prescription for Bactroban.  Orders:  •  mupirocin (BACTROBAN) 2 % ointment; Apply topically 3 (three) times a day    Essential hypertension  Well-controlled without medications.  Will continue to monitor.              History of Present Illness   He is here today with complaint of sore area on his forehead after he bumped his head into his car door.  He denies any headache or any neurologic symptoms is only tender to touch.  He also complained of urinary symptoms that have been getting worse lately.  He stated despite taking his Flomax he has been having hard time urinating and the stream is getting weaker.  He complained of nasal congestion and requesting prescription for Flonase nasal spray.      Review of Systems   Constitutional:  Negative for chills and fever.   HENT:  Positive for congestion and postnasal drip. Negative for trouble swallowing.    Eyes:  Negative for visual disturbance.   Respiratory:  Negative for cough and shortness of breath.    Cardiovascular:  Negative for chest pain and palpitations.   Gastrointestinal:  Negative for abdominal pain,  "blood in stool and vomiting.   Endocrine: Negative for cold intolerance and heat intolerance.   Genitourinary:  Positive for difficulty urinating and frequency. Negative for dysuria.   Musculoskeletal:  Negative for gait problem.   Skin:  Positive for wound. Negative for rash.   Neurological:  Negative for dizziness, syncope and headaches.   Hematological:  Negative for adenopathy.   Psychiatric/Behavioral:  Negative for behavioral problems.        Objective   /84 (BP Location: Left arm, Patient Position: Sitting, Cuff Size: Large)   Pulse 58   Temp 97.8 °F (36.6 °C) (Tympanic)   Resp 16   Ht 5' 8\" (1.727 m)   Wt 70.7 kg (155 lb 12.8 oz)   SpO2 99%   BMI 23.69 kg/m²      Physical Exam  Vitals and nursing note reviewed.   Constitutional:       Appearance: He is well-developed.   HENT:      Head: Normocephalic and atraumatic.      Nose: Congestion present.      Mouth/Throat:      Pharynx: Posterior oropharyngeal erythema present.     Eyes:      Pupils: Pupils are equal, round, and reactive to light.       Cardiovascular:      Rate and Rhythm: Normal rate and regular rhythm.      Heart sounds: Normal heart sounds.   Pulmonary:      Effort: Pulmonary effort is normal.      Breath sounds: Normal breath sounds.   Abdominal:      General: Bowel sounds are normal.      Palpations: Abdomen is soft.     Musculoskeletal:      Cervical back: Normal range of motion and neck supple.   Lymphadenopathy:      Cervical: No cervical adenopathy.     Skin:     General: Skin is warm.      Findings: Erythema and rash present.     Neurological:      Mental Status: He is alert and oriented to person, place, and time.         "

## 2025-07-23 ENCOUNTER — TELEPHONE (OUTPATIENT)
Age: 65
End: 2025-07-23

## 2025-07-23 NOTE — TELEPHONE ENCOUNTER
New Patient      Insurance   Current Insurance?[employer]   Insurance E-verified? yes     History   Reason for appointment/active symptoms?  Benign localized prostatic hyperplasia with lower urinary tract symptoms (LUTS)      Has the patient had any previous Urologist(s)? [facility] 2022     Was the patient seen in the ED? no     Labs/Imaging(Including Out Of Network)? labs      Records Requested? no  Records Visible in EPIC? yes      Personal history of cancer? no     Appointment   Office location preference:  [facility]   ?   Appointment Details   Date:  8/29/25  Time:  7:20am  Location:  [facility]  Provider:  Jennifer   Does the appointment need further review? no

## 2025-08-15 ENCOUNTER — OFFICE VISIT (OUTPATIENT)
Dept: FAMILY MEDICINE CLINIC | Facility: CLINIC | Age: 65
End: 2025-08-15
Payer: MEDICARE

## 2025-08-19 ENCOUNTER — TELEPHONE (OUTPATIENT)
Age: 65
End: 2025-08-19

## 2025-08-19 DIAGNOSIS — M75.42 IMPINGEMENT SYNDROME OF LEFT SHOULDER: ICD-10-CM

## 2025-08-19 DIAGNOSIS — M24.812 INTERNAL DERANGEMENT OF LEFT SHOULDER: Primary | ICD-10-CM

## (undated) DEVICE — CATH FOLEY 20FR 5ML 2 WAY SILICONE ELASTIMER

## (undated) DEVICE — GLOVE SRG BIOGEL ORTHOPEDIC 7.5

## (undated) DEVICE — GLOVE SRG BIOGEL 7

## (undated) DEVICE — TELFA NON-ADHERENT ABSORBENT DRESSING: Brand: TELFA

## (undated) DEVICE — SUT VICRYL 0 UR-6 27 IN J603H

## (undated) DEVICE — GLOVE SRG BIOGEL ECLIPSE 7

## (undated) DEVICE — SYRINGE 10ML LL

## (undated) DEVICE — GLOVE INDICATOR PI UNDERGLOVE SZ 8 BLUE

## (undated) DEVICE — GLOVE SRG BIOGEL ECLIPSE 7.5

## (undated) DEVICE — VISUALIZATION SYSTEM: Brand: CLEARIFY

## (undated) DEVICE — 1820 FOAM BLOCK NEEDLE COUNTER: Brand: DEVON

## (undated) DEVICE — SUT VICRYL PLUS 0 CTB-1 27 IN VCPB260H

## (undated) DEVICE — 3M™ TEGADERM™ TRANSPARENT FILM DRESSING FRAME STYLE, 1626W, 4 IN X 4-3/4 IN (10 CM X 12 CM), 50/CT 4CT/CASE: Brand: 3M™ TEGADERM™

## (undated) DEVICE — UTILITY MARKER,BLACK WITH LABELS: Brand: DEVON

## (undated) DEVICE — SUT SILK 2-0 TIES 144 IN LA55G

## (undated) DEVICE — CATH URETERAL 5FR X 70 CM FLEX TIP POLYUR BARD

## (undated) DEVICE — SUT VICRYL 2-0 SH 27 IN UNDYED J417H

## (undated) DEVICE — PACK TUR

## (undated) DEVICE — SUT PDS II 4-0 SH 27 IN Z315H

## (undated) DEVICE — GLOVE SRG BIOGEL 8

## (undated) DEVICE — CATH URETHERAL CONNECTOR

## (undated) DEVICE — SUT PDS II 0 CT-1 27 IN Z340H

## (undated) DEVICE — PREMIUM DRY TRAY LF: Brand: MEDLINE INDUSTRIES, INC.

## (undated) DEVICE — MEDI-VAC YANKAUER SUCTION HANDLE W/STRAIGHT TIP & CONTROL VENT: Brand: CARDINAL HEALTH

## (undated) DEVICE — GLOVE INDICATOR PI UNDERGLOVE SZ 7 BLUE

## (undated) DEVICE — BULB SYRINGE,IRRIGATION WITH PROTECTIVE CAP: Brand: DOVER

## (undated) DEVICE — CHLORAPREP HI-LITE 26ML ORANGE

## (undated) DEVICE — STERILE MAJOR GENERAL PACK: Brand: CARDINAL HEALTH

## (undated) DEVICE — SUT PROLENE 2-0 RB-1/RB-1 36 IN 8559H

## (undated) DEVICE — 3M™ IOBAN™ 2 ANTIMICROBIAL INCISE DRAPE 6650EZ: Brand: IOBAN™ 2

## (undated) DEVICE — SCD SEQUENTIAL COMPRESSION COMFORT SLEEVE MEDIUM KNEE LENGTH: Brand: KENDALL SCD

## (undated) DEVICE — URIMETER 2500ML

## (undated) DEVICE — SUT VICRYL 3-0 SH 27 IN J416H

## (undated) DEVICE — CYSTO TUBING SINGLE IRRIGATION

## (undated) DEVICE — GAUZE SPONGES,USP TYPE VII GAUZE, 12 PLY: Brand: CURITY

## (undated) DEVICE — DRAPE PROBE NEO-PROBE/ULTRASOUND

## (undated) DEVICE — GUIDEWIRE ANGLED TIP 0.035 IN SOLO PLUS

## (undated) DEVICE — INTENDED FOR TISSUE SEPARATION, AND OTHER PROCEDURES THAT REQUIRE A SHARP SURGICAL BLADE TO PUNCTURE OR CUT.: Brand: BARD-PARKER SAFETY BLADES SIZE 15, STERILE

## (undated) DEVICE — ENSEAL 20 CM SHAFT, LARGE JAW: Brand: ENSEAL X1

## (undated) DEVICE — SUT SILK 2-0 SH 30 IN K833H

## (undated) DEVICE — SYRINGE CATH TIP 50ML

## (undated) DEVICE — SUT MONOCRYL 4-0 PS-2 18 IN Y496G

## (undated) DEVICE — CO2 AND WATER TUBING/CAP SET FOR OLYMPUS® SCOPES & UCR: Brand: ERBE

## (undated) DEVICE — TUBING SUCTION 5MM X 12 FT

## (undated) DEVICE — NEEDLE 25G X 1 1/2

## (undated) DEVICE — Device: Brand: OMNICLOSE TROCAR SITE CLOSURE DEVICE

## (undated) DEVICE — RESOLUTION CLIP 2.8MM X 235CM STR LF DISP

## (undated) DEVICE — SINGLE-USE BIOPSY FORCEPS: Brand: RADIAL JAW 4

## (undated) DEVICE — PLUMEPEN PRO 10FT

## (undated) DEVICE — PVC URETHRAL CATHETER: Brand: DOVER

## (undated) DEVICE — SPONGE 4 X 4 XRAY 16 PLY STRL LF RFD

## (undated) DEVICE — STERILE SURGICAL LUBRICANT,  TUBE: Brand: SURGILUBE

## (undated) DEVICE — BUTTON SWITCH PENCIL HOLSTER: Brand: VALLEYLAB

## (undated) DEVICE — Device: Brand: DEFENDO AIR/WATER/SUCTION AND BIOPSY VALVE

## (undated) DEVICE — SPECIMEN CONTAINER STERILE PEEL PACK

## (undated) DEVICE — MAYO STAND COVER: Brand: CONVERTORS

## (undated) DEVICE — IV FLUSH NSS 10ML POSIFLUSH

## (undated) DEVICE — HEAVY DUTY TABLE COVER: Brand: CONVERTORS

## (undated) DEVICE — CONTOUR CURVED CUTTER STAPLER RELOAD: Brand: CONTOUR

## (undated) DEVICE — 3M™ STERI-STRIP™ REINFORCED ADHESIVE SKIN CLOSURES, R1547, 1/2 IN X 4 IN (12 MM X 100 MM), 6 STRIPS/ENVELOPE: Brand: 3M™ STERI-STRIP™

## (undated) DEVICE — TRAY FOLEY 16FR URIMETER SURESTEP

## (undated) DEVICE — 3M™ IOBAN™ 2 ANTIMICROBIAL INCISE DRAPE 6648EZ: Brand: IOBAN™ 2

## (undated) DEVICE — DRAPE SURGIKIT SADDLE BAG

## (undated) DEVICE — DRAPE LAPAROTOMY W/POUCHES

## (undated) DEVICE — 3000CC GUARDIAN II: Brand: GUARDIAN

## (undated) DEVICE — ENDOPATH XCEL UNIVERSAL TROCAR STABLILITY SLEEVES: Brand: ENDOPATH XCEL

## (undated) DEVICE — MEDI-VAC YANK SUCT HNDL W/TPRD BULBOUS TIP: Brand: CARDINAL HEALTH

## (undated) DEVICE — GLOVE SRG BIOGEL 7.5

## (undated) DEVICE — SPONGE LAP 18 X 18 IN

## (undated) DEVICE — EXIDINE 4 PCT

## (undated) DEVICE — ADHESIVE SKN CLSR HISTOACRYL FLEX 0.5ML LF

## (undated) DEVICE — PACK PBDS STERILE LAP LITHOTOMY RF

## (undated) DEVICE — SPONGE LAP 18 X 18 IN STRL RFD

## (undated) DEVICE — SUT SILK 3-0 SH 30 IN K832H

## (undated) DEVICE — ENDOPATH 5MM CURVED SCISSORS WITH MONOPOLAR CAUTERY: Brand: ENDOPATH

## (undated) DEVICE — GUIDEWIRE ANGLE TIP 0.038 IN SOLO PLUS

## (undated) DEVICE — SYRINGE 10ML LL CONTROL TOP

## (undated) DEVICE — GUIDEWIRE STRGHT TIP 0.035 IN  SOLO PLUS

## (undated) DEVICE — ENDOPATH XCEL BLADELESS TROCARS WITH STABILITY SLEEVES: Brand: ENDOPATH XCEL

## (undated) DEVICE — CATH SECURE FOLEY

## (undated) DEVICE — 3M™ TEGADERM™ TRANSPARENT FILM DRESSING FRAME STYLE, 1628, 6 IN X 8 IN (15 CM X 20 CM), 10/CT 8CT/CASE: Brand: 3M™ TEGADERM™

## (undated) DEVICE — DRESSING MEPILEX AG BORDER 4 X 8 IN

## (undated) DEVICE — GLOVE INDICATOR PI UNDERGLOVE SZ 6.5 BLUE

## (undated) DEVICE — INTENDED FOR TISSUE SEPARATION, AND OTHER PROCEDURES THAT REQUIRE A SHARP SURGICAL BLADE TO PUNCTURE OR CUT.: Brand: BARD-PARKER SAFETY BLADES SIZE 10, STERILE

## (undated) DEVICE — ASTOUND STANDARD SURGICAL GOWN, XL: Brand: CONVERTORS

## (undated) DEVICE — ENDOPATH PNEUMONEEDLE INSUFFLATION NEEDLES WITH LUER LOCK CONNECTORS 120MM: Brand: ENDOPATH

## (undated) DEVICE — PROXIMATE PLUS MD MULTI-DIRECTIONAL RELEASE SKIN STAPLERS CONTAINS 35 STAINLESS STEEL STAPLES APPROXIMATE CLOSED DIMENSIONS: 6.9MM X 3.9MM WIDE: Brand: PROXIMATE

## (undated) DEVICE — NEEDLE SPINAL 22G X 3.5IN  QUINCKE

## (undated) DEVICE — SUT VICRYL 2-0 REEL 54 IN J286G

## (undated) DEVICE — SUT MONOCRYL 3-0 PS-2 18 IN Y497G

## (undated) DEVICE — PACK PBDS LAP CHOLE RF

## (undated) DEVICE — CURVED INTRALUMINAL STAPLER (ILS) 24 TITANIUM ADJUSTABLE HEIGHT STAPLES

## (undated) DEVICE — UROCATCH BAG

## (undated) DEVICE — PROBE ULTRASOUND TRIPLANE TRANSRECTAL

## (undated) DEVICE — JACKSON-PRATT 100CC BULB RESERVOIR: Brand: CARDINAL HEALTH

## (undated) DEVICE — STERILE LAP LITHOTOMY PACK: Brand: CARDINAL HEALTH

## (undated) DEVICE — INSUFLATION TUBING INSUFLOW (LEXION)

## (undated) DEVICE — STERILE CYSTO PACK: Brand: CARDINAL HEALTH

## (undated) DEVICE — CONTOUR CURVED CUTTER STAPLER: Brand: CONTOUR

## (undated) DEVICE — THE EXACTO COLD SNARE IS INTENDED TO BE USED WITHOUT DIATHERMIC ENERGY FOR THE ENDOSCOPIC RESECTION OF POLYP TISSUE IN THE GASTROINTESTINAL TRACT.: Brand: EXACTO

## (undated) DEVICE — INTENDED FOR TISSUE SEPARATION, AND OTHER PROCEDURES THAT REQUIRE A SHARP SURGICAL BLADE TO PUNCTURE OR CUT.: Brand: BARD-PARKER SAFETY BLADES SIZE 11, STERILE

## (undated) DEVICE — JP PERF DRN SIL FLT 10MM FULL: Brand: CARDINAL HEALTH

## (undated) DEVICE — SUT ETHILON 3-0 PS-1 18 IN 1663G